# Patient Record
Sex: MALE | Race: WHITE | Employment: OTHER | URBAN - METROPOLITAN AREA
[De-identification: names, ages, dates, MRNs, and addresses within clinical notes are randomized per-mention and may not be internally consistent; named-entity substitution may affect disease eponyms.]

---

## 2017-01-16 ENCOUNTER — TELEPHONE (OUTPATIENT)
Dept: FAMILY MEDICINE CLINIC | Age: 72
End: 2017-01-16

## 2017-01-16 NOTE — TELEPHONE ENCOUNTER
Patient states that he would like to request a antibiotic called in for him, patient states that he is having the same symptoms as he did the last time an antibiotic was called in for him (chest cold, coughing up green phlegm). Patient states that he would also like this medication called in before 5pm today, advised patient of Dr. Yumiko song on Mondays. Patient states that he would just like this done as quickly as possible.      Best call back # for patient: [phone]

## 2017-01-17 RX ORDER — AMOXICILLIN AND CLAVULANATE POTASSIUM 875; 125 MG/1; MG/1
1 TABLET, FILM COATED ORAL 2 TIMES DAILY
Qty: 20 TAB | Refills: 0 | Status: SHIPPED | OUTPATIENT
Start: 2017-01-17 | End: 2017-01-27

## 2017-05-11 ENCOUNTER — OFFICE VISIT (OUTPATIENT)
Dept: FAMILY MEDICINE CLINIC | Age: 72
End: 2017-05-11

## 2017-05-11 VITALS
RESPIRATION RATE: 16 BRPM | DIASTOLIC BLOOD PRESSURE: 62 MMHG | SYSTOLIC BLOOD PRESSURE: 118 MMHG | TEMPERATURE: 97.4 F | BODY MASS INDEX: 26.93 KG/M2 | WEIGHT: 188.13 LBS | OXYGEN SATURATION: 98 % | HEART RATE: 73 BPM | HEIGHT: 70 IN

## 2017-05-11 DIAGNOSIS — D64.9 ANEMIA, UNSPECIFIED TYPE: ICD-10-CM

## 2017-05-11 DIAGNOSIS — E11.65 TYPE 2 DIABETES MELLITUS WITH HYPERGLYCEMIA, WITHOUT LONG-TERM CURRENT USE OF INSULIN (HCC): Primary | ICD-10-CM

## 2017-05-11 DIAGNOSIS — E78.5 HYPERLIPIDEMIA LDL GOAL <100: ICD-10-CM

## 2017-05-11 DIAGNOSIS — Z23 ENCOUNTER FOR IMMUNIZATION: ICD-10-CM

## 2017-05-11 DIAGNOSIS — E55.9 VITAMIN D DEFICIENCY: ICD-10-CM

## 2017-05-11 RX ORDER — LATANOPROST 50 UG/ML
SOLUTION/ DROPS OPHTHALMIC
Refills: 5 | COMMUNITY
Start: 2017-03-08 | End: 2017-08-03 | Stop reason: ALTCHOICE

## 2017-05-11 NOTE — PROGRESS NOTES
Patient Name: Josh Shah   MRN: [id number]    Francesco [place] is a 73 y.o. male who presents with the following: Transferring care from prior PCP Dr. Tono Lujan. He is seen for diabetes, hypertension, hyperlipidemia, history of prior MI, Atrial Fibrillation and history of TIA's. Since last visit he reports no chest pain, dyspnea or TIA's, no numbness, tingling or pain in extremities, no unusual visual symptoms, no hypoglycemia, no medication side effects noted. Home glucose monitoring: is performed regularly, fasting values range 100-200s. He reports medication compliance: compliant all of the time. Medication side effects: none. Diabetic diet compliance: compliant all of the time. Reports that he starting to get steroid injection into his eye last fall and noticed glucose levels are high afterwards. No longer will be getting steroid injections; previously did not take glyburide on a regular basis due to well controlled A1C. Lab Results   Component Value Date/Time    Hemoglobin A1c 7.8 11/09/2016 11:05 AM    Hemoglobin A1c (POC) 6.3 08/02/2016 12:53 PM     History of vitamin D deficiency, not currently on supplements. States that the weekly high-dose vitamin D caused insomnia for several weeks. Would prefer a daily dose instead. History of iron deficiency anemia. Previously has been admitted for anemia requiring blood transfusion. 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 Saw oncology who recommended either monitoring labs or doing a bone marrow biopsy. Patient has not yet done a bone marrow biopsy. Of note, he also has a history of abnormal free light chains which were thought to be attributable to his chronic kidney disease. Patient reports during the time he was diagnosed with anemia, he felt very fatigued and ill but currently these days he feels well. Has upcoming GI appt.     Lab Results   Component Value Date/Time    WBC 5.6 08/09/2016 02:55 PM HGB 9.3 08/09/2016 02:55 PM    HCT 30.1 08/09/2016 02:55 PM    PLATELET 747 27/00/7663 02:55 PM    MCV 82 08/09/2016 02:55 PM     Lab Results   Component Value Date/Time    Iron 47 07/06/2016 03:33 PM    TIBC 395 07/06/2016 03:33 PM    Iron % saturation 12 07/06/2016 03:33 PM    Ferritin 158 07/06/2016 03:33 PM         Review of Systems   Constitutional: Negative for fever, malaise/fatigue and weight loss. Respiratory: Negative for cough, hemoptysis, shortness of breath and wheezing. Cardiovascular: Negative for chest pain, palpitations, leg swelling and PND. Gastrointestinal: Negative for abdominal pain, constipation, diarrhea, nausea and vomiting. The patient's medications, allergies, past medical history, surgical history, family history and social history were reviewed and updated where appropriate. Prior to Admission medications    Medication Sig Start Date End Date Taking? Authorizing Provider   latanoprost (XALATAN) 0.005 % ophthalmic solution INSTILL ONE DROP INTO RIGHT EYE AT BEDTIME 3/8/17  Yes Historical Provider   glyBURIDE (DIABETA) 1.25 mg tablet Take 1 Tab by mouth Daily (before breakfast). 11/10/16  Yes Brittany Mathis DO   colchicine 0.6 mg tablet TAKE 1 TABLET BY MOUTH DAILY AS NEEDED 10/10/16  Yes Historical Provider   digoxin (LANOXIN) 0.125 mg tablet Take 1 Tab by mouth daily. 8/25/16  Yes Brittany Mathis DO   furosemide (LASIX) 40 mg tablet 40 mg daily. 5/10/16  Yes Historical Provider   hydrALAZINE (APRESOLINE) 25 mg tablet 25 mg three (3) times daily. 5/20/16  Yes Historical Provider   Febuxostat (ULORIC) 80 mg tab tablet Take 80 mg by mouth daily. Yes Historical Provider   omeprazole (PRILOSEC) 20 mg capsule Take 20 mg by mouth daily as needed. Yes Historical Provider   multivitamin (ONE A DAY) tablet Take 1 Tab by mouth daily.    Yes Historical Provider   Shelda Older TEST strip  1/29/16  Yes Historical Provider   glucose blood VI test strips (BLOOD GLUCOSE TEST) strip Per patient insurance and meter requirements. Testing 4 times per day. 2/26/16  Yes Yuliet Graft, DO   apixaban (ELIQUIS) 5 mg tablet Take 1 Tab by mouth two (2) times a day. 1/18/16  Yes Slick Forde NP   pravastatin (PRAVACHOL) 20 mg tablet Take 1 Tab by mouth nightly. 1/18/16  Yes Slick Forde NP   carvedilol (COREG) 3.125 mg tablet Take 1 Tab by mouth two (2) times daily (with meals). 1/18/16  Yes Slick Forde NP       Allergies   Allergen Reactions    Epinephrine Palpitations    Other Medication Other (comments)     Novacaine  Causes tachycardia           Past Medical History:   Diagnosis Date    Acute encephalopathy 1/14/2016    Anemia 5/11/2017    Atrial fibrillation (HCC)     Painter esophagus     CAD (coronary artery disease)     Diabetes (HCC)     Heart failure (HCC)     Cardiomyopathy, myocarditis    HTN, goal below 140/90     Retinopathy, diabetic, bilateral ([address] 3/11/2016    Stenosis of right carotid artery without cerebral infarction 1/18/2016    TIA (transient ischemic attack) 1/14/2016    Vitamin D deficiency 3/1/2016    Nephrology ordered and follow 2/22/16        Past Surgical History:   Procedure Laterality Date    COLONOSCOPY N/A 6/23/2016    COLONOSCOPY performed by Artis Hawkins MD at [facility] END[facility]COPY    HX UROLOGICAL  2013       Family History   Problem Relation Age of Onset    No Known Problems Mother     No Known Problems Father        Social History     Social History    Marital status:      Spouse name: N/A    Number of children: N/A    Years of education: N/A     Occupational History    Not on file.      Social History Main Topics    Smoking status: Former Smoker    Smokeless tobacco: Never Used    Alcohol use Yes      Comment: socially    Drug use: No    Sexual activity: Not Currently     Other Topics Concern    Not on file     Social History Narrative    ** Merged History Encounter **                OBJECTIVE    Visit Vitals  /62 (BP 1 Location: Right arm, BP Patient Position: Sitting)    Pulse 73    Temp 97.4 °F (36.3 °C) (Oral)    Resp 16    Ht 5' 10\" (1.778 m)    Wt 188 lb 2 oz (85.3 kg)    SpO2 98%    BMI 26.99 kg/m2       Physical Exam   Constitutional: He is well-developed, well-nourished, and in no distress. No distress. HENT:   Head: Normocephalic and atraumatic. Right Ear: External ear normal.   Left Ear: External ear normal.   Eyes: Conjunctivae and EOM are normal. Pupils are equal, round, and reactive to light. Cardiovascular: Normal rate and normal heart sounds. Exam reveals no gallop and no friction rub. No murmur heard. Irregularly irregular   Pulmonary/Chest: Effort normal and breath sounds normal. No respiratory distress. He has no wheezes. Skin: He is not diaphoretic. Psychiatric: Mood, memory, affect and judgment normal.   Nursing note and vitals reviewed. ASSESSMENT AND PLAN  Betty Harrell is a 73 y.o. male who presents today for:    1. Type 2 diabetes mellitus with hyperglycemia, without long-term current use of insulin (HCC)  Stable, continue current treatment pending review of labs. - HEMOGLOBIN A1C WITH EAG  - MICROALBUMIN, UR, RAND W/ MICROALBUMIN/CREA RATIO    2. Hyperlipidemia LDL goal <100  Will calculate ASCVD risk score pending labs. - METABOLIC PANEL, COMPREHENSIVE  - LIPID PANEL    3. Vitamin D deficiency  Stable, continue current treatment pending review of labs  - VITAMIN D, 25 HYDROXY    4. Anemia, unspecified type  Will reevaluate; if still abnormal, pt to schedule bone marrow biopsy and/or see oncology again. Pt to keep upcoming GI appt. - CBC WITH AUTOMATED DIFF    5. Encounter for immunization  - diph,Pertuss,AC,,Tet Vac-PF (BOOSTRIX) 2.5-8-5 Lf-mcg suspension; 0.5 mL by IntraMUSCular route once for 1 dose.  Please fax vaccination documentation to [phone] Attn: Dr. Pedro Centeno  Dispense: 0.5 mL; Refill: 0  - pneumococcal 13 rodney conj dip (PREVNAR 13, PF,) 0.5 mL syrg injection; 0.5 mL by IntraMUSCular route once for 1 dose. Please fax vaccination documentation to [phone] Attn: Dr. Svitlana Jennings  Dispense: 0.5 mL; Refill: 0  - varicella zoster vacine live (VARICELLA-ZOSTER VACINE LIVE) 19,400 unit/0.65 mL susr injection; 1 Vial by SubCUTAneous route once for 1 dose. Dispense: 0.65 mL; Refill: 0       Medications Discontinued During This Encounter   Medication Reason    Melatonin 300 mcg tab Not A Current Medication       Follow-up Disposition:  Return in about 3 months (around 8/11/2017) for DM follow up. Medication risks/benefits/costs/interactions/alternatives discussed with patient. Advised patient to call back or return to office if symptoms worsen/change/persist. If patient cannot reach us or should anything more severe/urgent arise he/she should proceed directly to the nearest emergency department. Discussed expected course/resolution/complications of diagnosis in detail with patient. Patient given a written after visit summary which includes his/her diagnoses, current medications and vitals. Patient expressed understanding with the diagnosis and plan.      Svitlana Jennings M.D.

## 2017-05-11 NOTE — PATIENT INSTRUCTIONS

## 2017-05-11 NOTE — MR AVS SNAPSHOT
Visit Information Date & Time Provider Department Dept. Phone Encounter #  
 5/11/2017 10:30 AM Mamta March MD [address] [place] [address] Avenue [phone] [id number] Follow-up Instructions Return in about 3 months (around 8/11/2017) for DM follow up. Upcoming Health Maintenance Date Due DTaP/Tdap/Td series (1 - Tdap) 10/26/1968 ZOSTER VACCINE AGE 60> 10/26/2007 Pneumococcal 65+ Low/Medium Risk (1 of 2 - PCV13) 10/26/2012 LIPID PANEL Q1 1/15/2017 FOOT EXAM Q1 2/26/2017 MICROALBUMIN Q1 4/29/2017 EYE EXAM RETINAL OR DILATED Q1 4/29/2017 HEMOGLOBIN A1C Q6M 5/9/2017 FOBT Q 1 YEAR AGE 50-75 5/25/2017 INFLUENZA AGE 9 TO ADULT 8/1/2017 MEDICARE YEARLY EXAM 11/10/2017 GLAUCOMA SCREENING Q2Y 4/29/2018 Allergies as of 5/11/2017  Review Complete On: 5/11/2017 By: Kaur Mohan LPN Severity Noted Reaction Type Reactions Epinephrine  07/06/2016    Palpitations Other Medication  03/03/2011    Other (comments) Novacaine Causes tachycardia Current Immunizations  Reviewed on 11/9/2016 Name Date Influenza High Dose Vaccine PF 2/26/2016 Not reviewed this visit You Were Diagnosed With   
  
 Codes Comments Vitamin D deficiency    -  Primary ICD-10-CM: E55.9 ICD-9-CM: 268.9 Type 2 diabetes mellitus with hyperglycemia, without long-term current use of insulin (HCC)     ICD-10-CM: E11.65 ICD-9-CM: 250.00, 790.29 Hyperlipidemia LDL goal <100     ICD-10-CM: E78.5 ICD-9-CM: 272.4 Anemia, unspecified type     ICD-10-CM: D64.9 ICD-9-CM: 515. 9 Vitals BP Pulse Temp Resp Height(growth percentile) Weight(growth percentile)  
 118/62 (BP 1 Location: Right arm, BP Patient Position: Sitting) 73 97.4 °F (36.3 °C) (Oral) 16 5' 10\" (1.778 m) 188 lb 2 oz (85.3 kg) SpO2 BMI Smoking Status 98% 26.99 kg/m2 Former Smoker Vitals History BMI and BSA Data Body Mass Index Body Surface Area  
 26.99 kg/m 2 2.05 m 2 Preferred Pharmacy Pharmacy Name Phone [id number] Maggie Morales, [address] [facility] [phone] Your Updated Medication List  
  
   
This list is accurate as of: 5/11/17 11:29 AM.  Always use your most recent med list.  
  
  
  
  
 apixaban 5 mg tablet Commonly known as:  Osvaldo Slough Take 1 Tab by mouth two (2) times a day. carvedilol 3.125 mg tablet Commonly known as:  Geannie Clos Take 1 Tab by mouth two (2) times daily (with meals). colchicine 0.6 mg tablet TAKE 1 TABLET BY MOUTH DAILY AS NEEDED  
  
 digoxin 0.125 mg tablet Commonly known as:  LANOXIN Take 1 Tab by mouth daily. febuxostat 80 mg Tab tablet Commonly known as:  Tura Moons Take 80 mg by mouth daily. furosemide 40 mg tablet Commonly known as:  LASIX 40 mg daily. glyBURIDE 1.25 mg tablet Commonly known as:  Basil Quarto Take 1 Tab by mouth Daily (before breakfast). hydrALAZINE 25 mg tablet Commonly known as:  APRESOLINE 25 mg three (3) times daily. latanoprost 0.005 % ophthalmic solution Commonly known as:  XALATAN  
INSTILL ONE DROP INTO RIGHT EYE AT BEDTIME  
  
 multivitamin tablet Commonly known as:  ONE A DAY Take 1 Tab by mouth daily. omeprazole 20 mg capsule Commonly known as:  PRILOSEC Take 20 mg by mouth daily as needed. * ONETOUCH ULTRA TEST strip Generic drug:  glucose blood VI test strips * glucose blood VI test strips strip Commonly known as:  blood glucose test  
Per patient insurance and meter requirements. Testing 4 times per day. pravastatin 20 mg tablet Commonly known as:  PRAVACHOL Take 1 Tab by mouth nightly. * Notice: This list has 2 medication(s) that are the same as other medications prescribed for you. Read the directions carefully, and ask your doctor or other care provider to review them with you. We Performed the Following CBC WITH AUTOMATED DIFF [63145 CPT(R)] HEMOGLOBIN A1C WITH EAG [61228 CPT(R)] LIPID PANEL [18853 CPT(R)] METABOLIC PANEL, COMPREHENSIVE [49729 CPT(R)] MICROALBUMIN, UR, RAND W/ MICROALBUMIN/CREA RATIO [id number] CPT(R)] VITAMIN D, 25 HYDROXY [id number] CPT(R)] Follow-up Instructions Return in about 3 months (around 8/11/2017) for DM follow up. Patient Instructions Learning About Meal Planning for Diabetes Why plan your meals? Meal planning can be a key part of managing diabetes. Planning meals and snacks with the right balance of carbohydrate, protein, and fat can help you keep your blood sugar at the target level you set with your doctor. You don't have to eat special foods. You can eat what your family eats, including sweets once in a while. But you do have to pay attention to how often you eat and how much you eat of certain foods. You may want to work with a dietitian or a certified diabetes educator. He or she can give you tips and meal ideas and can answer your questions about meal planning. This health professional can also help you reach a healthy weight if that is one of your goals. What plan is right for you? Your dietitian or diabetes educator may suggest that you start with the plate format or carbohydrate counting. The plate format The plate format is a simple way to help you manage how you eat. You plan meals by learning how much space each food should take on a plate. Using the plate format helps you spread carbohydrate throughout the day. It can make it easier to keep your blood sugar level within your target range. It also helps you see if you're eating healthy portion sizes. To use the plate format, you put non-starchy vegetables on half your plate. Add meat or meat substitutes on one-quarter of the plate.  Put a grain or starchy vegetable (such as brown rice or a potato) on the final quarter of the plate. You can add a small piece of fruit and some low-fat or fat-free milk or yogurt, depending on your carbohydrate goal for each meal. 
Here are some tips for using the plate format: · Make sure that you are not using an oversized plate. A 9-inch plate is best. Many restaurants use larger plates. · Get used to using the plate format at home. Then you can use it when you eat out. · Write down your questions about using the plate format. Talk to your doctor, a dietitian, or a diabetes educator about your concerns. Carbohydrate counting With carbohydrate counting, you plan meals based on the amount of carbohydrate in each food. Carbohydrate raises blood sugar higher and more quickly than any other nutrient. It is found in desserts, breads and cereals, and fruit. It's also found in starchy vegetables such as potatoes and corn, grains such as rice and pasta, and milk and yogurt. Spreading carbohydrate throughout the day helps keep your blood sugar levels within your target range. Your daily amount depends on several things, including your weight, how active you are, which diabetes medicines you take, and what your goals are for your blood sugar levels. A registered dietitian or diabetes educator can help you plan how much carbohydrate to include in each meal and snack. A guideline for your daily amount of carbohydrate is: · 45 to 60 grams at each meal. That's about the same as 3 to 4 carbohydrate servings. · 15 to 20 grams at each snack. That's about the same as 1 carbohydrate serving. The Nutrition Facts label on packaged foods tells you how much carbohydrate is in a serving of the food. First, look at the serving size on the food label. Is that the amount you eat in a serving? All of the nutrition information on a food label is based on that serving size. So if you eat more or less than that, you'll need to adjust the other numbers. Total carbohydrate is the next thing you need to look for on the label. If you count carbohydrate servings, one serving of carbohydrate is 15 grams. For foods that don't come with labels, such as fresh fruits and vegetables, you'll need a guide that lists carbohydrate in these foods. Ask your doctor, dietitian, or diabetes educator about books or other nutrition guides you can use. If you take insulin, you need to know how many grams of carbohydrate are in a meal. This lets you know how much rapid-acting insulin to take before you eat. If you use an insulin pump, you get a constant rate of insulin during the day. So the pump must be programmed at meals to give you extra insulin to cover the rise in blood sugar after meals. When you know how much carbohydrate you will eat, you can take the right amount of insulin. Or, if you always use the same amount of insulin, you need to make sure that you eat the same amount of carbohydrate at meals. If you need more help to understand carbohydrate counting and food labels, ask your doctor, dietitian, or diabetes educator. How do you get started with meal planning? Here are some tips to get started: 
· Plan your meals a week at a time. Don't forget to include snacks too. · Use cookbooks or online recipes to plan several main meals. Plan some quick meals for busy nights. You also can double some recipes that freeze well. Then you can save half for other busy nights when you don't have time to cook. · Make sure you have the ingredients you need for your recipes. If you're running low on basic items, put these items on your shopping list too. · List foods that you use to make breakfasts, lunches, and snacks. List plenty of fruits and vegetables. · Post this list on the refrigerator. Add to it as you think of more things you need. · Take the list to the store to do your weekly shopping. Follow-up care is a key part of your treatment and safety.  Be sure to make and go to all appointments, and call your doctor if you are having problems. It's also a good idea to know your test results and keep a list of the medicines you take. Where can you learn more? Go to http://tanika-claudia.info/. Kishan Derrick in the search box to learn more about \"Learning About Meal Planning for Diabetes. \" Current as of: October 26, 2016 Content Version: 11.2 © 6070-9657 [employer]. Care instructions adapted under license by [employer] (which disclaims liability or warranty for this information). If you have questions about a medical condition or this instruction, always ask your healthcare professional. [address] any warranty or liability for your use of this information. Introducing [facility]! Dear Kelechi July: Thank you for requesting a Ondeego account. Our records indicate that you already have an active Ondeego account. You can access your account anytime at [email or web address] Did you know that you can access your hospital and ER discharge instructions at any time in Ondeego? You can also review all of your test results from your hospital stay or ER visit. Additional Information If you have questions, please visit the Frequently Asked Questions section of the Ondeego website at [email or web address] Remember, Ondeego is NOT to be used for urgent needs. For medical emergencies, dial 911. Now available from your iPhone and Android! Please provide this summary of care documentation to your next provider. Your primary care clinician is listed as Amy Thacker. If you have any questions after today's visit, please call [phone].

## 2017-05-11 NOTE — PROGRESS NOTES
Patient presents in office today for routine office visit and to establish care with provider    Chief Complaint   Patient presents with    Diabetes     routine office visit    Cholesterol Problem     routine office visit   [address] [facility] transfer     1. Have you been to the ER, urgent care clinic since your last visit? Hospitalized since your last visit? No    2. Have you seen or consulted any other health care providers outside of the [address] [place] [address] since your last visit? Include any pap smears or colon screening. No     PHQ over the last two weeks 5/11/2017   Little interest or pleasure in doing things Not at all   Feeling down, depressed or hopeless Not at all   Total Score PHQ 2 0     Fall Risk Assessment, last 12 mths 5/11/2017   Able to walk? Yes   Fall in past 12 months?  No       Learning Assessment 5/11/2017   PRIMARY LEARNER Patient   PRIMARY LANGUAGE ENGLISH   LEARNER PREFERENCE PRIMARY OTHER (COMMENT)   ANSWERED BY patient   RELATIONSHIP SELF       Vitals:    05/11/17 1054   BP: 118/62   BP 1 Location: Right arm   BP Patient Position: Sitting   Pulse: 73   Resp: 16   Temp: 97.4 °F (36.3 °C)   TempSrc: Oral   SpO2: 98%   Weight: 188 lb 2 oz (85.3 kg)   Height: 5' 10\" (1.778 m)

## 2017-05-12 LAB
25(OH)D3+25(OH)D2 SERPL-MCNC: 26.9 NG/ML (ref 30–100)
ALBUMIN SERPL-MCNC: 4.6 G/DL (ref 3.6–4.8)
ALBUMIN/CREAT UR: 1339.5 MG/G CREAT (ref 0–30)
ALBUMIN/GLOB SERPL: 1.3 {RATIO} (ref 1.2–2.2)
ALP SERPL-CCNC: 64 IU/L (ref 39–117)
ALT SERPL-CCNC: 23 IU/L (ref 0–44)
AST SERPL-CCNC: 27 IU/L (ref 0–40)
BASOPHILS # BLD AUTO: 0 X10E3/UL (ref 0–0.2)
BASOPHILS NFR BLD AUTO: 0 %
BILIRUB SERPL-MCNC: 0.5 MG/DL (ref 0–1.2)
BUN SERPL-MCNC: 51 MG/DL (ref 8–27)
BUN/CREAT SERPL: 26 (ref 10–24)
CALCIUM SERPL-MCNC: 9.4 MG/DL (ref 8.6–10.2)
CHLORIDE SERPL-SCNC: 97 MMOL/L (ref 96–106)
CHOLEST SERPL-MCNC: 123 MG/DL (ref 100–199)
CO2 SERPL-SCNC: 25 MMOL/L (ref 18–29)
CREAT SERPL-MCNC: 1.96 MG/DL (ref 0.76–1.27)
CREAT UR-MCNC: 27.6 MG/DL
EOSINOPHIL # BLD AUTO: 0.1 X10E3/UL (ref 0–0.4)
EOSINOPHIL NFR BLD AUTO: 2 %
ERYTHROCYTE [DISTWIDTH] IN BLOOD BY AUTOMATED COUNT: 14.1 % (ref 12.3–15.4)
EST. AVERAGE GLUCOSE BLD GHB EST-MCNC: 209 MG/DL
GLOBULIN SER CALC-MCNC: 3.5 G/DL (ref 1.5–4.5)
GLUCOSE SERPL-MCNC: 314 MG/DL (ref 65–99)
HBA1C MFR BLD: 8.9 % (ref 4.8–5.6)
HCT VFR BLD AUTO: 38 % (ref 37.5–51)
HDLC SERPL-MCNC: 31 MG/DL
HGB BLD-MCNC: 12.1 G/DL (ref 12.6–17.7)
IMM GRANULOCYTES # BLD: 0 X10E3/UL (ref 0–0.1)
IMM GRANULOCYTES NFR BLD: 0 %
INTERPRETATION, 910389: NORMAL
INTERPRETATION: NORMAL
LDLC SERPL CALC-MCNC: 76 MG/DL (ref 0–99)
LYMPHOCYTES # BLD AUTO: 1 X10E3/UL (ref 0.7–3.1)
LYMPHOCYTES NFR BLD AUTO: 17 %
MCH RBC QN AUTO: 29.2 PG (ref 26.6–33)
MCHC RBC AUTO-ENTMCNC: 31.8 G/DL (ref 31.5–35.7)
MCV RBC AUTO: 92 FL (ref 79–97)
MICROALBUMIN UR-MCNC: 369.7 UG/ML
MONOCYTES # BLD AUTO: 0.4 X10E3/UL (ref 0.1–0.9)
MONOCYTES NFR BLD AUTO: 6 %
NEUTROPHILS # BLD AUTO: 4.3 X10E3/UL (ref 1.4–7)
NEUTROPHILS NFR BLD AUTO: 75 %
PDF IMAGE, 910387: NORMAL
PLATELET # BLD AUTO: 165 X10E3/UL (ref 150–379)
POTASSIUM SERPL-SCNC: 5 MMOL/L (ref 3.5–5.2)
PROT SERPL-MCNC: 8.1 G/DL (ref 6–8.5)
RBC # BLD AUTO: 4.15 X10E6/UL (ref 4.14–5.8)
SODIUM SERPL-SCNC: 138 MMOL/L (ref 134–144)
TRIGL SERPL-MCNC: 78 MG/DL (ref 0–149)
VLDLC SERPL CALC-MCNC: 16 MG/DL (ref 5–40)
WBC # BLD AUTO: 5.8 X10E3/UL (ref 3.4–10.8)

## 2017-05-16 NOTE — PROGRESS NOTES
Call to patient  verified. Informed patient of all results and recommendations. Patient will contact nephrologist regarding lisinopril 2.5 mg  Scheduled patient for  with Dr. Gardenia Garcia.

## 2017-05-25 ENCOUNTER — OFFICE VISIT (OUTPATIENT)
Dept: FAMILY MEDICINE CLINIC | Age: 72
End: 2017-05-25

## 2017-05-25 VITALS
BODY MASS INDEX: 26.6 KG/M2 | HEART RATE: 72 BPM | HEIGHT: 70 IN | DIASTOLIC BLOOD PRESSURE: 79 MMHG | SYSTOLIC BLOOD PRESSURE: 186 MMHG | WEIGHT: 185.8 LBS

## 2017-05-25 RX ORDER — INSULIN GLARGINE 100 [IU]/ML
14 INJECTION, SOLUTION SUBCUTANEOUS DAILY
Qty: 5 PEN | Refills: 3 | Status: SHIPPED | OUTPATIENT
Start: 2017-05-25 | End: 2018-05-27 | Stop reason: SDUPTHER

## 2017-05-25 RX ORDER — PEN NEEDLE, DIABETIC 31 GX3/16"
1 NEEDLE, DISPOSABLE MISCELLANEOUS DAILY
Qty: 100 PEN NEEDLE | Refills: 3 | Status: SHIPPED | OUTPATIENT
Start: 2017-05-25 | End: 2018-08-04 | Stop reason: SDUPTHER

## 2017-05-25 RX ORDER — BLOOD SUGAR DIAGNOSTIC
STRIP MISCELLANEOUS
Qty: 90 STRIP | Refills: 11 | Status: SHIPPED | OUTPATIENT
Start: 2017-05-25 | End: 2018-08-29

## 2017-05-25 NOTE — MR AVS SNAPSHOT
Visit Information Date & Time Provider Department Dept. Phone Encounter #  
 5/25/2017 11:00 AM Gladys Chan MD [address] [place] [address] [phone] [id number] Your Appointments 8/11/2017 10:00 AM  
ROUTINE CARE with Lewis Knight MD  
[facility]) Appt Note: 3 mo follow up  
 [address] [address]  
[phone]  
  
   
 [address] [address] Upcoming Health Maintenance Date Due DTaP/Tdap/Td series (1 - Tdap) 10/26/1968 ZOSTER VACCINE AGE 60> 10/26/2007 Pneumococcal 65+ Low/Medium Risk (1 of 2 - PCV13) 10/26/2012 FOOT EXAM Q1 2/26/2017 EYE EXAM RETINAL OR DILATED Q1 4/29/2017 FOBT Q 1 YEAR AGE 50-75 5/25/2017 INFLUENZA AGE 9 TO ADULT 8/1/2017 MEDICARE YEARLY EXAM 11/10/2017 HEMOGLOBIN A1C Q6M 11/11/2017 GLAUCOMA SCREENING Q2Y 4/29/2018 MICROALBUMIN Q1 5/11/2018 LIPID PANEL Q1 5/11/2018 Allergies as of 5/25/2017  Review Complete On: 5/25/2017 By: Gladys Chan MD  
  
 Severity Noted Reaction Type Reactions Epinephrine  07/06/2016    Palpitations Other Medication  03/03/2011    Other (comments) Novacaine Causes tachycardia Current Immunizations  Reviewed on 11/9/2016 Name Date Influenza High Dose Vaccine PF 2/26/2016 Not reviewed this visit You Were Diagnosed With   
  
 Codes Comments Uncontrolled type 2 diabetes mellitus with stage 4 chronic kidney disease, with long-term current use of insulin (HCC)    -  Primary ICD-10-CM: E11.22, E11.65, N18.4, Z79.4 ICD-9-CM: 250.52, 585.4, V58.67 Vitals BP Pulse Height(growth percentile) Weight(growth percentile) BMI Smoking Status 186/79 (BP 1 Location: Right arm, BP Patient Position: Sitting) 72 5' 10\" (1.778 m) 185 lb 12.8 oz (84.3 kg) 26.66 kg/m2 Former Smoker Vitals History BMI and BSA Data Body Mass Index Body Surface Area 26.66 kg/m 2 2.04 m 2 Preferred Pharmacy Pharmacy Name Phone [facility]/PHARMACY #[id number] [facility] [phone] Your Updated Medication List  
  
   
This list is accurate as of: 5/25/17 12:34 PM.  Always use your most recent med list.  
  
  
  
  
 apixaban 5 mg tablet Commonly known as:  Manuel Denice Take 1 Tab by mouth two (2) times a day. carvedilol 3.125 mg tablet Commonly known as:  Antionette Combe Take 1 Tab by mouth two (2) times daily (with meals). colchicine 0.6 mg tablet TAKE 1 TABLET BY MOUTH DAILY AS NEEDED  
  
 digoxin 0.125 mg tablet Commonly known as:  LANOXIN Take 1 Tab by mouth daily. febuxostat 80 mg Tab tablet Commonly known as:  Annye Hoots Take 80 mg by mouth daily. furosemide 40 mg tablet Commonly known as:  LASIX 40 mg daily. * glucose blood VI test strips strip Commonly known as:  blood glucose test  
Per patient insurance and meter requirements. Testing 4 times per day. * ONETOUCH ULTRA TEST strip Generic drug:  glucose blood VI test strips Test blood sugar 3 times daily  
  
 hydrALAZINE 25 mg tablet Commonly known as:  APRESOLINE 25 mg three (3) times daily. insulin glargine 100 unit/mL (3 mL) Inpn Commonly known as:  BASAGLAR KWIKPEN  
14 Units by SubCUTAneous route daily. Indications: type 2 diabetes mellitus Insulin Needles (Disposable) 32 gauge x 5/32\" Ndle Commonly known as:  COMFORT EZ PEN NEEDLES  
1 Each by Does Not Apply route daily. latanoprost 0.005 % ophthalmic solution Commonly known as:  XALATAN  
INSTILL ONE DROP INTO RIGHT EYE AT BEDTIME  
  
 multivitamin tablet Commonly known as:  ONE A DAY Take 1 Tab by mouth daily. omeprazole 20 mg capsule Commonly known as:  PRILOSEC Take 20 mg by mouth daily as needed. pravastatin 20 mg tablet Commonly known as:  PRAVACHOL Take 1 Tab by mouth nightly. * Notice: This list has 2 medication(s) that are the same as other medications prescribed for you. Read the directions carefully, and ask your doctor or other care provider to review them with you. Prescriptions Sent to Pharmacy Refills ONETOUCH ULTRA TEST strip 11 Sig: Test blood sugar 3 times daily Class: Normal  
 Pharmacy: [facility]/[address], [address] [place] [address] Ph #: [phone] insulin glargine (BASAGLAR KWIKPEN) 100 unit/mL (3 mL) inpn 3 Si Units by SubCUTAneous route daily. Indications: type 2 diabetes mellitus Class: Normal  
 Pharmacy: [facility]/[address], [address] [place] [address] Ph #: [phone] Route: SubCUTAneous Insulin Needles, Disposable, (COMFORT EZ PEN NEEDLES) 32 gauge x 5/32\" ndle 3 Si Each by Does Not Apply route daily. Class: Normal  
 Pharmacy: [address] Ph #: [phone] Route: Does Not Apply We Performed the Following Jaelyn 6 GLUCOSE FLOWSHEET [id number] CPT(R)]  DIABETES FOOT EXAM [7 Custom] Patient Instructions 1)  Please start checking your blood sugar first thing in the morning and then before bedtime. 2)  Stop taking your glyburide and start taking Basiglar 14 units under the skin every morning. Introducing [facility] & HEALTH SERVICES! Dear Jonel Mena: Thank you for requesting a [employer] account. Our records indicate that you already have an active [employer] account. You can access your account anytime at [email or web address] Did you know that you can access your hospital and ER discharge instructions at any time in [employer]ak[employer]? You can also review all of your test results from your hospital stay or ER visit. Additional Information If you have questions, please visit the Frequently Asked Questions section of the [employer] website at [email or web address] Remember, [employer] is NOT to be used for urgent needs. For medical emergencies, dial 911. Now available from your iPhone and Android! Please provide this summary of care documentation to your next provider. Your primary care clinician is listed as Amy Thacker. If you have any questions after today's visit, please call [phone].

## 2017-05-25 NOTE — PROGRESS NOTES
Chief Complaint   Patient presents with    New Patient    Diabetes     Type II diabetes. Last A1C - 8.9% - 5/11/17. Patient is testing BS 3 times daily    Diabetic Foot Exam     Patient is due for a foot exam     Other     Patient states that he had an eye exam - May 2017 - Dr. Christian Lamar     Next microalbumin is due 5/11/18       HPI  This is a 42-year-old white male with a history of type 2 diabetes mellitus diagnosed in 1989. He associates the diagnosis of diabetes with the initiation of carvedilol therapy for atrial fibrillation. He has been on carvedilol at varying doses for many years. His antihyperglycemic medication has been exclusively glyburide in varying doses. For quite some time his hemoglobin A1c's were well controlled on glyburide. More recently his hemoglobin A1c's have been increasing. His dose of carvedilol was decreased and the decrease in carvedilol was associated with an improvement in A1c. However it is important to point out that he also was hospitalized and received transfusions and so the reduction in hemoglobin A1c was likely artificial.  More recently, his A1c's have increased and his most recent value was 8.9% in early May. He has been trying to manage his diet with 1.25 mg of glyburide in the morning and dietary changes. He is on the road in his job as a . In general he checks her blood sugar in the morning. Recently those blood sugars have ranged between 180 and 280. He had a recent random blood sugar in his primary care physician's office of 312. His breakfast is usually eggs. Depending on the elevation in blood sugar in the morning he may or may not have a piece of bread or occasionally some leftover vegetables. He may or may not have lunch. If he does so it could be a sandwich and some flavored water. If his blood sugar is high that would be another day where he did not eat the bread but ate the meat and cheese.   He occasionally has a pack of peanut butter crackers in the afternoon and more flavored water. The evening meal is the largest meal of the day. Most often this is meat and chicken. Most of his vegetables are non-starchy vegetables including CABG, squash, peppers, onions and broccoli. He does not like [place] sprouts. He occasionally has corn. He may have a piece of dark chocolate in the evening. Usually does not snack at night although he occasionally has a low-carb yogurt. He states that he eats a lot of cheese. He has occasional episodes of hypoglycemia. The symptoms are described as tingling of the lips. He does not get palpitations or diaphoresis. ROS  He denies chest pain. He does have some diastolic heart failure and some shortness of breath. He denies constipation or diarrhea.   Visit Vitals    /79 (BP 1 Location: Right arm, BP Patient Position: Sitting)    Pulse 72    Ht 5' 10\" (1.778 m)    Wt 185 lb 12.8 oz (84.3 kg)    BMI 26.66 kg/m2       Physical Examination: General appearance - alert, well appearing, and in no distress  Mental status - alert, oriented to person, place, and time  Neck - supple, no significant adenopathy, thyroid exam: thyroid is normal in size without nodules or tenderness  Chest - clear to auscultation, no wheezes, rales or rhonchi, symmetric air entry  Heart - no murmurs noted, irregularly irregular rhythm with rate 86  Abdomen - soft, nontender, nondistended, no masses or organomegaly  Extremities - peripheral pulses normal, no pedal edema, no clubbing or cyanosis    Diabetic foot exam:     Left: Reflexes 4+     Vibratory sensation absent    Filament test reduced sensation with micro filament   Pulse DP: 2+ (normal)   Pulse PT: 2+ (normal)   Deformities: None  Right: Reflexes 4+   Vibratory sensation absent   Filament test reduced sensation with micro filament   Pulse DP: 2+ (normal)   Pulse PT: 2+ (normal)   Deformities: None      ASSESSMENT & PLAN  This gentleman has type 2 diabetes mellitus for nearly 30 years with elevated A1c's over the last 6 months. Given his baseline creatinine of nearly 2, clearly increasing the glyburide is inadvisable. Other oral medications are also not advised. Instead, we discussed the addition of basal insulin and have started him on 14 units of basaglar insulin once in the morning. We have chosen the morning because he thinks that is the best time for him to remember the medication. Alessio Barger reviewed the use of an insulin pen and he demonstrated good understanding. In addition, his wife is a nurse, he has 2 daughters who are nurses and his son is a physician so he has ample support to help him in the administration of the insulin. I have asked him to record blood sugars in the morning and at bedtime and return in 3 weeks. We will also communicate via my chart and adjustments in insulin will be made accordingly. He was also given my cell phone number. We spent more than 25 mintutes face to face, more than 50% was in counseling regarding diet, exercise and carbohydrate intake.

## 2017-05-25 NOTE — PATIENT INSTRUCTIONS
1)  Please start checking your blood sugar first thing in the morning and then before bedtime. 2)  Stop taking your glyburide and start taking Basiglar 14 units under the skin every morning.

## 2017-05-25 NOTE — PROGRESS NOTES
Patient seen today with Dr. Elder Oropeza and Vijaya Richmond RD. Patient's chart was reviewed prior to visit and discussed with team.      Patient is currently taking the following for diabetes: Glyburide 1.25 mg daily, patient states he \"rarely\" takes the evening dose, maybe 2 times a month    Patient reported medication adherence and denies any adverse effects from his medications. Patient currently checks blood sugars on a regular basis in the morning, but if it is higher he will check it multiple times daily for which he will decrease his carb intake during the day to help bring his blood sugar down. He did not bring in his blood sugars but states that it was in the 90's this morning with the highest blood sugar being in the high 200's. He hasn't reported any recent hypoglycemic events but states that he can tell when his sugars get into the 90's as his lips start to tingle. Demonstrated and verbally walked through how to inject basiglar using a demo pen which patient was able to successfully demonstrate back. Reviewed proper storage with patient and reviewed hypoglycemia with patient. Patient verbalized understanding of the above.     Sandip Gray, PharmD, Kaylie Elizabeth

## 2017-05-25 NOTE — PROGRESS NOTES
Reviewed pt's diet hx. He notes he has made some changes to tighten up his diet recently and now typical diet at home is generally low in carbohydrate. He does travel every few weeks for work for 3-5 days at a time and will eat more at restaurants with 30-50g carb at more meals than at home. Meals at home might be eggs with 0-1 slices toast; roast beef and cheese with or without bread for lunch sandwich; dinner of non-starchy veg and chicken. He is drinking only diet or sugar free drinks and flavored hendricks. Pt is eating well and aware of his carbohydrate intake and no diet changes appear needed at this time.

## 2017-06-29 ENCOUNTER — OFFICE VISIT (OUTPATIENT)
Dept: FAMILY MEDICINE CLINIC | Age: 72
End: 2017-06-29

## 2017-06-29 VITALS
HEART RATE: 67 BPM | BODY MASS INDEX: 26.26 KG/M2 | DIASTOLIC BLOOD PRESSURE: 95 MMHG | SYSTOLIC BLOOD PRESSURE: 220 MMHG | HEIGHT: 70 IN | WEIGHT: 183.4 LBS

## 2017-06-29 DIAGNOSIS — R03.0 ELEVATED BLOOD PRESSURE READING: Primary | ICD-10-CM

## 2017-06-29 NOTE — PROGRESS NOTES
At the same visit, I had patient patient show me using a basaglar demo pen how to put on and take off pen needle which patient was able to successfully complete. Patient states he will restart the basalgar tonight.     Estefania Lester, PharmD, Manish Baeza

## 2017-06-29 NOTE — PATIENT INSTRUCTIONS
1)  Restart the basaglar and let us know if you have any other problems with the basaglar pen    2) Send in your blood sugars via Hoodint 1 week

## 2017-06-29 NOTE — PROGRESS NOTES
This gentleman returns for follow-up of his type 2 diabetes mellitus with poor blood sugar control. He had been on 1.25 mg of glyburide daily but had with advancing renal insufficiency and inability to titrate this up further. Because of that, we elected to begin glargine insulin 14 units in the morning. The patient returns today having tried it briefly but only after discontinuing the glyburide altogether. He apparently had a lot of difficulty with the pen device and Ana spent a lot of time today going over his technique with him. At the same time, he was very frustrated with the fact that he had 2 m, both of which were giving him discrepant values and this added to his confusion and frustration. For the most part, he has not taken the insulin since we saw him last.  He went through a whole pen but we think he lost all of the insulin and through the pen away and so he has not been on the insulin for the most part. Examination  His blood pressure was 220/95. He claims to have missed his dose of antihypertensive this morning but has been taking it prior to that. Pulse 67  Lungs clear  Heart revealed a regular rate and rhythm with occasional skipped beats    Impression:  1. Type 2 diabetes mellitus with poor glucose control  2. Hypertension    Regarding his diabetes:  Alessio Barger did review his 2 different meters with him together this morning. There was some discrepancy between the 2 m. The test solution for the Livongo meter was accurate both at the low and high range suggesting that that meter is working well. Patient is going to test his One Touch meter with chest solution to determine whether it is accurate or not. Alessio Barger also went over with him the use of the insulin pen device and he demonstrated some understanding. Our hope is that he will begin taking 14 units of glargine insulin daily. Regarding his hypertension, I obtained a EKG today which was unchanged.   The patient notably was without any chest pain or shortness of breath. He did go back and take his blood pressure medication at home. We spent more than 25 mintutes face to face, more than 50% was in counseling regarding diet, exercise and carbohydrate intake.

## 2017-06-29 NOTE — PROGRESS NOTES
Patient seen today for follow-up. Patient's chart was reviewed prior to visit and discussed with team.      Patient is currently taking the following for diabetes: no medications. Patient states he did take the basaglar 14 units daily for a couple days but was concerned when he saw his blood sugars start to increase vs decrease. He expressed difficulties using the pen initially, he thought the pen needle snapped on versus screwed on so he couldn't get any insulin out when he tried to prime the pen. He then had problems removing the pen needle which he feels damaged the pen since it started leaking when he stored it. His wife and daughter showed him how to screw on the pen needle and patient states he didn't have any difficulty but then noted that his sugars were increasing so he stopped it. Patient also concerned about his glucometer. He received a livongo meter which he states reports higher numbers than his one touch meter (which he has always used). We asked him to bring back both glucometers with the control solution so we could retest his sugar using both meters and run the control solutions. Post visit:    Patient returned with both glucometers and control solution for the livongo meter. Using both meters simultaneously his blood sugars (11:50am):  Livongo: 243  One Touch: 175    Using controlled solutions, the livongo strip readings were within range for high and low solutions. After patient bought control solutions for the one touch, the high control was within range but the low control was 2 mg/dl below range. After discussion with Dr. Emery Franco, I told patient to use the livongo glucometer going forward and when he comes back in 1 week to bring it with him and we can retest and get a blood draw at the same time.

## 2017-06-29 NOTE — PROGRESS NOTES
Pt states he has not changed his food intake habits and continues to choose mostly protein and non-starchy vegetables to control his blood sugar and keep carbohydrates to a minimum. Nely Ledezma and Dr. Gardenia Garcia reviewed insulin use and plan to retry the previous goals and we will address blood sugars and diet later if needed once insulin regiment is administered correctly. He expressed comprehension, high motivation, and compliance is expected.

## 2017-07-06 ENCOUNTER — OFFICE VISIT (OUTPATIENT)
Dept: FAMILY MEDICINE CLINIC | Age: 72
End: 2017-07-06

## 2017-07-06 VITALS
DIASTOLIC BLOOD PRESSURE: 91 MMHG | HEART RATE: 64 BPM | BODY MASS INDEX: 26.11 KG/M2 | HEIGHT: 70 IN | SYSTOLIC BLOOD PRESSURE: 209 MMHG | WEIGHT: 182.4 LBS

## 2017-07-06 NOTE — PROGRESS NOTES
Pt has continued his low carbohydrate intake. His meals consist of mostly lean protein and non-starchy vegetables, main carbohydrate sources are from fruits. 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 He is doing much better with insulin regiment and plans to continue current diet. Provided positive reinforcement for progress. He expressed comprehension, high motivation, and compliance is expected.

## 2017-07-06 NOTE — MR AVS SNAPSHOT
Visit Information Date & Time Provider Department Dept. Phone Encounter #  
 7/6/2017 11:40 AM Keara Churchill  [facility] [phone] [id number] Your Appointments 8/11/2017 10:00 AM  
ROUTINE CARE with Roberto Luna MD  
[facility]) Appt Note: 3 mo follow up  
 [address] [address]  
[phone]  
  
   
 [address] [address] Upcoming Health Maintenance Date Due DTaP/Tdap/Td series (1 - Tdap) 10/26/1968 ZOSTER VACCINE AGE 60> 10/26/2007 Pneumococcal 65+ Low/Medium Risk (1 of 2 - PCV13) 10/26/2012 EYE EXAM RETINAL OR DILATED Q1 4/29/2017 FOBT Q 1 YEAR AGE 50-75 5/25/2017 INFLUENZA AGE 9 TO ADULT 8/1/2017 MEDICARE YEARLY EXAM 11/10/2017 HEMOGLOBIN A1C Q6M 11/11/2017 GLAUCOMA SCREENING Q2Y 4/29/2018 MICROALBUMIN Q1 5/11/2018 LIPID PANEL Q1 5/11/2018 FOOT EXAM Q1 5/25/2018 Allergies as of 7/6/2017  Review Complete On: 7/6/2017 By: Keara Churchill MD  
  
 Severity Noted Reaction Type Reactions Epinephrine  07/06/2016    Palpitations Other Medication  03/03/2011    Other (comments) Novacaine Causes tachycardia Current Immunizations  Reviewed on 11/9/2016 Name Date Influenza High Dose Vaccine PF 2/26/2016 Not reviewed this visit You Were Diagnosed With   
  
 Codes Comments Uncontrolled type 2 diabetes mellitus without complication, with long-term current use of insulin ([facility]    -  Primary ICD-10-CM: E11.65, Z79.4 ICD-9-CM: 250.02, V58.67 Vitals BP Pulse Height(growth percentile) Weight(growth percentile) BMI Smoking Status (!) 209/91 (BP 1 Location: Left arm, BP Patient Position: Sitting) 64 5' 10\" (1.778 m) 182 lb 6.4 oz (82.7 kg) 26.17 kg/m2 Former Smoker Vitals History BMI and BSA Data Body Mass Index Body Surface Area  
 26.17 kg/m 2 2.02 m 2 Preferred Pharmacy Pharmacy Name Phone [facility]/PHARMACY #[id number] [address], [address] [facility] [phone] Your Updated Medication List  
  
   
This list is accurate as of: 7/6/17 12:19 PM.  Always use your most recent med list.  
  
  
  
  
 apixaban 5 mg tablet Commonly known as:  Saeid Caraballo Take 1 Tab by mouth two (2) times a day. carvedilol 3.125 mg tablet Commonly known as:  Osorio Place Take 1 Tab by mouth two (2) times daily (with meals). colchicine 0.6 mg tablet TAKE 1 TABLET BY MOUTH DAILY AS NEEDED  
  
 digoxin 0.125 mg tablet Commonly known as:  LANOXIN Take 1 Tab by mouth daily. febuxostat 80 mg Tab tablet Commonly known as:  Adi Shun Take 80 mg by mouth daily. furosemide 40 mg tablet Commonly known as:  LASIX 40 mg daily. * glucose blood VI test strips strip Commonly known as:  blood glucose test  
Per patient insurance and meter requirements. Testing 4 times per day. * ONETOUCH ULTRA TEST strip Generic drug:  glucose blood VI test strips Test blood sugar 3 times daily  
  
 hydrALAZINE 25 mg tablet Commonly known as:  APRESOLINE 25 mg three (3) times daily. insulin glargine 100 unit/mL (3 mL) Inpn Commonly known as:  BASAGLAR KWIKPEN  
14 Units by SubCUTAneous route daily. Indications: type 2 diabetes mellitus Insulin Needles (Disposable) 32 gauge x 5/32\" Ndle Commonly known as:  COMFORT EZ PEN NEEDLES  
1 Each by Does Not Apply route daily. latanoprost 0.005 % ophthalmic solution Commonly known as:  XALATAN  
INSTILL ONE DROP INTO RIGHT EYE AT BEDTIME  
  
 multivitamin tablet Commonly known as:  ONE A DAY Take 1 Tab by mouth daily. omeprazole 20 mg capsule Commonly known as:  PRILOSEC Take 20 mg by mouth daily as needed. pravastatin 20 mg tablet Commonly known as:  PRAVACHOL Take 1 Tab by mouth nightly. * Notice: This list has 2 medication(s) that are the same as other medications prescribed for you. Read the directions carefully, and ask your doctor or other care provider to review them with you. Patient Instructions Don't make any changes to your Basaglar dose Keep up the good work! Introducing [facility] & HEALTH SERVICES! Dear Rukhsana Gathers: Thank you for requesting a [employer] account. Our records indicate that you already have an active [employer] account. You can access your account anytime at [email or web address] Did you know that you can access your hospital and ER discharge instructions at any time in [employer]? You can also review all of your test results from your hospital stay or ER visit. Additional Information If you have questions, please visit the Frequently Asked Questions section of the [employer] website at [email or web address] Remember, [employer] is NOT to be used for urgent needs. For medical emergencies, dial 911. Now available from your iPhone and Android! Please provide this summary of care documentation to your next provider. Your primary care clinician is listed as Amy Thacker. If you have any questions after today's visit, please call [phone].

## 2017-07-06 NOTE — PROGRESS NOTES
This gentleman returns for follow-up of his type 2 diabetes mellitus with elevated hemoglobin A1c is now on Lantus insulin 14 units at bedtime. He returns 1 week after starting the insulin consistently. Does turn out that he has had a flare of gout in his right hand and so his wife is been administering most of the doses of insulin which he takes at bedtime. Nevertheless, the blood sugars are really coming down. For the last 5 days his blood sugars have ranged between 100-140. He feels well. His diet is unchanged. He continues to eat a very low carb diet. He has had no episodes of hypoglycemia. Examination  Blood pressure 200/91  Pulse 64  Weight 182    Impression type 2 diabetes mellitus with improving blood sugar control on Lantus insulin 14 units at bedtime. Plan: We have continue the regimen. Please note I am very concerned about his persistent elevations in blood pressure for the last 2 visits. We will verify with a he has been taking his hydralazine are not. If he has been, adjustments in his medication will be necessary. We spent more than 25 mintutes face to face, more than 50% was in counseling regarding diet, exercise and carbohydrate intake.

## 2017-07-06 NOTE — PROGRESS NOTES
Verified that he had taken his blood pressure medications this morning. Patient states that he has noticed that his blood pressures had been running a little higher at home, but not as high as in the office. He reports readings of 160-180/60-80 and states that \"salt has been slipping back into his diet\". Asked him how often he has missed his hydralazine and he states that he is only taking it twice daily. Recommended that he try fitting his afternoon dose which patient states he can take it along with his uloric at 3 pm.  Also recommended that he bring his home readings to his upcoming PCP visit so his regimen can be reassessed. Patient verbalized understanding and he is going to try to take all three doses of hydralazine.     Bertrand Garcia, PharmD, Yash Jamison

## 2017-07-06 NOTE — PROGRESS NOTES
Patient seen today for follow-up after starting Basaglar 14 units daily. Patient's chart was reviewed prior to visit and discussed with team.      Patient is currently taking the following for diabetes: Basaglar 14 units at bedtime. He denies any problems with the basaglar pen or injecting, but states that he has only injected himself 2 days and that his wife has been giving the insulin. He also states that he is having a gout attack in his right hand so it has been hard for him to self inject. Patient reported medication adherence and denies any adverse effects from his basaglar. Patient currently checks blood sugars on a regular basis, today it was 126.     Yarelis Bar, PharmD, Miguel Ulloa

## 2017-08-03 ENCOUNTER — OFFICE VISIT (OUTPATIENT)
Dept: FAMILY MEDICINE CLINIC | Age: 72
End: 2017-08-03

## 2017-08-03 VITALS
BODY MASS INDEX: 26.14 KG/M2 | WEIGHT: 182.6 LBS | HEART RATE: 65 BPM | SYSTOLIC BLOOD PRESSURE: 212 MMHG | DIASTOLIC BLOOD PRESSURE: 76 MMHG | HEIGHT: 70 IN

## 2017-08-03 DIAGNOSIS — M10.9 ACUTE GOUT OF RIGHT KNEE, UNSPECIFIED CAUSE: ICD-10-CM

## 2017-08-03 NOTE — PROGRESS NOTES
Patient seen today for follow-up after Kristene Reason was started in July. Patient's chart was reviewed prior to visit and discussed with team.      Patient is currently taking the following for diabetes: Basaglar 14 units at bedtime    Patient reported medication adherence with his Basaglar and is better with the hydralazine now that he has moved it to mid afternoon with his uloric. He did miss a dose of the hydralazine yesterday but did take it this morning. He states he has an upcoming appointment with his cardiologist and has been checking his blood pressure at home. He denies any hypoglycemic episodes and has been checking his blood sugars on a regular basis.     Enrico Vieira, PharmD, Seamus Quevedo

## 2017-08-03 NOTE — PROGRESS NOTES
This gentleman returns for follow-up of his type 2 diabetes mellitus and history of gout. We recently started him on glargine insulin 14 units which he takes at bedtime. Prior to starting insulin consistently had lots of blood sugars in the 150-220 range. He now has consistent blood sugars in the  range. He checks his blood sugars mostly in the morning but occasionally throughout the rest of the day and they are all in excellent range. Breakfast is eggs vegetables. Lunch can be salad or leftovers or sandwich or yogurt. Supper is usually vegetables with it without some meat. Continues to have episode of gout. Currently his pain is not in his knee previously he had a attack in his wrist.  Because of the gout he has not been able to exercise or walk as much as he would like to. He also enjoyed tennis but is not being able to do that    Examination  Blood pressure 212/76  Pulse 65  Weight 182    Impression:  1. Type 2 diabetes mellitus with significantly improved glucose on glargine insulin 14 units at bedtime  2. Gout  3. Hypertension    Plan:  1. Regarding the diabetes, we continue the glargine insulin at 14 units at bedtime  2. Regarding the gout, he is on U [place] and we took the liberty of obtaining a serum uric acid and basic metabolic panel. I will call him with the results. 3.  Regarding his hypertension, he has an appointment with his cardiologist.  For the most part he is taking has his hydralazine on a regular basis but he did miss a dose yesterday evening. We spent more than 25 mintutes face to face, more than 50% was in counseling regarding diet, exercise and carbohydrate intake.

## 2017-08-03 NOTE — PROGRESS NOTES
Pt has continued previous diet habits of mostly non-starchy vegetables and some protein with a very little carbohydrate. He is not as active as he would like to be and is currently limited by a gout flair up. Reviewed foods that trigger gout and pt denies all except red meat but this is eaten minimally. He has been on business travel last week and food choices are not as consistent as at home, however blood sugars remain well controlled. Provided positive reinforcement for continuation of adherence. He expressed comprehension, high motivation, and compliance is expected.

## 2017-08-03 NOTE — MR AVS SNAPSHOT
Visit Information Date & Time Provider Department Dept. Phone Encounter #  
 8/3/2017 11:40 AM Jesse Ward MD [address] [place] [address] [phone] [id number] Your Appointments 8/11/2017 10:00 AM  
ROUTINE CARE with Sudhir Villareal MD  
[facility]) Appt Note: 3 mo follow up  
 [address] [address]  
[phone]  
  
   
 [address] [address] Upcoming Health Maintenance Date Due DTaP/Tdap/Td series (1 - Tdap) 10/26/1968 ZOSTER VACCINE AGE 60> 8/26/2007 Pneumococcal 65+ Low/Medium Risk (1 of 2 - PCV13) 10/26/2012 EYE EXAM RETINAL OR DILATED Q1 4/29/2017 FOBT Q 1 YEAR AGE 50-75 5/25/2017 INFLUENZA AGE 9 TO ADULT 8/1/2017 MEDICARE YEARLY EXAM 11/10/2017 HEMOGLOBIN A1C Q6M 11/11/2017 GLAUCOMA SCREENING Q2Y 4/29/2018 MICROALBUMIN Q1 5/11/2018 LIPID PANEL Q1 5/11/2018 FOOT EXAM Q1 5/25/2018 Allergies as of 8/3/2017  Review Complete On: 8/3/2017 By: Jesse Ward MD  
  
 Severity Noted Reaction Type Reactions Epinephrine  07/06/2016    Palpitations Other Medication  03/03/2011    Other (comments) Novacaine Causes tachycardia Current Immunizations  Reviewed on 11/9/2016 Name Date Influenza High Dose Vaccine PF 2/26/2016 Not reviewed this visit You Were Diagnosed With   
  
 Codes Comments Uncontrolled type 2 diabetes mellitus without complication, with long-term current use of insulin ([facility]    -  Primary ICD-10-CM: E11.65, Z79.4 ICD-9-CM: 250.02, V58.67 Acute gout of right knee, unspecified cause     ICD-10-CM: M10.9 ICD-9-CM: 274.01 Vitals BP Pulse Height(growth percentile) Weight(growth percentile) BMI Smoking Status (!) 212/76 (BP 1 Location: Left arm, BP Patient Position: Sitting) 65 5' 10\" (1.778 m) 182 lb 9.6 oz (82.8 kg) 26.2 kg/m2 Former Smoker Vitals History BMI and BSA Data Body Mass Index Body Surface Area  
 26.2 kg/m 2 2.02 m 2 Preferred Pharmacy Pharmacy Name Phone [facility] #[id number] Marnie Flores [address] [place] [address] [phone] Your Updated Medication List  
  
   
This list is accurate as of: 8/3/17 11:42 AM.  Always use your most recent med list.  
  
  
  
  
 apixaban 5 mg tablet Commonly known as:  Morna Escobar Take 1 Tab by mouth two (2) times a day. carvedilol 3.125 mg tablet Commonly known as:  Sourav Asim Take 1 Tab by mouth two (2) times daily (with meals). colchicine 0.6 mg tablet TAKE 1 TABLET BY MOUTH DAILY AS NEEDED  
  
 digoxin 0.125 mg tablet Commonly known as:  LANOXIN Take 1 Tab by mouth daily. febuxostat 80 mg Tab tablet Commonly known as:  Leanjani Awad Take 80 mg by mouth daily. furosemide 40 mg tablet Commonly known as:  LASIX 40 mg daily. * glucose blood VI test strips strip Commonly known as:  blood glucose test  
Per patient insurance and meter requirements. Testing 4 times per day. * ONETOUCH ULTRA TEST strip Generic drug:  glucose blood VI test strips Test blood sugar 3 times daily  
  
 hydrALAZINE 25 mg tablet Commonly known as:  APRESOLINE 25 mg three (3) times daily. insulin glargine 100 unit/mL (3 mL) Inpn Commonly known as:  BASAGLAR KWIKPEN  
14 Units by SubCUTAneous route daily. Indications: type 2 diabetes mellitus Insulin Needles (Disposable) 32 gauge x 5/32\" Ndle Commonly known as:  COMFORT EZ PEN NEEDLES  
1 Each by Does Not Apply route daily. multivitamin tablet Commonly known as:  ONE A DAY Take 1 Tab by mouth daily. omeprazole 20 mg capsule Commonly known as:  PRILOSEC Take 20 mg by mouth daily as needed. pravastatin 20 mg tablet Commonly known as:  PRAVACHOL Take 1 Tab by mouth nightly. * Notice:   This list has 2 medication(s) that are the same as other medications prescribed for you. Read the directions carefully, and ask your doctor or other care provider to review them with you. We Performed the Following AMB POC BASIC METABOLIC PANEL [39568 CPT(R)] URIC ACID [id number] CPT(R)] Patient Instructions 1)  Try checking an occasional blood sugar before you go to bed in addition to first thing in the morning 2)  Don't make any changes to your Basaglar dose at this time Introducing [facility] SERVICES! Dear Solo Jackson: Thank you for requesting a xTV account. Our records indicate that you already have an active xTV account. You can access your account anytime at [email or web address] Did you know that you can access your hospital and ER discharge instructions at any time in xTV? You can also review all of your test results from your hospital stay or ER visit. Additional Information If you have questions, please visit the Frequently Asked Questions section of the xTV website at [email or web address] Remember, xTV is NOT to be used for urgent needs. For medical emergencies, dial 911. Now available from your iPhone and Android! Please provide this summary of care documentation to your next provider. Your primary care clinician is listed as Amy Thacker. If you have any questions after today's visit, please call [phone].

## 2017-08-04 LAB — URATE SERPL-MCNC: 4.1 MG/DL (ref 3.7–8.6)

## 2017-08-11 ENCOUNTER — OFFICE VISIT (OUTPATIENT)
Dept: FAMILY MEDICINE CLINIC | Age: 72
End: 2017-08-11

## 2017-08-11 VITALS
WEIGHT: 177.2 LBS | RESPIRATION RATE: 18 BRPM | BODY MASS INDEX: 25.37 KG/M2 | HEART RATE: 61 BPM | DIASTOLIC BLOOD PRESSURE: 86 MMHG | OXYGEN SATURATION: 98 % | TEMPERATURE: 98.1 F | HEIGHT: 70 IN | SYSTOLIC BLOOD PRESSURE: 112 MMHG

## 2017-08-11 DIAGNOSIS — D64.9 ANEMIA, UNSPECIFIED TYPE: ICD-10-CM

## 2017-08-11 DIAGNOSIS — M1A.9XX0 CHRONIC GOUT, UNSPECIFIED CAUSE, UNSPECIFIED SITE: ICD-10-CM

## 2017-08-11 NOTE — MR AVS SNAPSHOT
Visit Information Date & Time Provider Department Dept. Phone Encounter #  
 8/11/2017 10:00 AM Dari Molina  [facility] [phone] [id number] Follow-up Instructions Return in about 3 months (around 11/11/2017) for DM follow up. Your Appointments 8/11/2017 10:00 AM  
ROUTINE CARE with Dari Molina MD  
[facility]) Appt Note: 3 mo follow up  
 [address] [facility] [address]  
[phone]  
  
   
 [address] [address]  
  
    
 8/31/2017 10:40 AM  
ROUTINE CARE with Elizabeth Underwood MD  
[address] [facility]) Appt Note: follow up  
 [address] [address]  
[phone]  
  
   
 [address] [address] Upcoming Health Maintenance Date Due DTaP/Tdap/Td series (1 - Tdap) 10/26/1968 ZOSTER VACCINE AGE 60> 8/26/2007 Pneumococcal 65+ Low/Medium Risk (1 of 2 - PCV13) 10/26/2012 EYE EXAM RETINAL OR DILATED Q1 4/29/2017 FOBT Q 1 YEAR AGE 50-75 5/25/2017 INFLUENZA AGE 9 TO ADULT 8/1/2017 MEDICARE YEARLY EXAM 11/10/2017 HEMOGLOBIN A1C Q6M 11/11/2017 GLAUCOMA SCREENING Q2Y 4/29/2018 MICROALBUMIN Q1 5/11/2018 LIPID PANEL Q1 5/11/2018 FOOT EXAM Q1 5/25/2018 Allergies as of 8/11/2017  Review Complete On: 8/11/2017 By: Ashley Newer Severity Noted Reaction Type Reactions Epinephrine  07/06/2016    Palpitations Other Medication  03/03/2011    Other (comments) Novacaine Causes tachycardia Current Immunizations  Reviewed on 11/9/2016 Name Date Influenza High Dose Vaccine PF 2/26/2016 Not reviewed this visit You Were Diagnosed With   
  
 Codes Comments Uncontrolled type 2 diabetes mellitus without complication, with long-term current use of insulin ([facility]    -  Primary ICD-10-CM: E11.65, Z79.4 ICD-9-CM: 250.02, V58.67   
 Anemia, unspecified type     ICD-10-CM: D64.9 ICD-9-CM: 464. 9 Chronic gout, unspecified cause, unspecified site     ICD-10-CM: M1A. 9XX0 
ICD-9-CM: 274.02 Vitals BP Pulse Temp Resp Height(growth percentile) Weight(growth percentile) 112/86 (BP 1 Location: Left arm, BP Patient Position: Sitting) 61 98.1 °F (36.7 °C) (Oral) 18 5' 10\" (1.778 m) 177 lb 3.2 oz (80.4 kg) SpO2 BMI Smoking Status 98% 25.43 kg/m2 Former Smoker Vitals History BMI and BSA Data Body Mass Index Body Surface Area  
 25.43 kg/m 2 1.99 m 2 Preferred Pharmacy Pharmacy Name Phone [facility] #[id number] Maria De Jesusariela Andrews, [address] [place] [address] [phone] Your Updated Medication List  
  
   
This list is accurate as of: 8/11/17  9:03 AM.  Always use your most recent med list.  
  
  
  
  
 apixaban 5 mg tablet Commonly known as:  Susa  Take 1 Tab by mouth two (2) times a day. carvedilol 3.125 mg tablet Commonly known as:  Jerl Specter Take 1 Tab by mouth two (2) times daily (with meals). colchicine 0.6 mg tablet TAKE 1 TABLET BY MOUTH DAILY AS NEEDED  
  
 digoxin 0.125 mg tablet Commonly known as:  LANOXIN Take 1 Tab by mouth daily. febuxostat 80 mg Tab tablet Commonly known as:  Ayleen Led Take 80 mg by mouth daily. furosemide 40 mg tablet Commonly known as:  LASIX 40 mg daily. * glucose blood VI test strips strip Commonly known as:  blood glucose test  
Per patient insurance and meter requirements. Testing 4 times per day. * ONETOUCH ULTRA TEST strip Generic drug:  glucose blood VI test strips Test blood sugar 3 times daily  
  
 hydrALAZINE 25 mg tablet Commonly known as:  APRESOLINE 25 mg three (3) times daily. insulin glargine 100 unit/mL (3 mL) Inpn Commonly known as:  BASAGLAR KWIKPEN  
14 Units by SubCUTAneous route daily. Indications: type 2 diabetes mellitus Insulin Needles (Disposable) 32 gauge x 5/32\" Ndle Commonly known as:  COMFORT EZ PEN NEEDLES  
1 Each by Does Not Apply route daily. multivitamin tablet Commonly known as:  ONE A DAY Take 1 Tab by mouth daily. omeprazole 20 mg capsule Commonly known as:  PRILOSEC Take 20 mg by mouth daily as needed. pravastatin 20 mg tablet Commonly known as:  PRAVACHOL Take 1 Tab by mouth nightly. * Notice: This list has 2 medication(s) that are the same as other medications prescribed for you. Read the directions carefully, and ask your doctor or other care provider to review them with you. We Performed the Following BASIC METABOLIC PANEL (7) [89522 CPT(R)] CBC WITH AUTOMATED DIFF [86752 CPT(R)] HEMOGLOBIN A1C WITH EAG [64351 CPT(R)] REFERRAL TO RHEUMATOLOGY [ZSW20 Custom] Comments:  
 Please evaluate patient for recurrent gout. Follow-up Instructions Return in about 3 months (around 11/11/2017) for DM follow up. Referral Information Referral ID Referred By Referred To  
  
 [id number] Sakshi Castellanos, [address] [place] [address], [address] [place] [address] Phone: [phone] Fax: [phone] Visits Status Start Date End Date 1 New Request 8/11/17 8/11/18 If your referral has a status of pending review or denied, additional information will be sent to support the outcome of this decision. Patient Instructions Learning About Diabetes Food Guidelines Your Care Instructions Meal planning is important to manage diabetes. It helps keep your blood sugar at a target level (which you set with your doctor). You don't have to eat special foods. You can eat what your family eats, including sweets once in a while. But you do have to pay attention to how often you eat and how much you eat of certain foods.  
You may want to work with a dietitian or a certified diabetes educator (CDE) to help you plan meals and snacks. A dietitian or CDE can also help you lose weight if that is one of your goals. What should you know about eating carbs? Managing the amount of carbohydrate (carbs) you eat is an important part of healthy meals when you have diabetes. Carbohydrate is found in many foods. · Learn which foods have carbs. And learn the amounts of carbs in different foods. ¨ Bread, cereal, pasta, and rice have about 15 grams of carbs in a serving. A serving is 1 slice of bread (1 ounce), ½ cup of cooked cereal, or 1/3 cup of cooked pasta or rice. ¨ Fruits have 15 grams of carbs in a serving. A serving is 1 small fresh fruit, such as an apple or orange; ½ of a banana; ½ cup of cooked or canned fruit; ½ cup of fruit juice; 1 cup of melon or raspberries; or 2 tablespoons of dried fruit. ¨ Milk and no-sugar-added yogurt have 15 grams of carbs in a serving. A serving is 1 cup of milk or 2/3 cup of no-sugar-added yogurt. ¨ Starchy vegetables have 15 grams of carbs in a serving. A serving is ½ cup of mashed potatoes or sweet potato; 1 cup winter squash; ½ of a small baked potato; ½ cup of cooked beans; or ½ cup cooked corn or green peas. · Learn how much carbs to eat each day and at each meal. A dietitian or CDE can teach you how to keep track of the amount of carbs you eat. This is called carbohydrate counting. · If you are not sure how to count carbohydrate grams, use the Plate Method to plan meals. It is a good, quick way to make sure that you have a balanced meal. It also helps you spread carbs throughout the day. ¨ Divide your plate by types of foods. Put non-starchy vegetables on half the plate, meat or other protein food on one-quarter of the plate, and a grain or starchy vegetable in the final quarter of the plate.  To this you can add a small piece of fruit and 1 cup of milk or yogurt, depending on how many carbs you are supposed to eat at a meal. 
 · Try to eat about the same amount of carbs at each meal. Do not \"save up\" your daily allowance of carbs to eat at one meal. 
· Proteins have very little or no carbs per serving. Examples of proteins are beef, chicken, turkey, fish, eggs, tofu, cheese, cottage cheese, and peanut butter. A serving size of meat is 3 ounces, which is about the size of a deck of cards. Examples of meat substitute serving sizes (equal to 1 ounce of meat) are 1/4 cup of cottage cheese, 1 egg, 1 tablespoon of peanut butter, and ½ cup of tofu. How can you eat out and still eat healthy? · Learn to estimate the serving sizes of foods that have carbohydrate. If you measure food at home, it will be easier to estimate the amount in a serving of restaurant food. · If the meal you order has too much carbohydrate (such as potatoes, corn, or baked beans), ask to have a low-carbohydrate food instead. Ask for a salad or green vegetables. · If you use insulin, check your blood sugar before and after eating out to help you plan how much to eat in the future. · If you eat more carbohydrate at a meal than you had planned, take a walk or do other exercise. This will help lower your blood sugar. What else should you know? · Limit saturated fat, such as the fat from meat and dairy products. This is a healthy choice because people who have diabetes are at higher risk of heart disease. So choose lean cuts of meat and nonfat or low-fat dairy products. Use olive or canola oil instead of butter or shortening when cooking. · Don't skip meals. Your blood sugar may drop too low if you skip meals and take insulin or certain medicines for diabetes. · Check with your doctor before you drink alcohol. Alcohol can cause your blood sugar to drop too low. Alcohol can also cause a bad reaction if you take certain diabetes medicines. Follow-up care is a key part of your treatment and safety.  Be sure to make and go to all appointments, and call your doctor if you are having problems. It's also a good idea to know your test results and keep a list of the medicines you take. Where can you learn more? Go to http://tanika-claudia.info/. Enter U286 in the search box to learn more about \"Learning About Diabetes Food Guidelines. \" Current as of: March 13, 2017 Content Version: 11.3 © 4703-7828 [employer]. Care instructions adapted under license by [employer] (which disclaims liability or warranty for this information). If you have questions about a medical condition or this instruction, always ask your healthcare professional. [address] any warranty or liability for your use of this information. Introducing [facility] & HEALTH SERVICES! Dear Fermin Antunez: Thank you for requesting a Mimoona account. Our records indicate that you already have an active Mimoona account. You can access your account anytime at [email or web address] Did you know that you can access your hospital and ER discharge instructions at any time in Mimoona? You can also review all of your test results from your hospital stay or ER visit. Additional Information If you have questions, please visit the Frequently Asked Questions section of the Mimoona website at [email or web address] Remember, Mimoona is NOT to be used for urgent needs. For medical emergencies, dial 911. Now available from your iPhone and Android! Please provide this summary of care documentation to your next provider. Your primary care clinician is listed as Amy Thacker. If you have any questions after today's visit, please call [phone].

## 2017-08-11 NOTE — PROGRESS NOTES
Chief Complaint   Patient presents with    Diabetes     3 months follow up     1. Have you been to the ER, urgent care clinic since your last visit? Hospitalized since your last visit? No    2. Have you seen or consulted any other health care providers outside of the [address] [place] [address] since your last visit? Include any pap smears or colon screening.  No

## 2017-08-11 NOTE — PATIENT INSTRUCTIONS

## 2017-08-11 NOTE — PROGRESS NOTES
Patient Name: Nakul Villalobos   MRN: [id number]    Fco Lawson is a 73 y.o. male who presents with the following:     He is seen for diabetes, hypertension and hyperlipidemia. Since last visit he reports no chest pain, dyspnea or TIA's, no numbness, tingling or pain in extremities, no unusual visual symptoms, no hypoglycemia, no medication side effects noted, no significant changes. Home glucose monitoring: is performed regularly. He reports medication compliance: compliant all of the time. Medication side effects: none. Diabetic diet compliance: compliant most of the time. Lab review: orders written for new lab studies as appropriate; see orders. Eye exam: overdue. Followed by Dr. Prasad Began team, due for A1c. Started on Kaola100. Lab Results   Component Value Date/Time    Hemoglobin A1c 8.9 05/11/2017 11:47 AM    Hemoglobin A1c (POC) 6.3 08/02/2016 12:53 PM     History of iron deficiency anemia. Previously has been admitted for anemia requiring blood transfusion. 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 Saw oncology who recommended either monitoring labs or doing a bone marrow biopsy. Patient has not yet done a bone marrow biopsy. Of note, he also has a history of abnormal free light chains which were thought to be attributable to his chronic kidney disease. Patient reports during the time he was diagnosed with anemia, he felt very fatigued and ill but currently these days he feels well. Has upcoming GI appt with repeat colonoscopy in a few months. Hx of chronic gout. Flare ups seems to affect multiple joints including ankles, knees, and left elbow. Has intermittently been taking Uloric acid but did not consistently take it recently. Had a flare up on 8/3 and uric acid was low. Hx of CKD, followed by nephrology.   Pt interested in seeing a rheumatology who would be able to perform joint aspiration during an acute flare up to get a definitive diagnosis. Review of Systems   Constitutional: Negative for fever, malaise/fatigue and weight loss. Respiratory: Negative for cough, hemoptysis, shortness of breath and wheezing. Cardiovascular: Negative for chest pain, palpitations, leg swelling and PND. Gastrointestinal: Negative for abdominal pain, constipation, diarrhea, nausea and vomiting. Musculoskeletal: Positive for joint pain. The patient's medications, allergies, past medical history, surgical history, family history and social history were reviewed and updated where appropriate. Prior to Admission medications    Medication Sig Start Date End Date Taking? Authorizing Provider   Tanvir Brady TEST strip Test blood sugar 3 times daily 5/25/17  Yes Tracy Badillo MD   insulin glargine [facility]) 100 unit/mL (3 mL) inpn 14 Units by SubCUTAneous route daily. Indications: type 2 diabetes mellitus 5/25/17  Yes Tracy Badillo MD   Insulin Needles, Disposable, (COMFORT EZ PEN NEEDLES) 32 gauge x 5/32\" ndle 1 Each by Does Not Apply route daily. 5/25/17  Yes Tracy Badillo MD   colchicine 0.6 mg tablet TAKE 1 TABLET BY MOUTH DAILY AS NEEDED 10/10/16  Yes Historical Provider   digoxin (LANOXIN) 0.125 mg tablet Take 1 Tab by mouth daily. 8/25/16  Yes Sahara Soto DO   furosemide (LASIX) 40 mg tablet 40 mg daily. 5/10/16  Yes Historical Provider   hydrALAZINE (APRESOLINE) 25 mg tablet 25 mg three (3) times daily. 5/20/16  Yes Historical Provider   Febuxostat (ULORIC) 80 mg tab tablet Take 80 mg by mouth daily. Yes Historical Provider   omeprazole (PRILOSEC) 20 mg capsule Take 20 mg by mouth daily as needed. Yes Historical Provider   multivitamin (ONE A DAY) tablet Take 1 Tab by mouth daily. Yes Historical Provider   glucose blood VI test strips (BLOOD GLUCOSE TEST) strip Per patient insurance and meter requirements. Testing 4 times per day.  2/26/16  Yes Sahara Soto DO   apixaban (ELIQUIS) 5 mg tablet Take 1 Tab by mouth two (2) times a day. 1/18/16  Yes Monica Zhong NP   pravastatin (PRAVACHOL) 20 mg tablet Take 1 Tab by mouth nightly. 1/18/16  Yes Monica Zhong NP   carvedilol (COREG) 3.125 mg tablet Take 1 Tab by mouth two (2) times daily (with meals). 1/18/16  Yes Monica Zhong NP       Allergies   Allergen Reactions    Epinephrine Palpitations    Other Medication Other (comments)     Novacaine  Causes tachycardia             OBJECTIVE    Visit Vitals    /86 (BP 1 Location: Left arm, BP Patient Position: Sitting)    Pulse 61    Temp 98.1 °F (36.7 °C) (Oral)    Resp 18    Ht 5' 10\" (1.778 m)    Wt 177 lb 3.2 oz (80.4 kg)    SpO2 98%    BMI 25.43 kg/m2       Physical Exam   Constitutional: He is oriented to person, place, and time and well-developed, well-nourished, and in no distress. No distress. HENT:   Head: Normocephalic and atraumatic. Right Ear: External ear normal.   Left Ear: External ear normal.   Eyes: Conjunctivae and EOM are normal. Pupils are equal, round, and reactive to light. Neurological: He is alert and oriented to person, place, and time. Gait normal.   Skin: He is not diaphoretic. Psychiatric: Mood, memory, affect and judgment normal.   Nursing note and vitals reviewed. ASSESSMENT AND PLAN  Kalyn Finley is a 73 y.o. male who presents today for:    1. Uncontrolled type 2 diabetes mellitus without complication, with long-term current use of insulin (HCC)  Stable, continue current treatment pending review of labs. - BASIC METABOLIC PANEL (7)  - HEMOGLOBIN A1C WITH EAG    2. Anemia, unspecified type  Stable, continue current treatment pending review of labs. - CBC WITH AUTOMATED DIFF    3. Chronic gout, unspecified cause, unspecified site  Discussed that uric acid levels can be falsely low during flare up. Recommend continue Uloric acid for prevention.  - REFERRAL TO RHEUMATOLOGY       There are no discontinued medications.     Follow-up Disposition:  Return in about 3 months (around 11/11/2017) for DM follow up. Time: 25 minutes was spent with this patient face to face discussing test results, follow up visits, and when repeat testing. I discussed diagnoses, risk factors and treatment for each based on current recommendations and literature. Greater than 50% of total visit time was spent in counseling and coordination of care. Medication risks/benefits/costs/interactions/alternatives discussed with patient. Advised patient to call back or return to office if symptoms worsen/change/persist. If patient cannot reach us or should anything more severe/urgent arise he/she should proceed directly to the nearest emergency department. Discussed expected course/resolution/complications of diagnosis in detail with patient. Patient given a written after visit summary which includes his/her diagnoses, current medications and vitals. Patient expressed understanding with the diagnosis and plan.      Rose Boothe M.D.

## 2017-08-12 LAB
BASOPHILS # BLD AUTO: 0 X10E3/UL (ref 0–0.2)
BASOPHILS NFR BLD AUTO: 1 %
BUN SERPL-MCNC: 76 MG/DL (ref 8–27)
BUN/CREAT SERPL: 37 (ref 10–24)
CHLORIDE SERPL-SCNC: 99 MMOL/L (ref 96–106)
CO2 SERPL-SCNC: 22 MMOL/L (ref 18–29)
CREAT SERPL-MCNC: 2.05 MG/DL (ref 0.76–1.27)
EOSINOPHIL # BLD AUTO: 0.2 X10E3/UL (ref 0–0.4)
EOSINOPHIL NFR BLD AUTO: 3 %
ERYTHROCYTE [DISTWIDTH] IN BLOOD BY AUTOMATED COUNT: 13.7 % (ref 12.3–15.4)
EST. AVERAGE GLUCOSE BLD GHB EST-MCNC: 177 MG/DL
GLUCOSE SERPL-MCNC: 121 MG/DL (ref 65–99)
HBA1C MFR BLD: 7.8 % (ref 4.8–5.6)
HCT VFR BLD AUTO: 35.7 % (ref 37.5–51)
HGB BLD-MCNC: 11.7 G/DL (ref 12.6–17.7)
IMM GRANULOCYTES # BLD: 0 X10E3/UL (ref 0–0.1)
IMM GRANULOCYTES NFR BLD: 0 %
INTERPRETATION: NORMAL
LYMPHOCYTES # BLD AUTO: 0.8 X10E3/UL (ref 0.7–3.1)
LYMPHOCYTES NFR BLD AUTO: 16 %
MCH RBC QN AUTO: 29.1 PG (ref 26.6–33)
MCHC RBC AUTO-ENTMCNC: 32.8 G/DL (ref 31.5–35.7)
MCV RBC AUTO: 89 FL (ref 79–97)
MONOCYTES # BLD AUTO: 0.4 X10E3/UL (ref 0.1–0.9)
MONOCYTES NFR BLD AUTO: 8 %
NEUTROPHILS # BLD AUTO: 3.7 X10E3/UL (ref 1.4–7)
NEUTROPHILS NFR BLD AUTO: 72 %
PLATELET # BLD AUTO: 185 X10E3/UL (ref 150–379)
POTASSIUM SERPL-SCNC: 5.2 MMOL/L (ref 3.5–5.2)
RBC # BLD AUTO: 4.02 X10E6/UL (ref 4.14–5.8)
SODIUM SERPL-SCNC: 142 MMOL/L (ref 134–144)
WBC # BLD AUTO: 5.1 X10E3/UL (ref 3.4–10.8)

## 2017-08-13 NOTE — PROGRESS NOTES
Released to [address] Your kidney marker (known as the creatinine level) is higher than usual, although that may be due to fasting at the time of the visit. Be sure to follow up with your kidney doctor regularly. Your average sugar/glucose levels for the past 3 months (determined by the hemoglobin A1C blood marker) is 7.8 which is improved from prior. Your blood counts are mildly low; please follow up with your gastroenterology doctor for the repeat colonoscopy.

## 2017-08-17 ENCOUNTER — HOSPITAL ENCOUNTER (OUTPATIENT)
Dept: VASCULAR SURGERY | Age: 72
Discharge: HOME | End: 2017-08-17
Attending: INTERNAL MEDICINE
Payer: COMMERCIAL

## 2017-08-17 DIAGNOSIS — R09.89 BRUIT: ICD-10-CM

## 2017-08-17 DIAGNOSIS — I42.9 FAMILIAL CARDIOMYOPATHY (HCC): ICD-10-CM

## 2017-08-17 DIAGNOSIS — I65.29 CAROTID ARTERY STENOSIS: ICD-10-CM

## 2017-08-17 PROCEDURE — 93880 EXTRACRANIAL BILAT STUDY: CPT

## 2017-08-24 ENCOUNTER — TELEPHONE (OUTPATIENT)
Dept: RHEUMATOLOGY | Age: 72
End: 2017-08-24

## 2017-08-31 ENCOUNTER — OFFICE VISIT (OUTPATIENT)
Dept: FAMILY MEDICINE CLINIC | Age: 72
End: 2017-08-31

## 2017-08-31 VITALS
DIASTOLIC BLOOD PRESSURE: 66 MMHG | BODY MASS INDEX: 25.71 KG/M2 | WEIGHT: 179.6 LBS | SYSTOLIC BLOOD PRESSURE: 159 MMHG | HEART RATE: 56 BPM | HEIGHT: 70 IN

## 2017-08-31 NOTE — PROGRESS NOTES
This gentleman returns for follow-up of his type 2 diabetes mellitus now on Basaglar insulin 14 units in the evening. He has had a dramatic improvement in his blood sugar control with the addition of insulin. His fasting blood sugars now range between 100-120. He checks his blood sugars occasionally pre-dinner and those can be as low as 68 and as high as 100. But there are very few of those. Most of the time he takes it in the morning. His diet is unchanged. He has not had any episodes of symptomatic hypoglycemia. His A1c has improved to 7.8% but actually his blood sugars look like his next A1c will be closer to 7%. Examination  Blood pressure 159/66  Pulse 56  Weight 179    Impression type 2 diabetes now well controlled on glargine insulin 14 units at bedtime. Plan: The patient seems very pleased and follow-up will now be with his primary care physician. He was told that if he needs her help in the future we are happy to do so. We spent more than 15 mintutes face to face, more than 50% was in counseling regarding diet, exercise and carbohydrate intake.

## 2017-08-31 NOTE — PROGRESS NOTES
Patient seen today for follow-up. Patient's chart was reviewed prior to visit and discussed with team.      Patient is currently taking the following for diabetes: Basaglar 14 units at bedtime    Patient reported medication adherence and denies any adverse effects from his medications. He did have one reading in the 60's prior to dinner but symptoms resolved once he ate. Patient currently checks blood sugars on a regular basis, this morning it was 99.     Jenelle Oliveira, PharmD, [place]

## 2017-08-31 NOTE — PROGRESS NOTES
Reviewed pt's diet hx. No changes to report. He is doing well with his lower carbohydrate diet and is very aware of sources of carbohydrate and amounts. Provided positive reinforcement for continued adherence and expressed comprehension, high motivation, and compliance is expected.

## 2017-12-29 NOTE — PROCEDURES
J[facility] 89  *** FINAL REPORT ***    Name: Hernán Knight  MRN: [id number]    Outpatient  : 26 Oct 1945  HIS Order #: [id number]  [address] [facility] Visit #: [id number]  Date: 17 Aug 2017    TYPE OF TEST: Cerebrovascular Duplex    REASON FOR TEST  Carotid bruit, Known carotid stenosis    Right Carotid:-             Proximal               Mid                 Distal  cm/s  Systolic  Diastolic  Systolic  Diastolic  Systolic  Diastolic  CCA:    781.3       9.1                            73.2      13.8  Bulb:  ECA:    497.5      10.3  ICA:    185.6      37.9      143.1      24.6       85.0      18.7  ICA/CCA:  2.5       2.7    ICA Stenosis: <50%    Right Vertebral:-  Finding: Not visualized  Sys:  [place]:    Right Subclavian:    Left Carotid:-            Proximal                Mid                 Distal  cm/s  Systolic  Diastolic  Systolic  Diastolic  Systolic  Diastolic  CCA:    855.2       9.1                           106.0      17.1  Bulb:  ECA:    334.2      11.8  ICA:    115.5      24.8       85.0      18.7       71.0      16.0  ICA/CCA:  1.1       1.5    ICA Stenosis: <50%    Left Vertebral:-  Finding: Antegrade  Sys:      102.7  [place]:       18.7    Left Subclavian:    INTERPRETATION/FINDINGS  PROCEDURE:  Evaluation of the extracranial cerebrovascular arteries  with ultrasound (B-mode imaging, pulsed Doppler, color Doppler). Includes the common carotid, internal carotid, external carotid, and  vertebral arteries. FINDINGS:  Right side: Intimal thickening observed in the CCA with scattered  calcific plaque noted throughout the CCA. Calcific plaque noted in the   bulb, ECA and ICA. Elevated velocity noted in the ECA. Unable to  visualize the vertebral artery, due to possible occlusion. Left side: Intimal thickening observed in the CCA with scattered  calcific plaque noted throughout the CCA. Calcific plaque also noted  in the bulb, ECA and ICA. Elevated velocity noted in the ECA.   Vertebral is patent with Pt is aware of results and us schedule for Tuesday 8:30 as Monday is New Years Day. antegrade flow. IMPRESSION: Findings are consistent with high end 0-49% stenosis of  the right internal carotid, and 0-49% stenosis of the left internal  carotid. Also, > 70 % stenosis observed of the right and left external   carotid artery. ADDITIONAL COMMENTS    NOTE: As per the patient, the velocities were inverted on the study  for the right ICA and  ECA, performed on  1/14/2016. I have personally reviewed the data relevant to the interpretation of  this  study. TECHNOLOGIST: Vonn Osgood, RDCS  Signed: 08/17/2017 03:35 PM    PHYSICIAN: Paola Cano.  James Oviedo MD  Signed: 08/18/2017 09:22 AM

## 2018-02-23 ENCOUNTER — OFFICE VISIT (OUTPATIENT)
Dept: FAMILY MEDICINE CLINIC | Age: 73
End: 2018-02-23

## 2018-02-23 VITALS
SYSTOLIC BLOOD PRESSURE: 124 MMHG | RESPIRATION RATE: 18 BRPM | HEART RATE: 65 BPM | OXYGEN SATURATION: 98 % | DIASTOLIC BLOOD PRESSURE: 80 MMHG | TEMPERATURE: 98.2 F | BODY MASS INDEX: 25.94 KG/M2 | WEIGHT: 181.2 LBS | HEIGHT: 70 IN

## 2018-02-23 DIAGNOSIS — Z13.6 SCREENING FOR AAA (ABDOMINAL AORTIC ANEURYSM): ICD-10-CM

## 2018-02-23 DIAGNOSIS — Z00.00 ROUTINE GENERAL MEDICAL EXAMINATION AT HEALTH CARE FACILITY: Primary | ICD-10-CM

## 2018-02-23 DIAGNOSIS — R53.81 PHYSICAL DECONDITIONING: ICD-10-CM

## 2018-02-23 DIAGNOSIS — D64.9 ANEMIA, UNSPECIFIED TYPE: ICD-10-CM

## 2018-02-23 DIAGNOSIS — E78.5 HYPERLIPIDEMIA, UNSPECIFIED HYPERLIPIDEMIA TYPE: ICD-10-CM

## 2018-02-23 DIAGNOSIS — M1A.9XX0 CHRONIC GOUT WITHOUT TOPHUS, UNSPECIFIED CAUSE, UNSPECIFIED SITE: ICD-10-CM

## 2018-02-23 DIAGNOSIS — E55.9 VITAMIN D DEFICIENCY: ICD-10-CM

## 2018-02-23 NOTE — PROGRESS NOTES
Patient Name: Brenda Mina   MRN: [id number]    Keith Rick is a 81 y.o. male who presents with the following:     He is seen for diabetes, hypertension, hyperlipidemia and CKD. Since last visit he reports no numbness, tingling or pain in extremities, no unusual visual symptoms, no hypoglycemia, no medication side effects noted, no significant changes. Home glucose monitoring: is performed regularly, fasting values range 125. He reports medication compliance: compliant all of the time. Medication side effects: none. Diabetic diet compliance: compliant all of the time. Lab review: orders written for new lab studies as appropriate; see orders. Eye exam: overdue. Lab Results   Component Value Date/Time    Hemoglobin A1c 7.8 (H) 08/11/2017 09:11 AM    Hemoglobin A1c (POC) 6.3 08/02/2016 12:53 PM     History of iron deficiency anemia. Previously has been admitted for anemia requiring blood transfusion.  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 Saw oncology who recommended either monitoring labs or doing a bone marrow biopsy. Has not seen them in a while. Jesus Lange has not yet done a bone marrow biopsy.  Of note, he also has a history of abnormal free light chains which were thought to be attributable to his chronic kidney disease. Patient reports during the time he was diagnosed with anemia, he felt very fatigued and ill but currently these days he feels well. Is on Eliquis for Afib. Hx of chronic gout. Flare ups seems to affect multiple joints including ankles, knees, and left elbow. Has intermittently been taking Uloric acid but did not consistently take it recently. Hx of CKD, followed by nephrology. Pt interested in seeing a rheumatology who would be able to perform joint aspiration during an acute flare up to get a definitive diagnosis. Hx of vitamin D deficiency, not on supplements.     Patient would like a referral to physical therapy to increase his strength. Denies any recent falls but occasionally feels that his core stability is not as strong as it once was. Denies any chest pain, syncope, vision changes, dizziness. Patient has not yet had a AAA ultrasound done. Has smoked more than 100 cigarettes in his lifetime. Review of Systems   Constitutional: Negative for fever, malaise/fatigue and weight loss. Respiratory: Negative for cough, hemoptysis, shortness of breath and wheezing. Cardiovascular: Negative for chest pain, palpitations, leg swelling and PND. Gastrointestinal: Negative for abdominal pain, constipation, diarrhea, nausea and vomiting. The patient's medications, allergies, past medical history, surgical history, family history and social history were reviewed and updated where appropriate. Prior to Admission medications    Medication Sig Start Date End Date Taking? Authorizing Provider   colchicine 0.6 mg tablet 1.2 mg ORALLY at the first sign of a flare followed by 0.6 mg one hr later; MAX 1.8 mg over 1 hour 1/27/18  Yes Nick Noriega MD   ONETOUCH ULTRA TEST strip Test blood sugar 3 times daily 5/25/17  Yes Myles Fitzgerald MD   insulin glargine (BASAGLAR KWIKPEN) 100 unit/mL (3 mL) inpn 14 Units by SubCUTAneous route daily. Indications: type 2 diabetes mellitus 5/25/17  Yes Myles Fitzgerald MD   Insulin Needles, Disposable, (COMFORT EZ PEN NEEDLES) 32 gauge x 5/32\" ndle 1 Each by Does Not Apply route daily. 5/25/17  Yes Myles Fitzgerald MD   digoxin (LANOXIN) 0.125 mg tablet Take 1 Tab by mouth daily. 8/25/16  Yes Carlo Gonzalez,    furosemide (LASIX) 40 mg tablet 40 mg daily. 5/10/16  Yes Historical Provider   hydrALAZINE (APRESOLINE) 25 mg tablet 25 mg three (3) times daily. 5/20/16  Yes Historical Provider   omeprazole (PRILOSEC) 20 mg capsule Take 20 mg by mouth daily as needed. Yes Historical Provider   multivitamin (ONE A DAY) tablet Take 1 Tab by mouth daily.    Yes Historical Provider   glucose blood VI test strips (BLOOD GLUCOSE TEST) strip Per patient insurance and meter requirements. Testing 4 times per day. 2/26/16  Yes Lannyfariba Silva    apixaban (ELIQUIS) 5 mg tablet Take 1 Tab by mouth two (2) times a day. 1/18/16  Yes Alfred Zee NP   pravastatin (PRAVACHOL) 20 mg tablet Take 1 Tab by mouth nightly. 1/18/16  Yes Alfred Zee NP   carvedilol (COREG) 3.125 mg tablet Take 1 Tab by mouth two (2) times daily (with meals). 1/18/16  Yes Alfred Zee NP   Febuxostat (ULORIC) 80 mg tab tablet Take 80 mg by mouth daily. Historical Provider       Allergies   Allergen Reactions    Epinephrine Palpitations    Other Medication Other (comments)     Novacaine  Causes tachycardia             OBJECTIVE    Visit Vitals    /80 (BP 1 Location: Left arm, BP Patient Position: Sitting)    Pulse 65    Temp 98.2 °F (36.8 °C) (Oral)    Resp 18    Ht 5' 10\" (1.778 m)    Wt 181 lb 3.2 oz (82.2 kg)    SpO2 98%    BMI 26 kg/m2       Physical Exam   Constitutional: He is oriented to person, place, and time and well-developed, well-nourished, and in no distress. No distress. Eyes: Conjunctivae and EOM are normal. Pupils are equal, round, and reactive to light. Cardiovascular: Exam reveals no gallop and no friction rub. No murmur heard. Irregularly irregular   Pulmonary/Chest: Effort normal and breath sounds normal. No respiratory distress. He has no wheezes. Neurological: He is alert and oriented to person, place, and time. Skin: Skin is warm and dry. No rash noted. He is not diaphoretic. Psychiatric: Mood, memory, affect and judgment normal.   Nursing note and vitals reviewed. ASSESSMENT AND PLAN  Bolivar Javier is a 81 y.o. male who presents today for:    1. Routine general medical examination at health care facility  Reviewed age appropriate screening tests as recommended by the [place] Preventive Services Database with patient today.   I have reviewed/discussed the above normal BMI with the patient. I have recommended the following interventions: dietary management education, guidance, and counseling, encourage exercise, monitor weight and prescribed dietary intake. 2. Uncontrolled type 2 diabetes mellitus without complication, with long-term current use of insulin (HCC)  Stable, continue current treatment pending review of labs. - HEMOGLOBIN A1C WITH EAG  - MICROALBUMIN, UR, RAND W/ MICROALB/CREAT RATIO    3. Hyperlipidemia, unspecified hyperlipidemia type  Will calculate ASCVD risk score pending labs. - METABOLIC PANEL, COMPREHENSIVE  - LIPID PANEL    4. Anemia, unspecified type  Stable, continue current treatment pending review of labs. If worsened, will refer back to oncology. - CBC WITH AUTOMATED DIFF  - IRON PROFILE  - FERRITIN    5. Vitamin D deficiency  Stable, continue current treatment pending review of labs. - VITAMIN D, 25 HYDROXY    6. Physical deconditioning  - REFERRAL TO PHYSICAL THERAPY    7. Chronic gout without tophus, unspecified cause, unspecified site  Stable, continue current treatment pending review of labs. - URIC ACID    8. Screening for AAA (abdominal aortic aneurysm)  - US EXAM SCREENING AAA; Future       There are no discontinued medications. Follow-up Disposition:  Return in about 3 months (around 5/23/2018) for DM follow up. Time: 40 minutes was spent with this patient face to face discussing test results, follow up visits, and when repeat testing. I discussed diagnoses, risk factors and treatment for each based on current recommendations and literature. Greater than 50% of total visit time was spent in counseling and coordination of care. Medication risks/benefits/costs/interactions/alternatives discussed with patient.   Advised patient to call back or return to office if symptoms worsen/change/persist. If patient cannot reach us or should anything more severe/urgent arise he/she should proceed directly to the nearest emergency department. Discussed expected course/resolution/complications of diagnosis in detail with patient. Patient given a written after visit summary which includes his/her diagnoses, current medications and vitals. Patient expressed understanding with the diagnosis and plan.      Anel Gann M.D.

## 2018-02-23 NOTE — PATIENT INSTRUCTIONS

## 2018-02-23 NOTE — PROGRESS NOTES
Chief Complaint   Patient presents with   [facility] Visit          1. Have you been to the ER, urgent care clinic since your last visit? Hospitalized since your last visit? No    2. Have you seen or consulted any other health care providers outside of the [address] [place] [address] since your last visit? Include any pap smears or colon screening. Yes, patient stated he saw the cardiologist last week.

## 2018-02-23 NOTE — MR AVS SNAPSHOT
Carl Jeans 
 
 
 [address] 
[phone] Patient: Noa Ortiz MRN: [id number] :1945 Visit Information Date & Time Provider Department Dept. Phone Encounter #  
 2018 10:30 AM Sakina Andino  [facility] [phone] [id number] Upcoming Health Maintenance Date Due DTaP/Tdap/Td series (1 - Tdap) 10/26/1968 ZOSTER VACCINE AGE 60> 2007 Pneumococcal 65+ Low/Medium Risk (1 of 2 - PCV13) 10/26/2012 COLONOSCOPY 2017 MEDICARE YEARLY EXAM 11/10/2017 HEMOGLOBIN A1C Q6M 2018 MICROALBUMIN Q1 2018 LIPID PANEL Q1 2018 FOOT EXAM Q1 2018 EYE EXAM RETINAL OR DILATED Q1 10/17/2018 GLAUCOMA SCREENING Q2Y 10/17/2019 Allergies as of 2018  Review Complete On: 2018 By: Pieter Del Severity Noted Reaction Type Reactions Epinephrine  2016    Palpitations Other Medication  2011    Other (comments) Novacaine Causes tachycardia Current Immunizations  Reviewed on 2016 Name Date Influenza High Dose Vaccine PF 2017, 2016 Not reviewed this visit You Were Diagnosed With   
  
 Codes Comments Routine general medical examination at health care facility    -  Primary ICD-10-CM: Z00.00 ICD-9-CM: V70.0 Uncontrolled type 2 diabetes mellitus without complication, with long-term current use of insulin (HCC)     ICD-10-CM: E11.65, Z79.4 ICD-9-CM: 250.02, V58.67 Anemia, unspecified type     ICD-10-CM: D64.9 ICD-9-CM: 039. 9 Vitamin D deficiency     ICD-10-CM: E55.9 ICD-9-CM: 268.9 Hyperlipidemia, unspecified hyperlipidemia type     ICD-10-CM: E78.5 ICD-9-CM: 272.4 Special screening for malignant neoplasm of prostate     ICD-10-CM: Z12.5 ICD-9-CM: V76.44 Physical deconditioning     ICD-10-CM: R53.81 ICD-9-CM: 799.3 Chronic gout without tophus, unspecified cause, unspecified site     ICD-10-CM: M1A. 9XX0 
ICD-9-CM: 274.02 Vitals BP Pulse Temp Resp Height(growth percentile) Weight(growth percentile) 124/80 (BP 1 Location: Left arm, BP Patient Position: Sitting) 65 98.2 °F (36.8 °C) (Oral) 18 5' 10\" (1.778 m) 181 lb 3.2 oz (82.2 kg) SpO2 BMI Smoking Status 98% 26 kg/m2 Former Smoker BMI and BSA Data Body Mass Index Body Surface Area  
 26 kg/m 2 2.01 m 2 Preferred Pharmacy Pharmacy Name Phone [id number], [address] [place] [address] [phone] Your Updated Medication List  
  
   
This list is accurate as of 2/23/18 11:22 AM.  Always use your most recent med list.  
  
  
  
  
 apixaban 5 mg tablet Commonly known as:  Stacy Grad Take 1 Tab by mouth two (2) times a day. carvedilol 3.125 mg tablet Commonly known as:  Luz Lua Take 1 Tab by mouth two (2) times daily (with meals). colchicine 0.6 mg tablet 1.2 mg ORALLY at the first sign of a flare followed by 0.6 mg one hr later; MAX 1.8 mg over 1 hour  
  
 digoxin 0.125 mg tablet Commonly known as:  LANOXIN Take 1 Tab by mouth daily. febuxostat 80 mg Tab tablet Commonly known as:  Oneita Cory Take 80 mg by mouth daily. furosemide 40 mg tablet Commonly known as:  LASIX 40 mg daily. * glucose blood VI test strips strip Commonly known as:  blood glucose test  
Per patient insurance and meter requirements. Testing 4 times per day. * ONETOUCH ULTRA TEST strip Generic drug:  glucose blood VI test strips Test blood sugar 3 times daily  
  
 hydrALAZINE 25 mg tablet Commonly known as:  APRESOLINE 25 mg three (3) times daily. insulin glargine 100 unit/mL (3 mL) Inpn Commonly known as:  BASAGLSAM MENDOSAPEN U-100 INSULIN  
14 Units by SubCUTAneous route daily. Indications: type 2 diabetes mellitus Insulin Needles (Disposable) 32 gauge x 5/32\" Ndle Commonly known as:  COMFORT EZ PEN NEEDLES  
1 Each by Does Not Apply route daily. multivitamin tablet Commonly known as:  ONE A DAY Take 1 Tab by mouth daily. omeprazole 20 mg capsule Commonly known as:  PRILOSEC Take 20 mg by mouth daily as needed. pravastatin 20 mg tablet Commonly known as:  PRAVACHOL Take 1 Tab by mouth nightly. * Notice: This list has 2 medication(s) that are the same as other medications prescribed for you. Read the directions carefully, and ask your doctor or other care provider to review them with you. We Performed the Following CBC WITH AUTOMATED DIFF [01219 CPT(R)] FERRITIN [93507 CPT(R)] HEMOGLOBIN A1C WITH EAG [67296 CPT(R)] IRON PROFILE [id number] CPT(R)] LIPID PANEL [21324 CPT(R)] METABOLIC PANEL, COMPREHENSIVE [61917 CPT(R)] MICROALBUMIN, UR, RAND W/ MICROALB/CREAT RATIO [id number] CPT(R)] REFERRAL TO PHYSICAL THERAPY [OWN28 Custom] Comments:  
 [facility] [address] Phone: [phone] URIC ACID C7448050 CPT(R)] VITAMIN D, 25 HYDROXY [id number] CPT(R)] Referral Information Referral ID Referred By Referred To  
  
 [id number] [address] Phone: [phone] Fax: [phone] Visits Status Start Date End Date 1 New Request 2/23/18 2/23/19 If your referral has a status of pending review or denied, additional information will be sent to support the outcome of this decision. Patient Instructions Learning About Diabetes Food Guidelines Your Care Instructions Meal planning is important to manage diabetes. It helps keep your blood sugar at a target level (which you set with your doctor).  You don't have to eat special foods. You can eat what your family eats, including sweets once in a while. But you do have to pay attention to how often you eat and how much you eat of certain foods. You may want to work with a dietitian or a certified diabetes educator (CDE) to help you plan meals and snacks. A dietitian or CDE can also help you lose weight if that is one of your goals. What should you know about eating carbs? Managing the amount of carbohydrate (carbs) you eat is an important part of healthy meals when you have diabetes. Carbohydrate is found in many foods. · Learn which foods have carbs. And learn the amounts of carbs in different foods. ¨ Bread, cereal, pasta, and rice have about 15 grams of carbs in a serving. A serving is 1 slice of bread (1 ounce), ½ cup of cooked cereal, or 1/3 cup of cooked pasta or rice. ¨ Fruits have 15 grams of carbs in a serving. A serving is 1 small fresh fruit, such as an apple or orange; ½ of a banana; ½ cup of cooked or canned fruit; ½ cup of fruit juice; 1 cup of melon or raspberries; or 2 tablespoons of dried fruit. ¨ Milk and no-sugar-added yogurt have 15 grams of carbs in a serving. A serving is 1 cup of milk or 2/3 cup of no-sugar-added yogurt. ¨ Starchy vegetables have 15 grams of carbs in a serving. A serving is ½ cup of mashed potatoes or sweet potato; 1 cup winter squash; ½ of a small baked potato; ½ cup of cooked beans; or ½ cup cooked corn or green peas. · Learn how much carbs to eat each day and at each meal. A dietitian or CDE can teach you how to keep track of the amount of carbs you eat. This is called carbohydrate counting. · If you are not sure how to count carbohydrate grams, use the Plate Method to plan meals. It is a good, quick way to make sure that you have a balanced meal. It also helps you spread carbs throughout the day. ¨ Divide your plate by types of foods.  Put non-starchy vegetables on half the plate, meat or other protein food on one-quarter of the plate, and a grain or starchy vegetable in the final quarter of the plate. To this you can add a small piece of fruit and 1 cup of milk or yogurt, depending on how many carbs you are supposed to eat at a meal. 
· Try to eat about the same amount of carbs at each meal. Do not \"save up\" your daily allowance of carbs to eat at one meal. 
· Proteins have very little or no carbs per serving. Examples of proteins are beef, chicken, turkey, fish, eggs, tofu, cheese, cottage cheese, and peanut butter. A serving size of meat is 3 ounces, which is about the size of a deck of cards. Examples of meat substitute serving sizes (equal to 1 ounce of meat) are 1/4 cup of cottage cheese, 1 egg, 1 tablespoon of peanut butter, and ½ cup of tofu. How can you eat out and still eat healthy? · Learn to estimate the serving sizes of foods that have carbohydrate. If you measure food at home, it will be easier to estimate the amount in a serving of restaurant food. · If the meal you order has too much carbohydrate (such as potatoes, corn, or baked beans), ask to have a low-carbohydrate food instead. Ask for a salad or green vegetables. · If you use insulin, check your blood sugar before and after eating out to help you plan how much to eat in the future. · If you eat more carbohydrate at a meal than you had planned, take a walk or do other exercise. This will help lower your blood sugar. What else should you know? · Limit saturated fat, such as the fat from meat and dairy products. This is a healthy choice because people who have diabetes are at higher risk of heart disease. So choose lean cuts of meat and nonfat or low-fat dairy products. Use olive or canola oil instead of butter or shortening when cooking. · Don't skip meals. Your blood sugar may drop too low if you skip meals and take insulin or certain medicines for diabetes. · Check with your doctor before you drink alcohol. Alcohol can cause your blood sugar to drop too low. Alcohol can also cause a bad reaction if you take certain diabetes medicines. Follow-up care is a key part of your treatment and safety. Be sure to make and go to all appointments, and call your doctor if you are having problems. It's also a good idea to know your test results and keep a list of the medicines you take. Where can you learn more? Go to http://tanika-claudia.info/. Enter A955 in the search box to learn more about \"Learning About Diabetes Food Guidelines. \" Current as of: March 13, 2017 Content Version: 11.4 © 1089-7211 [employer]. Care instructions adapted under license by [employer] (which disclaims liability or warranty for this information). If you have questions about a medical condition or this instruction, always ask your healthcare professional. [address] any warranty or liability for your use of this information. Introducing [facility]! Dear Debbie Monroy: Thank you for requesting a [employer] account. Our records indicate that you already have an active [employer] account. You can access your account anytime at [email or web address] Did you know that you can access your hospital and ER discharge instructions at any time in [employer]? You can also review all of your test results from your hospital stay or ER visit. Additional Information If you have questions, please visit the Frequently Asked Questions section of the [employer] website at [email or web address] Remember, [employer] is NOT to be used for urgent needs. For medical emergencies, dial 911. Now available from your iPhone and Android! Please provide this summary of care documentation to your next provider. Your primary care clinician is listed as Amy Thacker.  If you have any questions after today's visit, please call [phone].

## 2018-02-24 LAB
25(OH)D3+25(OH)D2 SERPL-MCNC: 35.4 NG/ML (ref 30–100)
ALBUMIN SERPL-MCNC: 4.6 G/DL (ref 3.5–4.8)
ALBUMIN/CREAT UR: 566.9 MG/G CREAT (ref 0–30)
ALBUMIN/GLOB SERPL: 1.4 {RATIO} (ref 1.2–2.2)
ALP SERPL-CCNC: 71 IU/L (ref 39–117)
ALT SERPL-CCNC: 23 IU/L (ref 0–44)
AST SERPL-CCNC: 24 IU/L (ref 0–40)
BASOPHILS # BLD AUTO: 0 X10E3/UL (ref 0–0.2)
BASOPHILS NFR BLD AUTO: 1 %
BILIRUB SERPL-MCNC: 0.5 MG/DL (ref 0–1.2)
BUN SERPL-MCNC: 44 MG/DL (ref 8–27)
BUN/CREAT SERPL: 19 (ref 10–24)
CALCIUM SERPL-MCNC: 9.6 MG/DL (ref 8.6–10.2)
CHLORIDE SERPL-SCNC: 98 MMOL/L (ref 96–106)
CHOLEST SERPL-MCNC: 135 MG/DL (ref 100–199)
CO2 SERPL-SCNC: 26 MMOL/L (ref 18–29)
CREAT SERPL-MCNC: 2.3 MG/DL (ref 0.76–1.27)
CREAT UR-MCNC: 67 MG/DL
EOSINOPHIL # BLD AUTO: 0.3 X10E3/UL (ref 0–0.4)
EOSINOPHIL NFR BLD AUTO: 5 %
ERYTHROCYTE [DISTWIDTH] IN BLOOD BY AUTOMATED COUNT: 14.7 % (ref 12.3–15.4)
EST. AVERAGE GLUCOSE BLD GHB EST-MCNC: 140 MG/DL
FERRITIN SERPL-MCNC: 58 NG/ML (ref 30–400)
GFR SERPLBLD CREATININE-BSD FMLA CKD-EPI: 28 ML/MIN/1.73
GFR SERPLBLD CREATININE-BSD FMLA CKD-EPI: 32 ML/MIN/1.73
GLOBULIN SER CALC-MCNC: 3.4 G/DL (ref 1.5–4.5)
GLUCOSE SERPL-MCNC: 174 MG/DL (ref 65–99)
HBA1C MFR BLD: 6.5 % (ref 4.8–5.6)
HCT VFR BLD AUTO: 33.6 % (ref 37.5–51)
HDLC SERPL-MCNC: 34 MG/DL
HGB BLD-MCNC: 10.9 G/DL (ref 13–17.7)
IMM GRANULOCYTES # BLD: 0 X10E3/UL (ref 0–0.1)
IMM GRANULOCYTES NFR BLD: 0 %
INTERPRETATION, 910389: NORMAL
INTERPRETATION: NORMAL
IRON SATN MFR SERPL: 16 % (ref 15–55)
IRON SERPL-MCNC: 60 UG/DL (ref 38–169)
LDLC SERPL CALC-MCNC: 79 MG/DL (ref 0–99)
LYMPHOCYTES # BLD AUTO: 0.8 X10E3/UL (ref 0.7–3.1)
LYMPHOCYTES NFR BLD AUTO: 11 %
MCH RBC QN AUTO: 28.9 PG (ref 26.6–33)
MCHC RBC AUTO-ENTMCNC: 32.4 G/DL (ref 31.5–35.7)
MCV RBC AUTO: 89 FL (ref 79–97)
MICROALBUMIN UR-MCNC: 379.8 UG/ML
MONOCYTES # BLD AUTO: 0.6 X10E3/UL (ref 0.1–0.9)
MONOCYTES NFR BLD AUTO: 9 %
NEUTROPHILS # BLD AUTO: 5.2 X10E3/UL (ref 1.4–7)
NEUTROPHILS NFR BLD AUTO: 74 %
PDF IMAGE, 910387: NORMAL
PLATELET # BLD AUTO: 166 X10E3/UL (ref 150–379)
POTASSIUM SERPL-SCNC: 4.9 MMOL/L (ref 3.5–5.2)
PROT SERPL-MCNC: 8 G/DL (ref 6–8.5)
RBC # BLD AUTO: 3.77 X10E6/UL (ref 4.14–5.8)
SODIUM SERPL-SCNC: 142 MMOL/L (ref 134–144)
TIBC SERPL-MCNC: 374 UG/DL (ref 250–450)
TRIGL SERPL-MCNC: 111 MG/DL (ref 0–149)
UIBC SERPL-MCNC: 314 UG/DL (ref 111–343)
URATE SERPL-MCNC: 12 MG/DL (ref 3.7–8.6)
VLDLC SERPL CALC-MCNC: 22 MG/DL (ref 5–40)
WBC # BLD AUTO: 7 X10E3/UL (ref 3.4–10.8)

## 2018-02-27 NOTE — PROGRESS NOTES
Please notify patient regarding their test results:    Kidney marker is slightly higher than prior. Uric acid is also very elevated. Would recommend resuming Uloric medication on a daily basis to reduce uric acid levels and therefore prevent future gout attacks. Patient to follow-up with his kidney doctor with these results. A1c is improving, continue current insulin regimen and keep up the good work. Continues to be anemic. Overdue for follow-up with oncology. However Dr. Ruben Ellington is no longer here, therefore patient may need to establish care with Dr. Haley Ellington. Vitamin d level is normal.  Cholesterol levels are normal, continue current statin dose.

## 2018-02-28 ENCOUNTER — HOSPITAL ENCOUNTER (OUTPATIENT)
Dept: PHYSICAL THERAPY | Age: 73
Discharge: HOME | End: 2018-02-28
Payer: COMMERCIAL

## 2018-02-28 PROCEDURE — 97161 PT EVAL LOW COMPLEX 20 MIN: CPT

## 2018-02-28 PROCEDURE — G8979 MOBILITY GOAL STATUS: HCPCS

## 2018-02-28 PROCEDURE — 97110 THERAPEUTIC EXERCISES: CPT

## 2018-02-28 PROCEDURE — G8978 MOBILITY CURRENT STATUS: HCPCS

## 2018-02-28 NOTE — PROGRESS NOTES
[facility]  [address]  [place], [address]  Phone: [phone]  Fax: [phone]    Plan of Care/Statement of Necessity for Physical Therapy Services  2-15    Patient name: Chantelle Shukla  : 1945  Provider#: [id number]  Referral source: Tai Mcguire MD      Medical/Treatment Diagnosis: Other malaise [R53.81]     Prior Hospitalization: see medical history     Comorbidities: anemia, atrial fib, DM II, gout, CKD no specific treatment currently, HTN, restless leg syndrome      Prior Level of Function: coco prol sitting, transfer sit to stand, ascend/descend stairs, exercise w/out pain/limitation   Medications: Verified on Patient Summary List  Start of Care: 2018      Onset Date: long hx    The Plan of Care and following information is based on the information from the initial evaluation.     Assessment/ key information: pt is a 81year old male, presents w/ signs and sx consistent w/ knee OA, gait/balance dysfunction     Evaluation Complexity History MEDIUM  Complexity : 1-2 comorbidities / personal factors will impact the outcome/ POC ; Examination LOW Complexity : 1-2 Standardized tests and measures addressing body structure, function, activity limitation and / or participation in recreation  ;Presentation LOW Complexity : Stable, uncomplicated  ;Clinical Decision Making MEDIUM Complexity : FOTO score of 26-74  Overall Complexity Rating: LOW     Problem List: pain affecting function, decrease ROM, decrease strength, impaired gait/ balance, decrease ADL/ functional abilitiies, decrease activity tolerance and decrease flexibility/ joint mobility   Treatment Plan may include any combination of the following: Therapeutic exercise, Therapeutic activities, Neuromuscular re-education, Physical agent/modality, Gait/balance training, Manual therapy and Patient education  Patient / Family readiness to learn indicated by: asking questions, trying to perform skills and interest  Persons(s) to be included in education: patient (P)  Barriers to Learning/Limitations: None  Patient Goal (s): loosen joints, improve balance  Patient Self Reported Health Status: good  Rehabilitation Potential: good    Short Term Goals: To be accomplished in 1-2 weeks:  1) Pt will be independent with HEP  2) Pt will demonstrate understanding/application of postural principles and recommendations   3) Pt will report >/= 25% decrease in symptoms   4) Pt will demo SLS >8 sec bilat for improved     Long Term Goals: To be accomplished in 6-8 weeks:  1) Pt will be able to sit greater than 60 minutes without increase pain   2) Pt will be able to stand greater than 60 minutes without increase pain  3) Pt will be able to ascend/descend stairs w/out incr pain    4) Pt will demonstrate independence with modified exercise/gym routine without aggravation of sx. 5) Pt will report >/= 50% improvement in balance/report of stability w/ ambulation     Frequency / Duration: Patient to be seen 1-2 times per week for 6-8 weeks. Patient/ Caregiver education and instruction: self care, activity modification, brace/ splint application and exercises    [x]  Plan of care has been reviewed with PTA    G-Codes (GP)  Mobility   Current  CK= 40-59%   Goal  CJ= 20-39%    The severity rating is based on clinical judgment and the FOTO Score . Certification Period: 2/28/2018-5/25/2018    Arielle Garg PT 2/28/2018 12:42 PM    ________________________________________________________________________    I certify that the above Therapy Services are being furnished while the patient is under my care. I agree with the treatment plan and certify that this therapy is necessary.     [id number] Signature:____________________  Date:____________Time: _________

## 2018-02-28 NOTE — PROGRESS NOTES
PT INITIAL EVALUATION NOTE - [facility] 2-15    Patient Name: Abiel Durham  Date:2018  : 1945  [x]  Patient  Verified  Payor: Don Gottron / Plan: [place] & B / Product Type: Medicare /    In time: 427 AM  Out time: 1030 AM  Total Treatment Time (min): 55  Total Timed Codes (min): 10  1:1 Treatment Time ( [address] only): 55   Visit #: 1     Treatment Area:  Other malaise [R53.81]    SUBJECTIVE  Pain Level (0-10 scale): 0  Any medication changes, allergies to medications, adverse drug reactions, diagnosis change, or new procedure performed?: [] No    [x] Yes (see summary sheet for update)  Subjective:      Pt presents w/ chief complaint of general decondition, bilat knee pain & balance deficits; pt reports long hx of gout that attributes various joint pain to, most often knees; long 30+ hx of bilat knee pain, no specific treatment or imaging over years, last episode of knee pain resolved ~ 2 weeks ago however has mild discomfort med knee today; pt reports other medical issues including anemia & DM that he feels effects general fitness level; describes 2 falls in last 5 years, last in 2017, 1 tripping over item in garage, 1 while descending attic stairs; after each fall experienced episodes of knee/LE pain but no specific treatment     Saw PCP for annual check up, mentioned pain & deconditioning, referred to rheumatologist but has not yet been able to schedule, referred to PT     Pt also w/ c/o bilat hand pain, hx of carpel tunnel syndromed ~ 5 years ago, saw MD, conservative treatment and sx have improved         Pain:   7/10 max 0/10 min 0/10 now     Aggravated by: prol sitting, ascending/descending stairs  Eased by: modification, walking   Location/description of symptoms:  L knee med joint line, peripatellar    Diagnostic Tests: [] Lab work [] X-rays    [] CT [] MRI     [] Other:  Results (per report of the patient):    PMH: Significant for anemia, atrial fib, DM II, gout, CKD no specific treatment currently, HTN, restless leg syndrome        Social/Recreation/Work: pt is self employed, primarily desk work, sitting & standing workstation, involves some going out to see clients, difficulty in/out of car (miata); pt walks for exercise, preferred routine, daily, typically outside, varied terrain, 30 min to hours, in last month min walking due to weather & knee pain; member of [employer], typically does TM amb & abdominal crunches, would like to go daily, in last month has only been 2 times     Reports interested in doing [employer] at gym, also reports stopped playing tennis 3-4 years ago, initially due to episode of L knee pain (gout?) and has not returned but is interested in doing some form of tennis     Prior level of function: able to coco prol sitting, transfer sit to stand, ascend/descend stairs, exercise w/out pain/limitation     Patient goal(s): \"loosen joints, improve balance\"     OBJECTIVE/EXAMINATION    Observation:   Pt is of tall, slender build  Mild edema observed L knee     Movement/gait:   decr trunk rotation, decr arm swing, toe out R > L       ROM:  [] Unable to assess               AAROM                                         Right Left   Hip Flexion wnl wnl    Extension hypo hypo    Abduction wnl wnl    Adduction wnl wnl    IR hypo hypo    ER hypo hypo   Knee Flexion 112 mild pain  114 pain     Extension 0 2 (hypo)    Ankle Plantarflexion nt nt    Dorsiflexion hypo hypo    Inversion nt nt    Eversion nt nt       Strength:  [] Unable to assess    Right Left   Hip Flexion 5 5    Extension 5 5    Abduction 4 4    Adduction nt nt    IR nt nt    ER 5 5   Knee Flexion 5 5    Extension 5 5   Ankle Plantarflexion 4 4    Dorsiflexion 5 5     SLR 5/5 bilat   QS = bilat     Neurosensory:   Sensation intact to light touch     Palpation:   Mild tender to palpation L med joint line  Crepitus L PF joint w/ mobilization & AROM L knee flex,ext   incr temp L knee     Joint Mobility:  Patellar mobility WNL, L PF crepitus     Functional Biomechanical Screen  SLS: R 4 seconds L 5 seconds (best of 3 trials bilat)  Bilateral Squat: bilat heel lift, quad dominant, L knee valgus     Special Tests:  Lachmans  [x] Neg    [] Pos Apley's Compression [x] Neg    [] Pos   Valgus@ 0 Degrees [x] Neg    [] Pos Reynold-Aashish [x] Neg    [] Pos  Valgus@ 30 Degrees [x] Neg    [] Pos Patellar Apprehension [x] Neg    [] Pos  Varus@ 0 Degrees [x] Neg    [] Pos Thessaly   [x] Neg    [] Pos  Varus@ 30 Degrees [x] Neg    [] Pos 90-90                           [] Neg    [x] Pos signif   Posterior Drawer [x] Neg    [] Pos   Anterior Drawer [x] Neg    [] Pos Ely's   [] Neg    [x] Pos signif   Other:                         Outcome Measure: Patient presents with an initial FOTO score 48/100    10 min Therapeutic Exercise:  [x] See flow sheet : patient education, instruction HEP    Rationale: increase ROM, increase strength, improve coordination, improve balance and increase proprioception to improve the patients ability to coco prol sitting, transfer sit to stand, ascend/descend stairs, exercise w/out pain/limitation           With   [x] TE   [] TA   [] neuro   [] other: Patient Education: [x] Review HEP    [] Progressed/Changed HEP based on:   [x] positioning   [x] body mechanics   [] transfers   [x] heat/ice application    [x] other:  karthik/path, POC and role of PT, activity modification, postural principles, sleeping position, workstation ergonomic         Pain Level (0-10 scale) post treatment: 0/10      ASSESSMENT:      [x]  See Plan of [facility], PT 2/28/2018  9:43 AM

## 2018-03-01 ENCOUNTER — TELEPHONE (OUTPATIENT)
Dept: FAMILY MEDICINE CLINIC | Age: 73
End: 2018-03-01

## 2018-03-02 ENCOUNTER — HOSPITAL ENCOUNTER (OUTPATIENT)
Dept: ULTRASOUND IMAGING | Age: 73
Discharge: HOME | End: 2018-03-02
Attending: FAMILY MEDICINE
Payer: COMMERCIAL

## 2018-03-02 DIAGNOSIS — Z13.6 SCREENING FOR AAA (ABDOMINAL AORTIC ANEURYSM): ICD-10-CM

## 2018-03-02 PROCEDURE — 76706 US ABDL AORTA SCREEN AAA: CPT

## 2018-03-02 NOTE — PROGRESS NOTES
Released to [address] Good news, your ultrasound of your aorta is normal without signs of an aneurysm.

## 2018-03-05 ENCOUNTER — HOSPITAL ENCOUNTER (OUTPATIENT)
Dept: PHYSICAL THERAPY | Age: 73
Discharge: HOME | End: 2018-03-05
Payer: COMMERCIAL

## 2018-03-05 PROCEDURE — 97110 THERAPEUTIC EXERCISES: CPT

## 2018-03-05 PROCEDURE — 97116 GAIT TRAINING THERAPY: CPT

## 2018-03-05 NOTE — PROGRESS NOTES
PT DAILY TREATMENT NOTE - [facility] 215    Patient Name: Noa Ortiz  Date:3/5/2018  : 1945  [x]  Patient  Verified  Payor: Diaz Watson / Plan: Alfredo García / Product Type: PPO /    In time: 1040 AM  Out time:1130 AM  Total Treatment Time (min): 50  Total Timed Codes (min): 50  1:1 Treatment Time ( [address] only): 40   Visit #: 2     Treatment Area:  Other malaise [R53.81]    SUBJECTIVE  Pain Level (0-10 scale): 0/10  Any medication changes, allergies to medications, adverse drug reactions, diagnosis change, or new procedure performed?: [x] No    [] Yes (see summary sheet for update)  Subjective functional status/changes:   [] No changes reported  Pt reports \"just stiff\"; admits poor compliance w/ ex over weekend; walked 30 min for ex this morning     OBJECTIVE    Modality rationale: decrease inflammation and decrease pain to improve the patients ability to coco prol sitting, transfer sit to stand, ascend/descend stairs, exercise w/out pain/limitation   Min Type Additional Details    [] Estim: []Att   []Unatt        []TENS instruct                  []IFC  []Premod   []NMES                     []Other:  []w/US   []w/ice   []w/heat  Position:  Location:    []  Traction: [] Cervical       []Lumbar                       [] Prone          []Supine                       []Intermittent   []Continuous Lbs:  [] before manual  [] after manual  []w/heat    []  Ultrasound: []Continuous   [] Pulsed at:                           []1MHz   []3MHz Location:  W/cm2:    [] Paraffin         Location:   []w/heat   nt [x]  Ice     []  Heat  []  Ice massage Position:  Location:    []  Laser  []  Other: Position:  Location:      []  Vasopneumatic Device Pressure:       [] lo [] med [] hi   Temperature:      [x] Skin assessment post-treatment:  [x]intact []redness- no adverse reaction    []redness - adverse reaction:     40 min Therapeutic Exercise:  [x] See flow sheet : review HEP, add standing HR, march, alt hip abd, mini squat, prone strap hip flexor stretch   Rationale: increase ROM, increase strength, improve coordination, improve balance and increase proprioception to improve the patients ability to coco prol sitting, transfer sit to stand, ascend/descend stairs, exercise w/out pain/limitation    10 min Gait Training:  SLS, tandem stance    Rationale: increase ROM, increase strength, improve coordination, improve balance and increase proprioception  to improve the patients ability to coco prol sitting, transfer sit to stand, ascend/descend stairs, exercise w/out pain/limitation    Other Objective/Functional Measures:   nt     Pain Level (0-10 scale) post treatment: 0/10    ASSESSMENT/Changes in Function:     Cueing for proper technique w/ ex; challenged w/ balance ex; hold warm up due to patient walking 30 min this morning    Patient will continue to benefit from skilled PT services to modify and progress therapeutic interventions, address functional mobility deficits, address ROM deficits, address strength deficits, analyze and address soft tissue restrictions, analyze and cue movement patterns, analyze and modify body mechanics/ergonomics and assess and modify postural abnormalities to attain remaining goals.      [x]  See Plan of Care  []  See progress note/recertification  []  See Discharge Summary         Progress towards goals / Updated goals:  nt    PLAN  []  Upgrade activities as tolerated     [x]  Continue plan of care  []  Update interventions per flow sheet       []  Discharge due to:_  []  Other:_      Kayla Mackenzie PT 3/5/2018  11:38 AM

## 2018-03-07 NOTE — PROGRESS NOTES
Call to patient.  verified. Informed patient of results and recommendations. Patient understands. Patient stated his Beauty Lance is too big for him to swallow whole and he would like to be prescribed a smaller pill. Advised him to use a pill cutter and cut in half or in 3 pieces because we do not know the size of pill when they are prescribed. Patient understands. He will make appt with nephrologist and Dr. Ronaldo Newby as well.

## 2018-03-08 ENCOUNTER — HOSPITAL ENCOUNTER (OUTPATIENT)
Dept: PHYSICAL THERAPY | Age: 73
Discharge: HOME | End: 2018-03-08
Payer: COMMERCIAL

## 2018-03-08 PROCEDURE — 97110 THERAPEUTIC EXERCISES: CPT

## 2018-03-08 PROCEDURE — 97116 GAIT TRAINING THERAPY: CPT

## 2018-03-08 NOTE — PROGRESS NOTES
PT DAILY TREATMENT NOTE - [facility] 2-15    Patient Name: Wagner Dover  Date:3/8/2018  : 1945  [x]  Patient  Verified  Payor: Heather Miles / Plan: Barb Fermin / Product Type: PPO /    In time: 1030 AM  Out time: 1145AM  Total Treatment Time (min): 75  Total Timed Codes (min): 75  1:1 Treatment Time ( [address] only): 40  Visit #: 3    Treatment Area:  Other malaise [R53.81]    SUBJECTIVE  Pain Level (0-10 scale): 0/10  Any medication changes, allergies to medications, adverse drug reactions, diagnosis change, or new procedure performed?: [x] No    [] Yes (see summary sheet for update)  Subjective functional status/changes:   [] No changes reported  Pt reports \"normal stiffness but worse from wearing shorts out in the cold\"; reports has been working out a little more which means \"looser overall\" but incr stiffness in the morning      OBJECTIVE    Modality rationale: decrease inflammation and decrease pain to improve the patients ability to coco prol sitting, transfer sit to stand, ascend/descend stairs, exercise w/out pain/limitation   Min Type Additional Details    [] Estim: []Att   []Unatt        []TENS instruct                  []IFC  []Premod   []NMES                     []Other:  []w/US   []w/ice   []w/heat  Position:  Location:    []  Traction: [] Cervical       []Lumbar                       [] Prone          []Supine                       []Intermittent   []Continuous Lbs:  [] before manual  [] after manual  []w/heat    []  Ultrasound: []Continuous   [] Pulsed at:                           []1MHz   []3MHz Location:  W/cm2:    [] Paraffin         Location:   []w/heat   nt [x]  Ice     []  Heat  []  Ice massage Position:  Location:    []  Laser  []  Other: Position:  Location:      []  Vasopneumatic Device Pressure:       [] lo [] med [] hi   Temperature:      [x] Skin assessment post-treatment:  [x]intact []redness- no adverse reaction    []redness - adverse reaction:     60 min Therapeutic Exercise:  [x] See flow sheet : add bridge w/ hold, TG LP, step up fw/lat      Rationale: increase ROM, increase strength, improve coordination, improve balance and increase proprioception to improve the patients ability to coco prol sitting, transfer sit to stand, ascend/descend stairs, exercise w/out pain/limitation    15 min Gait Training:  SLS, tandem stance, balance board, gait in speed ladder     Rationale: increase ROM, increase strength, improve coordination, improve balance and increase proprioception  to improve the patients ability to coco prol sitting, transfer sit to stand, ascend/descend stairs, exercise w/out pain/limitation    Other Objective/Functional Measures:   nt     Pain Level (0-10 scale) post treatment: 0/10    ASSESSMENT/Changes in Function:     Able to perform step ups w/out use of UE assist; challenged w/ sequencing w/ gait in speed ladder but no signif LOB; cont to c/o \"stiffness\", decr report of fatigue     Patient will continue to benefit from skilled PT services to modify and progress therapeutic interventions, address functional mobility deficits, address ROM deficits, address strength deficits, analyze and address soft tissue restrictions, analyze and cue movement patterns, analyze and modify body mechanics/ergonomics and assess and modify postural abnormalities to attain remaining goals.      [x]  See Plan of Care  []  See progress note/recertification  []  See Discharge Summary         Progress towards goals / Updated goals:  nt    PLAN  []  Upgrade activities as tolerated     [x]  Continue plan of care  []  Update interventions per flow sheet       []  Discharge due to:_  []  Other:_      Arminda Conrell, PT 3/8/2018  10:58 AM

## 2018-03-09 NOTE — PATIENT INSTRUCTIONS
1)  Try checking an occasional blood sugar before you go to bed in addition to first thing in the morning    2)  Don't make any changes to your Basaglar dose at this time Received return call from spouse, Wanting palliative care to follow in the community. Visit scheduled for 3/15.     Cathy Fragoso MSN, APNP, AGPCNP-BC, [facility]  Palliative Care  [phone] (Pager)  [phone] (Phone)

## 2018-03-12 ENCOUNTER — HOSPITAL ENCOUNTER (OUTPATIENT)
Dept: PHYSICAL THERAPY | Age: 73
End: 2018-03-12
Payer: COMMERCIAL

## 2018-03-15 ENCOUNTER — APPOINTMENT (OUTPATIENT)
Dept: PHYSICAL THERAPY | Age: 73
End: 2018-03-15
Payer: COMMERCIAL

## 2018-03-19 ENCOUNTER — APPOINTMENT (OUTPATIENT)
Dept: PHYSICAL THERAPY | Age: 73
End: 2018-03-19
Payer: COMMERCIAL

## 2018-03-22 ENCOUNTER — APPOINTMENT (OUTPATIENT)
Dept: PHYSICAL THERAPY | Age: 73
End: 2018-03-22
Payer: COMMERCIAL

## 2018-04-21 NOTE — TELEPHONE ENCOUNTER
Notes Recorded by Price Caldera LPN on  at 9:93 PM  Call to patient.  verified. Informed patient of results and recommendations. Patient understands. Patient stated his Amado Bevel is too big for him to swallow whole and he would like to be prescribed a smaller pill. Advised him to use a pill cutter and cut in half or in 3 pieces because we do not know the size of pill when they are prescribed. Patient understands. He will make appt with nephrologist and Dr. Prakash Penaloza as well.

## 2018-05-31 ENCOUNTER — OFFICE VISIT (OUTPATIENT)
Dept: FAMILY MEDICINE CLINIC | Age: 73
End: 2018-05-31

## 2018-05-31 VITALS
BODY MASS INDEX: 26.51 KG/M2 | TEMPERATURE: 98.4 F | WEIGHT: 185.2 LBS | HEART RATE: 76 BPM | HEIGHT: 70 IN | SYSTOLIC BLOOD PRESSURE: 138 MMHG | DIASTOLIC BLOOD PRESSURE: 80 MMHG | RESPIRATION RATE: 18 BRPM | OXYGEN SATURATION: 98 %

## 2018-05-31 DIAGNOSIS — D64.9 ANEMIA, UNSPECIFIED TYPE: ICD-10-CM

## 2018-05-31 DIAGNOSIS — E11.319 DIABETIC RETINOPATHY OF BOTH EYES ASSOCIATED WITH TYPE 2 DIABETES MELLITUS, MACULAR EDEMA PRESENCE UNSPECIFIED, UNSPECIFIED RETINOPATHY SEVERITY (HCC): ICD-10-CM

## 2018-05-31 DIAGNOSIS — R79.89 ABNORMAL THYROID BLOOD TEST: ICD-10-CM

## 2018-05-31 DIAGNOSIS — M1A.9XX0 CHRONIC GOUT, UNSPECIFIED CAUSE, UNSPECIFIED SITE: ICD-10-CM

## 2018-05-31 RX ORDER — INSULIN GLARGINE 100 [IU]/ML
INJECTION, SOLUTION SUBCUTANEOUS
Qty: 15 ADJUSTABLE DOSE PRE-FILLED PEN SYRINGE | Refills: 0 | Status: CANCELLED | OUTPATIENT
Start: 2018-05-31

## 2018-05-31 RX ORDER — COLCHICINE 0.6 MG/1
TABLET ORAL
Qty: 30 TAB | Refills: 0 | Status: SHIPPED | OUTPATIENT
Start: 2018-05-31 | End: 2018-08-10 | Stop reason: SDUPTHER

## 2018-05-31 NOTE — PROGRESS NOTES
Chief Complaint   Patient presents with    Diabetes     follow up     1. Have you been to the ER, urgent care clinic since your last visit? Hospitalized since your last visit? No    2. Have you seen or consulted any other health care providers outside of the [address] [place] [address] since your last visit? Include any pap smears or colon screening.  No

## 2018-05-31 NOTE — MR AVS SNAPSHOT
Wilber Childers 
 
 
 [address] [facility] [address] [place] [address] 
[phone] Patient: Yasmin Rodriguez MRN: [id number] :1945 Visit Information Date & Time Provider Department Dept. Phone Encounter #  
 2018  1:30 PM Estelle Hernandez  [facility] [phone] [id number] Follow-up Instructions Return in about 6 months (around 2018) for DM follow up. Your Appointments 2018 11:00 AM  
New Patient with Raffy Winkler MD  
[address] ([facility]) Appt Note: New Pt  
 [address] Ign[facility] [address]  
[phone]  
  
   
 [phone] Upcoming Health Maintenance Date Due DTaP/Tdap/Td series (1 - Tdap) 10/26/1968 ZOSTER VACCINE AGE 60> 2007 Pneumococcal 65+ Low/Medium Risk (1 of 2 - PCV13) 10/26/2012 FOOT EXAM Q1 2018 MEDICARE YEARLY EXAM 11/10/2018* Influenza Age 5 to Adult 2018 HEMOGLOBIN A1C Q6M 2018 EYE EXAM RETINAL OR DILATED Q1 10/17/2018 MICROALBUMIN Q1 2019 LIPID PANEL Q1 2019 GLAUCOMA SCREENING Q2Y 10/17/2019 *Topic was postponed. The date shown is not the original due date. Allergies as of 2018  Review Complete On: 2018 By: Honorio Monet Severity Noted Reaction Type Reactions Other Medication  2011    Other (comments) Novacaine Causes tachycardia Current Immunizations  Reviewed on 2016 Name Date Influenza High Dose Vaccine PF 2017, 2016 Not reviewed this visit You Were Diagnosed With   
  
 Codes Comments Uncontrolled type 2 diabetes mellitus with stage 4 chronic kidney disease, with long-term current use of insulin (HCC)    -  Primary ICD-10-CM: E11.22, E11.65, N18.4, Z79.4 ICD-9-CM: 250.52, 585.4, V58.67 Anemia, unspecified type     ICD-10-CM: D64.9 ICD-9-CM: 129. 9 Chronic gout, unspecified cause, unspecified site     ICD-10-CM: M1A. 9XX0 
ICD-9-CM: 274.02 Abnormal thyroid blood test     ICD-10-CM: R94.6 ICD-9-CM: 794.5 Vitals BP Pulse Temp Resp Height(growth percentile) Weight(growth percentile) 138/80 (BP 1 Location: Right arm, BP Patient Position: Sitting) 76 98.4 °F (36.9 °C) (Oral) 18 5' 10\" (1.778 m) 185 lb 3.2 oz (84 kg) SpO2 BMI Smoking Status 98% 26.57 kg/m2 Former Smoker Vitals History BMI and BSA Data Body Mass Index Body Surface Area  
 26.57 kg/m 2 2.04 m 2 Preferred Pharmacy Pharmacy Name Phone [id number] [address], [address] [phone] Your Updated Medication List  
  
   
This list is accurate as of 5/31/18  2:03 PM.  Always use your most recent med list.  
  
  
  
  
 apixaban 5 mg tablet Commonly known as:  Margarie Brody Take 1 Tab by mouth two (2) times a day. BASAGLAR KWIKPEN U-100 INSULIN 100 unit/mL (3 mL) Inpn Generic drug:  insulin glargine INJECT 14 UNITS BY SUBCUTANEOUS ROUTE DAILY. INDICATIONS: TYPE 2 DIABETES MELLITUS  
  
 carvedilol 3.125 mg tablet Commonly known as:  [place]  Take 1 Tab by mouth two (2) times daily (with meals). colchicine 0.6 mg tablet 1.2 mg ORALLY at the first sign of a flare followed by 0.6 mg one hr later; MAX 1.8 mg over 1 hour  
  
 digoxin 0.125 mg tablet Commonly known as:  LANOXIN Take 1 Tab by mouth daily. febuxostat 80 mg Tab tablet Commonly known as:  Abad Jim Take 80 mg by mouth daily. furosemide 40 mg tablet Commonly known as:  LASIX 40 mg daily. * glucose blood VI test strips strip Commonly known as:  blood glucose test  
Per patient insurance and meter requirements. Testing 4 times per day. * ONETOUCH ULTRA TEST strip Generic drug:  glucose blood VI test strips Test blood sugar 3 times daily hydrALAZINE 25 mg tablet Commonly known as:  APRESOLINE 25 mg three (3) times daily. Insulin Needles (Disposable) 32 gauge x \" Ndle Commonly known as:  COMFORT EZ PEN NEEDLES  
1 Each by Does Not Apply route daily. multivitamin tablet Commonly known as:  ONE A DAY Take 1 Tab by mouth daily. omeprazole 20 mg capsule Commonly known as:  PRILOSEC Take 20 mg by mouth daily as needed. pravastatin 20 mg tablet Commonly known as:  PRAVACHOL Take 1 Tab by mouth nightly. * Notice: This list has 2 medication(s) that are the same as other medications prescribed for you. Read the directions carefully, and ask your doctor or other care provider to review them with you. Prescriptions Sent to Pharmacy Refills  
 colchicine 0.6 mg tablet 0 Si.2 mg ORALLY at the first sign of a flare followed by 0.6 mg one hr later; MAX 1.8 mg over 1 hour Class: Normal  
 Pharmacy: [facility] [address] [place] [address], [address] [place] [address] #: [phone] We Performed the Following BASIC METABOLIC PANEL (7) [66909 CPT(R)] CBC WITH AUTOMATED DIFF [55501 CPT(R)] FERRITIN [89468 CPT(R)] HEMOGLOBIN A1C WITH EAG [93256 CPT(R)] IRON PROFILE [id number] CPT(R)] REFERRAL TO HEMATOLOGY ONCOLOGY [PHA79 Custom] Comments:  
 Patient will schedule referral himself/herself TSH AND FREE T4 [87899 CPT(R)] URIC ACID [id number] CPT(R)] Follow-up Instructions Return in about 6 months (around 2018) for DM follow up. Referral Information Referral ID Referred By Referred To  
  
 [id number] Rick RODNEY MD   
   [address] [place] [address] [address] [place] [address] Phone: [phone] Fax: [phone] Visits Status Start Date End Date 1 New Request 18  If your referral has a status of pending review or denied, additional information will be sent to support the outcome of this decision. Patient Instructions Anemia: Care Instructions Your Care Instructions Anemia is a low level of red blood cells, which carry oxygen throughout your body. Many things can cause anemia. Lack of iron is one of the most common causes. Your body needs iron to make hemoglobin, a substance in red blood cells that carries oxygen from the lungs to your body's cells. Without enough iron, the body produces fewer and smaller red blood cells. As a result, your body's cells do not get enough oxygen, and you feel tired and weak. And you may have trouble concentrating. Bleeding is the most common cause of a lack of iron. You may have heavy menstrual bleeding or bleeding caused by conditions such as ulcers, hemorrhoids, or cancer. Regular use of aspirin or other anti-inflammatory medicines (such as ibuprofen) also can cause bleeding in some people. A lack of iron in your diet also can cause anemia, especially at times when the body needs more iron, such as during pregnancy, infancy, and the teen years. Your doctor may have prescribed iron pills. It may take several months of treatment for your iron levels to return to normal. Your doctor also may suggest that you eat foods that are rich in iron, such as meat and beans. There are many other causes of anemia. It is not always due to a lack of iron. Finding the specific cause of your anemia will help your doctor find the right treatment for you. Follow-up care is a key part of your treatment and safety. Be sure to make and go to all appointments, and call your doctor if you are having problems. It's also a good idea to know your test results and keep a list of the medicines you take. How can you care for yourself at home? · Take your medicines exactly as prescribed. Call your doctor if you think you are having a problem with your medicine. · If your doctor recommends iron pills, take them as directed: ¨ Try to take the pills on an empty stomach about 1 hour before or 2 hours after meals. But you may need to take iron with food to avoid an upset stomach. ¨ Do not take antacids or drink milk or caffeine drinks (such as coffee, tea, or cola) at the same time or within 2 hours of the time that you take your iron. They can make it hard for your body to absorb the iron. ¨ Vitamin C (from food or supplements) helps your body absorb iron. Try taking iron pills with a glass of orange juice or some other food that is high in vitamin C, such as citrus fruits. ¨ Iron pills may cause stomach problems, such as heartburn, nausea, diarrhea, constipation, and cramps. Be sure to drink plenty of fluids, and include fruits, vegetables, and fiber in your diet each day. Iron pills often make your bowel movements dark or green. ¨ If you forget to take an iron pill, do not take a double dose of iron the next time you take a pill. ¨ Keep iron pills out of the reach of small children. An overdose of iron can be very dangerous. · Follow your doctor's advice about eating iron-rich foods. These include red meat, shellfish, poultry, eggs, beans, raisins, whole-grain bread, and leafy green vegetables. · Steam vegetables to help them keep their iron content. When should you call for help? Call 911 anytime you think you may need emergency care. For example, call if: 
? · You have symptoms of a heart attack. These may include: ¨ Chest pain or pressure, or a strange feeling in the chest. 
¨ Sweating. ¨ Shortness of breath. ¨ Nausea or vomiting. ¨ Pain, pressure, or a strange feeling in the back, neck, jaw, or upper belly or in one or both shoulders or arms. ¨ Lightheadedness or sudden weakness. ¨ A fast or irregular heartbeat. After you call 911, the  may tell you to chew 1 adult-strength or 2 to 4 low-dose aspirin. Wait for an ambulance.  Do not try to drive yourself. ? · You passed out (lost consciousness). ?Call your doctor now or seek immediate medical care if: 
? · You have new or increased shortness of breath. ? · You are dizzy or lightheaded, or you feel like you may faint. ? · Your fatigue and weakness continue or get worse. ? · You have any abnormal bleeding, such as: 
¨ Nosebleeds. ¨ Vaginal bleeding that is different (heavier, more frequent, at a different time of the month) than what you are used to. ¨ Bloody or black stools, or rectal bleeding. ¨ Bloody or pink urine. ? Watch closely for changes in your health, and be sure to contact your doctor if: 
? · You do not get better as expected. Where can you learn more? Go to http://tanika-claudia.info/. Enter R301 in the search box to learn more about \"Anemia: Care Instructions. \" Current as of: October 13, 2016 Content Version: 11.4 © 8668-9067 [employer]. Care instructions adapted under license by [employer] (which disclaims liability or warranty for this information). If you have questions about a medical condition or this instruction, always ask your healthcare professional. [address] [address] any warranty or liability for your use of this information. Introducing \[facility]\"" & HEALTH SERVICES! Dear Cortes Reinoso: Thank you for requesting a [employer] account. Our records indicate that you already have an active [employer] account. You can access your account anytime at [email or web address] Did you know that you can access your hospital and ER discharge instructions at any time in [employer] You can also review all of your test results from your hospital stay or ER visit. Additional Information If you have questions, please visit the Frequently Asked Questions section of the [employer] website at [email or web address] Remember, [employer] is NOT to be used for urgent needs.  For medical emergencies, dial 911. Now available from your iPhone and Android! Please provide this summary of care documentation to your next provider. Your primary care clinician is listed as Amy Thacker. If you have any questions after today's visit, please call [phone].

## 2018-05-31 NOTE — PROGRESS NOTES
Patient Name: Elizabeth Flores   MRN: [id number]    Ramone Reddy is a 81 y.o. male who presents with the following:     He is seen for diabetes. Since last visit he reports no chest pain, dyspnea or TIA's, no numbness, tingling or pain in extremities, no unusual visual symptoms, no hypoglycemia, no significant changes. Home glucose monitoring: is performed regularly, values are usually normal.  He reports medication compliance: compliant all of the time. Medication side effects: none. Diabetic diet compliance: compliant most of the time. Lab review: orders written for new lab studies as appropriate; see orders. Eye exam: UTD. Lab Results   Component Value Date/Time    Hemoglobin A1c 6.5 (H) 02/23/2018 11:31 AM    Hemoglobin A1c (POC) 6.3 08/02/2016 12:53 PM     Previously has been admitted for anemia requiring blood transfusion.  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 Saw oncology who recommended either monitoring labs or doing a bone marrow biopsy. Has not seen them in a while. Monroe Snyder has not yet done a bone marrow biopsy.  Of note, he also has a history of abnormal free light chains which were thought to be attributable to his chronic kidney disease. Has not set up appointment with new hematologist.  Sally Benton feels mildly fatigued but manageable. Requesting for his thyroid levels to be checked as he has had abnormal thyroid levels in the past and his daughters were recently diagnosed with Hashimoto's. Hx of chronic gout. Flare ups seems to affect multiple joints including ankles, knees, and left elbow. Has intermittently been taking Uloric acid but did not consistently take it recently. Hx of CKD, followed by nephrology. Pt interested in seeing a rheumatology who would be able to perform joint aspiration during an acute flare up to get a definitive diagnosis. Review of Systems   Constitutional: Positive for malaise/fatigue.  Negative for fever and weight loss. Respiratory: Negative for cough, hemoptysis, shortness of breath and wheezing. Cardiovascular: Negative for chest pain, palpitations, leg swelling and PND. Gastrointestinal: Negative for abdominal pain, constipation, diarrhea, nausea and vomiting. The patient's medications, allergies, past medical history, surgical history, family history and social history were reviewed and updated where appropriate. Prior to Admission medications    Medication Sig Start Date End Date Taking? Authorizing Provider   DIEGO ROY U-100 INSULIN 100 unit/mL (3 mL) inpn INJECT 14 UNITS BY SUBCUTANEOUS ROUTE DAILY. INDICATIONS: TYPE 2 DIABETES MELLITUS 5/28/18  Yes Nakul Lucas NP   colchicine 0.6 mg tablet 1.2 mg ORALLY at the first sign of a flare followed by 0.6 mg one hr later; MAX 1.8 mg over 1 hour 1/27/18  Yes Otis Crow MD   ONETOUCH ULTRA TEST strip Test blood sugar 3 times daily 5/25/17  Yes Jose Luis Taylor MD   Insulin Needles, Disposable, (COMFORT EZ PEN NEEDLES) 32 gauge x 5/32\" ndle 1 Each by Does Not Apply route daily. 5/25/17  Yes Jose Luis Taylor MD   digoxin (LANOXIN) 0.125 mg tablet Take 1 Tab by mouth daily. 8/25/16  Yes Gena Ayon DO   furosemide (LASIX) 40 mg tablet 40 mg daily. 5/10/16  Yes Historical Provider   hydrALAZINE (APRESOLINE) 25 mg tablet 25 mg three (3) times daily. 5/20/16  Yes Historical Provider   Febuxostat (ULORIC) 80 mg tab tablet Take 80 mg by mouth daily. Yes Historical Provider   omeprazole (PRILOSEC) 20 mg capsule Take 20 mg by mouth daily as needed. Yes Historical Provider   multivitamin (ONE A DAY) tablet Take 1 Tab by mouth daily. Yes Historical Provider   glucose blood VI test strips (BLOOD GLUCOSE TEST) strip Per patient insurance and meter requirements. Testing 4 times per day. 2/26/16  Yes Gena Ayon DO   apixaban (ELIQUIS) 5 mg tablet Take 1 Tab by mouth two (2) times a day.  1/18/16  Yes Martin Fox NP   pravastatin (PRAVACHOL) 20 mg tablet Take 1 Tab by mouth nightly. 1/18/16  Yes Dominguez Ramos NP   carvedilol (COREG) 3.125 mg tablet Take 1 Tab by mouth two (2) times daily (with meals). 1/18/16  Yes Dominguez Ramos NP       Allergies   Allergen Reactions    Other Medication Other (comments)     Novacaine  Causes tachycardia             OBJECTIVE    Visit Vitals    /80 (BP 1 Location: Right arm, BP Patient Position: Sitting)    Pulse 76    Temp 98.4 °F (36.9 °C) (Oral)    Resp 18    Ht 5' 10\" (1.778 m)    Wt 185 lb 3.2 oz (84 kg)    SpO2 98%    BMI 26.57 kg/m2       Physical Exam   Constitutional: He is oriented to person, place, and time and well-developed, well-nourished, and in no distress. No distress. Eyes: Conjunctivae and EOM are normal. Pupils are equal, round, and reactive to light. Musculoskeletal: Normal range of motion. Neurological: He is alert and oriented to person, place, and time. Gait normal.   Skin: Skin is warm and dry. He is not diaphoretic. Psychiatric: Mood, memory, affect and judgment normal.   Nursing note and vitals reviewed. ASSESSMENT AND PLAN  Ruth Ann Underwood is a 81 y.o. male who presents today for:    1. Uncontrolled type 2 diabetes mellitus with stage 4 chronic kidney disease, with long-term current use of insulin (HCC)  Stable, continue current treatment pending review of labs. - HEMOGLOBIN A1C WITH EAG    2. Anemia, unspecified type  Pt to set up appt with hematology. - CBC WITH AUTOMATED DIFF  - IRON PROFILE  - FERRITIN  - REFERRAL TO HEMATOLOGY ONCOLOGY    3. Chronic gout, unspecified cause, unspecified site  Has upcoming appt with rheumatology in July. - colchicine 0.6 mg tablet; 1.2 mg ORALLY at the first sign of a flare followed by 0.6 mg one hr later; MAX 1.8 mg over 1 hour  Dispense: 30 Tab; Refill: 0  - BASIC METABOLIC PANEL (7)  - URIC ACID    4. Abnormal thyroid blood test  Will r/o thyroid d/o.  - TSH AND FREE T4    5.  Diabetic retinopathy of both eyes associated with type 2 diabetes mellitus, macular edema presence unspecified, unspecified retinopathy severity ([address]  Stable, continue current treatment. Medications Discontinued During This Encounter   Medication Reason    colchicine 0.6 mg tablet Reorder       Follow-up Disposition:  Return in about 6 months (around 11/30/2018) for DM follow up. Medication risks/benefits/costs/interactions/alternatives discussed with patient. Advised patient to call back or return to office if symptoms worsen/change/persist. If patient cannot reach us or should anything more severe/urgent arise he/she should proceed directly to the nearest emergency department. Discussed expected course/resolution/complications of diagnosis in detail with patient. Patient given a written after visit summary which includes his/her diagnoses, current medications and vitals. Patient expressed understanding with the diagnosis and plan. Sterling Lawler M.D. Diagnoses and all orders for this visit:    1. Uncontrolled type 2 diabetes mellitus with stage 4 chronic kidney disease, with long-term current use of insulin (HCC)  -     HEMOGLOBIN A1C WITH EAG    2. Anemia, unspecified type  -     CBC WITH AUTOMATED DIFF  -     IRON PROFILE  -     FERRITIN  -     REFERRAL TO HEMATOLOGY ONCOLOGY    3. Chronic gout, unspecified cause, unspecified site  -     colchicine 0.6 mg tablet; 1.2 mg ORALLY at the first sign of a flare followed by 0.6 mg one hr later; MAX 1.8 mg over 1 hour  -     BASIC METABOLIC PANEL (7)  -     URIC ACID    4. Abnormal thyroid blood test  -     TSH AND FREE T4    5. Diabetic retinopathy of both eyes associated with type 2 diabetes mellitus, macular edema presence unspecified, unspecified retinopathy severity ([address]  Assessment & Plan:   This condition is managed by Specialist.  Key Antihyperglycemic Medications             BASAGLAR KWIKPEN U-100 INSULIN 100 unit/mL (3 mL) inpn  (Taking) INJECT 14 UNITS BY SUBCUTANEOUS ROUTE DAILY. INDICATIONS: TYPE 2 DIABETES MELLITUS        Other Key Diabetic Medications             pravastatin (PRAVACHOL) 20 mg tablet  (Taking) Take 1 Tab by mouth nightly.         Lab Results   Component Value Date/Time    Hemoglobin A1c 6.5 02/23/2018 11:31 AM    Glucose 174 02/23/2018 11:31 AM    Creatinine 2.30 02/23/2018 11:31 AM    Microalb/Creat ratio (ug/mg creat.) 566.9 02/23/2018 11:32 AM    Cholesterol, total 135 02/23/2018 11:31 AM    HDL Cholesterol 34 02/23/2018 11:31 AM    LDL, calculated 79 02/23/2018 11:31 AM    Triglyceride 111 02/23/2018 11:31 AM     Diabetic Foot and Eye Exam HM Status   Topic Date Due    Diabetic Foot Care  05/25/2018    Eye Exam  10/17/2018

## 2018-05-31 NOTE — ASSESSMENT & PLAN NOTE
This condition is managed by Specialist.  Key Antihyperglycemic Medications             BASAGLAR KWIKPEN U-100 INSULIN 100 unit/mL (3 mL) inpn  (Taking) INJECT 14 UNITS BY SUBCUTANEOUS ROUTE DAILY. INDICATIONS: TYPE 2 DIABETES MELLITUS        Other Key Diabetic Medications             pravastatin (PRAVACHOL) 20 mg tablet  (Taking) Take 1 Tab by mouth nightly.         Lab Results   Component Value Date/Time    Hemoglobin A1c 6.5 02/23/2018 11:31 AM    Glucose 174 02/23/2018 11:31 AM    Creatinine 2.30 02/23/2018 11:31 AM    Microalb/Creat ratio (ug/mg creat.) 566.9 02/23/2018 11:32 AM    Cholesterol, total 135 02/23/2018 11:31 AM    HDL Cholesterol 34 02/23/2018 11:31 AM    LDL, calculated 79 02/23/2018 11:31 AM    Triglyceride 111 02/23/2018 11:31 AM     Diabetic Foot and Eye Exam HM Status   Topic Date Due    Diabetic Foot Care  05/25/2018    Eye Exam  10/17/2018 .

## 2018-05-31 NOTE — PATIENT INSTRUCTIONS
Anemia: Care Instructions  Your Care Instructions    Anemia is a low level of red blood cells, which carry oxygen throughout your body. Many things can cause anemia. Lack of iron is one of the most common causes. Your body needs iron to make hemoglobin, a substance in red blood cells that carries oxygen from the lungs to your body's cells. Without enough iron, the body produces fewer and smaller red blood cells. As a result, your body's cells do not get enough oxygen, and you feel tired and weak. And you may have trouble concentrating. Bleeding is the most common cause of a lack of iron. You may have heavy menstrual bleeding or bleeding caused by conditions such as ulcers, hemorrhoids, or cancer. Regular use of aspirin or other anti-inflammatory medicines (such as ibuprofen) also can cause bleeding in some people. A lack of iron in your diet also can cause anemia, especially at times when the body needs more iron, such as during pregnancy, infancy, and the teen years. Your doctor may have prescribed iron pills. It may take several months of treatment for your iron levels to return to normal. Your doctor also may suggest that you eat foods that are rich in iron, such as meat and beans. There are many other causes of anemia. It is not always due to a lack of iron. Finding the specific cause of your anemia will help your doctor find the right treatment for you. Follow-up care is a key part of your treatment and safety. Be sure to make and go to all appointments, and call your doctor if you are having problems. It's also a good idea to know your test results and keep a list of the medicines you take. How can you care for yourself at home? · Take your medicines exactly as prescribed. Call your doctor if you think you are having a problem with your medicine. · If your doctor recommends iron pills, take them as directed:  ¨ Try to take the pills on an empty stomach about 1 hour before or 2 hours after meals. But you may need to take iron with food to avoid an upset stomach. ¨ Do not take antacids or drink milk or caffeine drinks (such as coffee, tea, or cola) at the same time or within 2 hours of the time that you take your iron. They can make it hard for your body to absorb the iron. ¨ Vitamin C (from food or supplements) helps your body absorb iron. Try taking iron pills with a glass of orange juice or some other food that is high in vitamin C, such as citrus fruits. ¨ Iron pills may cause stomach problems, such as heartburn, nausea, diarrhea, constipation, and cramps. Be sure to drink plenty of fluids, and include fruits, vegetables, and fiber in your diet each day. Iron pills often make your bowel movements dark or green. ¨ If you forget to take an iron pill, do not take a double dose of iron the next time you take a pill. ¨ Keep iron pills out of the reach of small children. An overdose of iron can be very dangerous. · Follow your doctor's advice about eating iron-rich foods. These include red meat, shellfish, poultry, eggs, beans, raisins, whole-grain bread, and leafy green vegetables. · Steam vegetables to help them keep their iron content. When should you call for help? Call 911 anytime you think you may need emergency care. For example, call if:  ? · You have symptoms of a heart attack. These may include:  ¨ Chest pain or pressure, or a strange feeling in the chest.  ¨ Sweating. ¨ Shortness of breath. ¨ Nausea or vomiting. ¨ Pain, pressure, or a strange feeling in the back, neck, jaw, or upper belly or in one or both shoulders or arms. ¨ Lightheadedness or sudden weakness. ¨ A fast or irregular heartbeat. After you call 911, the  may tell you to chew 1 adult-strength or 2 to 4 low-dose aspirin. Wait for an ambulance. Do not try to drive yourself. ? · You passed out (lost consciousness).    ?Call your doctor now or seek immediate medical care if:  ? · You have new or increased shortness of breath. ? · You are dizzy or lightheaded, or you feel like you may faint. ? · Your fatigue and weakness continue or get worse. ? · You have any abnormal bleeding, such as:  ¨ Nosebleeds. ¨ Vaginal bleeding that is different (heavier, more frequent, at a different time of the month) than what you are used to. ¨ Bloody or black stools, or rectal bleeding. ¨ Bloody or pink urine. ? Watch closely for changes in your health, and be sure to contact your doctor if:  ? · You do not get better as expected. Where can you learn more? Go to http://tanika-claudia.info/. Enter R301 in the search box to learn more about \"Anemia: Care Instructions. \"  Current as of: October 13, 2016  Content Version: 11.4  © 5839-9396 [employer]. Care instructions adapted under license by [employer] (which disclaims liability or warranty for this information). If you have questions about a medical condition or this instruction, always ask your healthcare professional. [place] [address] any warranty or liability for your use of this information.

## 2018-06-01 LAB
BASOPHILS # BLD AUTO: 0 X10E3/UL (ref 0–0.2)
BASOPHILS NFR BLD AUTO: 1 %
BUN SERPL-MCNC: 53 MG/DL (ref 8–27)
BUN/CREAT SERPL: 24 (ref 10–24)
CHLORIDE SERPL-SCNC: 99 MMOL/L (ref 96–106)
CO2 SERPL-SCNC: 23 MMOL/L (ref 18–29)
CREAT SERPL-MCNC: 2.25 MG/DL (ref 0.76–1.27)
EOSINOPHIL # BLD AUTO: 0.2 X10E3/UL (ref 0–0.4)
EOSINOPHIL NFR BLD AUTO: 3 %
ERYTHROCYTE [DISTWIDTH] IN BLOOD BY AUTOMATED COUNT: 14.7 % (ref 12.3–15.4)
EST. AVERAGE GLUCOSE BLD GHB EST-MCNC: 148 MG/DL
FERRITIN SERPL-MCNC: 47 NG/ML (ref 30–400)
GFR SERPLBLD CREATININE-BSD FMLA CKD-EPI: 28 ML/MIN/1.73
GFR SERPLBLD CREATININE-BSD FMLA CKD-EPI: 33 ML/MIN/1.73
GLUCOSE SERPL-MCNC: 88 MG/DL (ref 65–99)
HBA1C MFR BLD: 6.8 % (ref 4.8–5.6)
HCT VFR BLD AUTO: 33.3 % (ref 37.5–51)
HGB BLD-MCNC: 11.1 G/DL (ref 13–17.7)
IMM GRANULOCYTES # BLD: 0 X10E3/UL (ref 0–0.1)
IMM GRANULOCYTES NFR BLD: 0 %
INTERPRETATION: NORMAL
IRON SATN MFR SERPL: 13 % (ref 15–55)
IRON SERPL-MCNC: 51 UG/DL (ref 38–169)
LYMPHOCYTES # BLD AUTO: 0.6 X10E3/UL (ref 0.7–3.1)
LYMPHOCYTES NFR BLD AUTO: 11 %
MCH RBC QN AUTO: 29.1 PG (ref 26.6–33)
MCHC RBC AUTO-ENTMCNC: 33.3 G/DL (ref 31.5–35.7)
MCV RBC AUTO: 87 FL (ref 79–97)
MONOCYTES # BLD AUTO: 0.6 X10E3/UL (ref 0.1–0.9)
MONOCYTES NFR BLD AUTO: 10 %
NEUTROPHILS # BLD AUTO: 4.3 X10E3/UL (ref 1.4–7)
NEUTROPHILS NFR BLD AUTO: 75 %
PLATELET # BLD AUTO: 157 X10E3/UL (ref 150–379)
POTASSIUM SERPL-SCNC: 4.9 MMOL/L (ref 3.5–5.2)
RBC # BLD AUTO: 3.82 X10E6/UL (ref 4.14–5.8)
SODIUM SERPL-SCNC: 140 MMOL/L (ref 134–144)
T4 FREE SERPL-MCNC: 1.28 NG/DL (ref 0.82–1.77)
TIBC SERPL-MCNC: 392 UG/DL (ref 250–450)
TSH SERPL DL<=0.005 MIU/L-ACNC: 7.37 UIU/ML (ref 0.45–4.5)
UIBC SERPL-MCNC: 341 UG/DL (ref 111–343)
URATE SERPL-MCNC: 11.2 MG/DL (ref 3.7–8.6)
WBC # BLD AUTO: 5.6 X10E3/UL (ref 3.4–10.8)

## 2018-06-02 DIAGNOSIS — E03.8 SUBCLINICAL HYPOTHYROIDISM: Primary | ICD-10-CM

## 2018-06-02 RX ORDER — LEVOTHYROXINE SODIUM 25 UG/1
25 TABLET ORAL
Qty: 30 TAB | Refills: 2 | Status: SHIPPED | OUTPATIENT
Start: 2018-06-02 | End: 2018-07-13

## 2018-06-02 NOTE — PROGRESS NOTES
Dear Jose Dara Amaro,    I hope you are doing well. I wanted to follow up on your recent test results: Your average sugar/glucose levels for the past 3 months (determined by the hemoglobin A1C blood marker) is 6.8, which is currently at goal; please continue taking your diabetes medications at the same current dose. Your iron deficiency anemia is still present although remains stable; please schedule your appointment with Dr. Lizett Chicas (hematology). Your uric acid (gout levels) is still very high; please try to continue taking Uloric consistent to decrease these levels in order to prevent future gout attacks. Your kidney marker remains stable; please relay this to your kidney doctor. Your thyroid levels are abnormal. Given this finding and your symptoms of fatigue, I recommend that we start a thyroid supplement called levothyroxine 25 mcg daily. I have sent a prescription of this medication to your local pharmacy. Please come by to our lab for blood work only in about 6 weeks to recheck your thyroid levels; I will also obtain an additional blood test at that time to see if this is due to Hashimoto's diease. Please do not hesitate to contact our clinic with any further questions. Sincerely,    Shani MORRISON

## 2018-07-05 ENCOUNTER — OFFICE VISIT (OUTPATIENT)
Dept: FAMILY MEDICINE CLINIC | Age: 73
End: 2018-07-05

## 2018-07-05 VITALS
TEMPERATURE: 98.3 F | DIASTOLIC BLOOD PRESSURE: 66 MMHG | RESPIRATION RATE: 16 BRPM | BODY MASS INDEX: 26.83 KG/M2 | SYSTOLIC BLOOD PRESSURE: 142 MMHG | HEART RATE: 66 BPM | WEIGHT: 187.4 LBS | HEIGHT: 70 IN | OXYGEN SATURATION: 96 %

## 2018-07-05 DIAGNOSIS — J06.9 VIRAL URI WITH COUGH: Primary | ICD-10-CM

## 2018-07-05 RX ORDER — GUAIFENESIN AND DEXTROMETHORPHAN HYDROBROMIDE 1200; 60 MG/1; MG/1
1 TABLET, EXTENDED RELEASE ORAL
Qty: 30 TAB | Refills: 0
Start: 2018-07-05 | End: 2018-07-13

## 2018-07-05 NOTE — PROGRESS NOTES
Chief Complaint   Patient presents with    Cough     For about 3 days, Nt taking any medications for his symptoms yet.  Nasal Discharge     For 3 days,may have caught this from his wife per pt. 1. Have you been to the ER, urgent care clinic since your last visit? Hospitalized since your last visit? No    2. Have you seen or consulted any other health care providers outside of the [address] [place] [address] since your last visit? Include any pap smears or colon screening.  No

## 2018-07-05 NOTE — PROGRESS NOTES
HISTORY OF PRESENT ILLNESS  Vikash Alicia is a 81 y.o. male. HPI  C/o cough and sinus drainage x 3 days. Wife has URI. No fever/chills. Not taking any medications for current symptoms. Past medical history, social history, family history and medications were reviewed and updated. Blood pressure 142/66, pulse 66, temperature 98.3 °F (36.8 °C), temperature source Oral, resp. rate 16, height 5' 10\" (1.778 m), weight 187 lb 6.4 oz (85 kg), SpO2 96 %. Review of Systems   Constitutional: Negative for chills, fever and malaise/fatigue. HENT: Positive for congestion (mild) and sore throat. Negative for ear pain and sinus pain. Respiratory: Positive for cough. Negative for sputum production, shortness of breath and wheezing. Cardiovascular: Negative for chest pain. All other systems reviewed and are negative. Physical Exam   Constitutional: No distress. HENT:   Right Ear: Tympanic membrane and ear canal normal.   Left Ear: Tympanic membrane and ear canal normal.   Nose: Mucosal edema present. Right sinus exhibits no maxillary sinus tenderness and no frontal sinus tenderness. Left sinus exhibits no maxillary sinus tenderness and no frontal sinus tenderness. Mouth/Throat: Posterior oropharyngeal erythema present. No oropharyngeal exudate or posterior oropharyngeal edema. Neck: Neck supple. Cardiovascular:   Murmur heard. IRR   Pulmonary/Chest: He has no wheezes. He has no rales. Decreased inspiratory effort. Lymphadenopathy:     He has no cervical adenopathy. Skin: Skin is warm and dry. ASSESSMENT and PLAN  Diagnoses and all orders for this visit:    1. Viral URI with cough  -     dextromethorphan-guaiFENesin (MUCINEX DM) 60-1,200 mg Tb12; Take 1 Tab by mouth every twelve (12) hours as needed. Rest and fluids. Mucinex as above. Follow up prn if sx worsen or FTI over the next 2-3 days.

## 2018-07-05 NOTE — MR AVS SNAPSHOT
[facility] 
 
 
 [facility] [address] [place] [address] 
[phone] Patient: Gordon Denson MRN: [id number] :1945 Visit Information Date & Time Provider Department Dept. Phone Encounter #  
 2018  3:30 PM Ronaldo Slater, [address] [phone] [id number] Your Appointments 2018 11:00 AM  
New Patient with Penny Hendricks MD  
[address] ([facility]) Appt Note: New Pt  
 [address] Ignacio [facility] [address]  
[phone]  
  
   
 [address] [id number] Upcoming Health Maintenance Date Due DTaP/Tdap/Td series (1 - Tdap) 10/26/1968 ZOSTER VACCINE AGE 60> 2007 Pneumococcal 65+ Low/Medium Risk (1 of 2 - PCV13) 10/26/2012 FOOT EXAM Q1 2018 Influenza Age 5 to Adult 2018 EYE EXAM RETINAL OR DILATED Q1 10/17/2018 HEMOGLOBIN A1C Q6M 2018 MICROALBUMIN Q1 2019 LIPID PANEL Q1 2019 GLAUCOMA SCREENING Q2Y 10/17/2019 Allergies as of 2018  Review Complete On: 2018 By: Ronaldo Slater NP Severity Noted Reaction Type Reactions Other Medication  2011    Other (comments) Novacaine Causes tachycardia Current Immunizations  Reviewed on 2016 Name Date Influenza High Dose Vaccine PF 2017, 2016 Not reviewed this visit You Were Diagnosed With   
  
 Codes Comments Viral URI with cough    -  Primary ICD-10-CM: J06.9, B97.89 ICD-9-CM: 465.9 Vitals BP Pulse Temp Resp Height(growth percentile) Weight(growth percentile) 142/66 66 98.3 °F (36.8 °C) (Oral) 16 5' 10\" (1.778 m) 187 lb 6.4 oz (85 kg) SpO2 BMI Smoking Status 96% 26.89 kg/m2 Former Smoker Vitals History BMI and BSA Data Body Mass Index Body Surface Area  
 26.89 kg/m 2 2.05 m 2 Preferred Pharmacy Pharmacy Name Phone [id number] Michael [phone] Your Updated Medication List  
  
   
This list is accurate as of 7/5/18  4:17 PM.  Always use your most recent med list.  
  
  
  
  
 apixaban 5 mg tablet Commonly known as:  [place] Mac Take 1 Tab by mouth two (2) times a day. BASAGLAR KWIKPEN U-100 INSULIN 100 unit/mL (3 mL) Inpn Generic drug:  insulin glargine INJECT 14 UNITS BY SUBCUTANEOUS ROUTE DAILY. INDICATIONS: TYPE 2 DIABETES MELLITUS  
  
 carvedilol 3.125 mg tablet Commonly known as:  Mirlande Locket Take 1 Tab by mouth two (2) times daily (with meals). colchicine 0.6 mg tablet 1.2 mg ORALLY at the first sign of a flare followed by 0.6 mg one hr later; MAX 1.8 mg over 1 hour  
  
 dextromethorphan-guaiFENesin 60-1,200 mg Tb12 Commonly known as:  [address] DM Take 1 Tab by mouth every twelve (12) hours as needed. digoxin 0.125 mg tablet Commonly known as:  LANOXIN Take 1 Tab by mouth daily. febuxostat 80 mg Tab tablet Commonly known as:  Matt Monica Take 80 mg by mouth daily. furosemide 40 mg tablet Commonly known as:  LASIX 40 mg daily. * glucose blood VI test strips strip Commonly known as:  blood glucose test  
Per patient insurance and meter requirements. Testing 4 times per day. * ONETOUCH ULTRA TEST strip Generic drug:  glucose blood VI test strips Test blood sugar 3 times daily  
  
 hydrALAZINE 25 mg tablet Commonly known as:  APRESOLINE 25 mg three (3) times daily. Insulin Needles (Disposable) 32 gauge x 5/32\" Ndle Commonly known as:  COMFORT EZ PEN NEEDLES  
1 Each by Does Not Apply route daily. levothyroxine 25 mcg tablet Commonly known as:  SYNTHROID Take 1 Tab by mouth Daily (before breakfast). multivitamin tablet Commonly known as:  ONE A DAY Take 1 Tab by mouth daily. omeprazole 20 mg capsule Commonly known as:  PRILOSEC Take 20 mg by mouth daily as needed. pravastatin 20 mg tablet Commonly known as:  PRAVACHOL Take 1 Tab by mouth nightly. * Notice: This list has 2 medication(s) that are the same as other medications prescribed for you. Read the directions carefully, and ask your doctor or other care provider to review them with you. Introducing [facility]! Dear Kristie Torres: Thank you for requesting a Factual account. Our records indicate that you already have an active Factual account. You can access your account anytime at [email or web address] Did you know that you can access your hospital and ER discharge instructions at any time in Factual? You can also review all of your test results from your hospital stay or ER visit. Additional Information If you have questions, please visit the Frequently Asked Questions section of the Factual website at [email or web address] Remember, Factual is NOT to be used for urgent needs. For medical emergencies, dial 911. Now available from your iPhone and Android! Please provide this summary of care documentation to your next provider. Your primary care clinician is listed as Amy Thacker. If you have any questions after today's visit, please call [phone].

## 2018-07-13 ENCOUNTER — OFFICE VISIT (OUTPATIENT)
Dept: RHEUMATOLOGY | Age: 73
End: 2018-07-13

## 2018-07-13 VITALS
HEART RATE: 91 BPM | RESPIRATION RATE: 18 BRPM | DIASTOLIC BLOOD PRESSURE: 80 MMHG | BODY MASS INDEX: 26.77 KG/M2 | TEMPERATURE: 98.1 F | WEIGHT: 187 LBS | SYSTOLIC BLOOD PRESSURE: 161 MMHG | HEIGHT: 70 IN

## 2018-07-13 DIAGNOSIS — M10.361 ACUTE GOUT DUE TO RENAL IMPAIRMENT INVOLVING RIGHT KNEE: Primary | ICD-10-CM

## 2018-07-13 DIAGNOSIS — M25.462 PAIN AND SWELLING OF LEFT KNEE: ICD-10-CM

## 2018-07-13 DIAGNOSIS — M1A.39X1 CHRONIC TOPHACEOUS GOUT OF MULTIPLE SITES DUE TO RENAL IMPAIRMENT: ICD-10-CM

## 2018-07-13 DIAGNOSIS — I10 ESSENTIAL HYPERTENSION: ICD-10-CM

## 2018-07-13 DIAGNOSIS — M25.562 PAIN AND SWELLING OF LEFT KNEE: ICD-10-CM

## 2018-07-13 DIAGNOSIS — N18.4 CKD (CHRONIC KIDNEY DISEASE) STAGE 4, GFR 15-29 ML/MIN (HCC): ICD-10-CM

## 2018-07-13 RX ORDER — LIDOCAINE HYDROCHLORIDE 10 MG/ML
1 INJECTION, SOLUTION EPIDURAL; INFILTRATION; INTRACAUDAL; PERINEURAL ONCE
Qty: 1 ML | Refills: 0
Start: 2018-07-13 | End: 2018-07-13

## 2018-07-13 RX ORDER — TRIAMCINOLONE ACETONIDE 40 MG/ML
40 INJECTION, SUSPENSION INTRA-ARTICULAR; INTRAMUSCULAR ONCE
Qty: 1 ML | Refills: 0
Start: 2018-07-13 | End: 2018-07-13

## 2018-07-13 RX ORDER — LEFLUNOMIDE 20 MG/1
20 TABLET ORAL DAILY
Qty: 90 TAB | Refills: 0 | Status: SHIPPED | OUTPATIENT
Start: 2018-07-13 | End: 2018-08-29

## 2018-07-13 NOTE — PROGRESS NOTES
REASON FOR VISIT    This is the initial evaluation for Mr. Lisa Frost a 81 y.o.  male for gout. The patient is referred to the [facility] [address] [place] [address] at the request of Dr. Chang Ruiz. HISTORY OF PRESENT ILLNESS     I have reviewed and summarized old records from [facility]    In 10/02/2012, Bilateral Hand showed RIGHT: demineralization and small erosive changes.  Vascular calcification is noted. LEFT: demineralization and small erosive changes head 2nd middle phalanx, head first metacarpal bone. Vascular calcification is noted.      In 4/19/2016, labs showed uric acid 12.6 mg/dL. In 11/09/2016, labs showed uric acid 9.6 mg/dL. In 8/03/2017, labs showed uric acid 4.1 mg/dL. In 2/23/2018, labs showed uric acid 12.0 mg/dL    In 5/31/2018, labs showed WBC 5.6, lymphocytes 0.6, Hct 33.3%, platelets [phone], creatinine 2.23 mg/dL, eGFR 28, uric acid 11.2 mg/dL    Today, he complains of right knee acute pain and swelling that has been persistent. He has been taking colchicine twice daily without difficulty. He reports a history of gout involving his ankles, elbows, toes, knees. He has pain in his hands that he describes migratory. He does not have morning stiffness. His right 5th PIP cannot bend. He denies swelling. He cannot take Uloric now due to dysphagia    Therapy Includes:    NSAIDs: contra-indicated due to CKD  Colchicine prophylaxis: yes  Current urate-lowering therapy: none  Discontinued urate-lowering therapy because of inefficacy: allopurinol  Discontinued urate-lowering therapy because of side effects: none  Contra-Indicated urate-lowering therapy because of CKD: allopurinol, probenecid  Discontinued urate-lowering therapy because of dysphagia: Uloric    REVIEW OF SYSTEMS    A 15 point review of systems was performed and summarized below. The questionnaire was reviewed with the patient and scanned into the patient's medical record.     General: endorses fatigue, denies recent weight gain, recent weight loss, weakness, fever, night sweats  Musculoskeletal: endorses joint pain, joint swelling, morning stiffness, muscle weakness  Ears: endorses ringing in left ear, denies loss of hearing, deafness  Eyes: denies pain, redness, loss of vision, double vision, blurred vision, dryness, foreign body sensation  Mouth: denies sore tongue, oral ulcers, bleeding gums, loss of taste, dryness, increased dental caries  Nose: denies nosebleeds, loss of smell, nasal ulcers  Throat: denies frequent sore throats, hoarseness, difficulty in swallowing, pain in jaw while chewing  Neck: denies swollen glands, tender glands  Cardiopulmonary: endorses irregular heart beat, swollen legs or feet, denies pain in chest, sudden changes in heart beat, shortness of breath, difficulty breathing at night, cough, coughing of blood, wheezing  Gastrointestinal: endorses denies nausea, heartburn, stomach pain relieved by food, vomiting of blood/\"coffee grounds\", jaundice, increasing constipation, persistent diarrhea, blood in stools, black stools  Genitourinary: denies nocturia, difficult urination, pain or burning on urination, blood in urine, cloudy urine, pus in urine, genital discharge, frequent urination, rash/ulcers, sexual difficulties, prostate trouble  Hematologic: endorses anemia, denies bleeding tendency, blood clots  Skin: denies easy bruising, redness, rash, hives, sun sensitive, skin tightness, nodules/bumps, hair loss, color changes of hands or feet in the cold (Raynaud's)  Neurologic: denies headaches, dizziness, numbness or tingling in hands/feet, memory loss, muscle weakness, fainting or loss of consciousness  Psychiatric: denies depression, excessive worries  Sleep: endorses poor sleep (4 hours), daytime somnolence, denies snoring, apnea, difficulty falling asleep, difficulty staying asleep     PAST MEDICAL HISTORY    He has a past medical history of Acute encephalopathy (1/14/2016);  Anemia (5/11/2017); Atrial fibrillation ([address] Painter esophagus; CAD (coronary artery disease); Diabetes ([address] Heart failure ([address] HTN, goal below 140/90; Retinopathy, diabetic, bilateral ([address] (3/11/2016); Stenosis of right carotid artery without cerebral infarction (1/18/2016); TIA (transient ischemic attack) (1/14/2016); and Vitamin D deficiency (3/1/2016). FAMILY HISTORY    His family history includes Diabetes in his father; Heart Failure in his mother; No Known Problems in his sister; Other in his daughter, daughter, and daughter. SOCIAL HISTORY    He reports that he has quit smoking. He has never used smokeless tobacco. He reports that he drinks alcohol. He reports that he does not use illicit drugs. HEALTH MAINTENANCE    Immunizations  Immunization History   Administered Date(s) Administered    Influenza High Dose Vaccine PF 02/26/2016, 11/23/2017     MEDICATIONS    Current Outpatient Prescriptions   Medication Sig Dispense Refill    triamcinolone acetonide (KENALOG) 40 mg/mL injection 1 mL by Intra artICUlar route once for 1 dose. 1 mL 0    lidocaine, PF, (XYLOCAINE) 10 mg/mL (1 %) injection 1 mL by Intra artICUlar route once for 1 dose. 1 mL 0    leflunomide (ARAVA) 20 mg tablet Take 1 Tab by mouth daily for 90 days. Take half tab daily for 7 days and then one tab if tolerated 90 Tab 0    colchicine 0.6 mg tablet 1.2 mg ORALLY at the first sign of a flare followed by 0.6 mg one hr later; MAX 1.8 mg over 1 hour 30 Tab 0    BASAGLAR KWIKPEN U-100 INSULIN 100 unit/mL (3 mL) inpn INJECT 14 UNITS BY SUBCUTANEOUS ROUTE DAILY. INDICATIONS: TYPE 2 DIABETES MELLITUS 15 Adjustable Dose Pre-filled Pen Syringe 0    ONETOUCH ULTRA TEST strip Test blood sugar 3 times daily 90 Strip 11    Insulin Needles, Disposable, (COMFORT EZ PEN NEEDLES) 32 gauge x 5/32\" ndle 1 Each by Does Not Apply route daily. 100 Pen Needle 3    digoxin (LANOXIN) 0.125 mg tablet Take 1 Tab by mouth daily.  30 Tab 11    furosemide (LASIX) 40 mg tablet 40 mg daily. 5    hydrALAZINE (APRESOLINE) 25 mg tablet 25 mg three (3) times daily. 3    omeprazole (PRILOSEC) 20 mg capsule Take 20 mg by mouth daily as needed.  multivitamin (ONE A DAY) tablet Take 1 Tab by mouth daily.  glucose blood VI test strips (BLOOD GLUCOSE TEST) strip Per patient insurance and meter requirements. Testing 4 times per day. 100 Strip 11    apixaban (ELIQUIS) 5 mg tablet Take 1 Tab by mouth two (2) times a day. 60 Tab 2    pravastatin (PRAVACHOL) 20 mg tablet Take 1 Tab by mouth nightly. 30 Tab 2    carvedilol (COREG) 3.125 mg tablet Take 1 Tab by mouth two (2) times daily (with meals). 60 Tab 2    Febuxostat (ULORIC) 80 mg tab tablet Take 80 mg by mouth daily. ALLERGIES    Allergies   Allergen Reactions    Other Medication Other (comments)     Novacaine  Causes tachycardia         PHYSICAL EXAMINATION    Visit Vitals    /80    Pulse 91    Temp 98.1 °F (36.7 °C)    Resp 18    Ht 5' 10\" (1.778 m)    Wt 187 lb (84.8 kg)    BMI 26.83 kg/m2     Body mass index is 26.83 kg/(m^2). General: Patient is alert, oriented x 3, not in acute distress    HEENT:   Conjunctiva are not injected and appear moist, oral mucous membranes are moist, there are no ulcers present, there is no alopecia, neck is supple, there is no lymphadenopathy. Salivary glands are normal    Cardiovascular:  Heart is regular rate and rhythm, no murmurs. Chest:  Lungs are clear to auscultation bilaterally. Extremities:  Free of clubbing, cyanosis, extremities well perfused with edema around ankles    Neurological exam:  No focal sensory deficits, muscle strength is full in upper and lower extremities. Skin exam:  There are no rashes, no psoriasis, no active Raynaud's, no livedo reticularis, no periungual erythema.     Tophi: left olecranon, bilateral PIPIs    Musculoskeletal exam:  A comprehensive musculoskeletal exam was performed for all joints of each upper and lower extremity and assessed for swelling, tenderness and range of motion. Pertinent results are documented as below:     Bilateral ankle edema    MUSCULOSKELETAL ULTRASOUND EVALUATION    Ultrasound of the left hand. Indication: joint pain. (07/13/18)    Using a "[employer] Logiq e with 18 Mhz probe, limited views of the left hand were reviewed. This revealed hyperechoic rim along the 2nd MCP dorsally. The tendons were normal. Bony contours were regular without erosions seen. There were an an inhomogeneous collection in the joint space    Impression: tophaceous gout with double contour    Musculoskeletal Ultrasound Procedure Note. 1. Ultrasound of the right knee. Indication: joint pain. (07/13/18)    Using a "[employer] Logiq e with 12 Mhz probe, limited views of the right knee were reviewed. This revealed a large anechoic colleciton without doppler and the presence of synovial frons within the suprapatellar joint space and medial and lateral gutters joint space. The tendons were normal. Bony contours were regular without erosions seen. There were no soft tissue masses noted. Unable to perform suprapatellar view due to knee effusion to assess for double contour. Impression: large joint effusion    2. Ultrasound Guided Left knee Joint aspiration/Injection. Indication: Joint swelling/Pain    The patient was advised about the possible risks of the procedure including pain, bleeding, infection and lack of benefit. After obtaining verbal and written informed consent and time out performed, the area was prepped in a sterile fashion with chlorprep scrub. The skin was sprayed with Ethyl Chloride followed by insertion of 18 gauge needle into the subcutaneous tissue. The ultrasound probe was then placed on the sterile surface with sterile gel and under direct in-plane visualization the needle was inserted into joint space and 45 mL of serous fluid was obtained.  40 mg of Kenalog (triaminolone) mixed with 1% 1mL lidocaine was injected into the joint  The area was bandaged following the procedure and no acute complications were noted. The patient was advised to ice the area overnight and avoid strenuous activity for up to 72 hours. He will be contacting us should there be any fevers, increased pain or swelling suggestive of an infection. DATA REVIEW    Studies Reviewed:     Prior medical records were reviewed and are summarized as below:    Laboratory data: summarized in the HPI    Imaging: summarized in the HPI. 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 He has multiple joints involved with tophi. His ultrasound showed double contour sign. He has chronic kidney disease stage 4, so allopurinol and probenecid are contra-indicated. He cannot swallow Uloric and has had an inadequate response while trying to. He is on colchicine. We discussed lifestyle modification in the form of decreased protein and alcohol consumption. Special precaution to shellfish and beer, which are known precipitators. I aspirated 45 mL form his right knee and sent for analysis and injected it with Kenalog 40 mg IA with good tolerance. I discussed starting Satira Bible due to the inability for him to take other urate lowering therapies and he was amenable. [employer] Patient Assitance form submitted. Krystexxa infusion order was submitted to [facility]. I ordered labs including G6PD. I will continue colchicine daily and informed him to take twice daily if tolerated during a flare, given his chronic kidney disease. I also discussed initiation with the DMARD Arava (leflunomide) to help with his polyarthritis in his hands and to minimize immunogenenicity to Satira Bible.  I informed the patient the potential adverse effects, which may include: nausea, vomiting, dyspepsia, diarrhea, oral ulcers, infection, liver function abnormalities, blood count abnormalities, and rarely melanoma and non-melanoma skin cancer. The patient understood these possible adverse effects. I informed the patient of the need for routine CBC and CMP as a measure for long term use of immunosuppressants. I also instructed the patient to avoid ill contacts and the needs for annual influenza vaccines and the pneumonia vaccines. In childbearing patients, pregnancy is contra-indicated on leflunomide due to risk for birth defects. I informed the patient that leflunomide may take 4 to 12 weeks to be effective. I prescribed the patient Arava (leflunomide) 20 mg, and instructed to start with half a tablet (10 mg) daily for 7 days and then increase to a full tablet (20 mg) if he has no side effects, such as diarrhea or upset stomach. The patient will follow up in 4 weeks for laboratory monitoring. 2) Chronic Kidney Disease Stage 4. The patient's creatinine 2.23 mg/dL, eGFR 28. See #1. 3) Essential Hypertension. Her pressure was 161/80.  He is on Coreg 3.125 mg twice daily, hydralazine 25 mg three times daily, and Lasix 40 mg as needed    TODAY'S ORDERS    Orders Placed This Encounter    XR HAND LT MIN 3 V    XR HAND RT MIN 3 V    CBC WITH AUTOMATED DIFF    METABOLIC PANEL, COMPREHENSIVE    C REACTIVE PROTEIN, QT    SED RATE (ESR)    PROTEIN ELECTROPHORESIS W/ REFLX KEENAN    URIC ACID    GLUCOSE-6-PHOSPHATE DEHYDROGENASE    CELL COUNT, SYNOVIAL FLUID    CRYSTALS, SYNOVIAL FLUID    CHG US LMTD JOINT/OTH NONVASC XTR STRUX R-T W/IMG    TRIAMCINOLONE ACETONIDE INJ    20611 -DRAIN/INJECT LARGE JOINT/BURSA WITH US    triamcinolone acetonide (KENALOG) 40 mg/mL injection    lidocaine, PF, (XYLOCAINE) 10 mg/mL (1 %) injection    leflunomide (ARAVA) 20 mg tablet       Future Appointments  Date Time Provider Department Center   8/16/2018 2:20 PM MD Bryn Contreras MD, [address]    Adult Rheumatology   Musculoskeletal Ultrasound Certified  [facility] [address] [place], [address] [facility]   Phone [phone]  Fax [phone]

## 2018-07-13 NOTE — MR AVS SNAPSHOT
[address] 
[phone] Patient: Rafal De La Torre MRN: [id number] :1945 Visit Information Date & Time Provider Department Dept. Phone Encounter #  
 2018 11:00 AM Mark Scott, [address] [facility] [id number] Follow-up Instructions Return in about 4 weeks (around 8/10/2018). Upcoming Health Maintenance Date Due DTaP/Tdap/Td series (1 - Tdap) 10/26/1968 ZOSTER VACCINE AGE 60> 2007 Pneumococcal 65+ Low/Medium Risk (1 of 2 - PCV13) 10/26/2012 FOOT EXAM Q1 2018 Influenza Age 5 to Adult 2018 EYE EXAM RETINAL OR DILATED Q1 10/17/2018 HEMOGLOBIN A1C Q6M 2018 MICROALBUMIN Q1 2019 LIPID PANEL Q1 2019 GLAUCOMA SCREENING Q2Y 10/17/2019 Allergies as of 2018  Review Complete On: 2018 By: Mark Scott MD  
  
 Severity Noted Reaction Type Reactions Other Medication  2011    Other (comments) Novacaine Causes tachycardia Current Immunizations  Reviewed on 2016 Name Date Influenza High Dose Vaccine PF 2017, 2016 Not reviewed this visit You Were Diagnosed With   
  
 Codes Comments Acute gout due to renal impairment involving right knee    -  Primary ICD-10-CM: Halie Gonsales ICD-9-CM: 274.10 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     ICD-10-CM: M1A.30X1 ICD-9-CM: 274.03, 588.89 Vitals BP Pulse Temp Resp Height(growth percentile) Weight(growth percentile) 161/80 91 98.1 °F (36.7 °C) 18 5' 10\" (1.778 m) 187 lb (84.8 kg) BMI Smoking Status 26.83 kg/m2 Former Smoker BMI and BSA Data Body Mass Index Body Surface Area  
 26.83 kg/m 2 2.05 m 2 Preferred Pharmacy Pharmacy Name Phone  [id number] [phone] [phone] Your Updated Medication List  
  
   
This list is accurate as of 7/13/18 11:40 AM.  Always use your most recent med list.  
  
  
  
  
 apixaban 5 mg tablet Commonly known as:  Liliana Ortega Take 1 Tab by mouth two (2) times a day. BASAGLAR KWIKPEN U-100 INSULIN 100 unit/mL (3 mL) Inpn Generic drug:  insulin glargine INJECT 14 UNITS BY SUBCUTANEOUS ROUTE DAILY. INDICATIONS: TYPE 2 DIABETES MELLITUS  
  
 carvedilol 3.125 mg tablet Commonly known as:  Gillermina Begun Take 1 Tab by mouth two (2) times daily (with meals). colchicine 0.6 mg tablet 1.2 mg ORALLY at the first sign of a flare followed by 0.6 mg one hr later; MAX 1.8 mg over 1 hour  
  
 digoxin 0.125 mg tablet Commonly known as:  LANOXIN Take 1 Tab by mouth daily. febuxostat 80 mg Tab tablet Commonly known as:  Kati Delvin Take 80 mg by mouth daily. furosemide 40 mg tablet Commonly known as:  LASIX 40 mg daily. * glucose blood VI test strips strip Commonly known as:  blood glucose test  
Per patient insurance and meter requirements. Testing 4 times per day. * ONETOUCH ULTRA TEST strip Generic drug:  glucose blood VI test strips Test blood sugar 3 times daily  
  
 hydrALAZINE 25 mg tablet Commonly known as:  APRESOLINE 25 mg three (3) times daily. Insulin Needles (Disposable) 32 gauge x 5/32\" Ndle Commonly known as:  COMFORT EZ PEN NEEDLES  
1 Each by Does Not Apply route daily. leflunomide 20 mg tablet Commonly known as:  Farhana Cardinal Take 1 Tab by mouth daily for 90 days. Take half tab daily for 7 days and then one tab if tolerated  
  
 lidocaine (PF) 10 mg/mL (1 %) injection Commonly known as:  XYLOCAINE  
1 mL by Intra artICUlar route once for 1 dose. multivitamin tablet Commonly known as:  ONE A DAY Take 1 Tab by mouth daily. omeprazole 20 mg capsule Commonly known as:  PRILOSEC Take 20 mg by mouth daily as needed. pravastatin 20 mg tablet Commonly known as:  PRAVACHOL Take 1 Tab by mouth nightly. triamcinolone acetonide 40 mg/mL injection Commonly known as:  KENALOG  
1 mL by Intra artICUlar route once for 1 dose. * Notice: This list has 2 medication(s) that are the same as other medications prescribed for you. Read the directions carefully, and ask your doctor or other care provider to review them with you. Prescriptions Sent to Pharmacy Refills  
 leflunomide (ARAVA) 20 mg tablet 0 Sig: Take 1 Tab by mouth daily for 90 days. Take half tab daily for 7 days and then one tab if tolerated Class: Normal  
 Pharmacy: [facility] #: [phone] Route: Oral  
  
We Performed the Following C REACTIVE PROTEIN, QT [42492 CPT(R)] CBC WITH AUTOMATED DIFF [11488 CPT(R)] GLUCOSE-6-PHOSPHATE DEHYDROGENASE [id number] CPT(R)] METABOLIC PANEL, COMPREHENSIVE [70608 CPT(R)] ID ARTHROCENTESIS ASPIR&/INJ MAJOR JT/BURSA W/US [64316 CPT(R)] PROTEIN ELECTROPHORESIS W/ REFLX KEENAN [[id number] Custom] SED RATE (ESR) [id number] CPT(R)] TRIAMCINOLONE ACETONIDE INJ [ [facility]] URIC ACID G5039463 CPT(R)] Follow-up Instructions Return in about 4 weeks (around 8/10/2018). To-Do List   
 07/13/2018 Imaging:  XR HAND LT MIN 3 V   
  
 07/13/2018 Imaging:  XR HAND RT MIN 3 V Patient Instructions ARAVA (leflunomide) I prescribed you Arava (leflunomide) 20 mg tablets. Please start with half a tablet (10 mg) daily for 7 days and if you have no side effects, such as diarrhea or upset stomach, please increase to one full tablet daily (20 mg). Potential Adverse Affects 
 
- Nausea - Vomiting - Upset stomach 
- Diarrhea 
- Oral ulcers 
- Hair thinning - Infection - Liver function abnormalities - Blood count abnormalities Medication Monitoring You will need routine blood counts and kidney and liver testing as a measure of monitoring for long term use of immunosuppressants. Things You Should Do You should avoid ill contacts You should get annual influenza vaccines and the pneumonia vaccines. You should apply SPF 50 sunscreen when out in the sun. You should avoid alcohol as it may hasten hepatotoxicity. You should avoid pregnancy due to risk for birth defects. You should contact me if you are sick. You should hold leflunomide if you are sick and resume after your sickness resolves. If you have any problems or side effects with this medication, please contact me immediately to inform me. Leflunomide may take 4 to 12 weeks to be effective. Please continue to rest the joint for a few more days before resuming regular activities. It may be more painful for the first 1-2 days. Watch for fever, or increased swelling or persistent pain. Call or return to clinic as needed if such symptoms occur or there is failure to improve as anticipated.  
  
 
 
 
  
Introducing [facility]! Dear Nithya Host: Thank you for requesting a [employer] account. Our records indicate that you already have an active [employer] account. You can access your account anytime at [email or web address] Did you know that you can access your hospital and ER discharge instructions at any time in [employer]? You can also review all of your test results from your hospital stay or ER visit. Additional Information If you have questions, please visit the Frequently Asked Questions section of the [employer] website at [email or web address] Remember, [employer] is NOT to be used for urgent needs. For medical emergencies, dial 911. Now available from your iPhone and Android! Please provide this summary of care documentation to your next provider. Your primary care clinician is listed as Amy Thacker. If you have any questions after today's visit, please call [phone].

## 2018-07-13 NOTE — PATIENT INSTRUCTIONS
ARAVA (leflunomide)    I prescribed you Arava (leflunomide) 20 mg tablets. Please start with half a tablet (10 mg) daily for 7 days and if you have no side effects, such as diarrhea or upset stomach, please increase to one full tablet daily (20 mg). Potential Adverse Affects    - Nausea  - Vomiting  - Upset stomach  - Diarrhea  - Oral ulcers  - Hair thinning  - Infection  - Liver function abnormalities  - Blood count abnormalities    Medication Monitoring    You will need routine blood counts and kidney and liver testing as a measure of monitoring for long term use of immunosuppressants. Things You Should Do    You should avoid ill contacts     You should get annual influenza vaccines and the pneumonia vaccines. You should apply SPF 50 sunscreen when out in the sun. You should avoid alcohol as it may hasten hepatotoxicity. You should avoid pregnancy due to risk for birth defects. You should contact me if you are sick. You should hold leflunomide if you are sick and resume after your sickness resolves. If you have any problems or side effects with this medication, please contact me immediately to inform me. Leflunomide may take 4 to 12 weeks to be effective. Please continue to rest the joint for a few more days before resuming regular activities. It may be more painful for the first 1-2 days. Watch for fever, or increased swelling or persistent pain.  Call or return to clinic as needed if such symptoms occur or there is failure to improve as anticipated.

## 2018-07-14 LAB
APPEARANCE FLD: ABNORMAL
CIL COLUMNAR LINING CELLS FLD: 0 %
COLOR FLD: YELLOW
CRYSTALS FLD MICRO: ABNORMAL
EOSINOPHIL NFR FLD MANUAL: 0 %
LYMPHOCYTES NFR FLD MANUAL: 2 %
MACROPHAGES NFR FLD MANUAL: 8 %
NEUTROPHILS NFR FLD MANUAL: 90 %
NUC CELL # FLD: ABNORMAL CELLS/UL (ref 0–200)
RBC # FLD AUTO: 2000 /UL

## 2018-07-18 RX ORDER — DIPHENHYDRAMINE HCL 25 MG
25 CAPSULE ORAL ONCE
Status: ACTIVE | OUTPATIENT
Start: 2018-07-23 | End: 2018-07-23

## 2018-07-18 RX ORDER — ACETAMINOPHEN 325 MG/1
650 TABLET ORAL ONCE
Status: ACTIVE | OUTPATIENT
Start: 2018-07-23 | End: 2018-07-23

## 2018-07-18 RX ORDER — DIPHENHYDRAMINE HYDROCHLORIDE 50 MG/ML
25 INJECTION, SOLUTION INTRAMUSCULAR; INTRAVENOUS
Status: DISCONTINUED | OUTPATIENT
Start: 2018-07-23 | End: 2018-08-23 | Stop reason: HOSPADM

## 2018-07-18 RX ORDER — SODIUM CHLORIDE 9 MG/ML
25 INJECTION, SOLUTION INTRAVENOUS CONTINUOUS
Status: DISPENSED | OUTPATIENT
Start: 2018-07-23 | End: 2018-07-23

## 2018-07-18 RX ORDER — ACETAMINOPHEN 325 MG/1
650 TABLET ORAL
Status: ACTIVE | OUTPATIENT
Start: 2018-07-23 | End: 2018-07-23

## 2018-07-20 LAB
ALBUMIN SERPL ELPH-MCNC: 3.2 G/DL (ref 2.9–4.4)
ALBUMIN SERPL-MCNC: 3.7 G/DL (ref 3.5–4.8)
ALBUMIN/GLOB SERPL: 0.7 {RATIO} (ref 0.7–1.7)
ALBUMIN/GLOB SERPL: 0.9 {RATIO} (ref 1.2–2.2)
ALP SERPL-CCNC: 92 IU/L (ref 39–117)
ALPHA1 GLOB SERPL ELPH-MCNC: 0.4 G/DL (ref 0–0.4)
ALPHA2 GLOB SERPL ELPH-MCNC: 1.1 G/DL (ref 0.4–1)
ALT SERPL-CCNC: 24 IU/L (ref 0–44)
AST SERPL-CCNC: 26 IU/L (ref 0–40)
B-GLOBULIN SERPL ELPH-MCNC: 1.4 G/DL (ref 0.7–1.3)
BASOPHILS # BLD AUTO: 0 X10E3/UL (ref 0–0.2)
BASOPHILS NFR BLD AUTO: 0 %
BILIRUB SERPL-MCNC: 0.7 MG/DL (ref 0–1.2)
BUN SERPL-MCNC: 46 MG/DL (ref 8–27)
BUN/CREAT SERPL: 24 (ref 10–24)
CALCIUM SERPL-MCNC: 9.4 MG/DL (ref 8.6–10.2)
CHLORIDE SERPL-SCNC: 96 MMOL/L (ref 96–106)
CO2 SERPL-SCNC: 27 MMOL/L (ref 20–29)
CREAT SERPL-MCNC: 1.91 MG/DL (ref 0.76–1.27)
CRP SERPL-MCNC: 61.8 MG/L (ref 0–4.9)
EOSINOPHIL # BLD AUTO: 0.2 X10E3/UL (ref 0–0.4)
EOSINOPHIL NFR BLD AUTO: 1 %
ERYTHROCYTE [DISTWIDTH] IN BLOOD BY AUTOMATED COUNT: 13.9 % (ref 12.3–15.4)
ERYTHROCYTE [SEDIMENTATION RATE] IN BLOOD BY WESTERGREN METHOD: 28 MM/HR (ref 0–30)
G6PD BLD QN: 260 U/10E12 RBC (ref 146–376)
GAMMA GLOB SERPL ELPH-MCNC: 1.6 G/DL (ref 0.4–1.8)
GLOBULIN SER CALC-MCNC: 4 G/DL (ref 1.5–4.5)
GLOBULIN SER CALC-MCNC: 4.5 G/DL (ref 2.2–3.9)
GLUCOSE SERPL-MCNC: 136 MG/DL (ref 65–99)
HCT VFR BLD AUTO: 35 % (ref 37.5–51)
HGB BLD-MCNC: 11.2 G/DL (ref 13–17.7)
IMM GRANULOCYTES # BLD: 0 X10E3/UL (ref 0–0.1)
IMM GRANULOCYTES NFR BLD: 0 %
LYMPHOCYTES # BLD AUTO: 1.2 X10E3/UL (ref 0.7–3.1)
LYMPHOCYTES NFR BLD AUTO: 11 %
M PROTEIN SERPL ELPH-MCNC: ABNORMAL G/DL
MCH RBC QN AUTO: 28.6 PG (ref 26.6–33)
MCHC RBC AUTO-ENTMCNC: 32 G/DL (ref 31.5–35.7)
MCV RBC AUTO: 90 FL (ref 79–97)
MONOCYTES # BLD AUTO: 0.3 X10E3/UL (ref 0.1–0.9)
MONOCYTES NFR BLD AUTO: 3 %
NEUTROPHILS # BLD AUTO: 9.2 X10E3/UL (ref 1.4–7)
NEUTROPHILS NFR BLD AUTO: 85 %
PLATELET # BLD AUTO: 250 X10E3/UL (ref 150–379)
PLEASE NOTE, 011150: ABNORMAL
POTASSIUM SERPL-SCNC: 4.9 MMOL/L (ref 3.5–5.2)
PROT PATTERN SERPL ELPH-IMP: ABNORMAL
PROT SERPL-MCNC: 7.7 G/DL (ref 6–8.5)
RBC # BLD AUTO: 3.91 X10E6/UL (ref 4.14–5.8)
SODIUM SERPL-SCNC: 139 MMOL/L (ref 134–144)
URATE SERPL-MCNC: 10.8 MG/DL (ref 3.7–8.6)
WBC # BLD AUTO: 10.9 X10E3/UL (ref 3.4–10.8)

## 2018-07-23 ENCOUNTER — HOSPITAL ENCOUNTER (OUTPATIENT)
Dept: INFUSION THERAPY | Age: 73
Discharge: HOME | End: 2018-07-23

## 2018-08-03 ENCOUNTER — TELEPHONE (OUTPATIENT)
Dept: FAMILY MEDICINE CLINIC | Age: 73
End: 2018-08-03

## 2018-08-04 ENCOUNTER — OFFICE VISIT (OUTPATIENT)
Dept: FAMILY MEDICINE CLINIC | Age: 73
End: 2018-08-04

## 2018-08-04 VITALS
RESPIRATION RATE: 17 BRPM | BODY MASS INDEX: 24.91 KG/M2 | DIASTOLIC BLOOD PRESSURE: 70 MMHG | HEIGHT: 70 IN | SYSTOLIC BLOOD PRESSURE: 140 MMHG | OXYGEN SATURATION: 97 % | WEIGHT: 174 LBS | TEMPERATURE: 95.9 F | HEART RATE: 61 BPM

## 2018-08-04 DIAGNOSIS — R06.81 WITNESSED APNEIC SPELLS: ICD-10-CM

## 2018-08-04 DIAGNOSIS — E03.9 ACQUIRED HYPOTHYROIDISM: ICD-10-CM

## 2018-08-04 DIAGNOSIS — I10 ACCELERATED HYPERTENSION: Primary | ICD-10-CM

## 2018-08-04 DIAGNOSIS — I70.1 RENAL ARTERY STENOSIS (HCC): ICD-10-CM

## 2018-08-04 DIAGNOSIS — I48.0 PAROXYSMAL ATRIAL FIBRILLATION (HCC): ICD-10-CM

## 2018-08-04 DIAGNOSIS — D64.9 ANEMIA, UNSPECIFIED TYPE: ICD-10-CM

## 2018-08-04 DIAGNOSIS — N18.4 CKD (CHRONIC KIDNEY DISEASE) STAGE 4, GFR 15-29 ML/MIN (HCC): ICD-10-CM

## 2018-08-04 DIAGNOSIS — I65.21 STENOSIS OF RIGHT CAROTID ARTERY WITHOUT CEREBRAL INFARCTION: ICD-10-CM

## 2018-08-04 DIAGNOSIS — D12.2 ADENOMATOUS POLYP OF ASCENDING COLON: ICD-10-CM

## 2018-08-04 DIAGNOSIS — M1A.39X1 CHRONIC TOPHACEOUS GOUT OF MULTIPLE SITES DUE TO RENAL IMPAIRMENT: ICD-10-CM

## 2018-08-04 DIAGNOSIS — K22.70 BARRETT'S ESOPHAGUS WITHOUT DYSPLASIA: ICD-10-CM

## 2018-08-04 DIAGNOSIS — R13.10 DYSPHAGIA, UNSPECIFIED TYPE: ICD-10-CM

## 2018-08-04 RX ORDER — LEVOTHYROXINE SODIUM 25 UG/1
25 TABLET ORAL
Qty: 90 TAB | Refills: 1 | Status: SHIPPED | OUTPATIENT
Start: 2018-08-04 | End: 2018-08-16

## 2018-08-04 NOTE — MR AVS SNAPSHOT
[address] 
[phone] Patient: Ramón Salomon MRN: [id number] :1945 Visit Information Date & Time Provider Department Dept. Phone Encounter #  
 2018  2:00 PM Isaac Bright, [address] [facility] [phone] [id number] Your Appointments 2018  2:20 PM  
ESTABLISHED PATIENT with Tyra Lagos MD  
[facility] [address] [facility]) Appt Note: follow up; follow up  
 [place] [address]  
[address] [place] [phone] Upcoming Health Maintenance Date Due DTaP/Tdap/Td series (1 - Tdap) 10/26/1968 ZOSTER VACCINE AGE 60> 2007 Pneumococcal 65+ Low/Medium Risk (1 of 2 - PCV13) 10/26/2012 FOOT EXAM Q1 2018 Influenza Age 5 to Adult 2018 EYE EXAM RETINAL OR DILATED Q1 10/17/2018 HEMOGLOBIN A1C Q6M 2018 MICROALBUMIN Q1 2019 LIPID PANEL Q1 2019 GLAUCOMA SCREENING Q2Y 10/17/2019 Allergies as of 2018  Review Complete On: 2018 By: Yovany Yung LPN Severity Noted Reaction Type Reactions Other Medication  2011    Other (comments) Novacaine Causes tachycardia Current Immunizations  Reviewed on 2018 Name Date Influenza High Dose Vaccine PF 2017, 2016 Reviewed by Yovany Yung LPN on 8814 at  2:98 PM  
 Reviewed by Yovany Yung LPN on 7974 at  6:15 PM  
You Were Diagnosed With   
  
 Codes Comments Witnessed apneic spells    -  Primary ICD-10-CM: R06.81 
ICD-9-CM: 786.03 Accelerated hypertension     ICD-10-CM: I10 
ICD-9-CM: 401.0 Acquired hypothyroidism     ICD-10-CM: E03.9 ICD-9-CM: 244.9 CKD (chronic kidney disease) stage 4, GFR 15-29 ml/min ([facility])     ICD-10-CM: N18.4 ICD-9-CM: 202. 4 Anemia, unspecified type     ICD-10-CM: D64.9 ICD-9-CM: 285.9 Stenosis of right carotid artery without cerebral infarction     ICD-10-CM: I65.21 ICD-9-CM: 433.10 Paroxysmal atrial fibrillation (HCC)     ICD-10-CM: I48.0 ICD-9-CM: 427.31 Dysphagia, unspecified type     ICD-10-CM: R13.10 ICD-9-CM: 787.20 Vitals BP Pulse Temp Resp Height(growth percentile) Weight(growth percentile) 140/70 61 95.9 °F (35.5 °C) (Oral) 17 5' 10\" (1.778 m) 174 lb (78.9 kg) SpO2 BMI Smoking Status 97% 24.97 kg/m2 Former Smoker Vitals History BMI and BSA Data Body Mass Index Body Surface Area 24.97 kg/m 2 1.97 m 2 Preferred Pharmacy Pharmacy Name Phone [id number]- [address] [phone] Your Updated Medication List  
  
   
This list is accurate as of 8/4/18  3:04 PM.  Always use your most recent med list.  
  
  
  
  
 apixaban 5 mg tablet Commonly known as:  Princella Winkler Take 1 Tab by mouth two (2) times a day. BASAGLAR KWIKPEN U-100 INSULIN 100 unit/mL (3 mL) Inpn Generic drug:  insulin glargine INJECT 14 UNITS BY SUBCUTANEOUS ROUTE DAILY. INDICATIONS: TYPE 2 DIABETES MELLITUS  
  
 carvedilol 3.125 mg tablet Commonly known as:  Mar [place] Take 1 Tab by mouth two (2) times daily (with meals). colchicine 0.6 mg tablet 1.2 mg ORALLY at the first sign of a flare followed by 0.6 mg one hr later; MAX 1.8 mg over 1 hour  
  
 digoxin 0.125 mg tablet Commonly known as:  LANOXIN Take 1 Tab by mouth daily. furosemide 40 mg tablet Commonly known as:  LASIX 40 mg daily. * glucose blood VI test strips strip Commonly known as:  blood glucose test  
Per patient insurance and meter requirements. Testing 4 times per day. * ONETOUCH ULTRA TEST strip Generic drug:  glucose blood VI test strips Test blood sugar 3 times daily  
  
 hydrALAZINE 25 mg tablet Commonly known as:  APRESOLINE  
 25 mg three (3) times daily. Insulin Needles (Disposable) 32 gauge x 5/32\" Ndle Commonly known as:  COMFORT EZ PEN NEEDLES  
1 Each by Does Not Apply route daily. leflunomide 20 mg tablet Commonly known as:  Glorietta Hardin Take 1 Tab by mouth daily for 90 days. Take half tab daily for 7 days and then one tab if tolerated  
  
 levothyroxine 25 mcg tablet Commonly known as:  SYNTHROID Take 1 Tab by mouth Daily (before breakfast). multivitamin tablet Commonly known as:  ONE A DAY Take 1 Tab by mouth daily. omeprazole 20 mg capsule Commonly known as:  PRILOSEC Take 20 mg by mouth daily as needed. pravastatin 20 mg tablet Commonly known as:  PRAVACHOL Take 1 Tab by mouth nightly. * Notice: This list has 2 medication(s) that are the same as other medications prescribed for you. Read the directions carefully, and ask your doctor or other care provider to review them with you. Prescriptions Sent to Pharmacy Refills  
 levothyroxine (SYNTHROID) 25 mcg tablet 1 Sig: Take 1 Tab by mouth Daily (before breakfast). Class: Normal  
 Pharmacy: [facility] #: [phone] Route: Oral  
  
We Performed the Following CBC WITH AUTOMATED DIFF [82259 CPT(R)] DIGOXIN [45706 CPT(R)] METABOLIC PANEL, COMPREHENSIVE [36574 CPT(R)] REFERRAL TO NEPHROLOGY [TOW90 Custom] REFERRAL TO SLEEP STUDIES [REF99 Custom] RETICULOCYTE COUNT M3371317 CPT(R)] THYROID ANTIBODY PANEL [35723 CPT(R)] To-Do List   
 08/13/2018 10:00 AM  
  Appointment with [address] at [place] [address] ([phone])  
  
 08/27/2018 10:00 AM  
  Appointment with [address] at [place] [address] ([phone])  
  
 09/10/2018 10:00 AM  
  Appointment with [address] at [place] [address] ([phone])  
  
 09/24/2018 10:00 AM  
 Appointment with Shawn Car [address] at [facility] ([phone]) Referral Information Referral ID Referred By Referred To  
  
 [id number] MAT MARTINEZ, [address], [place]   
   [address] [place] Jason Rubi Phone: [phone] Fax: [phone] Visits Status Start Date End Date 1 New Request 8/4/18 8/4/19 If your referral has a status of pending review or denied, additional information will be sent to support the outcome of this decision. Referral ID Referred By Referred To  
 [id number] Tutu CORTE[facility] [place], [address] Visits Status Start Date End Date 1 New Request 8/4/18 8/4/19 If your referral has a status of pending review or denied, additional information will be sent to support the outcome of this decision. Patient Instructions High Blood Pressure: Care Instructions Your Care Instructions If your blood pressure is usually above 130/80, you have high blood pressure, or hypertension. That means the top number is 130 or higher or the bottom number is 80 or higher, or both. Despite what a lot of people think, high blood pressure usually doesn't cause headaches or make you feel dizzy or lightheaded. It usually has no symptoms. But it does increase your risk for heart attack, stroke, and kidney or eye damage. The higher your blood pressure, the more your risk increases. Your doctor will give you a goal for your blood pressure. Your goal will be based on your health and your age. Lifestyle changes, such as eating healthy and being active, are always important to help lower blood pressure. You might also take medicine to reach your blood pressure goal. 
Follow-up care is a key part of your treatment and safety.  Be sure to make and go to all appointments, and call your doctor if you are having problems. It's also a good idea to know your test results and keep a list of the medicines you take. How can you care for yourself at home? Medical treatment · If you stop taking your medicine, your blood pressure will go back up. You may take one or more types of medicine to lower your blood pressure. Be safe with medicines. Take your medicine exactly as prescribed. Call your doctor if you think you are having a problem with your medicine. · Talk to your doctor before you start taking aspirin every day. Aspirin can help certain people lower their risk of a heart attack or stroke. But taking aspirin isn't right for everyone, because it can cause serious bleeding. · See your doctor regularly. You may need to see the doctor more often at first or until your blood pressure comes down. · If you are taking blood pressure medicine, talk to your doctor before you take decongestants or anti-inflammatory medicine, such as ibuprofen. Some of these medicines can raise blood pressure. · Learn how to check your blood pressure at home. Lifestyle changes · Stay at a healthy weight. This is especially important if you put on weight around the waist. Losing even 10 pounds can help you lower your blood pressure. · If your doctor recommends it, get more exercise. Walking is a good choice. Bit by bit, increase the amount you walk every day. Try for at least 30 minutes on most days of the week. You also may want to swim, bike, or do other activities. · Avoid or limit alcohol. Talk to your doctor about whether you can drink any alcohol. · Try to limit how much sodium you eat to less than 2,300 milligrams (mg) a day. Your doctor may ask you to try to eat less than 1,500 mg a day. · Eat plenty of fruits (such as bananas and oranges), vegetables, legumes, whole grains, and low-fat dairy products. · Lower the amount of saturated fat in your diet.  Saturated fat is found in animal products such as milk, cheese, and meat. Limiting these foods may help you lose weight and also lower your risk for heart disease. · Do not smoke. Smoking increases your risk for heart attack and stroke. If you need help quitting, talk to your doctor about stop-smoking programs and medicines. These can increase your chances of quitting for good. When should you call for help? Call 911 anytime you think you may need emergency care. This may mean having symptoms that suggest that your blood pressure is causing a serious heart or blood vessel problem. Your blood pressure may be over 180/110. 
 For example, call 911 if: 
  · You have symptoms of a heart attack. These may include: ¨ Chest pain or pressure, or a strange feeling in the chest. 
¨ Sweating. ¨ Shortness of breath. ¨ Nausea or vomiting. ¨ Pain, pressure, or a strange feeling in the back, neck, jaw, or upper belly or in one or both shoulders or arms. ¨ Lightheadedness or sudden weakness. ¨ A fast or irregular heartbeat.  
  · You have symptoms of a stroke. These may include: 
¨ Sudden numbness, tingling, weakness, or loss of movement in your face, arm, or leg, especially on only one side of your body. ¨ Sudden vision changes. ¨ Sudden trouble speaking. ¨ Sudden confusion or trouble understanding simple statements. ¨ Sudden problems with walking or balance. ¨ A sudden, severe headache that is different from past headaches.  
  · You have severe back or belly pain.  
 Do not wait until your blood pressure comes down on its own. Get help right away. 
 Call your doctor now or seek immediate care if: 
  · Your blood pressure is much higher than normal (such as 180/110 or higher), but you don't have symptoms.  
  · You think high blood pressure is causing symptoms, such as: ¨ Severe headache. ¨ Blurry vision.  
 Watch closely for changes in your health, and be sure to contact your doctor if:   · Your blood pressure measures 140/90 or higher at least 2 times. That means the top number is 140 or higher or the bottom number is 90 or higher, or both.  
  · You think you may be having side effects from your blood pressure medicine.  
  · Your blood pressure is usually normal, but it goes above normal at least 2 times. Where can you learn more? Go to http://tanika-claudia.info/. Enter A868 in the search box to learn more about \"High Blood Pressure: Care Instructions. \" Current as of: December 6, 2017 Content Version: 11.7 © 2812-1304 [employer]. Care instructions adapted under license by [employer] (which disclaims liability or warranty for this information). If you have questions about a medical condition or this instruction, always ask your healthcare professional. [address] any warranty or liability for your use of this information. Introducing [facility] & HEALTH SERVICES! Dear Rafi Norris: Thank you for requesting a [employer] account. Our records indicate that you already have an active [employer] account. You can access your account anytime at [email or web address] Did you know that you can access your hospital and ER discharge instructions at any time in [employer]? You can also review all of your test results from your hospital stay or ER visit. Additional Information If you have questions, please visit the Frequently Asked Questions section of the [employer] website at [email or web address] Remember, [employer] is NOT to be used for urgent needs. For medical emergencies, dial 911. Now available from your iPhone and Android! Please provide this summary of care documentation to your next provider. Your primary care clinician is listed as Amy Thacker. If you have any questions after today's visit, please call [phone].

## 2018-08-04 NOTE — PATIENT INSTRUCTIONS

## 2018-08-04 NOTE — PROGRESS NOTES
Dhruv Lujan [facility] [place]. [place], [address]  [phone]             Date of visit: 8/4/2018   Subjective:      History obtained from:  the patient. Paula Ramon is a 81 y.o. male who presents today for bp was very high yesterday. Hadn't checked it in a while but checked it because he generally wasn't feeling well, was a bit foggy headed. Took an extra 1/2 hydralazine last night and in the early morning hours. Took regular dose this am and hasn't had mid-day dose yet. Feeling mostly better today. Complicated patient with numerous medication problems  Stage 4 CKD, has seen nephrology in hospital and outpatient, but not in the past 2 years. They found he had proteinuria and thought kidney disease likely due to HTN. He had ultrasound 2 years ago as well as urine tests, spep/upep, renal dopplers (showing mild less than 60% stenosis bilaterally)    His bp does tend to fluctuate over the years despite healthy lifestyle and regular use of his med    He also struggles with frequent gout. Was recently started on ARava but stopped it due to the high bp, which is a known side effect    Daughter with him notes she has heard him stop breathing and gasp for air when sleeping. He was noted to desaturate during sleep during last hospitalization. She and sister are concerned about JENNA. 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 He does have a cardiologist, Dr. Prince Stone, and has known paroxysmal a-fib but isn't sure when he feels it  History of TIA  On Eliquis but will stop it tomorrow in preparation for endoscopy    On dig for rate control and that works well, also coreg    He also has history of hypothyroidism and took synthroid for a while. It ran out, he never got a refill or the follow up testing.   Admits to often feeling cold  Generally prone to constipation but not so much since he has been taking colchicine regularly for his gout. Also known carotid stenosis but mild enough they didn't recommend procedure, checked yearly by Dr. Mary Stoner    Notes he is prone to pills getting stuck in his throat, makes him hoarse for a while afterward. Has to flush down with a lot of water. 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 07/13/2018    CKD (chronic kidney disease) stage 4, GFR 15-29 ml/min ([facility] 07/13/2018    Anemia 05/11/2017    Essential hypertension     Painter esophagus     Advance care planning 11/09/2016    Heart murmur, systolic 88/42/0289    [facility] 03/11/2016    Vitamin D deficiency 03/01/2016    Stenosis of right carotid artery without cerebral infarction 01/18/2016    TIA (transient ischemic attack) 01/14/2016     Current Outpatient Prescriptions   Medication Sig Dispense Refill    levothyroxine (SYNTHROID) 25 mcg tablet Take 1 Tab by mouth Daily (before breakfast). 90 Tab 1    leflunomide (ARAVA) 20 mg tablet Take 1 Tab by mouth daily for 90 days. Take half tab daily for 7 days and then one tab if tolerated 90 Tab 0    colchicine 0.6 mg tablet 1.2 mg ORALLY at the first sign of a flare followed by 0.6 mg one hr later; MAX 1.8 mg over 1 hour 30 Tab 0    BASAGLAR KWIKPEN U-100 INSULIN 100 unit/mL (3 mL) inpn INJECT 14 UNITS BY SUBCUTANEOUS ROUTE DAILY. INDICATIONS: TYPE 2 DIABETES MELLITUS 15 Adjustable Dose Pre-filled Pen Syringe 0    ONETOUCH ULTRA TEST strip Test blood sugar 3 times daily 90 Strip 11    Insulin Needles, Disposable, (COMFORT EZ PEN NEEDLES) 32 gauge x 5/32\" ndle 1 Each by Does Not Apply route daily.  100 Pen Needle 3    digoxin (LANOXIN) 0.125 mg tablet Take 1 Tab by mouth daily. 30 Tab 11    furosemide (LASIX) 40 mg tablet 40 mg daily. 5    hydrALAZINE (APRESOLINE) 25 mg tablet 25 mg three (3) times daily. 3    omeprazole (PRILOSEC) 20 mg capsule Take 20 mg by mouth daily as needed.  multivitamin (ONE A DAY) tablet Take 1 Tab by mouth daily.  glucose blood VI test strips (BLOOD GLUCOSE TEST) strip Per patient insurance and meter requirements. Testing 4 times per day. 100 Strip 11    apixaban (ELIQUIS) 5 mg tablet Take 1 Tab by mouth two (2) times a day. 60 Tab 2    pravastatin (PRAVACHOL) 20 mg tablet Take 1 Tab by mouth nightly. 30 Tab 2    carvedilol (COREG) 3.125 mg tablet Take 1 Tab by mouth two (2) times daily (with meals).  61 Tab 2     Facility-Administered Medications Ordered in Other Visits   Medication Dose Route Frequency Provider Last Rate Last Dose    diphenhydrAMINE (BENADRYL) injection 25 mg  25 mg IntraVENous Q4H PRN Shelia Diaz MD         Allergies   Allergen Reactions    Other Medication Other (comments)     Novacaine  Causes tachycardia       Past Medical History:   Diagnosis Date    Acute encephalopathy 1/14/2016    Anemia 5/11/2017    Atrial fibrillation (HCC)     Painter esophagus     CAD (coronary artery disease)     Diabetes (HCC)     Heart failure (HCC)     Cardiomyopathy, myocarditis    HTN, goal below 140/90     Retinopathy, diabetic, bilateral ([facility] 3/11/2016    Stenosis of right carotid artery without cerebral infarction 1/18/2016    TIA (transient ischemic attack) 1/14/2016    Vitamin D deficiency 3/1/2016    Nephrology ordered and follow 2/22/16      Past Surgical History:   Procedure Laterality Date    COLONOSCOPY N/A 6/23/2016    [facility] at [facility] ENDOSCOPY    HX ENDOSCOPY  06/27/2016    CAPSULE; normal small bowel    HX ENDOSCOPY  06/23/2016    upper endoscopy    HX UROLOGICAL  2013     Family History   Problem Relation Age of Onset    Heart Failure Mother     Diabetes Father     No Known Problems Sister     Other Daughter      Ana Rosa Coad Other Daughter      Ana Rosa Coad Other Daughter      Hashimoto     Social History   Substance Use Topics    Smoking status: Former Smoker    Smokeless tobacco: Never Used    Alcohol use Yes      Comment: 2 drinks/week, never a heavy drinker      Social History     Social History Narrative        Generally active, works out in the yard often        Review of Systems  Card: denies chest pain  Pulm: denies shortness of breath      Objective:     Vitals:    08/04/18 1358 08/04/18 1446   BP: 145/75 140/70   Pulse: 61    Resp: 17    Temp: 95.9 °F (35.5 °C)    TempSrc: Oral    SpO2: 97%    Weight: 174 lb (78.9 kg)    Height: 5' 10\" (1.778 m)      Body mass index is 24.97 kg/(m^2). General: stated age, well developed, well nourished and in NAD  Neck: supple, symmetrical, trachea midline, no adenopathy and thyroid: not enlarged, symmetric, no tenderness/mass/nodules  Lungs:  clear to auscultation w/o rales, rhonchi, wheezes w/normal effort and no use of accessory muscles of respiration   Heart: regular rate and rhythm, S1, S2 normal, no murmur, click, rub or gallop  Abdomen: soft, nontender  Ext:  No edema noted. Lymph: no cervical adenopathy appreciated  Skin:  Normal. and no rash or abnormalities   Psych: alert and oriented to person, place, time and situation and Speech: appropriate quality, quantity and organization of sentences     Assessment/Plan:       ICD-10-CM ICD-9-CM    1. Accelerated hypertension I10 401.0 REFERRAL TO SLEEP STUDIES      METABOLIC PANEL, COMPREHENSIVE      CBC WITH AUTOMATED DIFF   2. Witnessed apneic spells R06.81 786.03 REFERRAL TO SLEEP STUDIES   3. Acquired hypothyroidism E03.9 244.9 THYROID ANTIBODY PANEL   4. CKD (chronic kidney disease) stage 4, GFR 15-29 ml/min (HCC) N18.4 585.4 REFERRAL TO NEPHROLOGY   5. Anemia, unspecified type D64.9 285.9 RETICULOCYTE COUNT   6. Stenosis of right carotid artery without cerebral infarction I65.21 433.10    7. Paroxysmal atrial fibrillation (HCC) I48.0 427.31 DIGOXIN   8. Dysphagia, unspecified type R13.10 787.20    9. Renal artery stenosis (HCC) I70.1 440.1    10. 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 Adenomatous polyp of ascending colon D12.2 211.3    12. 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 M1A.30X1 274.03      588.89         Orders Placed This Encounter    THYROID ANTIBODY PANEL    METABOLIC PANEL, COMPREHENSIVE    CBC WITH AUTOMATED DIFF    DIGOXIN    RETICULOCYTE COUNT    REFERRAL TO SLEEP STUDIES    REFERRAL TO NEPHROLOGY    levothyroxine (SYNTHROID) 25 mcg tablet       Fluctuating BP certainly could be due to sleep apnea, will refer for sleep study  If can't get in quickly with sleep clinic let me know so we can try referral pulmonology instead  bp better today and I don't think will keep him from having his procedure on Monday  Advised to just not check it tomorrow, as he tends to get anxious about what it will be and then BP goes up. Can do his prep tomorrow as planned. I hopefully will have some labs back tomorrow and will send results. Advised going back on the synthroid  Will do antibody panel to confirm this is Hashimotos    The kidney disease is quite severe although stable  Will refer to nephrology for ongoing monitoring  discussed putting him back on losartan but not sure it would be the best idea as he has mild bilateral renal a stenosis    Discussed the diagnosis and plan and he expressed understanding.     F/u as planned previously with PCP or in 2-3 months, will need thyroid recheck    Freeman Daniel MD

## 2018-08-04 NOTE — PROGRESS NOTES
Chief Complaint   Patient presents with    Hypertension     for 2 days his BP this morning was 136 74 which is been the lowest. patient is having colonoscopy done 2018 just want to make sure BP is good     Identified pt with two pt identifiers (Name @ )    1. Have you been to the ER, urgent care clinic since your last visit? Hospitalized since your last visit? No    2. Have you seen or consulted any other health care providers outside of the [address] [place] [address] since your last visit? Include any pap smears or colon screening.  No     \"REVIEWED RECORD IN PREPARATION FOR VISIT AND HAVE OBTAINED NECESSARY DOCUMENTATION\"

## 2018-08-06 ENCOUNTER — APPOINTMENT (OUTPATIENT)
Dept: INFUSION THERAPY | Age: 73
End: 2018-08-06
Payer: COMMERCIAL

## 2018-08-06 ENCOUNTER — HOSPITAL ENCOUNTER (OUTPATIENT)
Age: 73
Setting detail: OUTPATIENT SURGERY
Discharge: HOME | End: 2018-08-06
Attending: INTERNAL MEDICINE | Admitting: INTERNAL MEDICINE
Payer: COMMERCIAL

## 2018-08-06 ENCOUNTER — ANESTHESIA EVENT (OUTPATIENT)
Dept: ENDOSCOPY | Age: 73
End: 2018-08-06
Payer: COMMERCIAL

## 2018-08-06 ENCOUNTER — ANESTHESIA (OUTPATIENT)
Dept: ENDOSCOPY | Age: 73
End: 2018-08-06
Payer: COMMERCIAL

## 2018-08-06 VITALS
HEIGHT: 70 IN | RESPIRATION RATE: 18 BRPM | WEIGHT: 170 LBS | OXYGEN SATURATION: 97 % | TEMPERATURE: 97.7 F | HEART RATE: 74 BPM | BODY MASS INDEX: 24.34 KG/M2 | SYSTOLIC BLOOD PRESSURE: 147 MMHG | DIASTOLIC BLOOD PRESSURE: 73 MMHG

## 2018-08-06 PROBLEM — E03.8 HYPOTHYROIDISM DUE TO HASHIMOTO'S THYROIDITIS: Status: ACTIVE | Noted: 2018-08-06

## 2018-08-06 PROBLEM — E06.3 HYPOTHYROIDISM DUE TO HASHIMOTO'S THYROIDITIS: Status: ACTIVE | Noted: 2018-08-06

## 2018-08-06 PROBLEM — D63.8 ANEMIA, CHRONIC DISEASE: Status: ACTIVE | Noted: 2017-05-11

## 2018-08-06 LAB
ALBUMIN SERPL-MCNC: 4.7 G/DL (ref 3.5–4.8)
ALBUMIN/GLOB SERPL: 1.3 {RATIO} (ref 1.2–2.2)
ALP SERPL-CCNC: 80 IU/L (ref 39–117)
ALT SERPL-CCNC: 32 IU/L (ref 0–44)
AST SERPL-CCNC: 33 IU/L (ref 0–40)
BASOPHILS # BLD AUTO: 0 X10E3/UL (ref 0–0.2)
BASOPHILS NFR BLD AUTO: 1 %
BILIRUB SERPL-MCNC: 0.5 MG/DL (ref 0–1.2)
BUN SERPL-MCNC: 51 MG/DL (ref 8–27)
BUN/CREAT SERPL: 28 (ref 10–24)
CALCIUM SERPL-MCNC: 9.7 MG/DL (ref 8.6–10.2)
CHLORIDE SERPL-SCNC: 99 MMOL/L (ref 96–106)
CO2 SERPL-SCNC: 25 MMOL/L (ref 20–29)
COLONOSCOPY, EXTERNAL: NORMAL
CREAT SERPL-MCNC: 1.79 MG/DL (ref 0.76–1.27)
DIGOXIN SERPL-MCNC: 1.4 NG/ML (ref 0.5–0.9)
EOSINOPHIL # BLD AUTO: 0.2 X10E3/UL (ref 0–0.4)
EOSINOPHIL NFR BLD AUTO: 6 %
ERYTHROCYTE [DISTWIDTH] IN BLOOD BY AUTOMATED COUNT: 14.6 % (ref 12.3–15.4)
GLOBULIN SER CALC-MCNC: 3.6 G/DL (ref 1.5–4.5)
GLUCOSE BLD STRIP.AUTO-MCNC: 95 MG/DL (ref 65–100)
GLUCOSE SERPL-MCNC: 110 MG/DL (ref 65–99)
HCT VFR BLD AUTO: 37.4 % (ref 37.5–51)
HGB BLD-MCNC: 12.1 G/DL (ref 13–17.7)
IMM GRANULOCYTES # BLD: 0 X10E3/UL (ref 0–0.1)
IMM GRANULOCYTES NFR BLD: 0 %
INTERPRETATION: NORMAL
LYMPHOCYTES # BLD AUTO: 0.6 X10E3/UL (ref 0.7–3.1)
LYMPHOCYTES NFR BLD AUTO: 15 %
MCH RBC QN AUTO: 28.9 PG (ref 26.6–33)
MCHC RBC AUTO-ENTMCNC: 32.4 G/DL (ref 31.5–35.7)
MCV RBC AUTO: 90 FL (ref 79–97)
MONOCYTES # BLD AUTO: 0.5 X10E3/UL (ref 0.1–0.9)
MONOCYTES NFR BLD AUTO: 11 %
NEUTROPHILS # BLD AUTO: 2.9 X10E3/UL (ref 1.4–7)
NEUTROPHILS NFR BLD AUTO: 67 %
PLATELET # BLD AUTO: 129 X10E3/UL (ref 150–379)
POTASSIUM SERPL-SCNC: 4.4 MMOL/L (ref 3.5–5.2)
PROT SERPL-MCNC: 8.3 G/DL (ref 6–8.5)
RBC # BLD AUTO: 4.18 X10E6/UL (ref 4.14–5.8)
RETICS/RBC NFR AUTO: 1 % (ref 0.6–2.6)
SERVICE CMNT-IMP: NORMAL
SODIUM SERPL-SCNC: 143 MMOL/L (ref 134–144)
THYROGLOB AB SERPL-ACNC: <1 IU/ML (ref 0–0.9)
THYROPEROXIDASE AB SERPL-ACNC: 141 IU/ML (ref 0–34)
WBC # BLD AUTO: 4.2 X10E3/UL (ref 3.4–10.8)

## 2018-08-06 PROCEDURE — 77030027957 HC TBNG IRR ENDOGTR BUSS -B: Performed by: INTERNAL MEDICINE

## 2018-08-06 PROCEDURE — 74011250636 HC RX REV CODE- 250/636

## 2018-08-06 PROCEDURE — 77030013992 HC SNR POLYP ENDOSC BSC -B: Performed by: INTERNAL MEDICINE

## 2018-08-06 PROCEDURE — 77030035028 HC CLP HEMSTAS ENDOSCP INSC COOK -C: Performed by: INTERNAL MEDICINE

## 2018-08-06 PROCEDURE — 76060000033 HC ANESTHESIA 1 TO 1.5 HR: Performed by: INTERNAL MEDICINE

## 2018-08-06 PROCEDURE — 76040000008: Performed by: INTERNAL MEDICINE

## 2018-08-06 PROCEDURE — 88305 TISSUE EXAM BY PATHOLOGIST: CPT | Performed by: INTERNAL MEDICINE

## 2018-08-06 PROCEDURE — 82962 GLUCOSE BLOOD TEST: CPT

## 2018-08-06 PROCEDURE — 77030009426 HC FCPS BIOP ENDOSC BSC -B: Performed by: INTERNAL MEDICINE

## 2018-08-06 RX ORDER — SODIUM CHLORIDE 9 MG/ML
100 INJECTION, SOLUTION INTRAVENOUS CONTINUOUS
Status: DISCONTINUED | OUTPATIENT
Start: 2018-08-06 | End: 2018-08-06 | Stop reason: HOSPADM

## 2018-08-06 RX ORDER — DEXTROMETHORPHAN/PSEUDOEPHED 2.5-7.5/.8
1.2 DROPS ORAL
Status: DISCONTINUED | OUTPATIENT
Start: 2018-08-06 | End: 2018-08-06 | Stop reason: HOSPADM

## 2018-08-06 RX ORDER — NALOXONE HYDROCHLORIDE 0.4 MG/ML
0.4 INJECTION, SOLUTION INTRAMUSCULAR; INTRAVENOUS; SUBCUTANEOUS
Status: DISCONTINUED | OUTPATIENT
Start: 2018-08-06 | End: 2018-08-06 | Stop reason: HOSPADM

## 2018-08-06 RX ORDER — ATROPINE SULFATE 0.1 MG/ML
0.5 INJECTION INTRAVENOUS
Status: DISCONTINUED | OUTPATIENT
Start: 2018-08-06 | End: 2018-08-06 | Stop reason: HOSPADM

## 2018-08-06 RX ORDER — HYDRALAZINE HYDROCHLORIDE 20 MG/ML
20 INJECTION INTRAMUSCULAR; INTRAVENOUS ONCE
Status: DISCONTINUED | OUTPATIENT
Start: 2018-08-06 | End: 2018-08-06 | Stop reason: HOSPADM

## 2018-08-06 RX ORDER — HYDRALAZINE HYDROCHLORIDE 20 MG/ML
INJECTION INTRAMUSCULAR; INTRAVENOUS AS NEEDED
Status: DISCONTINUED | OUTPATIENT
Start: 2018-08-06 | End: 2018-08-06 | Stop reason: HOSPADM

## 2018-08-06 RX ORDER — PROPOFOL 10 MG/ML
INJECTION, EMULSION INTRAVENOUS AS NEEDED
Status: DISCONTINUED | OUTPATIENT
Start: 2018-08-06 | End: 2018-08-06 | Stop reason: HOSPADM

## 2018-08-06 RX ORDER — FENTANYL CITRATE 50 UG/ML
200 INJECTION, SOLUTION INTRAMUSCULAR; INTRAVENOUS
Status: DISCONTINUED | OUTPATIENT
Start: 2018-08-06 | End: 2018-08-06 | Stop reason: HOSPADM

## 2018-08-06 RX ORDER — SODIUM CHLORIDE 9 MG/ML
INJECTION, SOLUTION INTRAVENOUS
Status: DISCONTINUED | OUTPATIENT
Start: 2018-08-06 | End: 2018-08-06 | Stop reason: HOSPADM

## 2018-08-06 RX ORDER — FLUMAZENIL 0.1 MG/ML
0.2 INJECTION INTRAVENOUS
Status: DISCONTINUED | OUTPATIENT
Start: 2018-08-06 | End: 2018-08-06 | Stop reason: HOSPADM

## 2018-08-06 RX ORDER — LABETALOL HYDROCHLORIDE 5 MG/ML
10 INJECTION, SOLUTION INTRAVENOUS ONCE
Status: DISCONTINUED | OUTPATIENT
Start: 2018-08-06 | End: 2018-08-06 | Stop reason: HOSPADM

## 2018-08-06 RX ORDER — LIDOCAINE HYDROCHLORIDE 20 MG/ML
INJECTION, SOLUTION EPIDURAL; INFILTRATION; INTRACAUDAL; PERINEURAL AS NEEDED
Status: DISCONTINUED | OUTPATIENT
Start: 2018-08-06 | End: 2018-08-06 | Stop reason: HOSPADM

## 2018-08-06 RX ORDER — SODIUM CHLORIDE 0.9 % (FLUSH) 0.9 %
5-10 SYRINGE (ML) INJECTION EVERY 8 HOURS
Status: DISCONTINUED | OUTPATIENT
Start: 2018-08-06 | End: 2018-08-06 | Stop reason: HOSPADM

## 2018-08-06 RX ORDER — SODIUM CHLORIDE 0.9 % (FLUSH) 0.9 %
5-10 SYRINGE (ML) INJECTION AS NEEDED
Status: DISCONTINUED | OUTPATIENT
Start: 2018-08-06 | End: 2018-08-06 | Stop reason: HOSPADM

## 2018-08-06 RX ORDER — MIDAZOLAM HYDROCHLORIDE 1 MG/ML
.25-1 INJECTION, SOLUTION INTRAMUSCULAR; INTRAVENOUS
Status: DISCONTINUED | OUTPATIENT
Start: 2018-08-06 | End: 2018-08-06 | Stop reason: HOSPADM

## 2018-08-06 RX ORDER — EPINEPHRINE 0.1 MG/ML
1 INJECTION INTRACARDIAC; INTRAVENOUS
Status: DISCONTINUED | OUTPATIENT
Start: 2018-08-06 | End: 2018-08-06 | Stop reason: HOSPADM

## 2018-08-06 RX ADMIN — HYDRALAZINE HYDROCHLORIDE 10 MG: 20 INJECTION INTRAMUSCULAR; INTRAVENOUS at 10:22

## 2018-08-06 RX ADMIN — LIDOCAINE HYDROCHLORIDE 100 MG: 20 INJECTION, SOLUTION EPIDURAL; INFILTRATION; INTRACAUDAL; PERINEURAL at 10:35

## 2018-08-06 RX ADMIN — SODIUM CHLORIDE: 9 INJECTION, SOLUTION INTRAVENOUS at 10:07

## 2018-08-06 RX ADMIN — HYDRALAZINE HYDROCHLORIDE 10 MG: 20 INJECTION INTRAMUSCULAR; INTRAVENOUS at 10:23

## 2018-08-06 RX ADMIN — PROPOFOL 40 MG: 10 INJECTION, EMULSION INTRAVENOUS at 10:35

## 2018-08-06 RX ADMIN — PROPOFOL 20 MG: 10 INJECTION, EMULSION INTRAVENOUS at 10:57

## 2018-08-06 RX ADMIN — PROPOFOL 40 MG: 10 INJECTION, EMULSION INTRAVENOUS at 10:50

## 2018-08-06 RX ADMIN — SODIUM CHLORIDE: 9 INJECTION, SOLUTION INTRAVENOUS at 10:55

## 2018-08-06 RX ADMIN — PROPOFOL 20 MG: 10 INJECTION, EMULSION INTRAVENOUS at 11:05

## 2018-08-06 RX ADMIN — PROPOFOL 40 MG: 10 INJECTION, EMULSION INTRAVENOUS at 10:45

## 2018-08-06 RX ADMIN — PROPOFOL 70 MG: 10 INJECTION, EMULSION INTRAVENOUS at 10:32

## 2018-08-06 RX ADMIN — PROPOFOL 40 MG: 10 INJECTION, EMULSION INTRAVENOUS at 10:39

## 2018-08-06 NOTE — DISCHARGE INSTRUCTIONS
[address]  [address], [address]    COLON and EGD DISCHARGE INSTRUCTIONS    Sia Deras  [id number]  1945    Discomfort:  Redness at IV site- apply warm compress to area; if redness or soreness persist- contact your physician  There may be a slight amount of blood passed from the rectum  Gaseous discomfort- walking, belching will help relieve any discomfort  You may not operate a vehicle for 12 hours  You may not engage in an occupation involving machinery or appliances for rest of today  You may not drink alcoholic beverages for at least 12 hours  Avoid making any critical decisions for at least 24 hour  DIET:  You may resume your regular diet - however -  remember your colon is empty and a heavy meal will produce gas. Avoid these foods:  vegetables, fried / greasy foods, carbonated drinks     ACTIVITY:  You may  resume your normal daily activities it is recommended that you spend the remainder of the day resting -  avoid any strenuous activity. CALL M.D. ANY SIGN OF:   Increasing pain, nausea, vomiting  Abdominal distension (swelling)  New increased bleeding (oral or rectal)  Fever (chills)  Pain in chest area  Bloody discharge from nose or mouth  Shortness of breath      Follow-up Instructions:   Call Dr. Law Glez for any questions or problems at [phone]   High fiber diet   Resume eliquis on 8/8/2018          ENDOSCOPY FINDINGS:   Your colonoscopy showed 6 polyps I removed and diverticulosis   Your endoscopy showed Painter's, I biopsied.   Telephone # [phone]      Signed By: Law Glez MD     8/6/2018  11:23 AM       DISCHARGE SUMMARY from Nurse    The following personal items collected during your admission are returned to you:   Dental Appliance: Dental Appliances: Uppers, With patient  Vision: Visual Aid: None  Hearing Aid:    Jewelry:    Clothing:    Other Valuables:    Valuables sent to safe:

## 2018-08-06 NOTE — PROGRESS NOTES
Received report from anesthesia staff on vital signs and status of patient.     Scopes precleaned at bedside by Jennifer Baer

## 2018-08-06 NOTE — ROUTINE PROCESS
Cate Newkirk  1945  [id number]    Situation:  Verbal report received from: Lynne Shah RN  Procedure: Procedure(s):  COLONOSCOPY  ESOPHAGOGASTRODUODENOSCOPY (EGD)  ESOPHAGOGASTRODUODENAL (EGD) BIOPSY  ENDOSCOPIC POLYPECTOMY  RESOLUTION CLIP    Background:    Preoperative diagnosis: HISTORY OF POLYPS, BARRETTS  Postoperative diagnosis: Painter's Esophagus  Hiatal Hernia  Colon Polyp    :  Dr. Mikayla Sunshine  Assistant(s): Endoscopy Technician-1: Radha Wtats IV  Endoscopy RN-1: Elliott Watkins RN    Specimens:   ID Type Source Tests Collected by Time Destination   1 : Painter's bx Preservative   Juanita Gilmore MD 8/6/2018 1040 Pathology   2 : Cecal polyp Preservative   Juaniat Gilmore MD 8/6/2018 1056 Pathology   3 : Transverse Polyps Preservative   Juanita Gilmore MD 8/6/2018 1058 Pathology   4 : Sigmoid polyps Preservative   Kings Johnson MD 8/6/2018 1103 Pathology     H. Pylori  no    Assessment:  Intra-procedure medications Anesthesia gave intra-procedure sedation and medications, see anesthesia flow sheet yes    Intravenous fluids: NS@ KVO     Vital signs stable     Abdominal assessment: round and soft     Recommendation:  Discharge patient per MD order.     Family or Friend   Permission to share finding with family or friend yes

## 2018-08-06 NOTE — ANESTHESIA POSTPROCEDURE EVALUATION
Post-Anesthesia Evaluation and Assessment    Patient: Rima Gonzales MRN: [id number]  SSN: [id number]    YOB: 1945  Age: 81 y.o. Sex: male       Cardiovascular Function/Vital Signs  Visit Vitals    /73    Pulse 74    Temp 36.5 °C (97.7 °F)    Resp 18    Ht 5' 10\" (1.778 m)    Wt 77.1 kg (170 lb)    SpO2 97%    BMI 24.39 kg/m2       Patient is status post MAC anesthesia for Procedure(s):  COLONOSCOPY  ESOPHAGOGASTRODUODENOSCOPY (EGD)  ESOPHAGOGASTRODUODENAL (EGD) BIOPSY  ENDOSCOPIC POLYPECTOMY  RESOLUTION CLIP. Nausea/Vomiting: None    Postoperative hydration reviewed and adequate. Pain:  Pain Scale 1: Numeric (0 - 10) (08/06/18 1139)  Pain Intensity 1: 0 (08/06/18 1139)   Managed    Neurological Status: At baseline    Mental Status and Level of Consciousness: Arousable    Pulmonary Status:   O2 Device: Room air (08/06/18 1139)   Adequate oxygenation and airway patent    Complications related to anesthesia: None    Post-anesthesia assessment completed.  No concerns    Signed By: Lita Reilly MD     August 6, 2018

## 2018-08-06 NOTE — PROCEDURES
[address]  [address] [facility], [address] [facility] (EGD) Procedure Note    Eladio Edwards  1945  [id number]      Procedure: Endoscopic Gastroduodenoscopy with biopsy    Indication:  Painter's/esophageal ulcer, GERD     Pre-operative Diagnosis: see indication above    Post-operative Diagnosis: see findings below    : Arnoldo Rivas MD    Referring Provider:  Agueda Lamb MD      Anesthesia/Sedation:  MAC anesthesia Propofol        Procedure Details     After infomed consent was obtained for the procedure, with all risks and benefits of procedure explained the patient was taken to the endoscopy suite and placed in the left lateral decubitus position. Following sequential administration of sedation as per above, the endoscope was inserted into the mouth and advanced under direct vision to third portion of the duodenum. A careful inspection was made as the gastroscope was withdrawn, including a retroflexed view of the proximal stomach; findings and interventions are described below. Findings:   Esophagus:hiatal hernia 3 cm in size   10 cm distal esophagus Painter's, random biopsies taken  Stomach: normal  Duodenum/jejunum: normal       Therapies:  none    Specimens: as above         EBL: None      Complications:   None; patient tolerated the procedure well. Impression:    -See post-procedure diagnoses.     Recommendations:  -Continue acid suppression.  -colonoscopy today    Signed By: Arnoldo Rivas MD     8/6/2018  10:42 AM

## 2018-08-06 NOTE — ANESTHESIA PREPROCEDURE EVALUATION
Anesthetic History               Review of Systems / Medical History  Patient summary reviewed, nursing notes reviewed and pertinent labs reviewed    Pulmonary                   Neuro/Psych       CVA: no residual symptoms  TIA     Cardiovascular    Hypertension: well controlled      CHF  Dysrhythmias : atrial fibrillation  CAD         GI/Hepatic/Renal  Within defined limits              Endo/Other    Diabetes: well controlled, type 2    Arthritis     Other Findings            Physical Exam    Airway  Mallampati: II  TM Distance: > 6 cm  Neck ROM: normal range of motion   Mouth opening: Normal     Cardiovascular  Regular rate and rhythm,  S1 and S2 normal,  no murmur, click, rub, or gallop             Dental    Dentition: Upper partial plate     Pulmonary  Breath sounds clear to auscultation               Abdominal  GI exam deferred       Other Findings            Anesthetic Plan    ASA: 3  Anesthesia type: MAC          Induction: Intravenous  Anesthetic plan and risks discussed with: Patient

## 2018-08-06 NOTE — PROCEDURES
[address], [address] [place] [address]      Colonoscopy Operative Report    Tayo Jefferson  [id number]  1945      Procedure Type:   Colonoscopy with polypectomy (snare cautery), polypectomy (hot biopsy)     Indications:    Personal history of colon polyps (screening only)   Date of last colonoscopy: 2016, Polyps  Yes    Pre-operative Diagnosis: see indication above    Post-operative Diagnosis:  See findings below    :  Aura Hernandez MD      Referring Provider: Winnie Kasper MD      Sedation:  MAC anesthesia Propofol      Procedure Details:  After informed consent was obtained with all risks and benefits of procedure explained and preoperative exam completed, the patient was taken to the endoscopy suite and placed in the left lateral decubitus position. Upon sequential sedation as per above, a digital rectal exam was performed demonstrating internal hemorrhoids. The Olympus videocolonoscope  was inserted in the rectum and carefully advanced to the cecum, which was identified by the ileocecal valve and appendiceal orifice. The cecum was identified by the ileocecal valve and appendiceal orifice. The quality of preparation was good. The colonoscope was slowly withdrawn with careful evaluation between folds. Retroflexion in the rectum was completed . Findings:   Rectum: normal  Sigmoid: 2 cm polyp removed by hot snaring, one hemoclip placed at site  1 cm polyp removed by hot biopsy  Descending Colon: normal  Transverse Colon: 3 polyps around 1 cm in size each, removed by hot biopsies  Ascending Colon: normal  Cecum: 9 mm polyp removed by hot biopsies  Diffuse diverticulosis of moderate degree was seen throughout colon      Specimen Removed:  as above    Complications: None. EBL:  None. Impression:    see findings    Recommendations: --Await pathology.       Recommendation for next colonscopy in 3 years  Resume eliquis on 8/8/2018    Signed By: Kings Johnson MD     8/6/2018  11:19 AM

## 2018-08-06 NOTE — IP AVS SNAPSHOT
[address] [id number] Patient: Rafal De La Torre MRN: [id number] :1945 About your hospitalization You were admitted on:  2018 You last received care in the:  [address] [place] [address] You were discharged on:  2018 Why you were hospitalized Your primary diagnosis was:  Not on File Follow-up Information None Your Scheduled Appointments 2018 10:00 AM EDT INFUSION 30 with [place] INFUSION NURSE 2  
Aundrea Toure ([address] [place] [address] Dr) [address] [place] [address] [address] [phone]  
[phone]   2:20 PM EDT  
ESTABLISHED PATIENT with Mark Scott MD  
[address]) [facility] [id number]  
[phone] 2018 10:00 AM EDT INFUSION 30 with [place] INFUSION NURSE 2  
Aundrea Toure ([address] [place] [address] ) [address] [place] [address] [address] [phone]  
[phone] Monday September 10, 2018 10:00 AM EDT INFUSION 30 with [place] INFUSION NURSE 2  
Aundrea Toure ([address] [place] [address] ) [address] [place] [address] [address] [phone]  
[phone] 2018 10:00 AM EDT INFUSION 30 with [place] INFUSION NURSE 2  
Aundrea Toure ([address] [place] [address] ) [address] [place] [address] [address] [phone]  
[phone] Discharge Orders None A check julieta indicates which time of day the medication should be taken. My Medications CONTINUE taking these medications Instructions Each Dose to Equal  
 Morning Noon Evening Bedtime BASAGLAR KWIKPEN U-100 INSULIN 100 unit/mL (3 mL) Inpn Generic drug:  insulin glargine Your last dose was: Your next dose is: INJECT 14 UNITS BY SUBCUTANEOUS ROUTE DAILY. INDICATIONS: TYPE 2 DIABETES MELLITUS  
     
   
   
   
  
 carvedilol 3.125 mg tablet Commonly known as:  Savanna Taylor Your last dose was: Your next dose is: Take 1 Tab by mouth two (2) times daily (with meals). 3.125 mg  
    
   
   
   
  
 colchicine 0.6 mg tablet Your last dose was: Your next dose is: 1.2 mg ORALLY at the first sign of a flare followed by 0.6 mg one hr later; MAX 1.8 mg over 1 hour  
     
   
   
   
  
 digoxin 0.125 mg tablet Commonly known as:  LANOXIN Your last dose was: Your next dose is: Take 1 Tab by mouth daily. 0.125 mg  
    
   
   
   
  
 furosemide 40 mg tablet Commonly known as:  LASIX Your last dose was: Your next dose is:    
   
   
 40 mg daily. 40 mg  
    
   
   
   
  
 * glucose blood VI test strips strip Commonly known as:  blood glucose test  
   
Your last dose was: Your next dose is:    
   
   
 Per patient insurance and meter requirements. Testing 4 times per day. * ONETOUCH ULTRA TEST strip Generic drug:  glucose blood VI test strips Your last dose was: Your next dose is:    
   
   
 Test blood sugar 3 times daily  
     
   
   
   
  
 hydrALAZINE 25 mg tablet Commonly known as:  APRESOLINE Your last dose was: Your next dose is:    
   
   
 25 mg three (3) times daily. 25 mg Insulin Needles (Disposable) 32 gauge x 5/32\" Ndle Commonly known as:  COMFORT EZ PEN NEEDLES Your last dose was: Your next dose is:    
   
   
 1 Each by Does Not Apply route daily. 1 Each  
    
   
   
   
  
 leflunomide 20 mg tablet Commonly known as:  Esther Poole Your last dose was: Your next dose is: Take 1 Tab by mouth daily for 90 days.  Take half tab daily for 7 days and then one tab if tolerated 20 mg  
    
   
   
   
  
 levothyroxine 25 mcg tablet Commonly known as:  SYNTHROID Your last dose was: Your next dose is: Take 1 Tab by mouth Daily (before breakfast). 25 mcg  
    
   
   
   
  
 multivitamin tablet Commonly known as:  ONE A DAY Your last dose was: Your next dose is: Take 1 Tab by mouth daily. 1 Tab  
    
   
   
   
  
 omeprazole 20 mg capsule Commonly known as:  PRILOSEC Your last dose was: Your next dose is: Take 20 mg by mouth daily as needed. 20 mg  
    
   
   
   
  
 pravastatin 20 mg tablet Commonly known as:  PRAVACHOL Your last dose was: Your next dose is: Take 1 Tab by mouth nightly. 20 mg  
    
   
   
   
  
 * Notice: This list has 2 medication(s) that are the same as other medications prescribed for you. Read the directions carefully, and ask your doctor or other care provider to review them with you. STOP taking these medications   
 apixaban 5 mg tablet Commonly known as:  Noreen Minus Discharge Instructions [address] 
[address] COLON and EGD DISCHARGE INSTRUCTIONS Tayo Jefferson 
[id number] 
1945 Discomfort: 
Redness at IV site- apply warm compress to area; if redness or soreness persist- contact your physician There may be a slight amount of blood passed from the rectum Gaseous discomfort- walking, belching will help relieve any discomfort You may not operate a vehicle for 12 hours You may not engage in an occupation involving machinery or appliances for rest of today You may not drink alcoholic beverages for at least 12 hours Avoid making any critical decisions for at least 24 hour DIET: 
You may resume your regular diet  however -  remember your colon is empty and a heavy meal will produce gas. Avoid these foods:  vegetables, fried / greasy foods, carbonated drinks ACTIVITY: 
You may  resume your normal daily activities it is recommended that you spend the remainder of the day resting -  avoid any strenuous activity. CALL M.D. ANY SIGN OF: Increasing pain, nausea, vomiting Abdominal distension (swelling) New increased bleeding (oral or rectal) Fever (chills) Pain in chest area Bloody discharge from nose or mouth Shortness of breath Follow-up Instructions: 
 Call Dr. Elijah Rodrigues for any questions or problems at [phone] High fiber diet Resume eliquis on 8/8/2018 ENDOSCOPY FINDINGS: 
 Your colonoscopy showed 6 polyps I removed and diverticulosis Your endoscopy showed Painter's, I biopsied. Telephone # [phone] Signed By: Elijah Rodrigues MD   
 8/6/2018  11:23 AM 
  
 
DISCHARGE SUMMARY from Nurse The following personal items collected during your admission are returned to you:  
Dental Appliance: Dental Appliances: Uppers, With patient Vision: Visual Aid: None Hearing Aid:   
Jewelry:   
Clothing:   
Other Valuables:   
Valuables sent to safe:   
 
 
 
ACO Transitions of Care [facility][1] offers a voluntary care coordination program to provide high quality service and care to [facility] fee-for-service beneficiaries. Karissa Salinas was designed to help you enhance your health and well-being through the following services: ? Transitions of Care  support for individuals who are transitioning from one care setting to another (example: Hospital to home). ? Chronic and Complex Care Coordination  support for individuals and caregivers of those with serious or chronic illnesses or with more than one chronic (ongoing) condition and those who take a number of different medications. If you meet specific medical criteria, a [address] [place] [address] Rd may call you directly to coordinate your care with your primary care physician and your other care providers. For questions about the [facility] programs, please, contact your physicians office. For general questions or additional information about Accountable Care Organizations: 
Please visit www.medicare.gov/acos. html or call 1-800-MEDICARE (7-347.807.7002) TTY users should call 5-226.608.3608. Introducing \[facility] [place] [facility]\"" & HEALTH SERVICES! Dear Osorio Lynne: Thank you for requesting a [employer] account. Our records indicate that you already have an active [employer] account. You can access your account anytime at [email or web address] Did you know that you can access your hospital and ER discharge instructions at any time in [employer]? You can also review all of your test results from your hospital stay or ER visit. Additional Information If you have questions, please visit the Frequently Asked Questions section of the [employer] website at [email or web address] Remember, [employer] is NOT to be used for urgent needs. For medical emergencies, dial 911. Now available from your iPhone and Android! Introducing Alan Warren As a [employer] patient, I wanted to make you aware of our electronic visit tool called Alan Laswonjeffreymegan. [employer] 24/7 allows you to connect within minutes with a medical provider 24 hours a day, seven days a week via a mobile device or tablet or logging into a secure website from your computer. You can access Alan Warren from anywhere in the [place].  
 
A virtual visit might be right for you when you have a simple condition and feel like you just dont want to get out of bed, or cant get away from work for an appointment, when your regular [employer] provider is not available (evenings, weekends or holidays), or when youre out of town and need minor care. Electronic visits cost only $49 and if the [employer] 24/7 provider determines a prescription is needed to treat your condition, one can be electronically transmitted to a nearby pharmacy*. Please take a moment to enroll today if you have not already done so. The enrollment process is free and takes just a few minutes. To enroll, please download the [employer] 24/7 murtaza to your tablet or phone, or visit [email or web address] to enroll on your computer. And, as an [address] [place] [address] patient with a [employer] account, the results of your visits will be scanned into your electronic medical record and your primary care provider will be able to view the scanned results. We urge you to continue to see your regular [employer] provider for your ongoing medical care. And while your primary care provider may not be the one available when you seek a [employer] virtual visit, the peace of mind you get from getting a real diagnosis real time can be priceless. For more information on [employer], view our Frequently Asked Questions (FAQs) at [email or web address]. Sincerely, 
 
Dariel Mahmood MD 
Chief Medical Officer [address] *:  certain medications cannot be prescribed via [employer] Providers Seen During Your Hospitalization Provider Specialty Primary office phone Klaus Lange MD Gastroenterology [phone] Your Primary Care Physician (PCP) Primary Care Physician Office Phone Office Fax Alena Thompson [phone] You are allergic to the following Allergen Reactions Other Medication Other (comments) Novacaine Causes tachycardia Recent Documentation Height Weight BMI Smoking Status 1.778 m 77.1 kg 24.39 kg/m2 Never Smoker Emergency Contacts Name Discharge Info Relation Home Work Mobile [address] CAREGIVER [3] Spouse [3] [phone] Patient Belongings The following personal items are in your possession at time of discharge: 
  Dental Appliances: Uppers, With patient  Visual Aid: None Please provide this summary of care documentation to your next provider. Signatures-by signing, you are acknowledging that this After Visit Summary has been reviewed with you and you have received a copy. Patient Signature:  ____________________________________________________________ Date:  ____________________________________________________________  
  
[facility] Provider Signature:  ____________________________________________________________ Date:  ____________________________________________________________

## 2018-08-09 RX ORDER — ACETAMINOPHEN 325 MG/1
650 TABLET ORAL
Status: ACTIVE | OUTPATIENT
Start: 2018-08-13 | End: 2018-08-13

## 2018-08-09 RX ORDER — ACETAMINOPHEN 325 MG/1
650 TABLET ORAL ONCE
Status: COMPLETED | OUTPATIENT
Start: 2018-08-13 | End: 2018-08-13

## 2018-08-09 RX ORDER — DIPHENHYDRAMINE HCL 25 MG
25 CAPSULE ORAL ONCE
Status: COMPLETED | OUTPATIENT
Start: 2018-08-13 | End: 2018-08-13

## 2018-08-09 RX ORDER — DIPHENHYDRAMINE HYDROCHLORIDE 50 MG/ML
25 INJECTION, SOLUTION INTRAMUSCULAR; INTRAVENOUS
Status: ACTIVE | OUTPATIENT
Start: 2018-08-13 | End: 2018-08-14

## 2018-08-09 RX ORDER — SODIUM CHLORIDE 9 MG/ML
25 INJECTION, SOLUTION INTRAVENOUS CONTINUOUS
Status: DISPENSED | OUTPATIENT
Start: 2018-08-13 | End: 2018-08-13

## 2018-08-10 DIAGNOSIS — M1A.9XX0 CHRONIC GOUT, UNSPECIFIED CAUSE, UNSPECIFIED SITE: ICD-10-CM

## 2018-08-13 ENCOUNTER — HOSPITAL ENCOUNTER (OUTPATIENT)
Dept: INFUSION THERAPY | Age: 73
Discharge: HOME | End: 2018-08-13
Payer: COMMERCIAL

## 2018-08-13 VITALS
OXYGEN SATURATION: 98 % | SYSTOLIC BLOOD PRESSURE: 182 MMHG | DIASTOLIC BLOOD PRESSURE: 88 MMHG | RESPIRATION RATE: 18 BRPM | HEART RATE: 71 BPM | TEMPERATURE: 98 F

## 2018-08-13 LAB
ALBUMIN SERPL-MCNC: 3.6 G/DL (ref 3.5–5)
ALBUMIN/GLOB SERPL: 0.9 {RATIO} (ref 1.1–2.2)
ALP SERPL-CCNC: 82 U/L (ref 45–117)
ALT SERPL-CCNC: 37 U/L (ref 12–78)
ANION GAP SERPL CALC-SCNC: 10 MMOL/L (ref 5–15)
AST SERPL-CCNC: 32 U/L (ref 15–37)
BASOPHILS # BLD: 0 K/UL (ref 0–0.1)
BASOPHILS NFR BLD: 1 % (ref 0–1)
BILIRUB SERPL-MCNC: 0.5 MG/DL (ref 0.2–1)
BUN SERPL-MCNC: 50 MG/DL (ref 6–20)
BUN/CREAT SERPL: 25 (ref 12–20)
CALCIUM SERPL-MCNC: 8.6 MG/DL (ref 8.5–10.1)
CHLORIDE SERPL-SCNC: 103 MMOL/L (ref 97–108)
CO2 SERPL-SCNC: 26 MMOL/L (ref 21–32)
CREAT SERPL-MCNC: 2.03 MG/DL (ref 0.7–1.3)
DIFFERENTIAL METHOD BLD: ABNORMAL
EOSINOPHIL # BLD: 0.2 K/UL (ref 0–0.4)
EOSINOPHIL NFR BLD: 5 % (ref 0–7)
ERYTHROCYTE [DISTWIDTH] IN BLOOD BY AUTOMATED COUNT: 13.8 % (ref 11.5–14.5)
ERYTHROCYTE [SEDIMENTATION RATE] IN BLOOD: 33 MM/HR (ref 0–20)
GLOBULIN SER CALC-MCNC: 4 G/DL (ref 2–4)
GLUCOSE SERPL-MCNC: 143 MG/DL (ref 65–100)
HCT VFR BLD AUTO: 34.2 % (ref 36.6–50.3)
HGB BLD-MCNC: 10.6 G/DL (ref 12.1–17)
IMM GRANULOCYTES # BLD: 0 K/UL (ref 0–0.04)
IMM GRANULOCYTES NFR BLD AUTO: 1 % (ref 0–0.5)
LYMPHOCYTES # BLD: 0.5 K/UL (ref 0.8–3.5)
LYMPHOCYTES NFR BLD: 12 % (ref 12–49)
MCH RBC QN AUTO: 28.3 PG (ref 26–34)
MCHC RBC AUTO-ENTMCNC: 31 G/DL (ref 30–36.5)
MCV RBC AUTO: 91.4 FL (ref 80–99)
MONOCYTES # BLD: 0.4 K/UL (ref 0–1)
MONOCYTES NFR BLD: 10 % (ref 5–13)
NEUTS SEG # BLD: 3 K/UL (ref 1.8–8)
NEUTS SEG NFR BLD: 71 % (ref 32–75)
NRBC # BLD: 0 K/UL (ref 0–0.01)
NRBC BLD-RTO: 0 PER 100 WBC
PLATELET # BLD AUTO: 120 K/UL (ref 150–400)
PMV BLD AUTO: 10.6 FL (ref 8.9–12.9)
POTASSIUM SERPL-SCNC: 4.2 MMOL/L (ref 3.5–5.1)
PROT SERPL-MCNC: 7.6 G/DL (ref 6.4–8.2)
RBC # BLD AUTO: 3.74 M/UL (ref 4.1–5.7)
RBC MORPH BLD: ABNORMAL
RBC MORPH BLD: ABNORMAL
SODIUM SERPL-SCNC: 139 MMOL/L (ref 136–145)
URATE SERPL-MCNC: 10.2 MG/DL (ref 3.5–7.2)
WBC # BLD AUTO: 4.1 K/UL (ref 4.1–11.1)

## 2018-08-13 PROCEDURE — 96413 CHEMO IV INFUSION 1 HR: CPT

## 2018-08-13 PROCEDURE — 96375 TX/PRO/DX INJ NEW DRUG ADDON: CPT

## 2018-08-13 PROCEDURE — 80053 COMPREHEN METABOLIC PANEL: CPT | Performed by: INTERNAL MEDICINE

## 2018-08-13 PROCEDURE — 74011250636 HC RX REV CODE- 250/636: Performed by: INTERNAL MEDICINE

## 2018-08-13 PROCEDURE — 85025 COMPLETE CBC W/AUTO DIFF WBC: CPT | Performed by: INTERNAL MEDICINE

## 2018-08-13 PROCEDURE — 84550 ASSAY OF BLOOD/URIC ACID: CPT | Performed by: INTERNAL MEDICINE

## 2018-08-13 PROCEDURE — 85652 RBC SED RATE AUTOMATED: CPT | Performed by: INTERNAL MEDICINE

## 2018-08-13 PROCEDURE — 36415 COLL VENOUS BLD VENIPUNCTURE: CPT | Performed by: INTERNAL MEDICINE

## 2018-08-13 PROCEDURE — 74011250637 HC RX REV CODE- 250/637: Performed by: INTERNAL MEDICINE

## 2018-08-13 PROCEDURE — 96415 CHEMO IV INFUSION ADDL HR: CPT

## 2018-08-13 PROCEDURE — 74011000250 HC RX REV CODE- 250: Performed by: INTERNAL MEDICINE

## 2018-08-13 RX ORDER — COLCHICINE 0.6 MG/1
0.6 TABLET ORAL 2 TIMES DAILY
Qty: 90 TAB | Refills: 0 | Status: SHIPPED | OUTPATIENT
Start: 2018-08-13 | End: 2018-08-29

## 2018-08-13 RX ADMIN — SODIUM CHLORIDE 20 MG: 9 INJECTION INTRAMUSCULAR; INTRAVENOUS; SUBCUTANEOUS at 10:38

## 2018-08-13 RX ADMIN — DIPHENHYDRAMINE HYDROCHLORIDE 25 MG: 25 CAPSULE ORAL at 10:37

## 2018-08-13 RX ADMIN — PEGLOTICASE 8 MG: 8 INJECTION, SOLUTION INTRAVENOUS at 11:13

## 2018-08-13 RX ADMIN — METHYLPREDNISOLONE SODIUM SUCCINATE 125 MG: 125 INJECTION, POWDER, FOR SOLUTION INTRAMUSCULAR; INTRAVENOUS at 10:42

## 2018-08-13 RX ADMIN — ACETAMINOPHEN 650 MG: 325 TABLET ORAL at 10:37

## 2018-08-13 RX ADMIN — SODIUM CHLORIDE 25 ML/HR: 900 INJECTION, SOLUTION INTRAVENOUS at 10:37

## 2018-08-13 NOTE — PROGRESS NOTES
[facility] VISIT NOTE    1010. Pt arrived at [facility] ambulatory and in no distress for Krystexxa C1. Assessment completed, pt without complaint. 24g PIV placed in Left Arm. Positive blood return noted and labs drawn. Medications received:  NS KVO  Tylenol PO  Benadryl PO  Pepcid IV  Solumedrol IV  Krystexxa IV    1241. Notified Dr. Shane Martinez of patient's elevated BP. Orders to continue [facility] and patient to follow up with PCP/ER if elevated BP persists. Notified patient. Patient verbalized understanding and stated he was scheduled to see his cardiologist this week and would discuss with cardiologist.    Patient stayed for 1 hour observation. Tolerated treatment well, no adverse reaction noted. PIV removed at discharge, no s/s of infiltration noted. Patient Vitals for the past 12 hrs:   Temp Pulse Resp BP SpO2   08/13/18 1441 98 °F (36.7 °C) 71 - 182/88 98 %   08/13/18 1246 97.7 °F (36.5 °C) 65 18 180/75 -   08/13/18 1217 - - - 200/71 -   08/13/18 1216 97.2 °F (36.2 °C) 75 16 (!) 213/98 -   08/13/18 1146 97.9 °F (36.6 °C) - 16 - -   08/13/18 1013 98 °F (36.7 °C) 76 18 187/79 -     Recent Results (from the past 12 hour(s))   CBC WITH AUTOMATED DIFF    Collection Time: 08/13/18 10:27 AM   Result Value Ref Range    WBC 4.1 4.1 - 11.1 K/uL    RBC 3.74 (L) 4.10 - 5.70 M/uL    HGB 10.6 (L) 12.1 - 17.0 g/dL    HCT 34.2 (L) 36.6 - 50.3 %    MCV 91.4 80.0 - 99.0 FL    MCH 28.3 26.0 - 34.0 PG    MCHC 31.0 30.0 - 36.5 g/dL    RDW 13.8 11.5 - 14.5 %    PLATELET 460 (L) 384 - 400 K/uL    MPV 10.6 8.9 - 12.9 FL    NRBC 0.0 0  WBC    ABSOLUTE NRBC 0.00 0.00 - 0.01 K/uL    NEUTROPHILS 71 32 - 75 %    LYMPHOCYTES 12 12 - 49 %    MONOCYTES 10 5 - 13 %    EOSINOPHILS 5 0 - 7 %    BASOPHILS 1 0 - 1 %    IMMATURE GRANULOCYTES 1 (H) 0.0 - 0.5 %    ABS. NEUTROPHILS 3.0 1.8 - 8.0 K/UL    ABS. LYMPHOCYTES 0.5 (L) 0.8 - 3.5 K/UL    ABS. MONOCYTES 0.4 0.0 - 1.0 K/UL    ABS. EOSINOPHILS 0.2 0.0 - 0.4 K/UL    ABS.  BASOPHILS 0.0 0.0 - 0.1 K/UL    ABS. IMM. GRANS. 0.0 0.00 - 0.04 K/UL    DF SMEAR SCANNED      RBC COMMENTS ANISOCYTOSIS  1+        RBC COMMENTS OVALOCYTES  PRESENT       METABOLIC PANEL, COMPREHENSIVE    Collection Time: 08/13/18 10:27 AM   Result Value Ref Range    Sodium 139 136 - 145 mmol/L    Potassium 4.2 3.5 - 5.1 mmol/L    Chloride 103 97 - 108 mmol/L    CO2 26 21 - 32 mmol/L    Anion gap 10 5 - 15 mmol/L    Glucose 143 (H) 65 - 100 mg/dL    BUN 50 (H) 6 - 20 MG/DL    Creatinine 2.03 (H) 0.70 - 1.30 MG/DL    BUN/Creatinine ratio 25 (H) 12 - 20      GFR est AA 40 (L) >60 ml/min/1.73m2    GFR est non-AA 33 (L) >60 ml/min/1.73m2    Calcium 8.6 8.5 - 10.1 MG/DL    Bilirubin, total 0.5 0.2 - 1.0 MG/DL    ALT (SGPT) 37 12 - 78 U/L    AST (SGOT) 32 15 - 37 U/L    Alk. phosphatase 82 45 - 117 U/L    Protein, total 7.6 6.4 - 8.2 g/dL    Albumin 3.6 3.5 - 5.0 g/dL    Globulin 4.0 2.0 - 4.0 g/dL    A-G Ratio 0.9 (L) 1.1 - 2.2     SED RATE (ESR)    Collection Time: 08/13/18 10:27 AM   Result Value Ref Range    Sed rate, automated 33 (H) 0 - 20 mm/hr   URIC ACID    Collection Time: 08/13/18 10:27 AM   Result Value Ref Range    Uric acid 10.2 (H) 3.5 - 7.2 MG/DL       1445. D/C'd from [facility] ambulatory and in no distress.  Next appointment is 8/27/18 at 10:00 am.

## 2018-08-16 ENCOUNTER — OFFICE VISIT (OUTPATIENT)
Dept: RHEUMATOLOGY | Age: 73
End: 2018-08-16

## 2018-08-16 VITALS
RESPIRATION RATE: 16 BRPM | OXYGEN SATURATION: 94 % | HEIGHT: 70 IN | WEIGHT: 172.2 LBS | SYSTOLIC BLOOD PRESSURE: 137 MMHG | BODY MASS INDEX: 24.65 KG/M2 | HEART RATE: 81 BPM | DIASTOLIC BLOOD PRESSURE: 64 MMHG | TEMPERATURE: 98.1 F

## 2018-08-16 DIAGNOSIS — N18.4 CKD (CHRONIC KIDNEY DISEASE) STAGE 4, GFR 15-29 ML/MIN (HCC): ICD-10-CM

## 2018-08-16 DIAGNOSIS — M1A.39X1 CHRONIC TOPHACEOUS GOUT OF MULTIPLE SITES DUE TO RENAL IMPAIRMENT: Primary | ICD-10-CM

## 2018-08-16 DIAGNOSIS — I10 ESSENTIAL HYPERTENSION: ICD-10-CM

## 2018-08-16 DIAGNOSIS — Z79.60 LONG-TERM USE OF IMMUNOSUPPRESSANT MEDICATION: ICD-10-CM

## 2018-08-16 RX ORDER — SODIUM, POTASSIUM,MAG SULFATES 17.5-3.13G
SOLUTION, RECONSTITUTED, ORAL ORAL
Refills: 0 | COMMUNITY
Start: 2018-07-06 | End: 2018-08-16

## 2018-08-16 RX ORDER — APIXABAN 5 MG/1
TABLET, FILM COATED ORAL
Refills: 3 | COMMUNITY
Start: 2018-06-30 | End: 2018-08-30

## 2018-08-16 NOTE — PATIENT INSTRUCTIONS
Take Imodium for diarrhea and let me know if you do not have relief. If you have relief, increase your leflunomide to 20 mg daily. If you do not tolerate it due to diarrhea while taking Imodium, then let me know on MyChart. Imodium generic is just as good.

## 2018-08-16 NOTE — PROGRESS NOTES
REASON FOR VISIT    This is a follow-up visit for Mr. 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 Gouty arthritis phenotype includes:  Tophi: yes  Erosions: yes  Tenosynovitis: no  Double Contour: yes     Therapy Includes:    NSAIDs: contra-indicated due to CKD  Colchicine prophylaxis: yes  Current urate-lowering therapy: Krystexxa 8 mg every 2 weeks (8/13/2018 to present)  Discontinued urate-lowering therapy because of inefficacy: allopurinol  Discontinued urate-lowering therapy because of side effects: none  Contra-Indicated urate-lowering therapy because of CKD: allopurinol, probenecid  Discontinued urate-lowering therapy because of dysphagia: Uloric    Immunomodulatory Therapy History includes:  Current DMARD therapy include: Leflunomide 10 mg  Prior DMARD therapy include: Leflunomide 20 mg  Discontinued DMARDs because of inefficacy: None  Discontinued DMARDs because of side effects: Leflunomide 20 mg (diarrhea)    Active problems include:    Patient Active Problem List   Diagnosis Code    TIA (transient ischemic attack) G45.9    Stenosis of right carotid artery without cerebral infarction I65.21    Vitamin D deficiency E55.9    Retinopathy, diabetic, bilateral ([facility] E11.319    Heart murmur, systolic M93.6    Advance care planning Z71.89    Essential hypertension I10    Anemia, chronic disease D63.8    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 Stephanie Mendiola returns for a follow-up visit.     On his last visit, I started him leflunomide 20 mg daily to minimize immunogenicity when Krystexxa infusions were started. I also aspirated and injection his right knee with good tolerance relief. The aspiration showed MSU crystals. He received his first dose on 8/13/2018, which was associated with blood pressure elevation hyperglcemia, which resolved, and diarrhea. He had diarrhea with lelfunomide on 10 mg and 20 mg so lowered his dose to 10 mg but did not inform me. He did not try Imodium. He was told that his colchicine was increased because he exhausted his benefits, which he is not sure about, and feels like this happened because he started [place]. He has noticed more mobility, loosening in his joints, and improved function after his infusions. Most recent uric acid from 8/13/2018 was 10.2 mg/dL (previously 11.2, 12.0, 4.1, 9.6, 12.6 mg/dL). The patient has not had any interval hospital admissions, infections, or surgeries. He had a interval EGD and colonoscopy with benign polyps found. REVIEW OF SYSTEMS    A comprehensive review of systems was performed and pertinent results are documented in the HPI, review of systems is otherwise non-contributory. PAST MEDICAL HISTORY    He has a past medical history of Acute encephalopathy (1/14/2016); Anemia (5/11/2017); Atrial fibrillation ([address] Painter esophagus; CAD (coronary artery disease); Diabetes ([address] Heart failure ([address] HTN, goal below 140/90; Retinopathy, diabetic, bilateral ([address] (3/11/2016); Stenosis of right carotid artery without cerebral infarction (1/18/2016); TIA (transient ischemic attack) (1/14/2016); and Vitamin D deficiency (3/1/2016). FAMILY HISTORY    His family history includes Diabetes in his father; Heart Failure in his mother; No Known Problems in his sister; Other in his daughter, daughter, and daughter. SOCIAL HISTORY    He reports that he has never smoked. He has never used smokeless tobacco. He reports that he drinks alcohol.  He reports that he does not use illicit drugs. IMMUNIZATIONS  Immunization History   Administered Date(s) Administered    Influenza High Dose Vaccine PF 02/26/2016, 11/23/2017       MEDICATIONS    Current Outpatient Prescriptions   Medication Sig Dispense Refill    ELIQUIS 5 mg tablet TAKE 1 TABLET BY MOUTH TWICE A DAY  3    Insulin Needles, Disposable, (LEONARD PEN NEEDLE) 32 gauge x 5/32\" ndle Test fasting glucose daily; E11.8 100 Pen Needle 5    leflunomide (ARAVA) 20 mg tablet Take 1 Tab by mouth daily for 90 days. Take half tab daily for 7 days and then one tab if tolerated 90 Tab 0    BASAGLAR KWIKPEN U-100 INSULIN 100 unit/mL (3 mL) inpn INJECT 14 UNITS BY SUBCUTANEOUS ROUTE DAILY. INDICATIONS: TYPE 2 DIABETES MELLITUS 15 Adjustable Dose Pre-filled Pen Syringe 0    ONETOUCH ULTRA TEST strip Test blood sugar 3 times daily 90 Strip 11    digoxin (LANOXIN) 0.125 mg tablet Take 1 Tab by mouth daily. 30 Tab 11    furosemide (LASIX) 40 mg tablet 40 mg daily. 5    hydrALAZINE (APRESOLINE) 25 mg tablet 25 mg three (3) times daily. 3    omeprazole (PRILOSEC) 20 mg capsule Take 20 mg by mouth daily as needed.  multivitamin (ONE A DAY) tablet Take 1 Tab by mouth daily.  glucose blood VI test strips (BLOOD GLUCOSE TEST) strip Per patient insurance and meter requirements. Testing 4 times per day. 100 Strip 11    pravastatin (PRAVACHOL) 20 mg tablet Take 1 Tab by mouth nightly. 30 Tab 2    carvedilol (COREG) 3.125 mg tablet Take 1 Tab by mouth two (2) times daily (with meals). 60 Tab 2    colchicine 0.6 mg tablet Take 1 Tab by mouth two (2) times a day.  90 Tab 0     Facility-Administered Medications Ordered in Other Visits   Medication Dose Route Frequency Provider Last Rate Last Dose    diphenhydrAMINE (BENADRYL) injection 25 mg  25 mg IntraVENous Q4H PRN Fariba Bernabe MD            ALLERGIES    Allergies   Allergen Reactions    Other Medication Other (comments)     Novacaine  Causes tachycardia PHYSICAL EXAMINATION    Visit Vitals    /64 (BP 1 Location: Left arm, BP Patient Position: Sitting)    Pulse 81    Temp 98.1 °F (36.7 °C) (Oral)    Resp 16    Ht 5' 10\" (1.778 m)    Wt 172 lb 3.2 oz (78.1 kg)    SpO2 94%    BMI 24.71 kg/m2     Body mass index is 24.71 kg/(m^2). General: Patient is alert, oriented x 3, not in acute distress    HEENT:   Sclerae are not injected and appear moist.  There is no alopecia. Neck is supple    Cardiovascular:  Heart is regular rate and rhythm, no murmurs. Chest:  Lungs are clear to auscultation bilaterally. No rhonchi, wheezes, or crackles. Extremities:  Free of clubbing, cyanosis, edema    Neurological exam:  Muscle strength is full in upper and lower extremities     Skin exam:  There are no rashes, no alopecia, no discoid lesions, no active Raynaud's, no livedo reticularis, no periungual erythema. Tophi: left olecranon, bilateral PIPIs    Musculoskeletal exam:  A comprehensive musculoskeletal exam was NOT performed for all joints of each upper and lower extremity and assessed for swelling, tenderness and range of motion.  Positive results are documented as below:    Previous Exam     Bilateral ankle edema    Joint Count 7/13/2018   Patient pain (0-100) 70   Left 1st MCP - Tender 1   Left 1st MCP - Swollen 1   Left 2nd MCP - Swollen 1   Left thumb IP - Tender 1   Left thumb IP - Swollen 1   Left 2nd PIP - Tender 1   Left 2nd PIP - Swollen 1   Left 3rd PIP - Tender 1   Left 3rd PIP - Swollen 1   Left 5th PIP - Tender 1   Left 5th PIP - Swollen 1   Right wrist- Tender 1   Right wrist- Swollen 1   Right 2nd MCP - Swollen 1   Right 5th MCP - Swollen 1   Right 2nd PIP - Swollen 1   Right 3rd PIP - Swollen 1   Right 4th PIP - Swollen 1   Right 5th PIP - Swollen 1   Right knee - Tender 1   Right knee - Swollen 1   Tender Joint Count (Total) 7   Swollen Joint Count (Total) 14   Patient Assessment (0-10) 2.5     DATA REVIEW    Laboratory     Recent laboratory results were reviewed, summarized, and discussed with the patient. Imaging    Musculoskeletal Ultrasound    Ultrasound of the left hand. Indication: joint pain. (07/13/18)  Using a [employer] Logiq e with 18 Mhz probe, limited views of the left hand were reviewed. This revealed hyperechoic rim along the 2nd MCP dorsally. The tendons were normal. Bony contours were regular without erosions seen. There were an an inhomogeneous collection in the joint space  Impression: tophaceous gout with double contour    Ultrasound of the right knee. Indication: joint pain. (07/13/18)  Using a [employer] Logiq e with 12 Mhz probe, limited views of the right knee were reviewed. This revealed a large anechoic colleciton without doppler and the presence of synovial frons within the suprapatellar joint space and medial and lateral gutters joint space. The tendons were normal. Bony contours were regular without erosions seen. There were no soft tissue masses noted. Unable to perform suprapatellar view due to knee effusion to assess for double contour. Impression: large joint effusion    Radiographs    Bilateral Hand 7/13/2018: RIGHT: Osteopenia is unchanged. No acute fracture or dislocation. Vascular calcifications are slightly increased. Radiocarpal and DRUJ osteoarthritis is increased. Triscaphe and first [facility] joint mild osteoarthritis is increased. No carpal erosion. There are erosions of the proximal fourth metacarpal at the fourth [facility] joint. Questionable new erosion of the second metacarpal head. No other MCP joint erosion. LEFT: No fracture or dislocation on plain film. Bones are osteopenic. Vascular calcifications are present. No chondrocalcinosis. Radiocarpal and intercarpal joints are within normal limits and unchanged. First MCP joint erosions are increased in size and number. Second MCP joint erosions are new. Remaining MCP joints are within normal limits. Multiple IP joint erosions are increased and more conspicuous.  No significant narrowing of the DIP joints. No periosteal reaction. Subtle erosion of the fourth DIP joint is unchanged. Third DIP joint erosions are more conspicuous. No chondrocalcinosis or periosteal reaction. Bilateral Hand 10/02/2012: RIGHT: demineralization and small erosive changes.  Vascular calcification is noted. LEFT: demineralization and small erosive changes head 2nd middle phalanx, head first metacarpal bone. Vascular calcification is noted    CT Imaging    CT Chest without contrast 6/21/2016: There is no definite evidence of interstitial lung disease. In the right upper lobe, there are vague areas of groundglass opacity which are nonspecific. There is a 5 mm nodule in the right upper lobe. There is minor atelectasis at the lung bases. There is a borderline enlarged pretracheal lymph node similar to 2012. The main pulmonary artery segment is slightly prominent measuring 3.6 cm perhaps and pulmonary artery hypertension. There is extensive atherosclerotic changes in the coronary arteries. There is also calcification in the aortic valve. In the upper abdomen, there is a small amount of ascites. There is mild prominence of the IVC and hepatic veins.      MR Imaging    MRI Brain without contrast 1/14/2016: There is no evidence for acute infarction. Several nonspecific tiny foci of altered signal are noted in the centrum semiovale bilaterally, somewhat more numerous on the left, nonspecific though compatible with chronic small vessel ischemic disease of white matter. There is no intra-axial extra-axial mass. The vascular flow voids at the base of the brain appear normal in conspicuity. Sella, optic chiasm, posterior fossa, orbits and paranasal sinuses are normal. A 7 mm focus of altered signal in the left occipital bone corresponds with an ill-defined lytic lesion shown on CT. The significance of this finding is doubtful.     MRA Brain without contrast 1/14/2016: The codominant vertebral artery confluence and basilar artery  demonstrate normal flow as do the posterior cerebral arteries bilaterally. Normal appearing flow is shown in the internal carotid artery siphons bilaterally as well as the anterior and middle cerebral arteries bilaterally. DXA     None    PROCEDURE    Ultrasound-Guided Right Knee Aspiration and Kenalog 40 mg IA. (07/13/18)    ASSESSMENT AND PLAN    This is a follow-up visit for Mr. Kristen Blake. 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 He has multiple joints involved with tophi. His ultrasound showed double contour sign. He has chronic kidney disease stage 4, so allopurinol and probenecid are contra-indicated. He cannot swallow Uloric and has had an inadequate response while trying to. He has failed conservative low purine diet. He is now on Krystexxa infusions. He received his first dose on 8/13/2018, with good tolerance. He feels that his joints are less stiff and he has more range of movement with decreased resistance in his joints after that one dose. I also have him on leflunomide. He did not tolerate taking more than 10 mg daily due to diarrhea. He did not contact me to inform me that he is having diarrhea. I asked him to take Imodium which she has not tried doing before. I asked him to take Imodium as needed and if his diarrhea resolves to increase his leflunomide to 20 mg daily. If he develops diarrhea on 20 mg leflunomide, then he should try taking Imodium. If the Imodium does not control his diarrhea then he should stop the clindamycin informally. He is no longer on colchicine due to cost of medication increasing to 500 hours for an unknown reason. I asked him to contact his insurance company to find out why it is no longer covered. I will continue Krystexxa infusions. 2) Long Term Use of Immunosuppressants.  The patient remains on immunomodulatory medications (lefluomide) and requires frequent toxicity monitoring by blood work. Respective labs were ordered (CBC and CMP). 3) Chronic Kidney Disease Stage 4. The patient's creatinine 2.03 mg/dL and eGFR 33 (previously 2.23 mg/dL, eGFR 28). 4) Essential Hypertension. Her pressure was 137/64 (previously 161/80). He is on Coreg 3.125 mg twice daily, hydralazine 25 mg three times daily, and Lasix 40 mg as needed    The patient voiced understanding of the aforementioned assessment and plan. Summary of plan was provided in the After Visit Summary patient instructions.      TODAY'S ORDERS    None    Future Appointments  Date Time Provider Xiomara Velez   8/29/2018 10:00 AM [place] INFUSION NURSE 2 [facility]   9/10/2018 10:00 AM SS INF5 CH3 <4H [facility]   9/24/2018 10:00 AM SS INF3 CH3 <4H [facility]   11/15/2018 3:00 PM Terra Quarles MD [address] [place], [address]    Adult Rheumatology   Rheumatology Ultrasound Certified  Raffaele Ball  A Part of [address]   Phone [phone]  Fax [phone]

## 2018-08-16 NOTE — MR AVS SNAPSHOT
[address] [facility] [address] 
[phone] Patient: Daryn Vivas MRN: [id number] :1945 Visit Information Date & Time Provider Department Dept. Phone Encounter #  
 2018  2:20 PM Raffaele Ryder [id number] Follow-up Instructions Return in about 3 months (around 2018). Upcoming Health Maintenance Date Due DTaP/Tdap/Td series (1 - Tdap) 10/26/1968 ZOSTER VACCINE AGE 60> 2007 Pneumococcal 65+ Low/Medium Risk (1 of 2 - PCV13) 10/26/2012 FOOT EXAM Q1 2018 Influenza Age 5 to Adult 2018 EYE EXAM RETINAL OR DILATED Q1 10/17/2018 HEMOGLOBIN A1C Q6M 2018 MICROALBUMIN Q1 2019 LIPID PANEL Q1 2019 GLAUCOMA SCREENING Q2Y 10/17/2019 Allergies as of 2018  Review Complete On: 2018 By: Heather Caldera LPN Severity Noted Reaction Type Reactions Other Medication  2011    Other (comments) Novacaine Causes tachycardia Current Immunizations  Reviewed on 2018 Name Date Influenza High Dose Vaccine PF 2017, 2016 Not reviewed this visit Vitals BP Pulse Temp Resp Height(growth percentile) Weight(growth percentile)  
 137/64 (BP 1 Location: Left arm, BP Patient Position: Sitting) 81 98.1 °F (36.7 °C) (Oral) 16 5' 10\" (1.778 m) 172 lb 3.2 oz (78.1 kg) SpO2 BMI Smoking Status 94% 24.71 kg/m2 Never Smoker Vitals History BMI and BSA Data Body Mass Index Body Surface Area 24.71 kg/m 2 1.96 m 2 Preferred Pharmacy Pharmacy Name Phone [id number] [facility], [address] [place] [address] [phone] Your Updated Medication List  
  
   
This list is accurate as of 18  3:02 PM.  Always use your most recent med list.  
  
  
  
  
 BASAGLAR KWIKPEN U-100 INSULIN 100 unit/mL (3 mL) Inpn Generic drug:  insulin glargine INJECT 14 UNITS BY SUBCUTANEOUS ROUTE DAILY. INDICATIONS: TYPE 2 DIABETES MELLITUS  
  
 carvedilol 3.125 mg tablet Commonly known as:  Len Pintos Take 1 Tab by mouth two (2) times daily (with meals). colchicine 0.6 mg tablet Take 1 Tab by mouth two (2) times a day. digoxin 0.125 mg tablet Commonly known as:  LANOXIN Take 1 Tab by mouth daily. ELIQUIS 5 mg tablet Generic drug:  apixaban TAKE 1 TABLET BY MOUTH TWICE A DAY  
  
 furosemide 40 mg tablet Commonly known as:  LASIX 40 mg daily. * glucose blood VI test strips strip Commonly known as:  blood glucose test  
Per patient insurance and meter requirements. Testing 4 times per day. * ONETOUCH ULTRA TEST strip Generic drug:  glucose blood VI test strips Test blood sugar 3 times daily  
  
 hydrALAZINE 25 mg tablet Commonly known as:  APRESOLINE 25 mg three (3) times daily. Insulin Needles (Disposable) 32 gauge x 5/32\" Ndle Commonly known as:  Elida Pen Needle Test fasting glucose daily; E11.8  
  
 leflunomide 20 mg tablet Commonly known as:  Candiss Filter Take 1 Tab by mouth daily for 90 days. Take half tab daily for 7 days and then one tab if tolerated  
  
 multivitamin tablet Commonly known as:  ONE A DAY Take 1 Tab by mouth daily. omeprazole 20 mg capsule Commonly known as:  PRILOSEC Take 20 mg by mouth daily as needed. pravastatin 20 mg tablet Commonly known as:  PRAVACHOL Take 1 Tab by mouth nightly. * Notice: This list has 2 medication(s) that are the same as other medications prescribed for you. Read the directions carefully, and ask your doctor or other care provider to review them with you. Follow-up Instructions Return in about 3 months (around 11/16/2018). To-Do List   
 08/29/2018 10:00 AM  
 Appointment with Shawn London 2 at [place] [address] ([phone])  
  
 09/10/2018 10:00 AM  
  Appointment with SS INF5 CH3 <4H at [place] [address] ([phone])  
  
 09/24/2018 10:00 AM  
  Appointment with SS INF3 CH3 <4H at [place] [address] ([phone]) Patient Instructions Take Imodium for diarrhea and let me know if you do not have relief. If you have relief, increase your leflunomide to 20 mg daily. If you do not tolerate it due to diarrhea while taking Imodium, then let me know on MyChart. Imodium generic is just as good. Introducing [facility] SERVICES! Dear Rosalba Rock: Thank you for requesting a [employer] account. Our records indicate that you already have an active [employer] account. You can access your account anytime at [email or web address] Device Did you know that you can access your hospital and ER discharge instructions at any time in [employer]? You can also review all of your test results from your hospital stay or ER visit. Additional Information If you have questions, please visit the Frequently Asked Questions section of the [employer] website at [email or web address] Remember, [employer] is NOT to be used for urgent needs. For medical emergencies, dial 911. Now available from your iPhone and Android! Please provide this summary of care documentation to your next provider. Your primary care clinician is listed as Amy Thacker. If you have any questions after today's visit, please call [phone].

## 2018-08-20 ENCOUNTER — APPOINTMENT (OUTPATIENT)
Dept: INFUSION THERAPY | Age: 73
End: 2018-08-20
Payer: COMMERCIAL

## 2018-08-21 RX ORDER — DIPHENHYDRAMINE HCL 25 MG
25 CAPSULE ORAL ONCE
Status: COMPLETED | OUTPATIENT
Start: 2018-08-29 | End: 2018-08-29

## 2018-08-21 RX ORDER — ACETAMINOPHEN 325 MG/1
650 TABLET ORAL ONCE
Status: COMPLETED | OUTPATIENT
Start: 2018-08-29 | End: 2018-08-29

## 2018-08-28 ENCOUNTER — TELEPHONE (OUTPATIENT)
Dept: FAMILY MEDICINE CLINIC | Age: 73
End: 2018-08-28

## 2018-08-28 NOTE — TELEPHONE ENCOUNTER
Left a message to patient informing that we have received his [place] Powerhart message and provider has replied to it and also has tried to call him twice today. Will try to contact patient again tomorrow.

## 2018-08-29 ENCOUNTER — HOSPITAL ENCOUNTER (INPATIENT)
Age: 73
LOS: 1 days | Discharge: HOME | DRG: 920 | End: 2018-08-30
Attending: EMERGENCY MEDICINE | Admitting: INTERNAL MEDICINE
Payer: COMMERCIAL

## 2018-08-29 ENCOUNTER — HOSPITAL ENCOUNTER (OUTPATIENT)
Dept: INFUSION THERAPY | Age: 73
Discharge: HOME | End: 2018-08-29
Payer: COMMERCIAL

## 2018-08-29 ENCOUNTER — TELEPHONE (OUTPATIENT)
Dept: RHEUMATOLOGY | Age: 73
End: 2018-08-29

## 2018-08-29 VITALS
SYSTOLIC BLOOD PRESSURE: 173 MMHG | TEMPERATURE: 98.7 F | OXYGEN SATURATION: 99 % | HEART RATE: 82 BPM | DIASTOLIC BLOOD PRESSURE: 79 MMHG | RESPIRATION RATE: 18 BRPM

## 2018-08-29 DIAGNOSIS — K92.2 GASTROINTESTINAL HEMORRHAGE, UNSPECIFIED GASTROINTESTINAL HEMORRHAGE TYPE: Primary | ICD-10-CM

## 2018-08-29 PROBLEM — D62 ACUTE BLOOD LOSS ANEMIA: Status: ACTIVE | Noted: 2018-08-29

## 2018-08-29 LAB
ALBUMIN SERPL-MCNC: 3.1 G/DL (ref 3.5–5)
ALBUMIN/GLOB SERPL: 0.9 {RATIO} (ref 1.1–2.2)
ALP SERPL-CCNC: 61 U/L (ref 45–117)
ALT SERPL-CCNC: 26 U/L (ref 12–78)
ANION GAP SERPL CALC-SCNC: 8 MMOL/L (ref 5–15)
AST SERPL-CCNC: 20 U/L (ref 15–37)
BASOPHILS # BLD: 0 K/UL (ref 0–0.1)
BASOPHILS NFR BLD: 1 % (ref 0–1)
BILIRUB SERPL-MCNC: 0.4 MG/DL (ref 0.2–1)
BUN SERPL-MCNC: 48 MG/DL (ref 6–20)
BUN/CREAT SERPL: 26 (ref 12–20)
CALCIUM SERPL-MCNC: 8.2 MG/DL (ref 8.5–10.1)
CHLORIDE SERPL-SCNC: 102 MMOL/L (ref 97–108)
CO2 SERPL-SCNC: 27 MMOL/L (ref 21–32)
COMMENT, HOLDF: NORMAL
CREAT SERPL-MCNC: 1.84 MG/DL (ref 0.7–1.3)
DIFFERENTIAL METHOD BLD: ABNORMAL
EOSINOPHIL # BLD: 0.1 K/UL (ref 0–0.4)
EOSINOPHIL NFR BLD: 2 % (ref 0–7)
ERYTHROCYTE [DISTWIDTH] IN BLOOD BY AUTOMATED COUNT: 13.8 % (ref 11.5–14.5)
ERYTHROCYTE [SEDIMENTATION RATE] IN BLOOD: 65 MM/HR (ref 0–20)
GLOBULIN SER CALC-MCNC: 3.5 G/DL (ref 2–4)
GLUCOSE BLD STRIP.AUTO-MCNC: 200 MG/DL (ref 65–100)
GLUCOSE BLD STRIP.AUTO-MCNC: 211 MG/DL (ref 65–100)
GLUCOSE SERPL-MCNC: 140 MG/DL (ref 65–100)
HCT VFR BLD AUTO: 19.8 % (ref 36.6–50.3)
HCT VFR BLD AUTO: 21 % (ref 36.6–50.3)
HEMOCCULT STL QL: POSITIVE
HGB BLD-MCNC: 6.1 G/DL (ref 12.1–17)
HGB BLD-MCNC: 6.2 G/DL (ref 12.1–17)
HGB BLD-MCNC: 6.8 G/DL (ref 12.1–17)
IMM GRANULOCYTES # BLD: 0 K/UL (ref 0–0.04)
IMM GRANULOCYTES NFR BLD AUTO: 0 % (ref 0–0.5)
INR PPP: 1.1 (ref 0.9–1.1)
LYMPHOCYTES # BLD: 0.5 K/UL (ref 0.8–3.5)
LYMPHOCYTES NFR BLD: 10 % (ref 12–49)
MCH RBC QN AUTO: 28.3 PG (ref 26–34)
MCHC RBC AUTO-ENTMCNC: 29.5 G/DL (ref 30–36.5)
MCV RBC AUTO: 95.9 FL (ref 80–99)
MONOCYTES # BLD: 0.4 K/UL (ref 0–1)
MONOCYTES NFR BLD: 8 % (ref 5–13)
NEUTS SEG # BLD: 3.8 K/UL (ref 1.8–8)
NEUTS SEG NFR BLD: 79 % (ref 32–75)
NRBC # BLD: 0 K/UL (ref 0–0.01)
NRBC BLD-RTO: 0 PER 100 WBC
PLATELET # BLD AUTO: 130 K/UL (ref 150–400)
PMV BLD AUTO: 10 FL (ref 8.9–12.9)
POTASSIUM SERPL-SCNC: 3.9 MMOL/L (ref 3.5–5.1)
PROT SERPL-MCNC: 6.6 G/DL (ref 6.4–8.2)
PROTHROMBIN TIME: 11.4 SEC (ref 9–11.1)
RBC # BLD AUTO: 2.19 M/UL (ref 4.1–5.7)
RBC MORPH BLD: ABNORMAL
SAMPLES BEING HELD,HOLD: NORMAL
SERVICE CMNT-IMP: ABNORMAL
SERVICE CMNT-IMP: ABNORMAL
SODIUM SERPL-SCNC: 137 MMOL/L (ref 136–145)
URATE SERPL-MCNC: 1.5 MG/DL (ref 3.5–7.2)
WBC # BLD AUTO: 4.8 K/UL (ref 4.1–11.1)

## 2018-08-29 PROCEDURE — 96413 CHEMO IV INFUSION 1 HR: CPT

## 2018-08-29 PROCEDURE — 96366 THER/PROPH/DIAG IV INF ADDON: CPT

## 2018-08-29 PROCEDURE — 74011250637 HC RX REV CODE- 250/637: Performed by: INTERNAL MEDICINE

## 2018-08-29 PROCEDURE — 74011000250 HC RX REV CODE- 250: Performed by: INTERNAL MEDICINE

## 2018-08-29 PROCEDURE — 77030013169 SET IV BLD ICUM -A

## 2018-08-29 PROCEDURE — 84550 ASSAY OF BLOOD/URIC ACID: CPT | Performed by: INTERNAL MEDICINE

## 2018-08-29 PROCEDURE — 82962 GLUCOSE BLOOD TEST: CPT

## 2018-08-29 PROCEDURE — 85652 RBC SED RATE AUTOMATED: CPT | Performed by: INTERNAL MEDICINE

## 2018-08-29 PROCEDURE — 36415 COLL VENOUS BLD VENIPUNCTURE: CPT | Performed by: INTERNAL MEDICINE

## 2018-08-29 PROCEDURE — 80053 COMPREHEN METABOLIC PANEL: CPT | Performed by: INTERNAL MEDICINE

## 2018-08-29 PROCEDURE — P9016 RBC LEUKOCYTES REDUCED: HCPCS | Performed by: EMERGENCY MEDICINE

## 2018-08-29 PROCEDURE — 85025 COMPLETE CBC W/AUTO DIFF WBC: CPT | Performed by: INTERNAL MEDICINE

## 2018-08-29 PROCEDURE — 65270000029 HC RM PRIVATE

## 2018-08-29 PROCEDURE — 36430 TRANSFUSION BLD/BLD COMPNT: CPT

## 2018-08-29 PROCEDURE — 82272 OCCULT BLD FECES 1-3 TESTS: CPT | Performed by: EMERGENCY MEDICINE

## 2018-08-29 PROCEDURE — 85018 HEMOGLOBIN: CPT | Performed by: EMERGENCY MEDICINE

## 2018-08-29 PROCEDURE — 86900 BLOOD TYPING SEROLOGIC ABO: CPT | Performed by: EMERGENCY MEDICINE

## 2018-08-29 PROCEDURE — 96365 THER/PROPH/DIAG IV INF INIT: CPT

## 2018-08-29 PROCEDURE — 96375 TX/PRO/DX INJ NEW DRUG ADDON: CPT

## 2018-08-29 PROCEDURE — 74011636637 HC RX REV CODE- 636/637: Performed by: INTERNAL MEDICINE

## 2018-08-29 PROCEDURE — 83036 HEMOGLOBIN GLYCOSYLATED A1C: CPT | Performed by: EMERGENCY MEDICINE

## 2018-08-29 PROCEDURE — 99284 EMERGENCY DEPT VISIT MOD MDM: CPT

## 2018-08-29 PROCEDURE — 85610 PROTHROMBIN TIME: CPT | Performed by: EMERGENCY MEDICINE

## 2018-08-29 PROCEDURE — 86920 COMPATIBILITY TEST SPIN: CPT | Performed by: EMERGENCY MEDICINE

## 2018-08-29 PROCEDURE — 30233N1 TRANSFUSION OF NONAUTOLOGOUS RED BLOOD CELLS INTO PERIPHERAL VEIN, PERCUTANEOUS APPROACH: ICD-10-PCS | Performed by: EMERGENCY MEDICINE

## 2018-08-29 PROCEDURE — P9040 RBC LEUKOREDUCED IRRADIATED: HCPCS | Performed by: EMERGENCY MEDICINE

## 2018-08-29 PROCEDURE — 36415 COLL VENOUS BLD VENIPUNCTURE: CPT | Performed by: EMERGENCY MEDICINE

## 2018-08-29 PROCEDURE — 74011250636 HC RX REV CODE- 250/636: Performed by: INTERNAL MEDICINE

## 2018-08-29 PROCEDURE — 96415 CHEMO IV INFUSION ADDL HR: CPT

## 2018-08-29 RX ORDER — SODIUM CHLORIDE 9 MG/ML
250 INJECTION, SOLUTION INTRAVENOUS AS NEEDED
Status: DISCONTINUED | OUTPATIENT
Start: 2018-08-29 | End: 2018-08-30 | Stop reason: HOSPADM

## 2018-08-29 RX ORDER — SODIUM CHLORIDE 0.9 % (FLUSH) 0.9 %
5-10 SYRINGE (ML) INJECTION AS NEEDED
Status: DISCONTINUED | OUTPATIENT
Start: 2018-08-29 | End: 2018-08-30 | Stop reason: HOSPADM

## 2018-08-29 RX ORDER — INSULIN GLARGINE 100 [IU]/ML
10 INJECTION, SOLUTION SUBCUTANEOUS DAILY
Status: DISCONTINUED | OUTPATIENT
Start: 2018-08-30 | End: 2018-08-30 | Stop reason: HOSPADM

## 2018-08-29 RX ORDER — FUROSEMIDE 40 MG/1
40 TABLET ORAL DAILY
Status: DISCONTINUED | OUTPATIENT
Start: 2018-08-29 | End: 2018-08-30 | Stop reason: HOSPADM

## 2018-08-29 RX ORDER — INSULIN LISPRO 100 [IU]/ML
INJECTION, SOLUTION INTRAVENOUS; SUBCUTANEOUS
Status: DISCONTINUED | OUTPATIENT
Start: 2018-08-29 | End: 2018-08-30 | Stop reason: HOSPADM

## 2018-08-29 RX ORDER — LEFLUNOMIDE 20 MG/1
20 TABLET ORAL DAILY
COMMUNITY
End: 2018-11-15 | Stop reason: SDUPTHER

## 2018-08-29 RX ORDER — COLCHICINE 0.6 MG/1
0.6 TABLET ORAL
COMMUNITY
End: 2018-12-14 | Stop reason: SDUPTHER

## 2018-08-29 RX ORDER — HYDRALAZINE HYDROCHLORIDE 25 MG/1
25 TABLET, FILM COATED ORAL 3 TIMES DAILY
Status: DISCONTINUED | OUTPATIENT
Start: 2018-08-29 | End: 2018-08-30 | Stop reason: HOSPADM

## 2018-08-29 RX ORDER — ONDANSETRON 2 MG/ML
4 INJECTION INTRAMUSCULAR; INTRAVENOUS
Status: DISCONTINUED | OUTPATIENT
Start: 2018-08-29 | End: 2018-08-30 | Stop reason: HOSPADM

## 2018-08-29 RX ORDER — DIGOXIN 125 MCG
0.12 TABLET ORAL EVERY OTHER DAY
Status: DISCONTINUED | OUTPATIENT
Start: 2018-08-29 | End: 2018-08-29

## 2018-08-29 RX ORDER — DIGOXIN 125 MCG
0.12 TABLET ORAL
COMMUNITY
End: 2019-02-22

## 2018-08-29 RX ORDER — DIGOXIN 125 MCG
0.12 TABLET ORAL DAILY
Status: DISCONTINUED | OUTPATIENT
Start: 2018-08-30 | End: 2018-08-29

## 2018-08-29 RX ORDER — SODIUM CHLORIDE 0.9 % (FLUSH) 0.9 %
5-10 SYRINGE (ML) INJECTION EVERY 8 HOURS
Status: DISCONTINUED | OUTPATIENT
Start: 2018-08-29 | End: 2018-08-30 | Stop reason: HOSPADM

## 2018-08-29 RX ORDER — DIGOXIN 125 MCG
0.12 TABLET ORAL EVERY OTHER DAY
Status: DISCONTINUED | OUTPATIENT
Start: 2018-08-30 | End: 2018-08-30 | Stop reason: HOSPADM

## 2018-08-29 RX ORDER — INSULIN GLARGINE 100 [IU]/ML
14 INJECTION, SOLUTION SUBCUTANEOUS
COMMUNITY
End: 2018-09-25 | Stop reason: SDUPTHER

## 2018-08-29 RX ORDER — ACETAMINOPHEN 325 MG/1
650 TABLET ORAL
Status: DISCONTINUED | OUTPATIENT
Start: 2018-08-29 | End: 2018-08-30 | Stop reason: HOSPADM

## 2018-08-29 RX ORDER — MAGNESIUM SULFATE 100 %
4 CRYSTALS MISCELLANEOUS AS NEEDED
Status: DISCONTINUED | OUTPATIENT
Start: 2018-08-29 | End: 2018-08-30 | Stop reason: HOSPADM

## 2018-08-29 RX ORDER — PRAVASTATIN SODIUM 20 MG/1
20 TABLET ORAL
Status: DISCONTINUED | OUTPATIENT
Start: 2018-08-29 | End: 2018-08-30 | Stop reason: HOSPADM

## 2018-08-29 RX ORDER — CARVEDILOL 3.12 MG/1
3.12 TABLET ORAL 2 TIMES DAILY WITH MEALS
Status: DISCONTINUED | OUTPATIENT
Start: 2018-08-29 | End: 2018-08-30 | Stop reason: HOSPADM

## 2018-08-29 RX ORDER — LEVOTHYROXINE SODIUM 25 UG/1
25 TABLET ORAL
COMMUNITY
End: 2018-08-29

## 2018-08-29 RX ORDER — LOPERAMIDE HYDROCHLORIDE 2 MG/1
2 CAPSULE ORAL
Status: ON HOLD | COMMUNITY
End: 2019-02-05

## 2018-08-29 RX ORDER — DEXTROSE 50 % IN WATER (D50W) INTRAVENOUS SYRINGE
25-50 AS NEEDED
Status: DISCONTINUED | OUTPATIENT
Start: 2018-08-29 | End: 2018-08-30 | Stop reason: HOSPADM

## 2018-08-29 RX ORDER — COLCHICINE 0.6 MG/1
0.6 TABLET ORAL 2 TIMES DAILY
Status: DISCONTINUED | OUTPATIENT
Start: 2018-08-29 | End: 2018-08-29

## 2018-08-29 RX ADMIN — HYDRALAZINE HYDROCHLORIDE 25 MG: 25 TABLET, FILM COATED ORAL at 21:36

## 2018-08-29 RX ADMIN — DIPHENHYDRAMINE HYDROCHLORIDE 25 MG: 25 CAPSULE ORAL at 10:27

## 2018-08-29 RX ADMIN — INSULIN LISPRO 3 UNITS: 100 INJECTION, SOLUTION INTRAVENOUS; SUBCUTANEOUS at 16:54

## 2018-08-29 RX ADMIN — PEGLOTICASE 8 MG: 8 INJECTION, SOLUTION INTRAVENOUS at 10:54

## 2018-08-29 RX ADMIN — HYDRALAZINE HYDROCHLORIDE 25 MG: 25 TABLET, FILM COATED ORAL at 16:44

## 2018-08-29 RX ADMIN — FUROSEMIDE 40 MG: 40 TABLET ORAL at 16:44

## 2018-08-29 RX ADMIN — INSULIN LISPRO 3 UNITS: 100 INJECTION, SOLUTION INTRAVENOUS; SUBCUTANEOUS at 22:52

## 2018-08-29 RX ADMIN — ACETAMINOPHEN 650 MG: 325 TABLET ORAL at 10:27

## 2018-08-29 RX ADMIN — PRAVASTATIN SODIUM 20 MG: 20 TABLET ORAL at 21:36

## 2018-08-29 RX ADMIN — METHYLPREDNISOLONE SODIUM SUCCINATE 125 MG: 125 INJECTION, POWDER, FOR SOLUTION INTRAMUSCULAR; INTRAVENOUS at 10:27

## 2018-08-29 RX ADMIN — CARVEDILOL 3.12 MG: 6.25 TABLET, FILM COATED ORAL at 16:44

## 2018-08-29 RX ADMIN — FAMOTIDINE 20 MG: 10 INJECTION, SOLUTION INTRAVENOUS at 10:28

## 2018-08-29 RX ADMIN — Medication 10 ML: at 21:39

## 2018-08-29 RX ADMIN — Medication 10 ML: at 16:45

## 2018-08-29 NOTE — ED NOTES
TRANSFER - OUT REPORT: 
 
Verbal report given to DANIELLE Childers(name) on Gordon Denson  being transferred to [facility](unit) for routine progression of care Report consisted of patients Situation, Background, Assessment and  
Recommendations(SBAR). Information from the following report(s) SBAR, ED Summary, [facility] and Recent Results was reviewed with the receiving nurse. Lines:  
Peripheral IV 08/29/18 Left Forearm (Active) Site Assessment Clean, dry, & intact 8/29/2018 10:48 AM  
Phlebitis Assessment 0 8/29/2018 10:48 AM  
Infiltration Assessment 0 8/29/2018 10:48 AM  
Dressing Status New 8/29/2018 10:48 AM  
Dressing Type Transparent 8/29/2018 10:48 AM  
   
Peripheral IV 08/29/18 Right Forearm (Active) Site Assessment Clean, dry, & intact 8/29/2018  1:58 PM  
Phlebitis Assessment 0 8/29/2018  1:58 PM  
Infiltration Assessment 0 8/29/2018  1:58 PM  
Dressing Status Clean, dry, & intact 8/29/2018  1:58 PM  
Dressing Type Transparent;Tape 8/29/2018  1:58 PM  
Hub Color/Line Status Pink;Flushed;Patent 8/29/2018  1:58 PM  
Action Taken Blood drawn 8/29/2018  1:58 PM  
Alcohol Cap Used Yes 8/29/2018  1:58 PM  
  
 
Opportunity for questions and clarification was provided. Patient transported with: 
 Registered Nurse

## 2018-08-29 NOTE — PROGRESS NOTES
[facility]: MED RECONCILIATION Comments/Recommendations:  
 
Eliquis and Leflunomide 
- stopped taking 8/25/18 (sat), per pt, when started to experience some bleeding 
- reviewed signs and symptoms of bleeding, including ICH-like headache, vision, hearing changes. Stated to call MD or come to emergency dpt when any of this occurs 
- also reviewed OTC meds that can additively \"thin the blood\" like vit e, fish oil Medications added: · Artificial tears · Imodium Medications adjusted: · Omeprazole- previously daily prn · Digoxin- previously 0.125mg daily · Colchicin- previously 0.6mg BID Information obtained from: Patient (good historian, alert, oriented), RxQuery, previous medical records Allergies: Novocain [procaine] Prior to Admission Medications:  
 
Medication Documentation Review Audit Reviewed by Jim Duffy (Pharmacist) on 08/29/18 at 5150 Medication Sig Documenting Provider Last Dose Status Taking?  
 
 carvedilol (COREG) 3.125 mg tablet Take 1 Tab by mouth two (2) times daily (with meals). Ayesha Rahman NP 8/28/2018 PM Active Yes  
 colchicine 0.6 mg tablet Take 0.6 mg by mouth daily as needed. Historical Provider  Active Yes  
 dextran 70-hypromellose (ARTIFICIAL TEARS,[id number],) ophthalmic solution Administer 1 Drop to both eyes as needed. Historical Provider  Active Yes  
 digoxin (LANOXIN) 0.125 mg tablet Take 0.125 mg by mouth every other day. Historical Provider 8/28/2018 AM Active Yes ELIQUIS 5 mg tablet TAKE 1 TABLET BY MOUTH TWICE A DAY Historical Provider 8/25/2018 AM Active Yes Med Note (Jessica Ellington. Wed Aug 29, 2018  4:29 PM): Last took Sat AM (8/25/18) furosemide (LASIX) 40 mg tablet Take 40 mg by mouth daily. Historical Provider 8/28/2018 AM Active Yes  Med Note Laverne January Fri Feb 23, 2018 10:47 AM):   
  
 hydrALAZINE (APRESOLINE) 25 mg tablet Take 25 mg by mouth three (3) times daily. Historical Provider 8/29/2018 AM Active Yes Med Note (Natasha Higgins   Fri Feb 23, 2018 10:47 AM):   
  
 insulin glargine (LANTUS,BASAGLAR) 100 unit/mL (3 mL) inpn 14 Units by SubCUTAneous route nightly. Historical Provider 8/28/2018 PM Active Yes  
 leflunomide (ARAVA) 20 mg tablet Take 20 mg by mouth daily. Historical Provider 8/25/2018 AM Active Yes Med Note (Trista Tinoco. Wed Aug 29, 2018  4:31 PM): Stopped on Sat (8/25/18) in cause involved w/ current issue 
  
 loperamide (IMODIUM) 2 mg capsule Take 2 mg by mouth four (4) times daily as needed for Diarrhea. Historical Provider  Active Yes Med Note (Trista Tinoco. Wed Aug 29, 2018  4:34 PM): May develop diarrhea w/ leflunomide 
  
 multivitamin (ONE A DAY) tablet Take 1 Tab by mouth daily. Historical Provider 8/28/2018 AM Active Yes  
 omeprazole (PRILOSEC) 20 mg capsule Take 20 mg by mouth daily. Historical Provider 8/28/2018 AM Active Yes  
 pravastatin (PRAVACHOL) 20 mg tablet Take 1 Tab by mouth nightly. Juan Tinoco NP 8/28/2018 PM Active Yes Thank you, 
Maru Parker, Pharm. D.

## 2018-08-29 NOTE — PROGRESS NOTES
8/29/2018 
3:07 PM 
Case management note Met with patient to discuss discharge planning. Confirmed demographics. Patient lives in 2 story home with 6 steps to enter and 20 within. Patient drives, works and performs ADL's independently. He has no equipment and never used home health. RX @ [facility]. Patient follows Dr. Porter Beltran for medical management. NN notified Reason for Admission: RRAT Score:     37 Resources/supports as identified by patient/family:   Family support Top Challenges facing patient (as identified by patient/family and CM): Finances/Medication cost?      No issues with cost 
           
Transportation? Patient drives Support system or lack thereof? Family support Living arrangements? Live with spouse Self-care/ADLs/Cognition? Can perform self care/ good health cognition Current Advanced Directive/Advance Care Plan:  Not discussed Plan for utilizing home health:    Not on this admission Likelihood of readmission: high/red Transition of Care Plan:         Home with out patient follow up Care Management Interventions PCP Verified by CM: Yes Mode of Transport at Discharge: Self Transition of Care Consult (CM Consult): Discharge Planning Current Support Network: Lives with Spouse Plan discussed with Pt/Family/Caregiver: Yes Discharge Location Discharge Placement: Unable to determine at this time Idalia Quinteros, [address]

## 2018-08-29 NOTE — TELEPHONE ENCOUNTER
Returned call to [facility] and was unable to reach Stewart Rosa. Dr. Lonnie Motta stated that he already text her and answered her question regarding the labs.

## 2018-08-29 NOTE — PROGRESS NOTES
Problem: Chemotherapy Treatment Goal: *Chemotherapy regimen followed Outcome: Progressing Towards Goal 
Pt receiving Mari Silverio

## 2018-08-29 NOTE — ED PROVIDER NOTES
HPI Comments: 81 y.o. male with extensive past medical history, please see list, significant for A-fib, CAD, CHF, cardiomyopathy, myocarditis, HTN, DM, TIA, anemia, who presents ambulatory to the ED with cc of abnormal labs and rectal bleeding. Pt reports he had 2 recent colonoscopies, during which 8 and 6 polyps were removed respectively. Per pt, the last colonoscopy was 2 weeks ago and involved the placement of hemostatic clips to prevent GI bleeding after the removal of \"some large\" polyps. Subsequently, he reports he woke 3 days ago \"in a little pool of blood. \" Today, pt states he was referred to the ED during infusion appointment at the [facility] for low Hgb of 6.2. There are no other acute medical concerns at this time. Social Hx: Never Tobacco use, yes EtOH use (2 drinks/week), denies Illicit Drug use PCP: Francisco Knight MD 
GI: Dr. Robledo Olegario Note written by Macho Corona, as dictated by Julianna Mackenzie MD 2:21 PM 
 
 
The history is provided by the patient. No  was used. Past Medical History:  
Diagnosis Date  Acute encephalopathy 1/14/2016  Anemia 5/11/2017  Atrial fibrillation ([address]  Painter esophagus  CAD (coronary artery disease)  Diabetes ([address]  Heart failure ([address] Cardiomyopathy, myocarditis  HTN, goal below 140/90  Retinopathy, diabetic, bilateral ([address] 3/11/2016  Stenosis of right carotid artery without cerebral infarction 1/18/2016  TIA (transient ischemic attack) 1/14/2016  Vitamin D deficiency 3/1/2016 Nephrology ordered and follow 2/22/16 Past Surgical History:  
Procedure Laterality Date  COLONOSCOPY N/A 6/23/2016 COLONOSCOPY performed by Dioni Aldana MD at [address] [place] [address] Sw ENDOSCOPY  COLONOSCOPY N/A 8/6/2018 COLONOSCOPY performed by Dioni Aldana MD at [address] [place] [address] HX ENDOSCOPY  06/27/2016 CAPSULE; normal small bowel  HX ENDOSCOPY  06/23/2016  
 upper endoscopy  HX UROLOGICAL  2013 Family History:  
Problem Relation Age of Onset  Heart Failure Mother  Diabetes Father  No Known Problems Sister  Other Daughter Roberto Bumpers  Other Daughter Roberto Bumpers  Other Daughter Roberto Bumpers Social History Social History  Marital status:  Spouse name: N/A  
 Number of children: N/A  
 Years of education: N/A Occupational History  Not on file. Social History Main Topics  Smoking status: Never Smoker  Smokeless tobacco: Never Used  Alcohol use Yes Comment: 2 drinks/week, never a heavy drinker  Drug use: No  
 Sexual activity: Not Currently Other Topics Concern  Not on file Social History Narrative  Generally active, works out in the yard often ALLERGIES: Novocain [procaine] Review of Systems Constitutional: Negative for chills and fever. HENT: Negative for ear pain and sore throat. Eyes: Negative for photophobia and pain. Respiratory: Negative for chest tightness and shortness of breath. Cardiovascular: Negative for chest pain and leg swelling. Gastrointestinal: Positive for anal bleeding. Negative for abdominal pain, nausea and vomiting. Genitourinary: Negative for dysuria and flank pain. Musculoskeletal: Negative for back pain and neck pain. Skin: Negative for rash and wound. Neurological: Negative for dizziness, light-headedness and headaches. Vitals:  
 08/29/18 1343 BP: 138/60 Pulse: 89 Resp: 16 Temp: 98 °F (36.7 °C) SpO2: 100% Weight: 77.1 kg (170 lb) Height: 5' 10\" (1.778 m) Physical Exam  
Constitutional: He is oriented to person, place, and time. He appears well-developed and well-nourished. No distress. HENT:  
Head: Normocephalic and atraumatic. Eyes: Conjunctivae and EOM are normal.  
Neck: Normal range of motion. Neck supple.   
Cardiovascular: Normal rate, regular rhythm, normal heart sounds and intact distal pulses. No murmur heard. Pulmonary/Chest: Effort normal and breath sounds normal. No stridor. No respiratory distress. He has no wheezes. Abdominal: Soft. Bowel sounds are normal. There is no tenderness. Genitourinary: Rectal exam shows guaiac positive stool. Musculoskeletal: Normal range of motion. He exhibits no edema or tenderness. Neurological: He is alert and oriented to person, place, and time. No cranial nerve deficit. Skin: Skin is warm and dry. He is not diaphoretic. There is pallor. Psychiatric: He has a normal mood and affect. Nursing note and vitals reviewed. MDM Number of Diagnoses or Management Options Gastrointestinal hemorrhage, unspecified gastrointestinal hemorrhage type:  
Diagnosis management comments: Patient with anemia and likely gi bleed - admit for transfusions and further care Amount and/or Complexity of Data Reviewed Clinical lab tests: reviewed and ordered ED Course Procedures CONSULT NOTE: 
3:15 PM Heath Seip, MD spoke with Dr. Karolyn Walker, Consult for Hospitalist.  Discussed available diagnostic tests and clinical findings. Dr. Karolyn Walker will admit. VITALS:  
Patient Vitals for the past 8 hrs: 
 Temp Pulse Resp BP SpO2  
08/29/18 1820 98.5 °F (36.9 °C) 96 16 168/74 100 % 08/29/18 1720 98.4 °F (36.9 °C) 89 16 161/80 -  
08/29/18 1650 98.4 °F (36.9 °C) - 16 (!) 160/96 99 % 08/29/18 1635 97.8 °F (36.6 °C) (!) 103 16 164/89 98 % 08/29/18 1615 98.2 °F (36.8 °C) 87 16 180/86 100 % 08/29/18 1343 98 °F (36.7 °C) 89 16 138/60 100 % Recent Results (from the past 24 hour(s)) CBC WITH AUTOMATED DIFF Collection Time: 08/29/18 10:08 AM  
Result Value Ref Range WBC 4.8 4.1 - 11.1 K/uL  
 RBC 2.19 (L) 4.10 - 5.70 M/uL HGB 6.2 (L) 12.1 - 17.0 g/dL HCT 21.0 (L) 36.6 - 50.3 % MCV 95.9 80.0 - 99.0 FL  
 MCH 28.3 26.0 - 34.0 PG  
 MCHC 29.5 (L) 30.0 - 36.5 g/dL RDW 13.8 11.5 - 14.5 % PLATELET 158 (L) 990 - 400 K/uL MPV 10.0 8.9 - 12.9 FL  
 NRBC 0.0 0  WBC ABSOLUTE NRBC 0.00 0.00 - 0.01 K/uL NEUTROPHILS 79 (H) 32 - 75 % LYMPHOCYTES 10 (L) 12 - 49 % MONOCYTES 8 5 - 13 % EOSINOPHILS 2 0 - 7 % BASOPHILS 1 0 - 1 % IMMATURE GRANULOCYTES 0 0.0 - 0.5 % ABS. NEUTROPHILS 3.8 1.8 - 8.0 K/UL  
 ABS. LYMPHOCYTES 0.5 (L) 0.8 - 3.5 K/UL  
 ABS. MONOCYTES 0.4 0.0 - 1.0 K/UL  
 ABS. EOSINOPHILS 0.1 0.0 - 0.4 K/UL  
 ABS. BASOPHILS 0.0 0.0 - 0.1 K/UL  
 ABS. IMM. GRANS. 0.0 0.00 - 0.04 K/UL  
 DF SMEAR SCANNED    
 RBC COMMENTS HYPOCHROMIA 1+ METABOLIC PANEL, COMPREHENSIVE Collection Time: 08/29/18 10:08 AM  
Result Value Ref Range Sodium 137 136 - 145 mmol/L Potassium 3.9 3.5 - 5.1 mmol/L Chloride 102 97 - 108 mmol/L  
 CO2 27 21 - 32 mmol/L Anion gap 8 5 - 15 mmol/L Glucose 140 (H) 65 - 100 mg/dL BUN 48 (H) 6 - 20 MG/DL Creatinine 1.84 (H) 0.70 - 1.30 MG/DL  
 BUN/Creatinine ratio 26 (H) 12 - 20 GFR est AA 44 (L) >60 ml/min/1.73m2 GFR est non-AA 37 (L) >60 ml/min/1.73m2 Calcium 8.2 (L) 8.5 - 10.1 MG/DL Bilirubin, total 0.4 0.2 - 1.0 MG/DL  
 ALT (SGPT) 26 12 - 78 U/L  
 AST (SGOT) 20 15 - 37 U/L Alk. phosphatase 61 45 - 117 U/L Protein, total 6.6 6.4 - 8.2 g/dL Albumin 3.1 (L) 3.5 - 5.0 g/dL Globulin 3.5 2.0 - 4.0 g/dL A-G Ratio 0.9 (L) 1.1 - 2.2 SED RATE (ESR) Collection Time: 08/29/18 10:08 AM  
Result Value Ref Range Sed rate, automated 65 (H) 0 - 20 mm/hr URIC ACID Collection Time: 08/29/18 10:08 AM  
Result Value Ref Range Uric acid 1.5 (L) 3.5 - 7.2 MG/DL PROTHROMBIN TIME + INR Collection Time: 08/29/18  1:57 PM  
Result Value Ref Range INR 1.1 0.9 - 1.1 Prothrombin time 11.4 (H) 9.0 - 11.1 sec SAMPLES BEING HELD Collection Time: 08/29/18  1:57 PM  
Result Value Ref Range SAMPLES BEING HELD 1SST, 1PST COMMENT Add-on orders for these samples will be processed based on acceptable specimen integrity and analyte stability, which may vary by analyte. HGB & HCT Collection Time: 08/29/18  1:57 PM  
Result Value Ref Range HGB 6.1 (L) 12.1 - 17.0 g/dL HCT 19.8 (L) 36.6 - 50.3 % TYPE + CROSSMATCH Collection Time: 08/29/18  1:57 PM  
Result Value Ref Range Crossmatch Expiration 09/01/2018 ABO/Rh(D) A NEGATIVE Antibody screen NEG Unit number [id number] Blood component type RC LRIR Unit division 00 Status of unit ISSUED Crossmatch result Compatible OCCULT BLOOD, STOOL Collection Time: 08/29/18  4:22 PM  
Result Value Ref Range Occult blood, stool POSITIVE (A) NEG    
GLUCOSE, POC Collection Time: 08/29/18  4:49 PM  
Result Value Ref Range Glucose (POC) 200 (H) 65 - 100 mg/dL  Performed by Irina Aid

## 2018-08-29 NOTE — PROGRESS NOTES
[facility] VISIT NOTE 
 
7420 Pt arrived at [place] ambulatory and in no distress for Krystexxa. Assessment completed, pt c/oleft knee pain when getting up. PIV LFA 24 G Positive blood return noted and labs drawn. Medications received: 
Pepcid IVP Solumedrol IVP Tylenol PO Bendaryl PO 
Krystexxa Tolerated treatment well, no adverse reaction noted. Pt's HGB 6.2 notified Dr Devonte Moeller and advised patient to go to ED for blood transfusion today. Pt left [facility] with IV still intact. Patient Vitals for the past 12 hrs: 
 Temp Pulse Resp BP SpO2  
08/29/18 1317 98.7 °F (37.1 °C) - 18 173/79 99 % 08/29/18 0959 98.3 °F (36.8 °C) 82 18 167/75 98 % Recent Results (from the past 12 hour(s)) CBC WITH AUTOMATED DIFF Collection Time: 08/29/18 10:08 AM  
Result Value Ref Range WBC 4.8 4.1 - 11.1 K/uL  
 RBC 2.19 (L) 4.10 - 5.70 M/uL HGB 6.2 (L) 12.1 - 17.0 g/dL HCT 21.0 (L) 36.6 - 50.3 % MCV 95.9 80.0 - 99.0 FL  
 MCH 28.3 26.0 - 34.0 PG  
 MCHC 29.5 (L) 30.0 - 36.5 g/dL  
 RDW 13.8 11.5 - 14.5 % PLATELET 049 (L) 315 - 400 K/uL MPV 10.0 8.9 - 12.9 FL  
 NRBC 0.0 0  WBC ABSOLUTE NRBC 0.00 0.00 - 0.01 K/uL NEUTROPHILS 79 (H) 32 - 75 % LYMPHOCYTES 10 (L) 12 - 49 % MONOCYTES 8 5 - 13 % EOSINOPHILS 2 0 - 7 % BASOPHILS 1 0 - 1 % IMMATURE GRANULOCYTES 0 0.0 - 0.5 % ABS. NEUTROPHILS 3.8 1.8 - 8.0 K/UL  
 ABS. LYMPHOCYTES 0.5 (L) 0.8 - 3.5 K/UL  
 ABS. MONOCYTES 0.4 0.0 - 1.0 K/UL  
 ABS. EOSINOPHILS 0.1 0.0 - 0.4 K/UL  
 ABS. BASOPHILS 0.0 0.0 - 0.1 K/UL  
 ABS. IMM. GRANS. 0.0 0.00 - 0.04 K/UL  
 DF SMEAR SCANNED    
 RBC COMMENTS HYPOCHROMIA 1+ METABOLIC PANEL, COMPREHENSIVE Collection Time: 08/29/18 10:08 AM  
Result Value Ref Range Sodium 137 136 - 145 mmol/L Potassium 3.9 3.5 - 5.1 mmol/L Chloride 102 97 - 108 mmol/L  
 CO2 27 21 - 32 mmol/L Anion gap 8 5 - 15 mmol/L Glucose 140 (H) 65 - 100 mg/dL  BUN 48 (H) 6 - 20 MG/DL  
 Creatinine 1.84 (H) 0.70 - 1.30 MG/DL  
 BUN/Creatinine ratio 26 (H) 12 - 20 GFR est AA 44 (L) >60 ml/min/1.73m2 GFR est non-AA 37 (L) >60 ml/min/1.73m2 Calcium 8.2 (L) 8.5 - 10.1 MG/DL Bilirubin, total 0.4 0.2 - 1.0 MG/DL  
 ALT (SGPT) 26 12 - 78 U/L  
 AST (SGOT) 20 15 - 37 U/L Alk. phosphatase 61 45 - 117 U/L Protein, total 6.6 6.4 - 8.2 g/dL Albumin 3.1 (L) 3.5 - 5.0 g/dL Globulin 3.5 2.0 - 4.0 g/dL A-G Ratio 0.9 (L) 1.1 - 2.2 SED RATE (ESR) Collection Time: 08/29/18 10:08 AM  
Result Value Ref Range Sed rate, automated 65 (H) 0 - 20 mm/hr URIC ACID Collection Time: 08/29/18 10:08 AM  
Result Value Ref Range Uric acid 1.5 (L) 3.5 - 7.2 MG/DL  
 
1315 D/C'd from [place] ambulatory and in no distress accompanied by self. Next appointment is 9/10/18 at 1000.

## 2018-08-29 NOTE — ROUTINE PROCESS
Bedside shift change report given to [facility]  (oncoming nurse) by Garrett Robison (offgoing nurse). Report included the following information SBAR and MAR.

## 2018-08-29 NOTE — ED NOTES
1:41 PM 
I have evaluated the patient as the Provider in Triage. I have reviewed His vital signs and the triage nurse assessment. I have talked with the patient and any available family and advised that I am the provider in triage and have ordered the appropriate study to initiate their work up based on the clinical presentation during my assessment. I have advised that the patient will be accommodated in the Main ED as soon as possible. I have also requested to contact the triage nurse or myself immediately if the patient experiences any changes in their condition during this brief waiting period. Edgardo Acevedo, DO Has low hgb today - was told by infusion center. Had a GI bleed over weekend. Reviewed labs today. At 10 am, his hgb was 6. BUN, Cr up. No abd pain right now.

## 2018-08-29 NOTE — H&P
Admission History and Physical 
 
 
NAME:  Enrico Stephen :   1945 MRN:  [id number] PCP:  Moni Figueroa MD  
 
Date/Time:  2018 Assessment/Plan:   
  
GI bleed (2018): Recent EGD on  with Barretts esophagus. Colonoscopy done same day with polypectomy x 6. Has had bleeding since. Suspect bleeding from polypectomy site exacerbated by apixaban use. May have stopped now. -- GI consult 
 -- hold apixaban 
 
Acute blood loss anemia (2018): HGB  was 12.1 and on  was 10.6. HGB now 6.2 with symptoms. -- agree with transfusion 1 unit for now -- serial HGB and transfuse for HGB < 7 or persistent symptoms -- lasix with transfusion Paroxysmal atrial fibrillation ([facility] (2018): By exam, c/w sinus with pac. -- continue coreg, digoxin -- hold apixaban DM type 2 with renal complications: 
 -- lantus while here and back to [facility] 
 -- add SSI Essential hypertension (): 
 -- continue coreg and hydralazine CKD (chronic kidney disease) stage 4, GFR 15-29 ml/min ([facility]) (2018):  Creatinine stable from prior. 
 -- monitor creatinine Painter esophagus (): 
 -- continue PPI Hypothyroidism due to Hashimoto's thyroiditis (2018): Not on meds. Gout:  Has [facility] infusion. -- continue colchicine Subjective: CHIEF COMPLAINT:  bleeding HISTORY OF PRESENT ILLNESS:    
Mr. Saadia Lacy is a 81 y.o.  male who is admitted with GI bleed. Mr. Saadia Lacy presented to the Emergency Department today complaining of gi bleeding. Had egd and colonoscopy earlier this month. Since then has noted dark colored stools. Then this past  woke up in a \"pool of blood\" and had another larger bloody BM the next day. Patient stopped taking apixaban at that time and has not noted any recurrence of bleeding. Was in [facility] and had labs done showing anemia.   Reports decreased energy and lightheaded. Mild sob. No chest pain or abd pains. Past Medical History:  
Diagnosis Date  Acute encephalopathy 1/14/2016  Anemia 5/11/2017  Atrial fibrillation ([address]  Painter esophagus  CAD (coronary artery disease)  Diabetes ([address] [address]  Heart failure ([facility] [address] [facility] Cardiomyopathy, myocarditis  HTN, goal below 140/90  Retinopathy, diabetic, bilateral ([facility] [address] [address] 3/11/2016  Stenosis of right carotid artery without cerebral infarction 1/18/2016  TIA (transient ischemic attack) 1/14/2016  Vitamin D deficiency 3/1/2016 Nephrology ordered and follow 2/22/16 Past Surgical History:  
Procedure Laterality Date  COLONOSCOPY N/A 6/23/2016 COLONOSCOPY performed by Valentine John MD at [address] [place] [address] ENDOSCOPY  COLONOSCOPY N/A 8/6/2018 COLONOSCOPY performed by Valentine John MD at [address] HX ENDOSCOPY  06/27/2016 CAPSULE; normal small bowel  HX ENDOSCOPY  06/23/2016  
 upper endoscopy  HX UROLOGICAL  2013 Social History Substance Use Topics  Smoking status: Never Smoker  Smokeless tobacco: Never Used  Alcohol use Yes Comment: 2 drinks/week, never a heavy drinker Family History Problem Relation Age of Onset  Heart Failure Mother  Diabetes Father  No Known Problems Sister  Other Daughter Andrew Small  Other Daughter Andrew Small  Other Daughter Andrew Small Allergies Allergen Reactions  Novocain [Procaine] Other (comments)  
  tachycardia Prior to Admission medications Medication Sig Start Date End Date Taking? Authorizing Provider ELIQUIS 5 mg tablet TAKE 1 TABLET BY MOUTH TWICE A DAY 6/30/18   Historical Provider  
colchicine 0.6 mg tablet Take 1 Tab by mouth two (2) times a day.  8/13/18   Fariba Bernabe MD  
Insulin Needles, Disposable, (LEONARD PEN NEEDLE) 32 gauge x 5/32\" ndle Test fasting glucose daily; E11.8 8/6/18   Chris Franz MD  
 leflunomide (ARAVA) 20 mg tablet Take 1 Tab by mouth daily for 90 days. Take half tab daily for 7 days and then one tab if tolerated 7/13/18 10/11/18  Rosemarie Villarreal MD  
[facility] U-100 INSULIN 100 unit/mL (3 mL) inpn INJECT 14 UNITS BY SUBCUTANEOUS ROUTE DAILY. INDICATIONS: TYPE 2 DIABETES MELLITUS 5/28/18   Baldomero Lucas NP  
[facility] ULTRA TEST strip Test blood sugar 3 times daily 5/25/17   Steve Castelan MD  
digoxin (LANOXIN) 0.125 mg tablet Take 1 Tab by mouth daily. 8/25/16   Leva Waldo, DO  
furosemide (LASIX) 40 mg tablet 40 mg daily. 5/10/16   Historical Provider  
hydrALAZINE (APRESOLINE) 25 mg tablet 25 mg three (3) times daily. 5/20/16   Historical Provider  
omeprazole (PRILOSEC) 20 mg capsule Take 20 mg by mouth daily as needed. Historical Provider  
multivitamin (ONE A DAY) tablet Take 1 Tab by mouth daily. Historical Provider  
glucose blood VI test strips (BLOOD GLUCOSE TEST) strip Per patient insurance and meter requirements. Testing 4 times per day. 2/26/16   Leva Waldo, DO  
pravastatin (PRAVACHOL) 20 mg tablet Take 1 Tab by mouth nightly. 1/18/16   Gerardigonzalez Diaz NP  
carvedilol (COREG) 3.125 mg tablet Take 1 Tab by mouth two (2) times daily (with meals). 1/18/16   Gerardine Bodily, NP Review of Systems: 
(bold if positive, if negative) Gen:  fatigueEyes:  ENT:  CVS:  Pulm:  dyspneaGI:  melena :   
MS:  Skin:  Endo:   
Hem:  Renal:   
Neuro:    
 
 
  
Objective: VITALS:   
Vital signs reviewed; most recent are: 
 
Visit Vitals  /60 (BP 1 Location: Right arm, BP Patient Position: At rest)  Pulse 89  Temp 98 °F (36.7 °C)  Resp 16  
 Ht 5' 10\" (1.778 m)  Wt 77.1 kg (170 lb)  SpO2 100%  BMI 24.39 kg/m2 SpO2 Readings from Last 6 Encounters:  
08/29/18 100% 08/29/18 99% 08/16/18 94% 08/13/18 98% 08/06/18 97% 08/04/18 97% No intake or output data in the 24 hours ending 08/29/18 8356 Exam: Physical Exam: 
 
Gen:  Well-developed, well-nourished, in no acute distress HEENT:  Pale conjunctivae, PERRL, hearing intact to voice, moist mucous membranes Neck:  Supple Resp:  No accessory muscle use, clear breath sounds without wheezes rales or rhonchi 
Card:  No murmurs, normal S1, S2 without thrills, trace peripheral edema Abd:  Soft, non-tender, non-distended, normoactive bowel sounds are present Musc:  No cyanosis Skin:  No rashes or ulcers, skin turgor is good Neuro:  Cranial nerves 3-12 are grossly intact,  strength is 5/5 bilaterally, dorsi / plantarflexion strength is 5/5 bilaterally, follows commands appropriately Psych:  Alert with good insight. Oriented to person, place, and time Labs: 
 
Recent Labs  
   08/29/18 
 1357  08/29/18 
 1008 WBC   --   4.8 HGB  6.1*  6.2* HCT  19.8*  21.0*  
PLT   --   130* Recent Labs  
   08/29/18 
 1008 NA  137  
K  3.9 CL  102 CO2  27 GLU  140* BUN  48* CREA  1.84* CA  8.2* ALB  3.1* TBILI  0.4 SGOT  20 ALT  26 Lab Results Component Value Date/Time Glucose (POC) 95 08/06/2018 10:03 AM  
 Glucose (POC) 136 (H) 06/28/2016 04:14 PM  
 
No results for input(s): PH, PCO2, PO2, HCO3, FIO2 in the last 72 hours. Recent Labs  
   08/29/18 
 1357 INR  1.1 Medical records reviewed in preparation for this admission: Old medical records. Surrogate decision maker:  spouse Total time spent in care of this patient: 60 Minutes Care Plan discussed with: Patient Discussed:  Care Plan Prophylaxis:  SCD's 
 
Probable Disposition:  Home w/Family 
        
___________________________________________________ Attending Physician: Rupinder Whyte MD

## 2018-08-29 NOTE — TELEPHONE ENCOUNTER
Lyubov Reyes from [facility] infusion called to speak to nurse to report patients labs. Call back number is [phone].

## 2018-08-29 NOTE — ED TRIAGE NOTES
\"I have low Hemoglobin, I had an intestinal bleed over the weekend. \" Pt noted blood in stool and contacted [facility] to notify him. He has a history of Anemia but not due to blood loss, rectal bleeding is new. Bright red blood, patient unsure if he's still bleeding.

## 2018-08-29 NOTE — IP AVS SNAPSHOT
[address] 
[phone] Patient: Rafal De La Torre MRN: [id number] :1945 About your hospitalization You were admitted on:  2018 You last received care in the:  [facility] 5M1 MED SURG 1 You were discharged on:  2018 Why you were hospitalized Your primary diagnosis was:  Gi Bleed Your diagnoses also included:  Essential Hypertension, Painter Esophagus, Ckd (Chronic Kidney Disease) Stage 4, Gfr 15-29 Ml/Min (Hcc), Paroxysmal Atrial Fibrillation (Hcc), Hypothyroidism Due To Hashimoto's Thyroiditis, Acute Blood Loss Anemia Follow-up Information Follow up With Details Comments Contact Info Tiffanie Capellan MD Schedule an appointment as soon as possible for a visit in 1 week Repeat blood count at that visit prior to restarting Eliquis. [address] 
[phone] Your Scheduled Appointments Monday September 10, 2018 10:00 AM EDT Infusion with SS INF5 CH3 <4H  
[address] ([address]) 11 [facility] [phone]  
[phone] 2018 10:00 AM EDT Infusion with SS INF3 CH3 <4H  
[address] ([address]) 11 [facility] [address] [phone] Discharge Orders None A check julieta indicates which time of day the medication should be taken. My Medications CONTINUE taking these medications Instructions Each Dose to Equal  
 Morning Noon Evening Bedtime ARTIFICIAL TEARS([id number]) ophthalmic solution Generic drug:  dextran 70-hypromellose Your last dose was: Your next dose is:    
   
   
 Administer 1 Drop to both eyes as needed. 1 Drop  
    
   
   
   
  
 carvedilol 3.125 mg tablet Commonly known as:  Adriane [place]  
   
 Your last dose was: Your next dose is: Take 1 Tab by mouth two (2) times daily (with meals). 3.125 mg  
    
   
   
   
  
 colchicine 0.6 mg tablet Your last dose was: Your next dose is: Take 0.6 mg by mouth daily as needed. 0.6 mg  
    
   
   
   
  
 digoxin 0.125 mg tablet Commonly known as:  LANOXIN Your last dose was: Your next dose is: Take 0.125 mg by mouth every other day. 0.125 mg  
    
   
   
   
  
 furosemide 40 mg tablet Commonly known as:  LASIX Your last dose was: Your next dose is: Take 40 mg by mouth daily. 40 mg  
    
   
   
   
  
 hydrALAZINE 25 mg tablet Commonly known as:  APRESOLINE Your last dose was: Your next dose is: Take 25 mg by mouth three (3) times daily. 25 mg  
    
   
   
   
  
 insulin glargine 100 unit/mL (3 mL) Inpn Commonly known as:  Ed Share Your last dose was: Your next dose is:    
   
   
 14 Units by SubCUTAneous route nightly. 14 Units  
    
   
   
   
  
 leflunomide 20 mg tablet Commonly known as:  Lopez Larson Your last dose was: Your next dose is: Take 20 mg by mouth daily. 20 mg  
    
   
   
   
  
 loperamide 2 mg capsule Commonly known as:  IMODIUM Your last dose was: Your next dose is: Take 2 mg by mouth four (4) times daily as needed for Diarrhea.  
 2 mg  
    
   
   
   
  
 multivitamin tablet Commonly known as:  ONE A DAY Your last dose was: Your next dose is: Take 1 Tab by mouth daily. 1 Tab  
    
   
   
   
  
 omeprazole 20 mg capsule Commonly known as:  PRILOSEC Your last dose was: Your next dose is: Take 20 mg by mouth daily. 20 mg  
    
   
   
   
  
 pravastatin 20 mg tablet Commonly known as:  PRAVACHOL Your last dose was: Your next dose is: Take 1 Tab by mouth nightly. 20 mg  
    
   
   
   
  
  
STOP taking these medications ELIQUIS 5 mg tablet Generic drug:  apixaban Discharge Instructions HOSPITALIST DISCHARGE INSTRUCTIONS 
NAME: Elan Flores :  1945 MRN:  [id number] Date/Time:  2018 12:30 PM 
 
ADMIT DATE: 2018 DISCHARGE DATE: 2018 DISCHARGE DIAGNOSIS: 
Blood loss anemia MEDICATIONS: 
 
· It is important that you take the medication exactly as they are prescribed. · Keep your medication in the bottles provided by the pharmacist and keep a list of the medication names, dosages, and times to be taken in your wallet. · Do not take other medications without consulting your doctor. What to do at Home: 1. Check your blood pressure at home twice a day. Call your doctor for blood pressure greater than 150/90 or lower than 110/55 or if you have dizziness or lightheadedness. 2.  Check you blood sugar twice a day. Call your doctor for blood sugar persistently greater than 200 or lower than 80. 
3.  Do not take Eliquis for one week. Have your blood count rechecked with PCP next week prior to restarting it. Recommended diet:  Cardiac Diet and Diabetic Diet Recommended activity: Activity as tolerated If you experience any of the following symptoms then please call your primary care physician or return to the emergency room if you cannot get hold of your doctor: 
Fever, chills, nausea, vomiting, diarrhea, change in mentation, falling, bleeding, shortness of breath, dizziness, lightheadedness. Follow Up: Follow-up Information Follow up With Details Comments Contact Info Chris Franz MD Schedule an appointment as soon as possible for a visit in 1 week Repeat blood count at that visit prior to restarting Eliquis. [address] 
[phone] Information obtained by : 
 I understand that if any problems occur once I am at home I am to contact my physician. I understand and acknowledge receipt of the instructions indicated above. Physician's or R.N.'s Signature                                                                  Date/Time Patient or Representative Signature                                                          Date/Time 
 
 
[facility] Transitions of Care [facility] offers a voluntary care coordination program to provide high quality service and care to [facility] fee-for-service beneficiaries. [employer] was designed to help you enhance your health and well-being through the following services: ? Transitions of Care  support for individuals who are transitioning from one care setting to another (example: Hospital to home). ? Chronic and Complex Care Coordination  support for individuals and caregivers of those with serious or chronic illnesses or with more than one chronic (ongoing) condition and those who take a number of different medications. If you meet specific medical criteria, a Critical access hospital [address] may call you directly to coordinate your care with your primary care physician and your other care providers. For questions about the [facility] programs, please, contact your physicians office. For general questions or additional information about Accountable Care Organizations: 
Please visit www.medicare.gov/acos. html or call 1-800-MEDICARE (1-204.566.9817) TTY users should call 9-761.290.9509. [employer] Announcement We are excited to announce that we are making your provider's discharge notes available to you in [employer]. You will see these notes when they are completed and signed by the physician that discharged you from your recent hospital stay. If you have any questions or concerns about any information you see in [employer], please call the Health Information Department where you were seen or reach out to your Primary Care Provider for more information about your plan of care. Introducing [facility] HEALTH SERVICES! Dear Alex Bond: Thank you for requesting a [employer] account. Our records indicate that you already have an active [employer] account. You can access your account anytime at [email or web address] Did you know that you can access your hospital and ER discharge instructions at any time in [employer]? You can also review all of your test results from your hospital stay or ER visit. Additional Information If you have questions, please visit the Frequently Asked Questions section of the [employer] website at [email or web address] Remember, [employer] is NOT to be used for urgent needs. For medical emergencies, dial 911. Now available from your iPhone and Android! Introducing Alan Warren As a [facility] patient, I wanted to make you aware of our electronic visit tool called Alan Warren. [facility] 24/7 allows you to connect within minutes with a medical provider 24 hours a day, seven days a week via a mobile device or tablet or logging into a secure website from your computer. You can access Alan Warren from anywhere in the [place].  
 
A virtual visit might be right for you when you have a simple condition and feel like you just dont want to get out of bed, or cant get away from work for an appointment, when your regular [facility] provider is not available (evenings, weekends or holidays), or when youre out of town and need minor care. Electronic visits cost only $49 and if the [employer] 24/7 provider determines a prescription is needed to treat your condition, one can be electronically transmitted to a nearby pharmacy*. Please take a moment to enroll today if you have not already done so. The enrollment process is free and takes just a few minutes. To enroll, please download the [employer] 24/7 murtaza to your tablet or phone, or visit [email or web address] to enroll on your computer. And, as an [address] [place] [address] patient with a [employer] account, the results of your visits will be scanned into your electronic medical record and your primary care provider will be able to view the scanned results. We urge you to continue to see your regular [employer] provider for your ongoing medical care. And while your primary care provider may not be the one available when you seek a [employer] virtual visit, the peace of mind you get from getting a real diagnosis real time can be priceless. For more information on [employer], view our Frequently Asked Questions (FAQs) at [email or web address]. Sincerely, 
 
Valentino Milks, MD 
Chief Medical Officer [employer] *:  certain medications cannot be prescribed via [email or web address] Providers Seen During Your Hospitalization Provider Specialty Primary office phone Jewel Vuong DO Emergency Medicine [phone] Jaime Salas MD Emergency Medicine [phone] Clarisa Lemus MD Internal Medicine [phone] Your Primary Care Physician (PCP) Primary Care Physician Office Phone Office Fax Loistine Spatz [phone] You are allergic to the following No active allergies Recent Documentation Height Weight BMI Smoking Status 1.778 m 77.1 kg 24.39 kg/m2 Never Smoker Emergency Contacts Name Discharge Info Relation Home Work Mobile [address] Benedicto CAREGIVER [3] Spouse [3] [phone] Patient Belongings The following personal items are in your possession at time of discharge: 
  Dental Appliances: None  Visual Aid: Glasses, With patient Please provide this summary of care documentation to your next provider. Signatures-by signing, you are acknowledging that this After Visit Summary has been reviewed with you and you have received a copy. Patient Signature:  ____________________________________________________________ Date:  ____________________________________________________________  
  
[employer] Provider Signature:  ____________________________________________________________ Date:  ____________________________________________________________

## 2018-08-29 NOTE — TELEPHONE ENCOUNTER
Contacted patient one more time regarding a [employer]. Left a message to call back office.

## 2018-08-30 ENCOUNTER — ANESTHESIA (OUTPATIENT)
Dept: ENDOSCOPY | Age: 73
DRG: 920 | End: 2018-08-30
Payer: COMMERCIAL

## 2018-08-30 ENCOUNTER — ANESTHESIA EVENT (OUTPATIENT)
Dept: ENDOSCOPY | Age: 73
DRG: 920 | End: 2018-08-30
Payer: COMMERCIAL

## 2018-08-30 VITALS
SYSTOLIC BLOOD PRESSURE: 183 MMHG | WEIGHT: 170 LBS | DIASTOLIC BLOOD PRESSURE: 86 MMHG | OXYGEN SATURATION: 95 % | HEIGHT: 70 IN | RESPIRATION RATE: 17 BRPM | BODY MASS INDEX: 24.34 KG/M2 | HEART RATE: 82 BPM | TEMPERATURE: 97.4 F

## 2018-08-30 LAB
ANION GAP SERPL CALC-SCNC: 8 MMOL/L (ref 5–15)
BUN SERPL-MCNC: 40 MG/DL (ref 6–20)
BUN/CREAT SERPL: 23 (ref 12–20)
CALCIUM SERPL-MCNC: 8.6 MG/DL (ref 8.5–10.1)
CHLORIDE SERPL-SCNC: 103 MMOL/L (ref 97–108)
CO2 SERPL-SCNC: 26 MMOL/L (ref 21–32)
COMMENT, HOLDF: NORMAL
CREAT SERPL-MCNC: 1.71 MG/DL (ref 0.7–1.3)
ERYTHROCYTE [DISTWIDTH] IN BLOOD BY AUTOMATED COUNT: 14.2 % (ref 11.5–14.5)
EST. AVERAGE GLUCOSE BLD GHB EST-MCNC: 134 MG/DL
GLUCOSE BLD STRIP.AUTO-MCNC: 124 MG/DL (ref 65–100)
GLUCOSE BLD STRIP.AUTO-MCNC: 135 MG/DL (ref 65–100)
GLUCOSE SERPL-MCNC: 117 MG/DL (ref 65–100)
HBA1C MFR BLD: 6.3 % (ref 4.2–6.3)
HCT VFR BLD AUTO: 22.8 % (ref 36.6–50.3)
HGB BLD-MCNC: 7.2 G/DL (ref 12.1–17)
HGB BLD-MCNC: 7.5 G/DL (ref 12.1–17)
MAGNESIUM SERPL-MCNC: 2.1 MG/DL (ref 1.6–2.4)
MCH RBC QN AUTO: 28.9 PG (ref 26–34)
MCHC RBC AUTO-ENTMCNC: 31.6 G/DL (ref 30–36.5)
MCV RBC AUTO: 91.6 FL (ref 80–99)
NRBC # BLD: 0 K/UL (ref 0–0.01)
NRBC BLD-RTO: 0 PER 100 WBC
PLATELET # BLD AUTO: 105 K/UL (ref 150–400)
PMV BLD AUTO: 9.6 FL (ref 8.9–12.9)
POTASSIUM SERPL-SCNC: 3.8 MMOL/L (ref 3.5–5.1)
RBC # BLD AUTO: 2.49 M/UL (ref 4.1–5.7)
SAMPLES BEING HELD,HOLD: NORMAL
SERVICE CMNT-IMP: ABNORMAL
SERVICE CMNT-IMP: ABNORMAL
SODIUM SERPL-SCNC: 137 MMOL/L (ref 136–145)
TSH SERPL DL<=0.05 MIU/L-ACNC: 1.93 UIU/ML (ref 0.36–3.74)
WBC # BLD AUTO: 4.1 K/UL (ref 4.1–11.1)

## 2018-08-30 PROCEDURE — 85027 COMPLETE CBC AUTOMATED: CPT | Performed by: INTERNAL MEDICINE

## 2018-08-30 PROCEDURE — 76060000031 HC ANESTHESIA FIRST 0.5 HR: Performed by: INTERNAL MEDICINE

## 2018-08-30 PROCEDURE — 85018 HEMOGLOBIN: CPT | Performed by: INTERNAL MEDICINE

## 2018-08-30 PROCEDURE — 36415 COLL VENOUS BLD VENIPUNCTURE: CPT | Performed by: INTERNAL MEDICINE

## 2018-08-30 PROCEDURE — 76040000019: Performed by: INTERNAL MEDICINE

## 2018-08-30 PROCEDURE — 36430 TRANSFUSION BLD/BLD COMPNT: CPT

## 2018-08-30 PROCEDURE — 80048 BASIC METABOLIC PNL TOTAL CA: CPT | Performed by: INTERNAL MEDICINE

## 2018-08-30 PROCEDURE — P9016 RBC LEUKOCYTES REDUCED: HCPCS | Performed by: EMERGENCY MEDICINE

## 2018-08-30 PROCEDURE — 84443 ASSAY THYROID STIM HORMONE: CPT | Performed by: INTERNAL MEDICINE

## 2018-08-30 PROCEDURE — 82962 GLUCOSE BLOOD TEST: CPT

## 2018-08-30 PROCEDURE — 83735 ASSAY OF MAGNESIUM: CPT | Performed by: INTERNAL MEDICINE

## 2018-08-30 PROCEDURE — 74011250637 HC RX REV CODE- 250/637: Performed by: INTERNAL MEDICINE

## 2018-08-30 PROCEDURE — 74011000250 HC RX REV CODE- 250

## 2018-08-30 PROCEDURE — 74011250636 HC RX REV CODE- 250/636

## 2018-08-30 PROCEDURE — 0DJD8ZZ INSPECTION OF LOWER INTESTINAL TRACT, VIA NATURAL OR ARTIFICIAL OPENING ENDOSCOPIC: ICD-10-PCS | Performed by: INTERNAL MEDICINE

## 2018-08-30 RX ORDER — FLUMAZENIL 0.1 MG/ML
0.2 INJECTION INTRAVENOUS
Status: DISCONTINUED | OUTPATIENT
Start: 2018-08-30 | End: 2018-08-30 | Stop reason: HOSPADM

## 2018-08-30 RX ORDER — EPINEPHRINE 0.1 MG/ML
1 INJECTION INTRACARDIAC; INTRAVENOUS
Status: DISCONTINUED | OUTPATIENT
Start: 2018-08-30 | End: 2018-08-30 | Stop reason: HOSPADM

## 2018-08-30 RX ORDER — SODIUM CHLORIDE 9 MG/ML
50 INJECTION, SOLUTION INTRAVENOUS CONTINUOUS
Status: DISCONTINUED | OUTPATIENT
Start: 2018-08-30 | End: 2018-08-30 | Stop reason: HOSPADM

## 2018-08-30 RX ORDER — ATROPINE SULFATE 0.1 MG/ML
0.4 INJECTION INTRAVENOUS
Status: DISCONTINUED | OUTPATIENT
Start: 2018-08-30 | End: 2018-08-30 | Stop reason: HOSPADM

## 2018-08-30 RX ORDER — PROPOFOL 10 MG/ML
INJECTION, EMULSION INTRAVENOUS AS NEEDED
Status: DISCONTINUED | OUTPATIENT
Start: 2018-08-30 | End: 2018-08-30 | Stop reason: HOSPADM

## 2018-08-30 RX ORDER — MIDAZOLAM HYDROCHLORIDE 1 MG/ML
.25-5 INJECTION, SOLUTION INTRAMUSCULAR; INTRAVENOUS
Status: DISCONTINUED | OUTPATIENT
Start: 2018-08-30 | End: 2018-08-30 | Stop reason: HOSPADM

## 2018-08-30 RX ORDER — FAMOTIDINE 10 MG/ML
INJECTION INTRAVENOUS AS NEEDED
Status: DISCONTINUED | OUTPATIENT
Start: 2018-08-30 | End: 2018-08-30 | Stop reason: HOSPADM

## 2018-08-30 RX ORDER — FAMOTIDINE 10 MG/ML
INJECTION INTRAVENOUS
Status: DISCONTINUED
Start: 2018-08-30 | End: 2018-08-30 | Stop reason: HOSPADM

## 2018-08-30 RX ORDER — DEXTROMETHORPHAN/PSEUDOEPHED 2.5-7.5/.8
1.2 DROPS ORAL
Status: DISCONTINUED | OUTPATIENT
Start: 2018-08-30 | End: 2018-08-30 | Stop reason: HOSPADM

## 2018-08-30 RX ORDER — NALOXONE HYDROCHLORIDE 0.4 MG/ML
0.4 INJECTION, SOLUTION INTRAMUSCULAR; INTRAVENOUS; SUBCUTANEOUS
Status: DISCONTINUED | OUTPATIENT
Start: 2018-08-30 | End: 2018-08-30 | Stop reason: HOSPADM

## 2018-08-30 RX ADMIN — HYDRALAZINE HYDROCHLORIDE 25 MG: 25 TABLET, FILM COATED ORAL at 08:33

## 2018-08-30 RX ADMIN — PROPOFOL 40 MG: 10 INJECTION, EMULSION INTRAVENOUS at 11:13

## 2018-08-30 RX ADMIN — CARVEDILOL 3.12 MG: 6.25 TABLET, FILM COATED ORAL at 08:33

## 2018-08-30 RX ADMIN — PROPOFOL 30 MG: 10 INJECTION, EMULSION INTRAVENOUS at 11:15

## 2018-08-30 RX ADMIN — Medication 10 ML: at 06:00

## 2018-08-30 RX ADMIN — PROPOFOL 10 MG: 10 INJECTION, EMULSION INTRAVENOUS at 11:20

## 2018-08-30 RX ADMIN — DIGOXIN 0.12 MG: 125 TABLET ORAL at 08:33

## 2018-08-30 RX ADMIN — FUROSEMIDE 40 MG: 40 TABLET ORAL at 08:33

## 2018-08-30 RX ADMIN — PROPOFOL 10 MG: 10 INJECTION, EMULSION INTRAVENOUS at 11:22

## 2018-08-30 RX ADMIN — PROPOFOL 10 MG: 10 INJECTION, EMULSION INTRAVENOUS at 11:17

## 2018-08-30 RX ADMIN — PROPOFOL 10 MG: 10 INJECTION, EMULSION INTRAVENOUS at 11:21

## 2018-08-30 RX ADMIN — PROPOFOL 10 MG: 10 INJECTION, EMULSION INTRAVENOUS at 11:18

## 2018-08-30 RX ADMIN — FAMOTIDINE 20 MG: 10 INJECTION INTRAVENOUS at 11:14

## 2018-08-30 NOTE — ANESTHESIA PREPROCEDURE EVALUATION
Anesthetic History Review of Systems / Medical History Patient summary reviewed, nursing notes reviewed and pertinent labs reviewed Pulmonary Neuro/Psych CVA: no residual symptoms TIA Cardiovascular Hypertension: well controlled CHF Dysrhythmias : atrial fibrillation CAD 
 
 
  
GI/Hepatic/Renal 
Within defined limits Endo/Other Diabetes: well controlled, type 2 Arthritis and anemia Other Findings Physical Exam 
 
Airway Mallampati: II 
TM Distance: > 6 cm Neck ROM: normal range of motion Mouth opening: Normal 
 
 Cardiovascular Regular rate and rhythm,  S1 and S2 normal,  no murmur, click, rub, or gallop Dental 
 
Dentition: Upper partial plate Pulmonary Breath sounds clear to auscultation Abdominal 
GI exam deferred Other Findings Anesthetic Plan ASA: 3 Anesthesia type: MAC Induction: Intravenous Anesthetic plan and risks discussed with: Patient Post polypectomy bleed in need of colonoscopy. Vital signs stable.

## 2018-08-30 NOTE — PROCEDURES
Singh Capps M.D.  [phone]            2018          Colonoscopy Operative Report  Sunday Du  :  1945  [facility] Medical Record Number:  [id number]      Indications:  post polypecotmy bleeding     :  Hetal Torres MD    Referring Provider: Nilsa Cooks, MD    Sedation:  MAC anesthesia Propofol    Pre-Procedural Exam:      Airway: clear,  No airway problems anticipated  Heart: RRR, without gallops or rubs  Lungs: clear bilaterally without wheezes, crackles, or rhonchi  Abdomen: soft, nontender, nondistended, bowel sounds present  Mental Status: awake, alert and oriented to person, place and time     Procedure Details:  After informed consent was obtained with all risks and benefits of procedure explained and preoperative exam completed, the patient was taken to the endoscopy suite and placed in the left lateral decubitus position. Upon sequential sedation as per above, a digital rectal exam was performed. The Olympus videocolonoscope  was inserted in the rectum and carefully advanced to the hepatic flexure. The quality of preparation was poor. The colonoscope was slowly withdrawn with careful inspection and evaluation between folds. Retroflexion in the rectum was performed. Findings: There was old heme and solid stool noted throughout the colon. The scope could only be safely advanced to the hepatic flexure. Due to the poor prep the prior sigmoid polypectomy site could not be identified. Interventions:  none    Specimen Removed:  none    Complications: None. EBL:  None. Impression:  Old heme and solid stool noted throughout the colon. There was no evidence of active GI blood loss.                The prior polypectomy sites could not be identified due to the poor prep    Recommendations:  - Monitor H&H closely and continue to hold Eliquis for at least 4-5 days  - Okay to advance diet as tolerated  - Follow up with  Elizabeth in 2-4 weeks for follow up  - If remains stable may be able to go home    Discharge Disposition:  Home in the company of a  when able to ambulate.     Bertrand Kang MD  8/30/2018  11:28 AM

## 2018-08-30 NOTE — DISCHARGE SUMMARY
Physician Discharge Summary     Patient ID:  Jerri Chamberlain  [id number]  81 y.o.  1945    Admit date: 8/29/2018    Discharge date and time: 8/30/2018    Admission Diagnoses: GI bleed  GI bleed    Discharge Diagnoses:    Principal Problem:    GI bleed (8/29/2018)    Active Problems:    Essential hypertension ()      Painter esophagus ()      CKD (chronic kidney disease) stage 4, GFR 15-29 ml/min (HCC) (7/13/2018)      Paroxysmal atrial fibrillation ([facility] (8/4/2018)      Hypothyroidism due to Hashimoto's thyroiditis (8/6/2018)      Acute blood loss anemia (8/29/2018)           Hospital Course:   GI bleed (8/29/2018): Recent EGD on 8/6 with Barretts esophagus. Colonoscopy done same day with polypectomy x 6. Has had bleeding since. Suspect bleeding from polypectomy site exacerbated by apixaban use. Colonoscopy done with old heme and solid stool, but no active bleeding noted. Will hold apixaban x 1 week.     Acute blood loss anemia (8/29/2018): HGB 8/4 was 12.1 and on 8/13 was 10.6. HGB to 7.2 overnight. Patient reports resolution of symptoms.     Paroxysmal atrial fibrillation ([facility] (8/4/2018):  Continued coreg and digoxin. Repeat HGB next week with PCP prior to restarting apixaban.     DM type 2 with renal complications:  Insulin continued.     Essential hypertension ():  Continued home regimen of coreg and hydralazine.     CKD (chronic kidney disease) stage 4, GFR 15-29 ml/min ([facility]) (7/13/2018):  Creatinine stable from prior. PCP:  Bianca Rousseau MD     Consults: GI    Condition of patient at discharge: stable    Discharge Exam:  Vitals:    08/30/18 1149 08/30/18 1154 08/30/18 1159 08/30/18 1212   BP: 147/84 160/85 150/78 183/86   Pulse: 73 81 80 82   Resp: 18 16 16 17   Temp:    97.4 °F (36.3 °C)   SpO2: 99% 98% 98% 95%   Weight:       Height:         Physical Exam:    Gen: Well-developed, well-nourished, in no acute distress  HEENT:  Pink conjunctivae, hearing intact to voice, moist mucous membranes  Resp: No accessory muscle use, clear breath sounds without wheezes rales or rhonchi  Card: No murmurs, normal S1, S2 without thrills or peripheral edema  Abd:  Soft, non-tender, non-distended, normoactive bowel sounds are present  Neuro: Face symmetric, tongue midline, speech fluent,  strength is 5/5 bilaterally  Psych:  Good insight, alert    Disposition: home    Discharge Medications:   Current Discharge Medication List      CONTINUE these medications which have NOT CHANGED    Details   insulin glargine (LANTUS,BASAGLAR) 100 unit/mL (3 mL) inpn 14 Units by SubCUTAneous route nightly. leflunomide (ARAVA) 20 mg tablet Take 20 mg by mouth daily. colchicine 0.6 mg tablet Take 0.6 mg by mouth daily as needed. digoxin (LANOXIN) 0.125 mg tablet Take 0.125 mg by mouth every other day. dextran 70-hypromellose (ARTIFICIAL TEARS,[id number],) ophthalmic solution Administer 1 Drop to both eyes as needed. loperamide (IMODIUM) 2 mg capsule Take 2 mg by mouth four (4) times daily as needed for Diarrhea. furosemide (LASIX) 40 mg tablet Take 40 mg by mouth daily. Refills: 5      hydrALAZINE (APRESOLINE) 25 mg tablet Take 25 mg by mouth three (3) times daily. Refills: 3      omeprazole (PRILOSEC) 20 mg capsule Take 20 mg by mouth daily. multivitamin (ONE A DAY) tablet Take 1 Tab by mouth daily. pravastatin (PRAVACHOL) 20 mg tablet Take 1 Tab by mouth nightly. Qty: 30 Tab, Refills: 2      carvedilol (COREG) 3.125 mg tablet Take 1 Tab by mouth two (2) times daily (with meals). Qty: 60 Tab, Refills: 2         STOP taking these medications       ELIQUIS 5 mg tablet Comments:   Reason for Stopping:               Activity: Activity as tolerated  Diet: Cardiac Diet and Diabetic Diet    Follow-up with Agueda Lamb MD in 1 week. Approximate time spent in patient care on day of discharge: 40 min.     Signed:  Sanchez Levi MD  8/30/2018  12:33 PM

## 2018-08-30 NOTE — ROUTINE PROCESS
Bedside and Verbal shift change report given to DANIELLE Mcginnis (oncoming nurse) by Chioma Woodson (offgoing nurse). Report included the following information SBAR, Kardex, Intake/Output and MAR.

## 2018-08-30 NOTE — DISCHARGE INSTRUCTIONS
HOSPITALIST DISCHARGE INSTRUCTIONS  NAME: Tayo Jefferson   :  1945   MRN:  [id number]     Date/Time:  2018 12:30 PM    ADMIT DATE: 2018     DISCHARGE DATE: 2018     DISCHARGE DIAGNOSIS:  Blood loss anemia    MEDICATIONS:    · It is important that you take the medication exactly as they are prescribed. · Keep your medication in the bottles provided by the pharmacist and keep a list of the medication names, dosages, and times to be taken in your wallet. · Do not take other medications without consulting your doctor. What to do at Home:  1. Check your blood pressure at home twice a day. Call your doctor for blood pressure greater than 150/90 or lower than 110/55 or if you have dizziness or lightheadedness. 2.  Check you blood sugar twice a day. Call your doctor for blood sugar persistently greater than 200 or lower than 80.  3.  Do not take Eliquis for one week. Have your blood count rechecked with PCP next week prior to restarting it. Recommended diet:  Cardiac Diet and Diabetic Diet    Recommended activity: Activity as tolerated    If you experience any of the following symptoms then please call your primary care physician or return to the emergency room if you cannot get hold of your doctor:  Fever, chills, nausea, vomiting, diarrhea, change in mentation, falling, bleeding, shortness of breath, dizziness, lightheadedness. Follow Up: Follow-up Information     Follow up With Details Comments TOSHIA Brower MD Schedule an appointment as soon as possible for a visit in 1 week Repeat blood count at that visit prior to restarting Eliquis. [address] [place] [address] Drive  [phone]            Information obtained by :  I understand that if any problems occur once I am at home I am to contact my physician. I understand and acknowledge receipt of the instructions indicated above. Physician's or R.N.'s Signature                                                                  Date/Time                                                                                                                                              Patient or Representative Signature                                                          Date/Time

## 2018-08-30 NOTE — PROGRESS NOTES
Enrico Stephen 
1945 
[id number] Situation: 
Verbal report received from: Carmita Obrien Procedure: Procedure(s): 
COLONOSCOPY Background: 
 
Preoperative diagnosis: GI bleed Postoperative diagnosis: * No post-op diagnosis entered * :  Dr. Ila Bobo Assistant(s): Endoscopy Technician-1: Celso Kay Endoscopy RN-1: Sylvia Bautista RN Specimens: * No specimens in log * H. Pylori  no Assessment: 
Intra-procedure medications Anesthesia gave intra-procedure sedation and medications, see anesthesia flow sheet yes Intravenous fluids: NS@ Dahlia Bolus Vital signs stable   yes Abdominal assessment: round and soft   yes Recommendation: 
Discharge patient per MD order  inpatient. Return to floor  yes Family or Friend   None present Permission to share finding with family or friend yes and n/a

## 2018-08-30 NOTE — CONSULTS
[address] [place] [address]. [facility] NP  [phone]                    GASTROENTEROLOGY CONSULTATION NOTE              NAME:  Paula Ramon   :   1945   MRN:   [id number]       Referring Physician:    Dr. Rowena Oconnell Date:   2018 11:08 AM    Chief Complaint:    Wendi Rahul Stools     History of Present Illness:    Patient is a 81 y.o. who we were asked to see in consultation for the above complaint.  morning the patient was incontinent of a maroon colored stool while sleeping. After this episode the the amount of maroon colored stools has been gradually decreasing. Jayshree Riser He takes Eliquis for A fib but had stopped taking it since he noted maroon stools. His symptoms have been associated with a crampy abdominal pain. He explains that he has been receiving an IV infusion of a new medication for gout and expresses concern that this may be contributing to his symptoms. He had an EGD earlier this month which showed barrets esophagus and a hiatal hernia. Colonoscopy was also done at that time and he had 6 polyps removed.      PMH:  Past Medical History:   Diagnosis Date    Acute encephalopathy 2016    Anemia 2017    Atrial fibrillation (HCC)     Painter esophagus     CAD (coronary artery disease)     Diabetes (HCC)     Heart failure (HCC)     Cardiomyopathy, myocarditis    HTN, goal below 140/90     Retinopathy, diabetic, bilateral ([facility] [address] 3/11/2016    Stenosis of right carotid artery without cerebral infarction 2016    TIA (transient ischemic attack) 2016    Vitamin D deficiency 3/1/2016    Nephrology ordered and follow 16        PSH:  Past Surgical History:   Procedure Laterality Date    COLONOSCOPY N/A 2016    COLONOSCOPY performed by Elizabeth Sanches MD at [address] 2018    COLONOSCOPY performed by Elizabeth Sanches MD at [address] HX ENDOSCOPY  2016    CAPSULE; normal small bowel    HX ENDOSCOPY  2016    upper endoscopy    HX UROLOGICAL  2013       Allergies:  No Known Allergies    Home Medications:  Prior to Admission Medications   Prescriptions Last Dose Informant Patient Reported? Taking? ELIQUIS 5 mg tablet 2018 at AM  Yes Yes   Sig: TAKE 1 TABLET BY MOUTH TWICE A DAY   carvedilol (COREG) 3.125 mg tablet 2018 at PM  No Yes   Sig: Take 1 Tab by mouth two (2) times daily (with meals). colchicine 0.6 mg tablet   Yes Yes   Sig: Take 0.6 mg by mouth daily as needed. dextran 70-hypromellose (ARTIFICIAL TEARS,[id number],) ophthalmic solution   Yes Yes   Sig: Administer 1 Drop to both eyes as needed. digoxin (LANOXIN) 0.125 mg tablet 2018 at AM  Yes Yes   Sig: Take 0.125 mg by mouth every other day. furosemide (LASIX) 40 mg tablet 2018 at AM  Yes Yes   Sig: Take 40 mg by mouth daily. hydrALAZINE (APRESOLINE) 25 mg tablet 2018 at AM  Yes Yes   Sig: Take 25 mg by mouth three (3) times daily. insulin glargine (LANTUS,BASAGLAR) 100 unit/mL (3 mL) inpn 2018 at PM  Yes Yes   Si Units by SubCUTAneous route nightly. leflunomide (ARAVA) 20 mg tablet 2018 at AM  Yes Yes   Sig: Take 20 mg by mouth daily. loperamide (IMODIUM) 2 mg capsule   Yes Yes   Sig: Take 2 mg by mouth four (4) times daily as needed for Diarrhea.   multivitamin (ONE A DAY) tablet 2018 at AM  Yes Yes   Sig: Take 1 Tab by mouth daily. omeprazole (PRILOSEC) 20 mg capsule 2018 at AM  Yes Yes   Sig: Take 20 mg by mouth daily. pravastatin (PRAVACHOL) 20 mg tablet 2018 at PM  No Yes   Sig: Take 1 Tab by mouth nightly.       Facility-Administered Medications: None       Hospital Medications:  Current Facility-Administered Medications   Medication Dose Route Frequency    0.9% sodium chloride infusion  50 mL/hr IntraVENous CONTINUOUS    midazolam (VERSED) injection 0.25-5 mg  0.25-5 mg IntraVENous Multiple    naloxone (NARCAN) injection 0.4 mg  0.4 mg IntraVENous Multiple    flumazenil (ROMAZICON) 0.1 mg/mL injection 0.2 mg  0.2 mg IntraVENous Multiple    simethicone (MYLICON) 92NE/8.7SM oral drops 80 mg  1.2 mL Oral Multiple    atropine injection 0.4 mg  0.4 mg IntraVENous ONCE PRN    EPINEPHrine (ADRENALIN) 0.1 mg/mL syringe 1 mg  1 mg Endoscopically ONCE PRN    0.9% sodium chloride infusion 250 mL  250 mL IntraVENous PRN    sodium chloride (NS) flush 5-10 mL  5-10 mL IntraVENous Q8H    sodium chloride (NS) flush 5-10 mL  5-10 mL IntraVENous PRN    acetaminophen (TYLENOL) tablet 650 mg  650 mg Oral Q4H PRN    ondansetron (ZOFRAN) injection 4 mg  4 mg IntraVENous Q4H PRN    insulin lispro (HUMALOG) injection   SubCUTAneous AC&HS    glucose chewable tablet 16 g  4 Tab Oral PRN    dextrose (D50W) injection syrg 12.5-25 g  25-50 mL IntraVENous PRN    glucagon (GLUCAGEN) injection 1 mg  1 mg IntraMUSCular PRN    furosemide (LASIX) tablet 40 mg  40 mg Oral DAILY    hydrALAZINE (APRESOLINE) tablet 25 mg  25 mg Oral TID    carvedilol (COREG) tablet 3.125 mg  3.125 mg Oral BID WITH MEALS    pravastatin (PRAVACHOL) tablet 20 mg  20 mg Oral QHS    insulin glargine (LANTUS) injection 10 Units  10 Units SubCUTAneous DAILY    digoxin (LANOXIN) tablet 0.125 mg  0.125 mg Oral EVERY OTHER DAY    0.9% sodium chloride infusion 250 mL  250 mL IntraVENous PRN       Social History:  Social History   Substance Use Topics    Smoking status: Never Smoker    Smokeless tobacco: Never Used    Alcohol use Yes      Comment: 2 drinks/week, never a heavy drinker       Family History:  Family History   Problem Relation Age of Onset    Heart Failure Mother     Diabetes Father     No Known Problems Sister     Other Daughter      Bindu Tello Other Daughter      Bindu Tello Other Daughter      Joceline Thomas       Review of Systems:  Constitutional: negative fever, negative chills, negative weight loss  Eyes:   negative visual changes  ENT:   negative sore throat, tongue or lip swelling  Respiratory:  negative cough, negative dyspnea  Cards:  negative for chest pain, palpitations, lower extremity edema  GI:   See HPI  :  negative for frequency, dysuria  Integument:  negative for rash and pruritus  Heme:  negative for easy bruising and gum/nose bleeding  Musculoskel: negative for myalgias,  back pain and muscle weakness  Neuro: negative for headaches, dizziness, vertigo  Psych:  negative for feelings of anxiety, depression     Objective:   Patient Vitals for the past 8 hrs:   BP Temp Pulse Resp SpO2   08/30/18 0812 188/82 97.7 °F (36.5 °C) 81 17 96 %   08/30/18 0438 158/77 98.3 °F (36.8 °C) 85 17 95 %   08/30/18 0310 125/74 98.7 °F (37.1 °C) 84 17 94 %        08/28 1901 - 08/30 0700  In: 657.5   Out: -     EXAM:     NEURO-alert, oriented x3, affect appropriate   HEENT-Head: Normocephalic, no lesions, without obvious abnormality. LUNGS-clear to auscultation bilaterally    COR-regular rate and rhythym     ABD- soft, non-tender.  Bowel sounds normal. No masses,  no organomegaly     EXT-no edema    Skin - No rash     Data Review     Recent Labs      08/30/18   0602  08/29/18   2134  08/29/18   1357  08/29/18   1008   WBC  4.1   --    --   4.8   HGB  7.2*  6.8*  6.1*  6.2*   HCT  22.8*   --   19.8*  21.0*   PLT  105*   --    --   130*     Recent Labs      08/30/18   0602  08/29/18   1008   NA  137  137   K  3.8  3.9   CL  103  102   CO2  26  27   BUN  40*  48*   CREA  1.71*  1.84*   GLU  117*  140*   CA  8.6  8.2*     Recent Labs      08/29/18   1008   SGOT  20   AP  61   TP  6.6   ALB  3.1*   GLOB  3.5     Recent Labs      08/29/18   1357   INR  1.1   PTP  11.4*       Patient Active Problem List   Diagnosis Code    TIA (transient ischemic attack) G45.9    Stenosis of right carotid artery without cerebral infarction I65.21    Vitamin D deficiency E55.9    Retinopathy, diabetic, bilateral (HCC) E11.319    Heart murmur, systolic K07.6    Advance care planning Z71.89    Essential hypertension I10    Anemia, chronic disease D63.8    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 - NPO   - Plan is for unprepped colonoscopy today. - Trend Hemoglobin and transfuse to goal of 7. Thanks for allowing me to participate in the care of this patient.   Signed By: Efraín Reis NP     8/30/2018  11:08 AM

## 2018-08-30 NOTE — PROGRESS NOTES
8/30/2018   CARE MANAGEMENT NOTE:  CM notified Nurse Navigator of pt's discharge home today. No needs identified. Serge

## 2018-08-30 NOTE — ANESTHESIA POSTPROCEDURE EVALUATION
Post-Anesthesia Evaluation and Assessment Patient: Ramón Salomon MRN: [id number]  SSN: [id number] YOB: 1945  Age: 81 y.o. Sex: male Cardiovascular Function/Vital Signs Visit Vitals  /86 (BP 1 Location: Left arm, BP Patient Position: Sitting)  Pulse 82  Temp 36.3 °C (97.4 °F)  Resp 17  Ht 5' 10\" (1.778 m)  Wt 77.1 kg (170 lb)  SpO2 95%  BMI 24.39 kg/m2 Patient is status post MAC anesthesia for Procedure(s): 
COLONOSCOPY. Nausea/Vomiting: None Postoperative hydration reviewed and adequate. Pain: 
Pain Scale 1: Numeric (0 - 10) (08/30/18 1159) Pain Intensity 1: 0 (08/30/18 1159) Managed Neurological Status: At baseline Mental Status and Level of Consciousness: Arousable Pulmonary Status:  
O2 Device: Room air (08/30/18 1159) Adequate oxygenation and airway patent Complications related to anesthesia: None Post-anesthesia assessment completed. No concerns Signed By: Ricardo Che MD   
 August 30, 2018

## 2018-08-30 NOTE — ROUTINE PROCESS
Assumed care of patient. Patient resting comfortably in bed, call bell in reach, with no complaints at this time. Will continue to monitor and reassess.

## 2018-08-30 NOTE — PROGRESS NOTES
[facility] follow-up appointment on Tuesday September 4,2018 @ 2:00 p.m. with Lexy Frye. Added to AVS. Meri Chacon CM Specialist

## 2018-08-31 ENCOUNTER — PATIENT OUTREACH (OUTPATIENT)
Dept: OTHER | Age: 73
End: 2018-08-31

## 2018-08-31 LAB
ABO + RH BLD: NORMAL
BLD PROD TYP BPU: NORMAL
BLD PROD TYP BPU: NORMAL
BLOOD GROUP ANTIBODIES SERPL: NORMAL
BPU ID: NORMAL
BPU ID: NORMAL
CROSSMATCH RESULT,%XM: NORMAL
CROSSMATCH RESULT,%XM: NORMAL
SPECIMEN EXP DATE BLD: NORMAL
STATUS OF UNIT,%ST: NORMAL
STATUS OF UNIT,%ST: NORMAL
UNIT DIVISION, %UDIV: 0
UNIT DIVISION, %UDIV: 0

## 2018-08-31 NOTE — PROGRESS NOTES
Transition Of Care Note Patient discharged from [facility] admitted on  and discharged on  for GI bleed post polypectomy. Medical History:    
Past Medical History:  
Diagnosis Date  Acute encephalopathy 2016  Anemia 2017  Atrial fibrillation ([facility] [address]  Painter esophagus  CAD (coronary artery disease)  Diabetes ([facility] [address]  Heart failure ([facility] [address] Cardiomyopathy, myocarditis  HTN, goal below 140/90  Retinopathy, diabetic, bilateral ([facility] [address] 3/11/2016  Stenosis of right carotid artery without cerebral infarction 2016  TIA (transient ischemic attack) 2016  Vitamin D deficiency 3/1/2016 Nephrology ordered and follow 16 Care Manager contacted the patient by telephone to perform post hospital discharge assessment. Verified  and zip code with patient as identifiers. Provided introduction to self, and explanation of the Nurse Care Manager role. Red Flags: 1. Check your blood pressure at home twice a day. Call your doctor for blood pressure greater than 150/90 or lower than 110/55 or if you have dizziness or lightheadedness. 2.  Check you blood sugar twice a day. Call your doctor for blood sugar persistently greater than 200 or lower than 80. 
3.  Do not take Eliquis for one week. Have your blood count rechecked with PCP next week prior to restarting it. Call 911 anytime you think you may need emergency care. For example, call if: 
  You have sudden, severe belly pain. You vomit blood or what looks like coffee grounds. You passed out (lost consciousness). Your stools are maroon or very bloody. Call your doctor now or seek immediate medical care if: 
  You are dizzy or lightheaded, or you feel like you may faint. Your stools are black and look like tar, or they have streaks of blood. You have belly pain. You vomit or have nausea. You have trouble swallowing, or it hurts when you swallow. Watch closely for changes in your health, and be sure to contact your doctor if: You do not get better as expected. Condition Focused Assessment:  
Patient reports the following:  
Is patient on dialysis? no 
Dialysis access site: n/a Activity level- moving several times a day, or as recommended? yes Abnormal activity level reported: n/a Any edema? no 
Location of any swelling: n/a Nutrition- prescribed diet? yes Diet prescribed or recommended: cardiac and diabetic Any of the following? Shortness of breath or difficulty breathing no Dizziness/lightheadedness no Fever no Fatigue no Anxiety no Confusion no Unexplained change in weight loss no New or worsening pain? no If yes, pain rated 0-10: n/a Location/pain characteristics: n/a New or worsening numbness or tingling? no If yes, location of numbness and tingling: n/a Difficulty sleeping? no 
 
Patient reported important numbers to know: 
Temperature 97.4 Blood pressure 183/86 8/30 pt has not rechecked by time of call, but will contact provider if BP outside of specified parameters Weight  170 Creatinine 1.71 H  
GFR 40 L Last H&H  7.2/22.8 BUN 40 H  
BUN/ Creatnine Ratio 23 H Last labs performed: 8/30 Medication:  
New Medications at Discharge: none noted Changed Medications at Discharge: none noted Discontinued Medications at Discharge: eliquis until H&H rechecked on 9/4 Current Outpatient Prescriptions Medication Sig  
 insulin glargine (LANTUS,BASAGLAR) 100 unit/mL (3 mL) inpn 14 Units by SubCUTAneous route nightly.  digoxin (LANOXIN) 0.125 mg tablet Take 0.125 mg by mouth every other day.  dextran 70-hypromellose (ARTIFICIAL TEARS,[id number],) ophthalmic solution Administer 1 Drop to both eyes as needed.  furosemide (LASIX) 40 mg tablet Take 40 mg by mouth daily.  hydrALAZINE (APRESOLINE) 25 mg tablet Take 25 mg by mouth three (3) times daily.  omeprazole (PRILOSEC) 20 mg capsule Take 20 mg by mouth daily.  multivitamin (ONE A DAY) tablet Take 1 Tab by mouth daily.  pravastatin (PRAVACHOL) 20 mg tablet Take 1 Tab by mouth nightly.  carvedilol (COREG) 3.125 mg tablet Take 1 Tab by mouth two (2) times daily (with meals).  leflunomide (ARAVA) 20 mg tablet Take 20 mg by mouth daily.  colchicine 0.6 mg tablet Take 0.6 mg by mouth daily as needed.  loperamide (IMODIUM) 2 mg capsule Take 2 mg by mouth four (4) times daily as needed for Diarrhea. No current facility-administered medications for this visit. There are no discontinued medications. Performed medication reconciliation with patient, and patient verbalizes understanding of administration of home medications. There were no barriers to obtaining medications identified at this time. Inpatient RRAT score: 37 Was this a readmission? no  
Patient stated reason for the readmission: n/a Barriers/Support system: 
patient and spouse Barriers/Challenges to Care: []  Decline in memory    []  Language barrier    
[]  Emotional                  []  Limited mobility 
[]  Lack of motivation     [] Vision, hearing or cognitive impairment []  Knowledge [] Financial Barriers []  Lack of support  []  Pain []  Other [x]  None CM Identified  Problems 
 (contributing problems for risk for readmission) 1. Risk for poor tissue perfusion related to increased risk of bleeding- pt states bleeding resolved, feeling very energetic after PRBC transfusion on 8/30- stopped eliquis will restart after follow-up labs Discharge Instructions : 
Reviewed discharge instructions with patient. Patient verbalizes understanding of discharge instructions and follow-up care. Advance Care Planning:  
Patient was offered the opportunity to discuss advance care planning:  no    
Does patient have an Advance Directive:  no If no, did you provide information on Caring Connections?   no  
 
 PCP/Specialist follow up: Patient scheduled to follow up with Rhoda Gar MD on 9/4. Future Appointments Date Time Provider Xiomara Velez 9/4/2018 2:00 PM Rhoda Gar MD [facility] SCHED  
9/10/2018 10:00 AM SS INF5 CH3 <4H [facility]  
9/24/2018 10:00 AM SS INF3 CH3 <4H [facility]  
11/15/2018 3:00 PM Sun Hathaway MD [address] [place] [address] Reviewed red flags with patient, and patient verbalizes understanding. Patient given an opportunity to ask questions. No other clinical/social/functional needs noted. The patient agrees to contact the PCP office for questions related to their healthcare. The patient expressed thanks, offered no additional questions and ended the call.

## 2018-08-31 NOTE — PROGRESS NOTES
Patient on [facility] discharge report dated 8/31, patient admitted from 8-29 to 8/30 at [address] for GI bleed, RRAT of 37, follow up with PCP scheduled on 9/4 at 2pm.  Left message on voicemail. Will attempt to contact again. Need to complete post-discharge assessment.

## 2018-08-31 NOTE — PATIENT INSTRUCTIONS
Gastrointestinal Bleeding: Care Instructions Your Care Instructions The digestive or gastrointestinal tract goes from the mouth to the anus. It is often called the GI tract. Bleeding can happen anywhere in the GI tract. It may be caused by an ulcer, an infection, or cancer. It may also be caused by medicines such as aspirin or ibuprofen. Light bleeding may not cause any symptoms at first. But if you continue to bleed for a while, you may feel very weak or tired. Sudden, heavy bleeding means you need to see a doctor right away. This kind of bleeding can be very dangerous. But it can usually be cured or controlled. The doctor may do some tests to find the cause of your bleeding. Follow-up care is a key part of your treatment and safety. Be sure to make and go to all appointments, and call your doctor if you are having problems. It's also a good idea to know your test results and keep a list of the medicines you take. How can you care for yourself at home? · Be safe with medicines. Take your medicines exactly as prescribed. Call your doctor if you think you are having a problem with your medicine. You will get more details on the specific medicines your doctor prescribes. · Do not take aspirin or other anti-inflammatory medicines, such as naproxen (Aleve) or ibuprofen (Advil, Motrin), without talking to your doctor first. Ask your doctor if it is okay to use acetaminophen (Tylenol). · Do not drink alcohol. · The bleeding may make you lose iron. So it's important to eat foods that have a lot of iron. These include red meat, shellfish, poultry, and eggs. They also include beans, raisins, whole-grain breads, and leafy green vegetables. If you want help planning meals, you can make an appointment with a dietitian. When should you call for help? Call 911 anytime you think you may need emergency care. For example, call if: 
  · You have sudden, severe belly pain.   · You vomit blood or what looks like coffee grounds.  
  · You passed out (lost consciousness).  
  · Your stools are maroon or very bloody.  
 Call your doctor now or seek immediate medical care if: 
  · You are dizzy or lightheaded, or you feel like you may faint.  
  · Your stools are black and look like tar, or they have streaks of blood.  
  · You have belly pain.  
  · You vomit or have nausea.  
  · You have trouble swallowing, or it hurts when you swallow.  
 Watch closely for changes in your health, and be sure to contact your doctor if: 
  · You do not get better as expected. Where can you learn more? Go to http://tanika-claudia.info/. Enter D444 in the search box to learn more about \"Gastrointestinal Bleeding: Care Instructions. \" Current as of: November 20, 2017 Content Version: 11.7 © 9655-5098 [employer]. Care instructions adapted under license by [employer] (which disclaims liability or warranty for this information). If you have questions about a medical condition or this instruction, always ask your healthcare professional. [address] any warranty or liability for your use of this information.

## 2018-09-04 ENCOUNTER — HOSPITAL ENCOUNTER (EMERGENCY)
Age: 73
Discharge: HOME | End: 2018-09-04
Attending: EMERGENCY MEDICINE
Payer: COMMERCIAL

## 2018-09-04 ENCOUNTER — OFFICE VISIT (OUTPATIENT)
Dept: FAMILY MEDICINE CLINIC | Age: 73
End: 2018-09-04

## 2018-09-04 VITALS
WEIGHT: 173 LBS | TEMPERATURE: 99.5 F | BODY MASS INDEX: 24.77 KG/M2 | RESPIRATION RATE: 18 BRPM | HEIGHT: 70 IN | HEART RATE: 93 BPM | DIASTOLIC BLOOD PRESSURE: 60 MMHG | SYSTOLIC BLOOD PRESSURE: 122 MMHG | OXYGEN SATURATION: 98 %

## 2018-09-04 VITALS
DIASTOLIC BLOOD PRESSURE: 66 MMHG | SYSTOLIC BLOOD PRESSURE: 154 MMHG | HEIGHT: 70 IN | WEIGHT: 174.2 LBS | BODY MASS INDEX: 24.94 KG/M2 | TEMPERATURE: 98.4 F | RESPIRATION RATE: 16 BRPM | OXYGEN SATURATION: 99 % | HEART RATE: 88 BPM

## 2018-09-04 DIAGNOSIS — D62 ACUTE BLOOD LOSS ANEMIA: Primary | ICD-10-CM

## 2018-09-04 DIAGNOSIS — K92.2 GASTROINTESTINAL HEMORRHAGE, UNSPECIFIED GASTROINTESTINAL HEMORRHAGE TYPE: ICD-10-CM

## 2018-09-04 DIAGNOSIS — Z09 HOSPITAL DISCHARGE FOLLOW-UP: Primary | ICD-10-CM

## 2018-09-04 DIAGNOSIS — M10.9 ACUTE GOUT OF RIGHT HAND, UNSPECIFIED CAUSE: ICD-10-CM

## 2018-09-04 DIAGNOSIS — D62 ACUTE BLOOD LOSS ANEMIA: ICD-10-CM

## 2018-09-04 LAB
ALBUMIN SERPL-MCNC: 3.1 G/DL (ref 3.5–5)
ALBUMIN/GLOB SERPL: 0.8 {RATIO} (ref 1.1–2.2)
ALP SERPL-CCNC: 72 U/L (ref 45–117)
ALT SERPL-CCNC: 25 U/L (ref 12–78)
ANION GAP SERPL CALC-SCNC: 8 MMOL/L (ref 5–15)
AST SERPL-CCNC: 16 U/L (ref 15–37)
BASOPHILS # BLD: 0 K/UL (ref 0–0.1)
BASOPHILS NFR BLD: 0 % (ref 0–1)
BILIRUB SERPL-MCNC: 0.6 MG/DL (ref 0.2–1)
BUN SERPL-MCNC: 54 MG/DL (ref 6–20)
BUN/CREAT SERPL: 26 (ref 12–20)
CALCIUM SERPL-MCNC: 8.2 MG/DL (ref 8.5–10.1)
CHLORIDE SERPL-SCNC: 101 MMOL/L (ref 97–108)
CO2 SERPL-SCNC: 28 MMOL/L (ref 21–32)
COMMENT, HOLDF: NORMAL
CREAT SERPL-MCNC: 2.04 MG/DL (ref 0.7–1.3)
DIFFERENTIAL METHOD BLD: ABNORMAL
EOSINOPHIL # BLD: 0.1 K/UL (ref 0–0.4)
EOSINOPHIL NFR BLD: 1 % (ref 0–7)
ERYTHROCYTE [DISTWIDTH] IN BLOOD BY AUTOMATED COUNT: 13.4 % (ref 11.5–14.5)
GLOBULIN SER CALC-MCNC: 3.9 G/DL (ref 2–4)
GLUCOSE SERPL-MCNC: 148 MG/DL (ref 65–100)
HCT VFR BLD AUTO: 24.5 % (ref 36.6–50.3)
HGB BLD-MCNC: 6.2 G/DL
HGB BLD-MCNC: 7.4 G/DL (ref 12.1–17)
IMM GRANULOCYTES # BLD: 0 K/UL
IMM GRANULOCYTES NFR BLD AUTO: 0 %
LYMPHOCYTES # BLD: 0.6 K/UL (ref 0.8–3.5)
LYMPHOCYTES NFR BLD: 9 % (ref 12–49)
MCH RBC QN AUTO: 28.7 PG (ref 26–34)
MCHC RBC AUTO-ENTMCNC: 30.2 G/DL (ref 30–36.5)
MCV RBC AUTO: 95 FL (ref 80–99)
MONOCYTES # BLD: 0.9 K/UL (ref 0–1)
MONOCYTES NFR BLD: 13 % (ref 5–13)
NEUTS SEG # BLD: 5.1 K/UL (ref 1.8–8)
NEUTS SEG NFR BLD: 77 % (ref 32–75)
NRBC # BLD: 0 K/UL (ref 0–0.01)
NRBC BLD-RTO: 0 PER 100 WBC
PLATELET # BLD AUTO: 120 K/UL (ref 150–400)
PMV BLD AUTO: 9.8 FL (ref 8.9–12.9)
POTASSIUM SERPL-SCNC: 3.9 MMOL/L (ref 3.5–5.1)
PROT SERPL-MCNC: 7 G/DL (ref 6.4–8.2)
RBC # BLD AUTO: 2.58 M/UL (ref 4.1–5.7)
RBC MORPH BLD: ABNORMAL
SAMPLES BEING HELD,HOLD: NORMAL
SODIUM SERPL-SCNC: 137 MMOL/L (ref 136–145)
WBC # BLD AUTO: 6.7 K/UL (ref 4.1–11.1)

## 2018-09-04 PROCEDURE — 80053 COMPREHEN METABOLIC PANEL: CPT | Performed by: STUDENT IN AN ORGANIZED HEALTH CARE EDUCATION/TRAINING PROGRAM

## 2018-09-04 PROCEDURE — 85025 COMPLETE CBC W/AUTO DIFF WBC: CPT | Performed by: STUDENT IN AN ORGANIZED HEALTH CARE EDUCATION/TRAINING PROGRAM

## 2018-09-04 PROCEDURE — 93005 ELECTROCARDIOGRAM TRACING: CPT

## 2018-09-04 PROCEDURE — 36415 COLL VENOUS BLD VENIPUNCTURE: CPT | Performed by: STUDENT IN AN ORGANIZED HEALTH CARE EDUCATION/TRAINING PROGRAM

## 2018-09-04 PROCEDURE — 99284 EMERGENCY DEPT VISIT MOD MDM: CPT

## 2018-09-04 RX ORDER — LEVOTHYROXINE SODIUM 25 UG/1
TABLET ORAL
Refills: 2 | COMMUNITY
Start: 2018-06-02 | End: 2018-11-15

## 2018-09-04 NOTE — PROGRESS NOTES
Chief Complaint   Patient presents with   [facility] Follow Up     GI bleed    Gout     left foot and right arm     1. Have you been to the ER, urgent care clinic since your last visit? Hospitalized since your last visit? No    2. Have you seen or consulted any other health care providers outside of the [facility] since your last visit? Include any pap smears or colon screening.  No

## 2018-09-04 NOTE — PATIENT INSTRUCTIONS
Learning About Blood Transfusions  What is a blood transfusion? Blood transfusion is a medical treatment to replace the blood or parts of blood that your body has lost. The blood goes through a tube from a bag to an intravenous (IV) catheter and into your vein. You may need a blood transfusion after losing blood from an injury, a major surgery, an illness that causes bleeding, or an illness that destroys blood cells. Transfusions are also used to give you the parts of blood-such as platelets, plasma, or substances that cause clotting-that your body needs to fight an illness or stop bleeding. How is a blood transfusion done? Before you receive a blood transfusion, your blood is tested to find out what your blood type is. Blood or blood parts that are a match with your blood type are ordered by your doctor. Blood is typed as A, B, AB, or O. It is also typed as Rh-positive or Rh-negative. Your blood is also screened to look for antibodies that might react with the blood that is given to you. The blood you are getting is checked and rechecked to make sure that it's the right type for you. A sample of your blood is mixed with a sample of the blood you will receive to check for problems. Before actually giving you the transfusion, a doctor and nurses will look at the label on the package of blood and compare it to your hospital ID bracelet and medical records. The transfusion begins only when all agree that this is the correct blood and that you are the correct person to receive it. To receive the transfusion, you will have an intravenous (IV) catheter inserted into a vein. A tube connects the catheter to the bag containing the blood, which is placed higher than your body. The blood then flows slowly into your vein. A doctor or nurse will check you several times during the transfusion to watch for a reaction or other problems. What are the possible risks?   Blood transfusions have many benefits and are often life-saving. But they also have a few risks. Possible risks include:  · Your body's reaction to receiving new blood. This may include:  ¨ Fever. ¨ Allergic reactions. ¨ Breathing problems. · An infection from the blood. This risk is small because of the strict rules placed on handling and storing blood. Getting a viral infection, such as HIV or hepatitis B or C, through blood transfusions has become very rare. The U.S. Food and Drug Administration (FDA) enforces strict guidelines on the collection, testing, storage, and use of blood. · Getting the wrong blood type by accident. Severe reactions, which can be life-threatening, are very rare. What can you expect after a blood transfusion? Here are some things you can do at home to help prevent infection at the transfusion site:  · Wash the area daily with warm, soapy water, and pat it dry. Don't use hydrogen peroxide or alcohol, which can slow healing. You may cover the area with a gauze bandage if it weeps or rubs against clothing. Change the bandage every day. · Keep the area clean and dry. When should you call for help? Call 911 anytime you think you may need emergency care. For example, call if:  · You have severe trouble breathing. Call your doctor now or seek immediate medical care if:  · You have a fever. · You feel weaker or more tired than usual.  · You have a yellow tint to your skin or the whites of your eyes. Watch closely for changes in your health, and be sure to contact your doctor if you have any problems. Follow-up care is a key part of your treatment and safety. Be sure to make and go to all appointments, and call your doctor if you are having problems. It's also a good idea to know your test results and keep a list of the medicines you take. Where can you learn more? Go to http://tanika-claudia.info/. Enter V509 in the search box to learn more about \"Learning About Blood Transfusions. \"  Current as of: October 9, 2017  Content Version: 11.7  © 9678-6763 [employer]. Care instructions adapted under license by [employer] (which disclaims liability or warranty for this information). If you have questions about a medical condition or this instruction, always ask your healthcare professional. [address] any warranty or liability for your use of this information.

## 2018-09-04 NOTE — MR AVS SNAPSHOT
Nancy Morgan 
 
 
 [address] 
[phone] Patient: Noreen Hughes MRN: [id number] :1945 Visit Information Date & Time Provider Department Dept. Phone Encounter #  
 2018  2:00 PM Karen Abdalla MD [address] [place] [phone] [id number] Follow-up Instructions Return if symptoms worsen or fail to improve. Your Appointments 11/15/2018  3:00 PM  
ESTABLISHED PATIENT with Chantell Ritchie MD  
[address]) Appt Note: 3 month fu  
 [place] [phone]  
[address] [place] [id number] Upcoming Health Maintenance Date Due DTaP/Tdap/Td series (1 - Tdap) 10/26/1968 ZOSTER VACCINE AGE 60> 2007 Pneumococcal 65+ Low/Medium Risk (1 of 2 - PCV13) 10/26/2012 FOOT EXAM Q1 2018 Influenza Age 5 to Adult 2018 MEDICARE YEARLY EXAM 11/10/2018* EYE EXAM RETINAL OR DILATED Q1 10/17/2018 MICROALBUMIN Q1 2019 LIPID PANEL Q1 2019 HEMOGLOBIN A1C Q6M 2019 GLAUCOMA SCREENING Q2Y 10/17/2019 *Topic was postponed. The date shown is not the original due date. Allergies as of 2018  Review Complete On: 2018 By: Kyle Morris No Known Allergies Current Immunizations  Reviewed on 2018 Name Date Influenza High Dose Vaccine PF 2017, 2016 Not reviewed this visit You Were Diagnosed With   
  
 Codes Comments Gastrointestinal hemorrhage, unspecified gastrointestinal hemorrhage type    -  Primary ICD-10-CM: K92.2 ICD-9-CM: 578.9 Vitals BP Pulse Temp Resp Height(growth percentile) Weight(growth percentile) 122/60 (BP 1 Location: Left arm, BP Patient Position: Sitting) 93 99.5 °F (37.5 °C) (Oral) 18 5' 10\" (1.778 m) 173 lb (78.5 kg) SpO2 BMI Smoking Status 98% 24.82 kg/m2 Never Smoker Vitals History BMI and BSA Data Body Mass Index Body Surface Area  
 24.82 kg/m 2 1.97 m 2 Preferred Pharmacy Pharmacy Name Phone [id number] [phone] Your Updated Medication List  
  
   
This list is accurate as of 9/4/18  2:33 PM.  Always use your most recent med list.  
  
  
  
  
 ARTIFICIAL TEARS([id number]) ophthalmic solution Generic drug:  dextran 70-hypromellose Administer 1 Drop to both eyes as needed. carvedilol 3.125 mg tablet Commonly known as:  Wileen Carney Take 1 Tab by mouth two (2) times daily (with meals). colchicine 0.6 mg tablet Take 0.6 mg by mouth daily as needed. digoxin 0.125 mg tablet Commonly known as:  LANOXIN Take 0.125 mg by mouth every other day. furosemide 40 mg tablet Commonly known as:  LASIX Take 40 mg by mouth daily. hydrALAZINE 25 mg tablet Commonly known as:  APRESOLINE Take 25 mg by mouth three (3) times daily. insulin glargine 100 unit/mL (3 mL) Inpn Commonly known as:  LANTUS,BASAGLAR  
14 Units by SubCUTAneous route nightly. leflunomide 20 mg tablet Commonly known as:  Ardella Odell Take 20 mg by mouth daily. levothyroxine 25 mcg tablet Commonly known as:  SYNTHROID  
TAKE 1 TABLET BY MOUTH EVERY DAY BEFORE BREAKFAST  
  
 loperamide 2 mg capsule Commonly known as:  IMODIUM Take 2 mg by mouth four (4) times daily as needed for Diarrhea.  
  
 multivitamin tablet Commonly known as:  ONE A DAY Take 1 Tab by mouth daily. omeprazole 20 mg capsule Commonly known as:  PRILOSEC Take 20 mg by mouth daily. pravastatin 20 mg tablet Commonly known as:  PRAVACHOL Take 1 Tab by mouth nightly. We Performed the Following AMB POC HEMOGLOBIN (HGB) [70362 CPT(R)] Follow-up Instructions Return if symptoms worsen or fail to improve. To-Do List   
 09/10/2018 10:00 AM  
  Appointment with FREDA INF5 CH3 <4H at [place] [address] ([phone])  
  
 09/24/2018 10:00 AM  
  Appointment with SS INF3 CH3 <4H at [place] [address] ([phone]) Patient Instructions Learning About Blood Transfusions What is a blood transfusion? Blood transfusion is a medical treatment to replace the blood or parts of blood that your body has lost. The blood goes through a tube from a bag to an intravenous (IV) catheter and into your vein. You may need a blood transfusion after losing blood from an injury, a major surgery, an illness that causes bleeding, or an illness that destroys blood cells. Transfusions are also used to give you the parts of blood-such as platelets, plasma, or substances that cause clotting-that your body needs to fight an illness or stop bleeding. How is a blood transfusion done? Before you receive a blood transfusion, your blood is tested to find out what your blood type is. Blood or blood parts that are a match with your blood type are ordered by your doctor. Blood is typed as A, B, AB, or O. It is also typed as Rh-positive or Rh-negative. Your blood is also screened to look for antibodies that might react with the blood that is given to you. The blood you are getting is checked and rechecked to make sure that it's the right type for you. A sample of your blood is mixed with a sample of the blood you will receive to check for problems. Before actually giving you the transfusion, a doctor and nurses will look at the label on the package of blood and compare it to your hospital ID bracelet and medical records. The transfusion begins only when all agree that this is the correct blood and that you are the correct person to receive it.  
To receive the transfusion, you will have an intravenous (IV) catheter inserted into a vein. A tube connects the catheter to the bag containing the blood, which is placed higher than your body. The blood then flows slowly into your vein. A doctor or nurse will check you several times during the transfusion to watch for a reaction or other problems. What are the possible risks? Blood transfusions have many benefits and are often life-saving. But they also have a few risks. Possible risks include: 
· Your body's reaction to receiving new blood. This may include: ¨ Fever. ¨ Allergic reactions. ¨ Breathing problems. · An infection from the blood. This risk is small because of the strict rules placed on handling and storing blood. Getting a viral infection, such as HIV or hepatitis B or C, through blood transfusions has become very rare. The U.S. Food and Drug Administration (FDA) enforces strict guidelines on the collection, testing, storage, and use of blood. · Getting the wrong blood type by accident. Severe reactions, which can be life-threatening, are very rare. What can you expect after a blood transfusion? Here are some things you can do at home to help prevent infection at the transfusion site: 
· Wash the area daily with warm, soapy water, and pat it dry. Don't use hydrogen peroxide or alcohol, which can slow healing. You may cover the area with a gauze bandage if it weeps or rubs against clothing. Change the bandage every day. · Keep the area clean and dry. When should you call for help? Call 911 anytime you think you may need emergency care. For example, call if: 
· You have severe trouble breathing. Call your doctor now or seek immediate medical care if: 
· You have a fever. · You feel weaker or more tired than usual. 
· You have a yellow tint to your skin or the whites of your eyes. Watch closely for changes in your health, and be sure to contact your doctor if you have any problems. Follow-up care is a key part of your treatment and safety.  Be sure to make and go to all appointments, and call your doctor if you are having problems. It's also a good idea to know your test results and keep a list of the medicines you take. Where can you learn more? Go to http://tanika-claudia.info/. Enter V588 in the search box to learn more about \"Learning About Blood Transfusions. \" Current as of: October 9, 2017 Content Version: 11.7 © 7802-3253 [employer]. Care instructions adapted under license by [employer] (which disclaims liability or warranty for this information). If you have questions about a medical condition or this instruction, always ask your healthcare professional. [address] any warranty or liability for your use of this information. Introducing [facility]! Dear Dominick Garcia: Thank you for requesting a [employer]YA[employer] account. Our records indicate that you already have an active UYA100 account. You can access your account anytime at [email or web address]. [email or web address]/[employer] Did you know that you can access your hospital and ER discharge instructions at any time in UYA100? You can also review all of your test results from your hospital stay or ER visit. Additional Information If you have questions, please visit the Frequently Asked Questions section of the UYA100 website at [email or web address]/. Remember, UYA100 is NOT to be used for urgent needs. For medical emergencies, dial 911. Now available from your iPhone and Android! Please provide this summary of care documentation to your next provider. Your primary care clinician is listed as Amy Thacker. If you have any questions after today's visit, please call [phone].

## 2018-09-04 NOTE — PROGRESS NOTES
Patient Name: Tayo Jefferson   MRN: [id number]    Fariba Smith is a 81 y.o. male who presents with the following:     Transition Care Management:    Admission: 8/29/18  Discharge: 8/30/18  Location: [address] [place] [address]  Diagnosis: Acute anemia secondary to GI bleed. Nurse Navigator note: reviewed    We reviewed the records. He presented with lower GI bleed secondary to recent colonoscopy with polypectomy ×6 and Eliquis use. He underwent another colonoscopy during hospital which showed old heme and solid stool with poor preparation but no active bleeding was noted. He received 2 units of packed red blood cells. He continues to hold his apixaban. He is taking his medications as directed & without side effects. These symptoms show no change. He states that 2 days ago, he cut his lower leg on a rock while mowing the lawn and it profusely bled; he estimates about 1/4 pint of blood loss at the time. States he has felt overall fatigue but currently has a gout flareup in his right hand. Denies any active bleeding in his stool. Lab Results   Component Value Date/Time    Hemoglobin (POC) 6.2 09/04/2018 02:17 PM    HGB 7.5 (L) 08/30/2018 12:48 PM       Review of Systems   Constitutional: Positive for malaise/fatigue. Negative for fever and weight loss. Respiratory: Negative for cough, hemoptysis, shortness of breath and wheezing. Cardiovascular: Negative for chest pain, palpitations, leg swelling and PND. Gastrointestinal: Negative for abdominal pain, constipation, diarrhea, nausea and vomiting. Endo/Heme/Allergies: Bruises/bleeds easily. The patient's medications, allergies, past medical history, surgical history, family history and social history were reviewed and updated where appropriate. Prior to Admission medications    Medication Sig Start Date End Date Taking?  Authorizing Provider   insulin glargine (LANTUS,BASAGLAR) 100 unit/mL (3 mL) inpn 14 Units by SubCUTAneous route nightly. Yes Historical Provider   leflunomide (ARAVA) 20 mg tablet Take 20 mg by mouth daily. Yes Historical Provider   colchicine 0.6 mg tablet Take 0.6 mg by mouth daily as needed. Yes Historical Provider   digoxin (LANOXIN) 0.125 mg tablet Take 0.125 mg by mouth every other day. Yes Historical Provider   dextran 70-hypromellose (ARTIFICIAL TEARS,[id number],) ophthalmic solution Administer 1 Drop to both eyes as needed. Yes Historical Provider   loperamide (IMODIUM) 2 mg capsule Take 2 mg by mouth four (4) times daily as needed for Diarrhea. Yes Historical Provider   furosemide (LASIX) 40 mg tablet Take 40 mg by mouth daily. 5/10/16  Yes Historical Provider   hydrALAZINE (APRESOLINE) 25 mg tablet Take 25 mg by mouth three (3) times daily. 5/20/16  Yes Historical Provider   omeprazole (PRILOSEC) 20 mg capsule Take 20 mg by mouth daily. Yes Historical Provider   multivitamin (ONE A DAY) tablet Take 1 Tab by mouth daily. Yes Historical Provider   pravastatin (PRAVACHOL) 20 mg tablet Take 1 Tab by mouth nightly. 1/18/16  Yes Aguila Curry NP   carvedilol (COREG) 3.125 mg tablet Take 1 Tab by mouth two (2) times daily (with meals). 1/18/16  Yes Aguila Curry NP   levothyroxine (SYNTHROID) 25 mcg tablet TAKE 1 TABLET BY MOUTH EVERY DAY BEFORE BREAKFAST 6/2/18   Historical Provider       No Known Allergies        OBJECTIVE    Visit Vitals    /60 (BP 1 Location: Left arm, BP Patient Position: Sitting)    Pulse 93    Temp 99.5 °F (37.5 °C) (Oral)    Resp 18    Ht 5' 10\" (1.778 m)    Wt 173 lb (78.5 kg)    SpO2 98%    BMI 24.82 kg/m2       Physical Exam   Constitutional: He is oriented to person, place, and time and well-developed, well-nourished, and in no distress. No distress. Eyes: Conjunctivae and EOM are normal. Pupils are equal, round, and reactive to light. Cardiovascular: Normal rate, regular rhythm and normal heart sounds.   Exam reveals no gallop and no friction rub. No murmur heard. Pulmonary/Chest: Effort normal and breath sounds normal. No respiratory distress. He has no wheezes. Musculoskeletal: He exhibits edema (Swelling of the right hand). Neurological: He is alert and oriented to person, place, and time. Skin: Skin is warm and dry. No rash noted. He is not diaphoretic. Prior superficial wound noted on left anterior shin with old blood scabbed over. No tenderness to touch. Mild erythema. Psychiatric: Mood, memory, affect and judgment normal.   Nursing note and vitals reviewed. ASSESSMENT AND PLAN  Patricia Ellsworth is a 81 y.o. male who presents today for:    1. Hospital discharge follow-up  Unfortunately, his hemoglobin is back down to 6.2, which was what sent him to the hospital the first time. Suspect that he lost additional blood over the weekend with his recent wound as well as possibly some ongoing lower GI bleeding. Advised patient to return to the ER for likely blood transfusion and close monitoring of H/H. Continue to hold apixiban. Pt was offered ambulance transport to [facility] ER. After reviewing risks and benefits of immediate ambulance transport, pt declined while understanding risks of refusing EMS transport and pt will plan to arrange self-transport. Informed refusal form signed and scanned into EHR. 2. Acute blood loss anemia  Not resolved, as above. - AMB POC HEMOGLOBIN (HGB)    3. Acute gout of right hand, unspecified cause  May be managed inpatient if admitted, otherwise f/u with rheumatology. There are no discontinued medications. Follow-up Disposition:  Return if symptoms worsen or fail to improve. Medication risks/benefits/costs/interactions/alternatives discussed with patient.   Advised patient to call back or return to office if symptoms worsen/change/persist. If patient cannot reach us or should anything more severe/urgent arise he/she should proceed directly to the nearest emergency department. Discussed expected course/resolution/complications of diagnosis in detail with patient. Patient given a written after visit summary which includes his/her diagnoses, current medications and vitals. Patient expressed understanding with the diagnosis and plan.      Derek Mack M.D.

## 2018-09-05 ENCOUNTER — PATIENT OUTREACH (OUTPATIENT)
Dept: OTHER | Age: 73
End: 2018-09-05

## 2018-09-05 LAB
ATRIAL RATE: 416 BPM
CALCULATED R AXIS, ECG10: 29 DEGREES
CALCULATED T AXIS, ECG11: 130 DEGREES
DIAGNOSIS, 93000: NORMAL
Q-T INTERVAL, ECG07: 356 MS
QRS DURATION, ECG06: 92 MS
QTC CALCULATION (BEZET), ECG08: 426 MS
VENTRICULAR RATE, ECG03: 86 BPM

## 2018-09-05 NOTE — DISCHARGE INSTRUCTIONS
Iron Deficiency Anemia: Care Instructions  Your Care Instructions    Anemia means that you do not have enough red blood cells. Red blood cells carry oxygen around your body. When you have anemia, it can make you pale, weak, and tired. Many things can cause anemia. The most common cause is loss of blood. This can happen if you have heavy menstrual periods. It can also happen if you have bleeding in your stomach or bowel. You can also get anemia if you don't have enough iron in your diet or if it's hard for your body to absorb iron. In some cases, pregnancy causes anemia. That's because a pregnant woman needs more iron. Your doctor may do more tests to find the cause of your anemia. If a disease or other health problem is causing it, your doctor will treat that problem. It's important to follow up with your doctor to make sure that your iron level returns to normal.  Follow-up care is a key part of your treatment and safety. Be sure to make and go to all appointments, and call your doctor if you are having problems. It's also a good idea to know your test results and keep a list of the medicines you take. How can you care for yourself at home? · If your doctor recommended iron pills, take them as directed. ¨ Try to take the pills on an empty stomach. You can do this about 1 hour before or 2 hours after meals. But you may need to take iron with food to avoid an upset stomach. ¨ Do not take antacids or drink milk or anything with caffeine within 2 hours of when you take your iron. They can keep your body from absorbing the iron well. ¨ Vitamin C helps your body absorb iron. You may want to take iron pills with a glass of orange juice or some other food high in vitamin C.  ¨ Iron pills may cause stomach problems. These include heartburn, nausea, diarrhea, constipation, and cramps. It can help to drink plenty of fluids and include fruits, vegetables, and fiber in your diet.   ¨ It's normal for iron pills to make your stool a greenish or grayish black. But internal bleeding can also cause dark stool. So it's important to tell your doctor about any color changes. ¨ Call your doctor if you think you are having a problem with your iron pills. Even after you start to feel better, it will take several months for your body to build up its supply of iron. ¨ If you miss a pill, don't take a double dose. ¨ Keep iron pills out of the reach of small children. Too much iron can be very dangerous. · Eat foods with a lot of iron. These include red meat, shellfish, poultry, and eggs. They also include beans, raisins, whole-grain bread, and leafy green vegetables. · Steam your vegetables. This is the best way to prepare them if you want to get as much iron as possible. · Be safe with medicines. Do not take nonsteroidal anti-inflammatory pain relievers unless your doctor tells you to. These include aspirin, naproxen (Aleve), and ibuprofen (Advil, Motrin). · Liquid iron can stain your teeth. But you can mix it with water or juice and drink it with a straw. Then it won't get on your teeth. When should you call for help? Call 911 anytime you think you may need emergency care. For example, call if:    · You passed out (lost consciousness).    Call your doctor now or seek immediate medical care if:    · You are short of breath.     · You are dizzy or light-headed, or you feel like you may faint.     · You have new or worse bleeding.    Watch closely for changes in your health, and be sure to contact your doctor if:    · You feel weaker or more tired than usual.     · You do not get better as expected. Where can you learn more? Go to http://tanika-claudia.info/. Enter H967 in the search box to learn more about \"Iron Deficiency Anemia: Care Instructions. \"  Current as of: October 9, 2017  Content Version: 11.7  © 3672-2818 [employer].  Care instructions adapted under license by [employer] (which disclaims liability or warranty for this information). If you have questions about a medical condition or this instruction, always ask your healthcare professional. [address] any warranty or liability for your use of this information.

## 2018-09-05 NOTE — ED TRIAGE NOTES
Patient arrives from home for abnormal labs due to GI bleed. Patient was recently admitted due to GI bleed at [facility]. Received 2 units of blood was discharged and followed up with his PCP today. Reports 6.2 hemoglobin today and was told to come to the ER. Patient alert and oriented x4. Denies dizziness, chest pain or shortness of breath. Reports generalized weakness but denies any dark stools. Ambulatory to triage.

## 2018-09-05 NOTE — ED NOTES
Discharge instructions given to patient by  nurse. Pt has been given counseling on follow-up and verbalizes understanding. IV D/C. Pt ambulated off of unit in no signs of distress.

## 2018-09-05 NOTE — PROGRESS NOTES
Follow up phone call to patient, two pt identifiers verified. Discussed patient's goals: Goals  Attends follow-up appointments as directed. 9/4 follow-up with Dr. Rogelio Herrera for repeat labs and discussion of restarting Eliquis Pt to contact cardiologist- Dr. Sandrine Murillo office to inform them of hospitalization and discontinued Eliquis  Prevent complications post hospitalization. Risk for poor tissue perfusion related to increased risk of bleeding- pt states bleeding resolved, feeling very energetic after PRBC transfusion on 8/30- stopped eliquis will restart after follow-up labs  Understands red flags post discharge. Call 911 anytime you think you may need emergency care. For example, call if: 
  You have sudden, severe belly pain. You vomit blood or what looks like coffee grounds. You passed out (lost consciousness). Your stools are maroon or very bloody. Call your doctor now or seek immediate medical care if: 
  You are dizzy or lightheaded, or you feel like you may faint. Your stools are black and look like tar, or they have streaks of blood. You have belly pain. You vomit or have nausea. You have trouble swallowing, or it hurts when you swallow. Watch closely for changes in your health, and be sure to contact your doctor if: You do not get better as expected. Pt reports that he had follow up with Dr. Venkatesh Bonilla and Dr. Rogelio Herrera, Hgb was rechecked at PCP office and was found to be 6.2- pt was referred to ED, where labs were rechecked and Hgb was 7.4, no transfusion needed. Pt states he is relieved, but has not started Apixiban at this time, will be contacting provider in regards to when he is to restart. Denied red flag symptoms of anemia-though he is a little fatigued, denies SOB, increasing fatigue, or dizziness or lightheadedness. This CM encouraged pt to contact provider and to inform this CM if unable to get response. Readiness to Change: []  Pre-contemplative    []  Contemplative 
[]  Preparation               [x]  Action                  []  Maintenance Barriers/Challenges to Care: []  Decline in memory    []  Language barrier    
[]  Emotional                  []  Limited mobility 
[]  Lack of motivation     [] Vision, hearing or cognitive impairment []  Knowledge [] Financial Barriers []  Lack of support  []  Pain [x]  Other-fagtigue  []  None Key pt activities to achieve better health:  
[]  Weight loss 
[]  Improved diabetic control 
[]  Decreased cholesterol levels 
[]  Decreased blood pressure [x]  Stable H&H post GI bleed 
[] Upcoming appointments: Future Appointments Date Time Provider Xiomara Velez 9/10/2018 10:00 AM [facility]  
9/24/2018 10:00 AM SS INF4 CH4 <4H [facility]  
11/15/2018 3:00 PM Amie Polk MD [address] [facility] Plan for next call: follow up in one week

## 2018-09-05 NOTE — ED PROVIDER NOTES
HPI Comments: 81 y.o. male with past medical history significant for AFIB, CAD, cardiomyopathy, DM with retinopathy, HTN, h/o TIA, carotid stenosis, Painter's esophagus, anemia, who presents ambulatory to the ED accompanied by wife, for evaluation of abnormal lab results. Patient reports that he had a colonoscopy performed 8/6/18 with polypectomy x 6 by Dr. Oswald Cespedes. Had rectal bleeding following the procedure, so was admitted to the hospital 8/29-8/30/18 for a GI bleed and acute blood loss anemia. It was suspected that the bleeding was from the polypectomy site exacerbated by Eliquis use. Patient states that he had a repeat colonoscopy performed on 8/30 by Dr. Sabi Philippe that showed old heme and solid stool throughout the colon without active bleeding noted. Patient also had a blood transfusion while admitted and was instructed to hold his Eliquis x 1 week. Hgb prior to discharge on 8/30 was 7.5. Patient states that he followed up with his PCP earlier today for repeat Hgb to see if he could re-start his Eliquis, but the Hgb was measured to be 6.2. Due to the lower Hgb, his PCP recommended that he continue holding the Eliquis and to come to the ED for further evaluation. Patient denies any lightheadedness or dizziness, but states that he feels generally weak. Also denies any melena, hematochezia, or rectal bleeding. Notes that he has joint pain d/t gout, but denies any chest pain, abdominal pain, headache, or back pain. Patient specifically denies fevers, chills, congestion, cough, shortness of breath, nausea, vomiting, diarrhea, constipation, neck pain, difficulty urinating, dysuria, or skin rash. There are no other acute medical concerns at this time. Social hx: Negative for Tobacco use; Positive for EtOH use (2 drinks per week on average); Negative for Illicit Drug Abuse PCP: Alberta Ramirez MD 
 
Note written by Meche Tabares, as dictated by Allyssa Pacheco MD 10:50 PM  
 
 The history is provided by the spouse, the patient and medical records. No  was used. Past Medical History:  
Diagnosis Date  Acute encephalopathy 1/14/2016  Anemia 5/11/2017  Atrial fibrillation ([facility]  Painter esophagus  CAD (coronary artery disease)  Diabetes ([facility]  Heart failure ([facility] [address] Cardiomyopathy, myocarditis  HTN, goal below 140/90  Retinopathy, diabetic, bilateral ([facility] [address] 3/11/2016  Stenosis of right carotid artery without cerebral infarction 1/18/2016  TIA (transient ischemic attack) 1/14/2016  Vitamin D deficiency 3/1/2016 Nephrology ordered and follow 2/22/16 Past Surgical History:  
Procedure Laterality Date  COLONOSCOPY N/A 6/23/2016 COLONOSCOPY performed by Elizabeth Sanches MD at [facility] ENDOSCOPY  COLONOSCOPY N/A 8/6/2018 COLONOSCOPY performed by Elizabeth Sanches MD at [facility] ENDOSCOPY  COLONOSCOPY N/A 8/30/2018 COLONOSCOPY performed by Michelle May MD at [address] [place] [address] HX ENDOSCOPY  06/27/2016 CAPSULE; normal small bowel  HX ENDOSCOPY  06/23/2016  
 upper endoscopy  HX UROLOGICAL  2013 Family History:  
Problem Relation Age of Onset  Heart Failure Mother  Diabetes Father  No Known Problems Sister  Other Daughter Charlett Heads  Other Daughter Charlett Heads  Other Daughter Charlett Heads Social History Social History  Marital status:  Spouse name: N/A  
 Number of children: N/A  
 Years of education: N/A Occupational History  Not on file. Social History Main Topics  Smoking status: Never Smoker  Smokeless tobacco: Never Used  Alcohol use Yes Comment: 2 drinks/week, never a heavy drinker  Drug use: No  
 Sexual activity: Not Currently Other Topics Concern  Not on file Social History Narrative  Generally active, works out in the yard often ALLERGIES: Review of patient's allergies indicates no known allergies. Review of Systems Constitutional: Negative for chills and fever. HENT: Negative for congestion. Respiratory: Negative for cough and shortness of breath. Cardiovascular: Negative for chest pain. Gastrointestinal: Negative for abdominal pain, anal bleeding, blood in stool, constipation, diarrhea, nausea and vomiting. Genitourinary: Negative for difficulty urinating and dysuria. Musculoskeletal: Positive for arthralgias. Negative for back pain and neck pain. Skin: Negative for rash. Neurological: Positive for weakness (generalized). Negative for dizziness, light-headedness and headaches. All other systems reviewed and are negative. Vitals:  
 09/04/18 2214 09/04/18 2230 09/04/18 2300 09/04/18 2341 BP:  158/50 152/62 154/66 Pulse:  88 89 88 Resp:  16 17 16 Temp:   98.4 °F (36.9 °C) 98.4 °F (36.9 °C) SpO2: 99% 98% 99% 99% Weight:      
Height:      
      
 
Physical Exam  
Nursing note and vitals reviewed. CONSTITUTIONAL: Well-appearing; well-nourished; in no apparent distress HEAD: Normocephalic; atraumatic EYES: PERRL; EOM intact; conjunctiva and sclera are clear bilaterally. ENT: No rhinorrhea; normal pharynx with no tonsillar hypertrophy; mucous membranes pink/moist, no erythema, no exudate. NECK: Supple; non-tender; no cervical lymphadenopathy CARD: Normal S1, S2; no murmurs, rubs, or gallops. Regular rate and rhythm. RESP: Normal respiratory effort; breath sounds clear and equal bilaterally; no wheezes, rhonchi, or rales. ABD: Normal bowel sounds; non-distended; non-tender; no palpable organomegaly, no masses, no bruits. Back Exam: Normal inspection; no vertebral point tenderness, no CVA tenderness. Normal range of motion. EXT: Normal ROM in all four extremities; non-tender to palpation; no swelling or deformity; distal pulses are normal, no edema. SKIN: Warm; dry; no rash. NEURO:Alert and oriented x 3, coherent, ISABEL-XII grossly intact, sensory and motor are non-focal. 
 
 
 
MDM Number of Diagnoses or Management Options Acute blood loss anemia:  
Gastrointestinal hemorrhage, unspecified gastrointestinal hemorrhage type:  
Diagnosis management comments: Assessment: 77-year-old, male, who presents with decreased hemoglobin since last admission, during which time he was eventually transfused secondarily to acute loss anemia, resulting from removal of chronic polyps, he was undergoing a colonoscopy and was on Eliquis. The patient had a repeat hemoglobin and PCP office  And was 6.2. He denies any bloody stools, and appears hemodynamically stable at this time. Plan: lab/ IV fluid/ antiemetics and analgesia/ serial exam/ Monitor and Reevaluate. Amount and/or Complexity of Data Reviewed Clinical lab tests: ordered and reviewed Tests in the radiology section of CPT®: ordered and reviewed Tests in the medicine section of CPT®: reviewed and ordered Risk of Complications, Morbidity, and/or Mortality Presenting problems: moderate Diagnostic procedures: moderate Management options: moderate Critical Care Total time providing critical care: (Total critical care time spent exclusive of procedures: 45 minutes also it should) ED Course Procedures Progress Note:  
Pt has been reexamined by Santosh Turner MD. Pt is feeling much better. Symptoms have improved. All available results have been reviewed with pt and any available family. Pt understands sx, dx, and tx in ED. Care plan has been outlined and questions have been answered. Pt is ready to go home. Will send home on  Chronic anemia, secondary to GI bleed requiring transfusion. Outpatient referral with PCP as needed. Written by Santosh Turner MD,10:51 PM 
 
. [facility]

## 2018-09-06 ENCOUNTER — TELEPHONE (OUTPATIENT)
Dept: FAMILY MEDICINE CLINIC | Age: 73
End: 2018-09-06

## 2018-09-06 NOTE — TELEPHONE ENCOUNTER
----- Message from Kimber Tinsley sent at 9/6/2018  1:41 PM EDT -----  Regarding: Dr. Sherrie Fisher [facility] cardiology) Dr. Jude Bruce office) requesting Pt's recent lab results sent over to that office (Fax) [phone]. Best contact [phone].

## 2018-09-10 ENCOUNTER — HOSPITAL ENCOUNTER (OUTPATIENT)
Dept: INFUSION THERAPY | Age: 73
Discharge: HOME | End: 2018-09-10
Payer: COMMERCIAL

## 2018-09-10 VITALS
RESPIRATION RATE: 16 BRPM | WEIGHT: 177.1 LBS | TEMPERATURE: 97.9 F | DIASTOLIC BLOOD PRESSURE: 110 MMHG | BODY MASS INDEX: 25.41 KG/M2 | HEART RATE: 70 BPM | SYSTOLIC BLOOD PRESSURE: 200 MMHG | OXYGEN SATURATION: 98 %

## 2018-09-10 LAB
ALBUMIN SERPL-MCNC: 2.9 G/DL (ref 3.5–5)
ALBUMIN/GLOB SERPL: 0.8 {RATIO} (ref 1.1–2.2)
ALP SERPL-CCNC: 82 U/L (ref 45–117)
ALT SERPL-CCNC: 28 U/L (ref 12–78)
ANION GAP SERPL CALC-SCNC: 9 MMOL/L (ref 5–15)
AST SERPL-CCNC: 23 U/L (ref 15–37)
BASOPHILS # BLD: 0 K/UL (ref 0–0.1)
BASOPHILS NFR BLD: 1 % (ref 0–1)
BILIRUB SERPL-MCNC: 0.4 MG/DL (ref 0.2–1)
BUN SERPL-MCNC: 42 MG/DL (ref 6–20)
BUN/CREAT SERPL: 27 (ref 12–20)
CALCIUM SERPL-MCNC: 8.6 MG/DL (ref 8.5–10.1)
CHLORIDE SERPL-SCNC: 107 MMOL/L (ref 97–108)
CO2 SERPL-SCNC: 27 MMOL/L (ref 21–32)
CREAT SERPL-MCNC: 1.58 MG/DL (ref 0.7–1.3)
DIFFERENTIAL METHOD BLD: ABNORMAL
EOSINOPHIL # BLD: 0.1 K/UL (ref 0–0.4)
EOSINOPHIL NFR BLD: 5 % (ref 0–7)
ERYTHROCYTE [DISTWIDTH] IN BLOOD BY AUTOMATED COUNT: 13.7 % (ref 11.5–14.5)
ERYTHROCYTE [SEDIMENTATION RATE] IN BLOOD: 70 MM/HR (ref 0–20)
GLOBULIN SER CALC-MCNC: 3.8 G/DL (ref 2–4)
GLUCOSE SERPL-MCNC: 93 MG/DL (ref 65–100)
HCT VFR BLD AUTO: 23.6 % (ref 36.6–50.3)
HGB BLD-MCNC: 7.1 G/DL (ref 12.1–17)
IMM GRANULOCYTES # BLD: 0 K/UL (ref 0–0.04)
IMM GRANULOCYTES NFR BLD AUTO: 0 % (ref 0–0.5)
LYMPHOCYTES # BLD: 0.4 K/UL (ref 0.8–3.5)
LYMPHOCYTES NFR BLD: 13 % (ref 12–49)
MCH RBC QN AUTO: 27.8 PG (ref 26–34)
MCHC RBC AUTO-ENTMCNC: 30.1 G/DL (ref 30–36.5)
MCV RBC AUTO: 92.5 FL (ref 80–99)
MONOCYTES # BLD: 0.3 K/UL (ref 0–1)
MONOCYTES NFR BLD: 12 % (ref 5–13)
NEUTS SEG # BLD: 2 K/UL (ref 1.8–8)
NEUTS SEG NFR BLD: 69 % (ref 32–75)
NRBC # BLD: 0 K/UL (ref 0–0.01)
NRBC BLD-RTO: 0 PER 100 WBC
PLATELET # BLD AUTO: 139 K/UL (ref 150–400)
PLATELET COMMENTS,PCOM: ABNORMAL
PMV BLD AUTO: 9.8 FL (ref 8.9–12.9)
POTASSIUM SERPL-SCNC: 3.9 MMOL/L (ref 3.5–5.1)
PROT SERPL-MCNC: 6.7 G/DL (ref 6.4–8.2)
RBC # BLD AUTO: 2.55 M/UL (ref 4.1–5.7)
RBC MORPH BLD: ABNORMAL
SODIUM SERPL-SCNC: 143 MMOL/L (ref 136–145)
URATE SERPL-MCNC: <0.2 MG/DL (ref 3.5–7.2)
WBC # BLD AUTO: 2.8 K/UL (ref 4.1–11.1)

## 2018-09-10 PROCEDURE — 96366 THER/PROPH/DIAG IV INF ADDON: CPT

## 2018-09-10 PROCEDURE — 84550 ASSAY OF BLOOD/URIC ACID: CPT | Performed by: INTERNAL MEDICINE

## 2018-09-10 PROCEDURE — 74011250636 HC RX REV CODE- 250/636: Performed by: INTERNAL MEDICINE

## 2018-09-10 PROCEDURE — 74011000250 HC RX REV CODE- 250: Performed by: INTERNAL MEDICINE

## 2018-09-10 PROCEDURE — 80053 COMPREHEN METABOLIC PANEL: CPT | Performed by: INTERNAL MEDICINE

## 2018-09-10 PROCEDURE — 85652 RBC SED RATE AUTOMATED: CPT | Performed by: INTERNAL MEDICINE

## 2018-09-10 PROCEDURE — 85025 COMPLETE CBC W/AUTO DIFF WBC: CPT | Performed by: INTERNAL MEDICINE

## 2018-09-10 PROCEDURE — 36415 COLL VENOUS BLD VENIPUNCTURE: CPT | Performed by: INTERNAL MEDICINE

## 2018-09-10 PROCEDURE — 96365 THER/PROPH/DIAG IV INF INIT: CPT

## 2018-09-10 PROCEDURE — 96375 TX/PRO/DX INJ NEW DRUG ADDON: CPT

## 2018-09-10 PROCEDURE — 74011250637 HC RX REV CODE- 250/637: Performed by: INTERNAL MEDICINE

## 2018-09-10 RX ORDER — SODIUM CHLORIDE 0.9 % (FLUSH) 0.9 %
10 SYRINGE (ML) INJECTION AS NEEDED
Status: ACTIVE | OUTPATIENT
Start: 2018-09-10 | End: 2018-09-11

## 2018-09-10 RX ORDER — DIPHENHYDRAMINE HCL 25 MG
25 CAPSULE ORAL ONCE
Status: COMPLETED | OUTPATIENT
Start: 2018-09-10 | End: 2018-09-10

## 2018-09-10 RX ORDER — ACETAMINOPHEN 325 MG/1
650 TABLET ORAL ONCE
Status: COMPLETED | OUTPATIENT
Start: 2018-09-10 | End: 2018-09-10

## 2018-09-10 RX ADMIN — Medication 10 ML: at 10:41

## 2018-09-10 RX ADMIN — PEGLOTICASE 8 MG: 8 INJECTION, SOLUTION INTRAVENOUS at 11:30

## 2018-09-10 RX ADMIN — ACETAMINOPHEN 650 MG: 325 TABLET ORAL at 10:49

## 2018-09-10 RX ADMIN — DIPHENHYDRAMINE HYDROCHLORIDE 25 MG: 25 CAPSULE ORAL at 10:49

## 2018-09-10 RX ADMIN — METHYLPREDNISOLONE SODIUM SUCCINATE 125 MG: 125 INJECTION, POWDER, FOR SOLUTION INTRAMUSCULAR; INTRAVENOUS at 10:49

## 2018-09-10 RX ADMIN — FAMOTIDINE 20 MG: 10 INJECTION, SOLUTION INTRAVENOUS at 10:49

## 2018-09-10 NOTE — PROGRESS NOTES
[facility] VISIT NOTE 
 
1020 Pt arrived at [facility] ambulatory and in no distress for Krystexxa infusion. Assessment completed, pt c/o right wrist gout pain and fatigue. Patient was in the ED on 8/29/18 and 9/4/18 for GI bleed. Dr. Jeovany Ramirez gave a verbal ok to treat order and said we do not need to wait for lab results before treating. Blood pressure 111/53, pulse 93, temperature 98.1 °F (36.7 °C), resp. rate 16, weight 80.3 kg (177 lb 1.6 oz). 24 G PIV placed in left arm, flushes easily with positive blood return. Labs drawn. Recent Results (from the past 12 hour(s)) CBC WITH AUTOMATED DIFF Collection Time: 09/10/18 10:40 AM  
Result Value Ref Range WBC 2.8 (L) 4.1 - 11.1 K/uL  
 RBC 2.55 (L) 4.10 - 5.70 M/uL HGB 7.1 (L) 12.1 - 17.0 g/dL HCT 23.6 (L) 36.6 - 50.3 % MCV 92.5 80.0 - 99.0 FL  
 MCH 27.8 26.0 - 34.0 PG  
 MCHC 30.1 30.0 - 36.5 g/dL  
 RDW 13.7 11.5 - 14.5 % PLATELET 343 (L) 230 - 400 K/uL MPV 9.8 8.9 - 12.9 FL  
 NRBC 0.0 0  WBC ABSOLUTE NRBC 0.00 0.00 - 0.01 K/uL NEUTROPHILS 69 32 - 75 % LYMPHOCYTES 13 12 - 49 % MONOCYTES 12 5 - 13 % EOSINOPHILS 5 0 - 7 % BASOPHILS 1 0 - 1 % IMMATURE GRANULOCYTES 0 0.0 - 0.5 % ABS. NEUTROPHILS 2.0 1.8 - 8.0 K/UL  
 ABS. LYMPHOCYTES 0.4 (L) 0.8 - 3.5 K/UL  
 ABS. MONOCYTES 0.3 0.0 - 1.0 K/UL  
 ABS. EOSINOPHILS 0.1 0.0 - 0.4 K/UL  
 ABS. BASOPHILS 0.0 0.0 - 0.1 K/UL  
 ABS. IMM. GRANS. 0.0 0.00 - 0.04 K/UL  
 DF SMEAR SCANNED    
 PLATELET COMMENTS Large Platelets RBC COMMENTS NORMOCYTIC, NORMOCHROMIC METABOLIC PANEL, COMPREHENSIVE Collection Time: 09/10/18 10:40 AM  
Result Value Ref Range Sodium 143 136 - 145 mmol/L Potassium 3.9 3.5 - 5.1 mmol/L Chloride 107 97 - 108 mmol/L  
 CO2 27 21 - 32 mmol/L Anion gap 9 5 - 15 mmol/L Glucose 93 65 - 100 mg/dL BUN 42 (H) 6 - 20 MG/DL  Creatinine 1.58 (H) 0.70 - 1.30 MG/DL  
 BUN/Creatinine ratio 27 (H) 12 - 20    
 GFR est AA 53 (L) >60 ml/min/1.73m2 GFR est non-AA 44 (L) >60 ml/min/1.73m2 Calcium 8.6 8.5 - 10.1 MG/DL Bilirubin, total 0.4 0.2 - 1.0 MG/DL  
 ALT (SGPT) 28 12 - 78 U/L  
 AST (SGOT) 23 15 - 37 U/L Alk. phosphatase 82 45 - 117 U/L Protein, total 6.7 6.4 - 8.2 g/dL Albumin 2.9 (L) 3.5 - 5.0 g/dL Globulin 3.8 2.0 - 4.0 g/dL A-G Ratio 0.8 (L) 1.1 - 2.2 URIC ACID Collection Time: 09/10/18 10:40 AM  
Result Value Ref Range Uric acid <0.2 (L) 3.5 - 7.2 MG/DL  
SED RATE (ESR) Collection Time: 09/10/18 10:40 AM  
Result Value Ref Range Sed rate, automated 70 (H) 0 - 20 mm/hr Medications received: 
Tylenol PO Benadryl PO Solumedrol IVP Pepcid IVP Mendez Halter Patient Vitals for the past 12 hrs: 
 Temp Pulse Resp BP SpO2  
09/10/18 1315 - - 16 (!) 243/112 -  
09/10/18 1310 - 72 16 (!) 212/92 98 % 09/10/18 1235 97.9 °F (36.6 °C) 98 16 170/77 98 % 09/10/18 1206 98.9 °F (37.2 °C) 99 16 141/88 -  
09/10/18 1029 98.1 °F (36.7 °C) 93 16 111/53 -  
 
BP was elevated in the last few minutes of the infusion. Patient was asymptomatic with no complaints. He said he took his BP meds this morning. Dr Andrews Melgoza was notified and ok with the patient's plan to monitor BP at home and call his PCP, cardiologist or nephrologist if it continues to stay elevated. Copy of the BP readings and labs from today given to the patient. PIV flushed and removed. (31) 400-548 D/C'd from [facility] ambulatory and in no distress.  Next appointment is 9/24/18 at 10am.

## 2018-09-14 ENCOUNTER — PATIENT OUTREACH (OUTPATIENT)
Dept: OTHER | Age: 73
End: 2018-09-14

## 2018-09-18 RX ORDER — ACETAMINOPHEN 325 MG/1
650 TABLET ORAL ONCE
Status: COMPLETED | OUTPATIENT
Start: 2018-09-24 | End: 2018-09-24

## 2018-09-18 RX ORDER — DIPHENHYDRAMINE HCL 25 MG
25 CAPSULE ORAL ONCE
Status: COMPLETED | OUTPATIENT
Start: 2018-09-24 | End: 2018-09-24

## 2018-09-24 ENCOUNTER — HOSPITAL ENCOUNTER (OUTPATIENT)
Dept: NON INVASIVE DIAGNOSTICS | Age: 73
Discharge: HOME | End: 2018-09-24
Attending: INTERNAL MEDICINE
Payer: COMMERCIAL

## 2018-09-24 ENCOUNTER — HOSPITAL ENCOUNTER (OUTPATIENT)
Dept: VASCULAR SURGERY | Age: 73
Discharge: HOME | End: 2018-09-24
Attending: INTERNAL MEDICINE
Payer: COMMERCIAL

## 2018-09-24 ENCOUNTER — HOSPITAL ENCOUNTER (OUTPATIENT)
Dept: INFUSION THERAPY | Age: 73
Discharge: HOME | End: 2018-09-24
Payer: COMMERCIAL

## 2018-09-24 VITALS
HEART RATE: 92 BPM | TEMPERATURE: 96.7 F | OXYGEN SATURATION: 99 % | RESPIRATION RATE: 18 BRPM | DIASTOLIC BLOOD PRESSURE: 96 MMHG | SYSTOLIC BLOOD PRESSURE: 182 MMHG

## 2018-09-24 DIAGNOSIS — I65.29 CAROTID STENOSIS: ICD-10-CM

## 2018-09-24 DIAGNOSIS — I11.9 HYPERTENSIVE HEART DISEASE: ICD-10-CM

## 2018-09-24 DIAGNOSIS — I48.91 ATRIAL FIBRILLATION (HCC): ICD-10-CM

## 2018-09-24 DIAGNOSIS — I42.9 CARDIOMYOPATHY (HCC): ICD-10-CM

## 2018-09-24 LAB
ALBUMIN SERPL-MCNC: 3.6 G/DL (ref 3.5–5)
ALBUMIN/GLOB SERPL: 1 {RATIO} (ref 1.1–2.2)
ALP SERPL-CCNC: 86 U/L (ref 45–117)
ALT SERPL-CCNC: 42 U/L (ref 12–78)
ANION GAP SERPL CALC-SCNC: 9 MMOL/L (ref 5–15)
AST SERPL-CCNC: 29 U/L (ref 15–37)
BASOPHILS # BLD: 0 K/UL (ref 0–0.1)
BASOPHILS NFR BLD: 1 % (ref 0–1)
BILIRUB SERPL-MCNC: 0.8 MG/DL (ref 0.2–1)
BUN SERPL-MCNC: 49 MG/DL (ref 6–20)
BUN/CREAT SERPL: 25 (ref 12–20)
CALCIUM SERPL-MCNC: 9 MG/DL (ref 8.5–10.1)
CHLORIDE SERPL-SCNC: 103 MMOL/L (ref 97–108)
CO2 SERPL-SCNC: 28 MMOL/L (ref 21–32)
CREAT SERPL-MCNC: 1.95 MG/DL (ref 0.7–1.3)
DIFFERENTIAL METHOD BLD: ABNORMAL
EOSINOPHIL # BLD: 0.1 K/UL (ref 0–0.4)
EOSINOPHIL NFR BLD: 3 % (ref 0–7)
ERYTHROCYTE [DISTWIDTH] IN BLOOD BY AUTOMATED COUNT: 15.8 % (ref 11.5–14.5)
ERYTHROCYTE [SEDIMENTATION RATE] IN BLOOD: 52 MM/HR (ref 0–20)
GLOBULIN SER CALC-MCNC: 3.7 G/DL (ref 2–4)
GLUCOSE SERPL-MCNC: 94 MG/DL (ref 65–100)
HCT VFR BLD AUTO: 26.3 % (ref 36.6–50.3)
HGB BLD-MCNC: 7.7 G/DL (ref 12.1–17)
IMM GRANULOCYTES # BLD: 0 K/UL (ref 0–0.04)
IMM GRANULOCYTES NFR BLD AUTO: 0 % (ref 0–0.5)
LYMPHOCYTES # BLD: 0.4 K/UL (ref 0.8–3.5)
LYMPHOCYTES NFR BLD: 9 % (ref 12–49)
MCH RBC QN AUTO: 26.4 PG (ref 26–34)
MCHC RBC AUTO-ENTMCNC: 29.3 G/DL (ref 30–36.5)
MCV RBC AUTO: 90.1 FL (ref 80–99)
MONOCYTES # BLD: 0.4 K/UL (ref 0–1)
MONOCYTES NFR BLD: 9 % (ref 5–13)
NEUTS SEG # BLD: 3.5 K/UL (ref 1.8–8)
NEUTS SEG NFR BLD: 78 % (ref 32–75)
NRBC # BLD: 0 K/UL (ref 0–0.01)
NRBC BLD-RTO: 0 PER 100 WBC
PLATELET # BLD AUTO: 113 K/UL (ref 150–400)
PMV BLD AUTO: 10.1 FL (ref 8.9–12.9)
POTASSIUM SERPL-SCNC: 4.1 MMOL/L (ref 3.5–5.1)
PROT SERPL-MCNC: 7.3 G/DL (ref 6.4–8.2)
RBC # BLD AUTO: 2.92 M/UL (ref 4.1–5.7)
RBC MORPH BLD: ABNORMAL
SODIUM SERPL-SCNC: 140 MMOL/L (ref 136–145)
URATE SERPL-MCNC: 0.2 MG/DL (ref 3.5–7.2)
WBC # BLD AUTO: 4.4 K/UL (ref 4.1–11.1)

## 2018-09-24 PROCEDURE — 74011250636 HC RX REV CODE- 250/636: Performed by: INTERNAL MEDICINE

## 2018-09-24 PROCEDURE — 93306 TTE W/DOPPLER COMPLETE: CPT

## 2018-09-24 PROCEDURE — 93880 EXTRACRANIAL BILAT STUDY: CPT

## 2018-09-24 PROCEDURE — 96417 CHEMO IV INFUS EACH ADDL SEQ: CPT

## 2018-09-24 PROCEDURE — 36415 COLL VENOUS BLD VENIPUNCTURE: CPT | Performed by: INTERNAL MEDICINE

## 2018-09-24 PROCEDURE — 74011250637 HC RX REV CODE- 250/637: Performed by: INTERNAL MEDICINE

## 2018-09-24 PROCEDURE — 84550 ASSAY OF BLOOD/URIC ACID: CPT | Performed by: INTERNAL MEDICINE

## 2018-09-24 PROCEDURE — 96366 THER/PROPH/DIAG IV INF ADDON: CPT

## 2018-09-24 PROCEDURE — 85652 RBC SED RATE AUTOMATED: CPT | Performed by: INTERNAL MEDICINE

## 2018-09-24 PROCEDURE — 85025 COMPLETE CBC W/AUTO DIFF WBC: CPT | Performed by: INTERNAL MEDICINE

## 2018-09-24 PROCEDURE — 80053 COMPREHEN METABOLIC PANEL: CPT | Performed by: INTERNAL MEDICINE

## 2018-09-24 PROCEDURE — 74011000250 HC RX REV CODE- 250: Performed by: INTERNAL MEDICINE

## 2018-09-24 PROCEDURE — 96365 THER/PROPH/DIAG IV INF INIT: CPT

## 2018-09-24 PROCEDURE — 96413 CHEMO IV INFUSION 1 HR: CPT

## 2018-09-24 PROCEDURE — 96375 TX/PRO/DX INJ NEW DRUG ADDON: CPT

## 2018-09-24 RX ADMIN — ACETAMINOPHEN 650 MG: 325 TABLET ORAL at 10:43

## 2018-09-24 RX ADMIN — FAMOTIDINE 20 MG: 10 INJECTION, SOLUTION INTRAVENOUS at 10:46

## 2018-09-24 RX ADMIN — PEGLOTICASE 8 MG: 8 INJECTION, SOLUTION INTRAVENOUS at 11:08

## 2018-09-24 RX ADMIN — DIPHENHYDRAMINE HYDROCHLORIDE 25 MG: 25 CAPSULE ORAL at 10:43

## 2018-09-24 RX ADMIN — METHYLPREDNISOLONE SODIUM SUCCINATE 125 MG: 125 INJECTION, POWDER, FOR SOLUTION INTRAMUSCULAR; INTRAVENOUS at 10:48

## 2018-09-24 NOTE — PROGRESS NOTES
[facility] VISIT NOTE Pt arrived at [facility] ambulatory and in no distress for Krystexxa. Assessment completed, pt had no complaints . Patient Vitals for the past 12 hrs: 
 Temp Pulse Resp BP SpO2  
09/24/18 1435 96.7 °F (35.9 °C) 92 18 (!) 182/96 -  
09/24/18 1310 96.5 °F (35.8 °C) 77 16 (!) 204/95 -  
09/24/18 1240 97.6 °F (36.4 °C) 83 18 182/77 -  
09/24/18 1210 97.9 °F (36.6 °C) 83 18 (!) 170/96 -  
09/24/18 1010 97.5 °F (36.4 °C) 88 16 139/59 99 % PIV accessed in right arm without difficulty and labs drawn and sent. Recent Results (from the past 12 hour(s)) CBC WITH AUTOMATED DIFF Collection Time: 09/24/18 10:22 AM  
Result Value Ref Range WBC 4.4 4.1 - 11.1 K/uL  
 RBC 2.92 (L) 4.10 - 5.70 M/uL HGB 7.7 (L) 12.1 - 17.0 g/dL HCT 26.3 (L) 36.6 - 50.3 % MCV 90.1 80.0 - 99.0 FL  
 MCH 26.4 26.0 - 34.0 PG  
 MCHC 29.3 (L) 30.0 - 36.5 g/dL  
 RDW 15.8 (H) 11.5 - 14.5 % PLATELET 693 (L) 603 - 400 K/uL MPV 10.1 8.9 - 12.9 FL  
 NRBC 0.0 0  WBC ABSOLUTE NRBC 0.00 0.00 - 0.01 K/uL NEUTROPHILS 78 (H) 32 - 75 % LYMPHOCYTES 9 (L) 12 - 49 % MONOCYTES 9 5 - 13 % EOSINOPHILS 3 0 - 7 % BASOPHILS 1 0 - 1 % IMMATURE GRANULOCYTES 0 0.0 - 0.5 % ABS. NEUTROPHILS 3.5 1.8 - 8.0 K/UL  
 ABS. LYMPHOCYTES 0.4 (L) 0.8 - 3.5 K/UL  
 ABS. MONOCYTES 0.4 0.0 - 1.0 K/UL  
 ABS. EOSINOPHILS 0.1 0.0 - 0.4 K/UL  
 ABS. BASOPHILS 0.0 0.0 - 0.1 K/UL  
 ABS. IMM. GRANS. 0.0 0.00 - 0.04 K/UL  
 DF SMEAR SCANNED    
 RBC COMMENTS NORMOCYTIC, NORMOCHROMIC METABOLIC PANEL, COMPREHENSIVE Collection Time: 09/24/18 10:22 AM  
Result Value Ref Range Sodium 140 136 - 145 mmol/L Potassium 4.1 3.5 - 5.1 mmol/L Chloride 103 97 - 108 mmol/L  
 CO2 28 21 - 32 mmol/L Anion gap 9 5 - 15 mmol/L Glucose 94 65 - 100 mg/dL BUN 49 (H) 6 - 20 MG/DL Creatinine 1.95 (H) 0.70 - 1.30 MG/DL  
 BUN/Creatinine ratio 25 (H) 12 - 20 GFR est AA 41 (L) >60 ml/min/1.73m2 GFR est non-AA 34 (L) >60 ml/min/1.73m2 Calcium 9.0 8.5 - 10.1 MG/DL Bilirubin, total 0.8 0.2 - 1.0 MG/DL  
 ALT (SGPT) 42 12 - 78 U/L  
 AST (SGOT) 29 15 - 37 U/L Alk. phosphatase 86 45 - 117 U/L Protein, total 7.3 6.4 - 8.2 g/dL Albumin 3.6 3.5 - 5.0 g/dL Globulin 3.7 2.0 - 4.0 g/dL A-G Ratio 1.0 (L) 1.1 - 2.2 SED RATE (ESR) Collection Time: 09/24/18 10:22 AM  
Result Value Ref Range Sed rate, automated 52 (H) 0 - 20 mm/hr URIC ACID Collection Time: 09/24/18 10:22 AM  
Result Value Ref Range Uric acid 0.2 (L) 3.5 - 7.2 MG/DL Medications received: 
Acetaminophen PO Diphenhydramine PO Famotidine IV Methylprednisolone IV Krystexxa IV 
 
Pt's BP routinely is elevated during this infusion as noted in past treatments. MD advises patient to monitor at home and call if there is a problem. Tolerated treatment well, no adverse reaction noted. PIV de-accessed without difficulty, 2x2 and coban wrapped. D/C'd from [facility] ambulatory and in no distress. Next appointment is 10/8/18 at 10:00.

## 2018-09-24 NOTE — PROCEDURES
[facility]  *** FINAL REPORT ***    Name: Mary Ortega  MRN: [id number]    Outpatient  : 26 Oct 1945  HIS Order #: [id number]  [address] [facility] Visit #: [id number]  Date: 24 Sep 2018    TYPE OF TEST: Cerebrovascular Duplex    REASON FOR TEST  Known carotid stenosis    Right Carotid:-             Proximal               Mid                 Distal  cm/s  Systolic  Diastolic  Systolic  Diastolic  Systolic  Diastolic  CCA:    428.3      12.4                            72.9      19.7  Bulb:  ECA:    448.4       0.0  ICA:    139.2      54.4      173.3      50.2      106.0      28.4  ICA/CCA:  1.9       2.8    ICA Stenosis: 50-69%    Right Vertebral:-  Finding: Antegrade  Sys:       57.8  [place]:       17.1    Right Subclavian:    Left Carotid:-            Proximal                Mid                 Distal  cm/s  Systolic  Diastolic  Systolic  Diastolic  Systolic  Diastolic  CCA:     47.8      16.2                            88.6      19.3  Bulb:  ECA:    447.8       0.0  ICA:    154.7      59.5      104.3      31.8      119.5      30.8  ICA/CCA:  1.7       3.1    ICA Stenosis: 50-69%    Left Vertebral:-  Finding: Antegrade  Sys:       99.7  Madison:       20.9    Left Subclavian:    INTERPRETATION/FINDINGS  PROCEDURE:  Evaluation of the extracranial cerebrovascular arteries  with ultrasound (B-mode imaging, pulsed Doppler, color Doppler). Includes the common carotid, internal carotid, external carotid, and  vertebral arteries. FINDINGS:  Right side: Intimal thickening observed in the CCA with scattered  calcific plaque noted throughout the CCA. Calcific plaque noted in the    bulb, ECA and ICA. Elevated velocity noted in the ECA. Left side: Intimal thickening observed in the CCA with scattered  calcific plaque noted throughout the CCA in addition to homogeneous  plaque in the mid CCA. Calcific plaque also noted in the bulb, ECA and   ICA. Elevated velocity noted in the ECA.     IMPRESSION: Findings are consistent with high end 50-69% stenosis of  the right internal carotid, and 50-69% stenosis of the left internal  carotid. Also, > 70 % stenosis observed of the right and left external    carotid artery. Asymmetric vertebrals noted with antegrade flow. ADDITIONAL COMMENTS    I have personally reviewed the data relevant to the interpretation of  this  study. TECHNOLOGIST: Cherelle Red RDCS  Signed: 09/24/2018 04:15 PM    PHYSICIAN: Ricke Babinski.  Renzo Valle MD  Signed: 09/25/2018 07:25 AM

## 2018-09-24 NOTE — PROGRESS NOTES
Problem: Patient Education:  Go to Education Activity Goal: Patient/Family Education Outcome: Progressing Towards Goal 
Pt here for Yarelis Martin

## 2018-10-02 RX ORDER — DIPHENHYDRAMINE HCL 25 MG
25 CAPSULE ORAL ONCE
Status: COMPLETED | OUTPATIENT
Start: 2018-10-08 | End: 2018-10-08

## 2018-10-02 RX ORDER — ACETAMINOPHEN 325 MG/1
650 TABLET ORAL ONCE
Status: COMPLETED | OUTPATIENT
Start: 2018-10-08 | End: 2018-10-08

## 2018-10-08 ENCOUNTER — HOSPITAL ENCOUNTER (OUTPATIENT)
Dept: INFUSION THERAPY | Age: 73
Discharge: HOME | End: 2018-10-08
Payer: COMMERCIAL

## 2018-10-08 VITALS
SYSTOLIC BLOOD PRESSURE: 216 MMHG | DIASTOLIC BLOOD PRESSURE: 93 MMHG | RESPIRATION RATE: 16 BRPM | HEART RATE: 77 BPM | TEMPERATURE: 97.6 F

## 2018-10-08 LAB
ALBUMIN SERPL-MCNC: 3.5 G/DL (ref 3.5–5)
ALBUMIN/GLOB SERPL: 1 {RATIO} (ref 1.1–2.2)
ALP SERPL-CCNC: 72 U/L (ref 45–117)
ALT SERPL-CCNC: 35 U/L (ref 12–78)
ANION GAP SERPL CALC-SCNC: 11 MMOL/L (ref 5–15)
AST SERPL-CCNC: 30 U/L (ref 15–37)
BASOPHILS # BLD: 0 K/UL (ref 0–0.1)
BASOPHILS NFR BLD: 1 % (ref 0–1)
BILIRUB SERPL-MCNC: 0.6 MG/DL (ref 0.2–1)
BUN SERPL-MCNC: 51 MG/DL (ref 6–20)
BUN/CREAT SERPL: 31 (ref 12–20)
CALCIUM SERPL-MCNC: 8.8 MG/DL (ref 8.5–10.1)
CHLORIDE SERPL-SCNC: 106 MMOL/L (ref 97–108)
CO2 SERPL-SCNC: 25 MMOL/L (ref 21–32)
CREAT SERPL-MCNC: 1.65 MG/DL (ref 0.7–1.3)
DIFFERENTIAL METHOD BLD: ABNORMAL
EOSINOPHIL # BLD: 0.2 K/UL (ref 0–0.4)
EOSINOPHIL NFR BLD: 5 % (ref 0–7)
ERYTHROCYTE [DISTWIDTH] IN BLOOD BY AUTOMATED COUNT: 17.2 % (ref 11.5–14.5)
ERYTHROCYTE [SEDIMENTATION RATE] IN BLOOD: 34 MM/HR (ref 0–20)
GLOBULIN SER CALC-MCNC: 3.4 G/DL (ref 2–4)
GLUCOSE SERPL-MCNC: 115 MG/DL (ref 65–100)
HCT VFR BLD AUTO: 28.5 % (ref 36.6–50.3)
HGB BLD-MCNC: 8.1 G/DL (ref 12.1–17)
IMM GRANULOCYTES # BLD: 0 K/UL (ref 0–0.04)
IMM GRANULOCYTES NFR BLD AUTO: 0 % (ref 0–0.5)
LYMPHOCYTES # BLD: 0.4 K/UL (ref 0.8–3.5)
LYMPHOCYTES NFR BLD: 10 % (ref 12–49)
MCH RBC QN AUTO: 26.2 PG (ref 26–34)
MCHC RBC AUTO-ENTMCNC: 28.4 G/DL (ref 30–36.5)
MCV RBC AUTO: 92.2 FL (ref 80–99)
MONOCYTES # BLD: 0.5 K/UL (ref 0–1)
MONOCYTES NFR BLD: 12 % (ref 5–13)
NEUTS SEG # BLD: 2.8 K/UL (ref 1.8–8)
NEUTS SEG NFR BLD: 72 % (ref 32–75)
NRBC # BLD: 0 K/UL (ref 0–0.01)
NRBC BLD-RTO: 0 PER 100 WBC
PLATELET # BLD AUTO: 96 K/UL (ref 150–400)
PMV BLD AUTO: 10.9 FL (ref 8.9–12.9)
POTASSIUM SERPL-SCNC: 4.5 MMOL/L (ref 3.5–5.1)
PROT SERPL-MCNC: 6.9 G/DL (ref 6.4–8.2)
RBC # BLD AUTO: 3.09 M/UL (ref 4.1–5.7)
RBC MORPH BLD: ABNORMAL
SODIUM SERPL-SCNC: 142 MMOL/L (ref 136–145)
URATE SERPL-MCNC: <0.2 MG/DL (ref 3.5–7.2)
WBC # BLD AUTO: 3.9 K/UL (ref 4.1–11.1)

## 2018-10-08 PROCEDURE — 74011250637 HC RX REV CODE- 250/637: Performed by: INTERNAL MEDICINE

## 2018-10-08 PROCEDURE — 96365 THER/PROPH/DIAG IV INF INIT: CPT

## 2018-10-08 PROCEDURE — 96413 CHEMO IV INFUSION 1 HR: CPT

## 2018-10-08 PROCEDURE — 36415 COLL VENOUS BLD VENIPUNCTURE: CPT | Performed by: INTERNAL MEDICINE

## 2018-10-08 PROCEDURE — 80053 COMPREHEN METABOLIC PANEL: CPT | Performed by: INTERNAL MEDICINE

## 2018-10-08 PROCEDURE — 85652 RBC SED RATE AUTOMATED: CPT | Performed by: INTERNAL MEDICINE

## 2018-10-08 PROCEDURE — 85025 COMPLETE CBC W/AUTO DIFF WBC: CPT | Performed by: INTERNAL MEDICINE

## 2018-10-08 PROCEDURE — 74011000250 HC RX REV CODE- 250: Performed by: INTERNAL MEDICINE

## 2018-10-08 PROCEDURE — 96415 CHEMO IV INFUSION ADDL HR: CPT

## 2018-10-08 PROCEDURE — 84550 ASSAY OF BLOOD/URIC ACID: CPT | Performed by: INTERNAL MEDICINE

## 2018-10-08 PROCEDURE — 74011250636 HC RX REV CODE- 250/636: Performed by: INTERNAL MEDICINE

## 2018-10-08 PROCEDURE — 96375 TX/PRO/DX INJ NEW DRUG ADDON: CPT

## 2018-10-08 PROCEDURE — 96366 THER/PROPH/DIAG IV INF ADDON: CPT

## 2018-10-08 RX ADMIN — METHYLPREDNISOLONE SODIUM SUCCINATE 125 MG: 125 INJECTION, POWDER, FOR SOLUTION INTRAMUSCULAR; INTRAVENOUS at 10:18

## 2018-10-08 RX ADMIN — PEGLOTICASE 8 MG: 8 INJECTION, SOLUTION INTRAVENOUS at 11:08

## 2018-10-08 RX ADMIN — ACETAMINOPHEN 650 MG: 325 TABLET ORAL at 10:12

## 2018-10-08 RX ADMIN — FAMOTIDINE 20 MG: 10 INJECTION, SOLUTION INTRAVENOUS at 10:18

## 2018-10-08 RX ADMIN — DIPHENHYDRAMINE HYDROCHLORIDE 25 MG: 25 CAPSULE ORAL at 10:12

## 2018-10-08 NOTE — PROGRESS NOTES
[facility] VISIT NOTE 
 
[id number]. Pt arrived at [facility] ambulatory and in no distress for Krystexxa. Assessment completed, pt states no complaints. 24g PIV placed in Right Arm. Positive blood return noted and labs drawn. Medications received: 
[address] Tylenol PO Benadryl PO Solumedrol IV Pepcid IV [place] Ty Patient Vitals for the past 12 hrs: 
 Temp Pulse Resp BP  
10/08/18 1306 97.6 °F (36.4 °C) 77 16 (!) 216/93  
10/08/18 1237 97.8 °F (36.6 °C) 89 14 (!) 217/98  
10/08/18 1207 98.8 °F (37.1 °C) - 16 -  
10/08/18 1137 98 °F (36.7 °C) 73 16 (!) 199/93  
10/08/18 0954 98 °F (36.7 °C) 78 16 183/87 Recent Results (from the past 12 hour(s)) CBC WITH AUTOMATED DIFF Collection Time: 10/08/18 10:06 AM  
Result Value Ref Range WBC 3.9 (L) 4.1 - 11.1 K/uL  
 RBC 3.09 (L) 4.10 - 5.70 M/uL HGB 8.1 (L) 12.1 - 17.0 g/dL HCT 28.5 (L) 36.6 - 50.3 % MCV 92.2 80.0 - 99.0 FL  
 MCH 26.2 26.0 - 34.0 PG  
 MCHC 28.4 (L) 30.0 - 36.5 g/dL  
 RDW 17.2 (H) 11.5 - 14.5 % PLATELET 96 (L) 647 - 400 K/uL MPV 10.9 8.9 - 12.9 FL  
 NRBC 0.0 0  WBC ABSOLUTE NRBC 0.00 0.00 - 0.01 K/uL NEUTROPHILS 72 32 - 75 % LYMPHOCYTES 10 (L) 12 - 49 % MONOCYTES 12 5 - 13 % EOSINOPHILS 5 0 - 7 % BASOPHILS 1 0 - 1 % IMMATURE GRANULOCYTES 0 0.0 - 0.5 % ABS. NEUTROPHILS 2.8 1.8 - 8.0 K/UL  
 ABS. LYMPHOCYTES 0.4 (L) 0.8 - 3.5 K/UL  
 ABS. MONOCYTES 0.5 0.0 - 1.0 K/UL  
 ABS. EOSINOPHILS 0.2 0.0 - 0.4 K/UL  
 ABS. BASOPHILS 0.0 0.0 - 0.1 K/UL  
 ABS. IMM. GRANS. 0.0 0.00 - 0.04 K/UL  
 DF SMEAR SCANNED    
 RBC COMMENTS OVALOCYTES PRESENT 
    
 RBC COMMENTS SCHISTOCYTES PRESENT 
    
 RBC COMMENTS HYPOCHROMIA 1+ 
    
 RBC COMMENTS ANISOCYTOSIS 
1+ METABOLIC PANEL, COMPREHENSIVE Collection Time: 10/08/18 10:06 AM  
Result Value Ref Range Sodium 142 136 - 145 mmol/L Potassium 4.5 3.5 - 5.1 mmol/L  Chloride 106 97 - 108 mmol/L  
 CO2 25 21 - 32 mmol/L  
 Anion gap 11 5 - 15 mmol/L Glucose 115 (H) 65 - 100 mg/dL BUN 51 (H) 6 - 20 MG/DL Creatinine 1.65 (H) 0.70 - 1.30 MG/DL  
 BUN/Creatinine ratio 31 (H) 12 - 20 GFR est AA 50 (L) >60 ml/min/1.73m2 GFR est non-AA 41 (L) >60 ml/min/1.73m2 Calcium 8.8 8.5 - 10.1 MG/DL Bilirubin, total 0.6 0.2 - 1.0 MG/DL  
 ALT (SGPT) 35 12 - 78 U/L  
 AST (SGOT) 30 15 - 37 U/L Alk. phosphatase 72 45 - 117 U/L Protein, total 6.9 6.4 - 8.2 g/dL Albumin 3.5 3.5 - 5.0 g/dL Globulin 3.4 2.0 - 4.0 g/dL A-G Ratio 1.0 (L) 1.1 - 2.2 SED RATE (ESR) Collection Time: 10/08/18 10:06 AM  
Result Value Ref Range Sed rate, automated 34 (H) 0 - 20 mm/hr URIC ACID Collection Time: 10/08/18 10:06 AM  
Result Value Ref Range Uric acid <0.2 (L) 3.5 - 7.2 MG/DL Tolerated treatment well, no adverse reaction noted. PIV removed at discharge, no s/s of infiltration noted. 1310. D/C'd from [facility] ambulatory and in no distress.  Next appointment is 10/22/18 at 11:00 am.

## 2018-10-11 ENCOUNTER — PATIENT OUTREACH (OUTPATIENT)
Dept: OTHER | Age: 73
End: 2018-10-11

## 2018-10-11 NOTE — LETTER
10/11/2018 3:30 PM 
 
Mr. Amaya Meridian 
[address] [facility] [address] Dear  Glendy Angela, 
 
My name is Adelia Heath , Employee Care Manager for [employer], and I have been trying to reach you. The Employee Care Management program is a free-of-charge, confidential service provided to our employees and their family members covered by the [employer]. Part of my job is to follow up with members who have recently been in the hospital or emergency room, to help them coordinate their care and answer questions they may have about their visit. As healthcare providers, we know that patients do better when they have close follow up with a primary care provider (PCP), especially after a hospital or emergency department visit. If you do not have a PCP, I can help you find one that is convenient to you and covered by your insurance. I can also help you understand any after visit instructions, such as what symptoms to watch out for, or any new or changed medications. Remember that you can access your After Visit Summary by logging into your [employer] account. If you do not have a [employer] account, I can help you request access. Our program is designed to provide you with the opportunity to have a [employer] care manager partner with you for your healthcare needs. Please contact me at the below number if I can provide you with assistance for any of the above services. Sincerely, Adelia Heath RN, BSN  Employee Care Manager [address] [place] [address] [phone] Fax [email or web address]. [email or web address]

## 2018-10-18 RX ORDER — ACETAMINOPHEN 325 MG/1
650 TABLET ORAL ONCE
Status: COMPLETED | OUTPATIENT
Start: 2018-10-22 | End: 2018-10-22

## 2018-10-18 RX ORDER — DIPHENHYDRAMINE HCL 25 MG
25 CAPSULE ORAL ONCE
Status: COMPLETED | OUTPATIENT
Start: 2018-10-22 | End: 2018-10-22

## 2018-10-22 ENCOUNTER — HOSPITAL ENCOUNTER (OUTPATIENT)
Dept: INFUSION THERAPY | Age: 73
Discharge: HOME | End: 2018-10-22
Payer: COMMERCIAL

## 2018-10-22 VITALS
HEART RATE: 83 BPM | BODY MASS INDEX: 26.36 KG/M2 | DIASTOLIC BLOOD PRESSURE: 72 MMHG | WEIGHT: 183.7 LBS | TEMPERATURE: 97.9 F | RESPIRATION RATE: 18 BRPM | SYSTOLIC BLOOD PRESSURE: 175 MMHG

## 2018-10-22 LAB
ALBUMIN SERPL-MCNC: 3.8 G/DL (ref 3.5–5)
ALBUMIN/GLOB SERPL: 1 {RATIO} (ref 1.1–2.2)
ALP SERPL-CCNC: 89 U/L (ref 45–117)
ALT SERPL-CCNC: 41 U/L (ref 12–78)
ANION GAP SERPL CALC-SCNC: 9 MMOL/L (ref 5–15)
AST SERPL-CCNC: 31 U/L (ref 15–37)
BASOPHILS # BLD: 0 K/UL (ref 0–0.1)
BASOPHILS NFR BLD: 1 % (ref 0–1)
BILIRUB SERPL-MCNC: 0.6 MG/DL (ref 0.2–1)
BUN SERPL-MCNC: 66 MG/DL (ref 6–20)
BUN/CREAT SERPL: 31 (ref 12–20)
CALCIUM SERPL-MCNC: 8.9 MG/DL (ref 8.5–10.1)
CHLORIDE SERPL-SCNC: 101 MMOL/L (ref 97–108)
CO2 SERPL-SCNC: 30 MMOL/L (ref 21–32)
CREAT SERPL-MCNC: 2.11 MG/DL (ref 0.7–1.3)
DIFFERENTIAL METHOD BLD: ABNORMAL
EOSINOPHIL # BLD: 0.2 K/UL (ref 0–0.4)
EOSINOPHIL NFR BLD: 5 % (ref 0–7)
ERYTHROCYTE [DISTWIDTH] IN BLOOD BY AUTOMATED COUNT: 17.8 % (ref 11.5–14.5)
ERYTHROCYTE [SEDIMENTATION RATE] IN BLOOD: 34 MM/HR (ref 0–20)
GLOBULIN SER CALC-MCNC: 3.8 G/DL (ref 2–4)
GLUCOSE SERPL-MCNC: 149 MG/DL (ref 65–100)
HCT VFR BLD AUTO: 31.6 % (ref 36.6–50.3)
HGB BLD-MCNC: 9 G/DL (ref 12.1–17)
IMM GRANULOCYTES # BLD: 0 K/UL (ref 0–0.04)
IMM GRANULOCYTES NFR BLD AUTO: 0 % (ref 0–0.5)
LYMPHOCYTES # BLD: 0.4 K/UL (ref 0.8–3.5)
LYMPHOCYTES NFR BLD: 9 % (ref 12–49)
MCH RBC QN AUTO: 26.3 PG (ref 26–34)
MCHC RBC AUTO-ENTMCNC: 28.5 G/DL (ref 30–36.5)
MCV RBC AUTO: 92.4 FL (ref 80–99)
MONOCYTES # BLD: 0.4 K/UL (ref 0–1)
MONOCYTES NFR BLD: 10 % (ref 5–13)
NEUTS SEG # BLD: 2.9 K/UL (ref 1.8–8)
NEUTS SEG NFR BLD: 75 % (ref 32–75)
NRBC # BLD: 0 K/UL (ref 0–0.01)
NRBC BLD-RTO: 0 PER 100 WBC
PLATELET # BLD AUTO: 104 K/UL (ref 150–400)
PMV BLD AUTO: 11 FL (ref 8.9–12.9)
POTASSIUM SERPL-SCNC: 4.1 MMOL/L (ref 3.5–5.1)
PROT SERPL-MCNC: 7.6 G/DL (ref 6.4–8.2)
RBC # BLD AUTO: 3.42 M/UL (ref 4.1–5.7)
RBC MORPH BLD: ABNORMAL
SODIUM SERPL-SCNC: 140 MMOL/L (ref 136–145)
URATE SERPL-MCNC: <0.2 MG/DL (ref 3.5–7.2)
WBC # BLD AUTO: 3.9 K/UL (ref 4.1–11.1)

## 2018-10-22 PROCEDURE — 74011250637 HC RX REV CODE- 250/637: Performed by: INTERNAL MEDICINE

## 2018-10-22 PROCEDURE — 36415 COLL VENOUS BLD VENIPUNCTURE: CPT | Performed by: INTERNAL MEDICINE

## 2018-10-22 PROCEDURE — 96365 THER/PROPH/DIAG IV INF INIT: CPT

## 2018-10-22 PROCEDURE — 74011250636 HC RX REV CODE- 250/636: Performed by: INTERNAL MEDICINE

## 2018-10-22 PROCEDURE — 80053 COMPREHEN METABOLIC PANEL: CPT | Performed by: INTERNAL MEDICINE

## 2018-10-22 PROCEDURE — 85025 COMPLETE CBC W/AUTO DIFF WBC: CPT | Performed by: INTERNAL MEDICINE

## 2018-10-22 PROCEDURE — 96375 TX/PRO/DX INJ NEW DRUG ADDON: CPT

## 2018-10-22 PROCEDURE — 74011000250 HC RX REV CODE- 250: Performed by: INTERNAL MEDICINE

## 2018-10-22 PROCEDURE — 84550 ASSAY OF BLOOD/URIC ACID: CPT | Performed by: INTERNAL MEDICINE

## 2018-10-22 PROCEDURE — 85652 RBC SED RATE AUTOMATED: CPT | Performed by: INTERNAL MEDICINE

## 2018-10-22 PROCEDURE — 96366 THER/PROPH/DIAG IV INF ADDON: CPT

## 2018-10-22 RX ORDER — SODIUM CHLORIDE 0.9 % (FLUSH) 0.9 %
10 SYRINGE (ML) INJECTION AS NEEDED
Status: ACTIVE | OUTPATIENT
Start: 2018-10-22 | End: 2018-10-23

## 2018-10-22 RX ADMIN — METHYLPREDNISOLONE SODIUM SUCCINATE 125 MG: 125 INJECTION, POWDER, FOR SOLUTION INTRAMUSCULAR; INTRAVENOUS at 11:43

## 2018-10-22 RX ADMIN — PEGLOTICASE 8 MG: 8 INJECTION, SOLUTION INTRAVENOUS at 12:20

## 2018-10-22 RX ADMIN — DIPHENHYDRAMINE HYDROCHLORIDE 25 MG: 25 CAPSULE ORAL at 11:33

## 2018-10-22 RX ADMIN — FAMOTIDINE 20 MG: 10 INJECTION, SOLUTION INTRAVENOUS at 11:40

## 2018-10-22 RX ADMIN — ACETAMINOPHEN 650 MG: 325 TABLET ORAL at 11:33

## 2018-10-22 NOTE — PROGRESS NOTES
[facility] VISIT NOTE 
 
1025 Pt arrived at [facility] ambulatory and in no distress for Krystexxa. Assessment completed, pt states no complaints. 24g PIV placed in Right Arm. Positive blood return noted and labs drawn. Medications received: 
[address] Tylenol PO Benadryl PO Solumedrol IV Pepcid IV Donata Cho Patient Vitals for the past 12 hrs: 
 Temp Pulse Resp BP  
10/22/18 1250 98.1 °F (36.7 °C) 80 18 162/80  
10/22/18 1104    166/88  
10/22/18 1026 98.1 °F (36.7 °C) 86 16 180/72 Recent Results (from the past 12 hour(s)) CBC WITH AUTOMATED DIFF Collection Time: 10/22/18 10:30 AM  
Result Value Ref Range WBC 3.9 (L) 4.1 - 11.1 K/uL  
 RBC 3.42 (L) 4.10 - 5.70 M/uL HGB 9.0 (L) 12.1 - 17.0 g/dL HCT 31.6 (L) 36.6 - 50.3 % MCV 92.4 80.0 - 99.0 FL  
 MCH 26.3 26.0 - 34.0 PG  
 MCHC 28.5 (L) 30.0 - 36.5 g/dL  
 RDW 17.8 (H) 11.5 - 14.5 % PLATELET 644 (L) 104 - 400 K/uL MPV 11.0 8.9 - 12.9 FL  
 NRBC 0.0 0  WBC ABSOLUTE NRBC 0.00 0.00 - 0.01 K/uL NEUTROPHILS 75 32 - 75 % LYMPHOCYTES 9 (L) 12 - 49 % MONOCYTES 10 5 - 13 % EOSINOPHILS 5 0 - 7 % BASOPHILS 1 0 - 1 % IMMATURE GRANULOCYTES 0 0.0 - 0.5 % ABS. NEUTROPHILS 2.9 1.8 - 8.0 K/UL  
 ABS. LYMPHOCYTES 0.4 (L) 0.8 - 3.5 K/UL  
 ABS. MONOCYTES 0.4 0.0 - 1.0 K/UL  
 ABS. EOSINOPHILS 0.2 0.0 - 0.4 K/UL  
 ABS. BASOPHILS 0.0 0.0 - 0.1 K/UL  
 ABS. IMM. GRANS. 0.0 0.00 - 0.04 K/UL  
 DF SMEAR SCANNED    
 RBC COMMENTS HYPOCHROMIA PRESENT 
    
METABOLIC PANEL, COMPREHENSIVE Collection Time: 10/22/18 10:30 AM  
Result Value Ref Range Sodium 140 136 - 145 mmol/L Potassium 4.1 3.5 - 5.1 mmol/L Chloride 101 97 - 108 mmol/L  
 CO2 30 21 - 32 mmol/L Anion gap 9 5 - 15 mmol/L Glucose 149 (H) 65 - 100 mg/dL BUN 66 (H) 6 - 20 MG/DL Creatinine 2.11 (H) 0.70 - 1.30 MG/DL  
 BUN/Creatinine ratio 31 (H) 12 - 20 GFR est AA 38 (L) >60 ml/min/1.73m2 GFR est non-AA 31 (L) >60 ml/min/1.73m2 Calcium 8.9 8.5 - 10.1 MG/DL Bilirubin, total 0.6 0.2 - 1.0 MG/DL  
 ALT (SGPT) 41 12 - 78 U/L  
 AST (SGOT) 31 15 - 37 U/L Alk. phosphatase 89 45 - 117 U/L Protein, total 7.6 6.4 - 8.2 g/dL Albumin 3.8 3.5 - 5.0 g/dL Globulin 3.8 2.0 - 4.0 g/dL A-G Ratio 1.0 (L) 1.1 - 2.2 SED RATE (ESR) Collection Time: 10/22/18 10:30 AM  
Result Value Ref Range Sed rate, automated 34 (H) 0 - 20 mm/hr URIC ACID Collection Time: 10/22/18 10:30 AM  
Result Value Ref Range Uric acid <0.2 (L) 3.5 - 7.2 MG/DL Tolerated treatment well, no adverse reaction noted. PIV removed at discharge, no s/s of infiltration noted.  D[facility]C'd [facility] ambulatory and in no distress.  Next appointment is 11/5/18 at 11:00 am.

## 2018-10-31 RX ORDER — ACETAMINOPHEN 325 MG/1
650 TABLET ORAL ONCE
Status: COMPLETED | OUTPATIENT
Start: 2018-11-05 | End: 2018-11-05

## 2018-10-31 RX ORDER — DIPHENHYDRAMINE HCL 25 MG
25 CAPSULE ORAL ONCE
Status: COMPLETED | OUTPATIENT
Start: 2018-11-05 | End: 2018-11-05

## 2018-11-05 ENCOUNTER — HOSPITAL ENCOUNTER (OUTPATIENT)
Dept: INFUSION THERAPY | Age: 73
Discharge: HOME | End: 2018-11-05
Payer: COMMERCIAL

## 2018-11-05 VITALS
TEMPERATURE: 97.6 F | SYSTOLIC BLOOD PRESSURE: 180 MMHG | RESPIRATION RATE: 16 BRPM | DIASTOLIC BLOOD PRESSURE: 74 MMHG | HEART RATE: 56 BPM

## 2018-11-05 LAB
ALBUMIN SERPL-MCNC: 3.6 G/DL (ref 3.5–5)
ALBUMIN/GLOB SERPL: 1 {RATIO} (ref 1.1–2.2)
ALP SERPL-CCNC: 93 U/L (ref 45–117)
ALT SERPL-CCNC: 40 U/L (ref 12–78)
ANION GAP SERPL CALC-SCNC: 10 MMOL/L (ref 5–15)
AST SERPL-CCNC: 34 U/L (ref 15–37)
BASOPHILS # BLD: 0.1 K/UL (ref 0–0.1)
BASOPHILS NFR BLD: 2 % (ref 0–1)
BILIRUB SERPL-MCNC: 0.6 MG/DL (ref 0.2–1)
BUN SERPL-MCNC: 50 MG/DL (ref 6–20)
BUN/CREAT SERPL: 25 (ref 12–20)
CALCIUM SERPL-MCNC: 8.7 MG/DL (ref 8.5–10.1)
CHLORIDE SERPL-SCNC: 104 MMOL/L (ref 97–108)
CO2 SERPL-SCNC: 29 MMOL/L (ref 21–32)
CREAT SERPL-MCNC: 2 MG/DL (ref 0.7–1.3)
DIFFERENTIAL METHOD BLD: ABNORMAL
EOSINOPHIL # BLD: 0.1 K/UL (ref 0–0.4)
EOSINOPHIL NFR BLD: 3 % (ref 0–7)
ERYTHROCYTE [DISTWIDTH] IN BLOOD BY AUTOMATED COUNT: 17.4 % (ref 11.5–14.5)
ERYTHROCYTE [SEDIMENTATION RATE] IN BLOOD: 25 MM/HR (ref 0–20)
GLOBULIN SER CALC-MCNC: 3.7 G/DL (ref 2–4)
GLUCOSE SERPL-MCNC: 140 MG/DL (ref 65–100)
HCT VFR BLD AUTO: 29.1 % (ref 36.6–50.3)
HGB BLD-MCNC: 8.5 G/DL (ref 12.1–17)
IMM GRANULOCYTES # BLD: 0 K/UL (ref 0–0.04)
IMM GRANULOCYTES NFR BLD AUTO: 0 % (ref 0–0.5)
LYMPHOCYTES # BLD: 0.3 K/UL (ref 0.8–3.5)
LYMPHOCYTES NFR BLD: 9 % (ref 12–49)
MCH RBC QN AUTO: 26.2 PG (ref 26–34)
MCHC RBC AUTO-ENTMCNC: 29.2 G/DL (ref 30–36.5)
MCV RBC AUTO: 89.5 FL (ref 80–99)
MONOCYTES # BLD: 0.4 K/UL (ref 0–1)
MONOCYTES NFR BLD: 13 % (ref 5–13)
NEUTS SEG # BLD: 2.5 K/UL (ref 1.8–8)
NEUTS SEG NFR BLD: 73 % (ref 32–75)
NRBC # BLD: 0 K/UL (ref 0–0.01)
NRBC BLD-RTO: 0 PER 100 WBC
PLATELET # BLD AUTO: 114 K/UL (ref 150–400)
PMV BLD AUTO: 11.4 FL (ref 8.9–12.9)
POTASSIUM SERPL-SCNC: 4.2 MMOL/L (ref 3.5–5.1)
PROT SERPL-MCNC: 7.3 G/DL (ref 6.4–8.2)
RBC # BLD AUTO: 3.25 M/UL (ref 4.1–5.7)
RBC MORPH BLD: ABNORMAL
SODIUM SERPL-SCNC: 143 MMOL/L (ref 136–145)
URATE SERPL-MCNC: <0.2 MG/DL (ref 3.5–7.2)
WBC # BLD AUTO: 3.4 K/UL (ref 4.1–11.1)

## 2018-11-05 PROCEDURE — 80053 COMPREHEN METABOLIC PANEL: CPT | Performed by: INTERNAL MEDICINE

## 2018-11-05 PROCEDURE — 85025 COMPLETE CBC W/AUTO DIFF WBC: CPT | Performed by: INTERNAL MEDICINE

## 2018-11-05 PROCEDURE — 74011250636 HC RX REV CODE- 250/636: Performed by: INTERNAL MEDICINE

## 2018-11-05 PROCEDURE — 96365 THER/PROPH/DIAG IV INF INIT: CPT

## 2018-11-05 PROCEDURE — 74011000250 HC RX REV CODE- 250: Performed by: INTERNAL MEDICINE

## 2018-11-05 PROCEDURE — 36415 COLL VENOUS BLD VENIPUNCTURE: CPT | Performed by: INTERNAL MEDICINE

## 2018-11-05 PROCEDURE — 96375 TX/PRO/DX INJ NEW DRUG ADDON: CPT

## 2018-11-05 PROCEDURE — 84550 ASSAY OF BLOOD/URIC ACID: CPT | Performed by: INTERNAL MEDICINE

## 2018-11-05 PROCEDURE — 74011250637 HC RX REV CODE- 250/637: Performed by: INTERNAL MEDICINE

## 2018-11-05 PROCEDURE — 96366 THER/PROPH/DIAG IV INF ADDON: CPT

## 2018-11-05 PROCEDURE — 85652 RBC SED RATE AUTOMATED: CPT | Performed by: INTERNAL MEDICINE

## 2018-11-05 RX ADMIN — METHYLPREDNISOLONE SODIUM SUCCINATE 125 MG: 125 INJECTION, POWDER, FOR SOLUTION INTRAMUSCULAR; INTRAVENOUS at 12:32

## 2018-11-05 RX ADMIN — DIPHENHYDRAMINE HYDROCHLORIDE 25 MG: 25 CAPSULE ORAL at 12:26

## 2018-11-05 RX ADMIN — PEGLOTICASE 8 MG: 8 INJECTION, SOLUTION INTRAVENOUS at 13:00

## 2018-11-05 RX ADMIN — ACETAMINOPHEN 650 MG: 325 TABLET ORAL at 12:27

## 2018-11-05 RX ADMIN — FAMOTIDINE 20 MG: 10 INJECTION, SOLUTION INTRAVENOUS at 12:31

## 2018-11-05 NOTE — PROGRESS NOTES
[facility] VISIT NOTE Pt arrived at [facility] ambulatory and in no distress for Krystexxa. Assessment completed, pt had no complaints . Patient Vitals for the past 12 hrs: 
 Temp Pulse Resp BP  
11/05/18 1430 97.6 °F (36.4 °C) (!) 56  180/74  
11/05/18 1400 97.4 °F (36.3 °C) (!) 52 16 178/80  
11/05/18 1330 97.6 °F (36.4 °C) 60 16 174/88  
11/05/18 1140 97.2 °F (36.2 °C) 81 18 180/80 PIV accessed in right forearm without difficulty and labs drawn and sent. Recent Results (from the past 12 hour(s)) CBC WITH AUTOMATED DIFF Collection Time: 11/05/18 11:50 AM  
Result Value Ref Range WBC 3.4 (L) 4.1 - 11.1 K/uL  
 RBC 3.25 (L) 4.10 - 5.70 M/uL HGB 8.5 (L) 12.1 - 17.0 g/dL HCT 29.1 (L) 36.6 - 50.3 % MCV 89.5 80.0 - 99.0 FL  
 MCH 26.2 26.0 - 34.0 PG  
 MCHC 29.2 (L) 30.0 - 36.5 g/dL  
 RDW 17.4 (H) 11.5 - 14.5 % PLATELET 419 (L) 003 - 400 K/uL MPV 11.4 8.9 - 12.9 FL  
 NRBC 0.0 0  WBC ABSOLUTE NRBC 0.00 0.00 - 0.01 K/uL NEUTROPHILS 73 32 - 75 % LYMPHOCYTES 9 (L) 12 - 49 % MONOCYTES 13 5 - 13 % EOSINOPHILS 3 0 - 7 % BASOPHILS 2 (H) 0 - 1 % IMMATURE GRANULOCYTES 0 0.0 - 0.5 % ABS. NEUTROPHILS 2.5 1.8 - 8.0 K/UL  
 ABS. LYMPHOCYTES 0.3 (L) 0.8 - 3.5 K/UL  
 ABS. MONOCYTES 0.4 0.0 - 1.0 K/UL  
 ABS. EOSINOPHILS 0.1 0.0 - 0.4 K/UL  
 ABS. BASOPHILS 0.1 0.0 - 0.1 K/UL  
 ABS. IMM. GRANS. 0.0 0.00 - 0.04 K/UL  
 DF SMEAR SCANNED    
 RBC COMMENTS ANISOCYTOSIS 1+ 
    
 RBC COMMENTS OVALOCYTES PRESENT 
    
 RBC COMMENTS TEARDROP CELLS 
PRESENT 
    
METABOLIC PANEL, COMPREHENSIVE Collection Time: 11/05/18 11:50 AM  
Result Value Ref Range Sodium 143 136 - 145 mmol/L Potassium 4.2 3.5 - 5.1 mmol/L Chloride 104 97 - 108 mmol/L  
 CO2 29 21 - 32 mmol/L Anion gap 10 5 - 15 mmol/L Glucose 140 (H) 65 - 100 mg/dL BUN 50 (H) 6 - 20 MG/DL  Creatinine 2.00 (H) 0.70 - 1.30 MG/DL  
 BUN/Creatinine ratio 25 (H) 12 - 20    
 GFR est AA 40 (L) >60 ml/min/1.73m2 GFR est non-AA 33 (L) >60 ml/min/1.73m2 Calcium 8.7 8.5 - 10.1 MG/DL Bilirubin, total 0.6 0.2 - 1.0 MG/DL  
 ALT (SGPT) 40 12 - 78 U/L  
 AST (SGOT) 34 15 - 37 U/L Alk. phosphatase 93 45 - 117 U/L Protein, total 7.3 6.4 - 8.2 g/dL Albumin 3.6 3.5 - 5.0 g/dL Globulin 3.7 2.0 - 4.0 g/dL A-G Ratio 1.0 (L) 1.1 - 2.2 SED RATE (ESR) Collection Time: 11/05/18 11:50 AM  
Result Value Ref Range Sed rate, automated 25 (H) 0 - 20 mm/hr URIC ACID Collection Time: 11/05/18 11:50 AM  
Result Value Ref Range Uric acid <0.2 (L) 3.5 - 7.2 MG/DL Medications received: 
Acetaminophen PO Diphenhydramine PO Famotidine IV Methyl prednisolone IV Ben Harm Tolerated treatment well, no adverse reaction noted. PIV flushed and deaccessed. 2x2 and band-aid applied. D/C'd from [facility] ambulatory and in no distress. Next appointment is 11/19/18 at 11:00.

## 2018-11-13 RX ORDER — DIPHENHYDRAMINE HCL 25 MG
25 CAPSULE ORAL ONCE
Status: COMPLETED | OUTPATIENT
Start: 2018-11-19 | End: 2018-11-19

## 2018-11-13 RX ORDER — ACETAMINOPHEN 325 MG/1
650 TABLET ORAL ONCE
Status: COMPLETED | OUTPATIENT
Start: 2018-11-19 | End: 2018-11-19

## 2018-11-15 ENCOUNTER — OFFICE VISIT (OUTPATIENT)
Dept: RHEUMATOLOGY | Age: 73
End: 2018-11-15

## 2018-11-15 VITALS
HEIGHT: 70 IN | DIASTOLIC BLOOD PRESSURE: 87 MMHG | WEIGHT: 182 LBS | TEMPERATURE: 97.6 F | HEART RATE: 98 BPM | BODY MASS INDEX: 26.05 KG/M2 | RESPIRATION RATE: 18 BRPM | SYSTOLIC BLOOD PRESSURE: 175 MMHG

## 2018-11-15 DIAGNOSIS — Z79.60 LONG-TERM USE OF IMMUNOSUPPRESSANT MEDICATION: ICD-10-CM

## 2018-11-15 DIAGNOSIS — M1A.39X1 CHRONIC TOPHACEOUS GOUT OF MULTIPLE SITES DUE TO RENAL IMPAIRMENT: Primary | ICD-10-CM

## 2018-11-15 DIAGNOSIS — I10 ESSENTIAL HYPERTENSION: ICD-10-CM

## 2018-11-15 DIAGNOSIS — N18.30 CKD (CHRONIC KIDNEY DISEASE) STAGE 3, GFR 30-59 ML/MIN (HCC): ICD-10-CM

## 2018-11-15 RX ORDER — LEFLUNOMIDE 20 MG/1
20 TABLET ORAL DAILY
Qty: 90 TAB | Refills: 0 | Status: SHIPPED | OUTPATIENT
Start: 2018-11-15 | End: 2019-01-14

## 2018-11-15 NOTE — PROGRESS NOTES
REASON FOR VISIT This is a follow-up visit for Mr. 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 Gouty arthritis phenotype includes: 
Tophi: yes Erosions: yes Tenosynovitis: no 
Double Contour: yes Therapy Includes: 
 
NSAIDs: contra-indicated due to CKD Colchicine prophylaxis: yes Current urate-lowering therapy: Krystexxa 8 mg every 2 weeks (8/13/2018 to present) Discontinued urate-lowering therapy because of inefficacy: allopurinol Discontinued urate-lowering therapy because of side effects: none Contra-Indicated urate-lowering therapy because of CKD: allopurinol, probenecid Discontinued urate-lowering therapy because of dysphagia: Uloric Immunomodulatory Therapy History includes: 
Current DMARD therapy include: Leflunomide 20 mg daily (8/2018 to present) Prior DMARD therapy include: none Discontinued DMARDs because of inefficacy: None Discontinued DMARDs because of side effects: none Active problems include: 
 
Patient Active Problem List  
Diagnosis Code  TIA (transient ischemic attack) G45.9  Stenosis of right carotid artery without cerebral infarction I65.21  
 Vitamin D deficiency E55.9  Retinopathy, diabetic, bilateral (HCC) E11.319  
 Heart murmur, systolic S08.4  Advance care planning Z71.89  
 Essential hypertension I10  
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 Tc Nash returns for a follow-up visit. On his last visit, I continued colchicine 0.6 mg, leflunomide 20 mg daily to minimize immunogenicity when Krystexxa infusions. His most recent infusion was 11/05/2018. Today, he feels well. He has had a small flare in his right hand around 5-6 weeks ago. He treated with colchicine with resolution. He has not been taking colchicine daily and has been taking it as needed. He feels more dexterity and mobility now overall. He has noticed some issues with balance now that he is more limber. He denies fever, weight loss, blurred vision, vision loss, oral ulcers, ankle swelling, dry cough, dyspnea, nausea, vomiting, dysphagia, abdominal pain, black or bloody stool, fall since last visit, rash, easy bruising and increased thirst. 
 
Most recent uric acid from 11/05/2018 was 0.2 mg/dL (previously 0.2, 0.2, 0.2, 0.2, 1.5, 10.2, 11.2, 12.0, 4.1, 9.6, 12.6 mg/dL). The patient has not had any interval hospital admissions, infections, or surgeries. He had a interval EGD and colonoscopy with benign polyps found. REVIEW OF SYSTEMS A comprehensive review of systems was performed and pertinent results are documented in the HPI, review of systems is otherwise non-contributory. PAST MEDICAL HISTORY He has a past medical history of Acute encephalopathy, Anemia, Atrial fibrillation ([address] Painter esophagus, CAD (coronary artery disease), Diabetes ([address] Heart failure ([address] HTN, goal below 140/90, Retinopathy, diabetic, bilateral ([address] Stenosis of right carotid artery without cerebral infarction, TIA (transient ischemic attack), and Vitamin D deficiency. FAMILY HISTORY His family history includes Diabetes in his father; Heart Failure in his mother; No Known Problems in his sister; Other in his daughter, daughter, and daughter. SOCIAL HISTORY He reports that  has never smoked.  he has never used smokeless tobacco. He reports that he drinks alcohol. He reports that he does not use drugs. IMMUNIZATIONS Immunization History Administered Date(s) Administered  Influenza High Dose Vaccine PF 02/26/2016, 11/23/2017 MEDICATIONS Current Outpatient Medications Medication Sig Dispense Refill  leflunomide (ARAVA) 20 mg tablet Take 1 Tab by mouth daily. 90 Tab 0  pegloticase (KRYSTEXXA) 8 mg/mL soln injection by IntraVENous route Once every 2 weeks.  insulin glargine (LANTUS,BASAGLAR) 100 unit/mL (3 mL) inpn 14 Units by SubCUTAneous route nightly for 90 days. 4 Pen 1  
 colchicine 0.6 mg tablet Take 0.6 mg by mouth daily as needed.  digoxin (LANOXIN) 0.125 mg tablet Take 0.125 mg by mouth every other day.  dextran 70-hypromellose (ARTIFICIAL TEARS,[id number],) ophthalmic solution Administer 1 Drop to both eyes as needed.  loperamide (IMODIUM) 2 mg capsule Take 2 mg by mouth four (4) times daily as needed for Diarrhea.  furosemide (LASIX) 40 mg tablet Take 40 mg by mouth daily. 5  
 hydrALAZINE (APRESOLINE) 25 mg tablet Take 25 mg by mouth three (3) times daily. 3  
 omeprazole (PRILOSEC) 20 mg capsule Take 20 mg by mouth daily.  multivitamin (ONE A DAY) tablet Take 1 Tab by mouth daily.  pravastatin (PRAVACHOL) 20 mg tablet Take 1 Tab by mouth nightly. 30 Tab 2  carvedilol (COREG) 3.125 mg tablet Take 1 Tab by mouth two (2) times daily (with meals). 60 Tab 2 Facility-Administered Medications Ordered in Other Visits Medication Dose Route Frequency Provider Last Rate Last Dose  [START ON 11/19/2018] methylPREDNISolone (PF) (SOLU-MEDROL) injection 125 mg  125 mg IntraVENous ONCE Greer Nageotte, MD      
 [START ON 11/19/2018] famotidine (PF) (PEPCID) 20 mg in sodium chloride 0.9% 10 mL injection  20 mg IntraVENous ONCE Greer Nageotte, MD      
 [START ON 11/19/2018] diphenhydrAMINE (BENADRYL) capsule 25 mg  25 mg Oral ONCE Janna Frederick MD      
  [START ON 11/19/2018] acetaminophen (TYLENOL) tablet 650 mg  650 mg Oral ONCE Omer Frederick MD      
 [START ON 11/19/2018] pegloticase (KRYSTEXXA) 8 mg in 0.9% sodium chloride 250 mL infusion  8 mg IntraVENous ONCE Omer Frederick MD      
  
 
ALLERGIES No Known Allergies PHYSICAL EXAMINATION Visit Vitals /87 Pulse 98 Temp 97.6 °F (36.4 °C) Resp 18 Ht 5' 10\" (1.778 m) Wt 182 lb (82.6 kg) BMI 26.11 kg/m² Body mass index is 26.11 kg/m². General: Patient is alert, oriented x 3, not in acute distress HEENT:  
Sclerae are not injected and appear moist. 
There is no alopecia. Neck is supple Cardiovascular: 
Heart is regular rate and rhythm, no murmurs. Chest: 
Lungs are clear to auscultation bilaterally. No rhonchi, wheezes, or crackles. Extremities: 
Free of clubbing, cyanosis, edema Neurological exam: Muscle strength is full in upper and lower extremities Skin exam: 
There are no rashes, no alopecia, no discoid lesions, no active Raynaud's, no livedo reticularis, no periungual erythema. Tophi: left olecranon, bilateral PIPs (which improved) Musculoskeletal exam: A comprehensive musculoskeletal exam was performed for all joints of each upper and lower extremity and assessed for swelling, tenderness and range of motion. Positive results are documented as below: 
 
Bilateral ankle edema (IMPROVED) Joint Count 11/15/2018 7/13/2018 Patient pain (0-100) - 70 Left 1st MCP - Tender - 1 Left 1st MCP - Swollen - 1 Left 2nd MCP - Swollen - 1 Left thumb IP - Tender - 1 Left thumb IP - Swollen - 1 Left 2nd PIP - Tender 1 1 Left 2nd PIP - Swollen 0 1 Left 3rd PIP - Tender 1 1 Left 3rd PIP - Swollen 1 1 Left 5th PIP - Tender - 1 Left 5th PIP - Swollen - 1 Right wrist- Tender - 1 Right wrist- Swollen - 1 Right 2nd MCP - Swollen - 1 Right 5th MCP - Swollen - 1 Right 2nd PIP - Swollen - 1 Right 3rd PIP - Swollen - 1 Right 4th PIP - Swollen - 1 Right 5th PIP - Swollen - 1 Right knee - Tender - 1 Right knee - Swollen - 1 Tender Joint Count (Total) 2 7 Swollen Joint Count (Total) 1 14 Patient Assessment (0-10) - 2.5 DATA REVIEW Laboratory Recent laboratory results were reviewed, summarized, and discussed with the patient. Imaging Musculoskeletal Ultrasound Ultrasound of the left hand. Indication: joint pain. (07/13/18) Using a [employer] Logiq e with 18 Mhz probe, limited views of the left hand were reviewed. This revealed hyperechoic rim along the 2nd MCP dorsally. The tendons were normal. Bony contours were regular without erosions seen. There were an an inhomogeneous collection in the joint space Impression: tophaceous gout with double contour Ultrasound of the right knee. Indication: joint pain. (07/13/18) Using a [employer] Logiq e with 12 Mhz probe, limited views of the right knee were reviewed. This revealed a large anechoic colleciton without doppler and the presence of synovial frons within the suprapatellar joint space and medial and lateral gutters joint space. The tendons were normal. Bony contours were regular without erosions seen. There were no soft tissue masses noted. Unable to perform suprapatellar view due to knee effusion to assess for double contour. Impression: large joint effusion Radiographs Bilateral Hand 7/13/2018: RIGHT: Osteopenia is unchanged. No acute fracture or dislocation. Vascular calcifications are slightly increased. Radiocarpal and DRUJ osteoarthritis is increased. Triscaphe and first Aia 16 joint mild osteoarthritis is increased. No carpal erosion. There are erosions of the proximal fourth metacarpal at the fourth Aia 16 joint. Questionable new erosion of the second metacarpal head. No other MCP joint erosion. LEFT: No fracture or dislocation on plain film. Bones are osteopenic. Vascular calcifications are present. No chondrocalcinosis. Radiocarpal and intercarpal joints are within normal limits and unchanged. First MCP joint erosions are increased in size and number. Second MCP joint erosions are new. Remaining MCP joints are within normal limits. Multiple IP joint erosions are increased and more conspicuous. No significant narrowing of the DIP joints. No periosteal reaction. Subtle erosion of the fourth DIP joint is unchanged. Third DIP joint erosions are more conspicuous. No chondrocalcinosis or periosteal reaction. Bilateral Hand 10/02/2012: RIGHT: demineralization and small erosive changes.  Vascular calcification is noted. LEFT: demineralization and small erosive changes head 2nd middle phalanx, head first metacarpal bone. Vascular calcification is noted CT Imaging CT Chest without contrast 6/21/2016: There is no definite evidence of interstitial lung disease. In the right upper lobe, there are vague areas of groundglass opacity which are nonspecific. There is a 5 mm nodule in the right upper lobe. There is minor atelectasis at the lung bases. There is a borderline enlarged pretracheal lymph node similar to 2012. The main pulmonary artery segment is slightly prominent measuring 3.6 cm perhaps and pulmonary artery hypertension. There is extensive atherosclerotic changes in the coronary arteries. There is also calcification in the aortic valve. In the upper abdomen, there is a small amount of ascites. There is mild prominence of the IVC and hepatic veins. 
   
MR Imaging MRI Brain without contrast 1/14/2016: There is no evidence for acute infarction. Several nonspecific tiny foci of altered signal are noted in the centrum semiovale bilaterally, somewhat more numerous on the left, nonspecific though compatible with chronic small vessel ischemic disease of white matter. There is no intra-axial extra-axial mass. The vascular flow voids at the base of the brain appear normal in conspicuity.  Sella, optic chiasm, posterior fossa, orbits and paranasal sinuses are normal. A 7 mm focus of altered signal in the left occipital bone corresponds with an ill-defined lytic lesion shown on CT. The significance of this finding is doubtful. MRA Brain without contrast 1/14/2016: The codominant vertebral artery confluence and basilar artery  demonstrate normal flow as do the posterior cerebral arteries bilaterally. Normal appearing flow is shown in the internal carotid artery siphons bilaterally as well as the anterior and middle cerebral arteries bilaterally. DXA None PROCEDURE Ultrasound-Guided Right Knee Aspiration and Kenalog 40 mg IA. (07/13/18) ASSESSMENT AND PLAN This is a follow-up visit for Mr. Stacey Mcclendon. 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 He is maintained on leflunomide 20 mg daily for immunogenicity prophylaxis while on Krystexxa infusions (since 8/13/2018). He is also taking colchicine as needed. He has had good tolerance and response to [place]. Most recent uric acid from 11/05/2018 was 0.2 mg/dL (previously 0.2, 0.2, 0.2, 0.2, 1.5, 10.2, 11.2, 12.0, 4.1, 9.6, 12.6 mg/dL). His tophi in his hands and left olecranon have improved. I will continue treatment 2) Long Term Use of Immunosuppressants. The patient remains on immunomodulatory medications (lefluomide) and requires frequent toxicity monitoring by blood work. 3) Chronic Kidney Disease Stage 3. The patient's creatinine 2.00 mg/dL and eGFR 33 (previously 2.03, 2.23 mg/dL, eGFR 33, 28). 4) Essential Hypertension. Her pressure was 175/87 (previously 137/64, 161/80). He is on Coreg 3.125 mg twice daily, hydralazine 25 mg three times daily, and Lasix 40 mg as needed The patient voiced understanding of the aforementioned assessment and plan. Summary of plan was provided in the After Visit Summary patient instructions.   
 
TODAY'S ORDERS 
 
 Orders Placed This Encounter  leflunomide (ARAVA) 20 mg tablet  pegloticase (KRYSTEXXA) 8 mg/mL soln injection Future Appointments Date Time Provider Xiomara Dalei 11/19/2018 11:00 AM SS INF4 CH4 <4H [facility] Burns  
11/27/2018 11:20 AM Pierre White MD [facility] [place] SCHED  
12/5/2018  9:00 AM Jayant Mckay MD [facility] 7103 Follow-up Disposition: 
Return in about 6 months (around 5/15/2019). Beto Aggarwal MD, [facility] Adult Rheumatology Rheumatology Ultrasound Certified [facility] A Part of [address], [address] [place] [address] Road Phone [phone] Fax [phone]

## 2018-11-19 ENCOUNTER — HOSPITAL ENCOUNTER (OUTPATIENT)
Dept: INFUSION THERAPY | Age: 73
Discharge: HOME | End: 2018-11-19
Payer: COMMERCIAL

## 2018-11-19 VITALS
TEMPERATURE: 97.1 F | OXYGEN SATURATION: 97 % | SYSTOLIC BLOOD PRESSURE: 172 MMHG | DIASTOLIC BLOOD PRESSURE: 88 MMHG | HEART RATE: 63 BPM | BODY MASS INDEX: 26.59 KG/M2 | WEIGHT: 185.3 LBS

## 2018-11-19 LAB
BASOPHILS # BLD: 0 K/UL (ref 0–0.1)
BASOPHILS NFR BLD: 1 % (ref 0–1)
COMMENT, HOLDF: NORMAL
DIFFERENTIAL METHOD BLD: ABNORMAL
EOSINOPHIL # BLD: 0.1 K/UL (ref 0–0.4)
EOSINOPHIL NFR BLD: 2 % (ref 0–7)
ERYTHROCYTE [DISTWIDTH] IN BLOOD BY AUTOMATED COUNT: 17 % (ref 11.5–14.5)
ERYTHROCYTE [SEDIMENTATION RATE] IN BLOOD: 35 MM/HR (ref 0–20)
HCT VFR BLD AUTO: 31.7 % (ref 36.6–50.3)
HGB BLD-MCNC: 9.4 G/DL (ref 12.1–17)
IMM GRANULOCYTES # BLD: 0 K/UL (ref 0–0.04)
IMM GRANULOCYTES NFR BLD AUTO: 0 % (ref 0–0.5)
LYMPHOCYTES # BLD: 0.3 K/UL (ref 0.8–3.5)
LYMPHOCYTES NFR BLD: 9 % (ref 12–49)
MCH RBC QN AUTO: 26.3 PG (ref 26–34)
MCHC RBC AUTO-ENTMCNC: 29.7 G/DL (ref 30–36.5)
MCV RBC AUTO: 88.5 FL (ref 80–99)
MONOCYTES # BLD: 0.4 K/UL (ref 0–1)
MONOCYTES NFR BLD: 10 % (ref 5–13)
NEUTS SEG # BLD: 3 K/UL (ref 1.8–8)
NEUTS SEG NFR BLD: 78 % (ref 32–75)
NRBC # BLD: 0 K/UL (ref 0–0.01)
NRBC BLD-RTO: 0 PER 100 WBC
PLATELET # BLD AUTO: 94 K/UL (ref 150–400)
PMV BLD AUTO: 11.3 FL (ref 8.9–12.9)
RBC # BLD AUTO: 3.58 M/UL (ref 4.1–5.7)
RBC MORPH BLD: ABNORMAL
RBC MORPH BLD: ABNORMAL
SAMPLES BEING HELD,HOLD: NORMAL
URATE SERPL-MCNC: <0.2 MG/DL (ref 3.5–7.2)
WBC # BLD AUTO: 3.8 K/UL (ref 4.1–11.1)

## 2018-11-19 PROCEDURE — 85025 COMPLETE CBC W/AUTO DIFF WBC: CPT

## 2018-11-19 PROCEDURE — 74011000250 HC RX REV CODE- 250: Performed by: INTERNAL MEDICINE

## 2018-11-19 PROCEDURE — 36415 COLL VENOUS BLD VENIPUNCTURE: CPT

## 2018-11-19 PROCEDURE — 96366 THER/PROPH/DIAG IV INF ADDON: CPT

## 2018-11-19 PROCEDURE — 96365 THER/PROPH/DIAG IV INF INIT: CPT

## 2018-11-19 PROCEDURE — 74011250637 HC RX REV CODE- 250/637: Performed by: INTERNAL MEDICINE

## 2018-11-19 PROCEDURE — 74011250636 HC RX REV CODE- 250/636: Performed by: INTERNAL MEDICINE

## 2018-11-19 PROCEDURE — 85652 RBC SED RATE AUTOMATED: CPT

## 2018-11-19 PROCEDURE — 84550 ASSAY OF BLOOD/URIC ACID: CPT

## 2018-11-19 PROCEDURE — 96375 TX/PRO/DX INJ NEW DRUG ADDON: CPT

## 2018-11-19 RX ADMIN — DIPHENHYDRAMINE HYDROCHLORIDE 25 MG: 25 CAPSULE ORAL at 11:41

## 2018-11-19 RX ADMIN — METHYLPREDNISOLONE SODIUM SUCCINATE 125 MG: 125 INJECTION, POWDER, FOR SOLUTION INTRAMUSCULAR; INTRAVENOUS at 11:47

## 2018-11-19 RX ADMIN — FAMOTIDINE 20 MG: 10 INJECTION, SOLUTION INTRAVENOUS at 11:47

## 2018-11-19 RX ADMIN — ACETAMINOPHEN 650 MG: 325 TABLET ORAL at 11:41

## 2018-11-19 RX ADMIN — PEGLOTICASE 8 MG: 8 INJECTION, SOLUTION INTRAVENOUS at 12:24

## 2018-11-19 NOTE — PROGRESS NOTES
Outpatient Infusion Center Progress Note 1100 Pt admit to [facility] for [address] ambulatory in stable condition. Assessment completed. No new concerns voiced. Visit Vitals /88 Pulse 61 Temp 97.6 °F (36.4 °C) Wt 84.1 kg (185 lb 4.8 oz) SpO2 97% BMI 26.59 kg/m² Medications: 
Tylenol po Benadryl po 
pepcid ivp Methylprednisolone ivp Krystexxa iv 
 
1445 Pt tolerated treatment well. D/c home ambulatory in no distress. Pt aware of next appointment scheduled for 12/3/18 @ 1100 Recent Results (from the past 12 hour(s)) CBC WITH AUTOMATED DIFF Collection Time: 11/19/18 11:18 AM  
Result Value Ref Range WBC 3.8 (L) 4.1 - 11.1 K/uL  
 RBC 3.58 (L) 4.10 - 5.70 M/uL HGB 9.4 (L) 12.1 - 17.0 g/dL HCT 31.7 (L) 36.6 - 50.3 % MCV 88.5 80.0 - 99.0 FL  
 MCH 26.3 26.0 - 34.0 PG  
 MCHC 29.7 (L) 30.0 - 36.5 g/dL  
 RDW 17.0 (H) 11.5 - 14.5 % PLATELET 94 (L) 914 - 400 K/uL MPV 11.3 8.9 - 12.9 FL  
 NRBC 0.0 0  WBC ABSOLUTE NRBC 0.00 0.00 - 0.01 K/uL NEUTROPHILS 78 (H) 32 - 75 % LYMPHOCYTES 9 (L) 12 - 49 % MONOCYTES 10 5 - 13 % EOSINOPHILS 2 0 - 7 % BASOPHILS 1 0 - 1 % IMMATURE GRANULOCYTES 0 0.0 - 0.5 % ABS. NEUTROPHILS 3.0 1.8 - 8.0 K/UL  
 ABS. LYMPHOCYTES 0.3 (L) 0.8 - 3.5 K/UL  
 ABS. MONOCYTES 0.4 0.0 - 1.0 K/UL  
 ABS. EOSINOPHILS 0.1 0.0 - 0.4 K/UL  
 ABS. BASOPHILS 0.0 0.0 - 0.1 K/UL  
 ABS. IMM. GRANS. 0.0 0.00 - 0.04 K/UL  
 DF SMEAR SCANNED    
 RBC COMMENTS ANISOCYTOSIS 1+ 
    
 RBC COMMENTS HYPOCHROMIA PRESENT 
    
URIC ACID Collection Time: 11/19/18 11:18 AM  
Result Value Ref Range Uric acid <0.2 (L) 3.5 - 7.2 MG/DL  
SAMPLES BEING HELD Collection Time: 11/19/18 11:18 AM  
Result Value Ref Range SAMPLES BEING HELD SST   
 COMMENT Add-on orders for these samples will be processed based on acceptable specimen integrity and analyte stability, which may vary by analyte.

## 2018-11-27 ENCOUNTER — OFFICE VISIT (OUTPATIENT)
Dept: SLEEP MEDICINE | Age: 73
End: 2018-11-27

## 2018-11-27 VITALS
HEIGHT: 70 IN | SYSTOLIC BLOOD PRESSURE: 160 MMHG | DIASTOLIC BLOOD PRESSURE: 90 MMHG | OXYGEN SATURATION: 95 % | BODY MASS INDEX: 25.27 KG/M2 | HEART RATE: 89 BPM | WEIGHT: 176.5 LBS

## 2018-11-27 DIAGNOSIS — G47.33 OSA (OBSTRUCTIVE SLEEP APNEA): Primary | ICD-10-CM

## 2018-11-27 DIAGNOSIS — I10 ESSENTIAL HYPERTENSION: ICD-10-CM

## 2018-11-27 DIAGNOSIS — I48.91 ATRIAL FIBRILLATION, UNSPECIFIED TYPE (HCC): ICD-10-CM

## 2018-11-27 RX ORDER — APIXABAN 5 MG/1
TABLET, FILM COATED ORAL
Refills: 3 | COMMUNITY
Start: 2018-11-09 | End: 2019-02-22

## 2018-11-27 NOTE — PATIENT INSTRUCTIONS
[address] [facility] [place]. [place], [address]  Tel.  [phone]  Fax. 100 [facility] 60  [place], [address] [facility]  Tel.  [phone]  Fax. [phone] [address]  Tel.  [phone]  Fax. [phone]     Sleep Apnea: After Your Visit  Your Care Instructions  Sleep apnea occurs when you frequently stop breathing for 10 seconds or longer during sleep. It can be mild to severe, based on the number of times per hour that you stop breathing or have slowed breathing. Blocked or narrowed airways in your nose, mouth, or throat can cause sleep apnea. Your airway can become blocked when your throat muscles and tongue relax during sleep. Sleep apnea is common, occurring in 1 out of 20 individuals. Individuals having any of the following characteristics should be evaluated and treated right away due to high risk and detrimental consequences from untreated sleep apnea:  1. Obesity  2. Congestive Heart failure  3. Atrial Fibrillation  4. Uncontrolled Hypertension  5. Type II Diabetes  6. Night-time Arrhythmias  7. Stroke  8. Pulmonary Hypertension  9. High-risk Driving Populations (pilots, truck drivers, etc.)  10. Patients Considering Weight-loss Surgery    How do you know you have sleep apnea? You probably have sleep apnea if you answer 'yes' to 3 or more of the following questions:  S - Have you been told that you Snore? T - Are you often Tired during the day? O - Has anyone Observed you stop breathing while sleeping? P- Do you have (or are being treated for) high blood Pressure? B - Are you obese (Body Mass Index > 35)? A - Is your Age 48years old or older? N - Is your Neck size greater than 16 inches? G - Are you male Gender? A sleep physician can prescribe a breathing device that prevents tissues in the throat from blocking your airway.  Or your doctor may recommend using a dental device (oral breathing device) to help keep your airway open. In some cases, surgery may be needed to remove enlarged tissues in the throat. Follow-up care is a key part of your treatment and safety. Be sure to make and go to all appointments, and call your doctor if you are having problems. It's also a good idea to know your test results and keep a list of the medicines you take. How can you care for yourself at home? · Lose weight, if needed. It may reduce the number of times you stop breathing or have slowed breathing. · Go to bed at the same time every night. · Sleep on your side. It may stop mild apnea. If you tend to roll onto your back, sew a pocket in the back of your pajama top. Put a tennis ball into the pocket, and stitch the pocket shut. This will help keep you from sleeping on your back. · Avoid alcohol and medicines such as sleeping pills and sedatives before bed. · Do not smoke. Smoking can make sleep apnea worse. If you need help quitting, talk to your doctor about stop-smoking programs and medicines. These can increase your chances of quitting for good. · Prop up the head of your bed 4 to 6 inches by putting bricks under the legs of the bed. · Treat breathing problems, such as a stuffy nose, caused by a cold or allergies. · Use a continuous positive airway pressure (CPAP) breathing machine if lifestyle changes do not help your apnea and your doctor recommends it. The machine keeps your airway from closing when you sleep. · If CPAP does not help you, ask your doctor whether you should try other breathing machines. A bilevel positive airway pressure machine has two types of air pressureâone for breathing in and one for breathing out. Another device raises or lowers air pressure as needed while you breathe. · If your nose feels dry or bleeds when using one of these machines, talk with your doctor about increasing moisture in the air. A humidifier may help.   · If your nose is runny or stuffy from using a breathing machine, talk with your doctor about using decongestants or a corticosteroid nasal spray. When should you call for help? Watch closely for changes in your health, and be sure to contact your doctor if:  · You still have sleep apnea even though you have made lifestyle changes. · You are thinking of trying a device such as CPAP. · You are having problems using a CPAP or similar machine. Where can you learn more? Go to [employer]. Enter T431 in the search box to learn more about \"Sleep Apnea: After Your Visit. \"   © 8611-7595 Healthwise, Incorporated. Care instructions adapted under license by [facility] [employer] (which disclaims liability or warranty for this information). This care instruction is for use with your licensed healthcare professional. If you have questions about a medical condition or this instruction, always ask your healthcare professional. Ila Glover any warranty or liability for your use of this information. PROPER SLEEP HYGIENE    What to avoid  · Do not have drinks with caffeine, such as coffee or black tea, for 8 hours before bed. · Do not smoke or use other types of tobacco near bedtime. Nicotine is a stimulant and can keep you awake. · Avoid drinking alcohol late in the evening, because it can cause you to wake in the middle of the night. · Do not eat a big meal close to bedtime. If you are hungry, eat a light snack. · Do not drink a lot of water close to bedtime, because the need to urinate may wake you up during the night. · Do not read or watch TV in bed. Use the bed only for sleeping and sexual activity. What to try  · Go to bed at the same time every night, and wake up at the same time every morning. Do not take naps during the day. · Keep your bedroom quiet, dark, and cool. · Get regular exercise, but not within 3 to 4 hours of your bedtime. .  · Sleep on a comfortable pillow and mattress.   · If watching the clock makes you anxious, turn it facing away from you so you cannot see the time. · If you worry when you lie down, start a worry book. Well before bedtime, write down your worries, and then set the book and your concerns aside. · Try meditation or other relaxation techniques before you go to bed. · If you cannot fall asleep, get up and go to another room until you feel sleepy. Do something relaxing. Repeat your bedtime routine before you go to bed again. · Make your house quiet and calm about an hour before bedtime. Turn down the lights, turn off the TV, log off the computer, and turn down the volume on music. This can help you relax after a busy day. Drowsy Driving  The [address] [place] [address] Road Traffic Safety Administration cites drowsiness as a causing factor in more than 971,344 police reported crashes annually, resulting in 76,000 injuries and 1,500 deaths. Other surveys suggest 55% of people polled have driven while drowsy in the past year, 23% had fallen asleep but not crashed, 3% crashed, and 2% had and accident due to drowsy driving. Who is at risk? Young Drivers: One study of drowsy driving accidents states that 55% of the drivers were under 25 years. Of those, 75% were male. Shift Workers and Travelers: People who work overnight or travel across time zones frequently are at higher risk of experiencing Circadian Rhythm Disorders. They are trying to work and function when their body is programed to sleep. Sleep Deprived: Lack of sleep has a serious impact on your ability to pay attention or focus on a task. Consistently getting less than the average of 8 hours your body needs creates partial or cumulative sleep deprivation. Untreated Sleep Disorders: Sleep Apnea, Narcolepsy, R.L.S., and other sleep disorders (untreated) prevent a person from getting enough restful sleep. This leads to excessive daytime sleepiness and increases the risk for drowsy driving accidents by up to 7 times.   Medications / Alcohol: Even over the counter medications can cause drowsiness. Medications that impair a drivers attention should have a warning label. Alcohol naturally makes you sleepy and on its own can cause accidents. Combined with excessive drowsiness its effects are amplified. Signs of Drowsy Driving:   * You don't remember driving the last few miles   * You may drift out of your abelardo   * You are unable to focus and your thoughts wander   * You may yawn more often than normal   * You have difficulty keeping your eyes open / nodding off   * Missing traffic signs, speeding, or tailgating  Prevention-   Good sleep hygiene, lifestyle and behavioral choices have the most impact on drowsy driving. There is no substitute for sleep and the average person requires 8 hours nightly. If you find yourself driving drowsy, stop and sleep. Consider the sleep hygiene tips provided during your visit as well. Medication Refill Policy: Refills for all medications require 1 week advance notice. Please have your pharmacy fax a refill request. We are unable to fax, or call in \"controled substance\" medications and you will need to pick these prescriptions up from our office. [employer] Activation    Thank you for requesting access to [employer]. Please follow the instructions below to securely access and download your online medical record. [employer] allows you to send messages to your doctor, view your test results, renew your prescriptions, schedule appointments, and more. How Do I Sign Up? 1. In your internet browser, go to [email or web address]. 2. Click on the First Time User? Click Here link in the Sign In box. You will see the New Member Sign Up page. 3. Enter your [employer] Access Code exactly as it appears below. You will not need to use this code after youve completed the sign-up process. If you do not sign up before the expiration date, you must request a new code. [employer] Access Code:  Activation code not generated  Current [employer] Status: Active (This is the date your [employer] access code will )    4. Enter the last four digits of your Social Security Number (xxxx) and Date of Birth (mm/dd/yyyy) as indicated and click Submit. You will be taken to the next sign-up page. 5. Create a Apartment Listt ID. This will be your [employer] login ID and cannot be changed, so think of one that is secure and easy to remember. 6. Create a [employer] password. You can change your password at any time. 7. Enter your Password Reset Question and Answer. This can be used at a later time if you forget your password. 8. Enter your e-mail address. You will receive e-mail notification when new information is available in [address] 9. Click Sign Up. You can now view and download portions of your medical record. 10. Click the Download Summary menu link to download a portable copy of your medical information. Additional Information    If you have questions, please call [phone]. Remember, [employer] is NOT to be used for urgent needs. For medical emergencies, dial 911.

## 2018-11-27 NOTE — PROGRESS NOTES
[address] [facility] [address] [place]. [place], [address]  Tel.  [phone]  Fax. 100 [facility] 60  [place], [address] [facility]  Tel.  [phone]  Fax. [phone] [address] [facility] [address]  Tel.  [phone]  Fax. [phone]         Subjective:      Nancy Saravia is an 72 y.o. male referred for evaluation for a sleep disorder. He complains of low oxygen levels during sleep noted during at least 4 four hospitalization in the past 3 years associated poor quality sleep sleeping in 3 hour intervals during the night and feeling sleepy during the day. Symptoms began several years ago, unchanged since that time. He usually can fall asleep in 5 minutes. Family or house members no note snoring. He denies completely or partially paralyzed while falling asleep or waking up. Nancy Saravia does wake up frequently at night. He is not bothered by waking up too early and left unable to get back to sleep. He actually sleeps about 3 - 8 hours at night and wakes up about 3 times during the night. He does not work shifts:  . Clerts! indicates he does not get too little sleep at night. His bedtime is variable. He awakens at 7:30 am. He does take naps. He takes 7 naps a week lasting 2, Hour(s). He has the following observed behaviors: Twitching of legs or feet;  . Other remarks: Vivid dreams    [place] Sleepiness Score: 8 which reflect mild daytime drowsiness. Ejection fraction was estimated to be 60 %. No Known Allergies      Current Outpatient Medications:     ELIQUIS 5 mg tablet, TAKE 1 TABLET BY MOUTH TWICE A DAY, Disp: , Rfl: 3    leflunomide (ARAVA) 20 mg tablet, Take 1 Tab by mouth daily. , Disp: 90 Tab, Rfl: 0    pegloticase (KRYSTEXXA) 8 mg/mL soln injection, by IntraVENous route Once every 2 weeks. , Disp: , Rfl:     insulin glargine (LANTUS,BASAGLAR) 100 unit/mL (3 mL) inpn, 14 Units by SubCUTAneous route nightly for 90 days. , Disp: 4 Pen, Rfl: 1   colchicine 0.6 mg tablet, Take 0.6 mg by mouth daily as needed. , Disp: , Rfl:     digoxin (LANOXIN) 0.125 mg tablet, Take 0.125 mg by mouth every other day., Disp: , Rfl:     dextran 70-hypromellose (ARTIFICIAL TEARS,[id number],) ophthalmic solution, Administer 1 Drop to both eyes as needed. , Disp: , Rfl:     loperamide (IMODIUM) 2 mg capsule, Take 2 mg by mouth four (4) times daily as needed for Diarrhea., Disp: , Rfl:     furosemide (LASIX) 40 mg tablet, Take 40 mg by mouth daily. , Disp: , Rfl: 5    hydrALAZINE (APRESOLINE) 25 mg tablet, Take 25 mg by mouth three (3) times daily. , Disp: , Rfl: 3    omeprazole (PRILOSEC) 20 mg capsule, Take 20 mg by mouth daily. , Disp: , Rfl:     multivitamin (ONE A DAY) tablet, Take 1 Tab by mouth daily. , Disp: , Rfl:     pravastatin (PRAVACHOL) 20 mg tablet, Take 1 Tab by mouth nightly., Disp: 30 Tab, Rfl: 2    carvedilol (COREG) 3.125 mg tablet, Take 1 Tab by mouth two (2) times daily (with meals). , Disp: 60 Tab, Rfl: 2     He  has a past medical history of Acute encephalopathy, Anemia, Atrial fibrillation ([address] Painter esophagus, CAD (coronary artery disease), Diabetes ([address] Heart failure ([address] HTN, goal below 140/90, Retinopathy, diabetic, bilateral ([address] Stenosis of right carotid artery without cerebral infarction, TIA (transient ischemic attack), and Vitamin D deficiency. He  has a past surgical history that includes hx urological (2013); hx endoscopy (06/27/2016); hx endoscopy (06/23/2016); COLONOSCOPY (N/A, 8/30/2018); COLONOSCOPY (N/A, 8/6/2018); ESOPHAGOGASTRODUODENOSCOPY (EGD) (N/A, 8/6/2018); ESOPHAGOGASTRODUODENAL (EGD) BIOPSY (N/A, 8/6/2018); ENDOSCOPIC POLYPECTOMY (N/A, 8/6/2018); RESOLUTION CLIP (N/A, 8/6/2018); CAPSULE (N/A, 6/26/2016); COLONOSCOPY (N/A, 6/23/2016); ESOPHAGOGASTRODUODENOSCOPY (EGD) (N/A, 6/23/2016); ESOPHAGOGASTRODUODENAL (EGD) BIOPSY (N/A, 6/23/2016); and ENDOSCOPIC POLYPECTOMY (N/A, 6/23/2016).     He family history includes Diabetes in his father; Heart Failure in his mother; No Known Problems in his sister; Other in his daughter, daughter, and daughter. He  reports that  has never smoked. he has never used smokeless tobacco. He reports that he drinks alcohol. He reports that he does not use drugs. Review of Systems:  Constitutional:  No significant weight loss or weight gain  Eyes:  No blurred vision  CVS:  No significant chest pain  Pulm:  No significant shortness of breath  GI:  No significant nausea or vomiting  :  No significant nocturia  Musculoskeletal:  No significant joint pain at night  Skin:  No significant rashes  Neuro:  No significant dizziness   Psych:  No active mood issues    Sleep Review of Systems: notable for no difficulty falling asleep; frequent awakenings at night;  regular dreaming noted; no nightmares ; no early morning headaches; memory problems; no concentration issues; no history of any automobile or occupational accidents due to daytime drowsiness. Objective:     Visit Vitals  /90   Pulse 89   Ht 5' 10\" (1.778 m)   Wt 176 lb 8 oz (80.1 kg)   SpO2 95%   BMI 25.33 kg/m²         General:   Not in acute distress   Eyes:  Anicteric sclerae, no obvious strabismus   Nose:  No obvious nasal septum deviation    Oropharynx:   Class 4 oropharyngeal outlet, thick tongue base, uvula could not be seen due to low-lying soft palate, narrow tonsilo-pharyngeal pilars   Tonsils:   tonsils are not seen due to low-lying soft palate   Neck:   Neck circ. in \"inches\": 14.5; midline trachea   Chest/Lungs:  Equal lung expansion, clear on auscultation    CVS:  Normal rate, regular rhythm; no JVD   Skin:  Warm to touch; no obvious rashes   Neuro:  No focal deficits ; no obvious tremor    Psych:  Normal affect,  normal countenance;          Assessment:       ICD-10-CM ICD-9-CM    1. JENNA (obstructive sleep apnea) G47.33 327.23 POLYSOMNOGRAPHY 1 NIGHT   2.  Atrial fibrillation, unspecified type ([facility] I48.91 427.31 3. Essential hypertension I10 401.9    4. BMI 25.0-25.9,adult Z68.25 V85.21          Plan:     * The patient currently has a Moderate Risk for having sleep apnea. STOP-BANG score 4.  * Sleep testing was ordered for initial evaluation. * He was provided information on sleep apnea including coresponding risk factors and the importance of proper treatment. * Treatment options if indicated were reviewed today. Patient agrees to a trial of PAP therapy if indicated. * Counseling was provided regarding proper sleep hygiene (including effect of light on sleep), paradoxical intention, stimulus control, sleep environment safety and safe driving. * Patient agrees to telephone [phone]  follow-up by myself or lead sleep technologist shortly after sleep study to review results and plan final management.     (patient has given permission for a message to be left regarding test results and further management if patient cannot be cannot be reached directly). Thank you for allowing us to participate in your patient's medical care. We'll keep you updated on these investigations. Cristy Figueroa MD, [facility]  Electronically signed.  11/27/18

## 2018-11-28 RX ORDER — ACETAMINOPHEN 325 MG/1
650 TABLET ORAL ONCE
Status: COMPLETED | OUTPATIENT
Start: 2018-12-03 | End: 2018-12-03

## 2018-11-28 RX ORDER — DIPHENHYDRAMINE HCL 25 MG
25 CAPSULE ORAL ONCE
Status: COMPLETED | OUTPATIENT
Start: 2018-12-03 | End: 2018-12-03

## 2018-12-03 ENCOUNTER — HOSPITAL ENCOUNTER (OUTPATIENT)
Dept: INFUSION THERAPY | Age: 73
Discharge: HOME | End: 2018-12-03
Payer: COMMERCIAL

## 2018-12-03 VITALS
HEART RATE: 72 BPM | TEMPERATURE: 97.7 F | DIASTOLIC BLOOD PRESSURE: 78 MMHG | SYSTOLIC BLOOD PRESSURE: 140 MMHG | RESPIRATION RATE: 16 BRPM

## 2018-12-03 LAB
ALBUMIN SERPL-MCNC: 3.7 G/DL (ref 3.5–5)
ALBUMIN/GLOB SERPL: 1.1 {RATIO} (ref 1.1–2.2)
ALP SERPL-CCNC: 78 U/L (ref 45–117)
ALT SERPL-CCNC: 39 U/L (ref 12–78)
ANION GAP SERPL CALC-SCNC: 11 MMOL/L (ref 5–15)
AST SERPL-CCNC: 35 U/L (ref 15–37)
BASOPHILS # BLD: 0 K/UL (ref 0–0.1)
BASOPHILS NFR BLD: 1 % (ref 0–1)
BILIRUB SERPL-MCNC: 0.6 MG/DL (ref 0.2–1)
BUN SERPL-MCNC: 52 MG/DL (ref 6–20)
BUN/CREAT SERPL: 27 (ref 12–20)
CALCIUM SERPL-MCNC: 9.1 MG/DL (ref 8.5–10.1)
CHLORIDE SERPL-SCNC: 105 MMOL/L (ref 97–108)
CO2 SERPL-SCNC: 26 MMOL/L (ref 21–32)
CREAT SERPL-MCNC: 1.96 MG/DL (ref 0.7–1.3)
DIFFERENTIAL METHOD BLD: ABNORMAL
EOSINOPHIL # BLD: 0.1 K/UL (ref 0–0.4)
EOSINOPHIL NFR BLD: 3 % (ref 0–7)
ERYTHROCYTE [DISTWIDTH] IN BLOOD BY AUTOMATED COUNT: 15.9 % (ref 11.5–14.5)
ERYTHROCYTE [SEDIMENTATION RATE] IN BLOOD: 29 MM/HR (ref 0–20)
GLOBULIN SER CALC-MCNC: 3.5 G/DL (ref 2–4)
GLUCOSE SERPL-MCNC: 118 MG/DL (ref 65–100)
HCT VFR BLD AUTO: 32.8 % (ref 36.6–50.3)
HGB BLD-MCNC: 9.7 G/DL (ref 12.1–17)
IMM GRANULOCYTES # BLD: 0 K/UL (ref 0–0.04)
IMM GRANULOCYTES NFR BLD AUTO: 0 % (ref 0–0.5)
LYMPHOCYTES # BLD: 0.4 K/UL (ref 0.8–3.5)
LYMPHOCYTES NFR BLD: 8 % (ref 12–49)
MCH RBC QN AUTO: 26.1 PG (ref 26–34)
MCHC RBC AUTO-ENTMCNC: 29.6 G/DL (ref 30–36.5)
MCV RBC AUTO: 88.2 FL (ref 80–99)
MONOCYTES # BLD: 0.5 K/UL (ref 0–1)
MONOCYTES NFR BLD: 11 % (ref 5–13)
NEUTS SEG # BLD: 3.5 K/UL (ref 1.8–8)
NEUTS SEG NFR BLD: 77 % (ref 32–75)
NRBC # BLD: 0 K/UL (ref 0–0.01)
NRBC BLD-RTO: 0 PER 100 WBC
PLATELET # BLD AUTO: 92 K/UL (ref 150–400)
PMV BLD AUTO: 11.1 FL (ref 8.9–12.9)
POTASSIUM SERPL-SCNC: 4 MMOL/L (ref 3.5–5.1)
PROT SERPL-MCNC: 7.2 G/DL (ref 6.4–8.2)
RBC # BLD AUTO: 3.72 M/UL (ref 4.1–5.7)
RBC MORPH BLD: ABNORMAL
SODIUM SERPL-SCNC: 142 MMOL/L (ref 136–145)
URATE SERPL-MCNC: <0.2 MG/DL (ref 3.5–7.2)
WBC # BLD AUTO: 4.5 K/UL (ref 4.1–11.1)

## 2018-12-03 PROCEDURE — 74011250636 HC RX REV CODE- 250/636: Performed by: INTERNAL MEDICINE

## 2018-12-03 PROCEDURE — 74011000250 HC RX REV CODE- 250: Performed by: INTERNAL MEDICINE

## 2018-12-03 PROCEDURE — 96365 THER/PROPH/DIAG IV INF INIT: CPT

## 2018-12-03 PROCEDURE — 96366 THER/PROPH/DIAG IV INF ADDON: CPT

## 2018-12-03 PROCEDURE — 36415 COLL VENOUS BLD VENIPUNCTURE: CPT

## 2018-12-03 PROCEDURE — 80053 COMPREHEN METABOLIC PANEL: CPT

## 2018-12-03 PROCEDURE — 96375 TX/PRO/DX INJ NEW DRUG ADDON: CPT

## 2018-12-03 PROCEDURE — 85652 RBC SED RATE AUTOMATED: CPT

## 2018-12-03 PROCEDURE — 84550 ASSAY OF BLOOD/URIC ACID: CPT

## 2018-12-03 PROCEDURE — 85025 COMPLETE CBC W/AUTO DIFF WBC: CPT

## 2018-12-03 PROCEDURE — 74011250637 HC RX REV CODE- 250/637: Performed by: INTERNAL MEDICINE

## 2018-12-03 RX ADMIN — PEGLOTICASE 8 MG: 8 INJECTION, SOLUTION INTRAVENOUS at 12:40

## 2018-12-03 RX ADMIN — DIPHENHYDRAMINE HYDROCHLORIDE 25 MG: 25 CAPSULE ORAL at 12:00

## 2018-12-03 RX ADMIN — FAMOTIDINE 20 MG: 10 INJECTION, SOLUTION INTRAVENOUS at 12:00

## 2018-12-03 RX ADMIN — ACETAMINOPHEN 650 MG: 325 TABLET ORAL at 11:59

## 2018-12-03 RX ADMIN — METHYLPREDNISOLONE SODIUM SUCCINATE 125 MG: 125 INJECTION, POWDER, FOR SOLUTION INTRAMUSCULAR; INTRAVENOUS at 12:00

## 2018-12-03 NOTE — PROGRESS NOTES
[facility] VISIT NOTE Pt arrived at [facility] ambulatory and in no distress for Krystexxa. Assessment completed, pt had no complaints . Patient Vitals for the past 12 hrs: 
 Temp Pulse Resp BP  
12/03/18 1440 97.7 °F (36.5 °C) 72 16 140/78  
12/03/18 1410 97.7 °F (36.5 °C) 84 16 168/82  
12/03/18 1340 97.6 °F (36.4 °C) 83 16 160/88  
12/03/18 1310 97.6 °F (36.4 °C) 73 16 189/87  
12/03/18 1107 97.9 °F (36.6 °C) 88  137/58 PIV accessed in right wrist without difficulty. Labs drawn and sent. Recent Results (from the past 12 hour(s)) CBC WITH AUTOMATED DIFF Collection Time: 12/03/18 11:28 AM  
Result Value Ref Range WBC 4.5 4.1 - 11.1 K/uL  
 RBC 3.72 (L) 4.10 - 5.70 M/uL HGB 9.7 (L) 12.1 - 17.0 g/dL HCT 32.8 (L) 36.6 - 50.3 % MCV 88.2 80.0 - 99.0 FL  
 MCH 26.1 26.0 - 34.0 PG  
 MCHC 29.6 (L) 30.0 - 36.5 g/dL  
 RDW 15.9 (H) 11.5 - 14.5 % PLATELET 92 (L) 575 - 400 K/uL MPV 11.1 8.9 - 12.9 FL  
 NRBC 0.0 0  WBC ABSOLUTE NRBC 0.00 0.00 - 0.01 K/uL NEUTROPHILS 77 (H) 32 - 75 % LYMPHOCYTES 8 (L) 12 - 49 % MONOCYTES 11 5 - 13 % EOSINOPHILS 3 0 - 7 % BASOPHILS 1 0 - 1 % IMMATURE GRANULOCYTES 0 0.0 - 0.5 % ABS. NEUTROPHILS 3.5 1.8 - 8.0 K/UL  
 ABS. LYMPHOCYTES 0.4 (L) 0.8 - 3.5 K/UL  
 ABS. MONOCYTES 0.5 0.0 - 1.0 K/UL  
 ABS. EOSINOPHILS 0.1 0.0 - 0.4 K/UL  
 ABS. BASOPHILS 0.0 0.0 - 0.1 K/UL  
 ABS. IMM. GRANS. 0.0 0.00 - 0.04 K/UL  
 DF SMEAR SCANNED    
 RBC COMMENTS ANISOCYTOSIS 1+ 
    
 RBC COMMENTS OVALOCYTES PRESENT 
    
 RBC COMMENTS TEARDROP CELLS 
PRESENT 
    
METABOLIC PANEL, COMPREHENSIVE Collection Time: 12/03/18 11:28 AM  
Result Value Ref Range Sodium 142 136 - 145 mmol/L Potassium 4.0 3.5 - 5.1 mmol/L Chloride 105 97 - 108 mmol/L  
 CO2 26 21 - 32 mmol/L Anion gap 11 5 - 15 mmol/L Glucose 118 (H) 65 - 100 mg/dL BUN 52 (H) 6 - 20 MG/DL  Creatinine 1.96 (H) 0.70 - 1.30 MG/DL  
 BUN/Creatinine ratio 27 (H) 12 - 20    
 GFR est AA 41 (L) >60 ml/min/1.73m2 GFR est non-AA 34 (L) >60 ml/min/1.73m2 Calcium 9.1 8.5 - 10.1 MG/DL Bilirubin, total 0.6 0.2 - 1.0 MG/DL  
 ALT (SGPT) 39 12 - 78 U/L  
 AST (SGOT) 35 15 - 37 U/L Alk. phosphatase 78 45 - 117 U/L Protein, total 7.2 6.4 - 8.2 g/dL Albumin 3.7 3.5 - 5.0 g/dL Globulin 3.5 2.0 - 4.0 g/dL A-G Ratio 1.1 1.1 - 2.2 SED RATE (ESR) Collection Time: 12/03/18 11:28 AM  
Result Value Ref Range Sed rate, automated 29 (H) 0 - 20 mm/hr URIC ACID Collection Time: 12/03/18 11:28 AM  
Result Value Ref Range Uric acid <0.2 (L) 3.5 - 7.2 MG/DL Medications received: 
Tylenol PO Venedryl PO Famotidine IV Methylprednisolone IV Elsworth Nissen Tolerated treatment well, no adverse reaction noted. PIV de-accessed and 2x2 and band-aid applied. D/C'd from [facility] ambulatory and in no distress. Next appointment is 12/17/18 at 11:00.

## 2018-12-03 NOTE — PROGRESS NOTES
Problem: Knowledge Deficit Goal: *Verbalizes understanding of procedures and medications Outcome: Progressing Towards Goal 
Pt here for Krystexxa.

## 2018-12-05 ENCOUNTER — OFFICE VISIT (OUTPATIENT)
Dept: ONCOLOGY | Age: 73
End: 2018-12-05

## 2018-12-05 VITALS
SYSTOLIC BLOOD PRESSURE: 167 MMHG | RESPIRATION RATE: 16 BRPM | TEMPERATURE: 97.6 F | HEIGHT: 70 IN | OXYGEN SATURATION: 94 % | BODY MASS INDEX: 25.48 KG/M2 | DIASTOLIC BLOOD PRESSURE: 89 MMHG | WEIGHT: 178 LBS | HEART RATE: 86 BPM

## 2018-12-05 DIAGNOSIS — K62.5 GASTROINTESTINAL HEMORRHAGE ASSOCIATED WITH ANORECTAL SOURCE: ICD-10-CM

## 2018-12-05 DIAGNOSIS — D64.9 ANEMIA, UNSPECIFIED TYPE: Primary | ICD-10-CM

## 2018-12-05 DIAGNOSIS — R68.89 OTHER GENERAL SYMPTOMS AND SIGNS: ICD-10-CM

## 2018-12-05 DIAGNOSIS — M1A.39X1 CHRONIC TOPHACEOUS GOUT OF MULTIPLE SITES DUE TO RENAL IMPAIRMENT: ICD-10-CM

## 2018-12-05 DIAGNOSIS — D64.9 ANEMIA, UNSPECIFIED TYPE: ICD-10-CM

## 2018-12-05 DIAGNOSIS — N18.4 CKD (CHRONIC KIDNEY DISEASE) STAGE 4, GFR 15-29 ML/MIN (HCC): ICD-10-CM

## 2018-12-05 NOTE — PROGRESS NOTES
[facility] [place] [address] [address], [address]  W: [phone]  F: [phone]    Reason for Visit:   Donell Stringer is a 72 y.o. male who is seen in consultation at the request of Dr. Ezequiel Schlatter for evaluation of anemia    History of Present Illness:   Patient is a 72 y.o. male with multiple comorbidities which include atrial fibrillation, history of Painter's esophagus, diabetes, cardiomyopathy, hypertension, TIA who is seen for evaluation of anemia. EMR reviewed and suggests that he developed anemia in June 2016 with hemoglobin had dropped to about 6.3 g/dL at which time he was microcytic due to GIB and was evaluated by Dr. Tristen Pyle. Anemia gradually improved and nearly corrected by August 2018 when hemoglobin had improved to 12.1 g/dL but he has subsequently continued to be anemic with hemoglobin ranging from 8-9.7 g/dL and an MCV which has been in the normal range. Of note he has also developed new thrombocytopenia since August 2018 which is fluctuated between 90 K to 129K. Has had no changes in his WBC count for the most part. Does have history of iron deficiency and has known chronic kidney disease. Otherwise prior workup has revealed a normal B12 level and no clear evidence of a gammopathy. He sees Dr. Emily King for chronic refractory tophaceous gout and is currently on leflunomide and Krystexxa. He states that 2 months ago he had a colonoscopy and had post procedure. He had polypectomy at the time. These were multiple adenomas. His bleeding stopped after a few days when he stopped his Eliquis though he needed a transfusion. He doesn't think he has any other bleeding. He has been taking po iron intermittently. He is back on the Eliquis at 5 mg BID. He has no fevers, chills, sweats. His energy is diggs. No new pain. No recent infections. Has no changes in BMs, not dark. No nausea/ no vomiting. No weight changes. No falls. No CP, SOB.  No bleed in urine. He rarely uses ETOH    No FH of Blood disorders or cancers     Past Medical History:   Diagnosis Date    Acute encephalopathy 1/14/2016    Anemia 5/11/2017    Atrial fibrillation (HCC)     Painter esophagus     CAD (coronary artery disease)     Diabetes (HCC)     Heart failure (HCC)     Cardiomyopathy, myocarditis    HTN, goal below 140/90     Retinopathy, diabetic, bilateral ([address] 3/11/2016    Stenosis of right carotid artery without cerebral infarction 1/18/2016    TIA (transient ischemic attack) 1/14/2016    Vitamin D deficiency 3/1/2016    Nephrology ordered and follow 2/22/16       Past Surgical History:   Procedure Laterality Date    COLONOSCOPY N/A 6/23/2016    COLONOSCOPY performed by Frederick Thomas MD at [facility]. N/A 8/6/2018    COLONOSCOPY performed by Frederick Thomas MD at [address] COLONOSCOPY N/A 8/30/2018    COLONOSCOPY performed by Ramonita Purdy MD at [facility] HX ENDOSCOPY  06/27/2016    CAPSULE; normal small bowel    HX ENDOSCOPY  06/23/2016    upper endoscopy    HX UROLOGICAL  2013      Social History     Tobacco Use    Smoking status: Never Smoker    Smokeless tobacco: Never Used   Substance Use Topics    Alcohol use: Yes     Comment: 2 drinks/week, never a heavy drinker      Family History   Problem Relation Age of Onset    Heart Failure Mother     Diabetes Father     No Known Problems Sister     Other Daughter         Elva Rolon Other Daughter         Elva Rolon Other Daughter         Hashimoto     Current Outpatient Medications   Medication Sig    ELIQUIS 5 mg tablet TAKE 1 TABLET BY MOUTH TWICE A DAY    leflunomide (ARAVA) 20 mg tablet Take 1 Tab by mouth daily.  pegloticase (KRYSTEXXA) 8 mg/mL soln injection by IntraVENous route Once every 2 weeks.  insulin glargine (LANTUS,BASAGLAR) 100 unit/mL (3 mL) inpn 14 Units by SubCUTAneous route nightly for 90 days.     colchicine 0.6 mg tablet Take 0.6 mg by mouth daily as needed.  digoxin (LANOXIN) 0.125 mg tablet Take 0.125 mg by mouth every other day.  dextran 70-hypromellose (ARTIFICIAL TEARS,[id number],) ophthalmic solution Administer 1 Drop to both eyes as needed.  loperamide (IMODIUM) 2 mg capsule Take 2 mg by mouth four (4) times daily as needed for Diarrhea.  furosemide (LASIX) 40 mg tablet Take 40 mg by mouth daily.  hydrALAZINE (APRESOLINE) 25 mg tablet Take 25 mg by mouth three (3) times daily.  omeprazole (PRILOSEC) 20 mg capsule Take 20 mg by mouth daily.  multivitamin (ONE A DAY) tablet Take 1 Tab by mouth daily.  pravastatin (PRAVACHOL) 20 mg tablet Take 1 Tab by mouth nightly.  carvedilol (COREG) 3.125 mg tablet Take 1 Tab by mouth two (2) times daily (with meals). No current facility-administered medications for this visit. No Known Allergies     Review of Systems: A complete review of systems was obtained, negative except as described above. Physical Exam:     Visit Vitals  /89 (BP 1 Location: Left arm, BP Patient Position: Sitting)   Pulse 86   Temp 97.6 °F (36.4 °C) (Oral)   Resp 16   Ht 5' 10\" (1.778 m)   Wt 178 lb (80.7 kg)   SpO2 94%   BMI 25.54 kg/m²     ECOG PS:1  General: No distress  Eyes: PERRLA, anicteric sclerae  HENT: Atraumatic, OP clear  Neck: Supple  Lymphatic: No cervical, supraclavicular, or inguinal adenopathy  Respiratory: CTAB, normal respiratory effort  CV: Normal rate, regular rhythm, no murmurs, no peripheral edema  GI: Soft, nontender, nondistended, no masses, no hepatomegaly, no splenomegaly  MS: Normal gait and station. Digits without clubbing or cyanosis. Skin: No rashes, ecchymoses, or petechiae. Normal temperature, turgor, and texture.   Psych: Alert, oriented, appropriate affect, normal judgment/insight    Results:     Lab Results   Component Value Date/Time    WBC 4.5 12/03/2018 11:28 AM    HGB 9.7 (L) 12/03/2018 11:28 AM    HCT 32.8 (L) 12/03/2018 11:28 AM    PLATELET 92 (L) 12/03/2018 11:28 AM    MCV 88.2 12/03/2018 11:28 AM    ABS. NEUTROPHILS 3.5 12/03/2018 11:28 AM    Hemoglobin (POC) 6.2 09/04/2018 02:17 PM     Lab Results   Component Value Date/Time    Sodium 142 12/03/2018 11:28 AM    Potassium 4.0 12/03/2018 11:28 AM    Chloride 105 12/03/2018 11:28 AM    CO2 26 12/03/2018 11:28 AM    Glucose 118 (H) 12/03/2018 11:28 AM    BUN 52 (H) 12/03/2018 11:28 AM    Creatinine 1.96 (H) 12/03/2018 11:28 AM    GFR est AA 41 (L) 12/03/2018 11:28 AM    GFR est non-AA 34 (L) 12/03/2018 11:28 AM    Calcium 9.1 12/03/2018 11:28 AM    Glucose (POC) 124 (H) 08/30/2018 12:15 PM    Creatinine (POC) 1.4 (H) 07/27/2012 08:37 AM     Lab Results   Component Value Date/Time    Bilirubin, total 0.6 12/03/2018 11:28 AM    ALT (SGPT) 39 12/03/2018 11:28 AM    AST (SGOT) 35 12/03/2018 11:28 AM    Alk. phosphatase 78 12/03/2018 11:28 AM    Protein, total 7.2 12/03/2018 11:28 AM    Albumin 3.7 12/03/2018 11:28 AM    Globulin 3.5 12/03/2018 11:28 AM     Lab Results   Component Value Date/Time    Reticulocyte count 1.0 08/04/2018 03:03 PM    Iron % saturation 13 (L) 05/31/2018 02:10 PM    TIBC 392 05/31/2018 02:10 PM    Ferritin 47 05/31/2018 02:10 PM    Vitamin B12 318 01/15/2016 05:03 PM    Sed rate (ESR) 28 07/13/2018 12:08 PM    Sed rate, automated 29 (H) 12/03/2018 11:28 AM    C-Reactive protein <0.29 06/21/2016 03:45 PM    C-Reactive Protein, Qt 61.8 (H) 07/13/2018 12:08 PM    TSH 1.93 08/30/2018 06:02 AM    M-Dru Not Observed 07/13/2018 12:08 PM    M-Dru Not Observed 07/06/2016 03:33 PM    CLEMENCIA, Direct NEGATIVE  06/21/2016 03:45 PM    CLEMENCIA Direct NEGATIVE  01/17/2016 04:58 AM     Lab Results   Component Value Date/Time    INR 1.1 08/29/2018 01:57 PM    aPTT 30.9 06/21/2016 01:37 PM     Records reviewed and summarized above. Pathology report(s) reviewed above. Radiology report(s) reviewed above. Assessment:   1) Normocytic anemia    Reviewed the EMR and all available labs.     He has had prior incidences of GI bleed and a resulting anemia which corrected with transfusions and iron replacement. Did have an episode of GI bleed in August 2018 after a polypectomy with worsening anemia. This has shown some improvement on oral iron though he remains anemic. Anemia is likely multifactorial and secondary to underlying CKD, inflammation, GI bleed and possibly medications that include leflunomide. It is noted that he has developed new mild thrombocytopenia and hence an underlying bone marrow disorder cannot be ruled out. He is hesitant to pursue a bone marrow biopsy yet. I think we can obtain additional workup to understand his anemia better and correct any nutritional deficiencies. If he still has persistent cytopenias we will discuss a bone marrow biopsy      2) Thrombocytopenia    This is new since August 2018 and coincides with him starting [employer]. Could certainly be drug-induced. We will do further workup to rule out other reversible causes    3) atrial fibrillation for his current creatinine level    He should likely be on 2.5 mg twice daily of Eliquis. He will discuss this with his cardiologist    4) health maintenance    Up-to-date with age appropriate cancer screening    5) Abnormal light chains    Ratio was slightly elevated over 2 but below 3. At that time his GFR was abnormal.  There was no clear evidence of a monoclonal process. We will repeat this workup    Plan:     · Gammopathy evaluation, hepatitis C, ferritin, iron profile, lead, haptoglobin, B12 level, ultrasound of abdomen    RTC 4 weeks    I appreciate the opportunity to participate in Mr. Kandis Poet Fahrbach's care.     Signed By: Fina Reyna MD

## 2018-12-05 NOTE — PROGRESS NOTES
Taty Whitehead is a 72 y.o. male here today as a new patient, anemia. Patient reports cough/congestion, runny nose, clear nasal drainage. No fevers noted. Over the last few days. 1. Have you been to the ER, urgent care clinic since your last visit? Hospitalized since your last visit? Transfusion in August/Spetember, had colonoscopy with bleeding needing transfusion    2. Have you seen or consulted any other health care providers outside of the [address] [place] [address] since your last visit? Include any pap smears or colon screening.  Cardiologist is with [facility], last seen 12/4

## 2018-12-07 ENCOUNTER — HOSPITAL ENCOUNTER (OUTPATIENT)
Dept: ULTRASOUND IMAGING | Age: 73
Discharge: HOME | End: 2018-12-07
Attending: INTERNAL MEDICINE
Payer: COMMERCIAL

## 2018-12-07 DIAGNOSIS — D64.9 ANEMIA, UNSPECIFIED TYPE: ICD-10-CM

## 2018-12-07 PROCEDURE — 76700 US EXAM ABDOM COMPLETE: CPT

## 2018-12-12 RX ORDER — ACETAMINOPHEN 325 MG/1
650 TABLET ORAL ONCE
Status: COMPLETED | OUTPATIENT
Start: 2018-12-17 | End: 2018-12-17

## 2018-12-12 RX ORDER — DIPHENHYDRAMINE HCL 25 MG
25 CAPSULE ORAL ONCE
Status: COMPLETED | OUTPATIENT
Start: 2018-12-17 | End: 2018-12-17

## 2018-12-14 RX ORDER — COLCHICINE 0.6 MG/1
0.6 TABLET ORAL
Qty: 30 TAB | Refills: 2 | Status: ON HOLD | OUTPATIENT
Start: 2018-12-14 | End: 2019-02-05

## 2018-12-14 RX ORDER — COLCHICINE 0.6 MG/1
0.6 TABLET ORAL
Qty: 30 TAB | Refills: 2 | OUTPATIENT
Start: 2018-12-14

## 2018-12-17 ENCOUNTER — HOSPITAL ENCOUNTER (OUTPATIENT)
Dept: INFUSION THERAPY | Age: 73
Discharge: HOME | End: 2018-12-17
Payer: COMMERCIAL

## 2018-12-17 VITALS
TEMPERATURE: 96.9 F | DIASTOLIC BLOOD PRESSURE: 94 MMHG | HEART RATE: 63 BPM | SYSTOLIC BLOOD PRESSURE: 186 MMHG | RESPIRATION RATE: 16 BRPM

## 2018-12-17 LAB
ALBUMIN SERPL-MCNC: 3.6 G/DL (ref 3.5–5)
ALBUMIN/GLOB SERPL: 1 {RATIO} (ref 1.1–2.2)
ALP SERPL-CCNC: 74 U/L (ref 45–117)
ALT SERPL-CCNC: 36 U/L (ref 12–78)
ANION GAP SERPL CALC-SCNC: 9 MMOL/L (ref 5–15)
AST SERPL-CCNC: 26 U/L (ref 15–37)
BASOPHILS # BLD: 0 K/UL (ref 0–0.1)
BASOPHILS NFR BLD: 1 % (ref 0–1)
BILIRUB SERPL-MCNC: 0.7 MG/DL (ref 0.2–1)
BUN SERPL-MCNC: 54 MG/DL (ref 6–20)
BUN/CREAT SERPL: 29 (ref 12–20)
CALCIUM SERPL-MCNC: 8.7 MG/DL (ref 8.5–10.1)
CHLORIDE SERPL-SCNC: 103 MMOL/L (ref 97–108)
CO2 SERPL-SCNC: 29 MMOL/L (ref 21–32)
CREAT SERPL-MCNC: 1.84 MG/DL (ref 0.7–1.3)
DIFFERENTIAL METHOD BLD: ABNORMAL
EOSINOPHIL # BLD: 0.1 K/UL (ref 0–0.4)
EOSINOPHIL NFR BLD: 3 % (ref 0–7)
ERYTHROCYTE [DISTWIDTH] IN BLOOD BY AUTOMATED COUNT: 15.9 % (ref 11.5–14.5)
ERYTHROCYTE [SEDIMENTATION RATE] IN BLOOD: 35 MM/HR (ref 0–20)
GLOBULIN SER CALC-MCNC: 3.5 G/DL (ref 2–4)
GLUCOSE SERPL-MCNC: 140 MG/DL (ref 65–100)
HCT VFR BLD AUTO: 30.2 % (ref 36.6–50.3)
HGB BLD-MCNC: 9.1 G/DL (ref 12.1–17)
IMM GRANULOCYTES # BLD: 0 K/UL (ref 0–0.04)
IMM GRANULOCYTES NFR BLD AUTO: 0 % (ref 0–0.5)
LYMPHOCYTES # BLD: 0.4 K/UL (ref 0.8–3.5)
LYMPHOCYTES NFR BLD: 8 % (ref 12–49)
MCH RBC QN AUTO: 27.2 PG (ref 26–34)
MCHC RBC AUTO-ENTMCNC: 30.1 G/DL (ref 30–36.5)
MCV RBC AUTO: 90.4 FL (ref 80–99)
MONOCYTES # BLD: 0.5 K/UL (ref 0–1)
MONOCYTES NFR BLD: 12 % (ref 5–13)
NEUTS SEG # BLD: 3.5 K/UL (ref 1.8–8)
NEUTS SEG NFR BLD: 76 % (ref 32–75)
NRBC # BLD: 0 K/UL (ref 0–0.01)
NRBC BLD-RTO: 0 PER 100 WBC
PLATELET # BLD AUTO: 84 K/UL (ref 150–400)
PMV BLD AUTO: 10.6 FL (ref 8.9–12.9)
POTASSIUM SERPL-SCNC: 4 MMOL/L (ref 3.5–5.1)
PROT SERPL-MCNC: 7.1 G/DL (ref 6.4–8.2)
RBC # BLD AUTO: 3.34 M/UL (ref 4.1–5.7)
RBC MORPH BLD: ABNORMAL
RBC MORPH BLD: ABNORMAL
SODIUM SERPL-SCNC: 141 MMOL/L (ref 136–145)
URATE SERPL-MCNC: <0.2 MG/DL (ref 3.5–7.2)
WBC # BLD AUTO: 4.5 K/UL (ref 4.1–11.1)

## 2018-12-17 PROCEDURE — 80053 COMPREHEN METABOLIC PANEL: CPT

## 2018-12-17 PROCEDURE — 74011250637 HC RX REV CODE- 250/637: Performed by: INTERNAL MEDICINE

## 2018-12-17 PROCEDURE — 74011250636 HC RX REV CODE- 250/636: Performed by: INTERNAL MEDICINE

## 2018-12-17 PROCEDURE — 96365 THER/PROPH/DIAG IV INF INIT: CPT

## 2018-12-17 PROCEDURE — 85025 COMPLETE CBC W/AUTO DIFF WBC: CPT

## 2018-12-17 PROCEDURE — 36415 COLL VENOUS BLD VENIPUNCTURE: CPT

## 2018-12-17 PROCEDURE — 84550 ASSAY OF BLOOD/URIC ACID: CPT

## 2018-12-17 PROCEDURE — 96366 THER/PROPH/DIAG IV INF ADDON: CPT

## 2018-12-17 PROCEDURE — 96375 TX/PRO/DX INJ NEW DRUG ADDON: CPT

## 2018-12-17 PROCEDURE — 85652 RBC SED RATE AUTOMATED: CPT

## 2018-12-17 RX ADMIN — PEGLOTICASE 8 MG: 8 INJECTION, SOLUTION INTRAVENOUS at 12:28

## 2018-12-17 RX ADMIN — ACETAMINOPHEN 650 MG: 325 TABLET ORAL at 11:50

## 2018-12-17 RX ADMIN — DIPHENHYDRAMINE HYDROCHLORIDE 25 MG: 25 CAPSULE ORAL at 11:50

## 2018-12-17 RX ADMIN — METHYLPREDNISOLONE SODIUM SUCCINATE 125 MG: 125 INJECTION, POWDER, FOR SOLUTION INTRAMUSCULAR; INTRAVENOUS at 11:56

## 2018-12-17 RX ADMIN — FAMOTIDINE 20 MG: 10 INJECTION, SOLUTION INTRAVENOUS at 11:50

## 2018-12-17 NOTE — PROGRESS NOTES
[facility] Short Visit Progress Note    1100 Patient admitted to [facility] for [facility] ambulatory in stable condition. Assessment completed. No new concerns voiced. Patient Vitals for the past 12 hrs:   Temp Pulse Resp BP   12/17/18 1345 96.9 °F (36.1 °C) 74 16 161/75   12/17/18 1255 96.9 °F (36.1 °C) 80 16 160/61   12/17/18 1122 98 °F (36.7 °C) 84 16 144/71     Recent Results (from the past 12 hour(s))   CBC WITH AUTOMATED DIFF    Collection Time: 12/17/18 11:31 AM   Result Value Ref Range    WBC 4.5 4.1 - 11.1 K/uL    RBC 3.34 (L) 4.10 - 5.70 M/uL    HGB 9.1 (L) 12.1 - 17.0 g/dL    HCT 30.2 (L) 36.6 - 50.3 %    MCV 90.4 80.0 - 99.0 FL    MCH 27.2 26.0 - 34.0 PG    MCHC 30.1 30.0 - 36.5 g/dL    RDW 15.9 (H) 11.5 - 14.5 %    PLATELET 84 (L) 220 - 400 K/uL    MPV 10.6 8.9 - 12.9 FL    NRBC 0.0 0  WBC    ABSOLUTE NRBC 0.00 0.00 - 0.01 K/uL    NEUTROPHILS 76 (H) 32 - 75 %    LYMPHOCYTES 8 (L) 12 - 49 %    MONOCYTES 12 5 - 13 %    EOSINOPHILS 3 0 - 7 %    BASOPHILS 1 0 - 1 %    IMMATURE GRANULOCYTES 0 0.0 - 0.5 %    ABS. NEUTROPHILS 3.5 1.8 - 8.0 K/UL    ABS. LYMPHOCYTES 0.4 (L) 0.8 - 3.5 K/UL    ABS. MONOCYTES 0.5 0.0 - 1.0 K/UL    ABS. EOSINOPHILS 0.1 0.0 - 0.4 K/UL    ABS. BASOPHILS 0.0 0.0 - 0.1 K/UL    ABS. IMM.  GRANS. 0.0 0.00 - 0.04 K/UL    DF SMEAR SCANNED      RBC COMMENTS ANISOCYTOSIS  1+        RBC COMMENTS OVALOCYTES  PRESENT       METABOLIC PANEL, COMPREHENSIVE    Collection Time: 12/17/18 11:31 AM   Result Value Ref Range    Sodium 141 136 - 145 mmol/L    Potassium 4.0 3.5 - 5.1 mmol/L    Chloride 103 97 - 108 mmol/L    CO2 29 21 - 32 mmol/L    Anion gap 9 5 - 15 mmol/L    Glucose 140 (H) 65 - 100 mg/dL    BUN 54 (H) 6 - 20 MG/DL    Creatinine 1.84 (H) 0.70 - 1.30 MG/DL    BUN/Creatinine ratio 29 (H) 12 - 20      GFR est AA 44 (L) >60 ml/min/1.73m2    GFR est non-AA 36 (L) >60 ml/min/1.73m2    Calcium 8.7 8.5 - 10.1 MG/DL    Bilirubin, total 0.7 0.2 - 1.0 MG/DL    ALT (SGPT) 36 12 - 78 U/L    AST (SGOT) 26 15 - 37 U/L    Alk. phosphatase 74 45 - 117 U/L    Protein, total 7.1 6.4 - 8.2 g/dL    Albumin 3.6 3.5 - 5.0 g/dL    Globulin 3.5 2.0 - 4.0 g/dL    A-G Ratio 1.0 (L) 1.1 - 2.2     SED RATE (ESR)    Collection Time: 12/17/18 11:31 AM   Result Value Ref Range    Sed rate, automated 35 (H) 0 - 20 mm/hr   URIC ACID    Collection Time: 12/17/18 11:31 AM   Result Value Ref Range    Uric acid <0.2 (L) 3.5 - 7.2 MG/DL       R AC with positive blood return. Medications:  Tylenol PO  Benadryl PO  Solumedrol IVP  Pepcid IVP  Krystexxa IV    1435 Patient tolerated treatment well. Patient discharged from [facility] ambulatory in no distress. Patient aware of next appointment scheduled for 12/31/18 at 1100.     Marcelina Cabral RN

## 2018-12-21 ENCOUNTER — HOSPITAL ENCOUNTER (OUTPATIENT)
Dept: LAB | Age: 73
Discharge: HOME | End: 2018-12-21
Payer: COMMERCIAL

## 2018-12-21 PROCEDURE — 83010 ASSAY OF HAPTOGLOBIN QUANT: CPT

## 2018-12-21 PROCEDURE — 82607 VITAMIN B-12: CPT

## 2018-12-21 PROCEDURE — 86803 HEPATITIS C AB TEST: CPT

## 2018-12-21 PROCEDURE — 83615 LACTATE (LD) (LDH) ENZYME: CPT

## 2018-12-21 PROCEDURE — 36415 COLL VENOUS BLD VENIPUNCTURE: CPT

## 2018-12-21 PROCEDURE — 83550 IRON BINDING TEST: CPT

## 2018-12-21 PROCEDURE — 84165 PROTEIN E-PHORESIS SERUM: CPT

## 2018-12-21 PROCEDURE — 82728 ASSAY OF FERRITIN: CPT

## 2018-12-26 RX ORDER — DIPHENHYDRAMINE HCL 25 MG
25 CAPSULE ORAL ONCE
Status: COMPLETED | OUTPATIENT
Start: 2018-12-31 | End: 2018-12-31

## 2018-12-26 RX ORDER — ACETAMINOPHEN 325 MG/1
650 TABLET ORAL ONCE
Status: COMPLETED | OUTPATIENT
Start: 2018-12-31 | End: 2018-12-31

## 2018-12-27 LAB
ALBUMIN SERPL ELPH-MCNC: 3.8 G/DL (ref 2.9–4.4)
ALBUMIN/GLOB SERPL: 1.3 {RATIO} (ref 0.7–1.7)
ALPHA1 GLOB SERPL ELPH-MCNC: 0.2 G/DL (ref 0–0.4)
ALPHA2 GLOB SERPL ELPH-MCNC: 0.8 G/DL (ref 0.4–1)
B-GLOBULIN SERPL ELPH-MCNC: 1.2 G/DL (ref 0.7–1.3)
FERRITIN SERPL-MCNC: 58 NG/ML (ref 30–400)
GAMMA GLOB SERPL ELPH-MCNC: 0.9 G/DL (ref 0.4–1.8)
GLOBULIN SER-MCNC: 3.1 G/DL (ref 2.2–3.9)
HAPTOGLOB SERPL-MCNC: 120 MG/DL (ref 34–200)
HCV AB S/CO SERPL IA: <0.1 S/CO RATIO (ref 0–0.9)
IGA SERPL-MCNC: 285 MG/DL (ref 61–437)
IGG SERPL-MCNC: 969 MG/DL (ref 700–1600)
IGM SERPL-MCNC: 39 MG/DL (ref 15–143)
INTERPRETATION SERPL IEP-IMP: ABNORMAL
IRON SATN MFR SERPL: 13 % (ref 15–55)
IRON SERPL-MCNC: 53 UG/DL (ref 38–169)
KAPPA LC FREE SER-MCNC: 42.2 MG/L (ref 3.3–19.4)
KAPPA LC FREE/LAMBDA FREE SER: 2.1 {RATIO} (ref 0.26–1.65)
LAMBDA LC FREE SERPL-MCNC: 20.1 MG/L (ref 5.7–26.3)
LDH SERPL-CCNC: 220 IU/L (ref 121–224)
M PROTEIN SERPL ELPH-MCNC: ABNORMAL G/DL
PLEASE NOTE:, 149534: ABNORMAL
PROT SERPL-MCNC: 6.9 G/DL (ref 6–8.5)
TIBC SERPL-MCNC: 395 UG/DL (ref 250–450)
UIBC SERPL-MCNC: 342 UG/DL (ref 111–343)
VIT B12 SERPL-MCNC: 624 PG/ML (ref 232–1245)

## 2018-12-31 ENCOUNTER — HOSPITAL ENCOUNTER (OUTPATIENT)
Dept: INFUSION THERAPY | Age: 73
Discharge: HOME | End: 2018-12-31
Payer: COMMERCIAL

## 2018-12-31 VITALS
HEART RATE: 75 BPM | DIASTOLIC BLOOD PRESSURE: 83 MMHG | SYSTOLIC BLOOD PRESSURE: 177 MMHG | TEMPERATURE: 97.8 F | RESPIRATION RATE: 16 BRPM

## 2018-12-31 LAB
ALBUMIN SERPL-MCNC: 3.5 G/DL (ref 3.5–5)
ALBUMIN/GLOB SERPL: 0.9 {RATIO} (ref 1.1–2.2)
ALP SERPL-CCNC: 78 U/L (ref 45–117)
ALT SERPL-CCNC: 34 U/L (ref 12–78)
ANION GAP SERPL CALC-SCNC: 9 MMOL/L (ref 5–15)
AST SERPL-CCNC: 23 U/L (ref 15–37)
BASOPHILS # BLD: 0 K/UL (ref 0–0.1)
BASOPHILS NFR BLD: 1 % (ref 0–1)
BILIRUB SERPL-MCNC: 0.6 MG/DL (ref 0.2–1)
BUN SERPL-MCNC: 68 MG/DL (ref 6–20)
BUN/CREAT SERPL: 34 (ref 12–20)
CALCIUM SERPL-MCNC: 8.9 MG/DL (ref 8.5–10.1)
CHLORIDE SERPL-SCNC: 105 MMOL/L (ref 97–108)
CO2 SERPL-SCNC: 26 MMOL/L (ref 21–32)
CREAT SERPL-MCNC: 1.99 MG/DL (ref 0.7–1.3)
DIFFERENTIAL METHOD BLD: ABNORMAL
EOSINOPHIL # BLD: 0.2 K/UL (ref 0–0.4)
EOSINOPHIL NFR BLD: 4 % (ref 0–7)
ERYTHROCYTE [DISTWIDTH] IN BLOOD BY AUTOMATED COUNT: 16 % (ref 11.5–14.5)
ERYTHROCYTE [SEDIMENTATION RATE] IN BLOOD: 48 MM/HR (ref 0–20)
GLOBULIN SER CALC-MCNC: 3.7 G/DL (ref 2–4)
GLUCOSE SERPL-MCNC: 142 MG/DL (ref 65–100)
HCT VFR BLD AUTO: 30.5 % (ref 36.6–50.3)
HGB BLD-MCNC: 9.2 G/DL (ref 12.1–17)
IMM GRANULOCYTES # BLD: 0 K/UL (ref 0–0.04)
IMM GRANULOCYTES NFR BLD AUTO: 0 % (ref 0–0.5)
LYMPHOCYTES # BLD: 0.3 K/UL (ref 0.8–3.5)
LYMPHOCYTES NFR BLD: 8 % (ref 12–49)
MCH RBC QN AUTO: 28 PG (ref 26–34)
MCHC RBC AUTO-ENTMCNC: 30.2 G/DL (ref 30–36.5)
MCV RBC AUTO: 93 FL (ref 80–99)
MONOCYTES # BLD: 0.5 K/UL (ref 0–1)
MONOCYTES NFR BLD: 11 % (ref 5–13)
NEUTS SEG # BLD: 3.2 K/UL (ref 1.8–8)
NEUTS SEG NFR BLD: 76 % (ref 32–75)
NRBC # BLD: 0 K/UL (ref 0–0.01)
NRBC BLD-RTO: 0 PER 100 WBC
PLATELET # BLD AUTO: 88 K/UL (ref 150–400)
PMV BLD AUTO: 11.1 FL (ref 8.9–12.9)
POTASSIUM SERPL-SCNC: 4.3 MMOL/L (ref 3.5–5.1)
PROT SERPL-MCNC: 7.2 G/DL (ref 6.4–8.2)
RBC # BLD AUTO: 3.28 M/UL (ref 4.1–5.7)
RBC MORPH BLD: ABNORMAL
SODIUM SERPL-SCNC: 140 MMOL/L (ref 136–145)
URATE SERPL-MCNC: 0.5 MG/DL (ref 3.5–7.2)
WBC # BLD AUTO: 4.2 K/UL (ref 4.1–11.1)

## 2018-12-31 PROCEDURE — 84550 ASSAY OF BLOOD/URIC ACID: CPT

## 2018-12-31 PROCEDURE — 85025 COMPLETE CBC W/AUTO DIFF WBC: CPT

## 2018-12-31 PROCEDURE — 36415 COLL VENOUS BLD VENIPUNCTURE: CPT

## 2018-12-31 PROCEDURE — 74011250636 HC RX REV CODE- 250/636: Performed by: INTERNAL MEDICINE

## 2018-12-31 PROCEDURE — 96366 THER/PROPH/DIAG IV INF ADDON: CPT

## 2018-12-31 PROCEDURE — 80053 COMPREHEN METABOLIC PANEL: CPT

## 2018-12-31 PROCEDURE — 96375 TX/PRO/DX INJ NEW DRUG ADDON: CPT

## 2018-12-31 PROCEDURE — 74011250637 HC RX REV CODE- 250/637: Performed by: INTERNAL MEDICINE

## 2018-12-31 PROCEDURE — 85652 RBC SED RATE AUTOMATED: CPT

## 2018-12-31 PROCEDURE — 96365 THER/PROPH/DIAG IV INF INIT: CPT

## 2018-12-31 RX ADMIN — PEGLOTICASE 8 MG: 8 INJECTION, SOLUTION INTRAVENOUS at 12:00

## 2018-12-31 RX ADMIN — METHYLPREDNISOLONE SODIUM SUCCINATE 125 MG: 125 INJECTION, POWDER, FOR SOLUTION INTRAMUSCULAR; INTRAVENOUS at 11:25

## 2018-12-31 RX ADMIN — DIPHENHYDRAMINE HYDROCHLORIDE 25 MG: 25 CAPSULE ORAL at 11:19

## 2018-12-31 RX ADMIN — FAMOTIDINE 20 MG: 10 INJECTION, SOLUTION INTRAVENOUS at 11:19

## 2018-12-31 RX ADMIN — ACETAMINOPHEN 650 MG: 325 TABLET ORAL at 11:19

## 2018-12-31 NOTE — PROGRESS NOTES
[facility] VISIT NOTE 
 
1100. Pt arrived at [facility] ambulatory and in no distress for Krystexxa. Assessment completed, pt states no new complaints or concerns. 24g PIV placed on Left Wrist. Positive blood return noted and labs drawn. Medications received: 
Charliene Coupe Tylenol PO Benadryl PO Pepcid IV Solumerol IV Stacie Stephen Tolerated treatment well, no adverse reaction noted. PIV removed at discharge, no s/s of infiltration noted. Patient Vitals for the past 12 hrs: 
 Temp Pulse Resp BP  
12/31/18 1359 97.8 °F (36.6 °C) 75 16 177/83  
12/31/18 1330 97.8 °F (36.6 °C) 75 16 173/74  
12/31/18 1300 97.8 °F (36.6 °C) 83 16 142/70  
12/31/18 1230 97.8 °F (36.6 °C) 74 16 156/69  
12/31/18 1102 97.1 °F (36.2 °C) 79 16 161/79 Recent Results (from the past 12 hour(s)) CBC WITH AUTOMATED DIFF Collection Time: 12/31/18 11:11 AM  
Result Value Ref Range WBC 4.2 4.1 - 11.1 K/uL  
 RBC 3.28 (L) 4.10 - 5.70 M/uL HGB 9.2 (L) 12.1 - 17.0 g/dL HCT 30.5 (L) 36.6 - 50.3 % MCV 93.0 80.0 - 99.0 FL  
 MCH 28.0 26.0 - 34.0 PG  
 MCHC 30.2 30.0 - 36.5 g/dL  
 RDW 16.0 (H) 11.5 - 14.5 % PLATELET 88 (L) 827 - 400 K/uL MPV 11.1 8.9 - 12.9 FL  
 NRBC 0.0 0  WBC ABSOLUTE NRBC 0.00 0.00 - 0.01 K/uL NEUTROPHILS 76 (H) 32 - 75 % LYMPHOCYTES 8 (L) 12 - 49 % MONOCYTES 11 5 - 13 % EOSINOPHILS 4 0 - 7 % BASOPHILS 1 0 - 1 % IMMATURE GRANULOCYTES 0 0.0 - 0.5 % ABS. NEUTROPHILS 3.2 1.8 - 8.0 K/UL  
 ABS. LYMPHOCYTES 0.3 (L) 0.8 - 3.5 K/UL  
 ABS. MONOCYTES 0.5 0.0 - 1.0 K/UL  
 ABS. EOSINOPHILS 0.2 0.0 - 0.4 K/UL  
 ABS. BASOPHILS 0.0 0.0 - 0.1 K/UL  
 ABS. IMM. GRANS. 0.0 0.00 - 0.04 K/UL  
 DF SMEAR SCANNED    
 RBC COMMENTS OVALOCYTES PRESENT 
    
 RBC COMMENTS ANISOCYTOSIS 1+ 
    
 RBC COMMENTS POLYCHROMASIA PRESENT 
    
METABOLIC PANEL, COMPREHENSIVE Collection Time: 12/31/18 11:11 AM  
Result Value Ref Range  Sodium 140 136 - 145 mmol/L  
 Potassium 4.3 3.5 - 5.1 mmol/L Chloride 105 97 - 108 mmol/L  
 CO2 26 21 - 32 mmol/L Anion gap 9 5 - 15 mmol/L Glucose 142 (H) 65 - 100 mg/dL BUN 68 (H) 6 - 20 MG/DL Creatinine 1.99 (H) 0.70 - 1.30 MG/DL  
 BUN/Creatinine ratio 34 (H) 12 - 20 GFR est AA 40 (L) >60 ml/min/1.73m2 GFR est non-AA 33 (L) >60 ml/min/1.73m2 Calcium 8.9 8.5 - 10.1 MG/DL Bilirubin, total 0.6 0.2 - 1.0 MG/DL  
 ALT (SGPT) 34 12 - 78 U/L  
 AST (SGOT) 23 15 - 37 U/L Alk. phosphatase 78 45 - 117 U/L Protein, total 7.2 6.4 - 8.2 g/dL Albumin 3.5 3.5 - 5.0 g/dL Globulin 3.7 2.0 - 4.0 g/dL A-G Ratio 0.9 (L) 1.1 - 2.2 SED RATE (ESR) Collection Time: 12/31/18 11:11 AM  
Result Value Ref Range Sed rate, automated 48 (H) 0 - 20 mm/hr URIC ACID Collection Time: 12/31/18 11:11 AM  
Result Value Ref Range Uric acid 0.5 (L) 3.5 - 7.2 MG/DL  
 
1400. D/C'd from [facility] ambulatory and in no distress.  Next appointment is 1/14/19 at 11:00 am.

## 2019-01-08 RX ORDER — ACETAMINOPHEN 325 MG/1
650 TABLET ORAL ONCE
Status: COMPLETED | OUTPATIENT
Start: 2019-01-14 | End: 2019-01-14

## 2019-01-08 RX ORDER — DIPHENHYDRAMINE HCL 25 MG
25 CAPSULE ORAL ONCE
Status: COMPLETED | OUTPATIENT
Start: 2019-01-14 | End: 2019-01-14

## 2019-01-10 ENCOUNTER — OFFICE VISIT (OUTPATIENT)
Dept: ONCOLOGY | Age: 74
End: 2019-01-10

## 2019-01-10 VITALS
TEMPERATURE: 98.2 F | WEIGHT: 185.6 LBS | HEIGHT: 70 IN | DIASTOLIC BLOOD PRESSURE: 79 MMHG | OXYGEN SATURATION: 95 % | HEART RATE: 97 BPM | BODY MASS INDEX: 26.57 KG/M2 | RESPIRATION RATE: 20 BRPM | SYSTOLIC BLOOD PRESSURE: 170 MMHG

## 2019-01-10 DIAGNOSIS — D64.9 ANEMIA, UNSPECIFIED TYPE: Primary | ICD-10-CM

## 2019-01-10 DIAGNOSIS — D69.6 THROMBOCYTOPENIA (HCC): ICD-10-CM

## 2019-01-10 DIAGNOSIS — N18.4 CKD (CHRONIC KIDNEY DISEASE) STAGE 4, GFR 15-29 ML/MIN (HCC): ICD-10-CM

## 2019-01-10 DIAGNOSIS — I48.0 PAROXYSMAL ATRIAL FIBRILLATION (HCC): ICD-10-CM

## 2019-01-10 NOTE — PROGRESS NOTES
[facility] [place] [address] [address], [address]  W: [phone]  F: [phone]    Reason for Visit:   Ruth Ann Underwood is a 72 y.o. male who is seen for follow up for anemia    History of Present Illness:   Patient is a 72 y.o. male with multiple comorbidities which include atrial fibrillation, history of Painter's esophagus, diabetes, cardiomyopathy, hypertension, TIA who is seen for follow up of anemia. Developed anemia in June 2016 with hemoglobin had dropped to about 6.3 g/dL at which time he was microcytic due to GIB and was evaluated by Dr. Hua Hurtado. Anemia gradually improved and nearly corrected by August 2018 when hemoglobin had improved to 12.1 g/dL but he has subsequently continued to be anemic with hemoglobin ranging from 8-9.7 g/dL and an MCV which has been in the normal range. Of note he has also developed new thrombocytopenia since August 2018 which is fluctuated between 90 K to 129K. Has had no changes in his WBC count for the most part. Does have history of iron deficiency and has known chronic kidney disease. Otherwise prior workup has revealed a normal B12 level and no clear evidence of a gammopathy. He sees Dr. Lamin Good for chronic refractory tophaceous gout and is currently on leflunomide and Krystexxa. He now comes to discuss results of additional testing. He states that 2 months ago he had a colonoscopy and had post procedure blood loss. He does not have any bleeding since then though. He had polypectomy at the time. These were multiple adenomas. He has been taking po iron intermittently. He is on the Eliquis at 5 mg BID. He has no other complaints today in the form of fevers, chills, sweats, additional bleeding, changes in energy, loss of weight etc.  Changes in medications for his chronic refractory tophaceous gout. Gae Jyoti       He rarely uses ETOH    No FH of Blood disorders or cancers     Past Medical History:   Diagnosis Date    Acute encephalopathy 1/14/2016    Anemia 5/11/2017    Atrial fibrillation (HCC)     Painter esophagus     CAD (coronary artery disease)     Diabetes (HCC)     Heart failure (HCC)     Cardiomyopathy, myocarditis    HTN, goal below 140/90     Retinopathy, diabetic, bilateral ([address] 3/11/2016    Stenosis of right carotid artery without cerebral infarction 1/18/2016    TIA (transient ischemic attack) 1/14/2016    Vitamin D deficiency 3/1/2016    Nephrology ordered and follow 2/22/16       Past Surgical History:   Procedure Laterality Date    COLONOSCOPY N/A 6/23/2016    COLONOSCOPY performed by Jordan Greene MD at [facility]. N/A 8/6/2018    COLONOSCOPY performed by Jordan Greene MD at [address] COLONOSCOPY N/A 8/30/2018    COLONOSCOPY performed by Darien Kramer MD at [address] [place] [address] HX ENDOSCOPY  06/27/2016    CAPSULE; normal small bowel    HX ENDOSCOPY  06/23/2016    upper endoscopy    HX UROLOGICAL  2013      Social History     Tobacco Use    Smoking status: Never Smoker    Smokeless tobacco: Never Used   Substance Use Topics    Alcohol use: Yes     Comment: 2 drinks/week, never a heavy drinker      Family History   Problem Relation Age of Onset    Heart Failure Mother     Diabetes Father     No Known Problems Sister     Other Daughter         Clement Anis Other Daughter         Clement Anis Other Daughter         Hashimoto     Current Outpatient Medications   Medication Sig    ELIQUIS 5 mg tablet TAKE 1 TABLET BY MOUTH TWICE A DAY    leflunomide (ARAVA) 20 mg tablet Take 1 Tab by mouth daily.  pegloticase (KRYSTEXXA) 8 mg/mL soln injection by IntraVENous route Once every 2 weeks.  digoxin (LANOXIN) 0.125 mg tablet Take 0.125 mg by mouth every other day.  dextran 70-hypromellose (ARTIFICIAL TEARS,[id number],) ophthalmic solution Administer 1 Drop to both eyes as needed.     loperamide (IMODIUM) 2 mg capsule Take 2 mg by mouth four (4) times daily as needed for Diarrhea.  furosemide (LASIX) 40 mg tablet Take 40 mg by mouth daily.  hydrALAZINE (APRESOLINE) 25 mg tablet Take 25 mg by mouth three (3) times daily.  omeprazole (PRILOSEC) 20 mg capsule Take 20 mg by mouth daily.  multivitamin (ONE A DAY) tablet Take 1 Tab by mouth daily.  pravastatin (PRAVACHOL) 20 mg tablet Take 1 Tab by mouth nightly.  carvedilol (COREG) 3.125 mg tablet Take 1 Tab by mouth two (2) times daily (with meals).  colchicine 0.6 mg tablet Take 1 Tab by mouth daily as needed. No current facility-administered medications for this visit. Facility-Administered Medications Ordered in Other Visits   Medication Dose Route Frequency    [START ON 1/14/2019] methylPREDNISolone (PF) (Solu-MEDROL) injection 125 mg  125 mg IntraVENous ONCE    [START ON 1/14/2019] famotidine (PF) (PEPCID) 20 mg in sodium chloride 0.9% 10 mL injection  20 mg IntraVENous ONCE    [START ON 1/14/2019] diphenhydrAMINE (BENADRYL) capsule 25 mg  25 mg Oral ONCE    [START ON 1/14/2019] acetaminophen (TYLENOL) tablet 650 mg  650 mg Oral ONCE    [START ON 1/14/2019] pegloticase (KRYSTEXXA) 8 mg in 0.9% sodium chloride 250 mL infusion  8 mg IntraVENous ONCE      No Known Allergies     Review of Systems: A complete review of systems was obtained, negative except as described above.     Physical Exam:     Visit Vitals  /79 (BP 1 Location: Left arm, BP Patient Position: Sitting)   Pulse 97   Temp 98.2 °F (36.8 °C) (Oral)   Resp 20   Ht 5' 10\" (1.778 m)   Wt 185 lb 9.6 oz (84.2 kg)   SpO2 95%   BMI 26.63 kg/m²     ECOG PS:1  General: No distress  Eyes: PERRLA, anicteric sclerae  HENT: Atraumatic, OP clear  Neck: Supple  Lymphatic: No cervical, supraclavicular, or inguinal adenopathy  Respiratory: CTAB, normal respiratory effort  CV: Normal rate, regular rhythm, no murmurs, no peripheral edema  GI: Soft, nontender, nondistended, no masses, no hepatomegaly, no splenomegaly  MS: Normal gait and station. Digits without clubbing or cyanosis. Skin: No rashes, ecchymoses, or petechiae. Normal temperature, turgor, and texture. Psych: Alert, oriented, appropriate affect, normal judgment/insight    Results:     Lab Results   Component Value Date/Time    WBC 4.2 12/31/2018 11:11 AM    HGB 9.2 (L) 12/31/2018 11:11 AM    HCT 30.5 (L) 12/31/2018 11:11 AM    PLATELET 88 (L) 35/74/0914 11:11 AM    MCV 93.0 12/31/2018 11:11 AM    ABS. NEUTROPHILS 3.2 12/31/2018 11:11 AM    Hemoglobin (POC) 6.2 09/04/2018 02:17 PM     Lab Results   Component Value Date/Time    Sodium 140 12/31/2018 11:11 AM    Potassium 4.3 12/31/2018 11:11 AM    Chloride 105 12/31/2018 11:11 AM    CO2 26 12/31/2018 11:11 AM    Glucose 142 (H) 12/31/2018 11:11 AM    BUN 68 (H) 12/31/2018 11:11 AM    Creatinine 1.99 (H) 12/31/2018 11:11 AM    GFR est AA 40 (L) 12/31/2018 11:11 AM    GFR est non-AA 33 (L) 12/31/2018 11:11 AM    Calcium 8.9 12/31/2018 11:11 AM    Glucose (POC) 124 (H) 08/30/2018 12:15 PM    Creatinine (POC) 1.4 (H) 07/27/2012 08:37 AM     Lab Results   Component Value Date/Time    Bilirubin, total 0.6 12/31/2018 11:11 AM    ALT (SGPT) 34 12/31/2018 11:11 AM    AST (SGOT) 23 12/31/2018 11:11 AM    Alk.  phosphatase 78 12/31/2018 11:11 AM    Protein, total 7.2 12/31/2018 11:11 AM    Albumin 3.5 12/31/2018 11:11 AM    Globulin 3.7 12/31/2018 11:11 AM     Lab Results   Component Value Date/Time    Reticulocyte count 1.0 08/04/2018 03:03 PM    Iron % saturation 13 (L) 12/21/2018 03:21 PM    TIBC 395 12/21/2018 03:21 PM    Ferritin 58 12/21/2018 03:21 PM    Vitamin B12 624 12/21/2018 03:21 PM    Haptoglobin 120 12/21/2018 03:21 PM     12/21/2018 03:21 PM    Sed rate (ESR) 28 07/13/2018 12:08 PM    Sed rate, automated 48 (H) 12/31/2018 11:11 AM    C-Reactive protein <0.29 06/21/2016 03:45 PM    C-Reactive Protein, Qt 61.8 (H) 07/13/2018 12:08 PM    TSH 1.93 08/30/2018 06:02 AM    M-Dru Not Observed 12/21/2018 03:21 PM    M-Dru Not Observed 07/13/2018 12:08 PM    CLEMENCIA, Direct NEGATIVE  06/21/2016 03:45 PM    CLEMENCIA Direct NEGATIVE  01/17/2016 04:58 AM    Hep C Virus Ab <0.1 12/21/2018 03:21 PM     Lab Results   Component Value Date/Time    INR 1.1 08/29/2018 01:57 PM    aPTT 30.9 06/21/2016 01:37 PM     Records reviewed and summarized above. Pathology report(s) reviewed above. Radiology report(s) reviewed ultrasound 12/7/18  IMPRESSION  IMPRESSION:      Isolated gallbladder wall thickening. Please correlate with clinical parameters  differentiate between acute and chronic cholecystitis. Splenic volume 322 mL. Normal appearance of the liver      Assessment:   1) Normocytic anemia    Reviewed the EMR and all available labs. Workup was reviewed and is consistent with anemia of chronic disease, anemia of CKD with a competent of functional iron deficiency. Though he was likely to stay anemic to some degree I think some intravenous iron might help given that his ferritin is 58. To hold off on intravenous iron for now while he is receiving treatment for his tophaceous gout. 2) Thrombocytopenia    This is new since August 2018 and coincides with him starting Denita Kohli. Could certainly be drug-induced. No other clear etiologies emerged from the above-mentioned workup. His ultrasound was reviewed and does not show any splenomegaly. We discussed that we cannot rule out AN underlying bone marrow disorder. However his thrombocytopenia is temporally related to when he started his treatment. Hence he agrees to follow this carefully for now and understands that a bone marrow biopsy may be indicated if counts worsen. 3) atrial fibrillation for his current creatinine level    He should likely be on 2.5 mg twice daily of Eliquis. He will discuss this with his cardiologist    4) health maintenance    Up-to-date with age appropriate cancer screening    5) Abnormal light chains    Ratio was slightly elevated over 2 but below 3.    There was no clear evidence of a monoclonal process. This is likely due to his abnormal GFR. Plan:     RTC 3 months with cbc and iron profile    I appreciate the opportunity to participate in Mr. Gerldine Atta Fahrbach's care.     Signed By: Eunice Galeano MD

## 2019-01-10 NOTE — PROGRESS NOTES
Bg Jay is a 72 y.o. male here today for follow up, anemia. 1. Have you been to the ER, urgent care clinic since your last visit? Hospitalized since your last visit? No     2. Have you seen or consulted any other health care providers outside of the [address] [place] [address] since your last visit? Include any pap smears or colon screening.

## 2019-01-14 ENCOUNTER — HOSPITAL ENCOUNTER (OUTPATIENT)
Age: 74
Setting detail: OBSERVATION
Discharge: HOME | End: 2019-01-15
Attending: EMERGENCY MEDICINE | Admitting: INTERNAL MEDICINE
Payer: COMMERCIAL

## 2019-01-14 ENCOUNTER — APPOINTMENT (OUTPATIENT)
Dept: GENERAL RADIOLOGY | Age: 74
End: 2019-01-14
Attending: EMERGENCY MEDICINE
Payer: COMMERCIAL

## 2019-01-14 ENCOUNTER — HOSPITAL ENCOUNTER (OUTPATIENT)
Dept: INFUSION THERAPY | Age: 74
Discharge: HOME | End: 2019-01-14
Payer: COMMERCIAL

## 2019-01-14 VITALS
OXYGEN SATURATION: 97 % | RESPIRATION RATE: 16 BRPM | HEART RATE: 88 BPM | BODY MASS INDEX: 27.05 KG/M2 | DIASTOLIC BLOOD PRESSURE: 81 MMHG | WEIGHT: 188.5 LBS | TEMPERATURE: 97.8 F | SYSTOLIC BLOOD PRESSURE: 161 MMHG

## 2019-01-14 DIAGNOSIS — R55 SYNCOPE AND COLLAPSE: Primary | ICD-10-CM

## 2019-01-14 PROBLEM — D69.6 THROMBOCYTOPENIA (HCC): Status: ACTIVE | Noted: 2019-01-14

## 2019-01-14 PROBLEM — R94.31 PROLONGED Q-T INTERVAL ON ECG: Status: ACTIVE | Noted: 2019-01-14

## 2019-01-14 LAB
ALBUMIN SERPL-MCNC: 3.6 G/DL (ref 3.5–5)
ALBUMIN/GLOB SERPL: 0.9 {RATIO} (ref 1.1–2.2)
ALP SERPL-CCNC: 80 U/L (ref 45–117)
ALT SERPL-CCNC: 35 U/L (ref 12–78)
ANION GAP SERPL CALC-SCNC: 11 MMOL/L (ref 5–15)
AST SERPL-CCNC: 29 U/L (ref 15–37)
ATRIAL RATE: 100 BPM
BASOPHILS # BLD: 0 K/UL (ref 0–0.1)
BASOPHILS NFR BLD: 1 % (ref 0–1)
BILIRUB SERPL-MCNC: 0.7 MG/DL (ref 0.2–1)
BUN SERPL-MCNC: 57 MG/DL (ref 6–20)
BUN/CREAT SERPL: 29 (ref 12–20)
CALCIUM SERPL-MCNC: 9.1 MG/DL (ref 8.5–10.1)
CALCULATED R AXIS, ECG10: 20 DEGREES
CALCULATED T AXIS, ECG11: 167 DEGREES
CHLORIDE SERPL-SCNC: 102 MMOL/L (ref 97–108)
CO2 SERPL-SCNC: 28 MMOL/L (ref 21–32)
COMMENT, HOLDF: NORMAL
COMMENT, HOLDF: NORMAL
CREAT SERPL-MCNC: 1.97 MG/DL (ref 0.7–1.3)
DIAGNOSIS, 93000: NORMAL
DIFFERENTIAL METHOD BLD: ABNORMAL
EOSINOPHIL # BLD: 0.2 K/UL (ref 0–0.4)
EOSINOPHIL NFR BLD: 4 % (ref 0–7)
ERYTHROCYTE [DISTWIDTH] IN BLOOD BY AUTOMATED COUNT: 15.8 % (ref 11.5–14.5)
ERYTHROCYTE [SEDIMENTATION RATE] IN BLOOD: 56 MM/HR (ref 0–20)
GLOBULIN SER CALC-MCNC: 3.9 G/DL (ref 2–4)
GLUCOSE BLD STRIP.AUTO-MCNC: 256 MG/DL (ref 65–100)
GLUCOSE SERPL-MCNC: 115 MG/DL (ref 65–100)
HCT VFR BLD AUTO: 28.4 % (ref 36.6–50.3)
HGB BLD-MCNC: 8.9 G/DL (ref 12.1–17)
IMM GRANULOCYTES # BLD AUTO: 0 K/UL (ref 0–0.04)
IMM GRANULOCYTES NFR BLD AUTO: 0 % (ref 0–0.5)
LYMPHOCYTES # BLD: 0.3 K/UL (ref 0.8–3.5)
LYMPHOCYTES NFR BLD: 7 % (ref 12–49)
MAGNESIUM SERPL-MCNC: 2.1 MG/DL (ref 1.6–2.4)
MCH RBC QN AUTO: 28.9 PG (ref 26–34)
MCHC RBC AUTO-ENTMCNC: 31.3 G/DL (ref 30–36.5)
MCV RBC AUTO: 92.2 FL (ref 80–99)
MONOCYTES # BLD: 0.5 K/UL (ref 0–1)
MONOCYTES NFR BLD: 11 % (ref 5–13)
NEUTS SEG # BLD: 3.5 K/UL (ref 1.8–8)
NEUTS SEG NFR BLD: 77 % (ref 32–75)
NRBC # BLD: 0 K/UL (ref 0–0.01)
NRBC BLD-RTO: 0 PER 100 WBC
PHOSPHATE SERPL-MCNC: 4.9 MG/DL (ref 2.6–4.7)
PLATELET # BLD AUTO: 132 K/UL (ref 150–400)
PMV BLD AUTO: 12.4 FL (ref 8.9–12.9)
POTASSIUM SERPL-SCNC: 4.3 MMOL/L (ref 3.5–5.1)
PROT SERPL-MCNC: 7.5 G/DL (ref 6.4–8.2)
Q-T INTERVAL, ECG07: 410 MS
QRS DURATION, ECG06: 92 MS
QTC CALCULATION (BEZET), ECG08: 496 MS
RBC # BLD AUTO: 3.08 M/UL (ref 4.1–5.7)
RBC MORPH BLD: ABNORMAL
SAMPLES BEING HELD,HOLD: NORMAL
SAMPLES BEING HELD,HOLD: NORMAL
SERVICE CMNT-IMP: ABNORMAL
SODIUM SERPL-SCNC: 141 MMOL/L (ref 136–145)
TROPONIN I SERPL-MCNC: 0.05 NG/ML
TROPONIN I SERPL-MCNC: 0.06 NG/ML
URATE SERPL-MCNC: 2 MG/DL (ref 3.5–7.2)
VENTRICULAR RATE, ECG03: 88 BPM
WBC # BLD AUTO: 4.5 K/UL (ref 4.1–11.1)

## 2019-01-14 PROCEDURE — 74011250637 HC RX REV CODE- 250/637: Performed by: INTERNAL MEDICINE

## 2019-01-14 PROCEDURE — 74011250637 HC RX REV CODE- 250/637: Performed by: FAMILY MEDICINE

## 2019-01-14 PROCEDURE — 96375 TX/PRO/DX INJ NEW DRUG ADDON: CPT

## 2019-01-14 PROCEDURE — 84550 ASSAY OF BLOOD/URIC ACID: CPT

## 2019-01-14 PROCEDURE — 93005 ELECTROCARDIOGRAM TRACING: CPT

## 2019-01-14 PROCEDURE — 96361 HYDRATE IV INFUSION ADD-ON: CPT

## 2019-01-14 PROCEDURE — 74011000250 HC RX REV CODE- 250: Performed by: INTERNAL MEDICINE

## 2019-01-14 PROCEDURE — 80053 COMPREHEN METABOLIC PANEL: CPT

## 2019-01-14 PROCEDURE — 74011250636 HC RX REV CODE- 250/636: Performed by: INTERNAL MEDICINE

## 2019-01-14 PROCEDURE — 65390000012 HC CONDITION CODE 44 OBSERVATION

## 2019-01-14 PROCEDURE — 74011250636 HC RX REV CODE- 250/636: Performed by: EMERGENCY MEDICINE

## 2019-01-14 PROCEDURE — 71045 X-RAY EXAM CHEST 1 VIEW: CPT

## 2019-01-14 PROCEDURE — 82962 GLUCOSE BLOOD TEST: CPT

## 2019-01-14 PROCEDURE — 84100 ASSAY OF PHOSPHORUS: CPT

## 2019-01-14 PROCEDURE — 96365 THER/PROPH/DIAG IV INF INIT: CPT

## 2019-01-14 PROCEDURE — 99285 EMERGENCY DEPT VISIT HI MDM: CPT

## 2019-01-14 PROCEDURE — 85025 COMPLETE CBC W/AUTO DIFF WBC: CPT

## 2019-01-14 PROCEDURE — 96374 THER/PROPH/DIAG INJ IV PUSH: CPT

## 2019-01-14 PROCEDURE — 74011636637 HC RX REV CODE- 636/637: Performed by: INTERNAL MEDICINE

## 2019-01-14 PROCEDURE — 99212 OFFICE O/P EST SF 10 MIN: CPT

## 2019-01-14 PROCEDURE — 36415 COLL VENOUS BLD VENIPUNCTURE: CPT

## 2019-01-14 PROCEDURE — 65660000000 HC RM CCU STEPDOWN

## 2019-01-14 PROCEDURE — 85652 RBC SED RATE AUTOMATED: CPT

## 2019-01-14 PROCEDURE — 83735 ASSAY OF MAGNESIUM: CPT

## 2019-01-14 PROCEDURE — 84484 ASSAY OF TROPONIN QUANT: CPT

## 2019-01-14 PROCEDURE — 74011250636 HC RX REV CODE- 250/636: Performed by: FAMILY MEDICINE

## 2019-01-14 RX ORDER — LOPERAMIDE HYDROCHLORIDE 2 MG/1
2 CAPSULE ORAL
Status: DISCONTINUED | OUTPATIENT
Start: 2019-01-14 | End: 2019-01-15 | Stop reason: HOSPADM

## 2019-01-14 RX ORDER — THERA TABS 400 MCG
1 TAB ORAL DAILY
Status: DISCONTINUED | OUTPATIENT
Start: 2019-01-15 | End: 2019-01-15 | Stop reason: HOSPADM

## 2019-01-14 RX ORDER — INSULIN GLARGINE 100 [IU]/ML
14 INJECTION, SOLUTION SUBCUTANEOUS
COMMUNITY
End: 2019-01-21 | Stop reason: SDUPTHER

## 2019-01-14 RX ORDER — LABETALOL HYDROCHLORIDE 5 MG/ML
10 INJECTION, SOLUTION INTRAVENOUS
Status: DISCONTINUED | OUTPATIENT
Start: 2019-01-14 | End: 2019-01-15 | Stop reason: HOSPADM

## 2019-01-14 RX ORDER — MAGNESIUM SULFATE HEPTAHYDRATE 40 MG/ML
2 INJECTION, SOLUTION INTRAVENOUS ONCE
Status: COMPLETED | OUTPATIENT
Start: 2019-01-14 | End: 2019-01-14

## 2019-01-14 RX ORDER — SODIUM CHLORIDE 0.9 % (FLUSH) 0.9 %
5-10 SYRINGE (ML) INJECTION AS NEEDED
Status: ACTIVE | OUTPATIENT
Start: 2019-01-14 | End: 2019-01-15

## 2019-01-14 RX ORDER — PRAVASTATIN SODIUM 20 MG/1
20 TABLET ORAL
Status: DISCONTINUED | OUTPATIENT
Start: 2019-01-14 | End: 2019-01-15 | Stop reason: HOSPADM

## 2019-01-14 RX ORDER — INSULIN GLARGINE 100 [IU]/ML
17 INJECTION, SOLUTION SUBCUTANEOUS ONCE
Status: COMPLETED | OUTPATIENT
Start: 2019-01-14 | End: 2019-01-14

## 2019-01-14 RX ORDER — SODIUM CHLORIDE 9 MG/ML
25 INJECTION, SOLUTION INTRAVENOUS AS NEEDED
Status: DISPENSED | OUTPATIENT
Start: 2019-01-14 | End: 2019-01-15

## 2019-01-14 RX ORDER — FUROSEMIDE 40 MG/1
40 TABLET ORAL DAILY
Status: DISCONTINUED | OUTPATIENT
Start: 2019-01-15 | End: 2019-01-15 | Stop reason: HOSPADM

## 2019-01-14 RX ORDER — LEFLUNOMIDE 20 MG/1
20 TABLET ORAL EVERY EVENING
COMMUNITY
End: 2019-02-22

## 2019-01-14 RX ORDER — DIGOXIN 125 MCG
0.12 TABLET ORAL EVERY OTHER DAY
Status: DISCONTINUED | OUTPATIENT
Start: 2019-01-14 | End: 2019-01-15 | Stop reason: HOSPADM

## 2019-01-14 RX ORDER — SODIUM CHLORIDE 9 MG/ML
75 INJECTION, SOLUTION INTRAVENOUS CONTINUOUS
Status: DISCONTINUED | OUTPATIENT
Start: 2019-01-14 | End: 2019-01-15

## 2019-01-14 RX ORDER — CARVEDILOL 3.12 MG/1
3.12 TABLET ORAL 2 TIMES DAILY WITH MEALS
Status: DISCONTINUED | OUTPATIENT
Start: 2019-01-14 | End: 2019-01-15 | Stop reason: HOSPADM

## 2019-01-14 RX ORDER — FUROSEMIDE 10 MG/ML
40 INJECTION INTRAMUSCULAR; INTRAVENOUS ONCE
Status: COMPLETED | OUTPATIENT
Start: 2019-01-14 | End: 2019-01-14

## 2019-01-14 RX ORDER — SODIUM CHLORIDE 0.9 % (FLUSH) 0.9 %
5-40 SYRINGE (ML) INJECTION AS NEEDED
Status: DISCONTINUED | OUTPATIENT
Start: 2019-01-14 | End: 2019-01-15 | Stop reason: HOSPADM

## 2019-01-14 RX ORDER — PANTOPRAZOLE SODIUM 40 MG/1
40 TABLET, DELAYED RELEASE ORAL
Status: DISCONTINUED | OUTPATIENT
Start: 2019-01-14 | End: 2019-01-15 | Stop reason: HOSPADM

## 2019-01-14 RX ORDER — HYDRALAZINE HYDROCHLORIDE 25 MG/1
25 TABLET, FILM COATED ORAL 3 TIMES DAILY
Status: DISCONTINUED | OUTPATIENT
Start: 2019-01-14 | End: 2019-01-15 | Stop reason: HOSPADM

## 2019-01-14 RX ORDER — DIPHENHYDRAMINE HCL 25 MG
25 CAPSULE ORAL
Status: ON HOLD | COMMUNITY
End: 2019-02-05

## 2019-01-14 RX ORDER — HYDRALAZINE HYDROCHLORIDE 20 MG/ML
10 INJECTION INTRAMUSCULAR; INTRAVENOUS
Status: DISCONTINUED | OUTPATIENT
Start: 2019-01-14 | End: 2019-01-15 | Stop reason: HOSPADM

## 2019-01-14 RX ORDER — OMEPRAZOLE 20 MG/1
20 CAPSULE, DELAYED RELEASE ORAL
Status: DISCONTINUED | OUTPATIENT
Start: 2019-01-14 | End: 2019-01-14 | Stop reason: CLARIF

## 2019-01-14 RX ORDER — FAMOTIDINE 10 MG/ML
20 INJECTION INTRAVENOUS
COMMUNITY
End: 2019-01-15

## 2019-01-14 RX ORDER — INSULIN GLARGINE 100 [IU]/ML
14 INJECTION, SOLUTION SUBCUTANEOUS
Status: DISCONTINUED | OUTPATIENT
Start: 2019-01-14 | End: 2019-01-15 | Stop reason: HOSPADM

## 2019-01-14 RX ORDER — LEFLUNOMIDE 10 MG/1
20 TABLET ORAL EVERY EVENING
Status: DISCONTINUED | OUTPATIENT
Start: 2019-01-14 | End: 2019-01-15 | Stop reason: HOSPADM

## 2019-01-14 RX ORDER — DIGOXIN 125 MCG
0.12 TABLET ORAL EVERY OTHER DAY
Status: DISCONTINUED | OUTPATIENT
Start: 2019-01-14 | End: 2019-01-14

## 2019-01-14 RX ORDER — SODIUM CHLORIDE 0.9 % (FLUSH) 0.9 %
5-40 SYRINGE (ML) INJECTION EVERY 8 HOURS
Status: DISCONTINUED | OUTPATIENT
Start: 2019-01-14 | End: 2019-01-15 | Stop reason: HOSPADM

## 2019-01-14 RX ADMIN — CARVEDILOL 3.12 MG: 6.25 TABLET, FILM COATED ORAL at 17:27

## 2019-01-14 RX ADMIN — FUROSEMIDE 40 MG: 10 INJECTION, SOLUTION INTRAMUSCULAR; INTRAVENOUS at 17:26

## 2019-01-14 RX ADMIN — SODIUM CHLORIDE 75 ML/HR: 900 INJECTION, SOLUTION INTRAVENOUS at 18:06

## 2019-01-14 RX ADMIN — Medication 10 ML: at 21:39

## 2019-01-14 RX ADMIN — METHYLPREDNISOLONE SODIUM SUCCINATE 125 MG: 125 INJECTION, POWDER, FOR SOLUTION INTRAMUSCULAR; INTRAVENOUS at 12:18

## 2019-01-14 RX ADMIN — DIGOXIN 0.12 MG: 125 TABLET ORAL at 17:27

## 2019-01-14 RX ADMIN — PRAVASTATIN SODIUM 20 MG: 20 TABLET ORAL at 21:39

## 2019-01-14 RX ADMIN — ACETAMINOPHEN 650 MG: 325 TABLET ORAL at 12:13

## 2019-01-14 RX ADMIN — INSULIN GLARGINE 17 UNITS: 100 INJECTION, SOLUTION SUBCUTANEOUS at 22:15

## 2019-01-14 RX ADMIN — SODIUM CHLORIDE 1000 ML: 900 INJECTION, SOLUTION INTRAVENOUS at 15:01

## 2019-01-14 RX ADMIN — PANTOPRAZOLE SODIUM 40 MG: 40 TABLET, DELAYED RELEASE ORAL at 21:38

## 2019-01-14 RX ADMIN — SODIUM CHLORIDE 25 ML/HR: 900 INJECTION, SOLUTION INTRAVENOUS at 12:16

## 2019-01-14 RX ADMIN — LABETALOL HYDROCHLORIDE 10 MG: 5 INJECTION INTRAVENOUS at 15:58

## 2019-01-14 RX ADMIN — APIXABAN 5 MG: 5 TABLET, FILM COATED ORAL at 21:38

## 2019-01-14 RX ADMIN — FAMOTIDINE 20 MG: 10 INJECTION, SOLUTION INTRAVENOUS at 12:18

## 2019-01-14 RX ADMIN — HYDRALAZINE HYDROCHLORIDE 25 MG: 25 TABLET, FILM COATED ORAL at 21:39

## 2019-01-14 RX ADMIN — HYDRALAZINE HYDROCHLORIDE 10 MG: 20 INJECTION INTRAMUSCULAR; INTRAVENOUS at 15:53

## 2019-01-14 RX ADMIN — MAGNESIUM SULFATE HEPTAHYDRATE 2 G: 40 INJECTION, SOLUTION INTRAVENOUS at 18:06

## 2019-01-14 RX ADMIN — DIPHENHYDRAMINE HYDROCHLORIDE 25 MG: 25 CAPSULE ORAL at 12:13

## 2019-01-14 NOTE — PROGRESS NOTES
Problem: Patient Education:  Go to Education Activity Goal: Patient/Family Education Outcome: Not Progressing Towards Goal 
Pt sent to ER

## 2019-01-14 NOTE — ED TRIAGE NOTES
Pt was at the cancer center for infusions for gout when he had a syncopal episode that lasted 3-6 mins. Urinated on himself while unresponsive. Pt states they gave him cortisone and an antiacid right before he went unresponsive. Hx of afib.

## 2019-01-14 NOTE — H&P
[facility] [address] [facility], [facility] 
[phone] Admission History and Physical 
 
 
NAME:  Inocente Zazueta :   1945 MRN:  [id number] PCP:  Gabby Terrazas MD  
 
Date/Time:  2019 Subjective: CHIEF COMPLAINT: syncope HISTORY OF PRESENT ILLNESS:    
Mr. Cami Ravi is a 72 y.o.  male who is admitted with syncope. Mr. Cami Ravi with PMH of gout, HTN, anemia, afib, TIA presented to ER after he passed out. Pt was at [facility] for his gout treatment and right after he received pepcid and cortisone IV, he felt dizzy and passed out. Never had similar symptoms in the past. rosalva SOB or chest pain. He had urine incontinent. Past Medical History:  
Diagnosis Date  Acute encephalopathy 2016  Anemia 2017  Atrial fibrillation ([address]  Painter esophagus  CAD (coronary artery disease)  Diabetes ([address]  Heart failure ([address] Cardiomyopathy, myocarditis  HTN, goal below 140/90  Retinopathy, diabetic, bilateral ([address] 3/11/2016  Stenosis of right carotid artery without cerebral infarction 2016  TIA (transient ischemic attack) 2016  Vitamin D deficiency 3/1/2016 Nephrology ordered and follow 16 Past Surgical History:  
Procedure Laterality Date  COLONOSCOPY N/A 2016 COLONOSCOPY performed by Tenisha Zimmer MD at [facility] ENDOSCOPY  COLONOSCOPY N/A 2018 COLONOSCOPY performed by Tenisha Zimmer MD at [facility] ENDOSCOPY  COLONOSCOPY N/A 2018 COLONOSCOPY performed by Naya Almeida MD at [address] [facility] HX ENDOSCOPY  2016 CAPSULE; normal small bowel  HX ENDOSCOPY  2016  
 upper endoscopy  HX UROLOGICAL  2013 Social History Tobacco Use  Smoking status: Never Smoker  Smokeless tobacco: Never Used Substance Use Topics  Alcohol use: Yes Comment: 2 drinks/week, never a heavy drinker Family History Problem Relation Age of Onset  Heart Failure Mother  Diabetes Father  No Known Problems Sister  Other Daughter Gena Din  Other Daughter Gena Din  Other Daughter Gena Din No Known Allergies Prior to Admission medications Medication Sig Start Date End Date Taking? Authorizing Provider  
colchicine 0.6 mg tablet Take 1 Tab by mouth daily as needed. 12/14/18   Cassy Santiago MD  
ELIQUIS 5 mg tablet TAKE 1 TABLET BY MOUTH TWICE A DAY 11/9/18   Provider, Historical  
leflunomide (ARAVA) 20 mg tablet Take 1 Tab by mouth daily. 11/15/18   Cassy Santiago MD  
pegloticase (KRYSTEXXA) 8 mg/mL soln injection by IntraVENous route Once every 2 weeks. Provider, Historical  
digoxin (LANOXIN) 0.125 mg tablet Take 0.125 mg by mouth every other day. Provider, Historical  
dextran 70-hypromellose (ARTIFICIAL TEARS,[id number],) ophthalmic solution Administer 1 Drop to both eyes as needed. Provider, Historical  
loperamide (IMODIUM) 2 mg capsule Take 2 mg by mouth four (4) times daily as needed for Diarrhea. Provider, Historical  
furosemide (LASIX) 40 mg tablet Take 40 mg by mouth daily. 5/10/16   Provider, Historical  
hydrALAZINE (APRESOLINE) 25 mg tablet Take 25 mg by mouth three (3) times daily. 5/20/16   Provider, Historical  
omeprazole (PRILOSEC) 20 mg capsule Take 20 mg by mouth daily. Provider, Historical  
multivitamin (ONE A DAY) tablet Take 1 Tab by mouth daily. Provider, Historical  
pravastatin (PRAVACHOL) 20 mg tablet Take 1 Tab by mouth nightly. 1/18/16   Merari Cuevas NP  
carvedilol (COREG) 3.125 mg tablet Take 1 Tab by mouth two (2) times daily (with meals). 1/18/16   Merari Cuevas NP Review of Systems: 
(bold if positive, if negative) Gen:  Eyes:  ENT:  CVS:  , syncopePulm:  GI:   
:   
MS:  Skin:  Psych:  Endo:   
Hem:  Renal:   
Neuro:    
 
 
  
Objective: VITALS:   
 Vital signs reviewed; most recent are: 
 
Visit Vitals BP (!) 201/93 Pulse 95 Temp 97.5 °F (36.4 °C) Resp 14 Ht 5' 10\" (1.778 m) Wt 83.9 kg (185 lb) SpO2 95% BMI 26.54 kg/m² SpO2 Readings from Last 6 Encounters:  
01/14/19 95% 01/14/19 97% 01/10/19 95% 12/05/18 94% 11/27/18 95% 11/19/18 97% No intake or output data in the 24 hours ending 01/14/19 1514 Exam:  
 
Physical Exam: 
 
Gen:  Well-developed, well-nourished, in no acute distress HEENT:  Pink conjunctivae, PERRL, hearing intact to voice, moist mucous membranes Neck:  Supple, without masses, thyroid non-tender Resp:  No accessory muscle use, clear breath sounds without wheezes rales or rhonchi 
Card:  No murmurs, normal S1, S2 without thrills, bruits or peripheral edema Abd:  Soft, non-tender, non-distended, normoactive bowel sounds are present, no palpable organomegaly and no detectable hernias Lymph:  No cervical or inguinal adenopathy Musc:  No cyanosis or clubbing Skin:  No rashes or ulcers, skin turgor is good Neuro:  Cranial nerves are grossly intact, no focal motor weakness, follows commands appropriately Psych:  Good insight, oriented to person, place and time, alert Labs: 
 
Recent Labs  
  01/14/19 
1158 WBC 4.5 HGB 8.9* HCT 28.4*  
* Recent Labs  
  01/14/19 
1158   
K 4.3  CO2 28 * BUN 57* CREA 1.97* CA 9.1 ALB 3.6 TBILI 0.7 SGOT 29 ALT 35 Lab Results Component Value Date/Time Glucose (POC) 124 (H) 08/30/2018 12:15 PM  
 Glucose (POC) 135 (H) 08/30/2018 07:13 AM  
 
No results for input(s): PH, PCO2, PO2, HCO3, FIO2 in the last 72 hours. No results for input(s): INR in the last 72 hours. No lab exists for component: INREXT Telemetry reviewed:   AFIB Assessment/Plan: 1. Syncope (1/14/2019). Possible vaso vegal. Admit to telemetry and monitor. Had recent echo, which is unremarkable. Check orthostatic BP. Cardiology consulted. 2.  Paroxysmal atrial fibrillation ([facility] (8/4/2018). Continue home digoxin, coreg and eliquis 3. Essential hypertension. On coreg and hydralazine. Add PRN hydralazine. 4.  Painter esophagus. Continue PPI 5. 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 Follows with rheumatology. On pegloticase q 2 weeks at infusion center. Continue colchicine and arava. 6.  CKD (chronic kidney disease) stage 4, GFR 15-29 ml/min ([facility]) (7/13/2018). Cr seems at baseline 7. Anemia (12/5/2018). This is likely secondary to chronic disease. Continue to monitor. 8.  Thrombocytopenia ([facility] (1/14/2019). This is chronic. Likely secondary to arava. Previous medical records reviewed Risk of deterioration: high Total time spent with patient: 70 Minutes Care Plan discussed with: Patient, Family, Nursing Staff, Consultant/Specialist and >50% of time spent in counseling and coordination of care Discussed:  Care Plan Prophylaxis:  eliquis Probable Disposition:  Home w/Family 
        
___________________________________________________ Attending Physician: Fadi Sandy MD

## 2019-01-14 NOTE — ED PROVIDER NOTES
72 y.o. male with extensive past medical history, please see list, significant for CAD, HTN, DM, TIA, afib, acute encephalopathy, anemia, Painter's esophagus, and heart failure who presents to the ED via EMS with chief complaint of syncope. Pt reports he was sitting in a chair today while receiving an IV infusion when he began feeling lightheaded and had a syncopal episode with LOC for about 3-6 minutes. Pt states he had bladder incontinence associated with the syncopal episode. Pt states he has also recently had some weight gain and diarrhea secondary to a medication he is taking. Pt states he felt fine prior to the episode. Pt denies hx of similar sx. Pt states he has been getting these infusions for his gout every two weeks and has had 11 prior infusions without any issues. Pt states he is feeling better now. Pt states he has hx of afib. Pt denies nausea, appetite changes, fever, cough, congestion, or vomiting. There are no other acute medical complaints voiced at this time. Social Hx: Never smoker. Uses EtOH. PCP: Avril Gutierrez MD 
Cardiology: Dr. Viv Hughes Note written by Macho Weldon, as dictated by Maxine Jama MD 1:16 PM  
 
 
The history is provided by the patient. Past Medical History:  
Diagnosis Date  Acute encephalopathy 1/14/2016  Anemia 5/11/2017  Atrial fibrillation ([address]  Painter esophagus  CAD (coronary artery disease)  Diabetes ([address]  Heart failure ([address] Cardiomyopathy, myocarditis  HTN, goal below 140/90  Retinopathy, diabetic, bilateral ([address] 3/11/2016  Stenosis of right carotid artery without cerebral infarction 1/18/2016  TIA (transient ischemic attack) 1/14/2016  Vitamin D deficiency 3/1/2016 Nephrology ordered and follow 2/22/16 Past Surgical History:  
Procedure Laterality Date  COLONOSCOPY N/A 6/23/2016 COLONOSCOPY performed by Ellis Hsu MD at [facility] ENDOSCOPY  COLONOSCOPY N/A 8/6/2018 COLONOSCOPY performed by Christy Bruno MD at [facility] ENDOSCOPY  COLONOSCOPY N/A 8/30/2018 COLONOSCOPY performed by Amina Muñiz MD at [address] [place] [address] HX ENDOSCOPY  06/27/2016 CAPSULE; normal small bowel  HX ENDOSCOPY  06/23/2016  
 upper endoscopy  HX UROLOGICAL  2013 Family History:  
Problem Relation Age of Onset  Heart Failure Mother  Diabetes Father  No Known Problems Sister  Other Daughter Danny Gene  Other Daughter Danny Gene  Other Daughter Danny Gene Social History Socioeconomic History  Marital status:  Spouse name: Not on file  Number of children: Not on file  Years of education: Not on file  Highest education level: Not on file Social Needs  Financial resource strain: Not on file  Food insecurity - worry: Not on file  Food insecurity - inability: Not on file  Transportation needs - medical: Not on file  Transportation needs - non-medical: Not on file Occupational History  Not on file Tobacco Use  Smoking status: Never Smoker  Smokeless tobacco: Never Used Substance and Sexual Activity  Alcohol use: Yes Comment: 2 drinks/week, never a heavy drinker  Drug use: No  
 Sexual activity: Not Currently Other Topics Concern  Not on file Social History Narrative  Generally active, works out in the yard often ALLERGIES: Patient has no known allergies. Review of Systems Constitutional: Positive for unexpected weight change (gain). Negative for appetite change and fever. HENT: Negative for congestion. Respiratory: Negative for cough. Gastrointestinal: Positive for diarrhea. Negative for nausea and vomiting. Genitourinary:  
     +bladder incontinence Neurological: Positive for syncope and light-headedness. All other systems reviewed and are negative. Vitals:  
 01/14/19 1304 BP: 177/81 Pulse: 86 Resp: 16 Temp: 97.5 °F (36.4 °C) SpO2: 97% Weight: 83.9 kg (185 lb) Height: 5' 10\" (1.778 m) Physical Exam  
Constitutional: He is oriented to person, place, and time. He appears well-developed and well-nourished. No distress. HENT:  
Head: Normocephalic and atraumatic. Eyes: Conjunctivae are normal. No scleral icterus. Neck: Neck supple. No tracheal deviation present. Cardiovascular: Normal rate, regular rhythm, normal heart sounds and intact distal pulses. Exam reveals no gallop and no friction rub. No murmur heard. Pulmonary/Chest: Effort normal and breath sounds normal. He has no wheezes. He has no rales. Abdominal: Soft. He exhibits no distension. There is no tenderness. There is no rebound and no guarding. Musculoskeletal: He exhibits no edema. Neurological: He is alert and oriented to person, place, and time. Skin: Skin is warm and dry. No rash noted. Psychiatric: He has a normal mood and affect. Nursing note and vitals reviewed. Note written by Macho Riggs, as dictated by Oralia Royal MD 1:17 PM 
 
MDM Procedures ED EKG interpretation: 
Rhythm: atrial fib; and irregular. Rate (approx.): 88; incomplete RBBB; ST/T wave: non-specific changes. Note written by Macho Riggs, as dictated by Oralia Royal MD 1:26 PM 
 
CONSULT NOTE: 
2:42 PM Oralia Royal MD spoke with Dr. Geri Weeks, Consult for Hospitalist.  Discussed available diagnostic tests and clinical findings. Dr. Geri Weeks will admit pt. A/P: prolonged episode of syncope prior to arrival; has underlying A-fib; unclear etiology of syncope; not orthostatic; appears well in ED; EKG, labs ok; feel admission for monitoring indicated.   Patricio Greenberg MD

## 2019-01-14 NOTE — PROGRESS NOTES
Outpatient Infusion Center Short Visit Progress Note 1130 Patient admitted to [facility] for Pegloticase  ambulatory in stable condition. Assessment completed. No new concerns voiced. Visit Vitals /81 Pulse 88 Temp 97.8 °F (36.6 °C) Resp 16 Wt 85.5 kg (188 lb 8 oz) SpO2 97% BMI 27.05 kg/m² Recent Results (from the past 12 hour(s)) CBC WITH AUTOMATED DIFF Collection Time: 01/14/19 11:58 AM  
Result Value Ref Range WBC 4.5 4.1 - 11.1 K/uL  
 RBC 3.08 (L) 4.10 - 5.70 M/uL HGB 8.9 (L) 12.1 - 17.0 g/dL HCT 28.4 (L) 36.6 - 50.3 % MCV 92.2 80.0 - 99.0 FL  
 MCH 28.9 26.0 - 34.0 PG  
 MCHC 31.3 30.0 - 36.5 g/dL  
 RDW 15.8 (H) 11.5 - 14.5 % PLATELET 311 (L) 087 - 400 K/uL MPV 12.4 8.9 - 12.9 FL  
 NRBC 0.0 0  WBC ABSOLUTE NRBC 0.00 0.00 - 0.01 K/uL NEUTROPHILS 77 (H) 32 - 75 % LYMPHOCYTES 7 (L) 12 - 49 % MONOCYTES 11 5 - 13 % EOSINOPHILS 4 0 - 7 % BASOPHILS 1 0 - 1 % IMMATURE GRANULOCYTES 0 0.0 - 0.5 % ABS. NEUTROPHILS 3.5 1.8 - 8.0 K/UL  
 ABS. LYMPHOCYTES 0.3 (L) 0.8 - 3.5 K/UL  
 ABS. MONOCYTES 0.5 0.0 - 1.0 K/UL  
 ABS. EOSINOPHILS 0.2 0.0 - 0.4 K/UL  
 ABS. BASOPHILS 0.0 0.0 - 0.1 K/UL  
 ABS. IMM. GRANS. 0.0 0.00 - 0.04 K/UL  
 DF SMEAR SCANNED    
 RBC COMMENTS NORMOCYTIC, NORMOCHROMIC METABOLIC PANEL, COMPREHENSIVE Collection Time: 01/14/19 11:58 AM  
Result Value Ref Range Sodium 141 136 - 145 mmol/L Potassium 4.3 3.5 - 5.1 mmol/L Chloride 102 97 - 108 mmol/L  
 CO2 28 21 - 32 mmol/L Anion gap 11 5 - 15 mmol/L Glucose 115 (H) 65 - 100 mg/dL BUN 57 (H) 6 - 20 MG/DL Creatinine 1.97 (H) 0.70 - 1.30 MG/DL  
 BUN/Creatinine ratio 29 (H) 12 - 20 GFR est AA 41 (L) >60 ml/min/1.73m2 GFR est non-AA 34 (L) >60 ml/min/1.73m2 Calcium 9.1 8.5 - 10.1 MG/DL Bilirubin, total 0.7 0.2 - 1.0 MG/DL  
 ALT (SGPT) 35 12 - 78 U/L  
 AST (SGOT) 29 15 - 37 U/L Alk.  phosphatase 80 45 - 117 U/L  
 Protein, total 7.5 6.4 - 8.2 g/dL Albumin 3.6 3.5 - 5.0 g/dL Globulin 3.9 2.0 - 4.0 g/dL A-G Ratio 0.9 (L) 1.1 - 2.2 SED RATE (ESR) Collection Time: 01/14/19 11:58 AM  
Result Value Ref Range Sed rate, automated 56 (H) 0 - 20 mm/hr URIC ACID Collection Time: 01/14/19 11:58 AM  
Result Value Ref Range Uric acid 2.0 (L) 3.5 - 7.2 MG/DL  
SAMPLES BEING HELD Collection Time: 01/14/19 11:58 AM  
Result Value Ref Range SAMPLES BEING HELD sst   
 COMMENT Add-on orders for these samples will be processed based on acceptable specimen integrity and analyte stability, which may vary by analyte. TROPONIN I Collection Time: 01/14/19  2:19 PM  
Result Value Ref Range Troponin-I, Qt. 0.05 (H) <0.05 ng/mL SAMPLES BEING HELD Collection Time: 01/14/19  2:19 PM  
Result Value Ref Range SAMPLES BEING HELD 1LAV,1SST,1RED,1BL   
 COMMENT Add-on orders for these samples will be processed based on acceptable specimen integrity and analyte stability, which may vary by analyte. PIV with positive blood return. Medications: 
Tylenol po Benadryl po 
Pepcid ivp Methylprednisolone ivp Pt received pre meds for planned Pegloticase IV At approximately 1225, pt c/o dizziness. RN (Madalyn Denise) assisted patient to feet elevated, recumbant position in the chairand fluids increased to 200 ml/hr. Pt then lost consciousness. 911 was called. RN [facility]) unable to get initial BP, thready pulse. Sternal rub applied. Additional RNs brought AED. Pt regained consciousness as AED was being applied and become A O X 4 but was unaware of incident. Pt was sweaty and incontinent of urine. EMS arrived and patient was transported to [facility] ED Patient Vitals for the past 12 hrs: 
 Temp Pulse Resp BP SpO2  
01/14/19 1240  88  161/81   
01/14/19 1235  88  161/81   
01/14/19 1136 97.8 °F (36.6 °C) 80 16 188/88 97 % 01/14/19 1131  99  104/67  MD office notified @ [phone]  Patient [facility] from [facility] ambulatory via EMS. MD will need to be consulted prior to next tx.

## 2019-01-14 NOTE — PROGRESS NOTES
TRANSFER - IN REPORT: 
 
Verbal report received from [address] [place] [address] (name) on Elizabeth Flores  being received from ED (unit) for routine progression of care Report consisted of patients Situation, Background, Assessment and  
Recommendations(SBAR). Information from the following report(s) SBAR, Kardex, ED Summary and Cardiac Rhythm A fib was reviewed with the receiving nurse. Opportunity for questions and clarification was provided. Assessment completed upon patients arrival to unit and care assumed. Per RN, they will try to draw 1800 troponin as it is already 1750  
 
1920 skin assessment completed with Leslee Salguero RN Bedside and Verbal shift change report given to Leslee Salguero (oncoming nurse) by Khushbu Nielsen (offgoing nurse). Report included the following information SBAR, Kardex, ED Summary and Cardiac Rhythm A fib.

## 2019-01-14 NOTE — PROGRESS NOTES
[facility]: MED RECONCILIATION Comments/Recommendations:  
· Patient able to confirm name, , allergies, and preferred pharmacy · Patient reports compliance to medications · Reports going to infusion center to receive dose of Krystexxa 8mg IV every 2 weeks. Is premedicated with IV Solumedrol (unknown dose), Benadryl 25mg PO, and Pepcid 20mg IV. Reports feeling lightheaded and passing out after doses of each. Did not receive dose of Krystexxa - was transferred to ER. · Patient also reported taking an additional 1/2 tab (12.5mg) of Hydralazine this am in addition to his usual 25mg am dose. · Wife and daughter were present and assisted with med history Medications added:  
 
· Pepcid 20mg IV Q14 days - Premed for [place] · Benadryl 25mg PO Q14 days - Premed for [place] · Solumedrol (unknown dose) Q14 days - Premed for [place] · Insulin glargine 14 units SQ QHS Medications removed: 
 
· None Medications adjusted: · Digoxin 0.125mg PO - takes daily x 2 days, then skips a day and repeats this schedule. Change from 0.125mg PO Q48hrs as previously reported. Information obtained from: Patient, patient's wife and daughter, and Rx Query Allergies: Patient has no known allergies. Prior to Admission Medications:  
Prior to Admission Medications Prescriptions Last Dose Informant Patient Reported? Taking? ELIQUIS 5 mg tablet 2019 at pm  Yes Yes Sig: TAKE 1 TABLET BY MOUTH TWICE A DAY  
OTHER 2019 at am  Yes Yes Sig: Solumedrol (unknown dose) IV every 14 days; Premed for Krystexxa  
carvedilol (COREG) 3.125 mg tablet 2019 at pm  No Yes Sig: Take 1 Tab by mouth two (2) times daily (with meals). colchicine 0.6 mg tablet 2019 at am  No Yes Sig: Take 1 Tab by mouth daily as needed. dextran 70-hypromellose (ARTIFICIAL TEARS,[id number],) ophthalmic solution 2019 at Unknown time  Yes Yes Sig: Administer 1 Drop to both eyes as needed. digoxin (LANOXIN) 0.125 mg tablet 2019  Yes Yes Sig: Take 0.125 mg by mouth. Pt takes daily x 2 days, then skips a day and repeats this schedule. diphenhydrAMINE (BENADRYL) 25 mg capsule 2019 at am  Yes Yes Sig: Take 25 mg by mouth every fourteen (14) days. Premed for Krystexxa  
famotidine, PF, (PEPCID) 20 mg/2 mL soln injection 2019 at am  Yes Yes Si mg by IntraVENous route every fourteen (14) days. Premed for Krystexxa  
furosemide (LASIX) 40 mg tablet 2019 at am  Yes Yes Sig: Take 40 mg by mouth daily. hydrALAZINE (APRESOLINE) 25 mg tablet 2019 at am  Yes Yes Sig: Take 25 mg by mouth three (3) times daily. insulin glargine (LANTUS,BASAGLAR) 100 unit/mL (3 mL) inpn 2019 at pm  Yes Yes Si Units by SubCUTAneous route nightly. leflunomide (ARAVA) 20 mg tablet 2019 at pm  Yes Yes Sig: Take 20 mg by mouth every evening. loperamide (IMODIUM) 2 mg capsule   Yes Yes Sig: Take 2 mg by mouth four (4) times daily as needed for Diarrhea.  
multivitamin (ONE A DAY) tablet 2019 at am  Yes Yes Sig: Take 1 Tab by mouth daily. omeprazole (PRILOSEC) 20 mg capsule 2019 at pm  Yes Yes Sig: Take 20 mg by mouth nightly. pegloticase (KRYSTEXXA) 8 mg/mL soln injection 2018  Yes Yes Si mg by IntraVENous route Once every 2 weeks. pravastatin (PRAVACHOL) 20 mg tablet 2019 at pm  No Yes Sig: Take 1 Tab by mouth nightly. Facility-Administered Medications: None Tiarra Denson, Pharm. D. [address] [place] [address] Dr [facility]

## 2019-01-15 VITALS
HEART RATE: 94 BPM | DIASTOLIC BLOOD PRESSURE: 90 MMHG | RESPIRATION RATE: 19 BRPM | TEMPERATURE: 97.9 F | WEIGHT: 185.19 LBS | OXYGEN SATURATION: 95 % | SYSTOLIC BLOOD PRESSURE: 152 MMHG | HEIGHT: 70 IN | BODY MASS INDEX: 26.51 KG/M2

## 2019-01-15 LAB
ANION GAP SERPL CALC-SCNC: 13 MMOL/L (ref 5–15)
BUN SERPL-MCNC: 55 MG/DL (ref 6–20)
BUN/CREAT SERPL: 29 (ref 12–20)
CALCIUM SERPL-MCNC: 8.6 MG/DL (ref 8.5–10.1)
CHLORIDE SERPL-SCNC: 103 MMOL/L (ref 97–108)
CO2 SERPL-SCNC: 25 MMOL/L (ref 21–32)
CREAT SERPL-MCNC: 1.89 MG/DL (ref 0.7–1.3)
ERYTHROCYTE [DISTWIDTH] IN BLOOD BY AUTOMATED COUNT: 15.3 % (ref 11.5–14.5)
GLUCOSE BLD STRIP.AUTO-MCNC: 211 MG/DL (ref 65–100)
GLUCOSE BLD STRIP.AUTO-MCNC: 264 MG/DL (ref 65–100)
GLUCOSE SERPL-MCNC: 255 MG/DL (ref 65–100)
HCT VFR BLD AUTO: 28.6 % (ref 36.6–50.3)
HGB BLD-MCNC: 8.5 G/DL (ref 12.1–17)
MAGNESIUM SERPL-MCNC: 2.4 MG/DL (ref 1.6–2.4)
MCH RBC QN AUTO: 27.7 PG (ref 26–34)
MCHC RBC AUTO-ENTMCNC: 29.7 G/DL (ref 30–36.5)
MCV RBC AUTO: 93.2 FL (ref 80–99)
NRBC # BLD: 0 K/UL (ref 0–0.01)
NRBC BLD-RTO: 0 PER 100 WBC
PHOSPHATE SERPL-MCNC: 5.2 MG/DL (ref 2.6–4.7)
PLATELET # BLD AUTO: 82 K/UL (ref 150–400)
PMV BLD AUTO: 10.7 FL (ref 8.9–12.9)
POTASSIUM SERPL-SCNC: 4.3 MMOL/L (ref 3.5–5.1)
RBC # BLD AUTO: 3.07 M/UL (ref 4.1–5.7)
SERVICE CMNT-IMP: ABNORMAL
SERVICE CMNT-IMP: ABNORMAL
SODIUM SERPL-SCNC: 141 MMOL/L (ref 136–145)
TROPONIN I SERPL-MCNC: 0.05 NG/ML
WBC # BLD AUTO: 3.5 K/UL (ref 4.1–11.1)

## 2019-01-15 PROCEDURE — 99218 HC RM OBSERVATION: CPT

## 2019-01-15 PROCEDURE — 82962 GLUCOSE BLOOD TEST: CPT

## 2019-01-15 PROCEDURE — 65390000012 HC CONDITION CODE 44 OBSERVATION

## 2019-01-15 PROCEDURE — 74011250637 HC RX REV CODE- 250/637: Performed by: INTERNAL MEDICINE

## 2019-01-15 PROCEDURE — 85027 COMPLETE CBC AUTOMATED: CPT

## 2019-01-15 PROCEDURE — 96376 TX/PRO/DX INJ SAME DRUG ADON: CPT

## 2019-01-15 PROCEDURE — 83735 ASSAY OF MAGNESIUM: CPT

## 2019-01-15 PROCEDURE — 36415 COLL VENOUS BLD VENIPUNCTURE: CPT

## 2019-01-15 PROCEDURE — 84484 ASSAY OF TROPONIN QUANT: CPT

## 2019-01-15 PROCEDURE — 74011250636 HC RX REV CODE- 250/636: Performed by: INTERNAL MEDICINE

## 2019-01-15 PROCEDURE — 80048 BASIC METABOLIC PNL TOTAL CA: CPT

## 2019-01-15 PROCEDURE — 93306 TTE W/DOPPLER COMPLETE: CPT

## 2019-01-15 PROCEDURE — 96361 HYDRATE IV INFUSION ADD-ON: CPT

## 2019-01-15 PROCEDURE — 74011250637 HC RX REV CODE- 250/637: Performed by: FAMILY MEDICINE

## 2019-01-15 PROCEDURE — 74011250636 HC RX REV CODE- 250/636: Performed by: NURSE PRACTITIONER

## 2019-01-15 PROCEDURE — 84100 ASSAY OF PHOSPHORUS: CPT

## 2019-01-15 RX ORDER — INSULIN LISPRO 100 [IU]/ML
INJECTION, SOLUTION INTRAVENOUS; SUBCUTANEOUS
Status: DISCONTINUED | OUTPATIENT
Start: 2019-01-15 | End: 2019-01-15 | Stop reason: HOSPADM

## 2019-01-15 RX ORDER — FUROSEMIDE 10 MG/ML
20 INJECTION INTRAMUSCULAR; INTRAVENOUS ONCE
Status: COMPLETED | OUTPATIENT
Start: 2019-01-15 | End: 2019-01-15

## 2019-01-15 RX ORDER — MAGNESIUM SULFATE 100 %
4 CRYSTALS MISCELLANEOUS AS NEEDED
Status: DISCONTINUED | OUTPATIENT
Start: 2019-01-15 | End: 2019-01-15 | Stop reason: HOSPADM

## 2019-01-15 RX ORDER — DEXTROSE 50 % IN WATER (D50W) INTRAVENOUS SYRINGE
12.5-25 AS NEEDED
Status: DISCONTINUED | OUTPATIENT
Start: 2019-01-15 | End: 2019-01-15 | Stop reason: HOSPADM

## 2019-01-15 RX ADMIN — FUROSEMIDE 20 MG: 10 INJECTION, SOLUTION INTRAMUSCULAR; INTRAVENOUS at 10:29

## 2019-01-15 RX ADMIN — CARVEDILOL 3.12 MG: 6.25 TABLET, FILM COATED ORAL at 08:14

## 2019-01-15 RX ADMIN — FUROSEMIDE 40 MG: 40 TABLET ORAL at 08:14

## 2019-01-15 RX ADMIN — HYDRALAZINE HYDROCHLORIDE 10 MG: 20 INJECTION INTRAMUSCULAR; INTRAVENOUS at 05:49

## 2019-01-15 RX ADMIN — THERA TABS 1 TABLET: TAB at 08:13

## 2019-01-15 RX ADMIN — APIXABAN 5 MG: 5 TABLET, FILM COATED ORAL at 08:13

## 2019-01-15 RX ADMIN — HYDRALAZINE HYDROCHLORIDE 25 MG: 25 TABLET, FILM COATED ORAL at 08:14

## 2019-01-15 NOTE — DISCHARGE INSTRUCTIONS
Patient Education     ACUTE DIAGNOSES:  Syncope  Syncope  Syncope    CHRONIC MEDICAL DIAGNOSES:  Problem List as of 1/15/2019 Date Reviewed: 1/14/2019          Codes Class Noted - Resolved    Thrombocytopenia ([facility] [address] ICD-10-CM: D69.6  ICD-9-CM: 287.5  1/14/2019 - Present        * (Principal) Syncope ICD-10-CM: R55  ICD-9-CM: 780.2  1/14/2019 - Present        Prolonged Q-T interval on ECG ICD-10-CM: R94.31  ICD-9-CM: 794.31  1/14/2019 - Present        Anemia ICD-10-CM: D64.9  ICD-9-CM: 285.9  12/5/2018 - Present        GI bleed ICD-10-CM: K92.2  ICD-9-CM: 578.9  8/29/2018 - Present        Acute blood loss anemia ICD-10-CM: D62  ICD-9-CM: 285.1  8/29/2018 - Present        Long-term use of immunosuppressant medication ICD-10-CM: Z79.899  ICD-9-CM: V58.69  8/16/2018 - Present        Hypothyroidism due to Hashimoto's thyroiditis ICD-10-CM: E03.8, E06.3  ICD-9-CM: 244.8, 245.2  8/6/2018 - Present        Adenomatous polyp of ascending colon ICD-10-CM: D12.2  ICD-9-CM: 211.3  8/4/2018 - Present        Paroxysmal atrial fibrillation (HCC) ICD-10-CM: I48.0  ICD-9-CM: 427.31  8/4/2018 - Present        Renal artery stenosis (HCC) ICD-10-CM: I70.1  ICD-9-CM: 440.1  8/4/2018 - Present        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 ICD-10-CM: M1A.30X1  ICD-9-CM: 274.03, 588.89  7/13/2018 - Present        CKD (chronic kidney disease) stage 4, GFR 15-29 ml/min (HCC) ICD-10-CM: N18.4  ICD-9-CM: 585.4  7/13/2018 - Present        Essential hypertension ICD-10-CM: I10  ICD-9-CM: 401.9  Unknown - Present        Anemia, chronic disease ICD-10-CM: D63.8  ICD-9-CM: 285.29  5/11/2017 - Present        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 AMD form reviewed and copy provided.  NN/facilitator to discuss with patient               Heart murmur, systolic [facility]-CF: Q36.7  ICD-9-CM: 785.2  5/6/2016 - Present    Overview Signed 5/6/2016  1:58 PM by Jonatan Roach     1/6 on 4/2016             Retinopathy, diabetic, bilateral ([facility] ICD-10-CM: E11.319  ICD-9-CM: 250.50, 362.01  3/11/2016 - Present        Vitamin D deficiency ICD-10-CM: E55.9  ICD-9-CM: 268.9  3/1/2016 - Present    Overview Signed 3/1/2016  4:55 PM by Jonatan Roach     Nephrology ordered and follow 2/22/16             Stenosis of right carotid artery without cerebral infarction ICD-10-CM: I65.21  ICD-9-CM: 433.10  1/18/2016 - Present        TIA (transient ischemic attack) ICD-10-CM: G45.9  ICD-9-CM: 435.9  1/14/2016 - Present        RESOLVED: Pulmonary hypertension ([facility] ICD-10-CM: I27.20  ICD-9-CM: 416.8  6/21/2016 - 6/28/2016        RESOLVED: Gout (Chronic) ICD-10-CM: M10.9  ICD-9-CM: 274.9  6/21/2016 - 6/28/2016        RESOLVED: CKD (chronic kidney disease) stage 3, GFR 30-59 ml/min (HCC) (Chronic) ICD-10-CM: N18.3  ICD-9-CM: 585.3  6/21/2016 - 6/28/2016        RESOLVED: HTN (hypertension) (Chronic) ICD-10-CM: I10  ICD-9-CM: 401.9  6/21/2016 - 6/28/2016        RESOLVED: Acute encephalopathy ICD-10-CM: G93.40  ICD-9-CM: 348.30  1/14/2016 - 8/11/2017        RESOLVED: A-fib ([facility] ICD-10-CM: I48.91  ICD-9-CM: 427.31  12/11/2011 - 6/28/2016        RESOLVED: Type 2 diabetes mellitus with diabetic retinopathy ([facility] ICD-10-CM: E11.319  ICD-9-CM: 250.50, 362.01  12/11/2011 - 6/28/2016              DISCHARGE MEDICATIONS:          · It is important that you take the medication exactly as they are prescribed. · Keep your medication in the bottles provided by the pharmacist and keep a list of the medication names, dosages, and times to be taken in your wallet. · Do not take other medications without consulting your doctor.        DIET:  Cardiac Diet and Diabetic Diet    ACTIVITY: Activity as tolerated    ADDITIONAL INFORMATION: If you experience any of the following symptoms then please call your primary care physician or return to the emergency room if you cannot get hold of your doctor: Fever, chills, nausea, vomiting, diarrhea, change in mentation, falling, bleeding, shortness of breath. FOLLOW UP CARE:  Dr. Nurys Kelly MD  you are to call and set up an appointment to see them in 2 weeks. Follow-up with cardiology       Information obtained by :  I understand that if any problems occur once I am at home I am to contact my physician. I understand and acknowledge receipt of the instructions indicated above. Physician's or R.N.'s Signature                                                                  Date/Time                                                                                                                                              Patient or Representative Signature                                                          Date/Time         Avoiding Triggers With Heart Failure: Care Instructions  Your Care Instructions    Triggers are anything that make your heart failure flare up. A flare-up is also called \"sudden heart failure\" or \"acute heart failure. \" When you have a flare-up, fluid builds up in your lungs, and you have problems breathing. You might need to go to the hospital. By watching for changes in your condition and avoiding triggers, you can prevent heart failure flare-ups. Follow-up care is a key part of your treatment and safety. Be sure to make and go to all appointments, and call your doctor if you are having problems. It's also a good idea to know your test results and keep a list of the medicines you take. How can you care for yourself at home? Watch for changes in your weight and condition  · Weigh yourself without clothing at the same time each day. Record your weight.  Call your doctor if you have sudden weight gain, such as more than 2 to 3 pounds in a day or 5 pounds in a week. (Your doctor may suggest a different range of weight gain.) A sudden weight gain may mean that your heart failure is getting worse. · Keep a daily record of your symptoms. Write down any changes in how you feel, such as new shortness of breath, cough, or problems eating. Also record if your ankles are more swollen than usual and if you feel more tired than usual. Note anything that you ate or did that could have triggered these changes. Limit sodium  Sodium causes your body to hold on to extra water. This may cause your heart failure symptoms to get worse. People get most of their sodium from processed foods. Fast food and restaurant meals also tend to be very high in sodium. · Your doctor may suggest that you limit sodium to 2,000 milligrams (mg) a day or less. That is less than 1 teaspoon of salt a day, including all the salt you eat in cooking or in packaged foods. · Read food labels on cans and food packages. They tell you how much sodium you get in one serving. Check the serving size. If you eat more than one serving, you are getting more sodium. · Be aware that sodium can come in forms other than salt, including monosodium glutamate (MSG), sodium citrate, and sodium bicarbonate (baking soda). MSG is often added to Asian food. You can sometimes ask for food without MSG or salt. · Slowly reducing salt will help you adjust to the taste. Take the salt shaker off the table. · Flavor your food with garlic, lemon juice, onion, vinegar, herbs, and spices instead of salt. Do not use soy sauce, steak sauce, onion salt, garlic salt, mustard, or ketchup on your food, unless it is labeled \"low-sodium\" or \"low-salt. \"  · Make your own salad dressings, sauces, and ketchup without adding salt. · Use fresh or frozen ingredients, instead of canned ones, whenever you can. Choose low-sodium canned goods.   · Eat less processed food and food from restaurants, including fast food. Exercise as directed  Moderate, regular exercise is very good for your heart. It improves your blood flow and helps control your weight. But too much exercise can stress your heart and cause a heart failure flare-up. · Check with your doctor before you start an exercise program.  · Walking is an easy way to get exercise. Start out slowly. Gradually increase the length and pace of your walk. Swimming, riding a bike, and using a treadmill are also good forms of exercise. · When you exercise, watch for signs that your heart is working too hard. You are pushing yourself too hard if you cannot talk while you are exercising. If you become short of breath or dizzy or have chest pain, stop, sit down, and rest.  · Do not exercise when you do not feel well. Take medicines correctly  · Take your medicines exactly as prescribed. Call your doctor if you think you are having a problem with your medicine. · Make a list of all the medicines you take. Include those prescribed to you by other doctors and any over-the-counter medicines, vitamins, or supplements you take. Take this list with you when you go to any doctor. · Take your medicines at the same time every day. It may help you to post a list of all the medicines you take every day and what time of day you take them. · Make taking your medicine as simple as you can. Plan times to take your medicines when you are doing other things, such as eating a meal or getting ready for bed. This will make it easier to remember to take your medicines. · Get organized. Use helpful tools, such as daily or weekly pill containers. When should you call for help?   Call 911 if you have symptoms of sudden heart failure such as:    · You have severe trouble breathing.     · You cough up pink, foamy mucus.     · You have a new irregular or rapid heartbeat.    Call your doctor now or seek immediate medical care if:    · You have new or increased shortness of breath.     · You are dizzy or lightheaded, or you feel like you may faint.     · You have sudden weight gain, such as more than 2 to 3 pounds in a day or 5 pounds in a week. (Your doctor may suggest a different range of weight gain.)     · You have increased swelling in your legs, ankles, or feet.     · You are suddenly so tired or weak that you cannot do your usual activities.    Watch closely for changes in your health, and be sure to contact your doctor if you develop new symptoms. Where can you learn more? Go to http://tanika-claudia.info/. Enter V514 in the search box to learn more about \"Avoiding Triggers With Heart Failure: Care Instructions. \"  Current as of: December 6, 2017  Content Version: 11.8  © 7737-0754 [employer]. Care instructions adapted under license by [employer] (which disclaims liability or warranty for this information). If you have questions about a medical condition or this instruction, always ask your healthcare professional. [place] [address] any warranty or liability for your use of this information.

## 2019-01-15 NOTE — PROGRESS NOTES
Shift Summary 1930: Bedside and Verbal shift change report given to Jacki Lopez, DANIELLE (oncoming nurse) by Wilberto Green RN (offgoing nurse). Report included the following information SBAR, Kardex, Procedure Summary, Intake/Output, MAR, Accordion, Recent Results and Cardiac Rhythm Afib.  
 
2100: Patient pleasant, finished dinner, denies SOB, chest pain, dizziness. Was talking to wife and daughter, both RNs and other daughter who is an MD. Patient well informed of all his medication and his diet. He requests to be put on a diabetic diet for the AM.  
 
2200: Blood glucose elevated post steroid tx earlier, patient says that when his blood glucose is elevated, he usually takes 17 instead of 14 units of lantus. Called Dr. Marcos Zimmer, got one time order for 17units tonight. 0020: trop and labs sent down. 0345: Patient has been sleeping soundly, BP stable, HR regular. 0545: treated  BP with prn hydralazine 0730: Bedside and Verbal shift change report given to Wilberto Green RN (oncoming nurse) by Jacki Lopez RN (offgoing nurse). Report included the following information SBAR, Kardex, Procedure Summary, Intake/Output, MAR, Accordion, Recent Results and Cardiac Rhythm Afib. Care Plan Summary Problem: Falls - Risk of 
Goal: *Absence of Falls Document Ruby Siddiqui Fall Risk and appropriate interventions in the flowsheet. Outcome: Progressing Towards Goal 
Fall Risk Interventions: 
 
Medication Interventions: Teach patient to arise slowly

## 2019-01-15 NOTE — NURSE NAVIGATOR
Chart reviewed by Heart Failure Nurse Navigator. Heart Failure database completed. EF:  55 to 60% ACEi/ARB/ARNi: Not currently indicated. BB: Carvedilol 3.125 mg BID. Aldosterone Antagonist: Not currently indicated. CRT Not currently indicated. NYHA Functional Class **. Heart Failure Teach Back in Patient Education. Heart Failure Avoiding Triggers on Discharge Instructions. Cardiologist: Dr. Norma Gray Met with patient at bedside. Introduced self and role of HF NN. Educated using teach back. Patient was well versed in HF self care. In addition, he has multiple nurses and one doctor in his immediate family. Patient was given and was appreciative of [facility] book. Patient was agreeable to f/u appointment with Dr. Kaykay Galloway for Friday and states he will be able to attend.

## 2019-01-15 NOTE — PROGRESS NOTES
1- Reason for Admission:   Syncope RRAT Score:   39 Resources/supports as identified by patient/family:  Supportive wife who is an RN Top Challenges facing patient (as identified by patient/family and CM): Finances/Medication cost?  No     
           
Transportation? No 
           
Support system or lack thereof? No 
                  
Living arrangements? No    
        
Self-care/ADLs/Cognition? No  
       
Current Advanced Directive/Advance Care Plan:  Full Code Plan for utilizing home health:  No   
                   
Likelihood of readmission: Moderate Transition of Care Plan:   Home I met with the pt to determine potential discharge needs. He lives with his wife, Ad Lopez ([phone]), who  Is an RN in a 2 story house. He is independent with his ADL's, works part-time in his own advertising business, uses no assistive devices and drives (drove self here). His PCP is Dr. Cali Cox who has no nurse navigator. He has prescription drug coverage and gets his medications at [facility] on [address] He is not anticipating any discharge needs. Care Management Interventions PCP Verified by CM: Yes(Dr. Carmela Coker nurse navigator) Discharge Durable Medical Equipment: No 
Physical Therapy Consult: No 
Occupational Therapy Consult: No 
Speech Therapy Consult: No 
Current Support Network: Lives with Spouse, Own Home Confirm Follow Up Transport: Self Plan discussed with Pt/Family/Caregiver: Yes Discharge Location Discharge Placement: (Home) TOO Simpson, CM

## 2019-01-15 NOTE — DISCHARGE SUMMARY
Hospitalist Discharge Summary     Patient ID:    Bg Jay  [id number]  72 y.o.  1945    Admit date: 1/14/2019    Discharge date and time: 1/15/2019    Admission Diagnoses: Syncope  Syncope  Syncope    Chronic Diagnoses:    Problem List as of 1/15/2019 Date Reviewed: 1/14/2019          Codes Class Noted - Resolved    Thrombocytopenia ([address] ICD-10-CM: D69.6  ICD-9-CM: 287.5  1/14/2019 - Present        * (Principal) Syncope ICD-10-CM: R55  ICD-9-CM: 780.2  1/14/2019 - Present        Prolonged Q-T interval on ECG ICD-10-CM: R94.31  ICD-9-CM: 794.31  1/14/2019 - Present        Anemia ICD-10-CM: D64.9  ICD-9-CM: 285.9  12/5/2018 - Present        GI bleed ICD-10-CM: K92.2  ICD-9-CM: 578.9  8/29/2018 - Present        Acute blood loss anemia ICD-10-CM: D62  ICD-9-CM: 285.1  8/29/2018 - Present        Long-term use of immunosuppressant medication ICD-10-CM: Z79.899  ICD-9-CM: V58.69  8/16/2018 - Present        Hypothyroidism due to Hashimoto's thyroiditis ICD-10-CM: E03.8, E06.3  ICD-9-CM: 244.8, 245.2  8/6/2018 - Present        Adenomatous polyp of ascending colon ICD-10-CM: D12.2  ICD-9-CM: 211.3  8/4/2018 - Present        Paroxysmal atrial fibrillation (HCC) ICD-10-CM: I48.0  ICD-9-CM: 427.31  8/4/2018 - Present        Renal artery stenosis (HCC) ICD-10-CM: I70.1  ICD-9-CM: 440.1  8/4/2018 - Present        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 ICD-10-CM: M1A.30X1  ICD-9-CM: 274.03, 588.89  7/13/2018 - Present        CKD (chronic kidney disease) stage 4, GFR 15-29 ml/min (HCC) ICD-10-CM: N18.4  ICD-9-CM: 585.4  7/13/2018 - Present        Essential hypertension ICD-10-CM: I10  ICD-9-CM: 401.9  Unknown - Present        Anemia, chronic disease ICD-10-CM: D63.8  ICD-9-CM: 285.29  5/11/2017 - Present        Painter esophagus ICD-10-CM: K22.70  ICD-9-CM: 530.85  Unknown - Present        Advance care planning ICD-10-CM: Z71.89  ICD-9-CM: V65.49  11/9/2016 - Present    Overview Signed 11/9/2016 10:37 AM by Sage Tinoco     Initial ACP discussion. AMD form reviewed and copy provided. NN/facilitator to discuss with patient               Heart murmur, systolic [facility]-AW: S10.1  ICD-9-CM: 785.2  5/6/2016 - Present    Overview Signed 5/6/2016  1:58 PM by Ange Petty     1/6 on 4/2016             Retinopathy, diabetic, bilateral ([facility] ICD-10-CM: E11.319  ICD-9-CM: 250.50, 362.01  3/11/2016 - Present        Vitamin D deficiency ICD-10-CM: E55.9  ICD-9-CM: 268.9  3/1/2016 - Present    Overview Signed 3/1/2016  4:55 PM by Ange Petty     Nephrology ordered and follow 2/22/16             Stenosis of right carotid artery without cerebral infarction ICD-10-CM: I65.21  ICD-9-CM: 433.10  1/18/2016 - Present        TIA (transient ischemic attack) ICD-10-CM: G45.9  ICD-9-CM: 435.9  1/14/2016 - Present        RESOLVED: Pulmonary hypertension ([facility] ICD-10-CM: I27.20  ICD-9-CM: 416.8  6/21/2016 - 6/28/2016        RESOLVED: Gout (Chronic) ICD-10-CM: M10.9  ICD-9-CM: 274.9  6/21/2016 - 6/28/2016        RESOLVED: CKD (chronic kidney disease) stage 3, GFR 30-59 ml/min (HCC) (Chronic) ICD-10-CM: N18.3  ICD-9-CM: 585.3  6/21/2016 - 6/28/2016        RESOLVED: HTN (hypertension) (Chronic) ICD-10-CM: I10  ICD-9-CM: 401.9  6/21/2016 - 6/28/2016        RESOLVED: Acute encephalopathy ICD-10-CM: G93.40  ICD-9-CM: 348.30  1/14/2016 - 8/11/2017        RESOLVED: A-fib ([facility] ICD-10-CM: I48.91  ICD-9-CM: 427.31  12/11/2011 - 6/28/2016        RESOLVED: Type 2 diabetes mellitus with diabetic retinopathy ([facility] ICD-10-CM: E11.319  ICD-9-CM: 250.50, 362.01  12/11/2011 - 6/28/2016              Discharge Medications:   Current Discharge Medication List      CONTINUE these medications which have NOT CHANGED    Details   leflunomide (ARAVA) 20 mg tablet Take 20 mg by mouth every evening.       OTHER Solumedrol (unknown dose) IV every 14 days; Premed for Krystexxa      diphenhydrAMINE (BENADRYL) 25 mg capsule Take 25 mg by mouth every fourteen (14) days. Premed for Krystexxa      insulin glargine (LANTUS,BASAGLAR) 100 unit/mL (3 mL) inpn 14 Units by SubCUTAneous route nightly. colchicine 0.6 mg tablet Take 1 Tab by mouth daily as needed. Qty: 30 Tab, Refills: 2      ELIQUIS 5 mg tablet TAKE 1 TABLET BY MOUTH TWICE A DAY  Refills: 3      pegloticase (KRYSTEXXA) 8 mg/mL soln injection 8 mg by IntraVENous route Once every 2 weeks. digoxin (LANOXIN) 0.125 mg tablet Take 0.125 mg by mouth. Pt takes daily x 2 days, then skips a day and repeats this schedule. dextran 70-hypromellose (ARTIFICIAL TEARS,[id number],) ophthalmic solution Administer 1 Drop to both eyes as needed. loperamide (IMODIUM) 2 mg capsule Take 2 mg by mouth four (4) times daily as needed for Diarrhea. furosemide (LASIX) 40 mg tablet Take 40 mg by mouth daily. Refills: 5      hydrALAZINE (APRESOLINE) 25 mg tablet Take 25 mg by mouth three (3) times daily. Refills: 3      omeprazole (PRILOSEC) 20 mg capsule Take 20 mg by mouth nightly. multivitamin (ONE A DAY) tablet Take 1 Tab by mouth daily. pravastatin (PRAVACHOL) 20 mg tablet Take 1 Tab by mouth nightly. Qty: 30 Tab, Refills: 2      carvedilol (COREG) 3.125 mg tablet Take 1 Tab by mouth two (2) times daily (with meals). Qty: 60 Tab, Refills: 2         STOP taking these medications       famotidine, PF, (PEPCID) 20 mg/2 mL soln injection Comments:   Reason for Stopping: Follow up Care:    1. Digna Hua MD in 1-2 weeks  2. Cardiology     Diet:  Cardiac Diet and Diabetic Diet    Disposition:  Home. Advanced Directive:    Discharge Exam:  See today's note.     CONSULTATIONS: Cardiology    Significant Diagnostic Studies:   Recent Labs     01/15/19  0021 01/14/19  1158   WBC 3.5* 4.5   HGB 8.5* 8.9*   HCT 28.6* 28.4*   PLT 82* 132*     Recent Labs     01/15/19  0021 01/14/19  1419 01/14/19  1158     --  141   K 4.3  --  4.3     --  102   CO2 25  -- 28   BUN 55*  --  57*   CREA 1.89*  --  1.97*   *  --  115*   CA 8.6  --  9.1   MG 2.4 2.1  --    PHOS 5.2* 4.9*  --    URICA  --   --  2.0*     Recent Labs     01/14/19  1158   SGOT 29   ALT 35   AP 80   TBILI 0.7   TP 7.5   ALB 3.6   GLOB 3.9     No results for input(s): INR, PTP, APTT in the last 72 hours. No lab exists for component: INREXT   No results for input(s): FE, TIBC, PSAT, FERR in the last 72 hours. No results for input(s): PH, PCO2, PO2 in the last 72 hours. No results for input(s): CPK, CKMB in the last 72 hours. No lab exists for component: TROPONINI  Lab Results   Component Value Date/Time    Glucose (POC) 264 (H) 01/15/2019 07:48 AM    Glucose (POC) 256 (H) 01/14/2019 09:36 PM    Glucose (POC) 124 (H) 08/30/2018 12:15 PM    Glucose (POC) 135 (H) 08/30/2018 07:13 AM    Glucose (POC) 211 (H) 08/29/2018 09:48 PM             HOSPITAL COURSE & TREATMENT RENDERED:   1. Syncope (1/14/2019). Possible vasovegal. Had recent echo, which is unremarkable. No orthostatic hypotension. Cardiology consulted appreciated. Recommended 30 days event monitor, but pt wants to do this with his cardiology       2.  Paroxysmal atrial fibrillation ([facility] (8/4/2018). Continue home digoxin, coreg and eliquis      3.   Essential hypertension. On coreg and hydralazine. Add PRN hydralazine.      4.  Painter esophagus. Continue PPI     5.  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 Follows with rheumatology. On pegloticase q 2 weeks at infusion center. Continue colchicine and arava.      6.  CKD (chronic kidney disease) stage 4, GFR 15-29 ml/min ([facility]) (7/13/2018). Cr seems at baseline      7.  Anemia (12/5/2018). This is likely secondary to chronic disease. Continue to monitor.      8.  Thrombocytopenia ([facility] (1/14/2019). This is chronic.  Likely secondary to arava.        Discharged in improved condition       Signed:  Genia Martin MD  1/15/2019  11:14 AM

## 2019-01-15 NOTE — PROGRESS NOTES
Samuel Faith [facility] 
[address] [facility], [address] [place] [address] 
[phone] Medical Progress Note NAME: Yasmin Rodriguez :  1945 MRM:  [id number] Date/Time: 1/15/2019  7:03 AM 
 
  
Assessment and Plan: 1. Syncope (2019). Possible vasovegal. Had recent echo, which is unremarkable. Check orthostatic BP. Cardiology consulted.  
  
2.  Paroxysmal atrial fibrillation ([facility] (2018). Continue home digoxin, coreg and eliquis  
  
3.   Essential hypertension. On coreg and hydralazine. Add PRN hydralazine.  
  
4.  Painter esophagus. Continue PPI 
  
5. 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 Follows with rheumatology. On pegloticase q 2 weeks at infusion center. Continue colchicine and arava.  
  
6.  CKD (chronic kidney disease) stage 4, GFR 15-29 ml/min ([facility]) (2018). Cr seems at baseline  
  
7. Anemia (2018). This is likely secondary to chronic disease. Continue to monitor.  
  
8. Thrombocytopenia ([facility] (2019). This is chronic. Likely secondary to arava.  
  
 
 
  
  
Subjective: Chief Complaint:  Follow up of pt who was admitted with syncope. Feels better ROS: 
(bold if positive, if negative) Tolerating PT  Tolerating Diet Objective:  
 
Last 24hrs VS reviewed since prior progress note. Most recent are: 
 
Visit Vitals /62 (BP 1 Location: Left arm, BP Patient Position: At rest) Pulse 82 Temp 97.8 °F (36.6 °C) Resp 14 Ht 5' 10\" (1.778 m) Wt 84 kg (185 lb 3 oz) SpO2 94% BMI 26.57 kg/m² SpO2 Readings from Last 6 Encounters:  
01/15/19 94% 19 97% 01/10/19 95% 18 94% 18 95% 18 97% Intake/Output Summary (Last 24 hours) at 1/15/2019 0703 Last data filed at 1/15/2019 3118 Gross per 24 hour Intake 2836.25 ml Output 750 ml Net 2086.25 ml Physical Exam: Gen:  Well-developed, well-nourished, in no acute distress HEENT:  Pink conjunctivae, PERRL, hearing intact to voice, moist mucous membranes Neck:  Supple, without masses, thyroid non-tender Resp:  No accessory muscle use, clear breath sounds without wheezes rales or rhonchi 
Card:  No murmurs, normal S1, S2 without thrills, bruits or peripheral edema Abd:  Soft, non-tender, non-distended, normoactive bowel sounds are present, no palpable organomegaly and no detectable hernias Lymph:  No cervical or inguinal adenopathy Musc:  No cyanosis or clubbing Skin:  No rashes or ulcers, skin turgor is good Neuro:  Cranial nerves are grossly intact, no focal motor weakness, follows commands appropriately Psych:  Good insight, oriented to person, place and time, alert 
__________________________________________________________________ Medications Reviewed: (see below) Medications:  
 
Current Facility-Administered Medications Medication Dose Route Frequency  hydrALAZINE (APRESOLINE) 20 mg/mL injection 10 mg  10 mg IntraVENous Q6H PRN  
 sodium chloride (NS) flush 5-40 mL  5-40 mL IntraVENous Q8H  
 sodium chloride (NS) flush 5-40 mL  5-40 mL IntraVENous PRN  
 0.9% sodium chloride infusion  75 mL/hr IntraVENous CONTINUOUS  
 labetalol (NORMODYNE;TRANDATE) injection 10 mg  10 mg IntraVENous Q2H PRN  
 carvedilol (COREG) tablet 3.125 mg  3.125 mg Oral BID WITH MEALS  furosemide (LASIX) tablet 40 mg  40 mg Oral DAILY  hydrALAZINE (APRESOLINE) tablet 25 mg  25 mg Oral TID  digoxin (LANOXIN) tablet 0.125 mg  0.125 mg Oral EVERY OTHER DAY  apixaban (ELIQUIS) tablet 5 mg  5 mg Oral BID  leflunomide (ARAVA) tablet 20 mg  20 mg Oral QPM  
 loperamide (IMODIUM) capsule 2 mg  2 mg Oral QID PRN  therapeutic multivitamin (THERAGRAN) tablet 1 Tab  1 Tab Oral DAILY  pravastatin (PRAVACHOL) tablet 20 mg  20 mg Oral QHS  insulin glargine (LANTUS) injection 14 Units  14 Units SubCUTAneous QHS  artificial tears (dextran 70-hypromellose) (NATURAL BALANCE) 0.1-0.3 % ophthalmic solution 1 Drop  1 Drop Both Eyes PRN  pantoprazole (PROTONIX) tablet 40 mg  40 mg Oral QHS Facility-Administered Medications Ordered in Other Encounters Medication Dose Route Frequency  sodium chloride (NS) flush 5-10 mL  5-10 mL IntraVENous PRN  
 0.9% sodium chloride infusion  25 mL/hr IntraVENous PRN Lab Data Reviewed: (see below) Lab Review:  
 
Recent Labs  
  01/15/19 
0021 01/14/19 
1158 WBC 3.5* 4.5 HGB 8.5* 8.9* HCT 28.6* 28.4* PLT 82* 132* Recent Labs  
  01/15/19 
0021 01/14/19 [id number] 01/14/19 
1158   --  141  
K 4.3  --  4.3   --  102 CO2 25  --  28  
*  --  115* BUN 55*  --  57* CREA 1.89*  --  1.97* CA 8.6  --  9.1 MG 2.4 2.1  --   
PHOS 5.2* 4.9*  --   
ALB  --   --  3.6 TBILI  --   --  0.7 SGOT  --   --  29 ALT  --   --  35 Lab Results Component Value Date/Time Glucose (POC) 256 (H) 01/14/2019 09:36 PM  
 Glucose (POC) 124 (H) 08/30/2018 12:15 PM  
 Glucose (POC) 135 (H) 08/30/2018 07:13 AM  
 Glucose (POC) 211 (H) 08/29/2018 09:48 PM  
 Glucose (POC) 200 (H) 08/29/2018 04:49 PM  
 
No results for input(s): PH, PCO2, PO2, HCO3, FIO2 in the last 72 hours. No results for input(s): INR in the last 72 hours. No lab exists for component: INREXT All Micro Results None I have reviewed notes of prior 24hr. Other pertinent lab: Total time spent with patient: 28 Care Plan discussed with: Patient, Family, Nursing Staff and >50% of time spent in counseling and coordination of care Discussed:  D/C Planning Prophylaxis:  eliquis Disposition:  Home w/Family 
        
___________________________________________________ Attending Physician: Xavier Lopez MD

## 2019-01-15 NOTE — PROGRESS NOTES
1- CASE MANAGEMENT NOTE: 
I received notification from UR that the pt's status has been changed from In-Pt to Observation. This was explained to the pt., he signed the Medicare and [facility], was given copies of these and the Code 44 letter and originals were placed on his chart.  TOO Simpson, CM

## 2019-01-15 NOTE — PROGRESS NOTES
Cardiology Progress Note [address] [facility]. [address] [place], [address] [facility] Phone [phone]; Fax [phone] 
 
 
 
1/15/2019 10:26 AM  
 
Admit Date:           2019 Admit Diagnosis:  Syncope Syncope :          1945 MRN:          [id number] ASSESSMENT/RECOMMENDATION:  
Syncope -Given history of myocarditis and cardiomyopathy (previously severely reduced restoration of function with medical therapy) patient is at risk for ventricular tachycardia related to scarring and reentrant rhythm. 
-Continue to monitor telemetry 
-Recommend 30-day event monitor to rule out dysrhythmia as etiology of syncope, patient wishes to have monitor obtained through Dr. Elizabeth Deaner office. 
  
Acute on chronic decompensated heart failure 
-Syncope with symptoms consistent with decompensated heart failure.  
-Echocardiogram shows pEF 
-s/p IV diuretics - transitioned to PO- okay to go home from a cardiac standpoint. Creatinine slightly better  
-continue home cardiovascular regimen 
  
Hypertension: elevated this morning. Monitor post meds 
  
Permanent atrial fibrillation rate controlled - cont dig/BB 
-Continue DOAC Anemia: H/H stable Okay to d.c from a cardiac standpoint. Will try to arrange follow up with Dr Trey Ray No intake/output data recorded. Last 3 Recorded Weights in this Encounter 19 1304 01/15/19 0531 Weight: 185 lb (83.9 kg) 185 lb 3 oz (84 kg) 1901 - 01/15 0700 In: 2836.3 [P.O.:900; I.V.:1936.3] Out: 750 [Urine:750] SUBJECTIVE Opal Madison denies palpitations, irregular heart beat, SOB, chest pain or LE edema. Weight still up, + abdominal bloating No lightheadedness or dizziness Current Facility-Administered Medications Medication Dose Route Frequency  insulin lispro (HUMALOG) injection   SubCUTAneous AC&HS  
 glucose chewable tablet 16 g  4 Tab Oral PRN  
 dextrose (D50W) injection syrg 12.5-25 g  12.5-25 g IntraVENous PRN  
 glucagon (GLUCAGEN) injection 1 mg  1 mg IntraMUSCular PRN  
 furosemide (LASIX) injection 20 mg  20 mg IntraVENous ONCE  hydrALAZINE (APRESOLINE) 20 mg/mL injection 10 mg  10 mg IntraVENous Q6H PRN  
 sodium chloride (NS) flush 5-40 mL  5-40 mL IntraVENous Q8H  
 sodium chloride (NS) flush 5-40 mL  5-40 mL IntraVENous PRN  
 labetalol (NORMODYNE;TRANDATE) injection 10 mg  10 mg IntraVENous Q2H PRN  
 carvedilol (COREG) tablet 3.125 mg  3.125 mg Oral BID WITH MEALS  furosemide (LASIX) tablet 40 mg  40 mg Oral DAILY  hydrALAZINE (APRESOLINE) tablet 25 mg  25 mg Oral TID  digoxin (LANOXIN) tablet 0.125 mg  0.125 mg Oral EVERY OTHER DAY  apixaban (ELIQUIS) tablet 5 mg  5 mg Oral BID  leflunomide (ARAVA) tablet 20 mg  20 mg Oral QPM  
 loperamide (IMODIUM) capsule 2 mg  2 mg Oral QID PRN  therapeutic multivitamin (THERAGRAN) tablet 1 Tab  1 Tab Oral DAILY  pravastatin (PRAVACHOL) tablet 20 mg  20 mg Oral QHS  insulin glargine (LANTUS) injection 14 Units  14 Units SubCUTAneous QHS  artificial tears (dextran 70-hypromellose) (NATURAL BALANCE) 0.1-0.3 % ophthalmic solution 1 Drop  1 Drop Both Eyes PRN  pantoprazole (PROTONIX) tablet 40 mg  40 mg Oral QHS OBJECTIVE Intake/Output Summary (Last 24 hours) at 1/15/2019 1026 Last data filed at 1/15/2019 9119 Gross per 24 hour Intake 2836.25 ml Output 750 ml Net 2086.25 ml Review of Systems - History obtained from the patient AS PER  HPI Telemetry A fib v rate 80's PHYSICAL EXAM  
  
 
Visit Vitals /71 (BP 1 Location: Left arm, BP Patient Position: At rest) Pulse 85 Temp 98 °F (36.7 °C) Resp 14 Ht 5' 10\" (1.778 m) Wt 185 lb 3 oz (84 kg) SpO2 95% BMI 26.57 kg/m² Gen: Well-developed, well-nourished, in no acute distress  alert and oriented x 3 HEENT:  Pink conjunctivae, Hearing grossly normal.No scleral icterus or conjunctival, moist mucous membranes Neck: Supple, No JVD Resp: No accessory muscle use, crackles angel bases-diminished BS Card: irregular Rate,Rythm, 2/6 murmurs at LSB, no rubs or gallop. No thrills. GI:          soft, non-tender MSK: No cyanosis or clubbing, good capillary refill Skin: No rashes or ulcers, abrasions to ANGEL LE 
Neuro:  Cranial nerves are grossly intact, moving all four extremities, no focal deficit, follows commands appropriately Psych:  Good insight, oriented to person, place and time, alert, Nml Affect LE: No edema DATA REVIEW Laboratory and Imaging have been reviewed by me and are notable for Recent Labs  
  01/15/19 
0021 01/14/19 
1817 01/14/19 [address] TROIQ 0.05* 0.06* 0.05* Recent Labs  
  01/15/19 
0021 01/14/19 [address] 01/14/19 
1158   --  141  
K 4.3  --  4.3 CO2 25  --  28  
BUN 55*  --  57* CREA 1.89*  --  1.97* *  --  115* PHOS 5.2* 4.9*  --   
MG 2.4 2.1  --   
WBC 3.5*  --  4.5 HGB 8.5*  --  8.9* HCT 28.6*  --  28.4* PLT 82*  --  132* Jac Arellano NP

## 2019-01-15 NOTE — PHYSICIAN ADVISORY
Letter of Status Determination:   
Recommend Changing patient status from INPATIENT to OBSERVATION Pt Name:  Asha Carrizales MR#  [id number] [facility]#   [id number] Room and Hospital  317/01  @ [facility] Hospitalization date  1/14/2019 12:53 PM  
Current Attending Physician  John Romero MD  
Principal diagnosis  Syncope Clinicals Mr. Cayla Diamond is a 72 y.o.  male who is admitted with syncope. Mr. Cayla Diamond with PMH of gout, HTN, anemia, afib, TIA presented to ER after he passed out. Pt was at [facility] for his gout treatment and right after he received pepcid and cortisone IV, he felt dizzy and passed out. Never had similar symptoms in the past. rosalva SOB or chest pain. He had urine incontinent. Pt with h/o myocarditis and severe CM, high risk for V Tach, acute on chronic decompensated HF, uncontrolled HTN [place] MCG criteria STATUS DETERMINATION  On the basis of review of available clinical data, documentation in the chart  It is our recommendation that the patient's status is changed from INPATIENT to OBSERVATION The final decision of the patient's hospitalization status depends on the attending physician's judgment Additional comments Insurance  Payor: [place] MEDICARE / Plan: [place] MEDICARE PART A & B / Product Type: Medicare /   
 
  
 
 
Dimitry Alvarado MD 
Cell: [phone] Physician Advisor Cell Insurance Information [facility] PART A & B Phone:   
 Subscriber: Yared Vasquez Subscriber#: [id number] Group#:  Precert#:   
   
 [employer] [address] [place] [address] PLAN Phone:   
 Subscriber: Jenny Rodriguez Subscriber#: [id number]  Group#: [id number] Precert#:

## 2019-01-15 NOTE — PROGRESS NOTES
Bedside and Verbal shift change report given to Briana (oncoming nurse) by Parag Snyder (offgoing nurse). Report included the following information SBAR, Kardex and Cardiac Rhythm NSR, PVCs. 0800 eccho being completed. 1120 orders seen to discharge patient.

## 2019-01-15 NOTE — PROGRESS NOTES
Discharge instructions were reviewed with patient. All questions were answered. Patient received a copy of his discharge papers and was discharged home. Security gave him a ride to his car that was parked at [place]. His IV and heart monitor were removed.

## 2019-01-16 ENCOUNTER — PATIENT OUTREACH (OUTPATIENT)
Dept: FAMILY MEDICINE CLINIC | Age: 74
End: 2019-01-16

## 2019-01-16 ENCOUNTER — PATIENT OUTREACH (OUTPATIENT)
Dept: OTHER | Age: 74
End: 2019-01-16

## 2019-01-16 NOTE — PROGRESS NOTES
Patient on report as eligible for Case Management. Recent observation for syncope at [facility] with discharge on 1/15. Left discreet message on voicemail with this CM contact information. Will attempt to contact again to offer [phone] Management services.

## 2019-01-16 NOTE — PROGRESS NOTES
Hospital Discharge Follow-Up Date/Time:  2019 1:46 PM 
 
Patient was admitted to [facility] on  and discharged on 1/15 for syncope. The physician discharge summary was available at the time of outreach. Patient was contacted within 1 business days of discharge. Top Challenges reviewed with the provider  -1/15 Syncope- passed out at infusion center after receiving gout tx. /93. Currently 160/70. Hgb 8.9 BUN 57,Creat 1.97 Cardio appt . pcp -MICAH and MWV if able to do. Method of communication with provider :face to face, chart routing Inpatient RRAT score: 39 Was this a readmission? no  
Patient stated reason for the readmission: na 
 
Nurse Navigator (NN) contacted the patient by telephone to perform post hospital discharge assessment. Verified name and  with patient as identifiers. Provided introduction to self, and explanation of the Nurse Navigator role. Reviewed discharge instructions and red flags with patient who verbalized understanding. Patient given an opportunity to ask questions and does not have any further questions or concerns at this time. The patient agrees to contact the PCP office for questions related to their healthcare. NN provided contact information for future reference. Disease Specific:   na 
 
Summary of patient's top problems: 1. Syncope-passed out at infusion center after getting gout tx. Possibly vasovegal. Recent ECHO ,unremarkable. 2. Paroxysmal Afib-continue digoxin,coreg and eliquis 3. HTN- coreg and hydralazine, add prn hydralazine. Home Health orders at discharge: none Home Health company: na 
Date of initial visit: na 
 
Durable Medical Equipment ordered/company: none Durable Medical Equipment received: na 
 
Barriers to care? Knowledge deficit-educate on management of syncope Advance Care Planning:  
Does patient have an Advance Directive:  not on file Medication(s):  
 New Medications at Discharge: none Changed Medications at Discharge: none Discontinued Medications at Discharge: pepcid Medication reconciliation was performed with patient, who verbalizes understanding of administration of home medications. There were barriers to obtaining medications identified at this time. Referral to Pharm D needed: no  
 
Current Outpatient Medications Medication Sig  
 leflunomide (ARAVA) 20 mg tablet Take 20 mg by mouth every evening.  OTHER Solumedrol (unknown dose) IV every 14 days; Premed for [place]  diphenhydrAMINE (BENADRYL) 25 mg capsule Take 25 mg by mouth every fourteen (14) days. Premed for [place]  insulin glargine (LANTUS,BASAGLAR) 100 unit/mL (3 mL) inpn 14 Units by SubCUTAneous route nightly.  colchicine 0.6 mg tablet Take 1 Tab by mouth daily as needed.  ELIQUIS 5 mg tablet TAKE 1 TABLET BY MOUTH TWICE A DAY  pegloticase (KRYSTEXXA) 8 mg/mL soln injection 8 mg by IntraVENous route Once every 2 weeks.  dextran 70-hypromellose (ARTIFICIAL TEARS,[id number],) ophthalmic solution Administer 1 Drop to both eyes as needed.  loperamide (IMODIUM) 2 mg capsule Take 2 mg by mouth four (4) times daily as needed for Diarrhea.  furosemide (LASIX) 40 mg tablet Take 40 mg by mouth daily.  hydrALAZINE (APRESOLINE) 25 mg tablet Take 25 mg by mouth three (3) times daily.  omeprazole (PRILOSEC) 20 mg capsule Take 20 mg by mouth nightly.  multivitamin (ONE A DAY) tablet Take 1 Tab by mouth daily.  pravastatin (PRAVACHOL) 20 mg tablet Take 1 Tab by mouth nightly.  carvedilol (COREG) 3.125 mg tablet Take 1 Tab by mouth two (2) times daily (with meals).  digoxin (LANOXIN) 0.125 mg tablet Take 0.125 mg by mouth. Pt takes daily x 2 days, then skips a day and repeats this schedule. No current facility-administered medications for this visit. [facility] follow up appointment(s):  
Future Appointments Date Time Provider Xiomara Velez 1/17/2019  4:20 PM Adriana Frederick MD [address] [facility]  
1/22/2019  1:30 PM Nilsa Hall MD [facility] SCHED  
1/28/2019 11:00 AM [facility]  
2/6/2019  8:30 PM BEDROOM 7 [address] [place] [address] SLEEP LAB [place]  
2/11/2019 11:00 AM [facility]  
2/25/2019 11:00 AM SS [facility] [place] [facility] follow up appointment(s): * 1/18 9:45am   Dispatch [facility]:  information provided as a resource Goals  education on management of syncope · Monitor bp-report low or high values to provider · Promote adequate fluid intake] · Change positions slowly · Reevaluate meds-may need to space bp meds out. · Note any dizziness, weakness · F/u cardio as directed. mbt 
  
  Prevent complications post hospitalization. 1/16/19 [facility] 1/14-1/15 syncope. · Reviewed meds with patient · Reviewed discharge instructions · Reminded to keep appt 1/18 with cardio and discuss syncope and MICAH · pcp MICAH 1/21  1:30pm 
· Reviewed red flags-dizzy,sob, chest pain, weakness, 
· Report any red flags/concerns to pcp/nn/cardio. · Nn contact info provided. · Dispatch health info provided. · NN to call back in 5-7 days for update. mbt

## 2019-01-17 ENCOUNTER — OFFICE VISIT (OUTPATIENT)
Dept: RHEUMATOLOGY | Age: 74
End: 2019-01-17

## 2019-01-17 ENCOUNTER — DOCUMENTATION ONLY (OUTPATIENT)
Dept: OTHER | Age: 74
End: 2019-01-17

## 2019-01-17 VITALS
WEIGHT: 180 LBS | DIASTOLIC BLOOD PRESSURE: 94 MMHG | HEIGHT: 70 IN | TEMPERATURE: 97.6 F | RESPIRATION RATE: 18 BRPM | HEART RATE: 86 BPM | SYSTOLIC BLOOD PRESSURE: 182 MMHG | BODY MASS INDEX: 25.77 KG/M2

## 2019-01-17 DIAGNOSIS — D61.818 PANCYTOPENIA (HCC): ICD-10-CM

## 2019-01-17 DIAGNOSIS — M1A.39X1 CHRONIC TOPHACEOUS GOUT OF MULTIPLE SITES DUE TO RENAL IMPAIRMENT: Primary | ICD-10-CM

## 2019-01-17 DIAGNOSIS — N18.4 CKD (CHRONIC KIDNEY DISEASE) STAGE 4, GFR 15-29 ML/MIN (HCC): ICD-10-CM

## 2019-01-17 DIAGNOSIS — Z79.60 LONG-TERM USE OF IMMUNOSUPPRESSANT MEDICATION: ICD-10-CM

## 2019-01-17 DIAGNOSIS — R55 SYNCOPE AND COLLAPSE: ICD-10-CM

## 2019-01-17 NOTE — PROGRESS NOTES
Noted patient inactive [facility], and does not qualify for Employee Case Management.

## 2019-01-17 NOTE — PROGRESS NOTES
REASON FOR VISIT This is a follow-up visit for Mr. 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 Gouty arthritis phenotype includes: 
Tophi: yes Erosions: yes Tenosynovitis: no 
Double Contour: yes Therapy Includes: 
 
NSAIDs: contra-indicated due to CKD Colchicine prophylaxis: yes Current urate-lowering therapy: Krystexxa 8 mg every 2 weeks (8/13/2018 to present) Discontinued urate-lowering therapy because of inefficacy: allopurinol Discontinued urate-lowering therapy because of side effects: none Contra-Indicated urate-lowering therapy because of CKD: allopurinol, probenecid Discontinued urate-lowering therapy because of dysphagia: Uloric Immunomodulatory Therapy History includes: 
Current DMARD therapy include: Leflunomide 20 mg daily (8/2018 to present) Prior DMARD therapy include: none Discontinued DMARDs because of inefficacy: None Discontinued DMARDs because of side effects: none Active problems include: 
 
Patient Active Problem List  
Diagnosis Code  TIA (transient ischemic attack) G45.9  Stenosis of right carotid artery without cerebral infarction I65.21  
 Vitamin D deficiency E55.9  Retinopathy, diabetic, bilateral (HCC) E11.319  
 Heart murmur, systolic Q63.5  Advance care planning Z71.89  
 Essential hypertension I10  
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  
 
 
HISTORY OF PRESENT ILLNESS 
 
Mr. Dara Amaro returns for a follow-up visit. On his last visit, I continued colchicine 0.6 mg, leflunomide 20 mg daily to minimize immunogenicity when Krystexxa infusions. His most recent infusion was 12/31/2018. On 1/14/2018, he received his pre-medication and had syncopal episode. His blood pressure and heart rate were normal. He regained consciousness but had become diaphoretic and had soiled himself and then was sent to the ED. He had not eaten. He associates it due to Pepcid and felt abdominal distention as soon he received. He feels that it was given soon as Benadryl. He also recalls that he had upset stomach prior to it. Today, he feels well. He has not had a flare of gout. He has mild stiffness in his hands and is more limber. He takes colchicine as depending on his ESR elevation. He denies fever, weight loss, blurred vision, vision loss, oral ulcers, ankle swelling, dry cough, dyspnea, nausea, vomiting, dysphagia, abdominal pain, black or bloody stool, fall since last visit, rash, easy bruising and increased thirst. 
 
Most recent uric acid from 11/05/2018 was 0.2 mg/dL (previously 0.2, 0.2, 0.2, 0.2, 1.5, 10.2, 11.2, 12.0, 4.1, 9.6, 12.6 mg/dL). The patient has not had any interval hospital admissions, infections, or surgeries. He had a interval EGD and colonoscopy with benign polyps found. REVIEW OF SYSTEMS A comprehensive review of systems was performed and pertinent results are documented in the HPI, review of systems is otherwise non-contributory. PAST MEDICAL HISTORY He has a past medical history of Acute encephalopathy (1/14/2016), Anemia (5/11/2017), Atrial fibrillation ([address] Painter esophagus, CAD (coronary artery disease), Diabetes ([address] Heart failure ([address] HTN, goal below 140/90, Retinopathy, diabetic, bilateral ([address] (3/11/2016), Stenosis of right carotid artery without cerebral infarction (1/18/2016), TIA (transient ischemic attack) (1/14/2016), and Vitamin D deficiency (3/1/2016). FAMILY HISTORY His family history includes Diabetes in his father; Heart Failure in his mother; No Known Problems in his sister; Other in his daughter, daughter, and daughter. SOCIAL HISTORY He reports that  has never smoked. he has never used smokeless tobacco. He reports that he drinks alcohol. He reports that he does not use drugs. IMMUNIZATIONS Immunization History Administered Date(s) Administered  Influenza High Dose Vaccine PF 02/26/2016, 11/23/2017  Influenza Vaccine 11/05/2018 MEDICATIONS Current Outpatient Medications Medication Sig Dispense Refill  leflunomide (ARAVA) 20 mg tablet Take 20 mg by mouth every evening.  insulin glargine (LANTUS,BASAGLAR) 100 unit/mL (3 mL) inpn 14 Units by SubCUTAneous route nightly.  colchicine 0.6 mg tablet Take 1 Tab by mouth daily as needed. 30 Tab 2  
 ELIQUIS 5 mg tablet TAKE 1 TABLET BY MOUTH TWICE A DAY  3  
 pegloticase (KRYSTEXXA) 8 mg/mL soln injection 8 mg by IntraVENous route Once every 2 weeks.  digoxin (LANOXIN) 0.125 mg tablet Take 0.125 mg by mouth. Pt takes daily x 2 days, then skips a day and repeats this schedule.  dextran 70-hypromellose (ARTIFICIAL TEARS,[id number],) ophthalmic solution Administer 1 Drop to both eyes as needed.  loperamide (IMODIUM) 2 mg capsule Take 2 mg by mouth four (4) times daily as needed for Diarrhea.  furosemide (LASIX) 40 mg tablet Take 40 mg by mouth daily. 5  
 hydrALAZINE (APRESOLINE) 25 mg tablet Take 25 mg by mouth three (3) times daily. 3  
 omeprazole (PRILOSEC) 20 mg capsule Take 20 mg by mouth nightly.  multivitamin (ONE A DAY) tablet Take 1 Tab by mouth daily.  pravastatin (PRAVACHOL) 20 mg tablet Take 1 Tab by mouth nightly.  30 Tab 2  
  carvedilol (COREG) 3.125 mg tablet Take 1 Tab by mouth two (2) times daily (with meals). 60 Tab 2  
 diphenhydrAMINE (BENADRYL) 25 mg capsule Take 25 mg by mouth every fourteen (14) days. Premed for [place] ALLERGIES No Known Allergies PHYSICAL EXAMINATION Visit Vitals BP (!) 182/94 Pulse 86 Temp 97.6 °F (36.4 °C) Resp 18 Ht 5' 10\" (1.778 m) Wt 180 lb (81.6 kg) BMI 25.83 kg/m² Body mass index is 25.83 kg/m². General: Patient is alert, oriented x 3, not in acute distress HEENT:  
Sclerae are not injected and appear moist. 
There is no alopecia. Neck is supple Cardiovascular: 
Heart is regular rate and rhythm, no murmurs. Chest: 
Lungs are clear to auscultation bilaterally. No rhonchi, wheezes, or crackles. Extremities: 
Free of clubbing, cyanosis, edema Neurological exam: Muscle strength is full in upper and lower extremities Skin exam: 
There are no rashes, no alopecia, no discoid lesions, no active Raynaud's, no livedo reticularis, no periungual erythema. Tophi: left olecranon, bilateral PIPs (which improved) Musculoskeletal exam: A comprehensive musculoskeletal exam was performed for all joints of each upper and lower extremity and assessed for swelling, tenderness and range of motion. Positive results are documented as below: 
 
Bilateral ankle edema (IMPROVED) Joint Count 11/15/2018 7/13/2018 Patient pain (0-100) - 70 Left 1st MCP - Tender - 1 Left 1st MCP - Swollen - 1 Left 2nd MCP - Swollen - 1 Left thumb IP - Tender - 1 Left thumb IP - Swollen - 1 Left 2nd PIP - Tender 1 1 Left 2nd PIP - Swollen 0 1 Left 3rd PIP - Tender 1 1 Left 3rd PIP - Swollen 1 1 Left 5th PIP - Tender - 1 Left 5th PIP - Swollen - 1 Right wrist- Tender - 1 Right wrist- Swollen - 1 Right 2nd MCP - Swollen - 1 Right 5th MCP - Swollen - 1 Right 2nd PIP - Swollen - 1 Right 3rd PIP - Swollen - 1 Right 4th PIP - Swollen - 1 Right 5th PIP - Swollen - 1 Right knee - Tender - 1 Right knee - Swollen - 1 Tender Joint Count (Total) 2 7 Swollen Joint Count (Total) 1 14 Patient Assessment (0-10) - 2.5 DATA REVIEW Laboratory Recent laboratory results were reviewed, summarized, and discussed with the patient. Imaging Musculoskeletal Ultrasound Ultrasound of the left hand. Indication: joint pain. (07/13/18) Using a MiniVax Logiq e with 18 Mhz probe, limited views of the left hand were reviewed. This revealed hyperechoic rim along the 2nd MCP dorsally. The tendons were normal. Bony contours were regular without erosions seen. There were an an inhomogeneous collection in the joint space Impression: tophaceous gout with double contour Ultrasound of the right knee. Indication: joint pain. (07/13/18) Using a MiniVax Logiq e with 12 Mhz probe, limited views of the right knee were reviewed. This revealed a large anechoic colleciton without doppler and the presence of synovial frons within the suprapatellar joint space and medial and lateral gutters joint space. The tendons were normal. Bony contours were regular without erosions seen. There were no soft tissue masses noted. Unable to perform suprapatellar view due to knee effusion to assess for double contour. Impression: large joint effusion Radiographs Bilateral Hand 7/13/2018: RIGHT: Osteopenia is unchanged. No acute fracture or dislocation. Vascular calcifications are slightly increased. Radiocarpal and DRUJ osteoarthritis is increased. Triscaphe and first [facility] joint mild osteoarthritis is increased. No carpal erosion. There are erosions of the proximal fourth metacarpal at the fourth [facility] joint. Questionable new erosion of the second metacarpal head. No other MCP joint erosion. LEFT: No fracture or dislocation on plain film. Bones are osteopenic. Vascular calcifications are present. No chondrocalcinosis. Radiocarpal and intercarpal joints are within normal limits and unchanged. First MCP joint erosions are increased in size and number. Second MCP joint erosions are new. Remaining MCP joints are within normal limits. Multiple IP joint erosions are increased and more conspicuous. No significant narrowing of the DIP joints. No periosteal reaction. Subtle erosion of the fourth DIP joint is unchanged. Third DIP joint erosions are more conspicuous. No chondrocalcinosis or periosteal reaction. Bilateral Hand 10/02/2012: RIGHT: demineralization and small erosive changes.  Vascular calcification is noted. LEFT: demineralization and small erosive changes head 2nd middle phalanx, head first metacarpal bone. Vascular calcification is noted CT Imaging CT Chest without contrast 6/21/2016: There is no definite evidence of interstitial lung disease. In the right upper lobe, there are vague areas of groundglass opacity which are nonspecific. There is a 5 mm nodule in the right upper lobe. There is minor atelectasis at the lung bases. There is a borderline enlarged pretracheal lymph node similar to 2012. The main pulmonary artery segment is slightly prominent measuring 3.6 cm perhaps and pulmonary artery hypertension. There is extensive atherosclerotic changes in the coronary arteries. There is also calcification in the aortic valve. In the upper abdomen, there is a small amount of ascites. There is mild prominence of the IVC and hepatic veins. 
   
MR Imaging MRI Brain without contrast 1/14/2016: There is no evidence for acute infarction. Several nonspecific tiny foci of altered signal are noted in the centrum semiovale bilaterally, somewhat more numerous on the left, nonspecific though compatible with chronic small vessel ischemic disease of white matter. There is no intra-axial extra-axial mass. The vascular flow voids at the base of the brain appear normal in conspicuity.  Sella, optic chiasm, posterior fossa, orbits and paranasal sinuses are normal. A 7 mm focus of altered signal in the left occipital bone corresponds with an ill-defined lytic lesion shown on CT. The significance of this finding is doubtful. MRA Brain without contrast 1/14/2016: The codominant vertebral artery confluence and basilar artery  demonstrate normal flow as do the posterior cerebral arteries bilaterally. Normal appearing flow is shown in the internal carotid artery siphons bilaterally as well as the anterior and middle cerebral arteries bilaterally. DXA None PROCEDURE Ultrasound-Guided Right Knee Aspiration and Kenalog 40 mg IA. (07/13/18) ASSESSMENT AND PLAN This is a follow-up visit for Mr. Deisy Monteiro. 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 He is maintained on leflunomide 20 mg daily for immunogenicity prophylaxis while on Krystexxa infusions (since 8/13/2018). He is also taking colchicine as needed but had been taking it according to ESR elevation, which I explained is likely due to his anemia. He has had good tolerance and response to [place]. Most recent uric acid from 1/14/2018 was 2.0 mg/dL (previously 0.2, 0.2, 0.2, 0.2, 0.2, 1.5, 10.2, 11.2, 12.0, 4.1, 9.6, 12.6 mg/dL). His tophi in his hands and left olecranon have improved. On his last infusion day, he had GI upset and then had syncope after his pre-medications were given, so this is not a direct effect from [place] and is not an infusion reaction. I will change his Pepcid from IV to PO to see if that minimizes this issue although, he had stomach upset prior to that infusion on that day. I asked him to only take colchicine as needed for flares. 2) Long Term Use of Immunosuppressants. The patient remains on immunomodulatory medications (lefluomide) and requires frequent toxicity monitoring by blood work. 3) Chronic Kidney Disease Stage 3. The patient's creatinine 1.89 mg/dL and eGFR 35 (previously 2.00, 2.03, 2.23 mg/dL, eGFR 33, 33, 28). 4) Pancytopenia. His Hct was 28.6%. He also has WBC 3.5 and platelets 57,985. This may be due to his chronic kidney disease and superimposed by leflunomide. Dr. Italo Walker is following. 5) Syncope. See #1. The patient voiced understanding of the aforementioned assessment and plan. Summary of plan was provided in the After Visit Summary patient instructions. TODAY'S ORDERS No orders of the defined types were placed in this encounter. Future Appointments Date Time Provider Xiomara Velez 1/22/2019  1:30 PM Nilsa Hall MD [facility]  
1/28/2019 11:00 [facility]  
2/6/2019  8:30 PM BEDROOM 7 [address] [place] [address] SLEEP LAB [place]  
2/11/2019 11:00 AM SS INF4 CH4 [facility]1H [facility]  
2/25/2019 11:00 AM SS INF4 CH4 <1H [facility]  
7/17/2019  1:40 PM Adriana Frederick MD [address] [place] [address] Zaire Hill MD, [facility] Adult Rheumatology Rheumatology Ultrasound Certified [facility] Part of [address] Phone [phone] Fax [phone]

## 2019-01-21 ENCOUNTER — HOSPITAL ENCOUNTER (OUTPATIENT)
Dept: INFUSION THERAPY | Age: 74
Discharge: HOME | End: 2019-01-21
Payer: COMMERCIAL

## 2019-01-21 VITALS
DIASTOLIC BLOOD PRESSURE: 98 MMHG | HEART RATE: 78 BPM | RESPIRATION RATE: 18 BRPM | SYSTOLIC BLOOD PRESSURE: 184 MMHG | TEMPERATURE: 97.8 F

## 2019-01-21 LAB
ALBUMIN SERPL-MCNC: 3.9 G/DL (ref 3.5–5)
ALBUMIN/GLOB SERPL: 0.9 {RATIO} (ref 1.1–2.2)
ALP SERPL-CCNC: 87 U/L (ref 45–117)
ALT SERPL-CCNC: 30 U/L (ref 12–78)
ANION GAP SERPL CALC-SCNC: 11 MMOL/L (ref 5–15)
AST SERPL-CCNC: 28 U/L (ref 15–37)
BASOPHILS # BLD: 0.1 K/UL (ref 0–0.1)
BASOPHILS NFR BLD: 2 % (ref 0–1)
BILIRUB SERPL-MCNC: 0.7 MG/DL (ref 0.2–1)
BUN SERPL-MCNC: 44 MG/DL (ref 6–20)
BUN/CREAT SERPL: 25 (ref 12–20)
CALCIUM SERPL-MCNC: 9.5 MG/DL (ref 8.5–10.1)
CHLORIDE SERPL-SCNC: 102 MMOL/L (ref 97–108)
CO2 SERPL-SCNC: 29 MMOL/L (ref 21–32)
CREAT SERPL-MCNC: 1.77 MG/DL (ref 0.7–1.3)
DIFFERENTIAL METHOD BLD: ABNORMAL
EOSINOPHIL # BLD: 0.3 K/UL (ref 0–0.4)
EOSINOPHIL NFR BLD: 7 % (ref 0–7)
ERYTHROCYTE [DISTWIDTH] IN BLOOD BY AUTOMATED COUNT: 14.6 % (ref 11.5–14.5)
ERYTHROCYTE [SEDIMENTATION RATE] IN BLOOD: 41 MM/HR (ref 0–20)
GLOBULIN SER CALC-MCNC: 4.2 G/DL (ref 2–4)
GLUCOSE SERPL-MCNC: 132 MG/DL (ref 65–100)
HCT VFR BLD AUTO: 34.7 % (ref 36.6–50.3)
HGB BLD-MCNC: 10.6 G/DL (ref 12.1–17)
IMM GRANULOCYTES # BLD AUTO: 0 K/UL (ref 0–0.04)
IMM GRANULOCYTES NFR BLD AUTO: 0 % (ref 0–0.5)
LYMPHOCYTES # BLD: 0.4 K/UL (ref 0.8–3.5)
LYMPHOCYTES NFR BLD: 9 % (ref 12–49)
MCH RBC QN AUTO: 28.5 PG (ref 26–34)
MCHC RBC AUTO-ENTMCNC: 30.5 G/DL (ref 30–36.5)
MCV RBC AUTO: 93.3 FL (ref 80–99)
MONOCYTES # BLD: 0.5 K/UL (ref 0–1)
MONOCYTES NFR BLD: 11 % (ref 5–13)
NEUTS SEG # BLD: 2.8 K/UL (ref 1.8–8)
NEUTS SEG NFR BLD: 71 % (ref 32–75)
NRBC # BLD: 0 K/UL (ref 0–0.01)
NRBC BLD-RTO: 0 PER 100 WBC
PLATELET # BLD AUTO: 117 K/UL (ref 150–400)
PMV BLD AUTO: 10.9 FL (ref 8.9–12.9)
POTASSIUM SERPL-SCNC: 4.3 MMOL/L (ref 3.5–5.1)
PROT SERPL-MCNC: 8.1 G/DL (ref 6.4–8.2)
RBC # BLD AUTO: 3.72 M/UL (ref 4.1–5.7)
RBC MORPH BLD: ABNORMAL
RBC MORPH BLD: ABNORMAL
SODIUM SERPL-SCNC: 142 MMOL/L (ref 136–145)
URATE SERPL-MCNC: 7.2 MG/DL (ref 3.5–7.2)
WBC # BLD AUTO: 4.1 K/UL (ref 4.1–11.1)

## 2019-01-21 PROCEDURE — 96366 THER/PROPH/DIAG IV INF ADDON: CPT

## 2019-01-21 PROCEDURE — 80053 COMPREHEN METABOLIC PANEL: CPT

## 2019-01-21 PROCEDURE — 85025 COMPLETE CBC W/AUTO DIFF WBC: CPT

## 2019-01-21 PROCEDURE — 84550 ASSAY OF BLOOD/URIC ACID: CPT

## 2019-01-21 PROCEDURE — 96375 TX/PRO/DX INJ NEW DRUG ADDON: CPT

## 2019-01-21 PROCEDURE — 74011250637 HC RX REV CODE- 250/637: Performed by: INTERNAL MEDICINE

## 2019-01-21 PROCEDURE — 96365 THER/PROPH/DIAG IV INF INIT: CPT

## 2019-01-21 PROCEDURE — 74011250636 HC RX REV CODE- 250/636: Performed by: INTERNAL MEDICINE

## 2019-01-21 PROCEDURE — 85652 RBC SED RATE AUTOMATED: CPT

## 2019-01-21 PROCEDURE — 36415 COLL VENOUS BLD VENIPUNCTURE: CPT

## 2019-01-21 RX ORDER — ACETAMINOPHEN 325 MG/1
650 TABLET ORAL ONCE
Status: COMPLETED | OUTPATIENT
Start: 2019-01-21 | End: 2019-01-21

## 2019-01-21 RX ORDER — FAMOTIDINE 20 MG/1
20 TABLET, FILM COATED ORAL ONCE
Status: COMPLETED | OUTPATIENT
Start: 2019-01-21 | End: 2019-01-21

## 2019-01-21 RX ORDER — DIPHENHYDRAMINE HCL 25 MG
25 CAPSULE ORAL ONCE
Status: COMPLETED | OUTPATIENT
Start: 2019-01-21 | End: 2019-01-21

## 2019-01-21 RX ORDER — FUROSEMIDE 10 MG/ML
20 INJECTION INTRAMUSCULAR; INTRAVENOUS ONCE
Status: DISCONTINUED | OUTPATIENT
Start: 2019-01-21 | End: 2019-01-21

## 2019-01-21 RX ADMIN — ACETAMINOPHEN 650 MG: 325 TABLET ORAL at 11:25

## 2019-01-21 RX ADMIN — METHYLPREDNISOLONE SODIUM SUCCINATE 125 MG: 125 INJECTION, POWDER, FOR SOLUTION INTRAMUSCULAR; INTRAVENOUS at 11:25

## 2019-01-21 RX ADMIN — DIPHENHYDRAMINE HYDROCHLORIDE 25 MG: 25 CAPSULE ORAL at 11:25

## 2019-01-21 RX ADMIN — FAMOTIDINE 20 MG: 20 TABLET, FILM COATED ORAL at 11:26

## 2019-01-21 RX ADMIN — PEGLOTICASE 8 MG: 8 INJECTION, SOLUTION INTRAVENOUS at 12:05

## 2019-01-21 NOTE — PROGRESS NOTES
Outpatient Infusion Center Progress Note 1100 Pt admit to [facility] for [address] ambulatory in stable condition. Assessment completed. No new concerns voiced. PIV started in right arm with positive blood. Labs drawn and sent. Premeds given. Visit Vitals BP (!) 184/98 Pulse 78 Temp 97.8 °F (36.6 °C) Resp 18 Medications: 
NS @ [facility] Benadryl po 
Pepcid po Tylenol po Solu-medrol ivp [address] Pt's BP elevated towards completion of infusion and after. Pt stated he felt fine. No dizziness, lightheadedness or blurry vision. Pt stated he monitors his BP at home as well. Dr. Shane Martinez notified of pt's BP. Instructed pt he needs to follow up with his nephrologist and to go to the ED if he started to experience chest pain, vision changes, or light headedness. Pt verbalized understanding. 1415 Pt tolerated treatment well. PIV flushed and removed prior to discharge. D/c home ambulatory in no distress. Pt aware of next appointment scheduled for 2/4/19. Recent Results (from the past 12 hour(s)) METABOLIC PANEL, COMPREHENSIVE Collection Time: 01/21/19 11:06 AM  
Result Value Ref Range Sodium 142 136 - 145 mmol/L Potassium 4.3 3.5 - 5.1 mmol/L Chloride 102 97 - 108 mmol/L  
 CO2 29 21 - 32 mmol/L Anion gap 11 5 - 15 mmol/L Glucose 132 (H) 65 - 100 mg/dL BUN 44 (H) 6 - 20 MG/DL Creatinine 1.77 (H) 0.70 - 1.30 MG/DL  
 BUN/Creatinine ratio 25 (H) 12 - 20 GFR est AA 46 (L) >60 ml/min/1.73m2 GFR est non-AA 38 (L) >60 ml/min/1.73m2 Calcium 9.5 8.5 - 10.1 MG/DL Bilirubin, total 0.7 0.2 - 1.0 MG/DL  
 ALT (SGPT) 30 12 - 78 U/L  
 AST (SGOT) 28 15 - 37 U/L Alk. phosphatase 87 45 - 117 U/L Protein, total 8.1 6.4 - 8.2 g/dL Albumin 3.9 3.5 - 5.0 g/dL Globulin 4.2 (H) 2.0 - 4.0 g/dL A-G Ratio 0.9 (L) 1.1 - 2.2 SED RATE (ESR) Collection Time: 01/21/19 11:06 AM  
Result Value Ref Range Sed rate, automated 41 (H) 0 - 20 mm/hr URIC ACID  
 Collection Time: 01/21/19 11:06 AM  
Result Value Ref Range Uric acid 7.2 3.5 - 7.2 MG/DL  
CBC WITH AUTOMATED DIFF Collection Time: 01/21/19 11:06 AM  
Result Value Ref Range WBC 4.1 4.1 - 11.1 K/uL  
 RBC 3.72 (L) 4.10 - 5.70 M/uL  
 HGB 10.6 (L) 12.1 - 17.0 g/dL HCT 34.7 (L) 36.6 - 50.3 % MCV 93.3 80.0 - 99.0 FL  
 MCH 28.5 26.0 - 34.0 PG  
 MCHC 30.5 30.0 - 36.5 g/dL  
 RDW 14.6 (H) 11.5 - 14.5 % PLATELET 448 (L) 808 - 400 K/uL MPV 10.9 8.9 - 12.9 FL  
 NRBC 0.0 0  WBC ABSOLUTE NRBC 0.00 0.00 - 0.01 K/uL NEUTROPHILS 71 32 - 75 % LYMPHOCYTES 9 (L) 12 - 49 % MONOCYTES 11 5 - 13 % EOSINOPHILS 7 0 - 7 % BASOPHILS 2 (H) 0 - 1 % IMMATURE GRANULOCYTES 0 0.0 - 0.5 % ABS. NEUTROPHILS 2.8 1.8 - 8.0 K/UL  
 ABS. LYMPHOCYTES 0.4 (L) 0.8 - 3.5 K/UL  
 ABS. MONOCYTES 0.5 0.0 - 1.0 K/UL  
 ABS. EOSINOPHILS 0.3 0.0 - 0.4 K/UL  
 ABS. BASOPHILS 0.1 0.0 - 0.1 K/UL  
 ABS. IMM. GRANS. 0.0 0.00 - 0.04 K/UL  
 DF SMEAR SCANNED    
 RBC COMMENTS ANISOCYTOSIS 1+ 
    
 RBC COMMENTS OVALOCYTES 1+

## 2019-01-22 RX ORDER — SODIUM CHLORIDE 0.9 % (FLUSH) 0.9 %
10 SYRINGE (ML) INJECTION AS NEEDED
Status: CANCELLED
Start: 2019-02-05

## 2019-01-22 RX ORDER — SODIUM CHLORIDE 9 MG/ML
10 INJECTION INTRAMUSCULAR; INTRAVENOUS; SUBCUTANEOUS AS NEEDED
Status: CANCELLED | OUTPATIENT
Start: 2019-02-05

## 2019-01-22 RX ORDER — SODIUM CHLORIDE 0.9 % (FLUSH) 0.9 %
10 SYRINGE (ML) INJECTION AS NEEDED
Status: CANCELLED
Start: 2019-01-29

## 2019-01-22 RX ORDER — HEPARIN 100 UNIT/ML
300-500 SYRINGE INTRAVENOUS AS NEEDED
Status: CANCELLED
Start: 2019-01-29

## 2019-01-22 RX ORDER — ACETAMINOPHEN 325 MG/1
650 TABLET ORAL AS NEEDED
Status: CANCELLED
Start: 2019-02-05

## 2019-01-22 RX ORDER — ONDANSETRON 2 MG/ML
8 INJECTION INTRAMUSCULAR; INTRAVENOUS AS NEEDED
Status: CANCELLED | OUTPATIENT
Start: 2019-01-29

## 2019-01-22 RX ORDER — DIPHENHYDRAMINE HYDROCHLORIDE 50 MG/ML
50 INJECTION, SOLUTION INTRAMUSCULAR; INTRAVENOUS AS NEEDED
Status: CANCELLED
Start: 2019-02-05

## 2019-01-22 RX ORDER — EPINEPHRINE 1 MG/ML
0.3 INJECTION, SOLUTION, CONCENTRATE INTRAVENOUS AS NEEDED
Status: CANCELLED | OUTPATIENT
Start: 2019-02-05

## 2019-01-22 RX ORDER — ALBUTEROL SULFATE 0.83 MG/ML
2.5 SOLUTION RESPIRATORY (INHALATION) AS NEEDED
Status: CANCELLED
Start: 2019-01-29

## 2019-01-22 RX ORDER — HYDROCORTISONE SODIUM SUCCINATE 100 MG/2ML
100 INJECTION, POWDER, FOR SOLUTION INTRAMUSCULAR; INTRAVENOUS AS NEEDED
Status: CANCELLED | OUTPATIENT
Start: 2019-01-29

## 2019-01-22 RX ORDER — HYDROCORTISONE SODIUM SUCCINATE 100 MG/2ML
100 INJECTION, POWDER, FOR SOLUTION INTRAMUSCULAR; INTRAVENOUS AS NEEDED
Status: CANCELLED | OUTPATIENT
Start: 2019-02-05

## 2019-01-22 RX ORDER — SODIUM CHLORIDE 9 MG/ML
10 INJECTION INTRAMUSCULAR; INTRAVENOUS; SUBCUTANEOUS AS NEEDED
Status: CANCELLED | OUTPATIENT
Start: 2019-01-29

## 2019-01-22 RX ORDER — ALBUTEROL SULFATE 0.83 MG/ML
2.5 SOLUTION RESPIRATORY (INHALATION) AS NEEDED
Status: CANCELLED
Start: 2019-02-05

## 2019-01-22 RX ORDER — ONDANSETRON 2 MG/ML
8 INJECTION INTRAMUSCULAR; INTRAVENOUS AS NEEDED
Status: CANCELLED | OUTPATIENT
Start: 2019-02-05

## 2019-01-22 RX ORDER — HEPARIN 100 UNIT/ML
300-500 SYRINGE INTRAVENOUS AS NEEDED
Status: CANCELLED
Start: 2019-02-05

## 2019-01-22 RX ORDER — ACETAMINOPHEN 325 MG/1
650 TABLET ORAL AS NEEDED
Status: CANCELLED
Start: 2019-01-29

## 2019-01-22 RX ORDER — DIPHENHYDRAMINE HYDROCHLORIDE 50 MG/ML
50 INJECTION, SOLUTION INTRAMUSCULAR; INTRAVENOUS AS NEEDED
Status: CANCELLED
Start: 2019-01-29

## 2019-01-22 RX ORDER — EPINEPHRINE 1 MG/ML
0.3 INJECTION, SOLUTION, CONCENTRATE INTRAVENOUS AS NEEDED
Status: CANCELLED | OUTPATIENT
Start: 2019-01-29

## 2019-01-24 ENCOUNTER — TELEPHONE (OUTPATIENT)
Dept: FAMILY MEDICINE CLINIC | Age: 74
End: 2019-01-24

## 2019-01-24 NOTE — TELEPHONE ENCOUNTER
Erik Marr is calling on behalf of Pt from [facility]  in regards to having medication submitted over to pharmacy. Medication they are requesting is Basaglar Insulin.       Best call back number  [phone]

## 2019-01-24 NOTE — TELEPHONE ENCOUNTER
Outgoing call to [facility] pharmacy. Spoke with Lesly Leger. Patients  verified. Lesly Leger asking if they can fill Basaglar insulin instead of Lantus. Advised pharmacy that the script that was sent over on 2019 contains the name Lashon Finch as well as Lantus. Pharmacist voiced understanding and will fill patients medication.

## 2019-01-25 ENCOUNTER — PATIENT OUTREACH (OUTPATIENT)
Dept: FAMILY MEDICINE CLINIC | Age: 74
End: 2019-01-25

## 2019-01-25 NOTE — PROGRESS NOTES
Call to patient today for f/u on [facility] 1/14-1/15 admission. LM requesting he call me back on my direct number. Will continue to try and reach.

## 2019-01-28 ENCOUNTER — APPOINTMENT (OUTPATIENT)
Dept: INFUSION THERAPY | Age: 74
End: 2019-01-28
Payer: COMMERCIAL

## 2019-01-30 RX ORDER — ACETAMINOPHEN 325 MG/1
650 TABLET ORAL ONCE
Status: COMPLETED | OUTPATIENT
Start: 2019-02-04 | End: 2019-02-04

## 2019-01-30 RX ORDER — FAMOTIDINE 20 MG/1
20 TABLET, FILM COATED ORAL ONCE
Status: COMPLETED | OUTPATIENT
Start: 2019-02-04 | End: 2019-02-04

## 2019-01-30 RX ORDER — DIPHENHYDRAMINE HCL 25 MG
25 CAPSULE ORAL ONCE
Status: COMPLETED | OUTPATIENT
Start: 2019-02-04 | End: 2019-02-04

## 2019-01-31 ENCOUNTER — TELEPHONE (OUTPATIENT)
Dept: ONCOLOGY | Age: 74
End: 2019-01-31

## 2019-01-31 NOTE — TELEPHONE ENCOUNTER
Call to patient. He has been out of town. HIPAA verified. He prefers [facility] for injectofer. He is unable to get injectofer on the same day as pegloticase as it is prescribed by another provider. He will be expecting [facility] scheduling to give him a call. Will advise Selene. /[facility]

## 2019-02-01 ENCOUNTER — PATIENT OUTREACH (OUTPATIENT)
Dept: FAMILY MEDICINE CLINIC | Age: 74
End: 2019-02-01

## 2019-02-01 NOTE — PROGRESS NOTES
Called patient, reports he is doing \"fine. \" Saw his cardiologist, Mg Perez, unsure what date(was sched for 1/18). Cardiologist thought he was doing well, no new recommendations. Denies any further syncope. Changed appt with  from 1/22 to 2/8 for his annual physical. 
Explained to him that it is best and in his best interests to see pcp within a few days of discharge to ensure that all is going well. He declined earlier appt with pcp, says he is doing fine and will see her on 2/8. No further concerns voiced. Urged to call NN if any questions/concerns. Goals  education on management of syncope · Monitor bp-report low or high values to provider · Promote adequate fluid intake] · Change positions slowly · Reevaluate meds-may need to space bp meds out. · Note any dizziness, weakness · F/u cardio as directed. mbt 
 
2/1/19 · No further episodes of syncope. · Following above recs. · bp stable per patient. mbt 
  
  Prevent complications post hospitalization. 1/16/19 [facility] 1/14-1/15 syncope. · Reviewed meds with patient · Reviewed discharge instructions · Reminded to keep appt 1/18 with cardio and discuss syncope and MICAH · pcp MICAH 1/21  1:30pm 
· Reviewed red flags-dizzy,sob, chest pain, weakness, 
· Report any red flags/concerns to pcp/nn/cardio. · Nn contact info provided. · Dispatch health info provided. · NN to call back in 5-7 days for update. mbt 
 
2/1/19 · Saw cardio and no new recs. · Cancelled appt for MICAH with pcp, rescheduled for 2/8 for a cpx-didn't want earlier visit, \"I am fine. \" · Denies any red flags · Reminded to call if any questions/concerns. mbt

## 2019-02-01 NOTE — Clinical Note
Cancelled his MICAH with you for 1/22-Rescheduled for cpx on 2/8-Does not want to be seen earlier- \"I am fine. \"

## 2019-02-04 ENCOUNTER — HOSPITAL ENCOUNTER (INPATIENT)
Age: 74
LOS: 18 days | Discharge: NURSING, REHAB OR LONG-TERM CARE | DRG: 242 | End: 2019-02-22
Attending: EMERGENCY MEDICINE | Admitting: FAMILY MEDICINE
Payer: MEDICARE

## 2019-02-04 ENCOUNTER — APPOINTMENT (OUTPATIENT)
Dept: CT IMAGING | Age: 74
End: 2019-02-04
Attending: EMERGENCY MEDICINE
Payer: MEDICARE

## 2019-02-04 ENCOUNTER — APPOINTMENT (OUTPATIENT)
Dept: NON INVASIVE DIAGNOSTICS | Age: 74
DRG: 242 | End: 2019-02-04
Attending: INTERNAL MEDICINE
Payer: MEDICARE

## 2019-02-04 ENCOUNTER — HOSPITAL ENCOUNTER (OUTPATIENT)
Dept: INFUSION THERAPY | Age: 74
Discharge: HOME | End: 2019-02-04
Payer: MEDICARE

## 2019-02-04 ENCOUNTER — HOSPITAL ENCOUNTER (EMERGENCY)
Age: 74
Discharge: ANOTHER HOSPITAL | End: 2019-02-04
Attending: EMERGENCY MEDICINE
Payer: MEDICARE

## 2019-02-04 ENCOUNTER — APPOINTMENT (OUTPATIENT)
Dept: GENERAL RADIOLOGY | Age: 74
End: 2019-02-04
Attending: EMERGENCY MEDICINE
Payer: MEDICARE

## 2019-02-04 ENCOUNTER — APPOINTMENT (OUTPATIENT)
Dept: GENERAL RADIOLOGY | Age: 74
DRG: 242 | End: 2019-02-04
Attending: INTERNAL MEDICINE
Payer: MEDICARE

## 2019-02-04 VITALS
TEMPERATURE: 96.4 F | HEART RATE: 91 BPM | RESPIRATION RATE: 18 BRPM | SYSTOLIC BLOOD PRESSURE: 118 MMHG | DIASTOLIC BLOOD PRESSURE: 60 MMHG

## 2019-02-04 VITALS
DIASTOLIC BLOOD PRESSURE: 88 MMHG | OXYGEN SATURATION: 100 % | SYSTOLIC BLOOD PRESSURE: 158 MMHG | HEART RATE: 91 BPM | RESPIRATION RATE: 18 BRPM | TEMPERATURE: 97.2 F | BODY MASS INDEX: 25.83 KG/M2 | WEIGHT: 180 LBS

## 2019-02-04 DIAGNOSIS — R41.82 ALTERED MENTAL STATUS, UNSPECIFIED ALTERED MENTAL STATUS TYPE: ICD-10-CM

## 2019-02-04 DIAGNOSIS — I46.9 PEA (PULSELESS ELECTRICAL ACTIVITY) (HCC): ICD-10-CM

## 2019-02-04 DIAGNOSIS — I21.3 ST ELEVATION (STEMI) MYOCARDIAL INFARCTION (HCC): ICD-10-CM

## 2019-02-04 DIAGNOSIS — I46.9 CARDIAC ARREST (HCC): Primary | ICD-10-CM

## 2019-02-04 DIAGNOSIS — R41.89 UNRESPONSIVE: Primary | ICD-10-CM

## 2019-02-04 DIAGNOSIS — I46.9 CARDIAC ARREST (HCC): ICD-10-CM

## 2019-02-04 LAB
ALBUMIN SERPL-MCNC: 3.4 G/DL (ref 3.5–5)
ALBUMIN SERPL-MCNC: 3.6 G/DL (ref 3.5–5)
ALBUMIN/GLOB SERPL: 0.9 {RATIO} (ref 1.1–2.2)
ALBUMIN/GLOB SERPL: 0.9 {RATIO} (ref 1.1–2.2)
ALP SERPL-CCNC: 86 U/L (ref 45–117)
ALP SERPL-CCNC: 90 U/L (ref 45–117)
ALT SERPL-CCNC: 29 U/L (ref 12–78)
ALT SERPL-CCNC: 44 U/L (ref 12–78)
AMPHET UR QL SCN: NEGATIVE
ANION GAP BLD CALC-SCNC: 19 MMOL/L (ref 10–20)
ANION GAP SERPL CALC-SCNC: 10 MMOL/L (ref 5–15)
ANION GAP SERPL CALC-SCNC: 13 MMOL/L (ref 5–15)
ANION GAP SERPL CALC-SCNC: 9 MMOL/L (ref 5–15)
APPEARANCE UR: ABNORMAL
APPEARANCE UR: ABNORMAL
APTT PPP: 28.7 SEC (ref 22.1–32)
APTT PPP: 30.5 SEC (ref 22.1–32)
ARTERIAL PATENCY WRIST A: YES
ARTERIAL PATENCY WRIST A: YES
AST SERPL-CCNC: 27 U/L (ref 15–37)
AST SERPL-CCNC: 45 U/L (ref 15–37)
BACTERIA URNS QL MICRO: NEGATIVE /HPF
BACTERIA URNS QL MICRO: NEGATIVE /HPF
BARBITURATES UR QL SCN: NEGATIVE
BASE DEFICIT BLD-SCNC: 2 MMOL/L
BASE DEFICIT BLDA-SCNC: 4.1 MMOL/L
BASOPHILS # BLD: 0 K/UL (ref 0–0.1)
BASOPHILS # BLD: 0 K/UL (ref 0–0.1)
BASOPHILS NFR BLD: 1 % (ref 0–1)
BASOPHILS NFR BLD: 1 % (ref 0–1)
BDY SITE: ABNORMAL
BDY SITE: ABNORMAL
BENZODIAZ UR QL: POSITIVE
BILIRUB SERPL-MCNC: 0.9 MG/DL (ref 0.2–1)
BILIRUB SERPL-MCNC: 0.9 MG/DL (ref 0.2–1)
BILIRUB UR QL: NEGATIVE
BILIRUB UR QL: NEGATIVE
BUN BLD-MCNC: 40 MG/DL (ref 9–20)
BUN SERPL-MCNC: 45 MG/DL (ref 6–20)
BUN SERPL-MCNC: 45 MG/DL (ref 6–20)
BUN SERPL-MCNC: 46 MG/DL (ref 6–20)
BUN/CREAT SERPL: 20 (ref 12–20)
BUN/CREAT SERPL: 23 (ref 12–20)
BUN/CREAT SERPL: 24 (ref 12–20)
CA-I BLD-MCNC: 0.95 MMOL/L (ref 1.12–1.32)
CA-I BLD-SCNC: 0.98 MMOL/L (ref 1.12–1.32)
CALCIUM SERPL-MCNC: 8.1 MG/DL (ref 8.5–10.1)
CALCIUM SERPL-MCNC: 8.9 MG/DL (ref 8.5–10.1)
CALCIUM SERPL-MCNC: 9.2 MG/DL (ref 8.5–10.1)
CANNABINOIDS UR QL SCN: NEGATIVE
CHLORIDE BLD-SCNC: 101 MMOL/L (ref 98–107)
CHLORIDE SERPL-SCNC: 102 MMOL/L (ref 97–108)
CHLORIDE SERPL-SCNC: 103 MMOL/L (ref 97–108)
CHLORIDE SERPL-SCNC: 105 MMOL/L (ref 97–108)
CO2 BLD-SCNC: 25 MMOL/L (ref 21–32)
CO2 SERPL-SCNC: 24 MMOL/L (ref 21–32)
CO2 SERPL-SCNC: 27 MMOL/L (ref 21–32)
CO2 SERPL-SCNC: 29 MMOL/L (ref 21–32)
COCAINE UR QL SCN: NEGATIVE
COLOR UR: ABNORMAL
COLOR UR: ABNORMAL
COMMENT, HOLDF: NORMAL
CREAT BLD-MCNC: 2.1 MG/DL (ref 0.6–1.3)
CREAT SERPL-MCNC: 1.86 MG/DL (ref 0.7–1.3)
CREAT SERPL-MCNC: 1.97 MG/DL (ref 0.7–1.3)
CREAT SERPL-MCNC: 2.26 MG/DL (ref 0.7–1.3)
D DIMER PPP FEU-MCNC: 3.99 MG/L FEU (ref 0–0.65)
DIFFERENTIAL METHOD BLD: ABNORMAL
DIFFERENTIAL METHOD BLD: ABNORMAL
DRUG SCRN COMMENT,DRGCM: ABNORMAL
EOSINOPHIL # BLD: 0.1 K/UL (ref 0–0.4)
EOSINOPHIL # BLD: 0.1 K/UL (ref 0–0.4)
EOSINOPHIL NFR BLD: 2 % (ref 0–7)
EOSINOPHIL NFR BLD: 3 % (ref 0–7)
EPAP/CPAP/PEEP, PAPEEP: 8
EPITH CASTS URNS QL MICRO: ABNORMAL /LPF
EPITH CASTS URNS QL MICRO: ABNORMAL /LPF
ERYTHROCYTE [DISTWIDTH] IN BLOOD BY AUTOMATED COUNT: 14.1 % (ref 11.5–14.5)
ERYTHROCYTE [DISTWIDTH] IN BLOOD BY AUTOMATED COUNT: 14.2 % (ref 11.5–14.5)
ERYTHROCYTE [DISTWIDTH] IN BLOOD BY AUTOMATED COUNT: 14.2 % (ref 11.5–14.5)
ERYTHROCYTE [SEDIMENTATION RATE] IN BLOOD: 47 MM/HR (ref 0–20)
FIO2 ON VENT: 100 %
GAS FLOW.O2 O2 DELIVERY SYS: ABNORMAL L/MIN
GAS FLOW.O2 SETTING OXYMISER: 12 BPM
GAS FLOW.O2 SETTING OXYMISER: 20 L/MIN
GLOBULIN SER CALC-MCNC: 3.8 G/DL (ref 2–4)
GLOBULIN SER CALC-MCNC: 3.8 G/DL (ref 2–4)
GLUCOSE BLD STRIP.AUTO-MCNC: 174 MG/DL (ref 65–100)
GLUCOSE BLD-MCNC: 151 MG/DL (ref 65–100)
GLUCOSE SERPL-MCNC: 145 MG/DL (ref 65–100)
GLUCOSE SERPL-MCNC: 148 MG/DL (ref 65–100)
GLUCOSE SERPL-MCNC: 82 MG/DL (ref 65–100)
GLUCOSE UR STRIP.AUTO-MCNC: NEGATIVE MG/DL
GLUCOSE UR STRIP.AUTO-MCNC: NEGATIVE MG/DL
GRAN CASTS URNS QL MICRO: ABNORMAL /LPF
GRAN CASTS URNS QL MICRO: ABNORMAL /LPF
HCO3 BLD-SCNC: 24.3 MMOL/L (ref 22–26)
HCO3 BLDA-SCNC: 23 MMOL/L (ref 22–26)
HCT VFR BLD AUTO: 30.4 % (ref 36.6–50.3)
HCT VFR BLD AUTO: 31.4 % (ref 36.6–50.3)
HCT VFR BLD AUTO: 32.8 % (ref 36.6–50.3)
HCT VFR BLD CALC: 32 % (ref 36.6–50.3)
HGB BLD-MCNC: 9.1 G/DL (ref 12.1–17)
HGB BLD-MCNC: 9.6 G/DL (ref 12.1–17)
HGB BLD-MCNC: 9.8 G/DL (ref 12.1–17)
HGB UR QL STRIP: ABNORMAL
HGB UR QL STRIP: NEGATIVE
IMM GRANULOCYTES # BLD AUTO: 0 K/UL (ref 0–0.04)
IMM GRANULOCYTES # BLD AUTO: 0.1 K/UL (ref 0–0.04)
IMM GRANULOCYTES NFR BLD AUTO: 0 % (ref 0–0.5)
IMM GRANULOCYTES NFR BLD AUTO: 2 % (ref 0–0.5)
INR PPP: 1.4 (ref 0.9–1.1)
INR PPP: 1.4 (ref 0.9–1.1)
KETONES UR QL STRIP.AUTO: ABNORMAL MG/DL
KETONES UR QL STRIP.AUTO: NEGATIVE MG/DL
LACTATE BLD-SCNC: 3.94 MMOL/L (ref 0.4–2)
LACTATE SERPL-SCNC: 1.2 MMOL/L (ref 0.4–2)
LEUKOCYTE ESTERASE UR QL STRIP.AUTO: NEGATIVE
LEUKOCYTE ESTERASE UR QL STRIP.AUTO: NEGATIVE
LYMPHOCYTES # BLD: 0.5 K/UL (ref 0.8–3.5)
LYMPHOCYTES # BLD: 0.8 K/UL (ref 0.8–3.5)
LYMPHOCYTES NFR BLD: 10 % (ref 12–49)
LYMPHOCYTES NFR BLD: 15 % (ref 12–49)
MAGNESIUM SERPL-MCNC: 1.9 MG/DL (ref 1.6–2.4)
MAGNESIUM SERPL-MCNC: 1.9 MG/DL (ref 1.6–2.4)
MCH RBC QN AUTO: 28.2 PG (ref 26–34)
MCH RBC QN AUTO: 28.3 PG (ref 26–34)
MCH RBC QN AUTO: 28.4 PG (ref 26–34)
MCHC RBC AUTO-ENTMCNC: 29.9 G/DL (ref 30–36.5)
MCHC RBC AUTO-ENTMCNC: 29.9 G/DL (ref 30–36.5)
MCHC RBC AUTO-ENTMCNC: 30.6 G/DL (ref 30–36.5)
MCV RBC AUTO: 92.4 FL (ref 80–99)
MCV RBC AUTO: 94.7 FL (ref 80–99)
MCV RBC AUTO: 95.1 FL (ref 80–99)
METHADONE UR QL: NEGATIVE
MONOCYTES # BLD: 0.4 K/UL (ref 0–1)
MONOCYTES # BLD: 0.5 K/UL (ref 0–1)
MONOCYTES NFR BLD: 10 % (ref 5–13)
MONOCYTES NFR BLD: 7 % (ref 5–13)
NEUTS SEG # BLD: 3.4 K/UL (ref 1.8–8)
NEUTS SEG # BLD: 4.2 K/UL (ref 1.8–8)
NEUTS SEG NFR BLD: 74 % (ref 32–75)
NEUTS SEG NFR BLD: 76 % (ref 32–75)
NITRITE UR QL STRIP.AUTO: NEGATIVE
NITRITE UR QL STRIP.AUTO: NEGATIVE
NRBC # BLD: 0 K/UL (ref 0–0.01)
NRBC BLD-RTO: 0 PER 100 WBC
O2/TOTAL GAS SETTING VFR VENT: 40 %
OPIATES UR QL: NEGATIVE
PCO2 BLD: 49.1 MMHG (ref 35–45)
PCO2 BLDA: 48 MMHG (ref 35–45)
PCP UR QL: NEGATIVE
PEEP RESPIRATORY: 5 CMH2O
PH BLD: 7.3 [PH] (ref 7.35–7.45)
PH BLDA: 7.29 [PH] (ref 7.35–7.45)
PH UR STRIP: 5 [PH] (ref 5–8)
PH UR STRIP: 5.5 [PH] (ref 5–8)
PHOSPHATE SERPL-MCNC: 3.9 MG/DL (ref 2.6–4.7)
PLATELET # BLD AUTO: 75 K/UL (ref 150–400)
PLATELET # BLD AUTO: 84 K/UL (ref 150–400)
PLATELET # BLD AUTO: 96 K/UL (ref 150–400)
PMV BLD AUTO: 10.9 FL (ref 8.9–12.9)
PMV BLD AUTO: 11.3 FL (ref 8.9–12.9)
PMV BLD AUTO: 11.6 FL (ref 8.9–12.9)
PO2 BLD: 111 MMHG (ref 80–100)
PO2 BLDA: 429 MMHG (ref 80–100)
POTASSIUM BLD-SCNC: 3.1 MMOL/L (ref 3.5–5.1)
POTASSIUM SERPL-SCNC: 3.2 MMOL/L (ref 3.5–5.1)
POTASSIUM SERPL-SCNC: 3.6 MMOL/L (ref 3.5–5.1)
POTASSIUM SERPL-SCNC: 3.8 MMOL/L (ref 3.5–5.1)
PROT SERPL-MCNC: 7.2 G/DL (ref 6.4–8.2)
PROT SERPL-MCNC: 7.4 G/DL (ref 6.4–8.2)
PROT UR STRIP-MCNC: 100 MG/DL
PROT UR STRIP-MCNC: 30 MG/DL
PROTHROMBIN TIME: 13.9 SEC (ref 9–11.1)
PROTHROMBIN TIME: 13.9 SEC (ref 9–11.1)
RBC # BLD AUTO: 3.21 M/UL (ref 4.1–5.7)
RBC # BLD AUTO: 3.4 M/UL (ref 4.1–5.7)
RBC # BLD AUTO: 3.45 M/UL (ref 4.1–5.7)
RBC #/AREA URNS HPF: ABNORMAL /HPF (ref 0–5)
RBC #/AREA URNS HPF: ABNORMAL /HPF (ref 0–5)
RBC MORPH BLD: ABNORMAL
SAMPLES BEING HELD,HOLD: NORMAL
SAO2 % BLD: 100 % (ref 92–97)
SAO2 % BLD: 98 % (ref 92–97)
SAO2% DEVICE SAO2% SENSOR NAME: ABNORMAL
SERVICE CMNT-IMP: ABNORMAL
SERVICE CMNT-IMP: ABNORMAL
SODIUM BLD-SCNC: 141 MMOL/L (ref 136–145)
SODIUM SERPL-SCNC: 138 MMOL/L (ref 136–145)
SODIUM SERPL-SCNC: 142 MMOL/L (ref 136–145)
SODIUM SERPL-SCNC: 142 MMOL/L (ref 136–145)
SP GR UR REFRACTOMETRY: 1.01 (ref 1–1.03)
SP GR UR REFRACTOMETRY: 1.01 (ref 1–1.03)
SPECIMEN SITE: ABNORMAL
SPECIMEN TYPE: ABNORMAL
THERAPEUTIC RANGE,PTTT: NORMAL SECS (ref 58–77)
THERAPEUTIC RANGE,PTTT: NORMAL SECS (ref 58–77)
TOTAL RESP. RATE, ITRR: 18
TROPONIN I BLD-MCNC: 0.05 NG/ML (ref 0–0.08)
TROPONIN I SERPL-MCNC: 0.07 NG/ML
TROPONIN I SERPL-MCNC: 0.82 NG/ML
TSH SERPL DL<=0.05 MIU/L-ACNC: 4.1 UIU/ML (ref 0.36–3.74)
UA: UC IF INDICATED,UAUC: ABNORMAL
URATE SERPL-MCNC: 4.5 MG/DL (ref 3.5–7.2)
UROBILINOGEN UR QL STRIP.AUTO: 0.2 EU/DL (ref 0.2–1)
UROBILINOGEN UR QL STRIP.AUTO: 0.2 EU/DL (ref 0.2–1)
VENTILATION MODE VENT: ABNORMAL
VENTILATION MODE VENT: ABNORMAL
VOLUME CONTROL IVLC: YES
VT SETTING VENT: 450 ML
VT SETTING VENT: 500 ML
WBC # BLD AUTO: 4.5 K/UL (ref 4.1–11.1)
WBC # BLD AUTO: 5.7 K/UL (ref 4.1–11.1)
WBC # BLD AUTO: 7.6 K/UL (ref 4.1–11.1)
WBC CASTS URNS QL MICRO: ABNORMAL /LPF
WBC URNS QL MICRO: ABNORMAL /HPF (ref 0–4)
WBC URNS QL MICRO: ABNORMAL /HPF (ref 0–4)

## 2019-02-04 PROCEDURE — 81001 URINALYSIS AUTO W/SCOPE: CPT

## 2019-02-04 PROCEDURE — 84484 ASSAY OF TROPONIN QUANT: CPT

## 2019-02-04 PROCEDURE — 74011250636 HC RX REV CODE- 250/636: Performed by: INTERNAL MEDICINE

## 2019-02-04 PROCEDURE — 80307 DRUG TEST PRSMV CHEM ANLYZR: CPT

## 2019-02-04 PROCEDURE — 83735 ASSAY OF MAGNESIUM: CPT

## 2019-02-04 PROCEDURE — 74011250636 HC RX REV CODE- 250/636

## 2019-02-04 PROCEDURE — 80047 BASIC METABLC PNL IONIZED CA: CPT

## 2019-02-04 PROCEDURE — 80053 COMPREHEN METABOLIC PANEL: CPT

## 2019-02-04 PROCEDURE — 36415 COLL VENOUS BLD VENIPUNCTURE: CPT

## 2019-02-04 PROCEDURE — 5A1945Z RESPIRATORY VENTILATION, 24-96 CONSECUTIVE HOURS: ICD-10-PCS | Performed by: HOSPITALIST

## 2019-02-04 PROCEDURE — 77030038203 HC BLD LARYN DISP STOR -B

## 2019-02-04 PROCEDURE — 75810000137 HC PLCMT CENT VENOUS CATH

## 2019-02-04 PROCEDURE — 74011250636 HC RX REV CODE- 250/636: Performed by: FAMILY MEDICINE

## 2019-02-04 PROCEDURE — 74011000258 HC RX REV CODE- 258: Performed by: FAMILY MEDICINE

## 2019-02-04 PROCEDURE — 71045 X-RAY EXAM CHEST 1 VIEW: CPT

## 2019-02-04 PROCEDURE — 94002 VENT MGMT INPAT INIT DAY: CPT

## 2019-02-04 PROCEDURE — 77030018729 HC ELECTRD DEFIB PAD CARD -B

## 2019-02-04 PROCEDURE — 77030032490 HC SLV COMPR SCD KNE COVD -B

## 2019-02-04 PROCEDURE — 74011250636 HC RX REV CODE- 250/636: Performed by: EMERGENCY MEDICINE

## 2019-02-04 PROCEDURE — 83605 ASSAY OF LACTIC ACID: CPT

## 2019-02-04 PROCEDURE — 65620000000 HC RM CCU GENERAL

## 2019-02-04 PROCEDURE — 80048 BASIC METABOLIC PNL TOTAL CA: CPT

## 2019-02-04 PROCEDURE — 84443 ASSAY THYROID STIM HORMONE: CPT

## 2019-02-04 PROCEDURE — 84550 ASSAY OF BLOOD/URIC ACID: CPT

## 2019-02-04 PROCEDURE — 85025 COMPLETE CBC W/AUTO DIFF WBC: CPT

## 2019-02-04 PROCEDURE — 87086 URINE CULTURE/COLONY COUNT: CPT

## 2019-02-04 PROCEDURE — 96374 THER/PROPH/DIAG INJ IV PUSH: CPT

## 2019-02-04 PROCEDURE — 74018 RADEX ABDOMEN 1 VIEW: CPT

## 2019-02-04 PROCEDURE — 92950 HEART/LUNG RESUSCITATION CPR: CPT

## 2019-02-04 PROCEDURE — 31500 INSERT EMERGENCY AIRWAY: CPT

## 2019-02-04 PROCEDURE — 77030037200 HC CATH CV HEAT EXCH QTRO ZOLL -G1

## 2019-02-04 PROCEDURE — 84100 ASSAY OF PHOSPHORUS: CPT

## 2019-02-04 PROCEDURE — 85610 PROTHROMBIN TIME: CPT

## 2019-02-04 PROCEDURE — 36600 WITHDRAWAL OF ARTERIAL BLOOD: CPT

## 2019-02-04 PROCEDURE — 82962 GLUCOSE BLOOD TEST: CPT

## 2019-02-04 PROCEDURE — 85027 COMPLETE CBC AUTOMATED: CPT

## 2019-02-04 PROCEDURE — 93005 ELECTROCARDIOGRAM TRACING: CPT

## 2019-02-04 PROCEDURE — 82803 BLOOD GASES ANY COMBINATION: CPT

## 2019-02-04 PROCEDURE — 95816 EEG AWAKE AND DROWSY: CPT | Performed by: INTERNAL MEDICINE

## 2019-02-04 PROCEDURE — 85730 THROMBOPLASTIN TIME PARTIAL: CPT

## 2019-02-04 PROCEDURE — 85652 RBC SED RATE AUTOMATED: CPT

## 2019-02-04 PROCEDURE — 74011250637 HC RX REV CODE- 250/637: Performed by: INTERNAL MEDICINE

## 2019-02-04 PROCEDURE — 85379 FIBRIN DEGRADATION QUANT: CPT

## 2019-02-04 PROCEDURE — 87040 BLOOD CULTURE FOR BACTERIA: CPT

## 2019-02-04 PROCEDURE — 74011000250 HC RX REV CODE- 250: Performed by: EMERGENCY MEDICINE

## 2019-02-04 PROCEDURE — 51702 INSERT TEMP BLADDER CATH: CPT

## 2019-02-04 PROCEDURE — 77030008683 HC TU ET CUF COVD -A

## 2019-02-04 PROCEDURE — 93306 TTE W/DOPPLER COMPLETE: CPT

## 2019-02-04 PROCEDURE — 99285 EMERGENCY DEPT VISIT HI MDM: CPT

## 2019-02-04 PROCEDURE — 70450 CT HEAD/BRAIN W/O DYE: CPT

## 2019-02-04 PROCEDURE — 77030005401 HC CATH RAD ARRO -A

## 2019-02-04 PROCEDURE — 77030034848

## 2019-02-04 RX ORDER — SODIUM CHLORIDE 450 MG/100ML
75 INJECTION, SOLUTION INTRAVENOUS CONTINUOUS
Status: DISCONTINUED | OUTPATIENT
Start: 2019-02-04 | End: 2019-02-06

## 2019-02-04 RX ORDER — ETOMIDATE 2 MG/ML
INJECTION INTRAVENOUS
Status: COMPLETED | OUTPATIENT
Start: 2019-02-04 | End: 2019-02-04

## 2019-02-04 RX ORDER — PHENYLEPHRINE HYDROCHLORIDE 10 MG/ML
INJECTION INTRAVENOUS
Status: COMPLETED | OUTPATIENT
Start: 2019-02-04 | End: 2019-02-04

## 2019-02-04 RX ORDER — SUCCINYLCHOLINE CHLORIDE 20 MG/ML
INJECTION INTRAMUSCULAR; INTRAVENOUS
Status: COMPLETED | OUTPATIENT
Start: 2019-02-04 | End: 2019-02-04

## 2019-02-04 RX ORDER — SODIUM CHLORIDE 0.9 % (FLUSH) 0.9 %
5-40 SYRINGE (ML) INJECTION AS NEEDED
Status: DISCONTINUED | OUTPATIENT
Start: 2019-02-04 | End: 2019-02-22 | Stop reason: HOSPADM

## 2019-02-04 RX ORDER — MIDAZOLAM HYDROCHLORIDE 1 MG/ML
INJECTION, SOLUTION INTRAMUSCULAR; INTRAVENOUS
Status: COMPLETED
Start: 2019-02-04 | End: 2019-02-04

## 2019-02-04 RX ORDER — SODIUM CHLORIDE 0.9 % (FLUSH) 0.9 %
5-40 SYRINGE (ML) INJECTION EVERY 8 HOURS
Status: DISCONTINUED | OUTPATIENT
Start: 2019-02-04 | End: 2019-02-22 | Stop reason: HOSPADM

## 2019-02-04 RX ORDER — MIDAZOLAM HYDROCHLORIDE 1 MG/ML
INJECTION, SOLUTION INTRAMUSCULAR; INTRAVENOUS
Status: COMPLETED | OUTPATIENT
Start: 2019-02-04 | End: 2019-02-04

## 2019-02-04 RX ORDER — PROPOFOL 10 MG/ML
0-50 VIAL (ML) INTRAVENOUS
Status: DISCONTINUED | OUTPATIENT
Start: 2019-02-04 | End: 2019-02-06

## 2019-02-04 RX ORDER — DEXTROSE 50 % IN WATER (D50W) INTRAVENOUS SYRINGE
25-50 AS NEEDED
Status: DISCONTINUED | OUTPATIENT
Start: 2019-02-04 | End: 2019-02-22 | Stop reason: HOSPADM

## 2019-02-04 RX ORDER — SODIUM CHLORIDE 9 MG/ML
3 INJECTION, SOLUTION INTRAVENOUS CONTINUOUS
Status: DISCONTINUED | OUTPATIENT
Start: 2019-02-04 | End: 2019-02-06

## 2019-02-04 RX ORDER — MAGNESIUM SULFATE HEPTAHYDRATE 40 MG/ML
2 INJECTION, SOLUTION INTRAVENOUS
Status: DISPENSED | OUTPATIENT
Start: 2019-02-04 | End: 2019-02-04

## 2019-02-04 RX ORDER — INSULIN LISPRO 100 [IU]/ML
INJECTION, SOLUTION INTRAVENOUS; SUBCUTANEOUS EVERY 6 HOURS
Status: DISCONTINUED | OUTPATIENT
Start: 2019-02-04 | End: 2019-02-18

## 2019-02-04 RX ORDER — SODIUM CHLORIDE 9 MG/ML
5 INJECTION, SOLUTION INTRAVENOUS CONTINUOUS
Status: DISCONTINUED | OUTPATIENT
Start: 2019-02-04 | End: 2019-02-13

## 2019-02-04 RX ORDER — ACETAMINOPHEN 650 MG/1
650 SUPPOSITORY RECTAL
Status: DISCONTINUED | OUTPATIENT
Start: 2019-02-04 | End: 2019-02-06

## 2019-02-04 RX ORDER — CHLORHEXIDINE GLUCONATE 1.2 MG/ML
15 RINSE ORAL EVERY 12 HOURS
Status: DISCONTINUED | OUTPATIENT
Start: 2019-02-04 | End: 2019-02-11

## 2019-02-04 RX ORDER — CHLORHEXIDINE GLUCONATE 1.2 MG/ML
15 RINSE ORAL 2 TIMES DAILY
Status: DISCONTINUED | OUTPATIENT
Start: 2019-02-04 | End: 2019-02-04 | Stop reason: SDUPTHER

## 2019-02-04 RX ORDER — SODIUM CHLORIDE 0.9 % (FLUSH) 0.9 %
5-10 SYRINGE (ML) INJECTION AS NEEDED
Status: ACTIVE | OUTPATIENT
Start: 2019-02-04 | End: 2019-02-05

## 2019-02-04 RX ORDER — MIDAZOLAM HYDROCHLORIDE 1 MG/ML
5 INJECTION, SOLUTION INTRAMUSCULAR; INTRAVENOUS
Status: COMPLETED | OUTPATIENT
Start: 2019-02-04 | End: 2019-02-04

## 2019-02-04 RX ORDER — MAGNESIUM SULFATE 100 %
4 CRYSTALS MISCELLANEOUS AS NEEDED
Status: DISCONTINUED | OUTPATIENT
Start: 2019-02-04 | End: 2019-02-22 | Stop reason: HOSPADM

## 2019-02-04 RX ORDER — MAGNESIUM SULFATE HEPTAHYDRATE 40 MG/ML
2 INJECTION, SOLUTION INTRAVENOUS
Status: COMPLETED | OUTPATIENT
Start: 2019-02-04 | End: 2019-02-05

## 2019-02-04 RX ORDER — SODIUM CHLORIDE 9 MG/ML
INJECTION, SOLUTION INTRAVENOUS
Status: COMPLETED | OUTPATIENT
Start: 2019-02-04 | End: 2019-02-04

## 2019-02-04 RX ADMIN — ETOMIDATE 25 MG: 2 INJECTION, SOLUTION INTRAVENOUS at 12:16

## 2019-02-04 RX ADMIN — DIPHENHYDRAMINE HYDROCHLORIDE 25 MG: 25 CAPSULE ORAL at 11:29

## 2019-02-04 RX ADMIN — PROPOFOL 25 MCG/KG/MIN: 10 INJECTION, EMULSION INTRAVENOUS at 17:18

## 2019-02-04 RX ADMIN — SODIUM CHLORIDE 75 ML/HR: 450 INJECTION, SOLUTION INTRAVENOUS at 17:39

## 2019-02-04 RX ADMIN — PROPOFOL 50 MCG/KG/MIN: 10 INJECTION, EMULSION INTRAVENOUS at 21:06

## 2019-02-04 RX ADMIN — Medication 10 ML: at 20:37

## 2019-02-04 RX ADMIN — MAGNESIUM SULFATE HEPTAHYDRATE 2 G: 40 INJECTION, SOLUTION INTRAVENOUS at 20:36

## 2019-02-04 RX ADMIN — SODIUM CHLORIDE 3 ML/HR: 900 INJECTION, SOLUTION INTRAVENOUS at 20:31

## 2019-02-04 RX ADMIN — ACETAMINOPHEN 650 MG: 325 TABLET ORAL at 11:29

## 2019-02-04 RX ADMIN — Medication 10 ML: at 20:38

## 2019-02-04 RX ADMIN — PHENYLEPHRINE HYDROCHLORIDE 25 MCG/MIN: 10 INJECTION INTRAVENOUS at 21:04

## 2019-02-04 RX ADMIN — Medication 50 MCG/HR: at 20:00

## 2019-02-04 RX ADMIN — Medication 10 ML: at 17:45

## 2019-02-04 RX ADMIN — MIDAZOLAM 5 MG: 1 INJECTION INTRAMUSCULAR; INTRAVENOUS at 12:34

## 2019-02-04 RX ADMIN — PROPOFOL 45 MCG/KG/MIN: 10 INJECTION, EMULSION INTRAVENOUS at 19:50

## 2019-02-04 RX ADMIN — PHENYLEPHRINE HYDROCHLORIDE 5 MCG/MIN: 10 INJECTION INTRAVENOUS at 15:29

## 2019-02-04 RX ADMIN — SODIUM CHLORIDE 1000 ML: 900 INJECTION, SOLUTION INTRAVENOUS at 12:22

## 2019-02-04 RX ADMIN — MIDAZOLAM HYDROCHLORIDE 5 MG: 1 INJECTION, SOLUTION INTRAMUSCULAR; INTRAVENOUS at 12:58

## 2019-02-04 RX ADMIN — MAGNESIUM SULFATE HEPTAHYDRATE 2 G: 40 INJECTION, SOLUTION INTRAVENOUS at 22:00

## 2019-02-04 RX ADMIN — FAMOTIDINE 20 MG: 20 TABLET, FILM COATED ORAL at 11:29

## 2019-02-04 RX ADMIN — SODIUM CHLORIDE 5 ML/HR: 900 INJECTION, SOLUTION INTRAVENOUS at 20:31

## 2019-02-04 RX ADMIN — SODIUM CHLORIDE 1000 ML: 900 INJECTION, SOLUTION INTRAVENOUS at 12:44

## 2019-02-04 RX ADMIN — METHYLPREDNISOLONE SODIUM SUCCINATE 125 MG: 125 INJECTION, POWDER, FOR SOLUTION INTRAMUSCULAR; INTRAVENOUS at 11:37

## 2019-02-04 RX ADMIN — MIDAZOLAM 5 MG: 1 INJECTION INTRAMUSCULAR; INTRAVENOUS at 12:58

## 2019-02-04 RX ADMIN — Medication 10 ML: at 11:33

## 2019-02-04 RX ADMIN — PHENYLEPHRINE HYDROCHLORIDE 100 MCG: 10 INJECTION INTRAVENOUS at 12:28

## 2019-02-04 RX ADMIN — SUCCINYLCHOLINE CHLORIDE 100 MG: 20 INJECTION, SOLUTION INTRAMUSCULAR; INTRAVENOUS; PARENTERAL at 12:16

## 2019-02-04 RX ADMIN — CHLORHEXIDINE GLUCONATE 15 ML: 1.2 RINSE ORAL at 20:36

## 2019-02-04 NOTE — ED NOTES
CCU at bedside; will transport to CCU when Hospitalist enters ADT order; assessment remains unchanged; family updated on plan of care and patient status, both daughter and wife are nurses and understand the situation; PIVs flushed with blood return; all ports on ICY have positive blood return; CCU to draw PBCs

## 2019-02-04 NOTE — Clinical Note
Left chest prepped with ChloraPrep Wet prep solution applied at: 724. Wet prep solution dried at: 728. Wet prep elapsed drying time: 4 mins.

## 2019-02-04 NOTE — Clinical Note
TRANSFER - OUT REPORT:  
 
Verbal report given to: Corby Caldera . Report consisted of patient's Situation, Background, Assessment and  
Recommendations(SBAR). Opportunity for questions and clarification was provided. Patient transported with a Registered Nurse, Monitor and Oxygen. Patient transported to: CCU.

## 2019-02-04 NOTE — ED NOTES
[address] [place] [address] in ED. Bedside and Verbal shift change report given to Dalton Kerr (oncoming nurse) by Nicko Urbina (offgoing nurse). Report included the following information SBAR, Kardex, ED Summary, MAR, Recent Results and Cardiac Rhythm Afib.

## 2019-02-04 NOTE — PROGRESS NOTES
Recd consult for renal failure Chart revd. D/W Rn 
OOH PEA arrest post Gout med infusion Making urine, Cr slight better and not too far from baseline. Continue supportive care. Consult to follow in jarett Montez MD 
[address] [facility]

## 2019-02-04 NOTE — ED NOTES
Wife given pants, underwear, shoes, wallet, medic alert bracelet, and upper dentures. Wedding band to left hand remains with patient.

## 2019-02-04 NOTE — Clinical Note
A venogram was performed on the left subclavian. Injected with single hand injection. Injection volume  = 10 mL.

## 2019-02-04 NOTE — Clinical Note
Left chest prepped with ChloraPrep Wet prep solution applied at: 719. Wet prep solution dried at: 723. Wet prep elapsed drying time: 4 mins.

## 2019-02-04 NOTE — Clinical Note
Patient is on Propofol at 50 mcg/kg/min, Phenylphedrine 30 mcg/min, Isupril 1 mcg/min prior to coming into the room.

## 2019-02-04 NOTE — Clinical Note
Right femoral vein. Accessed successfully. using fluoro guidance. Number of attempts =  1.  Dr. Frida Subramanian

## 2019-02-04 NOTE — PROGRESS NOTES
Spiritual Care Assessment/Progress Note [address] 
 
 
NAME: Shani Lindsay      MRN: [id number] AGE: 72 y.o. SEX: male Christian Affiliation: Scientology Language: Trenda Yoruba 2/4/2019     Total Time (in minutes): 88  
 
Spiritual Assessment begun in [facility] EMERGENCY DEPT through conversation with: 
  
    []Patient        [x] Family    [] Friend(s) Reason for Consult: Request by staff, Crisis Spiritual beliefs: (Please include comment if needed) [x] Identifies with a azul tradition: Scientology     
   [] Supported by a azul community:        
   [] Claims no spiritual orientation:       
   [] Seeking spiritual identity:            
   [] Adheres to an individual form of spirituality:       
   [] Not able to assess:                   
 
    
Identified resources for coping:  
   [] Prayer                           
   [] Music                  [] Guided Imagery [x] Family/friends                 [] Pet visits [] Devotional reading                         [] Unknown 
   [] Other:                                        
 
 
Interventions offered during this visit: (See comments for more details) Family/Friend(s): Affirmation of emotions/emotional suffering, Affirmation of azul, Catharsis/review of pertinent events in supportive environment, Coping skills reviewed/reinforced, Iconic (affirming the presence of God/Higher Power), Normalization of emotional/spiritual concerns(Wife, daughter, ANNETTE) Plan of Care: 
 
 [x] Support spiritual and/or cultural needs  
 [] Support AMD and/or advance care planning process    
 [] Support grieving process 
 [] Coordinate Rites and/or Rituals  
 [] Coordination with community clergy [] No spiritual needs identified at this time 
 [] Detailed Plan of Care below (See Comments)  [] Make referral to Music Therapy 
[] Make referral to Pet Therapy    
[] Make referral to Addiction services 
[] Make referral to [facility] [] Make referral to Spiritual Care Partner 
[] No future visits requested       
[x] Follow up visits as needed Comments:  requested, patient brought in by ambulance after [facility], family is on their way to hospital.  Met daughter in consultation room, patient's wife and son-in-law (ANNETTE) arrived shortly after. Provided spiritual presence, listening, and care as they gave voice to their thoughts, feelings, emotions, and concerns. Expressing feelings of anticipatory grief, but hopeful. Provided spiritual and emotional care as patient was prepared for transport to [facility] [place] [facility]. Family appeared comforted as a result of this care and expressed gratitude for this care. Visited by Rev. Naman Funez, [facility]  paging service: [phone])

## 2019-02-04 NOTE — PROGRESS NOTES
1549 Pt arrived to CCU. TRANSFER - IN REPORT: Verbal report received from Puja Javier RN(name) on Gordon Denson  being received from ER (unit) for routine progression of care  Report consisted of patients Situation, Background, Assessment and Recommendations(SBAR). Information from the following report(s) SBAR, ED Summary, Intake/Output, MAR, Recent Results and Cardiac Rhythm Afib was reviewed with the receiving nurse. Opportunity for questions and clarification was provided. Assessment completed upon patients arrival to unit and care assumed. Primary Nurse Julieta Landry RN and Andree Juárez RN performed a dual skin assessment on this patient No impairment noted Dom score is 16 Patient resting on Total Care Sport bed. Patient: Gordon Denson MRN: [id number]    Age: 72 y.o. YOB: 1945 Room/Bed: [address] [place] [address] Consent for video monitoring obtained after the below has been explained to the patient/family on 2/4/2019: 
1. They are being monitored continuously in an effort to promote their safety. 2. That there may be times when the camera will be discontinued to provide care to me to ensure my dignity, such as during bathing or any activity that risks me being exposed. 3. They have the right to opt out of having this surveillance monitoring at any time. 4. It has been explained to the patient/family and they understand that the hospital does not maintain any recording of this surveillance monitoring. Julieta Landry RN 
 
3735 Upon arrival to unit, Bladder temp 35.1. Pt in AFib. Fine twitching noted with tongue and lips 1600 Consent obtained for Arterial Line Placement 1606 Intravascular cooling started 1626 EEG Tech at bedside 1640 Goal Temp Achieved. Bladder temp 33.8 
 
1930 Bedside shift change report given to Areli Solomon (oncoming nurse) by Antonella Hill (offgoing nurse).  Report included the following information SBAR, Intake/Output, MAR, Recent Results and Cardiac Rhythm Afib.

## 2019-02-04 NOTE — PROGRESS NOTES
Spiritual Care Assessment/Progress Note [address] 
 
 
NAME: Elan Flores      MRN: [id number] AGE: 72 y.o. SEX: male Gnosticist Affiliation: Amish Language: [place] 2/4/2019     Total Time (in minutes): 15 Spiritual Assessment begun in [address] [facility] [address] DEP through conversation with: 
  
    []Patient        [x] Family    [] Friend(s) Reason for Consult: Other (comment)(Code Ice) Spiritual beliefs: (Please include comment if needed) [x] Identifies with a azul tradition:     
   [] Supported by a azul community:        
   [] Claims no spiritual orientation:       
   [] Seeking spiritual identity:            
   [] Adheres to an individual form of spirituality:       
   [] Not able to assess:                   
 
    
Identified resources for coping:  
   [x] Prayer                           
   [] Music                  [] Guided Imagery [x] Family/friends                 [] Pet visits [] Devotional reading                         [] Unknown 
   [] Other:                                       
 
 
Interventions offered during this visit: (See comments for more details) Family/Friend(s): Affirmation of emotions/emotional suffering, Prayer (assurance of) Plan of Care: 
 
 [x] Support spiritual and/or cultural needs  
 [] Support AMD and/or advance care planning process    
 [] Support grieving process 
 [] Coordinate Rites and/or Rituals  
 [] Coordination with community clergy [] No spiritual needs identified at this time 
 [] Detailed Plan of Care below (See Comments)  [] Make referral to Music Therapy 
[] Make referral to Pet Therapy    
[] Make referral to Addiction services 
[] Make referral to [facility] 
[] Make referral to Spiritual Care Partner 
[] No future visits requested       
[x] Follow up visits as needed Comments:Responded to [facility] in ER 12. Consulted with wife of pt. . Wife of pt asked this  to look out for her daughter who was to arrive soon.  met daughter of pt in the ER waiting area and escorted her to pt's room. Family asked to be kept in prayer. Provided support to patient/family. Advised of  Availability. Chaplains will follow as able and/or needed. Radha Lopez  Intern, MDiv 
[phone])

## 2019-02-04 NOTE — PROGRESS NOTES
[facility] Progress Note Date: 2019 Name: Nilesh Thompson MRN: [id number] : 1945 
 
1115. Mr. Aditya Rouse Arrived ambulatory and in no distress for Krystexxa. Assessment was completed, no acute issues at this time, no new complaints voiced. 24 G PIV established to L arm without difficulty, + blood return. Patient's uric acid went from 2.0 on 19 to 7.2 on 19- Dr. Wendi Telles notified. Orders received to draw a uric acid level prior to and after infusion. Medications given: 
Tylenol PO Pepcid PO Benadryl PO Solumedrol IV  
 
1137. Solumedrol 125 mg/10ml administered. With 5 ml remaining in syringe of solumedrol, patient complained of \"feeling a little woozy. \" Instructed patient to lay back in the bed, however, patient began falling forward and was unresponsive. Patient rigid. Called for help and BLS protocol initiated. 1141.  911 called. During continued CPR, AED advised 'no shock.' 
1147. Dr. Wendi Telles called and notified of patient's condition. 1149. EMS arrived. 1155. [facility] [place] [facility] [place]. 1203. Wife notified of patient's condition. 1204. Cardiologist, Dr. Josh Ji notified of patient's condition per patient's wife's request.  
 
 
 
Mr. Jiang Stage vitals were reviewed. Patient Vitals for the past 12 hrs: 
 Temp Pulse Resp BP  
19 1112 96.4 °F (35.8 °C) 91 18 118/60 Jewel Whitney RN 2019

## 2019-02-04 NOTE — ED NOTES
TRANSFER - OUT REPORT: 
 
Verbal report given to Jeni Marquez(name) on Priscilla Orozco  being transferred to [facility] ED(unit) for urgent transfer Report consisted of patients Situation, Background, Assessment and  
Recommendations(SBAR). Information from the following report(s) SBAR, Kardex, ED Summary, MAR, Recent Results and Cardiac Rhythm Afib was reviewed with the receiving nurse. Lines:  
Peripheral IV 02/04/19 Right Antecubital (Active) Site Assessment Clean, dry, & intact 2/4/2019 12:15 PM  
Phlebitis Assessment 0 2/4/2019 12:15 PM  
Infiltration Assessment 0 2/4/2019 12:15 PM  
Dressing Status Clean, dry, & intact 2/4/2019 12:15 PM  
Hub Color/Line Status Green 2/4/2019 12:15 PM  
   
Peripheral IV 02/04/19 Left Antecubital (Active) Site Assessment Clean, dry, & intact 2/4/2019 12:15 PM  
Phlebitis Assessment 0 2/4/2019 12:15 PM  
Infiltration Assessment 0 2/4/2019 12:15 PM  
Dressing Status Clean, dry, & intact; New 2/4/2019 12:15 PM  
Hub Color/Line Status Green 2/4/2019 12:15 PM  
  
 
Opportunity for questions and clarification was provided. Patient transported with: 
 Monitor O2 @ 15 liters Patient-specific medications from Pharmacy Registered Nurse Tech Pt intubated

## 2019-02-04 NOTE — CONSULTS
S Cardiology Consult Note    Date of consult:  02/04/19  Date of admission: 2/4/2019  Primary Cardiologist: Dr Sid Saleem  Physician Requesting consult: Dr Danae Klein / Reason for consult: Cardiac arrest, hypothermia protocol    History of the presenting illness:  Volodymyr Melendez is a 72 y.o. who was transferred to [facility] today for management post cardiac arrest  He was over at [facility] today for an OP infusion related to gout. He had already had about 7-8 infusions with no issue. Had a syncopal episode with the last one, and today had a PEA cardiac arrest. I do not have a rhythm strip from the event. Apparently he has chronic a.fib and was in a.fib throughout this episode. He had CPR for <10mins per chart review but was non-responsive, so a code ICE was called. He has been hemodynamically stable    Past Medical History:   Diagnosis Date    Acute encephalopathy 1/14/2016    Anemia 5/11/2017    Atrial fibrillation (HCC)     Painter esophagus     CAD (coronary artery disease)     Diabetes (HCC)     Heart failure (HCC)     Cardiomyopathy, myocarditis    HTN, goal below 140/90     Retinopathy, diabetic, bilateral ([facility] [address] 3/11/2016    Stenosis of right carotid artery without cerebral infarction 1/18/2016    TIA (transient ischemic attack) 1/14/2016    Vitamin D deficiency 3/1/2016    Nephrology ordered and follow 2/22/16        Prior to Admission medications    Medication Sig Start Date End Date Taking? Authorizing Provider   levothyroxine (SYNTHROID) 25 mcg tablet TAKE 1 TABLET BY MOUTH EVERY DAY BEFORE BREAKFAST 1/22/19   Greg Morataya MD   insulin glargine (LANTUS,BASAGLAR) 100 unit/mL (3 mL) inpn 14 Units by SubCUTAneous route nightly. 1/21/19   Greg Morataya MD   leflunomide (ARAVA) 20 mg tablet Take 20 mg by mouth every evening. Provider, Historical   diphenhydrAMINE (BENADRYL) 25 mg capsule Take 25 mg by mouth every fourteen (14) days.  Premed for Car[place]allo Oil, Historical   colchicine 0.6 mg tablet Take 1 Tab by mouth daily as needed. 12/14/18   Rosario Fernandez MD   ELIQUIS 5 mg tablet TAKE 1 TABLET BY MOUTH TWICE A DAY 11/9/18   Provider, Historical   pegloticase (KRYSTEXXA) 8 mg/mL soln injection 8 mg by IntraVENous route Once every 2 weeks. Provider, Historical   digoxin (LANOXIN) 0.125 mg tablet Take 0.125 mg by mouth. Pt takes daily x 2 days, then skips a day and repeats this schedule. Provider, Historical   dextran 70-hypromellose (ARTIFICIAL TEARS,[id number],) ophthalmic solution Administer 1 Drop to both eyes as needed. Provider, Historical   loperamide (IMODIUM) 2 mg capsule Take 2 mg by mouth four (4) times daily as needed for Diarrhea. Provider, Historical   furosemide (LASIX) 40 mg tablet Take 40 mg by mouth daily. 5/10/16   Provider, Historical   hydrALAZINE (APRESOLINE) 25 mg tablet Take 25 mg by mouth three (3) times daily. 5/20/16   Provider, Historical   omeprazole (PRILOSEC) 20 mg capsule Take 20 mg by mouth nightly. Provider, Historical   multivitamin (ONE A DAY) tablet Take 1 Tab by mouth daily. Provider, Historical   pravastatin (PRAVACHOL) 20 mg tablet Take 1 Tab by mouth nightly. 1/18/16   Balaji Guajardo NP   carvedilol (COREG) 3.125 mg tablet Take 1 Tab by mouth two (2) times daily (with meals).  1/18/16   Balaji Guajardo NP       Family History   Problem Relation Age of Onset    Heart Failure Mother     Diabetes Father     No Known Problems Sister     Other Daughter         Ginger Gargs Other Daughter         Ginger Gargs Other Daughter         Hashimoto       Social History     Socioeconomic History    Marital status:      Spouse name: Not on file    Number of children: Not on file    Years of education: Not on file    Highest education level: Not on file   Social Needs    Financial resource strain: Not on file    Food insecurity - worry: Not on file    Food insecurity - inability: Not on file   [facility] Transportation needs - medical: Not on file   [employer] needs - non-medical: Not on file   Occupational History    Not on file   Tobacco Use    Smoking status: Never Smoker    Smokeless tobacco: Never Used   Substance and Sexual Activity    Alcohol use: Yes     Comment: 2 drinks/week, never a heavy drinker    Drug use: No    Sexual activity: Not Currently   Other Topics Concern    Not on file   Social History Narrative        Generally active, works out in the yard often       Review of Systems   Unable to perform ROS: Intubated       Visit Vitals  /64 (BP 1 Location: Left arm, BP Patient Position: At rest)   Pulse 94   Temp 95 °F (35 °C)   Resp 12   Ht 5' 10\" (1.778 m)   Wt 81.6 kg (179 lb 16 oz)   SpO2 100%   BMI 25.83 kg/m²     Physical Exam   Constitutional: He is well-developed, well-nourished, and in no distress. He is intubated. HENT:   Head: Normocephalic and atraumatic. Eyes: Conjunctivae are normal. No scleral icterus. Neck: No JVD present. Cardiovascular: Normal rate and normal heart sounds. An irregularly irregular rhythm present. Exam reveals no gallop. No murmur heard. Pulmonary/Chest: Effort normal and breath sounds normal. No stridor. He is intubated. No respiratory distress. He has no wheezes. Abdominal: Soft. He exhibits no distension. Musculoskeletal: Normal range of motion. He exhibits no deformity. Neurological:   Sedated, with some non-purposeful arm movements   Skin: Skin is warm and dry.    Psychiatric: Mood and affect normal.       Lab review:  BMP:   Lab Results   Component Value Date/Time     02/04/2019 04:08 PM    K 3.6 02/04/2019 04:08 PM     02/04/2019 04:08 PM    CO2 24 02/04/2019 04:08 PM    AGAP 9 02/04/2019 04:08 PM     (H) 02/04/2019 04:08 PM    BUN 46 (H) 02/04/2019 04:08 PM    CREA 1.97 (H) 02/04/2019 04:08 PM    GFRAA 41 (L) 02/04/2019 04:08 PM    GFRNA 34 (L) 02/04/2019 04:08 PM        CBC:  Lab Results   Component Value Date/Time    WBC 7.6 02/04/2019 04:08 PM    Hemoglobin (POC) 6.2 09/04/2018 02:17 PM    HGB 9.1 (L) 02/04/2019 04:08 PM    Hematocrit (POC) 32 (L) 02/04/2019 12:23 PM    HCT 30.4 (L) 02/04/2019 04:08 PM    PLATELET 75 (L) 36/75/7068 04:08 PM    MCV 94.7 02/04/2019 04:08 PM       All Cardiac Markers in the last 24 hours:    Lab Results   Component Value Date/Time    TROIQ 0.82 (H) 02/04/2019 04:08 PM       Data review:  EKG tracing personally reviewed: a.fib with lateral TW inversions - old    Echocardiogram: pending    Other imaging: CT head - no acute infarct or bleed    Assessment:    1. Cardiac arrest. PEA. Unclear etiology. ? Anaphylaxis but not sure to what agent    2. Possible anoxic brain injury    3. Positive troponin - likely from CPR    4. Chronic a.fib    5. CKD stage III    6. Anemia, probably from CKD    7. Gout    Recommendations / Plan:    Hypothermia protcol - cooled to 33deg C already. Maintain x 24hrs, then slowly rewarm as per protocol  Sedation as needed  Consults to Pulm, Nephrology, Neurology  Echo pending  Hold Eliquis for now. Signed:  Lucretia HUANG  [facility]  Interventional Cardiology  02/04/19

## 2019-02-04 NOTE — ED PROVIDER NOTES
72 y.o. male with past medical history significant for cardiomyopathy, CAD, chronic AFIB, diabetes, HTN, h/o TIA, stenosis of right carotid artery, retinopathy, encephalopathy, anemia, vitamin D deficiency, Painter esophagus, who presents to the ED via EMS accompanied by family members, with chief complaint of cardiac arrest. EMS personnel reports that they transferred the patient from [facility] ED who was intubated after cardiac arrest. Code ICE was called on arrival. Family reports that the patient was supposed to receive an infusion for gout today, but became unresponsive after the nurses gave the patient Pepcid PO and pushed Solumedrol prior to infusion. They deny that patient received infusion today. Family notes that patient has had similar episode ~1 month ago, where patient had a syncopal episode after receiving Pepcid and Solumedrol, but regained conciousness. They state that the patient may have had an allergic reaction to one of those medications. Note that the patient did not have any issue with medications with last \"5-6\" infusions prior to episode last month. Family notes that the patient had a \"normal cardiac output\" with Echocardiogram \"within the last month\". There are no other acute medical concerns at this time. Full history, physical exam, and ROS unable to be obtained due to:  intubated. Social hx: Negative for Tobacco use; Positive for EtOH use; Negative for Illicit Drug use PCP: Austin Grossman MD 
Cardiology: Kaylynn Leroy MD 
 
Note written by Macho Frey, as dictated by Jesus Alberto Wilhelm MD 3:08 PM 
 
 
The history is provided by a relative and medical records. The history is limited by the condition of the patient (intubated). No  was used. Past Medical History:  
Diagnosis Date  Acute encephalopathy 1/14/2016  Anemia 5/11/2017  Atrial fibrillation ([address]  Painter esophagus  CAD (coronary artery disease)  Diabetes ([address]  Heart failure [facility] Cardiomyopathy, myocarditis  HTN, goal below 140/90  Retinopathy, diabetic, bilateral ([facility] 3/11/2016  Stenosis of right carotid artery without cerebral infarction 1/18/2016  TIA (transient ischemic attack) 1/14/2016  Vitamin D deficiency 3/1/2016 Nephrology ordered and follow 2/22/16 Past Surgical History:  
Procedure Laterality Date  COLONOSCOPY N/A 6/23/2016 COLONOSCOPY performed by Juan A Ventura MD at [facility] ENDOSCOPY  COLONOSCOPY N/A 8/6/2018 COLONOSCOPY performed by Juan A Ventura MD at [facility] ENDOSCOPY  COLONOSCOPY N/A 8/30/2018 COLONOSCOPY performed by Georgie Bazzi MD at [address] HX ENDOSCOPY  06/27/2016 CAPSULE; normal small bowel  HX ENDOSCOPY  06/23/2016  
 upper endoscopy  HX UROLOGICAL  2013 Family History:  
Problem Relation Age of Onset  Heart Failure Mother  Diabetes Father  No Known Problems Sister  Other Daughter Janey Frees  Other Daughter Janey Frees  Other Daughter Janey Frees Social History Socioeconomic History  Marital status:  Spouse name: Not on file  Number of children: Not on file  Years of education: Not on file  Highest education level: Not on file Social Needs  Financial resource strain: Not on file  Food insecurity - worry: Not on file  Food insecurity - inability: Not on file  Transportation needs - medical: Not on file  Transportation needs - non-medical: Not on file Occupational History  Not on file Tobacco Use  Smoking status: Never Smoker  Smokeless tobacco: Never Used Substance and Sexual Activity  Alcohol use: Yes Comment: 2 drinks/week, never a heavy drinker  Drug use: No  
 Sexual activity: Not Currently Other Topics Concern  Not on file Social History Narrative  Generally active, works out in the yard often ALLERGIES: Patient has no known allergies. Review of Systems Unable to perform ROS: Intubated Vitals:  
 02/04/19 1457 02/04/19 1458 BP: 159/77 Pulse: 88 82 Resp: 13 12 SpO2: 100% 100% Physical Exam  
Constitutional: He appears well-developed and well-nourished. Intubated. Ice bags underneath the axilla and groin. HENT:  
Head: Normocephalic and atraumatic. Nose: Nose normal.  
Mouth/Throat: Oropharynx is clear and moist.  
Eyes: Conjunctivae and EOM are normal. Pupils are equal, round, and reactive to light. No scleral icterus. Neck: Normal range of motion. Neck supple. No JVD present. No tracheal deviation present. No thyromegaly present. No carotid bruits noted. Cardiovascular: Normal rate, regular rhythm, normal heart sounds and intact distal pulses. Exam reveals no gallop and no friction rub. No murmur heard. Pulmonary/Chest: Breath sounds normal. No respiratory distress. He has no wheezes. He has no rales. He exhibits no tenderness. Ventilated. Abdominal: Soft. Bowel sounds are normal. He exhibits no distension and no mass. There is no tenderness. There is no rebound and no guarding. Musculoskeletal: Normal range of motion. He exhibits no edema or tenderness. Lymphadenopathy:  
  He has no cervical adenopathy. Neurological: He has normal reflexes. He is unresponsive. Skin: Skin is warm and dry. No rash noted. No erythema. Bilateral feet appear dusky. Nursing note and vitals reviewed. Note written by Macho Ley, as dictated by Brandie Vazquez MD 3:08 PM 
 
MDM Number of Diagnoses or Management Options Amount and/or Complexity of Data Reviewed Decide to obtain previous medical records or to obtain history from someone other than the patient: yes Review and summarize past medical records: yes Discuss the patient with other providers: yes Risk of Complications, Morbidity, and/or Mortality Presenting problems: high Diagnostic procedures: low Management options: moderate Patient Progress Patient progress: stable Procedures CONSULT NOTE: 
3:41 PM Amira Gray MD spoke with Dr. Lupillo Brody MD, Consult for Cardiology. Discussed available diagnostic tests and clinical findings. Dr. Yousuf Frye reports that Dr. Prema Sarabia has been assigned to evaluate the patient. Patient was admitted to the ed with ice to groin and axillae. On vent. Unresponsive. Transfer from another facility to be admitted here. Cardiology and hospitalist are consulted for admission.

## 2019-02-04 NOTE — Clinical Note
TRANSFER - IN REPORT:  
 
Verbal report received from: CCU Nurse. Report consisted of patient's Situation, Background, Assessment and  
Recommendations(SBAR). Opportunity for questions and clarification was provided. Assessment completed upon patient's arrival to unit and care assumed. Patient transported with a Registered Nurse, [address] [place] [address] / Patient Care Tech, Monitor and Oxygen. Oxygen used for patient = @ 12 - 20 Liters.

## 2019-02-04 NOTE — PROGRESS NOTES
responded to Code ICE in ER 12.  spoke with staff who transported pt and he mentioned pt's wife Edith Arroyo was on her way to the hospital. Please contact [phone] [address] [facility] for further support.  follow up as needed. Lorenzo Ruiz, [facility] 
 [phone])

## 2019-02-04 NOTE — ED TRIAGE NOTES
Patient arrives by Critical Care Transport from [facility]. Patient was at their infusion center and post solumedrol administration, patient had a witnessed arrest. CPR initiated immediately; on EMS arrival, CPR continued; PEA on monitor; 1 mg epinephrine administered and patient regained pulse. Patient was admitted last month after receiving similar medications at the infusion center and immediately collapsed and was syncopal. 
 
Arrives with #18 gauge in LEFT and RIGHT AC & ICY catheter to LEFT femoral; temperature sensing dsouza & ETT @ [address] [facility] Per sending RN, patient responds to extreme noxious stimuli (ABG, dsouza) but no response to nail bed pressure; + radial and pedal pulse; CT of Head in process at time of report, not resulted Recent vital signs as of report (4277)  97.2, 91, 158/88, 18 & 100% on Ventilator Head CT negative

## 2019-02-04 NOTE — PROGRESS NOTES
Went to see pt for code ice. Spoke with ER Dr. Trsih James who communicated that pt's primary cardiologist Dr. Anibal Arreguin had already communicated with Dr. Jaya Thomas who was going to see pt on transfer here. Will defer cardiac management to Dr. Za Stafford.

## 2019-02-04 NOTE — Clinical Note
RV lead testing showed P/R: 8.6 mV, Slew: more than 4.0 V/s, Impedence 716 ohms at 2 V, Threshold: 0.7 V at 0.50 ms, Current 1.4 mA

## 2019-02-04 NOTE — Clinical Note
Mallampati: Class III - soft palate, base of uvula visible. ASA: Class 4 - patient with severe systemic disease that is a constant threat to life.

## 2019-02-04 NOTE — ED NOTES
Strong femoral pulse palpated by Dr. Castillo Fall. 1 L NS completed. Starting Cold fluids at this time. Dr. Castillo Fall at bedside to start code ice catheter placement.

## 2019-02-04 NOTE — CONSULTS
Initial Pulmonary / Critical Care Consultation    Assessment / Plan:    Acute resp failure - 2/2 OOH cardiac arrest - on mechanical ventilation - FiO2 40%. No asdz on initial cxr    PEA arrest    Gout - s/p Krystexxa    Chronic afib    DM    --vent support  --vent bundle / lung protective ventilation  --code ice per protocol  --Cardiology following - echo pnd  --neuro eval -- EEG being performed - Head CT OK    The patient is critically ill and is at significant risk of decompensation. Critical Care time spent exclusive of procedures 30 minutes. History / Subjective:  Reason:  Respiratory failure  Requesting Provider:  Dr César Sarmiento is a 72 y.o.  male who  has a past medical history of Acute encephalopathy (1/14/2016), Anemia (5/11/2017), Atrial fibrillation ([address] Painter esophagus, CAD (coronary artery disease), Diabetes ([address] Heart failure ([address] HTN, goal below 140/90, Retinopathy, diabetic, bilateral ([address] (3/11/2016), Stenosis of right carotid artery without cerebral infarction (1/18/2016), TIA (transient ischemic attack) (1/14/2016), and Vitamin D deficiency (3/1/2016). admitted 2/4/2019 with cardiac arrest    Pt receiving infusion for gout when becam unresponsive  PEA arrest intubated and given epi  ROSC after approximately 10 minutes  Head CT OK  Placed on code ice protocol  Not on vasopressors  Has chronic afib.   Current rhythm is afib    No Known Allergies  Past Medical History:   Diagnosis Date    Acute encephalopathy 1/14/2016    Anemia 5/11/2017    Atrial fibrillation (HCC)     Painter esophagus     CAD (coronary artery disease)     Diabetes (HCC)     Heart failure (HCC)     Cardiomyopathy, myocarditis    HTN, goal below 140/90     Retinopathy, diabetic, bilateral ([address] 3/11/2016    Stenosis of right carotid artery without cerebral infarction 1/18/2016    TIA (transient ischemic attack) 1/14/2016    Vitamin D deficiency 3/1/2016    Nephrology ordered and follow 2/22/16       Past Surgical History:   Procedure Laterality Date    COLONOSCOPY N/A 6/23/2016    COLONOSCOPY performed by Jordana Ohara MD at [address] COLONOSCOPY N/A 8/6/2018    COLONOSCOPY performed by Jordana Ohara MD at [facility]. N/A 8/30/2018    COLONOSCOPY performed by Bertrand Kang MD at [address] HX ENDOSCOPY  06/27/2016    CAPSULE; normal small bowel    HX ENDOSCOPY  06/23/2016    upper endoscopy    HX UROLOGICAL  2013      Prior to Admission medications    Medication Sig Start Date End Date Taking? Authorizing Provider   levothyroxine (SYNTHROID) 25 mcg tablet TAKE 1 TABLET BY MOUTH EVERY DAY BEFORE BREAKFAST 1/22/19   Jose Antonio Machuca MD   insulin glargine (LANTUS,BASAGLAR) 100 unit/mL (3 mL) inpn 14 Units by SubCUTAneous route nightly. 1/21/19   Jose Antonio Machuca MD   leflunomide (ARAVA) 20 mg tablet Take 20 mg by mouth every evening. Provider, Historical   diphenhydrAMINE (BENADRYL) 25 mg capsule Take 25 mg by mouth every fourteen (14) days. Premed for [place]    Provider, Historical   colchicine 0.6 mg tablet Take 1 Tab by mouth daily as needed. 12/14/18   Radha Caballero MD   ELIQUIS 5 mg tablet TAKE 1 TABLET BY MOUTH TWICE A DAY 11/9/18   Provider, Historical   pegloticase (KRYSTEXXA) 8 mg/mL soln injection 8 mg by IntraVENous route Once every 2 weeks. Provider, Historical   digoxin (LANOXIN) 0.125 mg tablet Take 0.125 mg by mouth. Pt takes daily x 2 days, then skips a day and repeats this schedule. Provider, Historical   dextran 70-hypromellose (ARTIFICIAL TEARS,[id number],) ophthalmic solution Administer 1 Drop to both eyes as needed. Provider, Historical   loperamide (IMODIUM) 2 mg capsule Take 2 mg by mouth four (4) times daily as needed for Diarrhea. Provider, Historical   furosemide (LASIX) 40 mg tablet Take 40 mg by mouth daily.  5/10/16   Provider, Historical   hydrALAZINE (APRESOLINE) 25 mg tablet Take 25 mg by mouth three (3) times daily. 16   Provider, Historical   omeprazole (PRILOSEC) 20 mg capsule Take 20 mg by mouth nightly. Provider, Historical   multivitamin (ONE A DAY) tablet Take 1 Tab by mouth daily. Provider, Historical   pravastatin (PRAVACHOL) 20 mg tablet Take 1 Tab by mouth nightly. 16   Balaji Guajardo NP   carvedilol (COREG) 3.125 mg tablet Take 1 Tab by mouth two (2) times daily (with meals). 16   Balaji Guajardo NP      Family History   Problem Relation Age of Onset    Heart Failure Mother     Diabetes Father     No Known Problems Sister     Other Daughter         Ginger Jensen Other Daughter         Ginger Gargs Other Daughter         Hashimoto     Social History     Tobacco Use    Smoking status: Never Smoker    Smokeless tobacco: Never Used   Substance Use Topics    Alcohol use: Yes     Comment: 2 drinks/week, never a heavy drinker      ROS:  Review of systems not obtained due to patient factors. Objective:  Patient Vitals for the past 4 hrs:   BP Temp Pulse Resp SpO2 Height Weight   19 1535 164/64 95 °F (35 °C) 94  100 %     19 1458   82 12 100 %     19 1457 159/77 95.4 °F (35.2 °C) 88 13 100 % 5' 10\" (1.778 m) 81.6 kg (179 lb 16 oz)     Temp (24hrs), Av.2 °F (35.7 °C), Min:95 °F (35 °C), Max:97.2 °F (36.2 °C)    CVP:          Intake/Output Summary (Last 24 hours) at 2019 1700  Last data filed at 2019 1515  Gross per 24 hour   Intake    Output 550 ml   Net -550 ml     Blood Sugar:  Glucose   Date Value Ref Range Status   2019 148 (H) 65 - 100 mg/dL Final   2019 82 65 - 100 mg/dL Final   2019 132 (H) 65 - 100 mg/dL Final   01/15/2019 255 (H) 65 - 100 mg/dL Final   2019 115 (H) 65 - 100 mg/dL Final     Exam:  [place]  Oral ett   moves all extremities spontaneously  No accessory use  Symmetrical chest expansion  Scattered rhonchi  irreg  Soft  Warm and dry   No edema      Lab data was reviewed.     Radiology images were independently viewed and available reports were reviewed. CXR - ETT, no acute process    Lab:  Recent Labs     02/04/19  1608 02/04/19  1219 02/04/19  1218 02/04/19  1128   WBC 7.6 5.7  --  4.5   HGB 9.1* 9.8*  --  9.6*   PLT 75* 96*  --  84*   NA  --  142  --  142   K  --  3.2*  --  3.8   CL  --  102  --  103   CO2  --  27  --  29   BUN  --  45*  --  45*   CREA  --  2.26*  --  1.86*   GLU  --  148*  --  82   CA  --  8.9  --  9.2   MG  --   --  1.9  --    INR  --  1.4*  --   --    TROIQ  --  0.07*  --   --    TBILI  --  0.9  --  0.9   SGOT  --  45*  --  27   LAC 1.2  --   --   --      ABG:  No results for input(s): PHI, PCO2I, PO2I, HCO3I, SO2I, FIO2I in the last 72 hours.   All Micro Results     Procedure Component Value Units Date/Time    CULTURE, BLOOD, PAIRED [[id number]] Collected:  02/04/19 1608    Order Status:  Completed Specimen:  Blood Updated:  02/04/19 1630            Jerryl Saint, MD

## 2019-02-04 NOTE — ED NOTES
TRANSFER - OUT REPORT: 
 
Verbal report given to [facility] (name) on Adriel Jarquin  being transferred to CCU 25 (unit) for routine progression of care Report consisted of patients Situation, Background, Assessment and  
Recommendations(SBAR). Information from the following report(s) SBAR and ED Summary was reviewed with the receiving nurse. Lines:  
Triple Lumen Code ICE Catheter-Quattros 02/04/19 Left Femoral (Active) Central Line Being Utilized Yes 2/4/2019  3:09 PM  
Site Assessment Clean, dry, & intact 2/4/2019  3:09 PM  
Infiltration Assessment 0 2/4/2019  3:09 PM  
Affected Extremity/Extremities Color distal to insertion site pink (or appropriate for race) 2/4/2019  3:09 PM  
Date of Last Dressing Change 02/04/19 2/4/2019  3:09 PM  
Dressing Status Clean, dry, & intact 2/4/2019  3:09 PM  
Dressing Type Transparent 2/4/2019  3:09 PM  
Proximal Hub Color/Line Status Blue 2/4/2019  3:09 PM  
Positive Blood Return (Medial Site) Yes 2/4/2019  3:09 PM  
Medial Hub Color/Line Status White 2/4/2019  3:09 PM  
Positive Blood Return (Lateral Site) Yes 2/4/2019  3:09 PM  
Distal Hub Color/Line Status Brown 2/4/2019  3:09 PM  
Positive Blood Return (Site #3) Yes 2/4/2019  3:09 PM  
   
Peripheral IV 02/04/19 Right Antecubital (Active) Site Assessment Clean, dry, & intact 2/4/2019  3:02 PM  
Phlebitis Assessment 0 2/4/2019  3:02 PM  
Infiltration Assessment 0 2/4/2019  3:02 PM  
Dressing Status Clean, dry, & intact 2/4/2019  3:02 PM  
Dressing Type Tape;Transparent 2/4/2019  3:02 PM  
Hub Color/Line Status Green;Capped;Flushed 2/4/2019  3:02 PM  
   
Peripheral IV 02/04/19 Left Antecubital (Active) Site Assessment Clean, dry, & intact 2/4/2019  3:02 PM  
Phlebitis Assessment 0 2/4/2019  3:02 PM  
Infiltration Assessment 0 2/4/2019  3:02 PM  
Dressing Status Clean, dry, & intact 2/4/2019  3:02 PM  
Dressing Type Tape;Transparent 2/4/2019  3:02 PM  
 Hub Color/Line Status Green;Capped;Flushed 2/4/2019  3:02 PM  
  
 
Opportunity for questions and clarification was provided. Patient transported with: 
 Monitor Registered Nurse 
 
& RT & CCU RN

## 2019-02-04 NOTE — Clinical Note
TRANSFER - OUT REPORT:  
 
Verbal report given to: CCU nurse. Report consisted of patient's Situation, Background, Assessment and  
Recommendations(SBAR). Opportunity for questions and clarification was provided. Patient transported with a Registered Nurse, [address] [place] [address] / Patient Care Tech, Monitor and Oxygen. Oxygen used for patient = @ 12 - 20 Liters. Patient transported to: CCU.

## 2019-02-04 NOTE — Clinical Note
Provider notified patient is already on antibiotic. Deemed not needing another antibiotic for the procedure.

## 2019-02-04 NOTE — ED PROVIDER NOTES
72 y.o. male with past medical history significant for atrial fibrillation, CAD, and heart failure who presents from the [facility] via EMS for a cardiac arrest. EMS reports the pt was at the [facility] today for a treatment. EMS states the pt said he was not feeling well and suddenly became unresponsive while nursing staff was pushing Solumedrol. EMS reports they were called and CPR was started immediately. EMS states their was no airway established when they arrived, so they established airway and an IV. EMS states the pt had PEA upon their arrival. EMS reports they were pushing in the first epi when the pt gained ROSC and had a pulse of 50 bpm. EMS reports giving the pt one of epi and transferring him for further care. EMS states the pt remained unconscious throughout but his most recent pulse was 97 bpm. EMS reports the pt was likely in PEA for less than 10 minutes. There are no other acute medical concerns at this time. Full history, physical exam, and ROS unable to be obtained due to: pt unresponsive. Social hx - Tobacco use: none, Alcohol Use: yes PCP: Lauryn Singh MD 
 
Note written by Macho Interiano, as dictated by Fartun Wray MD 12:05 PM. The history is provided by the EMS personnel. The history is limited by the condition of the patient. No  was used. Past Medical History:  
Diagnosis Date  Acute encephalopathy 1/14/2016  Anemia 5/11/2017  Atrial fibrillation ([address]  Painter esophagus  CAD (coronary artery disease)  Diabetes ([address]  Heart failure ([address] Cardiomyopathy, myocarditis  HTN, goal below 140/90  Retinopathy, diabetic, bilateral ([address] 3/11/2016  Stenosis of right carotid artery without cerebral infarction 1/18/2016  TIA (transient ischemic attack) 1/14/2016  Vitamin D deficiency 3/1/2016 Nephrology ordered and follow 2/22/16 Past Surgical History: Procedure Laterality Date  COLONOSCOPY N/A 6/23/2016 COLONOSCOPY performed by Jorge Esquivel MD at [facility] ENDOSCOPY  COLONOSCOPY N/A 8/6/2018 COLONOSCOPY performed by Jorge Esquivel MD at [facility] ENDOSCOPY  COLONOSCOPY N/A 8/30/2018 COLONOSCOPY performed by Kaykay Joseph MD at [address] HX ENDOSCOPY  06/27/2016 CAPSULE; normal small bowel  HX ENDOSCOPY  06/23/2016  
 upper endoscopy  HX UROLOGICAL  2013 Family History:  
Problem Relation Age of Onset  Heart Failure Mother  Diabetes Father  No Known Problems Sister  Other Daughter Sonja Polanco  Other Daughter Sonja Polanco  Other Daughter Sonja Polanco Social History Socioeconomic History  Marital status:  Spouse name: Not on file  Number of children: Not on file  Years of education: Not on file  Highest education level: Not on file Social Needs  Financial resource strain: Not on file  Food insecurity - worry: Not on file  Food insecurity - inability: Not on file  Transportation needs - medical: Not on file  Transportation needs - non-medical: Not on file Occupational History  Not on file Tobacco Use  Smoking status: Never Smoker  Smokeless tobacco: Never Used Substance and Sexual Activity  Alcohol use: Yes Comment: 2 drinks/week, never a heavy drinker  Drug use: No  
 Sexual activity: Not Currently Other Topics Concern  Not on file Social History Narrative  Generally active, works out in the yard often ALLERGIES: Patient has no known allergies. Review of Systems Unable to perform ROS: Patient unresponsive Vitals:  
 02/04/19 1212 02/04/19 1214 02/04/19 1214 02/04/19 1219 Pulse: 94 87  94 Resp: 19 23  21 SpO2:   100% 98% Weight:      
      
 
Physical Exam  
Constitutional:  
Was being bagged by a Pawel airway. Eyes:  
Pupils midsize and not reactive. Cardiovascular: Normal rate. An irregularly irregular rhythm present. Strong carotid and femoral pulses. Palpable radial pulse. 2/6 murmur heard. Pulmonary/Chest:  
Breath sounds equal with bagging. Abdominal: Soft. He exhibits no distension. Neurological: He is unresponsive. Does not respond to verbal or painful stimuli. Nursing note and vitals reviewed. Note written by Macho Jameson, as dictated by Lb Natarajan MD 12:05 PM. MDM Number of Diagnoses or Management Options Cardiac arrest [facility]): Amount and/or Complexity of Data Reviewed Clinical lab tests: ordered and reviewed Obtain history from someone other than the patient: yes Discuss the patient with other providers: yes Independent visualization of images, tracings, or specimens: yes Risk of Complications, Morbidity, and/or Mortality Presenting problems: high Diagnostic procedures: high Management options: high Critical Care Total time providing critical care:  minutes Intubation Date/Time: 2/4/2019 12:16 PM 
Performed by: Lb Natarajan MD 
Authorized by: Lb Natarajan MD  
 
Consent:  
  Consent obtained:  Emergent situation Pre-procedure details:  
  Patient status:  Unresponsive Paralytics:  Succinylcholine (Also given atomidate) Procedure details: CPR in progress: no Intubation method:  Oral 
  Oral intubation technique:  Video-assisted Tube size (mm):  7.5 Tube type:  Cuffed Number of attempts:  1 Post-procedure details:  
  Patient tolerance of procedure: Tolerated well, no immediate complications Central Line 
Date/Time: 2/4/2019 1:19 PM 
Performed by: Lb Natarajan MD 
Authorized by: Lb Natarajan MD  
 
Consent:  
  Consent obtained:  Emergent situation Procedure details: Location:  L femoral 
Comments:  
   Code Ice Catheter inserted. Note written by Macho Jameson, as dictated by Lb Natarajan MD 12:25 PM. 
 
ED EKG interpretation: Rhythm: atrial fib; and irregular. Rate (approx.): 96 bpm; non-specific ST changes Note written by Macho Cervantes, as dictated by Jame Diallo MD 12:13 PM. PROGRESS NOTE: 
1:13 PM 
Dr. Easton Caban spoke with Dr. Cliff Galindo and Dr. Orly Perdomo while I was placing a code ice catheter. Dr. Cliff Galindo will consult for cardiology, but recommends internal medicine admission. Dr. Orly Perdomo, ED provider, will consult with Dr. Dg Enriquez, for admission. PROGRESS NOTE: 
1:26 PM 
Spoke with pt's family. CONSULT NOTE: 
1:40 PM Jame Diallo MD spoke with Dr. Desiree Jamil for Hospitalist.  Discussed available diagnostic tests and clinical findings. Dr. Cami Guidry accepts the pt for admission, but the pt will have to go to the ED. PROGRESS NOTE: 
1:47 PM 
Dr. Orly Perdomo is aware the pt is coming to the ED.  
 
1:47 PM  
Pt is being transferred to [facility] for further management.

## 2019-02-05 ENCOUNTER — APPOINTMENT (OUTPATIENT)
Dept: GENERAL RADIOLOGY | Age: 74
DRG: 242 | End: 2019-02-05
Attending: INTERNAL MEDICINE
Payer: MEDICARE

## 2019-02-05 LAB
ANION GAP SERPL CALC-SCNC: 11 MMOL/L (ref 5–15)
ANION GAP SERPL CALC-SCNC: 12 MMOL/L (ref 5–15)
APTT PPP: 32.7 SEC (ref 22.1–32)
ARTERIAL PATENCY WRIST A: NO
ARTERIAL PATENCY WRIST A: YES
ATRIAL RATE: 234 BPM
ATRIAL RATE: 70 BPM
ATRIAL RATE: 81 BPM
BASE DEFICIT BLD-SCNC: 1 MMOL/L
BASE DEFICIT BLD-SCNC: 2 MMOL/L
BASE DEFICIT BLD-SCNC: 2 MMOL/L
BASE EXCESS BLD CALC-SCNC: 1 MMOL/L
BDY SITE: ABNORMAL
BUN SERPL-MCNC: 50 MG/DL (ref 6–20)
BUN SERPL-MCNC: 52 MG/DL (ref 6–20)
BUN SERPL-MCNC: 55 MG/DL (ref 6–20)
BUN SERPL-MCNC: 57 MG/DL (ref 6–20)
BUN/CREAT SERPL: 23 (ref 12–20)
BUN/CREAT SERPL: 25 (ref 12–20)
CA-I BLD-SCNC: 0.97 MMOL/L (ref 1.12–1.32)
CA-I BLD-SCNC: 1 MMOL/L (ref 1.12–1.32)
CA-I BLD-SCNC: 1.04 MMOL/L (ref 1.12–1.32)
CA-I BLD-SCNC: 1.05 MMOL/L (ref 1.12–1.32)
CALCIUM SERPL-MCNC: 8 MG/DL (ref 8.5–10.1)
CALCIUM SERPL-MCNC: 8.4 MG/DL (ref 8.5–10.1)
CALCIUM SERPL-MCNC: 8.5 MG/DL (ref 8.5–10.1)
CALCIUM SERPL-MCNC: 9.1 MG/DL (ref 8.5–10.1)
CALCULATED R AXIS, ECG10: 0 DEGREES
CALCULATED R AXIS, ECG10: 39 DEGREES
CALCULATED R AXIS, ECG10: 45 DEGREES
CALCULATED T AXIS, ECG11: -114 DEGREES
CALCULATED T AXIS, ECG11: 158 DEGREES
CALCULATED T AXIS, ECG11: 162 DEGREES
CHLORIDE SERPL-SCNC: 103 MMOL/L (ref 97–108)
CHLORIDE SERPL-SCNC: 103 MMOL/L (ref 97–108)
CHLORIDE SERPL-SCNC: 104 MMOL/L (ref 97–108)
CHLORIDE SERPL-SCNC: 104 MMOL/L (ref 97–108)
CO2 SERPL-SCNC: 23 MMOL/L (ref 21–32)
CO2 SERPL-SCNC: 24 MMOL/L (ref 21–32)
CREAT SERPL-MCNC: 2.12 MG/DL (ref 0.7–1.3)
CREAT SERPL-MCNC: 2.19 MG/DL (ref 0.7–1.3)
CREAT SERPL-MCNC: 2.21 MG/DL (ref 0.7–1.3)
CREAT SERPL-MCNC: 2.31 MG/DL (ref 0.7–1.3)
DIAGNOSIS, 93000: NORMAL
ECHO AO ROOT DIAM: 2.77 CM
ECHO AV AREA PEAK VELOCITY: 1.4 CM2
ECHO AV AREA VTI: 1.6 CM2
ECHO AV AREA/BSA PEAK VELOCITY: 0.7 CM2/M2
ECHO AV AREA/BSA VTI: 0.8 CM2/M2
ECHO AV MEAN GRADIENT: 8.3 MMHG
ECHO AV PEAK GRADIENT: 18.1 MMHG
ECHO AV PEAK VELOCITY: 212.64 CM/S
ECHO AV VTI: 47.31 CM
ECHO LA AREA 4C: 25.5 CM2
ECHO LA MAJOR AXIS: 5.56 CM
ECHO LA TO AORTIC ROOT RATIO: 2.01
ECHO LA VOL 2C: 75.69 ML (ref 18–58)
ECHO LA VOL 4C: 72.32 ML (ref 18–58)
ECHO LA VOL BP: 79.5 ML (ref 18–58)
ECHO LA VOL/BSA BIPLANE: 39.93 ML/M2 (ref 16–28)
ECHO LA VOLUME INDEX A2C: 38.01 ML/M2 (ref 16–28)
ECHO LA VOLUME INDEX A4C: 36.32 ML/M2 (ref 16–28)
ECHO LV E' LATERAL VELOCITY: 6.99 CM/S
ECHO LV E' SEPTAL VELOCITY: 5.82 CM/S
ECHO LV INTERNAL DIMENSION DIASTOLIC: 4.29 CM (ref 4.2–5.9)
ECHO LV INTERNAL DIMENSION SYSTOLIC: 2.71 CM
ECHO LV IVSD: 1.46 CM (ref 0.6–1)
ECHO LV MASS 2D: 302.7 G (ref 88–224)
ECHO LV MASS INDEX 2D: 152 G/M2 (ref 49–115)
ECHO LV POSTERIOR WALL DIASTOLIC: 1.51 CM (ref 0.6–1)
ECHO LVOT DIAM: 2.04 CM
ECHO LVOT PEAK GRADIENT: 3.3 MMHG
ECHO LVOT PEAK VELOCITY: 91.27 CM/S
ECHO LVOT SV: 73.4 ML
ECHO LVOT VTI: 22.5 CM
ECHO MV E VELOCITY: 1.05 CM/S
ECHO MV E/E' LATERAL: 0.15
ECHO MV E/E' RATIO (AVERAGED): 0.17
ECHO MV E/E' SEPTAL: 0.18
ECHO PV MAX VELOCITY: 116.83 CM/S
ECHO PV PEAK GRADIENT: 5.5 MMHG
ECHO RV INTERNAL DIMENSION: 3.4 CM
ECHO RV TAPSE: 1.58 CM (ref 1.5–2)
ECHO TV REGURGITANT MAX VELOCITY: 393.18 CM/S
ECHO TV REGURGITANT PEAK GRADIENT: 61.8 MMHG
ERYTHROCYTE [DISTWIDTH] IN BLOOD BY AUTOMATED COUNT: 13.8 % (ref 11.5–14.5)
ERYTHROCYTE [DISTWIDTH] IN BLOOD BY AUTOMATED COUNT: 13.8 % (ref 11.5–14.5)
ERYTHROCYTE [DISTWIDTH] IN BLOOD BY AUTOMATED COUNT: 13.9 % (ref 11.5–14.5)
ERYTHROCYTE [DISTWIDTH] IN BLOOD BY AUTOMATED COUNT: 14 % (ref 11.5–14.5)
EST. AVERAGE GLUCOSE BLD GHB EST-MCNC: 137 MG/DL
GAS FLOW.O2 O2 DELIVERY SYS: ABNORMAL L/MIN
GAS FLOW.O2 SETTING OXYMISER: 12 BPM
GLUCOSE BLD STRIP.AUTO-MCNC: 179 MG/DL (ref 65–100)
GLUCOSE BLD STRIP.AUTO-MCNC: 213 MG/DL (ref 65–100)
GLUCOSE BLD STRIP.AUTO-MCNC: 220 MG/DL (ref 65–100)
GLUCOSE BLD STRIP.AUTO-MCNC: 231 MG/DL (ref 65–100)
GLUCOSE SERPL-MCNC: 166 MG/DL (ref 65–100)
GLUCOSE SERPL-MCNC: 184 MG/DL (ref 65–100)
GLUCOSE SERPL-MCNC: 194 MG/DL (ref 65–100)
GLUCOSE SERPL-MCNC: 195 MG/DL (ref 65–100)
HBA1C MFR BLD: 6.4 % (ref 4.2–6.3)
HCO3 BLD-SCNC: 23.4 MMOL/L (ref 22–26)
HCO3 BLD-SCNC: 24.1 MMOL/L (ref 22–26)
HCO3 BLD-SCNC: 24.2 MMOL/L (ref 22–26)
HCO3 BLD-SCNC: 24.2 MMOL/L (ref 22–26)
HCO3 BLD-SCNC: 24.3 MMOL/L (ref 22–26)
HCO3 BLD-SCNC: 24.8 MMOL/L (ref 22–26)
HCO3 BLD-SCNC: 25.9 MMOL/L (ref 22–26)
HCT VFR BLD AUTO: 27 % (ref 36.6–50.3)
HCT VFR BLD AUTO: 27.1 % (ref 36.6–50.3)
HCT VFR BLD AUTO: 28 % (ref 36.6–50.3)
HCT VFR BLD AUTO: 28.3 % (ref 36.6–50.3)
HGB BLD-MCNC: 8.2 G/DL (ref 12.1–17)
HGB BLD-MCNC: 8.4 G/DL (ref 12.1–17)
HGB BLD-MCNC: 8.4 G/DL (ref 12.1–17)
HGB BLD-MCNC: 8.5 G/DL (ref 12.1–17)
INR PPP: 1.4 (ref 0.9–1.1)
MAGNESIUM SERPL-MCNC: 2.5 MG/DL (ref 1.6–2.4)
MAGNESIUM SERPL-MCNC: 2.9 MG/DL (ref 1.6–2.4)
MAGNESIUM SERPL-MCNC: 3 MG/DL (ref 1.6–2.4)
MAGNESIUM SERPL-MCNC: 3.2 MG/DL (ref 1.6–2.4)
MCH RBC QN AUTO: 27.6 PG (ref 26–34)
MCH RBC QN AUTO: 27.8 PG (ref 26–34)
MCH RBC QN AUTO: 28 PG (ref 26–34)
MCH RBC QN AUTO: 28.4 PG (ref 26–34)
MCHC RBC AUTO-ENTMCNC: 29.7 G/DL (ref 30–36.5)
MCHC RBC AUTO-ENTMCNC: 30.3 G/DL (ref 30–36.5)
MCHC RBC AUTO-ENTMCNC: 30.4 G/DL (ref 30–36.5)
MCHC RBC AUTO-ENTMCNC: 31.1 G/DL (ref 30–36.5)
MCV RBC AUTO: 91.2 FL (ref 80–99)
MCV RBC AUTO: 91.2 FL (ref 80–99)
MCV RBC AUTO: 92.1 FL (ref 80–99)
MCV RBC AUTO: 93.7 FL (ref 80–99)
NRBC # BLD: 0 K/UL (ref 0–0.01)
NRBC BLD-RTO: 0 PER 100 WBC
O2/TOTAL GAS SETTING VFR VENT: 35 %
O2/TOTAL GAS SETTING VFR VENT: 40 %
O2/TOTAL GAS SETTING VFR VENT: 40 %
O2/TOTAL GAS SETTING VFR VENT: 70 %
PCO2 BLD: 37.6 MMHG (ref 35–45)
PCO2 BLD: 41.1 MMHG (ref 35–45)
PCO2 BLD: 42.7 MMHG (ref 35–45)
PCO2 BLD: 42.7 MMHG (ref 35–45)
PCO2 BLD: 44.8 MMHG (ref 35–45)
PCO2 BLD: 44.8 MMHG (ref 35–45)
PCO2 BLD: 50.5 MMHG (ref 35–45)
PEEP RESPIRATORY: 5 CMH2O
PEEP RESPIRATORY: 8 CMH2O
PH BLD: 7.3 [PH] (ref 7.35–7.45)
PH BLD: 7.34 [PH] (ref 7.35–7.45)
PH BLD: 7.36 [PH] (ref 7.35–7.45)
PH BLD: 7.36 [PH] (ref 7.35–7.45)
PH BLD: 7.37 [PH] (ref 7.35–7.45)
PH BLD: 7.38 [PH] (ref 7.35–7.45)
PH BLD: 7.4 [PH] (ref 7.35–7.45)
PLATELET # BLD AUTO: 56 K/UL (ref 150–400)
PLATELET # BLD AUTO: 59 K/UL (ref 150–400)
PLATELET # BLD AUTO: 61 K/UL (ref 150–400)
PLATELET # BLD AUTO: 62 K/UL (ref 150–400)
PMV BLD AUTO: 11.2 FL (ref 8.9–12.9)
PMV BLD AUTO: 11.2 FL (ref 8.9–12.9)
PMV BLD AUTO: 11.7 FL (ref 8.9–12.9)
PMV BLD AUTO: 11.9 FL (ref 8.9–12.9)
PO2 BLD: 110 MMHG (ref 80–100)
PO2 BLD: 129 MMHG (ref 80–100)
PO2 BLD: 135 MMHG (ref 80–100)
PO2 BLD: 151 MMHG (ref 80–100)
PO2 BLD: 162 MMHG (ref 80–100)
PO2 BLD: 168 MMHG (ref 80–100)
PO2 BLD: 181 MMHG (ref 80–100)
POTASSIUM SERPL-SCNC: 3.8 MMOL/L (ref 3.5–5.1)
POTASSIUM SERPL-SCNC: 3.8 MMOL/L (ref 3.5–5.1)
POTASSIUM SERPL-SCNC: 3.9 MMOL/L (ref 3.5–5.1)
POTASSIUM SERPL-SCNC: 3.9 MMOL/L (ref 3.5–5.1)
PROTHROMBIN TIME: 14 SEC (ref 9–11.1)
Q-T INTERVAL, ECG07: 400 MS
Q-T INTERVAL, ECG07: 402 MS
Q-T INTERVAL, ECG07: 554 MS
QRS DURATION, ECG06: 102 MS
QRS DURATION, ECG06: 106 MS
QRS DURATION, ECG06: 98 MS
QTC CALCULATION (BEZET), ECG08: 472 MS
QTC CALCULATION (BEZET), ECG08: 507 MS
QTC CALCULATION (BEZET), ECG08: 529 MS
RBC # BLD AUTO: 2.96 M/UL (ref 4.1–5.7)
RBC # BLD AUTO: 2.97 M/UL (ref 4.1–5.7)
RBC # BLD AUTO: 3.02 M/UL (ref 4.1–5.7)
RBC # BLD AUTO: 3.04 M/UL (ref 4.1–5.7)
SAO2 % BLD: 100 % (ref 92–97)
SAO2 % BLD: 98 % (ref 92–97)
SAO2 % BLD: 99 % (ref 92–97)
SERVICE CMNT-IMP: ABNORMAL
SODIUM SERPL-SCNC: 138 MMOL/L (ref 136–145)
SPECIMEN TYPE: ABNORMAL
THERAPEUTIC RANGE,PTTT: ABNORMAL SECS (ref 58–77)
TOTAL RESP. RATE, ITRR: 12
TOTAL RESP. RATE, ITRR: 12
TOTAL RESP. RATE, ITRR: 18
TROPONIN I SERPL-MCNC: 1.07 NG/ML
TROPONIN I SERPL-MCNC: 1.2 NG/ML
VENTILATION MODE VENT: ABNORMAL
VENTRICULAR RATE, ECG03: 55 BPM
VENTRICULAR RATE, ECG03: 84 BPM
VENTRICULAR RATE, ECG03: 96 BPM
VOLUME CONTROL IVLC: YES
VT SETTING VENT: 400 ML
VT SETTING VENT: 500 ML
WBC # BLD AUTO: 3.7 K/UL (ref 4.1–11.1)
WBC # BLD AUTO: 3.8 K/UL (ref 4.1–11.1)
WBC # BLD AUTO: 4.4 K/UL (ref 4.1–11.1)
WBC # BLD AUTO: 5.6 K/UL (ref 4.1–11.1)

## 2019-02-05 PROCEDURE — 83735 ASSAY OF MAGNESIUM: CPT

## 2019-02-05 PROCEDURE — 74011000250 HC RX REV CODE- 250: Performed by: HOSPITALIST

## 2019-02-05 PROCEDURE — 74011250636 HC RX REV CODE- 250/636: Performed by: HOSPITALIST

## 2019-02-05 PROCEDURE — 77030034848

## 2019-02-05 PROCEDURE — 82962 GLUCOSE BLOOD TEST: CPT

## 2019-02-05 PROCEDURE — 74011250636 HC RX REV CODE- 250/636: Performed by: FAMILY MEDICINE

## 2019-02-05 PROCEDURE — 84484 ASSAY OF TROPONIN QUANT: CPT

## 2019-02-05 PROCEDURE — 85027 COMPLETE CBC AUTOMATED: CPT

## 2019-02-05 PROCEDURE — 85730 THROMBOPLASTIN TIME PARTIAL: CPT

## 2019-02-05 PROCEDURE — 71045 X-RAY EXAM CHEST 1 VIEW: CPT

## 2019-02-05 PROCEDURE — 85610 PROTHROMBIN TIME: CPT

## 2019-02-05 PROCEDURE — 80048 BASIC METABOLIC PNL TOTAL CA: CPT

## 2019-02-05 PROCEDURE — 83036 HEMOGLOBIN GLYCOSYLATED A1C: CPT

## 2019-02-05 PROCEDURE — 74011000258 HC RX REV CODE- 258: Performed by: INTERNAL MEDICINE

## 2019-02-05 PROCEDURE — 36600 WITHDRAWAL OF ARTERIAL BLOOD: CPT

## 2019-02-05 PROCEDURE — 36415 COLL VENOUS BLD VENIPUNCTURE: CPT

## 2019-02-05 PROCEDURE — 74011000250 HC RX REV CODE- 250: Performed by: INTERNAL MEDICINE

## 2019-02-05 PROCEDURE — 74011636637 HC RX REV CODE- 636/637: Performed by: FAMILY MEDICINE

## 2019-02-05 PROCEDURE — 77030029065 HC DRSG HEMO QCLOT ZMED -B

## 2019-02-05 PROCEDURE — C9113 INJ PANTOPRAZOLE SODIUM, VIA: HCPCS | Performed by: HOSPITALIST

## 2019-02-05 PROCEDURE — 51798 US URINE CAPACITY MEASURE: CPT

## 2019-02-05 PROCEDURE — 74011000258 HC RX REV CODE- 258: Performed by: HOSPITALIST

## 2019-02-05 PROCEDURE — 74011250636 HC RX REV CODE- 250/636: Performed by: INTERNAL MEDICINE

## 2019-02-05 PROCEDURE — 93005 ELECTROCARDIOGRAM TRACING: CPT

## 2019-02-05 PROCEDURE — 94003 VENT MGMT INPAT SUBQ DAY: CPT

## 2019-02-05 PROCEDURE — 65620000000 HC RM CCU GENERAL

## 2019-02-05 PROCEDURE — 74011000258 HC RX REV CODE- 258: Performed by: FAMILY MEDICINE

## 2019-02-05 RX ADMIN — PROPOFOL 50 MCG/KG/MIN: 10 INJECTION, EMULSION INTRAVENOUS at 19:54

## 2019-02-05 RX ADMIN — PHENYLEPHRINE HYDROCHLORIDE 25 MCG/MIN: 10 INJECTION INTRAVENOUS at 22:21

## 2019-02-05 RX ADMIN — INSULIN LISPRO 2 UNITS: 100 INJECTION, SOLUTION INTRAVENOUS; SUBCUTANEOUS at 11:34

## 2019-02-05 RX ADMIN — Medication 10 ML: at 22:18

## 2019-02-05 RX ADMIN — SODIUM CHLORIDE 40 MG: 9 INJECTION, SOLUTION INTRAMUSCULAR; INTRAVENOUS; SUBCUTANEOUS at 12:59

## 2019-02-05 RX ADMIN — Medication 10 ML: at 13:00

## 2019-02-05 RX ADMIN — Medication 10 ML: at 06:11

## 2019-02-05 RX ADMIN — PROPOFOL 30 MCG/KG/MIN: 10 INJECTION, EMULSION INTRAVENOUS at 14:30

## 2019-02-05 RX ADMIN — CALCIUM GLUCONATE 1 G: 98 INJECTION, SOLUTION INTRAVENOUS at 18:25

## 2019-02-05 RX ADMIN — CHLORHEXIDINE GLUCONATE 15 ML: 1.2 RINSE ORAL at 22:22

## 2019-02-05 RX ADMIN — Medication 10 ML: at 22:17

## 2019-02-05 RX ADMIN — INSULIN LISPRO 2 UNITS: 100 INJECTION, SOLUTION INTRAVENOUS; SUBCUTANEOUS at 06:11

## 2019-02-05 RX ADMIN — PROPOFOL 35 MCG/KG/MIN: 10 INJECTION, EMULSION INTRAVENOUS at 08:52

## 2019-02-05 RX ADMIN — Medication 100 MCG/HR: at 19:51

## 2019-02-05 RX ADMIN — SODIUM CHLORIDE 75 ML/HR: 450 INJECTION, SOLUTION INTRAVENOUS at 08:51

## 2019-02-05 RX ADMIN — CHLORHEXIDINE GLUCONATE 15 ML: 1.2 RINSE ORAL at 08:01

## 2019-02-05 RX ADMIN — INSULIN LISPRO 2 UNITS: 100 INJECTION, SOLUTION INTRAVENOUS; SUBCUTANEOUS at 00:09

## 2019-02-05 RX ADMIN — PROPOFOL 50 MCG/KG/MIN: 10 INJECTION, EMULSION INTRAVENOUS at 04:39

## 2019-02-05 RX ADMIN — PROPOFOL 50 MCG/KG/MIN: 10 INJECTION, EMULSION INTRAVENOUS at 00:15

## 2019-02-05 RX ADMIN — CALCIUM CHLORIDE 1 G: 100 INJECTION, SOLUTION INTRAVENOUS at 02:00

## 2019-02-05 NOTE — PROGRESS NOTES
2000 Received report from [facility] and assumed care. VSS, no signs of pain or distress. Remains intubated and sedated at goal temperature. 2100 Complete chlorhexidine bed bath provided, tolerated well. Anesthesiologist at bedside to place arterial line. 2200 Multiple attempts to place arterial line, unsuccessful. 2300 Family at bedside. 0000 Labs drawn. 0400 Portable chest xray completed. 0500 EKG done. 0600 Labs drawn. 0730 Bedside and Verbal shift change report given to [facility] (oncoming nurse) by Kristen Ortiz (offgoing nurse). Report included the following information SBAR, Kardex, Intake/Output, MAR, Recent Results and Alarm Parameters .

## 2019-02-05 NOTE — INTERDISCIPLINARY ROUNDS
IDR/SLIDR Summary Patient: Elan Flores MRN: [id number]    Age: 72 y.o. YOB: 1945 Room/Bed: [address] [place] [address] Admit Diagnosis: Cardiac arrest [facility]) [I46.9]  Principal Diagnosis: Cardiac arrest [address] Goals: stable VS; rewarm at 1640 Readmission: yes Quality Measure: Not applicable VTE Prophylaxis: Mechanical 
Influenza Vaccine screening completed? YES Pneumococcal Vaccine screening completed? NO Mobility needs: Yes   Nutrition plan:No 
Consults:P.T, O.T., Speech, Respiratory and Case Management Financial concerns:Yes  Escalated to CM? YES 
RRAT Score: 39   Interventions:H2H Testing due for pt today? YES 
LOS: 0 days Expected length of stay 7? days Discharge plan: home   PCP: Vani Lackey MD 
Transportation needs: Yes Days before discharge:two or more days before discharge Discharge disposition: Home Signed:  
 
Williams Lenz RN 
2/4/2019 
11:26 PM

## 2019-02-05 NOTE — H&P
[address]  HISTORY AND PHYSICAL Chun Patel 
MR#: [id number] : 1945 ACCOUNT #: [de-identified] ADMIT DATE: 2019 CHIEF COMPLAINT:  Cardiac arrest. 
 
HISTORY OF PRESENT ILLNESS:  The patient is a 72-year-old gentleman with past medical history of anemia, atrial fibrillation, Painter's esophagus, coronary artery disease, diabetes, heart failure, cardiomyopathy, myocarditis, history of hypertension, diabetic retinopathy, right carotid artery stenosis, TIA and vitamin D deficiency who presents to the hospital from [facility]. Patient presented to [facility] today apparently in PEA. The history was primarily obtained from the ER physician as the patient is intubated and his wife is not present at the bedside. Apparently, the patient went to [facility] today and while he was there he reported that \"he was not feeling well and suddenly became unresponsive while nursing staff was pushing Solu-Medrol. \"  EMS was called. CPR was started. At this time, no airway was established prior to arrival of EMS. Airway was established when EMS came and the patient was found to be in PEA upon their arrival.  The first dose of epi and the patient gained return of spontaneous circulation, had a pulse of 50 beats per minute. EMS gave him one more epi and he was brought to the hospital.  In [facility], patient was deemed to be appropriate for cold ice per the cardiologist.  A catheter was placed and the patient was transferred to [facility] for \"ice\" protocol. Currently, the patient is in the CCU and cardiology has been consulted. No further history could be obtained from the patient or from the ER physician discussion. PAST MEDICAL HISTORY:  See above.  
 
HOME MEDICATIONS:  Currently, the  patient, per chart, is on the following home medications:  Levothyroxine 25 mcg every day, Lantus 14 units daily, Arava 20 mg daily, Benadryl 25 mg daily, colchicine 0.6 mg daily as needed, Eliquis 5 mg b.i.d., Krystexxa  8 mg once every 2 weeks, digoxin 0.125 mg by mouth,  Lasix 40 mg daily, hydralazine 25 mg t.i.d., omeprazole 20 mg nightly, multivitamin, Pravachol 20 mg nightly and Coreg 3.125 mg b.i.d. SOCIAL HISTORY:  No history of tobacco abuse. Occasional alcohol. No IV drug abuse. ALLERGIES:  NO KNOWN DRUG ALLERGIES. FAMILY HISTORY:  Mother has a history of heart failure. Father had history of diabetes. Daughter has history of Hashimoto thyroiditis. REVIEW OF SYSTEMS:  Cannot be obtained from the patient due to being intubated. PHYSICAL EXAMINATION: 
VITAL SIGNS:  Temperature 97.2, pulse 94, respiratory rate 12, blood pressure 164/64, pulse ox 100%, intubated. GENERAL: Intubated. HEENT:  Pupils equal and reactive to light. Mouth is slightly sluggish. Moist mucous membranes. Tympanic membranes clear. NECK:  Supple. CHEST:  Decreased basal breath sounds. CORONARY:  S1, S2 were heard. ABDOMEN:  Soft, nontender, nondistended. Bowel sounds are physiological. 
EXTREMITIES:  No clubbing, no cyanosis, no edema. NEUROPSYCHIATRIC:  DTR 1+. SKIN:  Cool. LABORATORY DATA:  White count 5.7, hemoglobin 9.8, hematocrit 32.8, platelets 31,775. INR 1.4, sodium 142, potassium 3.2, chloride 102, bicarb 27, anion gap 13, glucose 148, BUN 45, creatinine 2.26, calcium 8.9, magnesium 1.9, bilirubin total 0.9, ALT 44, AST 45, alkaline phosphatase 90. Troponin 0.07. ABG shows a pH of 7.29, pCO2 of 48, pO2 of 40.9 INR 1.4. CT of the head, no acute intracranial mass. X-ray of the chest shows endotracheal tube appears to be in satisfactory position. ASSESSMENT AND PLAN: 
1.  status post cardiac arrest/pulseless electrical activity. Patient currently on hypothermic protocol and admitted to CCU, continue modality per protocol. Continue the patient on pressors as needed.   Cardiology consult has been requested and awaiting recommendations from cardiology. Will monitor. Will get an echocardiogram.  Cycle troponins. Will get an EEG. Continue patient intubated and further intervention will be per hospital course. Provide neurovascular checks. Will defer to cardiology for further plan of care. Continue to closely monitor. 2.  Diabetes. The patient will be on sliding scale NovoLog insulin and Accu-Cheks, diet control and close monitoring. 3.  Hypothyroidism. Put patient on IV Synthroid. 4.  Anemia, appears to be chronic. Monitor H and H. Further intervention per hospital course. 5.  Hypernatremia. Patient is on half normal saline. 6.  Gastrointestinal and deep venous thrombosis prophylaxis. Patient is on sequential compression devices. Dorota Salgado MD MM/MN 
D: 02/04/2019 16:29    
T: 02/04/2019 22:16 JOB #: [id number]

## 2019-02-05 NOTE — CONSULTS
NEUROLOGY CONSULT NOTE    Name Daryn Vivas Age 72 y.o. MRN [id number]  1945     Consulting Physician: Miquel Carl MD      Chief Complaint:  S/P Cardiac arrest     Assessment/Plan:     · Principal Problem:  ·   Cardiac arrest ([facility] (2019)  ·   ·   72year old male with a h/o Afib on Eliquis, carotid stenosis, HTN, DM, CAD, CHF, TIA presenting with PEA arrest s/p brief CPR and epi x 1 with ROSC, now on CODE ICE protocol. Neurologically he is withdrawing x 4 despite sedation and cooling. EEG without evidence of epileptiform activity, noted mild generalized slowing. Head CT on admission without acute intracranial process. Will reassess for prognostic determination after he is rewarmed. Thank you very much for this referral. I appreciate the opportunity to participate in this patient's care. History of Present Illness: This is a 72 y.o.   male, we were asked to see for cardiac arrest/CODE ICE protocol. PMH notable for Afib on Eliquis, carotid stenosis, HTN, DM, CAD, CHF, TIA. Patient was at [facility] HEART awaiting infusion. He received pre-medication with Solumedrol and became acutely unresponsive with PEA arrest, ROSC after first dose of epi. Now on CODE ICE protocol. CT head on admission without acute process, noted mild atrophy. He is intubated and sedated. EEG performed showing no evidence of epileptiform activity, noted diffuse slowing. No Known Allergies     Prior to Admission medications    Medication Sig Start Date End Date Taking? Authorizing Provider   insulin glargine (LANTUS,BASAGLAR) 100 unit/mL (3 mL) inpn 14 Units by SubCUTAneous route nightly. 19  Yes Sander Scruggs MD   ELIQUIS 5 mg tablet TAKE 1 TABLET BY MOUTH TWICE A DAY 18  Yes Provider, Historical   pegloticase (KRYSTEXXA) 8 mg/mL soln injection 8 mg by IntraVENous route Once every 2 weeks.    Yes Provider, Historical   digoxin (LANOXIN) 0.125 mg tablet Take 0.125 mg by mouth. Pt takes daily x 2 days, then skips a day and repeats this schedule. Yes Provider, Historical   dextran 70-hypromellose (ARTIFICIAL TEARS,[id number],) ophthalmic solution Administer 1 Drop to both eyes as needed. Yes Provider, Historical   hydrALAZINE (APRESOLINE) 25 mg tablet Take 25 mg by mouth three (3) times daily. 5/20/16  Yes Provider, Historical   multivitamin (ONE A DAY) tablet Take 1 Tab by mouth daily. Yes Provider, Historical   pravastatin (PRAVACHOL) 20 mg tablet Take 1 Tab by mouth nightly. 1/18/16  Yes Vikki Snider NP   carvedilol (COREG) 3.125 mg tablet Take 1 Tab by mouth two (2) times daily (with meals). 1/18/16  Yes Vikki Snider NP   levothyroxine (SYNTHROID) 25 mcg tablet TAKE 1 TABLET BY MOUTH EVERY DAY BEFORE BREAKFAST 1/22/19   Ronit Linares MD   leflunomide (ARAVA) 20 mg tablet Take 20 mg by mouth every evening. Provider, Historical   furosemide (LASIX) 40 mg tablet Take 40 mg by mouth daily. 5/10/16   Provider, Historical   omeprazole (PRILOSEC) 20 mg capsule Take 20 mg by mouth nightly.     Provider, Historical       Past Medical History:   Diagnosis Date    Acute encephalopathy 1/14/2016    Anemia 5/11/2017    Atrial fibrillation (HCC)     Painter esophagus     CAD (coronary artery disease)     Diabetes (HCC)     Heart failure (HCC)     Cardiomyopathy, myocarditis    HTN, goal below 140/90     Retinopathy, diabetic, bilateral ([facility] 3/11/2016    Stenosis of right carotid artery without cerebral infarction 1/18/2016    TIA (transient ischemic attack) 1/14/2016    Vitamin D deficiency 3/1/2016    Nephrology ordered and follow 2/22/16         Past Surgical History:   Procedure Laterality Date    COLONOSCOPY N/A 6/23/2016    COLONOSCOPY performed by Maryjean Boeck, MD at [address] COLONOSCOPY N/A 8/6/2018    COLONOSCOPY performed by Maryjean Boeck, MD at [facility] N/A 8/30/2018    COLONOSCOPY performed by Tong Welch MD at [address] HX ENDOSCOPY  06/27/2016    CAPSULE; normal small bowel    HX ENDOSCOPY  06/23/2016    upper endoscopy    HX UROLOGICAL  2013        Social History     Tobacco Use    Smoking status: Never Smoker    Smokeless tobacco: Never Used   Substance Use Topics    Alcohol use: Yes     Comment: 2 drinks/week, never a heavy drinker        Family History   Problem Relation Age of Onset    Heart Failure Mother     Diabetes Father     No Known Problems Sister     Other Daughter         Jaison Lezama Other Daughter         Jaison Lezama Other Daughter         Bianca Choudhary        Review of Systems:   Comprehensive review of systems: unable to assess     Exam:     Visit Vitals  /47   Pulse (!) 43   Temp (!) 91.4 °F (33 °C)   Resp 12   Ht 5' 10\" (1.778 m)   Wt 83.7 kg (184 lb 8.4 oz)   SpO2 100%   BMI 26.48 kg/m²        General: Intubated, sedated   Head: Normocephalic, atraumatic, anicteric sclera   Lungs:  Clear to auscultation bilaterally, No wheezes or rubs   Cardiac: Regular rate and rhythm with no murmurs. Abd: Bowel sounds were audible. No tenderness on palpation   Ext: No pedal edema   Skin: No overt signs of rash     Neurological Exam:  Mental Status: Intubated, sedated, no commands       Cranial Nerves:   PERR pinpoint/sluggish. +Corneals, (-)gag. EOM-ML w/o blink to threat. No grimace. Motor:  W/D x 4   Reflexes:   Deep tendon reflexes 1+/4 and symmetrical.  Plantar response is downgoing b/l. Sensory:   Symmetrically intact to noxious stimulation   Gait:  Unable to assess   Tremor:   Tremulousness noted in extremities with sedation held on CODE ICE   Cerebellar:  Finger to nose and heel over shin to knee was demonstrated competently. Neurovascular: No carotid bruits. Imaging  CT Results (maximum last 3):   Results from [place] encounter on 02/04/19   CT HEAD WO CONT    Narrative INDICATION: ams; ams     Exam: Noncontrast CT of the brain is performed with 5 mm collimation. CT dose reduction was achieved with the use of the standardized protocol  tailored for this examination and automatic exposure control for dose  modulation. Direct comparison is made to prior CT dated January 2016. FINDINGS: There is mild diffuse cortical atrophy. There is no acute intracranial  hemorrhage, mass, mass effect or herniation. Ventricular system is normal. The  gray-white matter differentiation is well-preserved. The mastoid air cells are  well pneumatized. The visualized paranasal sinuses are normal.      Impression IMPRESSION: No acute intracranial hemorrhage, mass or infarct. Lab Review  Lab Results   Component Value Date/Time    WBC 3.8 (L) 02/05/2019 11:28 AM    HCT 27.1 (L) 02/05/2019 11:28 AM    HGB 8.2 (L) 02/05/2019 11:28 AM    PLATELET 59 (L) 75/12/3770 11:28 AM     Lab Results   Component Value Date/Time    Sodium 138 02/05/2019 11:28 AM    Potassium 3.9 02/05/2019 11:28 AM    Chloride 104 02/05/2019 11:28 AM    CO2 23 02/05/2019 11:28 AM    Glucose 184 (H) 02/05/2019 11:28 AM    BUN 55 (H) 02/05/2019 11:28 AM    Creatinine 2.21 (H) 02/05/2019 11:28 AM    Calcium 8.4 (L) 02/05/2019 11:28 AM     No components found for: TROPQUANT  No results found for: CLEMENCIA    Signed:  Ramakrishna Frank DO  2/5/2019  3:44 PM

## 2019-02-05 NOTE — PROGRESS NOTES
NUTRITION Consult received to start tube feeding once pt re-warmed and bowel sounds have returned. Will monitor patient's progress-if unable to extubate then will need to start feeding. RD to follow. Thank you.  
 
 
Niall Rubio RD [facility]

## 2019-02-05 NOTE — PROGRESS NOTES
[facility] Cardiology Progress Note Date of service: 2/5/2019 Subjective: 
Mr Nayla Ford remains intubated and ventilated On the hypothermia protocol and remains at goal temp Has been bradycardic but hemodynamically stable Nursing reports some withdrawal to pain, and at times moving all 4 limbs when sedation was paused earlier Objective: 
 
Visit Vitals /44 Pulse (!) 50 Temp (!) 91.6 °F (33.1 °C) Resp 15 Ht 5' 10\" (1.778 m) Wt 83.7 kg (184 lb 8.4 oz) SpO2 100% BMI 26.48 kg/m² Physical Exam  
Constitutional: He appears well-developed and well-nourished. He is intubated. HENT:  
Head: Normocephalic and atraumatic. Eyes: Conjunctivae are normal. No scleral icterus. Neck: No JVD present. Cardiovascular: An irregularly irregular rhythm present. Bradycardia present. Exam reveals no gallop. Murmur heard. Early systolic murmur is present with a grade of 2/6. Pulmonary/Chest: Effort normal and breath sounds normal. No stridor. He is intubated. No respiratory distress. He has no wheezes. He has no rales. Abdominal: Soft. He exhibits no distension. Musculoskeletal: He exhibits no edema or deformity. Neurological:  
Sedated Skin: Skin is warm and dry. Data reviewed: 
Recent Results (from the past 12 hour(s)) EKG, 12 LEAD, SUBSEQUENT Collection Time: 02/05/19  4:25 AM  
Result Value Ref Range Ventricular Rate 55 BPM  
 Atrial Rate 234 BPM  
 QRS Duration 106 ms  
 Q-T Interval 554 ms QTC Calculation (Bezet) 529 ms Calculated R Axis 45 degrees Calculated T Axis -114 degrees Diagnosis Atrial fibrillation with slow ventricular response ST & T wave abnormality, consider inferior ischemia or digitalis effect ST & T wave abnormality, consider anterolateral ischemia or digitalis effect Prolonged QT When compared with ECG of 04-FEB-2019 15:25, 
Vent. rate has decreased BY  29 BPM 
T wave inversion now evident in Inferior leads T wave inversion more evident in Anterior leads Confirmed by Paul Newton M.D., [facility] ([id number]) on 2/5/2019 65:37:24 AM 
  
METABOLIC PANEL, BASIC Collection Time: 02/05/19  6:05 AM  
Result Value Ref Range Sodium 138 136 - 145 mmol/L Potassium 3.8 3.5 - 5.1 mmol/L Chloride 103 97 - 108 mmol/L  
 CO2 23 21 - 32 mmol/L Anion gap 12 5 - 15 mmol/L Glucose 194 (H) 65 - 100 mg/dL BUN 52 (H) 6 - 20 MG/DL Creatinine 2.12 (H) 0.70 - 1.30 MG/DL  
 BUN/Creatinine ratio 25 (H) 12 - 20 GFR est AA 38 (L) >60 ml/min/1.73m2 GFR est non-AA 31 (L) >60 ml/min/1.73m2 Calcium 9.1 8.5 - 10.1 MG/DL MAGNESIUM Collection Time: 02/05/19  6:05 AM  
Result Value Ref Range Magnesium 3.2 (H) 1.6 - 2.4 mg/dL CBC W/O DIFF Collection Time: 02/05/19  6:05 AM  
Result Value Ref Range WBC 4.4 4.1 - 11.1 K/uL  
 RBC 3.04 (L) 4.10 - 5.70 M/uL HGB 8.5 (L) 12.1 - 17.0 g/dL HCT 28.0 (L) 36.6 - 50.3 % MCV 92.1 80.0 - 99.0 FL  
 MCH 28.0 26.0 - 34.0 PG  
 MCHC 30.4 30.0 - 36.5 g/dL  
 RDW 13.8 11.5 - 14.5 % PLATELET 62 (L) 436 - 400 K/uL MPV 11.7 8.9 - 12.9 FL  
 NRBC 0.0 0  WBC ABSOLUTE NRBC 0.00 0.00 - 0.01 K/uL  
TROPONIN I Collection Time: 02/05/19  6:05 AM  
Result Value Ref Range Troponin-I, Qt. 1.07 (H) <0.05 ng/mL GLUCOSE, POC Collection Time: 02/05/19  6:10 AM  
Result Value Ref Range Glucose (POC) 220 (H) 65 - 100 mg/dL Performed by Ike Pelaez POC EG7 Collection Time: 02/05/19  6:15 AM  
Result Value Ref Range Calcium, ionized (POC) 1.05 (L) 1.12 - 1.32 mmol/L  
 FIO2 (POC) 40 % pH (POC) 7.401 7.35 - 7.45    
 pCO2 (POC) 37.6 35.0 - 45.0 MMHG  
 pO2 (POC) 110 (H) 80 - 100 MMHG  
 HCO3 (POC) 23.4 22 - 26 MMOL/L Base deficit (POC) 1 mmol/L  
 sO2 (POC) 98 (H) 92 - 97 % Site RIGHT RADIAL Device: VENT Mode ASSIST CONTROL Tidal volume 500 ml Set Rate 12 bpm  
 PEEP/CPAP (POC) 5 cmH2O  Allens test (POC) YES    
 Specimen type (POC) ARTERIAL    
GLUCOSE, POC Collection Time: 02/05/19 11:27 AM  
Result Value Ref Range Glucose (POC) 213 (H) 65 - 100 mg/dL Performed by Marty Mohan POC EG7 Collection Time: 02/05/19 11:27 AM  
Result Value Ref Range Calcium, ionized (POC) 1.04 (L) 1.12 - 1.32 mmol/L  
 FIO2 (POC) 35 % pH (POC) 7.376 7.35 - 7.45    
 pCO2 (POC) 41.1 35.0 - 45.0 MMHG  
 pO2 (POC) 129 (H) 80 - 100 MMHG  
 HCO3 (POC) 24.1 22 - 26 MMOL/L Base deficit (POC) 1 mmol/L  
 sO2 (POC) 99 (H) 92 - 97 % Site RIGHT RADIAL Device: VENT Mode ASSIST CONTROL Tidal volume 500 ml Set Rate 12 bpm  
 PEEP/CPAP (POC) 5 cmH2O Allens test (POC) YES Specimen type (POC) ARTERIAL Total resp. rate 12 METABOLIC PANEL, BASIC Collection Time: 02/05/19 11:28 AM  
Result Value Ref Range Sodium 138 136 - 145 mmol/L Potassium 3.9 3.5 - 5.1 mmol/L Chloride 104 97 - 108 mmol/L  
 CO2 23 21 - 32 mmol/L Anion gap 11 5 - 15 mmol/L Glucose 184 (H) 65 - 100 mg/dL BUN 55 (H) 6 - 20 MG/DL Creatinine 2.21 (H) 0.70 - 1.30 MG/DL  
 BUN/Creatinine ratio 25 (H) 12 - 20 GFR est AA 36 (L) >60 ml/min/1.73m2 GFR est non-AA 30 (L) >60 ml/min/1.73m2 Calcium 8.4 (L) 8.5 - 10.1 MG/DL MAGNESIUM Collection Time: 02/05/19 11:28 AM  
Result Value Ref Range Magnesium 3.0 (H) 1.6 - 2.4 mg/dL CBC W/O DIFF Collection Time: 02/05/19 11:28 AM  
Result Value Ref Range WBC 3.8 (L) 4.1 - 11.1 K/uL  
 RBC 2.97 (L) 4.10 - 5.70 M/uL HGB 8.2 (L) 12.1 - 17.0 g/dL HCT 27.1 (L) 36.6 - 50.3 % MCV 91.2 80.0 - 99.0 FL  
 MCH 27.6 26.0 - 34.0 PG  
 MCHC 30.3 30.0 - 36.5 g/dL  
 RDW 13.9 11.5 - 14.5 % PLATELET 59 (L) 114 - 400 K/uL MPV 11.2 8.9 - 12.9 FL  
 NRBC 0.0 0  WBC ABSOLUTE NRBC 0.00 0.00 - 0.01 K/uL POC G3 - PUL Collection Time: 02/05/19 11:30 AM  
Result Value Ref Range FIO2 (POC) 35 %  pH (POC) 7.370 7.35 - 7.45    
 pCO2 (POC) 44.8 35.0 - 45.0 MMHG  
 pO2 (POC) 135 (H) 80 - 100 MMHG  
 HCO3 (POC) 25.9 22 - 26 MMOL/L  
 sO2 (POC) 99 (H) 92 - 97 % Base excess (POC) 1 mmol/L Site RIGHT RADIAL Device: VENT Mode ASSIST CONTROL Tidal volume 500 ml Set Rate 12 bpm  
 PEEP/CPAP (POC) 5 cmH2O Allens test (POC) YES Specimen type (POC) ARTERIAL Total resp. rate 12 Tele: a.fib, rate in the upper 30s to 40s Echo: 
2/5/19 · Estimated left ventricular ejection fraction is 56 - 60%. Left ventricular moderate concentric hypertrophy. Normal left ventricular wall motion, no regional wall motion abnormality noted. Normal left ventricular strain. · Left atrial cavity size is severely dilated. · Right atrial cavity size is mildly dilated. · Mild aortic valve sclerosis with no significant stenosis. · Moderate mitral annular calcification. Mild mitral valve regurgitation. · Mild tricuspid valve regurgitation is present. Severe pulmonary hypertension is present. Assessment: 1. Cardiac arrest. PEA. Unclear etiology. ? Anaphylaxis but not sure to what agent 
  
2. Possible anoxic brain injury 
  
3. Positive troponin - likely from CPR 
  
4. Chronic a.fib Slow rate currently 
  
5. CKD stage III 
  
6. Anemia, probably from CKD 
  
7. Gout 8. Thrombocytopenia: unclear etiology 9. Pulmonary hypertension 
  
Plan: To commence controlled re-warming this afternoon Continue to hold Eliquis for now Probably resume that late tomorrow Signed: 
Grant Montalvo MD 
Interventional Cardiology 2/5/2019

## 2019-02-05 NOTE — PROGRESS NOTES
0730 Bedside shift change report given to Peter Johnson (oncoming nurse) by [facility] (offgoing nurse). Report included the following information SBAR, Intake/Output, MAR, Recent Results and Cardiac Rhythm A fib.  
 
1000 Minimum urine output. Bladder scan 120mL urine present. [place] irrigated without improvement in urine output. Will replace [address]. [address] using sterile technique. 1200 Dr. Skye Alaniz with Neurology at bedside. Propofol paused. Pt withdrawing from pain. 1 Dr. Calvin Andre and Dr. Marcello Mcniar at bedside. No new orders received. Plan to continue to monitor patient and begin to rewarm at 1640.  
 
1641 Began rewarming pt. Machine settings verified by Peter Lopez, RN and Alexander Colindres RN. 
 
7716 Pt reaching both hands up to face and wiggling toes bilaterally. Propofol and fentanyl gtt increased. See MAR for further titration details. 1741 Ionized Calcium 1.0. Dr. Marecllo Mcnair notified. Order received for 1 gram Calcium Gluconate. 1930 Bedside shift change report given to Adonay Valenzuela (oncoming nurse) by Peter Johnson (offgoing nurse). Report included the following information SBAR, Intake/Output, MAR, Recent Results and Cardiac Rhythm A fib.

## 2019-02-05 NOTE — PROGRESS NOTES
Problem: Falls - Risk of 
Goal: *Absence of Falls Document Samara Marques Fall Risk and appropriate interventions in the flowsheet. Outcome: Progressing Towards Goal 
Fall Risk Interventions: 
  
 
  
 
Medication Interventions: Evaluate medications/consider consulting pharmacy Elimination Interventions: Patient to call for help with toileting needs Problem: Pressure Injury - Risk of 
Goal: *Prevention of pressure injury Document Dom Scale and appropriate interventions in the flowsheet. Outcome: Progressing Towards Goal 
Pressure Injury Interventions: 
Sensory Interventions: Maintain/enhance activity level Activity Interventions: Pressure redistribution bed/mattress(bed type) Mobility Interventions: HOB 30 degrees or less, Pressure redistribution bed/mattress (bed type) Nutrition Interventions: Discuss nutritional consult with provider Friction and Shear Interventions: HOB 30 degrees or less, Lift sheet

## 2019-02-05 NOTE — PROCEDURES
ELECTROENCEPHALOGRAM REPORT    HISTORY: Patient is a 81-year-old male who is being evaluated for altered  mental status s/p cardiac arrest.     DESCRIPTION: This is an 18-channel EEG performed on an unresponsive  patient. The dominant posterior background rhythm consists of  medium-voltage rhythms in the 5 Hz frequency range out of the posterior  head region. Photic stimulation does not elicit a significant driving response. Hyperventilation was not performed. ELECTROENCEPHALOGRAM SUMMARY: Mildly abnormal EEG due to mild slowing of  the background rhythms. CLINICAL INTERPRETATION: This EEG is suggestive of some mild generalized  encephalopathic process, nonspecific in type. This may be related to  underlying structural brain injury and/or toxic/metabolic abnormality. No  clearly lateralizing or epileptiform features were seen. Please correlate  clinically.     Lisa Carlos DO  02/05/19

## 2019-02-05 NOTE — PROGRESS NOTES
Initial Pulmonary / Critical Care Consultation Assessment / Plan:   
Acute resp failure - 2/2 OOH cardiac arrest - on mechanical ventilation - FiO2 40%. No asdz on initial cxr, follow up with mild basilar atelectasis PEA arrest 
 
Gout - s/p Farzaneh Lyon Chronic afib DM 
 
CKD with anemia 
 
--vent support 
--vent bundle / lung protective ventilation 
--code ice per protocol 
-- On pepcid  
--Cardiology following  
--neuro eval --- Head CT OK The patient is critically ill and is at significant risk of decompensation. D/W RN and family at bedside History / Subjective: 
Reason:  Respiratory failure Requesting Provider:  Dr Adria Terry 2/5 Seen and examined. Sedated, on  protocol. CXR with mild bibasilar haziness- suspected atelectasis. Await rewarming 2/4 Jerri Chamberlain is a 72 y.o.  male who  has a past medical history of Acute encephalopathy (1/14/2016), Anemia (5/11/2017), Atrial fibrillation ([address] Painter esophagus, CAD (coronary artery disease), Diabetes ([address] Heart failure ([address] HTN, goal below 140/90, Retinopathy, diabetic, bilateral ([address] (3/11/2016), Stenosis of right carotid artery without cerebral infarction (1/18/2016), TIA (transient ischemic attack) (1/14/2016), and Vitamin D deficiency (3/1/2016). admitted 2/4/2019 with cardiac arrest 
 
Pt receiving infusion for gout when becam unresponsive PEA arrest intubated and given epi ROSC after approximately 10 minutes Head CT OK Placed on code ice protocol Not on vasopressors Has chronic afib. Current rhythm is afib No Known Allergies Past Medical History:  
Diagnosis Date  Acute encephalopathy 1/14/2016  Anemia 5/11/2017  Atrial fibrillation ([address]  Painter esophagus  CAD (coronary artery disease)  Diabetes ([address]  Heart failure ([address] Cardiomyopathy, myocarditis  HTN, goal below 140/90  Retinopathy, diabetic, bilateral ([address] 3/11/2016  Stenosis of right carotid artery without cerebral infarction 1/18/2016  TIA (transient ischemic attack) 1/14/2016  Vitamin D deficiency 3/1/2016 Nephrology ordered and follow 2/22/16 Past Surgical History:  
Procedure Laterality Date  COLONOSCOPY N/A 6/23/2016 COLONOSCOPY performed by Mallika Tinsley MD at [facility] ENDOSCOPY  COLONOSCOPY N/A 8/6/2018 COLONOSCOPY performed by Mallika Tinsley MD at [facility] ENDOSCOPY  COLONOSCOPY N/A 8/30/2018 COLONOSCOPY performed by Torrey Butler MD at [address] HX ENDOSCOPY  06/27/2016 CAPSULE; normal small bowel  HX ENDOSCOPY  06/23/2016  
 upper endoscopy  HX UROLOGICAL  2013 Prior to Admission medications Medication Sig Start Date End Date Taking? Authorizing Provider  
levothyroxine (SYNTHROID) 25 mcg tablet TAKE 1 TABLET BY MOUTH EVERY DAY BEFORE BREAKFAST 1/22/19   Lise Fairchild MD  
insulin glargine (LANTUS,BASAGLAR) 100 unit/mL (3 mL) inpn 14 Units by SubCUTAneous route nightly. 1/21/19   Lise Fairchild MD  
leflunomide (ARAVA) 20 mg tablet Take 20 mg by mouth every evening. Provider, Historical  
diphenhydrAMINE (BENADRYL) 25 mg capsule Take 25 mg by mouth every fourteen (14) days. Premed for [place]    Provider, Historical  
colchicine 0.6 mg tablet Take 1 Tab by mouth daily as needed. 12/14/18   Rhonda Espitia MD  
ELIQUIS 5 mg tablet TAKE 1 TABLET BY MOUTH TWICE A DAY 11/9/18   Provider, Historical  
pegloticase (KRYSTEXXA) 8 mg/mL soln injection 8 mg by IntraVENous route Once every 2 weeks. Provider, Historical  
digoxin (LANOXIN) 0.125 mg tablet Take 0.125 mg by mouth. Pt takes daily x 2 days, then skips a day and repeats this schedule. Provider, Historical  
dextran 70-hypromellose (ARTIFICIAL TEARS,[id number],) ophthalmic solution Administer 1 Drop to both eyes as needed.     Provider, Historical  
loperamide (IMODIUM) 2 mg capsule Take 2 mg by mouth four (4) times daily as needed for Diarrhea. Provider, Historical  
furosemide (LASIX) 40 mg tablet Take 40 mg by mouth daily. 5/10/16   Provider, Historical  
hydrALAZINE (APRESOLINE) 25 mg tablet Take 25 mg by mouth three (3) times daily. 16   Provider, Historical  
omeprazole (PRILOSEC) 20 mg capsule Take 20 mg by mouth nightly. Provider, Historical  
multivitamin (ONE A DAY) tablet Take 1 Tab by mouth daily. Provider, Historical  
pravastatin (PRAVACHOL) 20 mg tablet Take 1 Tab by mouth nightly. 16   Enedina Zimmerman NP  
carvedilol (COREG) 3.125 mg tablet Take 1 Tab by mouth two (2) times daily (with meals). 16   Enedina Zimmerman NP Family History Problem Relation Age of Onset  Heart Failure Mother  Diabetes Father  No Known Problems Sister  Other Daughter Janny Mock  Other Daughter Janny Mock  Other Daughter Janny Mock Social History Tobacco Use  Smoking status: Never Smoker  Smokeless tobacco: Never Used Substance Use Topics  Alcohol use: Yes Comment: 2 drinks/week, never a heavy drinker ROS:  Review of systems not obtained due to patient factors. Objective: 
Patient Vitals for the past 4 hrs: 
 BP Temp Pulse Resp SpO2  
19 0900 116/59 (!) 91.4 °F (33 °C) (!) 48 14 100 % 19 0800 115/49 (!) 91.6 °F (33.1 °C) (!) 44 16 99 % 19 0700 130/52 (!) 91.6 °F (33.1 °C) (!) 49 13 99 % 19 0630 127/48  (!) 49 13 100 % 19 0600 150/75 (!) 91.6 °F (33.1 °C) (!) 51 15 100 % 19 0530 144/61  (!) 53 15 100 % Temp (24hrs), Av.9 °F (33.8 °C), Min:91.2 °F (32.9 °C), Max:97.2 °F (36.2 °C) CVP:       
 
Intake/Output Summary (Last 24 hours) at 2019 6430 Last data filed at 2019 0800 Gross per 24 hour Intake 1611.23 ml Output 2325 ml Net -713.77 ml Blood Sugar: 
Glucose Date Value Ref Range Status 2019 194 (H) 65 - 100 mg/dL Final  
 02/05/2019 195 (H) 65 - 100 mg/dL Final  
02/04/2019 145 (H) 65 - 100 mg/dL Final  
02/04/2019 148 (H) 65 - 100 mg/dL Final  
02/04/2019 82 65 - 100 mg/dL Final  
 
Exam: 
Sedate Oral ett 
 moves all extremities spontaneously No accessory use Symmetrical chest expansion Scattered rhonchi 
irreg Soft Warm and dry No edema Lab data was reviewed. Radiology images were independently viewed and available reports were reviewed. CXR - ETT, no acute process Lab: 
Recent Labs 02/05/19 
6774 02/05/19 
0002 02/04/19 
1622 02/04/19 
1608 02/04/19 
1219  02/04/19 
1128 WBC 4.4 5.6  --  7.6 5.7  --  4.5 HGB 8.5* 8.4*  --  9.1* 9.8*  --  9.6* PLT 62* 61*  --  75* 96*  --  84*  138  --  138 142  --  142  
K 3.8 3.9  --  3.6 3.2*  --  3.8  103  --  105 102  --  103 CO2 23 24  --  24 27  --  29  
BUN 52* 50*  --  46* 45*  --  45* CREA 2.12* 2.19*  --  1.97* 2.26*  --  1.86* * 195*  --  145* 148*  --  82  
CA 9.1 8.0*  --  8.1* 8.9  --  9.2 MG 3.2* 2.5*  --  1.9  --    < >  --   
PHOS  --   --   --  3.9  --   --   --   
INR  --  1.4* 1.4*  --  1.4*  --   --   
TROIQ 1.07* 1.20*  --  0.82* 0.07*   < >  --   
TBILI  --   --   --   --  0.9  --  0.9 SGOT  --   --   --   --  45*  --  27 LAC  --   --   --  1.2  --   --   --   
 < > = values in this interval not displayed. ABG: 
Recent Labs 02/05/19 
0615 02/05/19 
0020 02/04/19 
1655 PHI 7.401 7.341* 7.302* PCO2I 37.6 44.8 49.1*  
PO2I 110* 151* 111* HCO3I 23.4 24.2 24.3 SO2I 98* 99* 98* FIO2I 40 40 40 All Micro Results Procedure Component Value Units Date/Time CULTURE, BLOOD, PAIRED [[id number]] Collected:  02/04/19 1608 Order Status:  Completed Specimen:  Blood Updated:  02/05/19 9178 Special Requests: NO SPECIAL REQUESTS Culture result: NO GROWTH AFTER 11 HOURS Luigi Nur MD

## 2019-02-05 NOTE — PROGRESS NOTES
Problem: Falls - Risk of 
Goal: *Absence of Falls Document Richland Valdo Fall Risk and appropriate interventions in the flowsheet. Outcome: Progressing Towards Goal 
Fall Risk Interventions: 
  
 
  
 
Medication Interventions: Evaluate medications/consider consulting pharmacy Elimination Interventions: Call light in reach Problem: Pressure Injury - Risk of 
Goal: *Prevention of pressure injury Document Dom Scale and appropriate interventions in the flowsheet. Outcome: Progressing Towards Goal 
Pressure Injury Interventions: 
Sensory Interventions: Assess changes in LOC, Check visual cues for pain, Keep linens dry and wrinkle-free Activity Interventions: Pressure redistribution bed/mattress(bed type) Mobility Interventions: Pressure redistribution bed/mattress (bed type) Nutrition Interventions: Discuss nutritional consult with provider Friction and Shear Interventions: Lift sheet

## 2019-02-05 NOTE — PROGRESS NOTES
Rounded on [facility] patients and provided Anointing of the Sick  at request of family Fr. Elder Marrero

## 2019-02-05 NOTE — PROGRESS NOTES
Hospitalist Progress Note Elvira George MD 
Answering service: [phone] OR [phone] from in house phone Date of Service:  2019 NAME:  Gabi Zafar :  1945 MRN:  [id number] Admission Summary:  
The patient is a 61-year-old gentleman with past medical history of anemia, atrial fibrillation, Painter's esophagus, coronary artery disease, diabetes, heart failure, cardiomyopathy, myocarditis, history of hypertension, diabetic retinopathy, right carotid artery stenosis, TIA and vitamin D deficiency who presents to the hospital from [facility]. Patient presented to [facility] today apparently in PEA. The history was primarily obtained from the ER physician as the patient is intubated and his wife is not present at the bedside. Interval history / Subjective:  
Remains intubated and rewarming started Assessment & Plan: 1.  status post cardiac arrest/pulseless electrical activity. - Patient currently on hypothermic protocol and started to re warm -  Cardiology following 
- ECHO - EF WNL 
- Cycle troponins. - trending down may be from CPR  
- Will get an EEG. non specific no seizure activity 2. Diabetes. The patient will be on sliding scale NovoLog insulin and Accu-Cheks, diet control and close monitoring Rena Lopez 3. Hypothyroidism. Put patient on IV Synthroid. 4. CKD stage 3 - worsen from hypotension from CPR / cardiac arrest? 
- nephrology on board 4. Anemia, appears to be chronic. Monitor H and H. Further intervention per hospital course. 5.  Hypernatremia. Patient is on half normal saline. 6.  Gastrointestinal PPX 7. Low plt from the biologic agents? Code status: Full DVT prophylaxis: SCD Care Plan discussed with: Patient/Family and Nurse Disposition: TBD d/w family at bedside Hospital Problems  Date Reviewed: 2019 Codes Class Noted POA * (Principal) Cardiac arrest [facility]) ICD-10-CM: I46.9 ICD-9-CM: 427.5  2/4/2019 Unknown Review of Systems:  
Review of systems not obtained due to patient factors. Vital Signs:  
 Last 24hrs VS reviewed since prior progress note. Most recent are: 
Visit Vitals /44 Pulse (!) 50 Temp (!) 91.6 °F (33.1 °C) Resp 15 Ht 5' 10\" (1.778 m) Wt 83.7 kg (184 lb 8.4 oz) SpO2 100% BMI 26.48 kg/m² Intake/Output Summary (Last 24 hours) at 2/5/2019 1500 Last data filed at 2/5/2019 1400 Gross per 24 hour Intake 2324.17 ml Output 2665 ml Net -340.83 ml Physical Examination:  
 
 
     
Constitutional:  Sedated ENT:  Oral mucous moist   
Resp:  CTA bilaterally. CV:  Regular rhythm, ,bradycardia GI:  Soft, non distended, non tender. bs+ Musculoskeletal:  No edema, warm, 2+ pulses throughout Neurologic:  Moves all extremities. AAOx0,   
   
  
 
Data Review:  
 I personally reviewed  Image and labs Labs:  
 
Recent Labs 02/05/19 
1128 02/05/19 
7518 WBC 3.8* 4.4 HGB 8.2* 8.5* HCT 27.1* 28.0*  
PLT 59* 62* Recent Labs 02/05/19 
1128 02/05/19 
0605 02/05/19 
0002 02/04/19 
1608  02/04/19 
1128  138 138 138   < > 142 K 3.9 3.8 3.9 3.6   < > 3.8  103 103 105   < > 103 CO2 23 23 24 24   < > 29 BUN 55* 52* 50* 46*   < > 45* CREA 2.21* 2.12* 2.19* 1.97*   < > 1.86* * 194* 195* 145*   < > 82  
CA 8.4* 9.1 8.0* 8.1*   < > 9.2 MG 3.0* 3.2* 2.5* 1.9   < >  --   
PHOS  --   --   --  3.9  --   --   
URICA  --   --   --   --   --  4.5  
 < > = values in this interval not displayed. Recent Labs 02/04/19 
1219 02/04/19 
1128 SGOT 45* 27 ALT 44 29 AP 90 86 TBILI 0.9 0.9 TP 7.2 7.4 ALB 3.4* 3.6 GLOB 3.8 3.8 Recent Labs 02/05/19 
0002 02/04/19 
1622 02/04/19 
1219 INR 1.4* 1.4* 1.4* PTP 14.0* 13.9* 13.9* APTT 32.7* 30.5 28.7 No results for input(s): FE, TIBC, PSAT, FERR in the last 72 hours. No results found for: FOL, RBCF Recent Labs 02/04/19 
1238 PH 7.29* PCO2 48* PO2 429* Recent Labs 02/05/19 
1312 02/05/19 
0002 02/04/19 
1608 TROIQ 1.07* 1.20* 0.82* Lab Results Component Value Date/Time Cholesterol, total 135 02/23/2018 11:31 AM  
 HDL Cholesterol 34 (L) 02/23/2018 11:31 AM  
 LDL, calculated 79 02/23/2018 11:31 AM  
 Triglyceride 111 02/23/2018 11:31 AM  
 CHOL/HDL Ratio 5.0 01/15/2016 03:49 AM  
 
Lab Results Component Value Date/Time Glucose (POC) 213 (H) 02/05/2019 11:27 AM  
 Glucose (POC) 220 (H) 02/05/2019 06:10 AM  
 Glucose (POC) 231 (H) 02/05/2019 12:06 AM  
 Glucose (POC) 174 (H) 02/04/2019 07:24 PM  
 Glucose (POC) 151 (H) 02/04/2019 12:23 PM  
 Glucose (POC) 211 (H) 01/15/2019 11:33 AM  
 
Lab Results Component Value Date/Time Color YELLOW/STRAW 02/04/2019 04:08 PM  
 Appearance CLOUDY (A) 02/04/2019 04:08 PM  
 Specific gravity 1.012 02/04/2019 04:08 PM  
 pH (UA) 5.0 02/04/2019 04:08 PM  
 Protein 30 (A) 02/04/2019 04:08 PM  
 Glucose NEGATIVE  02/04/2019 04:08 PM  
 Ketone TRACE (A) 02/04/2019 04:08 PM  
 Bilirubin NEGATIVE  02/04/2019 04:08 PM  
 Urobilinogen 0.2 02/04/2019 04:08 PM  
 Nitrites NEGATIVE  02/04/2019 04:08 PM  
 Leukocyte Esterase NEGATIVE  02/04/2019 04:08 PM  
 Epithelial cells FEW 02/04/2019 04:08 PM  
 Bacteria NEGATIVE  02/04/2019 04:08 PM  
 WBC 10-20 02/04/2019 04:08 PM  
 RBC  02/04/2019 04:08 PM  
 
 
 
Medications Reviewed:  
 
Current Facility-Administered Medications Medication Dose Route Frequency  pantoprazole (PROTONIX) 40 mg in sodium chloride 0.9% 10 mL injection  40 mg IntraVENous DAILY  sodium chloride (NS) flush 5-40 mL  5-40 mL IntraVENous Q8H  
 sodium chloride (NS) flush 5-40 mL  5-40 mL IntraVENous PRN  
 acetaminophen (TYLENOL) suppository 650 mg  650 mg Rectal Q4H PRN  
  chlorhexidine (PERIDEX) 0.12 % mouthwash 15 mL  15 mL Oral Q12H  
 sodium chloride (NS) flush 5-40 mL  5-40 mL IntraVENous Q8H  
 sodium chloride (NS) flush 5-40 mL  5-40 mL IntraVENous PRN  
 0.45% sodium chloride infusion  75 mL/hr IntraVENous CONTINUOUS  
 glucose chewable tablet 16 g  4 Tab Oral PRN  
 dextrose (D50W) injection syrg 12.5-25 g  25-50 mL IntraVENous PRN  
 glucagon (GLUCAGEN) injection 1 mg  1 mg IntraMUSCular PRN  
 insulin lispro (HUMALOG) injection   SubCUTAneous Q6H  
 [START ON 2/7/2019] levothyroxine (SYNTHROID) injection 18.75 mcg  18.75 mcg IntraVENous Q24H  
 fentaNYL (PF) 1,500 mcg/30 mL (50 mcg/mL) infusion  0-200 mcg/hr IntraVENous TITRATE  propofol (DIPRIVAN) infusion  0-50 mcg/kg/min IntraVENous TITRATE  
 0.9% sodium chloride infusion  3 mL/hr IntraVENous CONTINUOUS  
 0.9% sodium chloride infusion  5 mL/hr IntraVENous CONTINUOUS  
 PHENYLephrine (GINA-SYNEPHRINE) 30 mg in 0.9% sodium chloride 250 mL infusion   mcg/min IntraVENous TITRATE  
 
______________________________________________________________________ EXPECTED LENGTH OF STAY: 2d 0h 
ACTUAL LENGTH OF STAY:          1 Radha Ortez MD

## 2019-02-05 NOTE — CONSULTS
[address]    Name:  Clark Montoya  MR#:  [id number]  :  1945  ACCOUNT #:  [de-identified]  DATE OF SERVICE:  2019      ATTENDING PHYSICIAN:  Rose Quiles MD    REQUESTING PHYSICIAN:  Rajesh Villanueva MD    REASON FOR CONSULTATION:  Management of renal failure. The patient was seen and examined. History obtained mostly  from chart review. HISTORY OF PRESENT ILLNESS:  The patient is a 47-year-old   male with past medical history significant for  CAD, diabetes, cardiomyopathy, history of myocarditis,  hypertension, CKD, anemia, AFib, right carotid artery  stenosis, history of TIA who presented to [facility] with a PEA cardiac arrest.  The patient was  supposed to get infusion for treatment of gout as  outpatient. He was not feeling well and suddenly became  unresponsive when he was receiving premedication with IV  Solu-Medrol and Pepcid. Apparently, he did not receive the  gout medication yet. He had 10-minute out-of-hospital  resuscitation. He is on code ICE protocol. He was  transferred to [facility] for further management. He is currently intubated and not responsive. Family at the  bedside. He is making urine. He is not on pressors. He is  borderline bradycardic with his AFib. From renal standpoint, he has underlying CKD. Baseline  creatinine has fluctuated widely and ranges anywhere from  1.6 to 2 range. His renal function has been relatively  stable through the segment with creatinine ranging from 1.97  to 2.26. It is close to his baseline.     Past Medical History:   Diagnosis Date    Acute encephalopathy 2016    Anemia 2017    Atrial fibrillation (HCC)     Painter esophagus     CAD (coronary artery disease)     Diabetes (HCC)     Heart failure (HCC)     Cardiomyopathy, myocarditis    HTN, goal below 140/90     Retinopathy, diabetic, bilateral ([facility] [address] 3/11/2016    Stenosis of right carotid artery without cerebral infarction 1/18/2016    TIA (transient ischemic attack) 1/14/2016    Vitamin D deficiency 3/1/2016    Nephrology ordered and follow 2/22/16      Past Surgical History:   Procedure Laterality Date    COLONOSCOPY N/A 6/23/2016    COLONOSCOPY performed by Jordana Ohara MD at [facility]. N/A 8/6/2018    COLONOSCOPY performed by Jordana Ohara MD at [address] 8/30/2018    COLONOSCOPY performed by Bertrand Kang MD at [facility] HX ENDOSCOPY  06/27/2016    CAPSULE; normal small bowel    HX ENDOSCOPY  06/23/2016    upper endoscopy    HX UROLOGICAL  2013     No Known Allergies     Medications Prior to Admission   Medication Sig    insulin glargine (LANTUS,BASAGLAR) 100 unit/mL (3 mL) inpn 14 Units by SubCUTAneous route nightly.  ELIQUIS 5 mg tablet TAKE 1 TABLET BY MOUTH TWICE A DAY    pegloticase (KRYSTEXXA) 8 mg/mL soln injection 8 mg by IntraVENous route Once every 2 weeks.  digoxin (LANOXIN) 0.125 mg tablet Take 0.125 mg by mouth. Pt takes daily x 2 days, then skips a day and repeats this schedule.  dextran 70-hypromellose (ARTIFICIAL TEARS,[id number],) ophthalmic solution Administer 1 Drop to both eyes as needed.  hydrALAZINE (APRESOLINE) 25 mg tablet Take 25 mg by mouth three (3) times daily.  multivitamin (ONE A DAY) tablet Take 1 Tab by mouth daily.  pravastatin (PRAVACHOL) 20 mg tablet Take 1 Tab by mouth nightly.  carvedilol (COREG) 3.125 mg tablet Take 1 Tab by mouth two (2) times daily (with meals).  levothyroxine (SYNTHROID) 25 mcg tablet TAKE 1 TABLET BY MOUTH EVERY DAY BEFORE BREAKFAST    leflunomide (ARAVA) 20 mg tablet Take 20 mg by mouth every evening.  furosemide (LASIX) 40 mg tablet Take 40 mg by mouth daily.  omeprazole (PRILOSEC) 20 mg capsule Take 20 mg by mouth nightly.      Social History     Tobacco Use    Smoking status: Never Smoker    Smokeless tobacco: Never Used   Substance Use Topics    Alcohol use: Yes     Comment: 2 drinks/week, never a heavy drinker    Drug use: No     Family History   Problem Relation Age of Onset    Heart Failure Mother     Diabetes Father     No Known Problems Sister     Other Daughter         Rajendra Small Other Daughter         Rajendra Small Other Daughter         Hashimoto         REVIEW OF SYSTEMS:  Unable to obtain. PHYSICAL EXAMINATION:  GENERAL:  Elderly male who is intubated and unresponsive on  vent. VITAL SIGNS:  He is hypothermic with code ICE protocol. Pulse 49-51; -785 systolic, 39-54 diastolic;  respiration of 14, saturating 100% on ventilator. HEENT:  Head is atraumatic, normocephalic. No scleral  icterus. NECK:  Endotracheal tube in place. LUNGS:  Symmetrical chest expansion. Scattered rhonchi. HEART:  Irregular rhythm with bradycardia. No murmur, no  rub. ABDOMEN:  Soft, nontender, normal bowel sounds. EXTREMITIES:  No clubbing, cyanosis or edema. GENITOURINARY:  Chacon catheter in place. SKIN:  Without any rashes. Peripheries are warm. LABORATORY DATA:  CBC:  Hemoglobin 8.5, WBC and platelets  are normal.  Chemistry:  BUN 52, creatinine 2.12, potassium  bicarbonate normal.  Blood culture has been drawn and so far  negative. Chest x-ray:  No significant change compared to  yesterday. It showed mild bibasilar haziness favoring  atelectasis. No overt pulmonary edema. CT head did not  show any acute intracranial process. Previous ultrasound of the abdomen in December 2018 showed  normal size bilateral kidneys without any hydronephrosis. ASSESSMENT:  1. Chronic kidney disease stage 3.  2.  Status post pulseless electrical activity arrest.  3.  Chronic atrial fibrillation with bradycardia. 4.  Gout with status post Krystexxa. 5.  Acute respiratory failure secondary to out-of-hospital  cardiac arrest.  Stable oxygenation. 6.  Anemia with likely component of anemia of chronic kidney  disease.   7.  Diabetes with diabetic chronic kidney disease. 8.  Hypertension. The patient is stable from renal standpoint. Creatinine is  close to baseline. He is nonoliguric. Electrolytes are  acceptable. RECOMMENDATIONS:  1. Continue vent support. 2.  Rate and rhythm control as possible. 3.  Avoid nephrotoxins. 4.  Pressors if needed for any hypotension. 5.  Monitor I's and O's.  6.  We will consider epoetin once he is extubated and stable  at a later date. Thanks for renal consult. We will follow the patient with  you. Discuss with patient's family and CCU nurse.         Jey Forrester MD      [id number]  D:  02/05/2019 10:25  T:  02/05/2019 13:28  JOB #:  [id number]

## 2019-02-05 NOTE — CONSULTS
044115- consult dictated    Pt with DM-2, HTN, CKD, Gout OOH PEA arrest prior to receiving Gout med via infusion; code ice protocol  Consulted for CKD  Cr close to baseline. Non oliguric.  Lytes stable  Ct supp care  Will consider EPO once extubated and more stable    Ike Smith MD  [facility]

## 2019-02-06 ENCOUNTER — APPOINTMENT (OUTPATIENT)
Dept: GENERAL RADIOLOGY | Age: 74
DRG: 242 | End: 2019-02-06
Attending: INTERNAL MEDICINE
Payer: MEDICARE

## 2019-02-06 ENCOUNTER — APPOINTMENT (OUTPATIENT)
Dept: GENERAL RADIOLOGY | Age: 74
DRG: 242 | End: 2019-02-06
Attending: HOSPITALIST
Payer: MEDICARE

## 2019-02-06 LAB
ANION GAP SERPL CALC-SCNC: 10 MMOL/L (ref 5–15)
ANION GAP SERPL CALC-SCNC: 11 MMOL/L (ref 5–15)
ANION GAP SERPL CALC-SCNC: 14 MMOL/L (ref 5–15)
APTT PPP: 29.4 SEC (ref 22.1–32)
ARTERIAL PATENCY WRIST A: ABNORMAL
ARTERIAL PATENCY WRIST A: YES
ATRIAL RATE: 0 BPM
ATRIAL RATE: 77 BPM
BACTERIA SPEC CULT: NORMAL
BASE DEFICIT BLD-SCNC: 1 MMOL/L
BASE DEFICIT BLD-SCNC: 4 MMOL/L
BASE DEFICIT BLD-SCNC: 5 MMOL/L
BASE DEFICIT BLD-SCNC: 6 MMOL/L
BASE DEFICIT BLD-SCNC: 9 MMOL/L
BASE EXCESS BLD CALC-SCNC: 0 MMOL/L
BDY SITE: ABNORMAL
BUN SERPL-MCNC: 56 MG/DL (ref 6–20)
BUN SERPL-MCNC: 61 MG/DL (ref 6–20)
BUN SERPL-MCNC: 65 MG/DL (ref 6–20)
BUN/CREAT SERPL: 19 (ref 12–20)
BUN/CREAT SERPL: 24 (ref 12–20)
BUN/CREAT SERPL: 24 (ref 12–20)
CA-I BLD-SCNC: 1.01 MMOL/L (ref 1.12–1.32)
CA-I BLD-SCNC: 1.04 MMOL/L (ref 1.12–1.32)
CALCIUM SERPL-MCNC: 8 MG/DL (ref 8.5–10.1)
CALCIUM SERPL-MCNC: 8.3 MG/DL (ref 8.5–10.1)
CALCIUM SERPL-MCNC: 8.4 MG/DL (ref 8.5–10.1)
CALCULATED R AXIS, ECG10: 0 DEGREES
CALCULATED R AXIS, ECG10: 31 DEGREES
CALCULATED T AXIS, ECG11: -129 DEGREES
CALCULATED T AXIS, ECG11: 0 DEGREES
CC UR VC: NORMAL
CHLORIDE SERPL-SCNC: 103 MMOL/L (ref 97–108)
CHLORIDE SERPL-SCNC: 104 MMOL/L (ref 97–108)
CHLORIDE SERPL-SCNC: 104 MMOL/L (ref 97–108)
CO2 SERPL-SCNC: 21 MMOL/L (ref 21–32)
CO2 SERPL-SCNC: 23 MMOL/L (ref 21–32)
CO2 SERPL-SCNC: 23 MMOL/L (ref 21–32)
CREAT SERPL-MCNC: 2.38 MG/DL (ref 0.7–1.3)
CREAT SERPL-MCNC: 2.5 MG/DL (ref 0.7–1.3)
CREAT SERPL-MCNC: 3.46 MG/DL (ref 0.7–1.3)
DIAGNOSIS, 93000: NORMAL
DIAGNOSIS, 93000: NORMAL
ERYTHROCYTE [DISTWIDTH] IN BLOOD BY AUTOMATED COUNT: 14.2 % (ref 11.5–14.5)
ERYTHROCYTE [DISTWIDTH] IN BLOOD BY AUTOMATED COUNT: 14.2 % (ref 11.5–14.5)
ERYTHROCYTE [DISTWIDTH] IN BLOOD BY AUTOMATED COUNT: 14.5 % (ref 11.5–14.5)
GAS FLOW.O2 O2 DELIVERY SYS: ABNORMAL L/MIN
GAS FLOW.O2 SETTING OXYMISER: 12 BPM
GAS FLOW.O2 SETTING OXYMISER: 12 L/M
GAS FLOW.O2 SETTING OXYMISER: 18 BPM
GLUCOSE BLD STRIP.AUTO-MCNC: 101 MG/DL (ref 65–100)
GLUCOSE BLD STRIP.AUTO-MCNC: 116 MG/DL (ref 65–100)
GLUCOSE BLD STRIP.AUTO-MCNC: 120 MG/DL (ref 65–100)
GLUCOSE BLD STRIP.AUTO-MCNC: 141 MG/DL (ref 65–100)
GLUCOSE SERPL-MCNC: 103 MG/DL (ref 65–100)
GLUCOSE SERPL-MCNC: 121 MG/DL (ref 65–100)
GLUCOSE SERPL-MCNC: 140 MG/DL (ref 65–100)
HCO3 BLD-SCNC: 19.9 MMOL/L (ref 22–26)
HCO3 BLD-SCNC: 22.4 MMOL/L (ref 22–26)
HCO3 BLD-SCNC: 22.8 MMOL/L (ref 22–26)
HCO3 BLD-SCNC: 23.5 MMOL/L (ref 22–26)
HCO3 BLD-SCNC: 24.1 MMOL/L (ref 22–26)
HCO3 BLD-SCNC: 25.1 MMOL/L (ref 22–26)
HCT VFR BLD AUTO: 24.8 % (ref 36.6–50.3)
HCT VFR BLD AUTO: 26.5 % (ref 36.6–50.3)
HCT VFR BLD AUTO: 30 % (ref 36.6–50.3)
HGB BLD-MCNC: 7.6 G/DL (ref 12.1–17)
HGB BLD-MCNC: 8.2 G/DL (ref 12.1–17)
HGB BLD-MCNC: 9 G/DL (ref 12.1–17)
INR PPP: 1.3 (ref 0.9–1.1)
LACTATE SERPL-SCNC: 1.7 MMOL/L (ref 0.4–2)
MAGNESIUM SERPL-MCNC: 2.8 MG/DL (ref 1.6–2.4)
MAGNESIUM SERPL-MCNC: 2.9 MG/DL (ref 1.6–2.4)
MAGNESIUM SERPL-MCNC: 2.9 MG/DL (ref 1.6–2.4)
MCH RBC QN AUTO: 28 PG (ref 26–34)
MCH RBC QN AUTO: 28.2 PG (ref 26–34)
MCH RBC QN AUTO: 28.4 PG (ref 26–34)
MCHC RBC AUTO-ENTMCNC: 30 G/DL (ref 30–36.5)
MCHC RBC AUTO-ENTMCNC: 30.6 G/DL (ref 30–36.5)
MCHC RBC AUTO-ENTMCNC: 30.9 G/DL (ref 30–36.5)
MCV RBC AUTO: 91.5 FL (ref 80–99)
MCV RBC AUTO: 91.7 FL (ref 80–99)
MCV RBC AUTO: 94 FL (ref 80–99)
NRBC # BLD: 0 K/UL (ref 0–0.01)
NRBC BLD-RTO: 0 PER 100 WBC
O2/TOTAL GAS SETTING VFR VENT: 0.35 %
O2/TOTAL GAS SETTING VFR VENT: 100 %
O2/TOTAL GAS SETTING VFR VENT: 35 %
O2/TOTAL GAS SETTING VFR VENT: 80 %
O2/TOTAL GAS SETTING VFR VENT: 80 %
PCO2 BLD: 41.1 MMHG (ref 35–45)
PCO2 BLD: 43.4 MMHG (ref 35–45)
PCO2 BLD: 53.5 MMHG (ref 35–45)
PCO2 BLD: 54.9 MMHG (ref 35–45)
PCO2 BLD: 57.6 MMHG (ref 35–45)
PCO2 BLD: 59.7 MMHG (ref 35–45)
PEEP RESPIRATORY: 5 CMH2O
PH BLD: 7.17 [PH] (ref 7.35–7.45)
PH BLD: 7.18 [PH] (ref 7.35–7.45)
PH BLD: 7.22 [PH] (ref 7.35–7.45)
PH BLD: 7.24 [PH] (ref 7.35–7.45)
PH BLD: 7.37 [PH] (ref 7.35–7.45)
PH BLD: 7.38 [PH] (ref 7.35–7.45)
PIP ISTAT,IPIP: 19
PIP ISTAT,IPIP: 20
PIP ISTAT,IPIP: 20
PLATELET # BLD AUTO: 107 K/UL (ref 150–400)
PLATELET # BLD AUTO: 152 K/UL (ref 150–400)
PLATELET # BLD AUTO: 96 K/UL (ref 150–400)
PMV BLD AUTO: 11 FL (ref 8.9–12.9)
PMV BLD AUTO: 11.1 FL (ref 8.9–12.9)
PMV BLD AUTO: 11.7 FL (ref 8.9–12.9)
PO2 BLD: 126 MMHG (ref 80–100)
PO2 BLD: 134 MMHG (ref 80–100)
PO2 BLD: 180 MMHG (ref 80–100)
PO2 BLD: 183 MMHG (ref 80–100)
PO2 BLD: 224 MMHG (ref 80–100)
PO2 BLD: 53 MMHG (ref 80–100)
PO2 BLD: 74 MMHG (ref 80–100)
POTASSIUM SERPL-SCNC: 3.7 MMOL/L (ref 3.5–5.1)
POTASSIUM SERPL-SCNC: 3.8 MMOL/L (ref 3.5–5.1)
POTASSIUM SERPL-SCNC: 4.5 MMOL/L (ref 3.5–5.1)
PRESSURE SUPPORT SETTING VENT: 12 CMH2O
PROTHROMBIN TIME: 12.7 SEC (ref 9–11.1)
Q-T INTERVAL, ECG07: 0 MS
Q-T INTERVAL, ECG07: 560 MS
QRS DURATION, ECG06: 0 MS
QRS DURATION, ECG06: 98 MS
QTC CALCULATION (BEZET), ECG08: 0 MS
QTC CALCULATION (BEZET), ECG08: 554 MS
RBC # BLD AUTO: 2.71 M/UL (ref 4.1–5.7)
RBC # BLD AUTO: 2.89 M/UL (ref 4.1–5.7)
RBC # BLD AUTO: 3.19 M/UL (ref 4.1–5.7)
SAO2 % BLD: 100 % (ref 92–97)
SAO2 % BLD: 78 % (ref 92–97)
SAO2 % BLD: 91 % (ref 92–97)
SAO2 % BLD: 99 % (ref 92–97)
SERVICE CMNT-IMP: ABNORMAL
SERVICE CMNT-IMP: NORMAL
SODIUM SERPL-SCNC: 137 MMOL/L (ref 136–145)
SODIUM SERPL-SCNC: 138 MMOL/L (ref 136–145)
SODIUM SERPL-SCNC: 138 MMOL/L (ref 136–145)
SPECIMEN TYPE: ABNORMAL
THERAPEUTIC RANGE,PTTT: NORMAL SECS (ref 58–77)
TOTAL RESP. RATE, ITRR: 12
TOTAL RESP. RATE, ITRR: 17
TOTAL RESP. RATE, ITRR: 18
TOTAL RESP. RATE, ITRR: 20
TOTAL RESP. RATE, ITRR: 20
VENTILATION MODE VENT: ABNORMAL
VENTRICULAR RATE, ECG03: 0 BPM
VENTRICULAR RATE, ECG03: 59 BPM
VOLUME CONTROL IVLC: YES
VOLUME CONTROL IVLC: YES
VT SETTING VENT: 500 ML
WBC # BLD AUTO: 14.4 K/UL (ref 4.1–11.1)
WBC # BLD AUTO: 8.1 K/UL (ref 4.1–11.1)
WBC # BLD AUTO: 9.3 K/UL (ref 4.1–11.1)

## 2019-02-06 PROCEDURE — 36415 COLL VENOUS BLD VENIPUNCTURE: CPT

## 2019-02-06 PROCEDURE — 82962 GLUCOSE BLOOD TEST: CPT

## 2019-02-06 PROCEDURE — 80048 BASIC METABOLIC PNL TOTAL CA: CPT

## 2019-02-06 PROCEDURE — C9113 INJ PANTOPRAZOLE SODIUM, VIA: HCPCS | Performed by: HOSPITALIST

## 2019-02-06 PROCEDURE — 74011000250 HC RX REV CODE- 250: Performed by: INTERNAL MEDICINE

## 2019-02-06 PROCEDURE — 77030029065 HC DRSG HEMO QCLOT ZMED -B

## 2019-02-06 PROCEDURE — 83735 ASSAY OF MAGNESIUM: CPT

## 2019-02-06 PROCEDURE — 74011000258 HC RX REV CODE- 258: Performed by: INTERNAL MEDICINE

## 2019-02-06 PROCEDURE — 74011000258 HC RX REV CODE- 258: Performed by: HOSPITALIST

## 2019-02-06 PROCEDURE — 74018 RADEX ABDOMEN 1 VIEW: CPT

## 2019-02-06 PROCEDURE — 94660 CPAP INITIATION&MGMT: CPT

## 2019-02-06 PROCEDURE — 85730 THROMBOPLASTIN TIME PARTIAL: CPT

## 2019-02-06 PROCEDURE — 71045 X-RAY EXAM CHEST 1 VIEW: CPT

## 2019-02-06 PROCEDURE — 74011250636 HC RX REV CODE- 250/636: Performed by: INTERNAL MEDICINE

## 2019-02-06 PROCEDURE — 74011250636 HC RX REV CODE- 250/636

## 2019-02-06 PROCEDURE — 94003 VENT MGMT INPAT SUBQ DAY: CPT

## 2019-02-06 PROCEDURE — 33210 INSERT ELECTRD/PM CATH SNGL: CPT | Performed by: INTERNAL MEDICINE

## 2019-02-06 PROCEDURE — 93005 ELECTROCARDIOGRAM TRACING: CPT

## 2019-02-06 PROCEDURE — 77030005320 HC CATH PACE TEMP STJU -B: Performed by: INTERNAL MEDICINE

## 2019-02-06 PROCEDURE — 0BH17EZ INSERTION OF ENDOTRACHEAL AIRWAY INTO TRACHEA, VIA NATURAL OR ARTIFICIAL OPENING: ICD-10-PCS | Performed by: ANESTHESIOLOGY

## 2019-02-06 PROCEDURE — 74011250636 HC RX REV CODE- 250/636: Performed by: FAMILY MEDICINE

## 2019-02-06 PROCEDURE — 65620000000 HC RM CCU GENERAL

## 2019-02-06 PROCEDURE — 5A12012 PERFORMANCE OF CARDIAC OUTPUT, SINGLE, MANUAL: ICD-10-PCS | Performed by: INTERNAL MEDICINE

## 2019-02-06 PROCEDURE — 74011250636 HC RX REV CODE- 250/636: Performed by: HOSPITALIST

## 2019-02-06 PROCEDURE — 5A09357 ASSISTANCE WITH RESPIRATORY VENTILATION, LESS THAN 24 CONSECUTIVE HOURS, CONTINUOUS POSITIVE AIRWAY PRESSURE: ICD-10-PCS | Performed by: HOSPITALIST

## 2019-02-06 PROCEDURE — 74011000250 HC RX REV CODE- 250: Performed by: HOSPITALIST

## 2019-02-06 PROCEDURE — 5A1945Z RESPIRATORY VENTILATION, 24-96 CONSECUTIVE HOURS: ICD-10-PCS | Performed by: HOSPITALIST

## 2019-02-06 PROCEDURE — 87040 BLOOD CULTURE FOR BACTERIA: CPT

## 2019-02-06 PROCEDURE — 77030027138 HC INCENT SPIROMETER -A

## 2019-02-06 PROCEDURE — 83605 ASSAY OF LACTIC ACID: CPT

## 2019-02-06 PROCEDURE — 74011000258 HC RX REV CODE- 258: Performed by: FAMILY MEDICINE

## 2019-02-06 PROCEDURE — 85027 COMPLETE CBC AUTOMATED: CPT

## 2019-02-06 PROCEDURE — 82803 BLOOD GASES ANY COMBINATION: CPT

## 2019-02-06 PROCEDURE — 74011250637 HC RX REV CODE- 250/637: Performed by: HOSPITALIST

## 2019-02-06 PROCEDURE — 5A1223Z PERFORMANCE OF CARDIAC PACING, CONTINUOUS: ICD-10-PCS | Performed by: INTERNAL MEDICINE

## 2019-02-06 PROCEDURE — 85610 PROTHROMBIN TIME: CPT

## 2019-02-06 PROCEDURE — C1894 INTRO/SHEATH, NON-LASER: HCPCS | Performed by: INTERNAL MEDICINE

## 2019-02-06 PROCEDURE — 36600 WITHDRAWAL OF ARTERIAL BLOOD: CPT

## 2019-02-06 PROCEDURE — 03HY32Z INSERTION OF MONITORING DEVICE INTO UPPER ARTERY, PERCUTANEOUS APPROACH: ICD-10-PCS | Performed by: INTERNAL MEDICINE

## 2019-02-06 RX ORDER — ATROPINE SULFATE 0.1 MG/ML
INJECTION INTRAVENOUS
Status: COMPLETED | OUTPATIENT
Start: 2019-02-06 | End: 2019-02-06

## 2019-02-06 RX ORDER — FENTANYL CITRATE 50 UG/ML
25-50 INJECTION, SOLUTION INTRAMUSCULAR; INTRAVENOUS
Status: DISCONTINUED | OUTPATIENT
Start: 2019-02-06 | End: 2019-02-08 | Stop reason: ALTCHOICE

## 2019-02-06 RX ORDER — PROPOFOL 10 MG/ML
0-50 VIAL (ML) INTRAVENOUS
Status: DISCONTINUED | OUTPATIENT
Start: 2019-02-06 | End: 2019-02-10

## 2019-02-06 RX ORDER — VANCOMYCIN 1.75 GRAM/500 ML IN 0.9 % SODIUM CHLORIDE INTRAVENOUS
1750
Status: COMPLETED | OUTPATIENT
Start: 2019-02-06 | End: 2019-02-07

## 2019-02-06 RX ORDER — BACITRACIN 500 UNIT/G
1 PACKET (EA) TOPICAL AS NEEDED
Status: DISCONTINUED | OUTPATIENT
Start: 2019-02-06 | End: 2019-02-22 | Stop reason: HOSPADM

## 2019-02-06 RX ORDER — LIDOCAINE HYDROCHLORIDE 10 MG/ML
INJECTION INFILTRATION; PERINEURAL AS NEEDED
Status: DISCONTINUED | OUTPATIENT
Start: 2019-02-06 | End: 2019-02-06 | Stop reason: HOSPADM

## 2019-02-06 RX ORDER — PROPOFOL 10 MG/ML
INJECTION, EMULSION INTRAVENOUS
Status: COMPLETED | OUTPATIENT
Start: 2019-02-06 | End: 2019-02-06

## 2019-02-06 RX ORDER — EPINEPHRINE 0.1 MG/ML
INJECTION INTRACARDIAC; INTRAVENOUS
Status: COMPLETED
Start: 2019-02-06 | End: 2019-02-06

## 2019-02-06 RX ORDER — SODIUM BICARBONATE 1 MEQ/ML
SYRINGE (ML) INTRAVENOUS
Status: COMPLETED | OUTPATIENT
Start: 2019-02-06 | End: 2019-02-06

## 2019-02-06 RX ORDER — SUCCINYLCHOLINE CHLORIDE 20 MG/ML
INJECTION INTRAMUSCULAR; INTRAVENOUS
Status: COMPLETED | OUTPATIENT
Start: 2019-02-06 | End: 2019-02-06

## 2019-02-06 RX ORDER — EPINEPHRINE 0.1 MG/ML
INJECTION INTRACARDIAC; INTRAVENOUS
Status: COMPLETED | OUTPATIENT
Start: 2019-02-06 | End: 2019-02-06

## 2019-02-06 RX ORDER — SUCCINYLCHOLINE CHLORIDE 20 MG/ML
INJECTION INTRAMUSCULAR; INTRAVENOUS
Status: DISPENSED
Start: 2019-02-06 | End: 2019-02-07

## 2019-02-06 RX ORDER — ACETAMINOPHEN 325 MG/1
650 TABLET ORAL
Status: DISCONTINUED | OUTPATIENT
Start: 2019-02-06 | End: 2019-02-19

## 2019-02-06 RX ORDER — BUMETANIDE 0.25 MG/ML
2 INJECTION INTRAMUSCULAR; INTRAVENOUS ONCE
Status: COMPLETED | OUTPATIENT
Start: 2019-02-06 | End: 2019-02-06

## 2019-02-06 RX ADMIN — PROPOFOL 10 MG: 10 INJECTION, EMULSION INTRAVENOUS at 19:31

## 2019-02-06 RX ADMIN — PHENYLEPHRINE HYDROCHLORIDE 35 MCG/MIN: 10 INJECTION INTRAVENOUS at 08:32

## 2019-02-06 RX ADMIN — SODIUM BICARBONATE 100 MEQ: 84 INJECTION, SOLUTION INTRAVENOUS at 20:46

## 2019-02-06 RX ADMIN — ATROPINE SULFATE 1 MG: 0.1 INJECTION, SOLUTION ENDOTRACHEAL; INTRAMUSCULAR; INTRAVENOUS; SUBCUTANEOUS at 19:50

## 2019-02-06 RX ADMIN — PROPOFOL 25 MCG/KG/MIN: 10 INJECTION, EMULSION INTRAVENOUS at 20:00

## 2019-02-06 RX ADMIN — ATROPINE SULFATE 1 MG: 0.1 INJECTION, SOLUTION ENDOTRACHEAL; INTRAMUSCULAR; INTRAVENOUS; SUBCUTANEOUS at 19:26

## 2019-02-06 RX ADMIN — PHENYLEPHRINE HYDROCHLORIDE 200 MCG/MIN: 10 INJECTION, SOLUTION INTRAMUSCULAR; INTRAVENOUS; SUBCUTANEOUS at 23:29

## 2019-02-06 RX ADMIN — BUMETANIDE 2 MG: 0.25 INJECTION INTRAMUSCULAR; INTRAVENOUS at 15:25

## 2019-02-06 RX ADMIN — SODIUM CHLORIDE 1 MCG/MIN: 900 INJECTION, SOLUTION INTRAVENOUS at 21:07

## 2019-02-06 RX ADMIN — EPINEPHRINE 1 MG: 0.1 INJECTION INTRACARDIAC; INTRAVENOUS at 19:25

## 2019-02-06 RX ADMIN — SODIUM BICARBONATE 100 MEQ: 84 INJECTION, SOLUTION INTRAVENOUS at 19:26

## 2019-02-06 RX ADMIN — SODIUM CHLORIDE 40 MG: 9 INJECTION, SOLUTION INTRAMUSCULAR; INTRAVENOUS; SUBCUTANEOUS at 08:10

## 2019-02-06 RX ADMIN — PROPOFOL 50 MCG/KG/MIN: 10 INJECTION, EMULSION INTRAVENOUS at 00:05

## 2019-02-06 RX ADMIN — PHENYLEPHRINE HYDROCHLORIDE 75 MCG/MIN: 10 INJECTION INTRAVENOUS at 17:41

## 2019-02-06 RX ADMIN — PROPOFOL 50 MCG/KG/MIN: 10 INJECTION, EMULSION INTRAVENOUS at 04:01

## 2019-02-06 RX ADMIN — EPINEPHRINE 1 MG: 0.1 INJECTION INTRACARDIAC; INTRAVENOUS at 20:00

## 2019-02-06 RX ADMIN — EPINEPHRINE 1 MG: 0.1 INJECTION INTRACARDIAC; INTRAVENOUS at 20:41

## 2019-02-06 RX ADMIN — PROPOFOL 10 MG: 10 INJECTION, EMULSION INTRAVENOUS at 19:30

## 2019-02-06 RX ADMIN — EPINEPHRINE 1 MG: 0.1 INJECTION INTRACARDIAC; INTRAVENOUS at 19:52

## 2019-02-06 RX ADMIN — Medication 10 ML: at 23:15

## 2019-02-06 RX ADMIN — CHLORHEXIDINE GLUCONATE 15 ML: 1.2 RINSE ORAL at 08:25

## 2019-02-06 RX ADMIN — EPINEPHRINE 1 MG: 0.1 INJECTION INTRACARDIAC; INTRAVENOUS at 21:08

## 2019-02-06 RX ADMIN — SODIUM CHLORIDE 75 ML/HR: 450 INJECTION, SOLUTION INTRAVENOUS at 09:24

## 2019-02-06 RX ADMIN — FENTANYL CITRATE 50 MCG: 50 INJECTION, SOLUTION INTRAMUSCULAR; INTRAVENOUS at 20:13

## 2019-02-06 RX ADMIN — PIPERACILLIN AND TAZOBACTAM 3.38 G: 3; .375 INJECTION, POWDER, FOR SOLUTION INTRAVENOUS at 19:59

## 2019-02-06 RX ADMIN — CHLORHEXIDINE GLUCONATE 15 ML: 1.2 RINSE ORAL at 23:27

## 2019-02-06 RX ADMIN — PHENYLEPHRINE HYDROCHLORIDE 20 MCG/MIN: 10 INJECTION INTRAVENOUS at 09:28

## 2019-02-06 RX ADMIN — Medication 10 ML: at 15:06

## 2019-02-06 RX ADMIN — EPINEPHRINE 1 MCG/MIN: 1 INJECTION PARENTERAL at 21:00

## 2019-02-06 RX ADMIN — Medication 10 ML: at 04:42

## 2019-02-06 RX ADMIN — Medication 10 ML: at 15:05

## 2019-02-06 RX ADMIN — SODIUM BICARBONATE 50 MEQ: 84 INJECTION, SOLUTION INTRAVENOUS at 21:09

## 2019-02-06 RX ADMIN — ACETAMINOPHEN 650 MG: 325 TABLET ORAL at 12:03

## 2019-02-06 RX ADMIN — VANCOMYCIN HYDROCHLORIDE 1750 MG: 10 INJECTION, POWDER, LYOPHILIZED, FOR SOLUTION INTRAVENOUS at 22:30

## 2019-02-06 NOTE — PROGRESS NOTES
Neurology Progress Note Patient ID: 
Enrico Stephen 
[id number] 
28 y.o. 
1945 Subjective:  
  
Patient has completed CODE ICE, rewarmed and extubated this AM. He is following commands and making some jokes. Current Facility-Administered Medications Medication Dose Route Frequency  fentaNYL citrate (PF) injection 25-50 mcg  25-50 mcg IntraVENous Q4H PRN  
 acetaminophen (TYLENOL) tablet 650 mg  650 mg Oral Q6H PRN  
 bacitracin 500 unit/gram packet 1 Packet  1 Packet Topical PRN  pantoprazole (PROTONIX) 40 mg in sodium chloride 0.9% 10 mL injection  40 mg IntraVENous DAILY  sodium chloride (NS) flush 5-40 mL  5-40 mL IntraVENous Q8H  
 sodium chloride (NS) flush 5-40 mL  5-40 mL IntraVENous PRN  chlorhexidine (PERIDEX) 0.12 % mouthwash 15 mL  15 mL Oral Q12H  
 sodium chloride (NS) flush 5-40 mL  5-40 mL IntraVENous Q8H  
 sodium chloride (NS) flush 5-40 mL  5-40 mL IntraVENous PRN  
 glucose chewable tablet 16 g  4 Tab Oral PRN  
 dextrose (D50W) injection syrg 12.5-25 g  25-50 mL IntraVENous PRN  
 glucagon (GLUCAGEN) injection 1 mg  1 mg IntraMUSCular PRN  
 insulin lispro (HUMALOG) injection   SubCUTAneous Q6H  
 [START ON 2/7/2019] levothyroxine (SYNTHROID) injection 18.75 mcg  18.75 mcg IntraVENous Q24H  
 0.9% sodium chloride infusion  5 mL/hr IntraVENous CONTINUOUS  
 PHENYLephrine (GINA-SYNEPHRINE) 30 mg in 0.9% sodium chloride 250 mL infusion   mcg/min IntraVENous TITRATE Objective:  
 
Patient Vitals for the past 8 hrs: 
 BP Temp Pulse Resp SpO2  
02/06/19 1300   (!) 102 16 93 % 02/06/19 1200 (!) 125/109 99 °F (37.2 °C) 94 15 94 % 02/06/19 1100 114/70  93 17 96 % 02/06/19 1030 95/49  96 17 (!) 89 % 02/06/19 1000 147/75  98 18 92 % 02/06/19 0930   95 14 (!) 83 % 02/06/19 0900 133/77  86 14 90 % 02/06/19 0830 140/65  87 16 90 % 02/06/19 0800 122/52 98.6 °F (37 °C) 70 14 97 % 02/06/19 0730 118/46  64 13 97 % 02/06/19 0700 125/50 98.2 °F (36.8 °C) 66 12 98 % 02/06 0701 - 02/06 1900 In: 318.7 [I.V.:318.7] Out: 243 [Urine:243] 02/04 1901 - 02/06 0700 In: 4202.3 [I.V.:4082.3] Out: 9231 [Urine:2505] Lab Review Recent Results (from the past 24 hour(s)) METABOLIC PANEL, BASIC Collection Time: 02/05/19  5:35 PM  
Result Value Ref Range Sodium 138 136 - 145 mmol/L Potassium 3.8 3.5 - 5.1 mmol/L Chloride 104 97 - 108 mmol/L  
 CO2 23 21 - 32 mmol/L Anion gap 11 5 - 15 mmol/L Glucose 166 (H) 65 - 100 mg/dL BUN 57 (H) 6 - 20 MG/DL Creatinine 2.31 (H) 0.70 - 1.30 MG/DL  
 BUN/Creatinine ratio 25 (H) 12 - 20 GFR est AA 34 (L) >60 ml/min/1.73m2 GFR est non-AA 28 (L) >60 ml/min/1.73m2 Calcium 8.5 8.5 - 10.1 MG/DL MAGNESIUM Collection Time: 02/05/19  5:35 PM  
Result Value Ref Range Magnesium 2.9 (H) 1.6 - 2.4 mg/dL CBC W/O DIFF Collection Time: 02/05/19  5:35 PM  
Result Value Ref Range WBC 3.7 (L) 4.1 - 11.1 K/uL  
 RBC 2.96 (L) 4.10 - 5.70 M/uL HGB 8.4 (L) 12.1 - 17.0 g/dL HCT 27.0 (L) 36.6 - 50.3 % MCV 91.2 80.0 - 99.0 FL  
 MCH 28.4 26.0 - 34.0 PG  
 MCHC 31.1 30.0 - 36.5 g/dL  
 RDW 13.8 11.5 - 14.5 % PLATELET 56 (L) 345 - 400 K/uL MPV 11.9 8.9 - 12.9 FL  
 NRBC 0.0 0  WBC ABSOLUTE NRBC 0.00 0.00 - 0.01 K/uL GLUCOSE, POC Collection Time: 02/05/19  5:35 PM  
Result Value Ref Range Glucose (POC) 179 (H) 65 - 100 mg/dL Performed by Tiffany Duarte POC [id number] Collection Time: 02/05/19  5:36 PM  
Result Value Ref Range Calcium, ionized (POC) 1.00 (L) 1.12 - 1.32 mmol/L  
 FIO2 (POC) 35 % pH (POC) 7.364 7.35 - 7.45    
 pCO2 (POC) 42.7 35.0 - 45.0 MMHG  
 pO2 (POC) 168 (H) 80 - 100 MMHG  
 HCO3 (POC) 24.3 22 - 26 MMOL/L Base deficit (POC) 1 mmol/L  
 sO2 (POC) 99 (H) 92 - 97 % Site DRAWN FROM ARTERIAL LINE Device: VENT Mode ASSIST CONTROL Tidal volume 500 ml  Set Rate 12 bpm  
 PEEP/CPAP (POC) 5 cmH2O  
 Allens test (POC) NO Specimen type (POC) ARTERIAL    
POC G3 - PUL Collection Time: 02/05/19  5:40 PM  
Result Value Ref Range FIO2 (POC) 35 % pH (POC) 7.362 7.35 - 7.45    
 pCO2 (POC) 42.7 35.0 - 45.0 MMHG  
 pO2 (POC) 181 (H) 80 - 100 MMHG  
 HCO3 (POC) 24.2 22 - 26 MMOL/L  
 sO2 (POC) 100 (H) 92 - 97 % Base deficit (POC) 1 mmol/L Site RIGHT RADIAL Device: VENT Mode ASSIST CONTROL Tidal volume 500 ml Set Rate 12 bpm  
 PEEP/CPAP (POC) 5 cmH2O Allens test (POC) YES Specimen type (POC) ARTERIAL METABOLIC PANEL, BASIC Collection Time: 02/06/19 12:01 AM  
Result Value Ref Range Sodium 138 136 - 145 mmol/L Potassium 3.7 3.5 - 5.1 mmol/L Chloride 103 97 - 108 mmol/L  
 CO2 21 21 - 32 mmol/L Anion gap 14 5 - 15 mmol/L Glucose 140 (H) 65 - 100 mg/dL BUN 56 (H) 6 - 20 MG/DL Creatinine 2.38 (H) 0.70 - 1.30 MG/DL  
 BUN/Creatinine ratio 24 (H) 12 - 20 GFR est AA 33 (L) >60 ml/min/1.73m2 GFR est non-AA 27 (L) >60 ml/min/1.73m2 Calcium 8.4 (L) 8.5 - 10.1 MG/DL MAGNESIUM Collection Time: 02/06/19 12:01 AM  
Result Value Ref Range Magnesium 2.9 (H) 1.6 - 2.4 mg/dL CBC W/O DIFF Collection Time: 02/06/19 12:01 AM  
Result Value Ref Range WBC 8.1 4.1 - 11.1 K/uL  
 RBC 2.71 (L) 4.10 - 5.70 M/uL HGB 7.6 (L) 12.1 - 17.0 g/dL HCT 24.8 (L) 36.6 - 50.3 % MCV 91.5 80.0 - 99.0 FL  
 MCH 28.0 26.0 - 34.0 PG  
 MCHC 30.6 30.0 - 36.5 g/dL  
 RDW 14.2 11.5 - 14.5 % PLATELET 96 (L) 417 - 400 K/uL MPV 11.7 8.9 - 12.9 FL  
 NRBC 0.0 0  WBC ABSOLUTE NRBC 0.00 0.00 - 0.01 K/uL PROTHROMBIN TIME + INR Collection Time: 02/06/19 12:01 AM  
Result Value Ref Range INR 1.3 (H) 0.9 - 1.1 Prothrombin time 12.7 (H) 9.0 - 11.1 sec PTT Collection Time: 02/06/19 12:01 AM  
Result Value Ref Range  aPTT 29.4 22.1 - 32.0 sec  
 aPTT, therapeutic range     58.0 - 77.0 SECS  
GLUCOSE, POC  
 Collection Time: 02/06/19 12:04 AM  
Result Value Ref Range Glucose (POC) 141 (H) 65 - 100 mg/dL Performed by [place] POC EG7 Collection Time: 02/06/19 12:30 AM  
Result Value Ref Range Calcium, ionized (POC) 1.01 (L) 1.12 - 1.32 mmol/L  
 FIO2 (POC) 0.35 % pH (POC) 7.377 7.35 - 7.45    
 pCO2 (POC) 41.1 35.0 - 45.0 MMHG  
 pO2 (POC) 134 (H) 80 - 100 MMHG  
 HCO3 (POC) 24.1 22 - 26 MMOL/L Base deficit (POC) 1 mmol/L  
 sO2 (POC) 99 (H) 92 - 97 % Site RIGHT RADIAL Device: VENT Mode ASSIST CONTROL Tidal volume 500 ml Set Rate 12 bpm  
 PEEP/CPAP (POC) 5 cmH2O Allens test (POC) YES Specimen type (POC) ARTERIAL Total resp. rate 12 Volume control YES    
EKG, 12 LEAD, SUBSEQUENT Collection Time: 02/06/19  4:26 AM  
Result Value Ref Range Ventricular Rate 59 BPM  
 Atrial Rate 77 BPM  
 QRS Duration 98 ms Q-T Interval 560 ms QTC Calculation (Bezet) 554 ms Calculated R Axis 31 degrees Calculated T Axis -129 degrees Diagnosis Atrial fibrillation with slow ventricular response Incomplete right bundle branch block ST & Marked T wave abnormality, consider anterolateral ischemia Prolonged QT When compared with ECG of 05-FEB-2019 04:25, No significant change was found Confirmed by Shoaib Oleary MD, Jaron Dangelo ([id number]) on 2/6/2019 7:21:39 AM 
  
METABOLIC PANEL, BASIC Collection Time: 02/06/19  6:01 AM  
Result Value Ref Range Sodium 138 136 - 145 mmol/L Potassium 3.8 3.5 - 5.1 mmol/L Chloride 104 97 - 108 mmol/L  
 CO2 23 21 - 32 mmol/L Anion gap 11 5 - 15 mmol/L Glucose 121 (H) 65 - 100 mg/dL BUN 61 (H) 6 - 20 MG/DL Creatinine 2.50 (H) 0.70 - 1.30 MG/DL  
 BUN/Creatinine ratio 24 (H) 12 - 20 GFR est AA 31 (L) >60 ml/min/1.73m2 GFR est non-AA 26 (L) >60 ml/min/1.73m2 Calcium 8.0 (L) 8.5 - 10.1 MG/DL MAGNESIUM Collection Time: 02/06/19  6:01 AM  
Result Value Ref Range Magnesium 2.9 (H) 1.6 - 2.4 mg/dL CBC W/O DIFF Collection Time: 02/06/19  6:01 AM  
Result Value Ref Range WBC 9.3 4.1 - 11.1 K/uL  
 RBC 2.89 (L) 4.10 - 5.70 M/uL HGB 8.2 (L) 12.1 - 17.0 g/dL HCT 26.5 (L) 36.6 - 50.3 % MCV 91.7 80.0 - 99.0 FL  
 MCH 28.4 26.0 - 34.0 PG  
 MCHC 30.9 30.0 - 36.5 g/dL  
 RDW 14.2 11.5 - 14.5 % PLATELET 359 (L) 093 - 400 K/uL MPV 11.1 8.9 - 12.9 FL  
 NRBC 0.0 0  WBC ABSOLUTE NRBC 0.00 0.00 - 0.01 K/uL GLUCOSE, POC Collection Time: 02/06/19  6:09 AM  
Result Value Ref Range Glucose (POC) 120 (H) 65 - 100 mg/dL Performed by Javier Zamora POC EG7 Collection Time: 02/06/19  6:13 AM  
Result Value Ref Range Calcium, ionized (POC) 1.04 (L) 1.12 - 1.32 mmol/L  
 FIO2 (POC) 35 % pH (POC) 7.370 7.35 - 7.45    
 pCO2 (POC) 43.4 35.0 - 45.0 MMHG  
 pO2 (POC) 126 (H) 80 - 100 MMHG  
 HCO3 (POC) 25.1 22 - 26 MMOL/L Base excess (POC) 0 mmol/L  
 sO2 (POC) 99 (H) 92 - 97 % Site RIGHT RADIAL Device: VENT Mode ASSIST CONTROL Tidal volume 500 ml Set Rate 12 bpm  
 PEEP/CPAP (POC) 5 cmH2O Allens test (POC) YES Specimen type (POC) ARTERIAL Volume control YES    
GLUCOSE, POC Collection Time: 02/06/19 11:28 AM  
Result Value Ref Range Glucose (POC) 116 (H) 65 - 100 mg/dL Performed by Calixto Mendoza Neurological Exam: 
Mental Status: Awake, oriented to place not date, follows commands briskly/appropriately  
     
Cranial Nerves:   PERRL, EOMI. VF intact, smile symmetric  
     
Motor:  GARCIA 5/5 Reflexes:   Deep tendon reflexes 1+/4 and symmetrical.  Plantar response is downgoing b/l. Sensory:   Symmetrically intact Gait:  Deferred Tremor:   Slight b/l UE action tremor Cerebellar:  Finger to nose and heel over shin to knee was demonstrated competently. Assessment/Plan:  
 
Principal Problem: 
  Cardiac arrest ([facility] [address] (2/4/2019) 72year old male with a h/o Afib on Eliquis, carotid stenosis, HTN, DM, CAD, CHF, TIA presenting with PEA arrest s/p brief CPR and epi x 1 with ROSC, s/p CODE ICE protocol. EEG without evidence of epileptiform activity, noted mild generalized slowing. Head CT on admission without acute intracranial process. Now rewarmed and extubated this AM.  He is awake/interactive and following commands appropriately without focal deficits. Some mild confusion to date. I suspect this will hopefully resolve with time. He is recovering well post arrest.  Family updated. Please call if we may be of further assistance. Will sign off.    
  
Signed: 
Lily Peralta DO 
2/6/2019 
2:39 PM

## 2019-02-06 NOTE — INTERDISCIPLINARY ROUNDS
IDR/SLIDR Summary Patient: Ramón Salomon MRN: [id number]    Age: 72 y.o. YOB: 1945 Room/Bed: [address] [place] [address] Admit Diagnosis: Cardiac arrest [facility]) [I46.9]  Principal Diagnosis: Cardiac arrest ([facility] [address] Goals: stable VS; rewarm Readmission: yes Quality Measure: Not applicable VTE Prophylaxis: Mechanical 
Influenza Vaccine screening completed? YES Pneumococcal Vaccine screening completed? NO Mobility needs: Yes   Nutrition plan:No 
Consults:P.T, O.T., Speech, Respiratory and Case Management Financial concerns:Yes  Escalated to CM? YES 
RRAT Score: 39   Interventions:H2H Testing due for pt today? YES 
LOS: 1 days Expected length of stay 7? days Discharge plan: home   PCP: Sheila Hannah MD 
Transportation needs: Yes Days before discharge:two or more days before discharge Discharge disposition: Home Signed:  
 
Gopi Marquez RN 
2/5/2019 
11:26 PM

## 2019-02-06 NOTE — PROGRESS NOTES
Problem: Falls - Risk of 
Goal: *Absence of Falls Document Davidtimothy Da SilvaMleony Fall Risk and appropriate interventions in the flowsheet. Outcome: Progressing Towards Goal 
Fall Risk Interventions: 
  
 
Mentation Interventions: Adequate sleep, hydration, pain control, Evaluate medications/consider consulting pharmacy, Family/sitter at bedside, Reorient patient, Update white board Medication Interventions: Evaluate medications/consider consulting pharmacy Elimination Interventions: Call light in reach Problem: Pressure Injury - Risk of 
Goal: *Prevention of pressure injury Document Dom Scale and appropriate interventions in the flowsheet. Outcome: Progressing Towards Goal 
Pressure Injury Interventions: 
Sensory Interventions: Check visual cues for pain, Float heels, Keep linens dry and wrinkle-free, Minimize linen layers, Turn and reposition approx. every two hours (pillows and wedges if needed) Activity Interventions: Pressure redistribution bed/mattress(bed type) Mobility Interventions: Float heels, HOB 30 degrees or less, Pressure redistribution bed/mattress (bed type), Turn and reposition approx. every two hours(pillow and wedges) Nutrition Interventions: Document food/fluid/supplement intake, Discuss nutritional consult with provider Friction and Shear Interventions: HOB 30 degrees or less, Lift sheet

## 2019-02-06 NOTE — PROGRESS NOTES
0730: Received report from Oaklyn ThingWorx and assumed care. 0830: Stopped Propofol for SBT. 0900: Dr Rin Elena pulmonary, at bedside orders received for SBT. RT at bedside to change vent settings. 5589: Per Dr Rin Elena pulmonary, extubated pt to nasal cannula 4L. 0930: Pt SPO2 88%, pt placed on face mask @ 50%. Stopped Fentanyl per Dr Rin Elena pulmonary. 1000: Pt's O2 sats 92% 1030: Pt SPO2 88%, pt placed on NRB. 1100: Pt SPO2 92%, pt placed on face mask @ 50%. 1200: Per Dr Yasmin Martinez, nephrology, stopped 1/2 NS. Stopped Chung. Assessed pt's swallow with ice chips and sips of water, with no abnormalities. Pt given PO Tylenol for mild pain. Pt put on NC @ 6L with SPO2 96%. 1215: Portable CXR at bedside. 1400: Pt SPO2 88% on NRB. Lung sounds were coarse. Performed deep oral suction. Pt's work of breathing increased and SPO2 decreased to 79%. 1420: RT beside to get ABG. Results included a critical pH of 7.18 and a pCO2 of 59.  
 
1430: Per Dr Rin Elena pulmonary, RT placed pt on BIPAP at 17/5, with a rate of 20 and 100% oxygen. Chung restarted and will titrate to keep MAP > 55 per Dr Betty Gonzalez. 1530: Repeat ABG performed, per Dr Rin Elena pulmonary. RT at bedside to change BIPAP settings. Will repeat gas @ 1900 per MD.  
 
0643: Dr. Macario Madison, cardiology, notified of pt status; -120. 
 
1620: Pt with significant decreased urine output. Pt's [place] flushed and patent. Bladder scan performed with insignificant findings. 1700: Paged Cristopher Thomas, nephrology, regarding pt urine output following administration of Bumex. Per MD, keep MAP > 65. No additional orders received, will continue to monitor pt.  
 
1730: Family voiced concern regarding pt's overall change in status earlier today. Notified Dr. Betty Gonzalez, hospitalist. MD spoke with family by phone. Orders received for STAT labs. 1845: Received orders from Dr Betty Gonzalez hospitalist, for Vanc, Zosyn, Eliquis, Blood Culture, Lactic Acid. 1900: at pt's bedside to turn and reposition. Additional labs drawn. Pt taken off bipap for 1 minute to perform mouth care. Pt alert and talkative at this time. 1928: Pt HR decreased to 50s. Pt unresponsive with thready pulse. Atropine given. Pt without pulse. Code blue called. See code documentation. 1933: Pt pulse regained. Anesthesia at bedside to intubate pt. Family at bedside. Update given. 1934: Spoke with Dr. Star Valle, cardiology, regarding pt's status. No orders received. Notified Dr. Brittanie Allan, nephrology, of recent events and Cr increased to 3.46. No orders received. MD advised to expect no urine output after events. Notified Dr. Mari Ortiz, pulmonary, of recent events will be at pt's bedside to discuss with family. 1950: Pt PEA on monitor. Code Blue called. See code documentation. 1953: Pt pulse regained. 2000: Bedside shift change report given to Shana Gray RN (oncoming nurse) by Lane Powell RN (offgoing nurse). Report included the following information SBAR, Kardex, ED Summary, Procedure Summary, Intake/Output, MAR, Recent Results and Cardiac Rhythm Afib.

## 2019-02-06 NOTE — PROGRESS NOTES
Initial Pulmonary / Critical Care Consultation Assessment / Plan:   
Acute resp failure - 2/2 OOH cardiac arrest - on mechanical ventilation - FiO2 40%. No asdz on initial cxr, follow up with mild basilar atelectasis. Extubated on 2/6, placed on bipap for resp acidosis PEA arrest 
 
Gout - s/p Horacio Lala Chronic afib DM 
 
CKD with anemia -- Bipap/O2: settings reviewed and adjusted, monitor gas exchange closely -- Diuretic 
-- Minimize opiates -- On pepcid  
--Cardiology following  
--neuro eval --- Head CT OK The patient is critically ill and is at significant risk of decompensation. D/W RN and family at bedside. CCT 35' History / Subjective: 
Reason:  Respiratory failure Requesting Provider:  Dr Diana Presley 2/6 Seen and examined earlier today. Did well with SBT with RSBI of 60 and was extubated. Subsequently developed confusion and ABG showed resp acidosis and he was placed on bipap. 2/5 Seen and examined. Sedated, on  protocol. CXR with mild bibasilar haziness- suspected atelectasis. Await rewarming 2/4 Jace Crow is a 72 y.o.  male who  has a past medical history of Acute encephalopathy (1/14/2016), Anemia (5/11/2017), Atrial fibrillation ([address] Painter esophagus, CAD (coronary artery disease), Diabetes ([address] Heart failure ([address] HTN, goal below 140/90, Retinopathy, diabetic, bilateral ([address] (3/11/2016), Stenosis of right carotid artery without cerebral infarction (1/18/2016), TIA (transient ischemic attack) (1/14/2016), and Vitamin D deficiency (3/1/2016). admitted 2/4/2019 with cardiac arrest 
 
Pt receiving infusion for gout when becam unresponsive PEA arrest intubated and given epi ROSC after approximately 10 minutes Head CT OK Placed on code ice protocol Not on vasopressors Has chronic afib. Current rhythm is afib No Known Allergies Past Medical History:  
Diagnosis Date  Acute encephalopathy 1/14/2016  Anemia 5/11/2017  Atrial fibrillation ([facility]  Painter esophagus  CAD (coronary artery disease)  Diabetes ([facility]  [facility] Cardiomyopathy, myocarditis  HTN, goal below 140/90  Retinopathy, diabetic, bilateral ([facility] 3/11/2016  Stenosis of right carotid artery without cerebral infarction 1/18/2016  TIA (transient ischemic attack) 1/14/2016  Vitamin D deficiency 3/1/2016 Nephrology ordered and follow 2/22/16 Past Surgical History:  
Procedure Laterality Date  COLONOSCOPY N/A 6/23/2016 COLONOSCOPY performed by Naif Valerio MD at [address] [place] [address] ENDOSCOPY  COLONOSCOPY N/A 8/6/2018 COLONOSCOPY performed by Naif Valerio MD at [address] [place] [address] ENDOSCOPY  COLONOSCOPY N/A 8/30/2018 COLONOSCOPY performed by Crystal Arredondo MD at [address] [place] [address] HX ENDOSCOPY  06/27/2016 CAPSULE; normal small bowel  HX ENDOSCOPY  06/23/2016  
 upper endoscopy  HX UROLOGICAL  2013 Prior to Admission medications Medication Sig Start Date End Date Taking? Authorizing Provider  
insulin glargine (LANTUS,BASAGLAR) 100 unit/mL (3 mL) inpn 14 Units by SubCUTAneous route nightly. 1/21/19  Yes Donald Scruggs MD  
ELIQUIS 5 mg tablet TAKE 1 TABLET BY MOUTH TWICE A DAY 11/9/18  Yes Provider, Historical  
pegloticase (KRYSTEXXA) 8 mg/mL soln injection 8 mg by IntraVENous route Once every 2 weeks. Yes Provider, Historical  
digoxin (LANOXIN) 0.125 mg tablet Take 0.125 mg by mouth. Pt takes daily x 2 days, then skips a day and repeats this schedule. Yes Provider, Historical  
dextran 70-hypromellose (ARTIFICIAL TEARS,[id number],) ophthalmic solution Administer 1 Drop to both eyes as needed. Yes Provider, Historical  
hydrALAZINE (APRESOLINE) 25 mg tablet Take 25 mg by mouth three (3) times daily. 5/20/16  Yes Provider, Historical  
multivitamin (ONE A DAY) tablet Take 1 Tab by mouth daily.    Yes Provider, Historical  
 pravastatin (PRAVACHOL) 20 mg tablet Take 1 Tab by mouth nightly. 16  Yes Mayviewwilson Avila NP  
carvedilol (COREG) 3.125 mg tablet Take 1 Tab by mouth two (2) times daily (with meals). 16  Yes May[place]wilson Avila NP  
levothyroxine (SYNTHROID) 25 mcg tablet TAKE 1 TABLET BY MOUTH EVERY DAY BEFORE BREAKFAST 19   Vani Lackey MD  
leflunomide (ARAVA) 20 mg tablet Take 20 mg by mouth every evening. Provider, Historical  
furosemide (LASIX) 40 mg tablet Take 40 mg by mouth daily. 5/10/16   Provider, Historical  
omeprazole (PRILOSEC) 20 mg capsule Take 20 mg by mouth nightly. Provider, Historical  
  
Family History Problem Relation Age of Onset  Heart Failure Mother  Diabetes Father  No Known Problems Sister  Other Daughter Andrew Banuelos  Other Daughter Andrew Banuelos  Other Daughter Andrew Banuelos Social History Tobacco Use  Smoking status: Never Smoker  Smokeless tobacco: Never Used Substance Use Topics  Alcohol use: Yes Comment: 2 drinks/week, never a heavy drinker ROS:  Review of systems not obtained due to patient factors. Objective: 
Patient Vitals for the past 4 hrs: 
 BP Temp Pulse Resp SpO2  
19 1500 92/75  (!) 111 11 100 % 19 1400   67 18 (!) 81 % 19 1300   (!) 102 16 93 % 19 1200 (!) 125/109 99 °F (37.2 °C) 94 15 94 % Temp (24hrs), Av °F (35 °C), Min:91.4 °F (33 °C), Max:99 °F (37.2 °C) CVP:       
 
Intake/Output Summary (Last 24 hours) at 2019 1515 Last data filed at 2019 1441 Gross per 24 hour Intake 2137.14 ml Output 1198 ml Net 939.14 ml Blood Sugar: 
Glucose Date Value Ref Range Status 2019 121 (H) 65 - 100 mg/dL Final  
2019 140 (H) 65 - 100 mg/dL Final  
2019 166 (H) 65 - 100 mg/dL Final  
2019 184 (H) 65 - 100 mg/dL Final  
2019 194 (H) 65 - 100 mg/dL Final  
 
Exam: 
Sedate Oral ett moves all extremities spontaneously No accessory use Symmetrical chest expansion Scattered rhonchi 
irreg Soft Warm and dry No edema Lab data was reviewed. Radiology images were independently viewed and available reports were reviewed. CXR - ETT, no acute process Lab: 
Recent Labs 02/06/19 
0601 02/06/19 
0001 02/05/19 
1735  02/05/19 
3211 02/05/19 
0002 02/04/19 
1622 02/04/19 
1608 02/04/19 
1219  02/04/19 
1128 WBC 9.3 8.1 3.7*   < > 4.4 5.6  --  7.6 5.7  --  4.5 HGB 8.2* 7.6* 8.4*   < > 8.5* 8.4*  --  9.1* 9.8*  --  9.6*  
* 96* 56*   < > 62* 61*  --  75* 96*  --  84*  138 138   < > 138 138  --  138 142  --  142  
K 3.8 3.7 3.8   < > 3.8 3.9  --  3.6 3.2*  --  3.8  103 104   < > 103 103  --  105 102  --  103 CO2 23 21 23   < > 23 24  --  24 27  --  29 BUN 61* 56* 57*   < > 52* 50*  --  46* 45*  --  45* CREA 2.50* 2.38* 2.31*   < > 2.12* 2.19*  --  1.97* 2.26*  --  1.86* * 140* 166*   < > 194* 195*  --  145* 148*  --  82  
CA 8.0* 8.4* 8.5   < > 9.1 8.0*  --  8.1* 8.9  --  9.2 MG 2.9* 2.9* 2.9*   < > 3.2* 2.5*  --  1.9  --    < >  --   
PHOS  --   --   --   --   --   --   --  3.9  --   --   --   
INR  --  1.3*  --   --   --  1.4* 1.4*  --  1.4*   < >  --   
TROIQ  --   --   --   --  1.07* 1.20*  --  0.82* 0.07*   < >  --   
TBILI  --   --   --   --   --   --   --   --  0.9  --  0.9 SGOT  --   --   --   --   --   --   --   --  45*  --  27 LAC  --   --   --   --   --   --   --  1.2  --   --   --   
 < > = values in this interval not displayed. ABG: 
Recent Labs 02/06/19 
1420 02/06/19 
2605 02/06/19 
0030 PHI 7.183* 7.370 7.377 PCO2I 59.7* 43.4 41.1 PO2I 53* 126* 134* HCO3I 22.4 25.1 24.1 SO2I 78* 99* 99* FIO2I 100 35 0.35 All Micro Results Procedure Component Value Units Date/Time CULTURE, BLOOD, PAIRED [[id number]] Collected:  02/04/19 1608 Order Status:  Completed Specimen:  Blood Updated:  02/06/19 5096 Special Requests: NO SPECIAL REQUESTS Culture result: NO GROWTH 2 DAYS Rory Aden MD

## 2019-02-06 NOTE — PROGRESS NOTES
Hospitalist Progress Note Mitchell Underwood MD 
Answering service: [phone] OR [phone] from in house phone Date of Service:  2019 NAME:  Jace Crow :  1945 MRN:  [id number] Admission Summary:  
The patient is a 61-year-old gentleman with past medical history of anemia, atrial fibrillation, Painter's esophagus, coronary artery disease, diabetes, heart failure, cardiomyopathy, myocarditis, history of hypertension, diabetic retinopathy, right carotid artery stenosis, TIA and vitamin D deficiency who presents to the hospital from [facility]. Patient presented to [facility] today apparently in PEA. The history was primarily obtained from the ER physician as the patient is intubated and his wife is not present at the bedside. Interval history / Subjective:  
Extubated on BIPAP In AM extubated and than after few hours on NRB he got confused AGB looks acidotic with some CO 2 re tension PCCM notified increased rate and    
 
Assessment & Plan: 1. AHRF status post cardiac arrest/pulseless electrical activity. - Patient initially on hypothermic protocol and started to re warm now normothermic - Cardiology following 
- ECHO - EF WNL 
- Cycle troponins. - trending down may be from CPR  
- Will get an EEG. non specific no seizure activity 
- Bradycardia resolved  
- extubated today on BIPAP 2. Diabetes. The patient will be on sliding scale NovoLog insulin and Accu-Cheks, diet control and close monitoring 3. Hypothyroidism. Put patient on IV Synthroid. 4. CKD stage 3 - worsen from hypotension from CPR / cardiac arrest? 
- nephrology on board avoid nephrotoxin 5. Chronic a.fib Slow rate currently- On Eliquis will resume once can swallow 6  Anemia, appears to be chronic. Monitor H and H. Further intervention per hospital course Audrey Carlson 7.  Hypernatremia. Patient is on half normal saline. 8.  Gastrointestinal PPX 9. Low plt from the biologic agents? plt > 100 Code status: Full DVT prophylaxis: SCD Care Plan discussed with: Patient/Family and Nurse Disposition: TBD d/w family at bedside Critically ill may detoriorate Hospital Problems  Date Reviewed: 2/4/2019 Codes Class Noted POA * (Principal) Cardiac arrest [facility]) ICD-10-CM: I46.9 ICD-9-CM: 427.5  2/4/2019 Unknown Review of Systems:  
Review of systems not obtained due to patient factors. Vital Signs:  
 Last 24hrs VS reviewed since prior progress note. Most recent are: 
Visit Vitals BP (!) 125/109 Pulse (!) 102 Temp 99 °F (37.2 °C) Resp 16 Ht 5' 10\" (1.778 m) Wt 83.6 kg (184 lb 4.9 oz) SpO2 93% BMI 26.44 kg/m² Intake/Output Summary (Last 24 hours) at 2/6/2019 1450 Last data filed at 2/6/2019 1441 Gross per 24 hour Intake 2232.84 ml Output 1223 ml Net 1009.84 ml Physical Examination:  
 
 
     
Constitutional:  on BIPAP  
ENT:  Oral mucous moist   
Resp:  CTA bilaterally. CV:  Regular rhythm, ,bradycardia GI:  Soft, non distended, non tender. bs+ Musculoskeletal:  No edema, warm, 2+ pulses throughout Neurologic:  Moves all extremities. AAOx 1 can speak through mask Data Review:  
 I personally reviewed  Image and labs Labs:  
 
Recent Labs 02/06/19 
0601 02/06/19 
0001 WBC 9.3 8.1 HGB 8.2* 7.6* HCT 26.5* 24.8*  
* 96* Recent Labs 02/06/19 
0601 02/06/19 
0001 02/05/19 
1735  02/04/19 
1608  02/04/19 
1128  138 138   < > 138   < > 142 K 3.8 3.7 3.8   < > 3.6   < > 3.8  103 104   < > 105   < > 103 CO2 23 21 23   < > 24   < > 29 BUN 61* 56* 57*   < > 46*   < > 45* CREA 2.50* 2.38* 2.31*   < > 1.97*   < > 1.86* * 140* 166*   < > 145*   < > 82  
CA 8.0* 8.4* 8.5   < > 8.1*   < > 9.2 MG 2.9* 2.9* 2.9*   < > 1.9   < >  --   
PHOS  --   --   --   --  3.9  --   --   
URICA  --   --   --   --   --   --  4.5  
 < > = values in this interval not displayed. Recent Labs 02/04/19 
1219 02/04/19 
1128 SGOT 45* 27 ALT 44 29 AP 90 86 TBILI 0.9 0.9 TP 7.2 7.4 ALB 3.4* 3.6 GLOB 3.8 3.8 Recent Labs 02/06/19 
0001 02/05/19 
0002 02/04/19 
1622 INR 1.3* 1.4* 1.4* PTP 12.7* 14.0* 13.9* APTT 29.4 32.7* 30.5 No results for input(s): FE, TIBC, PSAT, FERR in the last 72 hours. No results found for: FOL, RBCF Recent Labs 02/04/19 
1238 PH 7.29* PCO2 48* PO2 429* Recent Labs 02/05/19 
8760 02/05/19 
0002 02/04/19 
1608 TROIQ 1.07* 1.20* 0.82* Lab Results Component Value Date/Time Cholesterol, total 135 02/23/2018 11:31 AM  
 HDL Cholesterol 34 (L) 02/23/2018 11:31 AM  
 LDL, calculated 79 02/23/2018 11:31 AM  
 Triglyceride 111 02/23/2018 11:31 AM  
 CHOL/HDL Ratio 5.0 01/15/2016 03:49 AM  
 
Lab Results Component Value Date/Time Glucose (POC) 116 (H) 02/06/2019 11:28 AM  
 Glucose (POC) 120 (H) 02/06/2019 06:09 AM  
 Glucose (POC) 141 (H) 02/06/2019 12:04 AM  
 Glucose (POC) 179 (H) 02/05/2019 05:35 PM  
 Glucose (POC) 213 (H) 02/05/2019 11:27 AM  
 
Lab Results Component Value Date/Time Color YELLOW/STRAW 02/04/2019 04:08 PM  
 Appearance CLOUDY (A) 02/04/2019 04:08 PM  
 Specific gravity 1.012 02/04/2019 04:08 PM  
 pH (UA) 5.0 02/04/2019 04:08 PM  
 Protein 30 (A) 02/04/2019 04:08 PM  
 Glucose NEGATIVE  02/04/2019 04:08 PM  
 Ketone TRACE (A) 02/04/2019 04:08 PM  
 Bilirubin NEGATIVE  02/04/2019 04:08 PM  
 Urobilinogen 0.2 02/04/2019 04:08 PM  
 Nitrites NEGATIVE  02/04/2019 04:08 PM  
 Leukocyte Esterase NEGATIVE  02/04/2019 04:08 PM  
 Epithelial cells FEW 02/04/2019 04:08 PM  
 Bacteria NEGATIVE  02/04/2019 04:08 PM  
 WBC 10-20 02/04/2019 04:08 PM  
 RBC  02/04/2019 04:08 PM  
 
 
 
Medications Reviewed: Current Facility-Administered Medications Medication Dose Route Frequency  fentaNYL citrate (PF) injection 25-50 mcg  25-50 mcg IntraVENous Q4H PRN  
 acetaminophen (TYLENOL) tablet 650 mg  650 mg Oral Q6H PRN  
 bacitracin 500 unit/gram packet 1 Packet  1 Packet Topical PRN  pantoprazole (PROTONIX) 40 mg in sodium chloride 0.9% 10 mL injection  40 mg IntraVENous DAILY  sodium chloride (NS) flush 5-40 mL  5-40 mL IntraVENous Q8H  
 sodium chloride (NS) flush 5-40 mL  5-40 mL IntraVENous PRN  chlorhexidine (PERIDEX) 0.12 % mouthwash 15 mL  15 mL Oral Q12H  
 sodium chloride (NS) flush 5-40 mL  5-40 mL IntraVENous Q8H  
 sodium chloride (NS) flush 5-40 mL  5-40 mL IntraVENous PRN  
 glucose chewable tablet 16 g  4 Tab Oral PRN  
 dextrose (D50W) injection syrg 12.5-25 g  25-50 mL IntraVENous PRN  
 glucagon (GLUCAGEN) injection 1 mg  1 mg IntraMUSCular PRN  
 insulin lispro (HUMALOG) injection   SubCUTAneous Q6H  
 [START ON 2/7/2019] levothyroxine (SYNTHROID) injection 18.75 mcg  18.75 mcg IntraVENous Q24H  
 0.9% sodium chloride infusion  5 mL/hr IntraVENous CONTINUOUS  
 PHENYLephrine (GINA-SYNEPHRINE) 30 mg in 0.9% sodium chloride 250 mL infusion   mcg/min IntraVENous TITRATE  
 
______________________________________________________________________ EXPECTED LENGTH OF STAY: 2d 0h 
ACTUAL LENGTH OF STAY:          2 Blake Shane MD

## 2019-02-06 NOTE — PROGRESS NOTES
Problem: Falls - Risk of 
Goal: *Absence of Falls Document Sherry Snaz Fall Risk and appropriate interventions in the flowsheet. Outcome: Progressing Towards Goal 
Fall Risk Interventions: 
  
 
  
 
Medication Interventions: Evaluate medications/consider consulting pharmacy Elimination Interventions: Call light in reach Problem: Pressure Injury - Risk of 
Goal: *Prevention of pressure injury Document Dom Scale and appropriate interventions in the flowsheet. Outcome: Progressing Towards Goal 
Pressure Injury Interventions: 
Sensory Interventions: Check visual cues for pain, Float heels Activity Interventions: Pressure redistribution bed/mattress(bed type) Mobility Interventions: HOB 30 degrees or less, Pressure redistribution bed/mattress (bed type) Nutrition Interventions: Discuss nutritional consult with provider Friction and Shear Interventions: HOB 30 degrees or less, Lift sheet

## 2019-02-06 NOTE — PROGRESS NOTES
Name: Jerri Chamberlain MRN: [id number] : 1945 Assessment: 
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 Cr close to basln but slight bump to 2.5 today, which is likely hemodynamically mediated from low nl BP and bradycardia yesterday HTN- with mild intermittent hypotension DM-2 Cardiac arrest; PEA- ? etiology. Code ice protocol- now warmed up to normothermia Gout Anemia- with component of ACKD A Resp failure- s/p extubation earlier today Plan/Recommendations: D/C 1/2 NS Use IV NS- if needed for hypotension Prn IV lasix for worsening sob or hypoxia Rate/rythm control with Afib- as able; no longer bradycardic Avoid nephrotoxins Subjective: 
Extubated. Feels OK. No acute c/o 
HR in 90's on monitor ROS:  
Deferred Exam: 
Visit Vitals BP 95/49 Pulse 96 Temp 98.6 °F (37 °C) Resp 17 Ht 5' 10\" (1.778 m) Wt 83.6 kg (184 lb 4.9 oz) SpO2 (!) 89% BMI 26.44 kg/m² WB/WN in NAD 
AT/NC No icterus No JVD B/l rhonchi+ Irregular, +SM, no tachy or niurka Soft, NT Chacon+ No peripheral edema Alert awake Current Facility-Administered Medications Medication Dose Route Frequency Last Dose  fentaNYL citrate (PF) injection 25-50 mcg  25-50 mcg IntraVENous Q4H PRN  pantoprazole (PROTONIX) 40 mg in sodium chloride 0.9% 10 mL injection  40 mg IntraVENous DAILY 40 mg at 19 0810  
 sodium chloride (NS) flush 5-40 mL  5-40 mL IntraVENous Q8H 10 mL at 19 0442  sodium chloride (NS) flush 5-40 mL  5-40 mL IntraVENous PRN 10 mL at 19 0442  acetaminophen (TYLENOL) suppository 650 mg  650 mg Rectal Q4H PRN  chlorhexidine (PERIDEX) 0.12 % mouthwash 15 mL  15 mL Oral Q12H 15 mL at 19 0825  sodium chloride (NS) flush 5-40 mL  5-40 mL IntraVENous Q8H 10 mL at 19 0442  sodium chloride (NS) flush 5-40 mL  5-40 mL IntraVENous PRN 10 mL at 02/06/19 0442  
 glucose chewable tablet 16 g  4 Tab Oral PRN    
 dextrose (D50W) injection syrg 12.5-25 g  25-50 mL IntraVENous PRN    
 glucagon (GLUCAGEN) injection 1 mg  1 mg IntraMUSCular PRN    
 insulin lispro (HUMALOG) injection   SubCUTAneous Q6H Stopped at 02/05/19 1800  [START ON 2/7/2019] levothyroxine (SYNTHROID) injection 18.75 mcg  18.75 mcg IntraVENous Q24H    
 0.9% sodium chloride infusion  5 mL/hr IntraVENous CONTINUOUS Stopped at 02/06/19 9986  PHENYLephrine (GINA-SYNEPHRINE) 30 mg in 0.9% sodium chloride 250 mL infusion   mcg/min IntraVENous TITRATE 20 mcg/min at 02/06/19 6773 Labs/Data: 
 
Lab Results Component Value Date/Time WBC 9.3 02/06/2019 06:01 AM  
 Hemoglobin (POC) 6.2 09/04/2018 02:17 PM  
 HGB 8.2 (L) 02/06/2019 06:01 AM  
 Hematocrit (POC) 32 (L) 02/04/2019 12:23 PM  
 HCT 26.5 (L) 02/06/2019 06:01 AM  
 PLATELET 898 (L) 09/20/4564 06:01 AM  
 MCV 91.7 02/06/2019 06:01 AM  
 
 
Lab Results Component Value Date/Time Sodium 138 02/06/2019 06:01 AM  
 Potassium 3.8 02/06/2019 06:01 AM  
 Chloride 104 02/06/2019 06:01 AM  
 CO2 23 02/06/2019 06:01 AM  
 Anion gap 11 02/06/2019 06:01 AM  
 Glucose 121 (H) 02/06/2019 06:01 AM  
 BUN 61 (H) 02/06/2019 06:01 AM  
 Creatinine 2.50 (H) 02/06/2019 06:01 AM  
 BUN/Creatinine ratio 24 (H) 02/06/2019 06:01 AM  
 GFR est AA 31 (L) 02/06/2019 06:01 AM  
 GFR est non-AA 26 (L) 02/06/2019 06:01 AM  
 Calcium 8.0 (L) 02/06/2019 06:01 AM  
 
 
Wt Readings from Last 3 Encounters:  
02/06/19 83.6 kg (184 lb 4.9 oz) 02/04/19 81.6 kg (180 lb) 01/17/19 81.6 kg (180 lb) Intake/Output Summary (Last 24 hours) at 2/6/2019 1134 Last data filed at 2/6/2019 1125 Gross per 24 hour Intake 2528.25 ml Output 1305 ml Net 1223.25 ml Patient seen and examined. Chart reviewed. Labs, data and other pertinent notes reviewed in last 24 hrs. PMH/SH/FH reviewed and unchanged compared to H&P Discussed with pt/RN Uriel Preston MD

## 2019-02-06 NOTE — PROGRESS NOTES
[facility] Cardiology Progress Note Date of service: 2/6/2019 Subjective: 
Mr Diana Lim remains intubated and ventilated He has been gradually re-warmed and is close to normothermia now On a small dose of chantell for BP Her daughter reports he has been opening eyes and tracking to some extent Currently sedated on propfol Objective: 
 
Visit Vitals /50 Pulse 66 Temp 98.2 °F (36.8 °C) Resp 12 Ht 5' 10\" (1.778 m) Wt 83.6 kg (184 lb 4.9 oz) SpO2 98% BMI 26.44 kg/m² Physical Exam  
Constitutional: He appears well-developed and well-nourished. He is intubated. HENT:  
Head: Normocephalic and atraumatic. Eyes: Conjunctivae are normal. No scleral icterus. Neck: No JVD present. Cardiovascular: Normal rate. An irregularly irregular rhythm present. Exam reveals no gallop. Murmur heard. Early systolic murmur is present with a grade of 2/6. Pulmonary/Chest: Effort normal and breath sounds normal. No stridor. He is intubated. No respiratory distress. He has no wheezes. He has no rales. Abdominal: Soft. He exhibits no distension. Musculoskeletal: He exhibits no edema or deformity. Neurological:  
Sedated Skin: Skin is warm and dry. Data reviewed: 
Recent Results (from the past 12 hour(s)) METABOLIC PANEL, BASIC Collection Time: 02/06/19 12:01 AM  
Result Value Ref Range Sodium 138 136 - 145 mmol/L Potassium 3.7 3.5 - 5.1 mmol/L Chloride 103 97 - 108 mmol/L  
 CO2 21 21 - 32 mmol/L Anion gap 14 5 - 15 mmol/L Glucose 140 (H) 65 - 100 mg/dL BUN 56 (H) 6 - 20 MG/DL Creatinine 2.38 (H) 0.70 - 1.30 MG/DL  
 BUN/Creatinine ratio 24 (H) 12 - 20 GFR est AA 33 (L) >60 ml/min/1.73m2 GFR est non-AA 27 (L) >60 ml/min/1.73m2 Calcium 8.4 (L) 8.5 - 10.1 MG/DL MAGNESIUM Collection Time: 02/06/19 12:01 AM  
Result Value Ref Range Magnesium 2.9 (H) 1.6 - 2.4 mg/dL CBC W/O DIFF Collection Time: 02/06/19 12:01 AM  
Result Value Ref Range WBC 8.1 4.1 - 11.1 K/uL  
 RBC 2.71 (L) 4.10 - 5.70 M/uL HGB 7.6 (L) 12.1 - 17.0 g/dL HCT 24.8 (L) 36.6 - 50.3 % MCV 91.5 80.0 - 99.0 FL  
 MCH 28.0 26.0 - 34.0 PG  
 MCHC 30.6 30.0 - 36.5 g/dL  
 RDW 14.2 11.5 - 14.5 % PLATELET 96 (L) 710 - 400 K/uL MPV 11.7 8.9 - 12.9 FL  
 NRBC 0.0 0  WBC ABSOLUTE NRBC 0.00 0.00 - 0.01 K/uL PROTHROMBIN TIME + INR Collection Time: 02/06/19 12:01 AM  
Result Value Ref Range INR 1.3 (H) 0.9 - 1.1 Prothrombin time 12.7 (H) 9.0 - 11.1 sec PTT Collection Time: 02/06/19 12:01 AM  
Result Value Ref Range aPTT 29.4 22.1 - 32.0 sec  
 aPTT, therapeutic range     58.0 - 77.0 SECS  
GLUCOSE, POC Collection Time: 02/06/19 12:04 AM  
Result Value Ref Range Glucose (POC) 141 (H) 65 - 100 mg/dL Performed by Sailaja Jacinto EKG, 12 LEAD, SUBSEQUENT Collection Time: 02/06/19  4:26 AM  
Result Value Ref Range Ventricular Rate 59 BPM  
 Atrial Rate 77 BPM  
 QRS Duration 98 ms Q-T Interval 560 ms QTC Calculation (Bezet) 554 ms Calculated R Axis 31 degrees Calculated T Axis -129 degrees Diagnosis Atrial fibrillation with slow ventricular response Incomplete right bundle branch block ST & Marked T wave abnormality, consider anterolateral ischemia Prolonged QT When compared with ECG of 05-FEB-2019 04:25, No significant change was found METABOLIC PANEL, BASIC Collection Time: 02/06/19  6:01 AM  
Result Value Ref Range Sodium 138 136 - 145 mmol/L Potassium 3.8 3.5 - 5.1 mmol/L Chloride 104 97 - 108 mmol/L  
 CO2 23 21 - 32 mmol/L Anion gap 11 5 - 15 mmol/L Glucose 121 (H) 65 - 100 mg/dL BUN 61 (H) 6 - 20 MG/DL Creatinine 2.50 (H) 0.70 - 1.30 MG/DL  
 BUN/Creatinine ratio 24 (H) 12 - 20 GFR est AA 31 (L) >60 ml/min/1.73m2 GFR est non-AA 26 (L) >60 ml/min/1.73m2 Calcium 8.0 (L) 8.5 - 10.1 MG/DL MAGNESIUM Collection Time: 02/06/19  6:01 AM  
Result Value Ref Range Magnesium 2.9 (H) 1.6 - 2.4 mg/dL CBC W/O DIFF Collection Time: 02/06/19  6:01 AM  
Result Value Ref Range WBC 9.3 4.1 - 11.1 K/uL  
 RBC 2.89 (L) 4.10 - 5.70 M/uL HGB 8.2 (L) 12.1 - 17.0 g/dL HCT 26.5 (L) 36.6 - 50.3 % MCV 91.7 80.0 - 99.0 FL  
 MCH 28.4 26.0 - 34.0 PG  
 MCHC 30.9 30.0 - 36.5 g/dL  
 RDW 14.2 11.5 - 14.5 % PLATELET 115 (L) 265 - 400 K/uL MPV 11.1 8.9 - 12.9 FL  
 NRBC 0.0 0  WBC ABSOLUTE NRBC 0.00 0.00 - 0.01 K/uL GLUCOSE, POC Collection Time: 02/06/19  6:09 AM  
Result Value Ref Range Glucose (POC) 120 (H) 65 - 100 mg/dL Performed by Rafat Perez POC EG7 Collection Time: 02/06/19  6:13 AM  
Result Value Ref Range Calcium, ionized (POC) 1.04 (L) 1.12 - 1.32 mmol/L  
 FIO2 (POC) 35 % pH (POC) 7.370 7.35 - 7.45    
 pCO2 (POC) 43.4 35.0 - 45.0 MMHG  
 pO2 (POC) 126 (H) 80 - 100 MMHG  
 HCO3 (POC) 25.1 22 - 26 MMOL/L Base excess (POC) 0 mmol/L  
 sO2 (POC) 99 (H) 92 - 97 % Site RIGHT RADIAL Device: VENT Mode ASSIST CONTROL Tidal volume 500 ml Set Rate 12 bpm  
 PEEP/CPAP (POC) 5 cmH2O Allens test (POC) YES Specimen type (POC) ARTERIAL Volume control YES Tele: a.fib, rate in the 60s Echo: 
2/5/19 · Estimated left ventricular ejection fraction is 56 - 60%. Left ventricular moderate concentric hypertrophy. Normal left ventricular wall motion, no regional wall motion abnormality noted. Normal left ventricular strain. · Left atrial cavity size is severely dilated. · Right atrial cavity size is mildly dilated. · Mild aortic valve sclerosis with no significant stenosis. · Moderate mitral annular calcification. Mild mitral valve regurgitation. · Mild tricuspid valve regurgitation is present. Severe pulmonary hypertension is present. Assessment: 1. Cardiac arrest. PEA. Unclear etiology. ? Anaphylaxis but not sure to what agent 
  
2.  Possible anoxic brain injury 
  
 3. Positive troponin - likely from CPR 
  
4. Chronic a.fib Rate better now that re-warmed 
  
5. CKD stage III 
  
6. Anemia, probably from CKD 
  
7. Gout 8. Thrombocytopenia: unclear etiology Improving 9. Pulmonary hypertension 
  
Plan: 
 
Likely wean sedation and if neurologic status allows, aim to extubate today Leave cooling catheter in place to maintain normothermia today Resume Eliquis if able to extubate Signed: 
Quin Edwards MD 
Interventional Cardiology 2/6/2019

## 2019-02-06 NOTE — PROGRESS NOTES
2000 Received report from Millie Connell and assumed care. VSS, no signs of pain or distress. Family at bedside. 2100 Complete chlorhexidine bed bath provided, tolerated well. 2220 Chung drip started at 25mcg/min for MAP <65.  
0000 Labs drawn, ABG completed. 0400 Assessment unchanged. Daughter remains at bedside. Weaning sedation for possible SBT this morning.  
0600 Labs drawn, ABG completed. EKG done. SAT passed. 0730 Bedside and Verbal shift change report given to Berwyn Severs and Gurdeep Galdamez (oncoming nurse) by Conchis Hou (offgoing nurse). Report included the following information SBAR, Kardex, Intake/Output, MAR, Recent Results and Alarm Parameters .

## 2019-02-06 NOTE — PROGRESS NOTES
Day #1 of Zosyn Indication:  pneumonia Current regimen:  2.5g IV [id number] Abx regimen: Zosyn and vancomycin Recent Labs 19 
1803 19 
0601 19 
0001 19 
1735 WBC 14.4* 9.3 8.1 3.7*  
CREA  --  2.50* 2.38* 2.31* BUN  --  61* 56* 57* Est CrCl: ~28 ml/min Temp (24hrs), Av °F (35.6 °C), Min:92.7 °F (33.7 °C), Max:99 °F (37.2 °C) Cultures:  Urine:  NGTD 
 Blood:  NGTD Plan: Change to 3.375g IV [id number] over four hours for CrCl > 20mL/min.

## 2019-02-07 ENCOUNTER — APPOINTMENT (OUTPATIENT)
Dept: GENERAL RADIOLOGY | Age: 74
DRG: 242 | End: 2019-02-07
Attending: INTERNAL MEDICINE
Payer: MEDICARE

## 2019-02-07 LAB
ALBUMIN SERPL-MCNC: 2.8 G/DL (ref 3.5–5)
ALBUMIN SERPL-MCNC: 2.9 G/DL (ref 3.5–5)
ALBUMIN/GLOB SERPL: 0.8 {RATIO} (ref 1.1–2.2)
ALBUMIN/GLOB SERPL: 0.9 {RATIO} (ref 1.1–2.2)
ALP SERPL-CCNC: 72 U/L (ref 45–117)
ALP SERPL-CCNC: 76 U/L (ref 45–117)
ALT SERPL-CCNC: 63 U/L (ref 12–78)
ALT SERPL-CCNC: 64 U/L (ref 12–78)
ANION GAP SERPL CALC-SCNC: 13 MMOL/L (ref 5–15)
ANION GAP SERPL CALC-SCNC: 13 MMOL/L (ref 5–15)
ANION GAP SERPL CALC-SCNC: 18 MMOL/L (ref 5–15)
APTT PPP: 29.4 SEC (ref 22.1–32)
ARTERIAL PATENCY WRIST A: ABNORMAL
AST SERPL-CCNC: 114 U/L (ref 15–37)
AST SERPL-CCNC: 99 U/L (ref 15–37)
BASE EXCESS BLD CALC-SCNC: 3 MMOL/L
BASOPHILS # BLD: 0 K/UL (ref 0–0.1)
BASOPHILS # BLD: 0.1 K/UL (ref 0–0.1)
BASOPHILS NFR BLD: 0 % (ref 0–1)
BASOPHILS NFR BLD: 1 % (ref 0–1)
BDY SITE: ABNORMAL
BILIRUB SERPL-MCNC: 0.9 MG/DL (ref 0.2–1)
BILIRUB SERPL-MCNC: 1.1 MG/DL (ref 0.2–1)
BUN SERPL-MCNC: 71 MG/DL (ref 6–20)
BUN SERPL-MCNC: 74 MG/DL (ref 6–20)
BUN SERPL-MCNC: 77 MG/DL (ref 6–20)
BUN/CREAT SERPL: 19 (ref 12–20)
BUN/CREAT SERPL: 20 (ref 12–20)
BUN/CREAT SERPL: 23 (ref 12–20)
CA-I BLD-SCNC: 0.84 MMOL/L (ref 1.12–1.32)
CALCIUM SERPL-MCNC: 7.5 MG/DL (ref 8.5–10.1)
CALCIUM SERPL-MCNC: 7.6 MG/DL (ref 8.5–10.1)
CALCIUM SERPL-MCNC: 7.6 MG/DL (ref 8.5–10.1)
CHLORIDE SERPL-SCNC: 102 MMOL/L (ref 97–108)
CHLORIDE SERPL-SCNC: 104 MMOL/L (ref 97–108)
CHLORIDE SERPL-SCNC: 105 MMOL/L (ref 97–108)
CO2 SERPL-SCNC: 20 MMOL/L (ref 21–32)
CO2 SERPL-SCNC: 23 MMOL/L (ref 21–32)
CO2 SERPL-SCNC: 23 MMOL/L (ref 21–32)
CREAT SERPL-MCNC: 3.42 MG/DL (ref 0.7–1.3)
CREAT SERPL-MCNC: 3.64 MG/DL (ref 0.7–1.3)
CREAT SERPL-MCNC: 3.75 MG/DL (ref 0.7–1.3)
DIFFERENTIAL METHOD BLD: ABNORMAL
DIFFERENTIAL METHOD BLD: ABNORMAL
EOSINOPHIL # BLD: 0 K/UL (ref 0–0.4)
EOSINOPHIL # BLD: 0 K/UL (ref 0–0.4)
EOSINOPHIL NFR BLD: 0 % (ref 0–7)
EOSINOPHIL NFR BLD: 0 % (ref 0–7)
ERYTHROCYTE [DISTWIDTH] IN BLOOD BY AUTOMATED COUNT: 14.5 % (ref 11.5–14.5)
ERYTHROCYTE [DISTWIDTH] IN BLOOD BY AUTOMATED COUNT: 14.6 % (ref 11.5–14.5)
GAS FLOW.O2 O2 DELIVERY SYS: ABNORMAL L/MIN
GAS FLOW.O2 SETTING OXYMISER: 22 BPM
GLOBULIN SER CALC-MCNC: 3.2 G/DL (ref 2–4)
GLOBULIN SER CALC-MCNC: 3.5 G/DL (ref 2–4)
GLUCOSE BLD STRIP.AUTO-MCNC: 158 MG/DL (ref 65–100)
GLUCOSE BLD STRIP.AUTO-MCNC: 190 MG/DL (ref 65–100)
GLUCOSE BLD STRIP.AUTO-MCNC: 198 MG/DL (ref 65–100)
GLUCOSE BLD STRIP.AUTO-MCNC: 232 MG/DL (ref 65–100)
GLUCOSE BLD STRIP.AUTO-MCNC: 242 MG/DL (ref 65–100)
GLUCOSE SERPL-MCNC: 203 MG/DL (ref 65–100)
GLUCOSE SERPL-MCNC: 224 MG/DL (ref 65–100)
GLUCOSE SERPL-MCNC: 225 MG/DL (ref 65–100)
HCO3 BLD-SCNC: 27.5 MMOL/L (ref 22–26)
HCT VFR BLD AUTO: 27 % (ref 36.6–50.3)
HCT VFR BLD AUTO: 28.1 % (ref 36.6–50.3)
HGB BLD-MCNC: 8.3 G/DL (ref 12.1–17)
HGB BLD-MCNC: 8.8 G/DL (ref 12.1–17)
IMM GRANULOCYTES # BLD AUTO: 0 K/UL
IMM GRANULOCYTES # BLD AUTO: 0 K/UL
IMM GRANULOCYTES NFR BLD AUTO: 0 %
IMM GRANULOCYTES NFR BLD AUTO: 0 %
INR PPP: 1.3 (ref 0.9–1.1)
LACTATE SERPL-SCNC: 2.5 MMOL/L (ref 0.4–2)
LYMPHOCYTES # BLD: 0.2 K/UL (ref 0.8–3.5)
LYMPHOCYTES # BLD: 0.2 K/UL (ref 0.8–3.5)
LYMPHOCYTES NFR BLD: 2 % (ref 12–49)
LYMPHOCYTES NFR BLD: 2 % (ref 12–49)
MAGNESIUM SERPL-MCNC: 2.6 MG/DL (ref 1.6–2.4)
MAGNESIUM SERPL-MCNC: 2.6 MG/DL (ref 1.6–2.4)
MCH RBC QN AUTO: 28.3 PG (ref 26–34)
MCH RBC QN AUTO: 28.9 PG (ref 26–34)
MCHC RBC AUTO-ENTMCNC: 30.7 G/DL (ref 30–36.5)
MCHC RBC AUTO-ENTMCNC: 31.3 G/DL (ref 30–36.5)
MCV RBC AUTO: 92.1 FL (ref 80–99)
MCV RBC AUTO: 92.2 FL (ref 80–99)
MONOCYTES # BLD: 0.3 K/UL (ref 0–1)
MONOCYTES # BLD: 0.9 K/UL (ref 0–1)
MONOCYTES NFR BLD: 4 % (ref 5–13)
MONOCYTES NFR BLD: 8 % (ref 5–13)
NEUTS BAND NFR BLD MANUAL: 8 % (ref 0–6)
NEUTS BAND NFR BLD MANUAL: 8 % (ref 0–6)
NEUTS SEG # BLD: 10.5 K/UL (ref 1.8–8)
NEUTS SEG # BLD: 7.7 K/UL (ref 1.8–8)
NEUTS SEG NFR BLD: 82 % (ref 32–75)
NEUTS SEG NFR BLD: 85 % (ref 32–75)
NRBC # BLD: 0 K/UL (ref 0–0.01)
NRBC # BLD: 0 K/UL (ref 0–0.01)
NRBC BLD-RTO: 0 PER 100 WBC
NRBC BLD-RTO: 0 PER 100 WBC
O2/TOTAL GAS SETTING VFR VENT: 100 %
PCO2 BLD: 44.8 MMHG (ref 35–45)
PEEP RESPIRATORY: 8 CMH2O
PH BLD: 7.4 [PH] (ref 7.35–7.45)
PHOSPHATE SERPL-MCNC: 7.7 MG/DL (ref 2.6–4.7)
PIP ISTAT,IPIP: 25
PLATELET # BLD AUTO: 106 K/UL (ref 150–400)
PLATELET # BLD AUTO: 152 K/UL (ref 150–400)
PLATELET COMMENTS,PCOM: ABNORMAL
PLATELET COMMENTS,PCOM: ABNORMAL
PMV BLD AUTO: 11.3 FL (ref 8.9–12.9)
PMV BLD AUTO: 12.1 FL (ref 8.9–12.9)
PO2 BLD: 401 MMHG (ref 80–100)
POTASSIUM SERPL-SCNC: 4 MMOL/L (ref 3.5–5.1)
POTASSIUM SERPL-SCNC: 4.2 MMOL/L (ref 3.5–5.1)
POTASSIUM SERPL-SCNC: 4.4 MMOL/L (ref 3.5–5.1)
PROT SERPL-MCNC: 6.1 G/DL (ref 6.4–8.2)
PROT SERPL-MCNC: 6.3 G/DL (ref 6.4–8.2)
PROTHROMBIN TIME: 13 SEC (ref 9–11.1)
RBC # BLD AUTO: 2.93 M/UL (ref 4.1–5.7)
RBC # BLD AUTO: 3.05 M/UL (ref 4.1–5.7)
RBC MORPH BLD: ABNORMAL
SAO2 % BLD: 100 % (ref 92–97)
SERVICE CMNT-IMP: ABNORMAL
SODIUM SERPL-SCNC: 140 MMOL/L (ref 136–145)
SODIUM SERPL-SCNC: 140 MMOL/L (ref 136–145)
SODIUM SERPL-SCNC: 141 MMOL/L (ref 136–145)
SPECIMEN TYPE: ABNORMAL
THERAPEUTIC RANGE,PTTT: NORMAL SECS (ref 58–77)
TOTAL RESP. RATE, ITRR: 22
VENTILATION MODE VENT: ABNORMAL
VT SETTING VENT: 500 ML
WBC # BLD AUTO: 11.6 K/UL (ref 4.1–11.1)
WBC # BLD AUTO: 8.3 K/UL (ref 4.1–11.1)

## 2019-02-07 PROCEDURE — 80053 COMPREHEN METABOLIC PANEL: CPT

## 2019-02-07 PROCEDURE — C1892 INTRO/SHEATH,FIXED,PEEL-AWAY: HCPCS | Performed by: INTERNAL MEDICINE

## 2019-02-07 PROCEDURE — 74011000250 HC RX REV CODE- 250: Performed by: HOSPITALIST

## 2019-02-07 PROCEDURE — 77030039046 HC PAD DEFIB RADIOTRNSPNT CNMD -B: Performed by: INTERNAL MEDICINE

## 2019-02-07 PROCEDURE — 5A12012 PERFORMANCE OF CARDIAC OUTPUT, SINGLE, MANUAL: ICD-10-PCS | Performed by: HOSPITALIST

## 2019-02-07 PROCEDURE — 74011636637 HC RX REV CODE- 636/637: Performed by: HOSPITALIST

## 2019-02-07 PROCEDURE — 33207 INSERT HEART PM VENTRICULAR: CPT | Performed by: INTERNAL MEDICINE

## 2019-02-07 PROCEDURE — 85730 THROMBOPLASTIN TIME PARTIAL: CPT

## 2019-02-07 PROCEDURE — 74011636637 HC RX REV CODE- 636/637: Performed by: FAMILY MEDICINE

## 2019-02-07 PROCEDURE — 74011250636 HC RX REV CODE- 250/636: Performed by: INTERNAL MEDICINE

## 2019-02-07 PROCEDURE — 74011250636 HC RX REV CODE- 250/636

## 2019-02-07 PROCEDURE — 74011250637 HC RX REV CODE- 250/637: Performed by: HOSPITALIST

## 2019-02-07 PROCEDURE — 77030031139 HC SUT VCRL2 J&J -A: Performed by: INTERNAL MEDICINE

## 2019-02-07 PROCEDURE — 65620000000 HC RM CCU GENERAL

## 2019-02-07 PROCEDURE — C9113 INJ PANTOPRAZOLE SODIUM, VIA: HCPCS | Performed by: HOSPITALIST

## 2019-02-07 PROCEDURE — 74011000272 HC RX REV CODE- 272: Performed by: INTERNAL MEDICINE

## 2019-02-07 PROCEDURE — 85610 PROTHROMBIN TIME: CPT

## 2019-02-07 PROCEDURE — C1785 PMKR, DUAL, RATE-RESP: HCPCS | Performed by: INTERNAL MEDICINE

## 2019-02-07 PROCEDURE — 99153 MOD SED SAME PHYS/QHP EA: CPT | Performed by: INTERNAL MEDICINE

## 2019-02-07 PROCEDURE — 77030012935 HC DRSG AQUACEL BMS -B: Performed by: INTERNAL MEDICINE

## 2019-02-07 PROCEDURE — 82962 GLUCOSE BLOOD TEST: CPT

## 2019-02-07 PROCEDURE — 85025 COMPLETE CBC W/AUTO DIFF WBC: CPT

## 2019-02-07 PROCEDURE — 87086 URINE CULTURE/COLONY COUNT: CPT

## 2019-02-07 PROCEDURE — 82803 BLOOD GASES ANY COMBINATION: CPT

## 2019-02-07 PROCEDURE — 83735 ASSAY OF MAGNESIUM: CPT

## 2019-02-07 PROCEDURE — 71045 X-RAY EXAM CHEST 1 VIEW: CPT

## 2019-02-07 PROCEDURE — 74011000250 HC RX REV CODE- 250: Performed by: INTERNAL MEDICINE

## 2019-02-07 PROCEDURE — 02HK3JZ INSERTION OF PACEMAKER LEAD INTO RIGHT VENTRICLE, PERCUTANEOUS APPROACH: ICD-10-PCS | Performed by: INTERNAL MEDICINE

## 2019-02-07 PROCEDURE — 94003 VENT MGMT INPAT SUBQ DAY: CPT

## 2019-02-07 PROCEDURE — 0BH17EZ INSERTION OF ENDOTRACHEAL AIRWAY INTO TRACHEA, VIA NATURAL OR ARTIFICIAL OPENING: ICD-10-PCS | Performed by: HOSPITALIST

## 2019-02-07 PROCEDURE — C1898 LEAD, PMKR, OTHER THAN TRANS: HCPCS | Performed by: INTERNAL MEDICINE

## 2019-02-07 PROCEDURE — 83605 ASSAY OF LACTIC ACID: CPT

## 2019-02-07 PROCEDURE — 84100 ASSAY OF PHOSPHORUS: CPT

## 2019-02-07 PROCEDURE — 74011636320 HC RX REV CODE- 636/320: Performed by: INTERNAL MEDICINE

## 2019-02-07 PROCEDURE — 36415 COLL VENOUS BLD VENIPUNCTURE: CPT

## 2019-02-07 PROCEDURE — 77030018673: Performed by: INTERNAL MEDICINE

## 2019-02-07 PROCEDURE — 74011000258 HC RX REV CODE- 258: Performed by: HOSPITALIST

## 2019-02-07 PROCEDURE — 02H63JZ INSERTION OF PACEMAKER LEAD INTO RIGHT ATRIUM, PERCUTANEOUS APPROACH: ICD-10-PCS | Performed by: INTERNAL MEDICINE

## 2019-02-07 PROCEDURE — 74011250636 HC RX REV CODE- 250/636: Performed by: HOSPITALIST

## 2019-02-07 PROCEDURE — 99152 MOD SED SAME PHYS/QHP 5/>YRS: CPT | Performed by: INTERNAL MEDICINE

## 2019-02-07 PROCEDURE — 0JH604Z INSERTION OF PACEMAKER, SINGLE CHAMBER INTO CHEST SUBCUTANEOUS TISSUE AND FASCIA, OPEN APPROACH: ICD-10-PCS | Performed by: INTERNAL MEDICINE

## 2019-02-07 PROCEDURE — 74011000250 HC RX REV CODE- 250: Performed by: FAMILY MEDICINE

## 2019-02-07 DEVICE — IPG W3SR01 AZURE S SR MRI WL USA
Type: IMPLANTABLE DEVICE | Site: HEART | Status: FUNCTIONAL
Brand: AZURE™ S SR MRI SURESCAN™

## 2019-02-07 DEVICE — LEAD PCMKR CAPSUR SP NOVUS 58 --: Type: IMPLANTABLE DEVICE | Site: CHEST  WALL | Status: FUNCTIONAL

## 2019-02-07 RX ORDER — MIDAZOLAM HYDROCHLORIDE 1 MG/ML
INJECTION, SOLUTION INTRAMUSCULAR; INTRAVENOUS AS NEEDED
Status: DISCONTINUED | OUTPATIENT
Start: 2019-02-07 | End: 2019-02-07 | Stop reason: HOSPADM

## 2019-02-07 RX ORDER — SODIUM BICARBONATE 1 MEQ/ML
SYRINGE (ML) INTRAVENOUS
Status: COMPLETED | OUTPATIENT
Start: 2019-02-06 | End: 2019-02-06

## 2019-02-07 RX ORDER — INSULIN GLARGINE 100 [IU]/ML
5 INJECTION, SOLUTION SUBCUTANEOUS
Status: DISCONTINUED | OUTPATIENT
Start: 2019-02-07 | End: 2019-02-22 | Stop reason: HOSPADM

## 2019-02-07 RX ORDER — EPINEPHRINE 0.1 MG/ML
INJECTION INTRACARDIAC; INTRAVENOUS
Status: COMPLETED | OUTPATIENT
Start: 2019-02-06 | End: 2019-02-06

## 2019-02-07 RX ORDER — LIDOCAINE HYDROCHLORIDE AND EPINEPHRINE 10; 10 MG/ML; UG/ML
INJECTION, SOLUTION INFILTRATION; PERINEURAL AS NEEDED
Status: DISCONTINUED | OUTPATIENT
Start: 2019-02-07 | End: 2019-02-07 | Stop reason: HOSPADM

## 2019-02-07 RX ORDER — FENTANYL CITRATE 50 UG/ML
INJECTION, SOLUTION INTRAMUSCULAR; INTRAVENOUS AS NEEDED
Status: DISCONTINUED | OUTPATIENT
Start: 2019-02-07 | End: 2019-02-07 | Stop reason: HOSPADM

## 2019-02-07 RX ADMIN — LEVOTHYROXINE SODIUM 18.75 MCG: 100 INJECTION, POWDER, LYOPHILIZED, FOR SOLUTION INTRAVENOUS at 12:32

## 2019-02-07 RX ADMIN — PIPERACILLIN AND TAZOBACTAM 3.38 G: 3; .375 INJECTION, POWDER, FOR SOLUTION INTRAVENOUS at 03:22

## 2019-02-07 RX ADMIN — CHLORHEXIDINE GLUCONATE 15 ML: 1.2 RINSE ORAL at 22:58

## 2019-02-07 RX ADMIN — Medication 10 ML: at 21:20

## 2019-02-07 RX ADMIN — SODIUM CHLORIDE 40 MG: 9 INJECTION, SOLUTION INTRAMUSCULAR; INTRAVENOUS; SUBCUTANEOUS at 09:25

## 2019-02-07 RX ADMIN — PHENYLEPHRINE HYDROCHLORIDE 55 MCG/MIN: 10 INJECTION, SOLUTION INTRAMUSCULAR; INTRAVENOUS; SUBCUTANEOUS at 08:30

## 2019-02-07 RX ADMIN — PROPOFOL 30 MCG/KG/MIN: 10 INJECTION, EMULSION INTRAVENOUS at 22:40

## 2019-02-07 RX ADMIN — PIPERACILLIN AND TAZOBACTAM 3.38 G: 3; .375 INJECTION, POWDER, FOR SOLUTION INTRAVENOUS at 11:27

## 2019-02-07 RX ADMIN — INSULIN GLARGINE 5 UNITS: 100 INJECTION, SOLUTION SUBCUTANEOUS at 23:22

## 2019-02-07 RX ADMIN — CHLORHEXIDINE GLUCONATE 15 ML: 1.2 RINSE ORAL at 09:26

## 2019-02-07 RX ADMIN — ACETAMINOPHEN 650 MG: 325 TABLET ORAL at 17:06

## 2019-02-07 RX ADMIN — PROPOFOL 40 MCG/KG/MIN: 10 INJECTION, EMULSION INTRAVENOUS at 04:44

## 2019-02-07 RX ADMIN — INSULIN LISPRO 2 UNITS: 100 INJECTION, SOLUTION INTRAVENOUS; SUBCUTANEOUS at 12:31

## 2019-02-07 RX ADMIN — Medication 10 ML: at 21:19

## 2019-02-07 RX ADMIN — PIPERACILLIN AND TAZOBACTAM 3.38 G: 3; .375 INJECTION, POWDER, FOR SOLUTION INTRAVENOUS at 18:55

## 2019-02-07 RX ADMIN — INSULIN LISPRO 2 UNITS: 100 INJECTION, SOLUTION INTRAVENOUS; SUBCUTANEOUS at 06:43

## 2019-02-07 RX ADMIN — Medication 10 ML: at 10:15

## 2019-02-07 RX ADMIN — PHENYLEPHRINE HYDROCHLORIDE 70 MCG/MIN: 10 INJECTION, SOLUTION INTRAMUSCULAR; INTRAVENOUS; SUBCUTANEOUS at 14:15

## 2019-02-07 RX ADMIN — PROPOFOL 10 MCG/KG/MIN: 10 INJECTION, EMULSION INTRAVENOUS at 09:01

## 2019-02-07 RX ADMIN — ACETAMINOPHEN 650 MG: 325 TABLET ORAL at 23:03

## 2019-02-07 RX ADMIN — PROPOFOL 50 MCG/KG/MIN: 10 INJECTION, EMULSION INTRAVENOUS at 00:10

## 2019-02-07 RX ADMIN — Medication 10 ML: at 14:16

## 2019-02-07 NOTE — PROGRESS NOTES
Intubation Note Called to bedside secondary to code plue, PEA. Pulse recovered, some spont. resp effort. Not arouseable. .   
Patient pre-oxygenated with 100% oxygen. RSI with propofol 100mg IVP. DVL x 1 . 
 
7.5 OETT taped and secured at 22 cm at the teeth/gum.+ bilateral BS's, + chest rise, + ETCO2. CXR pending. Ventilator settings as per Critical Care attending physician. Ce Fajardo

## 2019-02-07 NOTE — INTERDISCIPLINARY ROUNDS
IDR/SLIDR Summary Patient: Miquel Redd MRN: [id number]    Age: 72 y.o. YOB: 1945 Room/Bed: [address] [place] [address] Admit Diagnosis: Cardiac arrest [facility]) [I46.9]  Principal Diagnosis: Cardiac arrest ([facility] [address] Goals: stable VS 
Readmission: yes Quality Measure: Not applicable VTE Prophylaxis: Mechanical 
Influenza Vaccine screening completed? YES Pneumococcal Vaccine screening completed? NO Mobility needs: Yes   Nutrition plan:No 
Consults:P.T, O.T., Speech, Respiratory and Case Management Financial concerns:Yes  Escalated to CM? YES 
RRAT Score: 39   Interventions:H2H Testing due for pt today? YES 
LOS: 3 days Expected length of stay 7? days Discharge plan: home   PCP: Aleksandar Padilla MD 
Transportation needs: Yes Days before discharge:two or more days before discharge Discharge disposition: Home Signed:  
 
Amado Vieira 2/7/2019 
11:26 PM

## 2019-02-07 NOTE — PROGRESS NOTES
Bedside and Verbal shift change report given to Ronal Espinal 86 by [facility]. Report included the following information SBAR, Kardex, MAR, Recent Results and Cardiac Rhythm Afib. 
 
0830: TRANSFER - IN REPORT: 
 
Verbal report received from [facility] on Noreen Hughes  being received from Cath lab(unit) for change in patient condition(Pacer placement) Report consisted of patients Situation, Background, Assessment and  
Recommendations(SBAR). Information from the following report(s) SBAR, Kardex, MAR, Recent Results and Cardiac Rhythm Afib was reviewed with the receiving nurse. Opportunity for questions and clarification was provided. Assessment completed upon patients arrival to unit and care assumed. 6559: When pt arrived BP low in 50s. Increased Chung and stopped Propofol. 0840: Continuing to increase pressors. 0930: Pt coughing on vent restarted on propofol. BP stable on 120 mcg of Chung. Family asking numerous questions to all treatment team. 
 
1200: Pt's BP more stable. Able to titrate Chung and able to increase Propofol for pt's comfort  
 
1600: Consents signed by wife Family aware of need of bronch and central line 1930: Bedside and Verbal shift change report given to [facility] by Nasreen Olmos RN. Report included the following information SBAR, Kardex, MAR, Recent Results and Cardiac Rhythm Afib/Paced.

## 2019-02-07 NOTE — PROGRESS NOTES
[facility] Cardiology Progress Note Date of service: 2/7/2019 Subjective: 
Mr Henny Parikh had several PEA codes yesterday evening, with sudden episodes of severe bradycardia A temporary pacemaker catheter was placed emergently last night, and he has now had a PPM placed by Dr Anguiano Amen this AM 
He remains intubated and is on Chung for hypotension Objective: 
 
Visit Vitals /61 Pulse 92 Temp 98.8 °F (37.1 °C) Resp 22 Ht 5' 10\" (1.778 m) Wt 85.2 kg (187 lb 13.3 oz) SpO2 99% BMI 26.95 kg/m² Physical Exam  
Constitutional: He appears well-developed and well-nourished. He is intubated. HENT:  
Head: Normocephalic and atraumatic. Eyes: Conjunctivae are normal. No scleral icterus. Neck: No JVD present. Cardiovascular: Normal rate. An irregularly irregular rhythm present. Exam reveals no gallop. Murmur heard. Early systolic murmur is present with a grade of 2/6. Pulmonary/Chest: Effort normal and breath sounds normal. No stridor. He is intubated. No respiratory distress. He has no wheezes. He has no rales. Abdominal: Soft. He exhibits no distension. Musculoskeletal: He exhibits no edema or deformity. Neurological:  
Sedated Skin: Skin is warm and dry. Data reviewed: 
Recent Results (from the past 12 hour(s)) EKG, 12 LEAD, INITIAL Collection Time: 02/06/19 10:36 PM  
Result Value Ref Range Ventricular Rate 0 BPM  
 Atrial Rate 0 BPM  
 QRS Duration 0 ms Q-T Interval 0 ms QTC Calculation (Bezet) 0 ms Calculated R Axis 0 degrees Calculated T Axis 0 degrees Diagnosis ** No QRS complexes found, no ECG analysis possible ** When compared with ECG of 06-FEB-2019 04:26, 
Current undetermined rhythm precludes rhythm comparison, needs review CBC WITH AUTOMATED DIFF Collection Time: 02/07/19 12:01 AM  
Result Value Ref Range WBC 11.6 (H) 4.1 - 11.1 K/uL  
 RBC 3.05 (L) 4.10 - 5.70 M/uL HGB 8.8 (L) 12.1 - 17.0 g/dL HCT 28.1 (L) 36.6 - 50.3 % MCV 92.1 80.0 - 99.0 FL  
 MCH 28.9 26.0 - 34.0 PG  
 MCHC 31.3 30.0 - 36.5 g/dL  
 RDW 14.6 (H) 11.5 - 14.5 % PLATELET 410 506 - 335 K/uL MPV 11.3 8.9 - 12.9 FL  
 NRBC 0.0 0  WBC ABSOLUTE NRBC 0.00 0.00 - 0.01 K/uL NEUTROPHILS 82 (H) 32 - 75 % BAND NEUTROPHILS 8 (H) 0 - 6 % LYMPHOCYTES 2 (L) 12 - 49 % MONOCYTES 8 5 - 13 % EOSINOPHILS 0 0 - 7 % BASOPHILS 0 0 - 1 % IMMATURE GRANULOCYTES 0 %  
 ABS. NEUTROPHILS 10.5 (H) 1.8 - 8.0 K/UL  
 ABS. LYMPHOCYTES 0.2 (L) 0.8 - 3.5 K/UL  
 ABS. MONOCYTES 0.9 0.0 - 1.0 K/UL  
 ABS. EOSINOPHILS 0.0 0.0 - 0.4 K/UL  
 ABS. BASOPHILS 0.0 0.0 - 0.1 K/UL  
 ABS. IMM. GRANS. 0.0 K/UL  
 DF MANUAL PLATELET COMMENTS Large Platelets RBC COMMENTS OVALOCYTES PRESENT 
    
METABOLIC PANEL, COMPREHENSIVE Collection Time: 02/07/19 12:01 AM  
Result Value Ref Range Sodium 140 136 - 145 mmol/L Potassium 4.2 3.5 - 5.1 mmol/L Chloride 102 97 - 108 mmol/L  
 CO2 20 (L) 21 - 32 mmol/L Anion gap 18 (H) 5 - 15 mmol/L Glucose 203 (H) 65 - 100 mg/dL BUN 71 (H) 6 - 20 MG/DL Creatinine 3.75 (H) 0.70 - 1.30 MG/DL  
 BUN/Creatinine ratio 19 12 - 20 GFR est AA 19 (L) >60 ml/min/1.73m2 GFR est non-AA 16 (L) >60 ml/min/1.73m2 Calcium 7.6 (L) 8.5 - 10.1 MG/DL Bilirubin, total 1.1 (H) 0.2 - 1.0 MG/DL  
 ALT (SGPT) 63 12 - 78 U/L  
 AST (SGOT) 99 (H) 15 - 37 U/L Alk. phosphatase 76 45 - 117 U/L Protein, total 6.3 (L) 6.4 - 8.2 g/dL Albumin 2.8 (L) 3.5 - 5.0 g/dL Globulin 3.5 2.0 - 4.0 g/dL A-G Ratio 0.8 (L) 1.1 - 2.2 MAGNESIUM Collection Time: 02/07/19 12:01 AM  
Result Value Ref Range Magnesium 2.6 (H) 1.6 - 2.4 mg/dL PHOSPHORUS Collection Time: 02/07/19 12:01 AM  
Result Value Ref Range Phosphorus 7.7 (H) 2.6 - 4.7 MG/DL  
GLUCOSE, POC Collection Time: 02/07/19 12:27 AM  
Result Value Ref Range Glucose (POC) 198 (H) 65 - 100 mg/dL Performed by Jadon Friend METABOLIC PANEL, COMPREHENSIVE Collection Time: 02/07/19  4:23 AM  
Result Value Ref Range Sodium 140 136 - 145 mmol/L Potassium 4.4 3.5 - 5.1 mmol/L Chloride 104 97 - 108 mmol/L  
 CO2 23 21 - 32 mmol/L Anion gap 13 5 - 15 mmol/L Glucose 224 (H) 65 - 100 mg/dL BUN 74 (H) 6 - 20 MG/DL Creatinine 3.64 (H) 0.70 - 1.30 MG/DL  
 BUN/Creatinine ratio 20 12 - 20 GFR est AA 20 (L) >60 ml/min/1.73m2 GFR est non-AA 17 (L) >60 ml/min/1.73m2 Calcium 7.6 (L) 8.5 - 10.1 MG/DL Bilirubin, total 0.9 0.2 - 1.0 MG/DL  
 ALT (SGPT) 64 12 - 78 U/L  
 AST (SGOT) 114 (H) 15 - 37 U/L Alk. phosphatase 72 45 - 117 U/L Protein, total 6.1 (L) 6.4 - 8.2 g/dL Albumin 2.9 (L) 3.5 - 5.0 g/dL Globulin 3.2 2.0 - 4.0 g/dL A-G Ratio 0.9 (L) 1.1 - 2.2    
CBC WITH AUTOMATED DIFF Collection Time: 02/07/19  4:23 AM  
Result Value Ref Range WBC 8.3 4.1 - 11.1 K/uL  
 RBC 2.93 (L) 4.10 - 5.70 M/uL HGB 8.3 (L) 12.1 - 17.0 g/dL HCT 27.0 (L) 36.6 - 50.3 % MCV 92.2 80.0 - 99.0 FL  
 MCH 28.3 26.0 - 34.0 PG  
 MCHC 30.7 30.0 - 36.5 g/dL  
 RDW 14.5 11.5 - 14.5 % PLATELET 359 (L) 005 - 400 K/uL MPV 12.1 8.9 - 12.9 FL  
 NRBC 0.0 0  WBC ABSOLUTE NRBC 0.00 0.00 - 0.01 K/uL NEUTROPHILS 85 (H) 32 - 75 % BAND NEUTROPHILS 8 (H) 0 - 6 % LYMPHOCYTES 2 (L) 12 - 49 % MONOCYTES 4 (L) 5 - 13 % EOSINOPHILS 0 0 - 7 % BASOPHILS 1 0 - 1 % IMMATURE GRANULOCYTES 0 %  
 ABS. NEUTROPHILS 7.7 1.8 - 8.0 K/UL  
 ABS. LYMPHOCYTES 0.2 (L) 0.8 - 3.5 K/UL  
 ABS. MONOCYTES 0.3 0.0 - 1.0 K/UL  
 ABS. EOSINOPHILS 0.0 0.0 - 0.4 K/UL  
 ABS. BASOPHILS 0.1 0.0 - 0.1 K/UL  
 ABS. IMM. GRANS. 0.0 K/UL  
 DF MANUAL PLATELET COMMENTS Large Platelets RBC COMMENTS ANISOCYTOSIS 1+ 
    
 RBC COMMENTS TEARDROP CELLS 
PRESENT 
    
 RBC COMMENTS OVALOCYTES PRESENT 
    
MAGNESIUM Collection Time: 02/07/19  4:23 AM  
Result Value Ref Range Magnesium 2.6 (H) 1.6 - 2.4 mg/dL PROTHROMBIN TIME + INR Collection Time: 02/07/19  4:23 AM  
Result Value Ref Range INR 1.3 (H) 0.9 - 1.1 Prothrombin time 13.0 (H) 9.0 - 11.1 sec PTT Collection Time: 02/07/19  4:23 AM  
Result Value Ref Range aPTT 29.4 22.1 - 32.0 sec  
 aPTT, therapeutic range     58.0 - 77.0 SECS  
LACTIC ACID Collection Time: 02/07/19  4:23 AM  
Result Value Ref Range Lactic acid 2.5 (HH) 0.4 - 2.0 MMOL/L  
POC EG7 Collection Time: 02/07/19  6:31 AM  
Result Value Ref Range Calcium, ionized (POC) 0.84 (L) 1.12 - 1.32 mmol/L  
 FIO2 (POC) 100 % pH (POC) 7.397 7.35 - 7.45    
 pCO2 (POC) 44.8 35.0 - 45.0 MMHG  
 pO2 (POC) 401 (H) 80 - 100 MMHG  
 HCO3 (POC) 27.5 (H) 22 - 26 MMOL/L Base excess (POC) 3 mmol/L  
 sO2 (POC) 100 (H) 92 - 97 % Site DRAWN FROM ARTERIAL LINE Device: VENT Mode ASSIST CONTROL Tidal volume 500 ml Set Rate 22 bpm  
 PEEP/CPAP (POC) 8 cmH2O  
 PIP (POC) 25 Allens test (POC) N/A Specimen type (POC) ARTERIAL Total resp. rate 22 GLUCOSE, POC Collection Time: 02/07/19  6:38 AM  
Result Value Ref Range Glucose (POC) 232 (H) 65 - 100 mg/dL Performed by Thad Ingram Assessment: 1. Cardiac arrest. PEA. With events of 2/6 it now seems like that his underlying mechanism may have been bradyarrhythmia from SSS.  
  
2. SSS: s/p PPM 2/7. 
  
3. Acute respiratory failure S/p cardiac arrest. May have some element of aspiration pneumonia 4. Hypotension 
  
5. Chronic a.fib 
  
6. CKD stage III Renal indices trending a bit worse, likely from hypotension 
  
7. Anemia, probably from CKD 
  
8. Gout 9. Thrombocytopenia: unclear etiology Improving 10. Pulmonary hypertension 
  
Plan: 
 
Continue current supportive cares Appreciate help from Dr Diana Gomez with pacemaker Weaning sedation may help with hypotension Signed: 
Nichelle Wilson MD 
Interventional Cardiology 2/7/2019

## 2019-02-07 NOTE — PROGRESS NOTES
1924 pt Jeremiah Brand in 40's, 1mg Atropine given which was unsuccessful. Pt in PEA arrest, CPR started, Code Blue called, 1 mg Epi given, 1 amp Bicarb given 1930 received ROSC, pt responsive,Code ended 1933 Pt intubated by Dr Abena Garland.Ariane paged Cardiology & Pulmonary 1952 PEA arrest, CPR started, Code Blue called, 1 mg Atropine given, 1 mg Epi given, 1 amp Bicarb given 1957 received ROSC, Code ended 
 
2000 Dr Usha Vaca at bedside 2005 Dr Josh Saenz at bedside completing bedside ECHO, paged Dr Milvia Ceballos for transvenous pacer. 2010 ABG completed 2027 OGT placed 2041 PEA arrest, CPR started, Code Blue called, 1 mg Epi given, 2 amps Bicarb given 2044 received ROSC 
 
2050 CXR & KUB completed 2057 Epi & Isuprel started per Dr Josh Saenz 2059 ABG completed 2107 PEA arrest, CPR started, Code Blue called. Attempted external pacing rate 70/Ma's 80 per Dr Milvia Ceballos @ bedside which was unseccessful. 1 mg Epi given, 1 amp Bicarb given. 2112 Received ROSC, Code ended 2125 Arterial line placed by Dr Usha Vaca 2139 transported pt to Cath lab with Dr Milvia Ceballos, Cath Lab RN & CCU RN x2 
 
2211 [place], [place] Lab RN called report R vein 5 Fr sheath Temp pacer rate 80 Ma 10 sutured 2225 pt back from Cath lab, EKG performed, pt in Afib HR  
 
2240 Epi down to 1 
 
2300 paged Nephrology, spoke with Dr Lori Acuña regarding recent events, advised CVVH may be considered in the morning 2314 Epi off 
 
0011 Chung down to 175 mcg/min 
 
0020 labs & urine culture sent per order 
 
0021 Chung down to 150 mcg/min 
 
0031 Chung down to 125 mcg/min 
 
0159 Chung down to 100 mcg/min 
 
0320 Chung down to 75 mcg/min 
 
0329 Chung down to 60 mcg/min 
 
0542 Chung down to 30 mcg/min 
 
0400 CXR completed 5975 labs sent per order 1220 paged Hospitalist for critical lactic results, new orders to repeat lactic in 4 hrs. 
 
0545 Dr Binu Butler here to see pt, speaking with family 0630 Yaya from Cath lab called for report [id number] Cath lab here to transport pt. 
 
0730 Bedside shift change report given to Ronal Espinal 86 (oncoming nurse) by David Snell RN (offgoing nurse). Report included the following information SBAR, Kardex, OR Summary, Procedure Summary, Intake/Output, MAR, Recent Results and Cardiac Rhythm Afib.

## 2019-02-07 NOTE — PROGRESS NOTES
Name: Zee Christina MRN: [id number] : 1945 Assessment: 
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 HTN- now with hypotension DM-2 Cardiac arrest; PEA- ? etiology. Code ice protocol; recurrence of arrest last night/early AM overnight Gout Anemia- with component of ACKD A Resp failure- reintubated Pt had PEA bradycardic arrest again last night. Got temporary Pacer last night and got permanent pacemaker today am. BP was low but improving. Started on pressors UOP dropped and Cr bumped, but lytes OK. UOP of around 400 ml in bag. D/W pts family at bedside- possibility of needing dialysis vs he may turn around if hemodynamics are maintained. HR is now controlled post pacemaker Plan/Recommendations: 
Pressors to keep SBP >100 mm Hg 
Monitor IOs for next couple hrs Rpt labs at noon I will follow back to see how he is doing hemodynamically, how's his UOP and f/u labs If he is worsening, will get mingo and start CVVH Subjective: 
Events noted from overnight. Coded again Intubated again Got perm pacer this am. HR now in 70's ROS:  
UTO Exam: 
Visit Vitals /73 Pulse 79 Temp 98.9 °F (37.2 °C) Resp 8 Ht 5' 10\" (1.778 m) Wt 85.2 kg (187 lb 13.3 oz) SpO2 99% BMI 26.95 kg/m² NAD 
+ETT No icterus B/l rhonchi+ Irregular, +SM, no tachy or niurka Soft, NT [place]+ No peripheral edema Sedated on vent Current Facility-Administered Medications Medication Dose Route Frequency Last Dose  fentaNYL citrate (PF) injection 25-50 mcg  25-50 mcg IntraVENous Q4H PRN 50 mcg at 19  acetaminophen (TYLENOL) tablet 650 mg  650 mg Oral Q6H  mg at 19 1203  bacitracin 500 unit/gram packet 1 Packet  1 Packet Topical PRN    
  piperacillin-tazobactam (ZOSYN) 3.375 g in 0.9% sodium chloride (MBP/ADV) 100 mL  3.375 g IntraVENous Q8H 3.375 g at 02/07/19 0322  apixaban (ELIQUIS) tablet 2.5 mg  2.5 mg Oral BID Stopped at 02/06/19 2000  propofol (DIPRIVAN) infusion  0-50 mcg/kg/min IntraVENous TITRATE 10 mcg/kg/min at 02/07/19 0901  isoproterenol (ISUPREL) 1 mg in 0.9% sodium chloride 100 mL infusion  0-10 mcg/min IntraVENous TITRATE Stopped at 02/07/19 0757  EPINEPHrine (ADRENALIN) 5 mg in 0.9% sodium chloride 250 mL infusion  1-10 mcg/min IntraVENous TITRATE Stopped at 02/06/19 2314  PHENYLephrine (GINA-SYNEPHRINE) 100 mg in 0.9% sodium chloride 250 mL infusion   mcg/min IntraVENous TITRATE 135 mcg/min at 02/07/19 0927  
 pantoprazole (PROTONIX) 40 mg in sodium chloride 0.9% 10 mL injection  40 mg IntraVENous DAILY 40 mg at 02/07/19 9332  sodium chloride (NS) flush 5-40 mL  5-40 mL IntraVENous Q8H 10 mL at 02/06/19 2315  sodium chloride (NS) flush 5-40 mL  5-40 mL IntraVENous PRN 10 mL at 02/06/19 0442  chlorhexidine (PERIDEX) 0.12 % mouthwash 15 mL  15 mL Oral Q12H 15 mL at 02/07/19 7347  sodium chloride (NS) flush 5-40 mL  5-40 mL IntraVENous Q8H 10 mL at 02/06/19 2315  sodium chloride (NS) flush 5-40 mL  5-40 mL IntraVENous PRN 10 mL at 02/06/19 0442  
 glucose chewable tablet 16 g  4 Tab Oral PRN    
 dextrose (D50W) injection syrg 12.5-25 g  25-50 mL IntraVENous PRN    
 glucagon (GLUCAGEN) injection 1 mg  1 mg IntraMUSCular PRN    
 insulin lispro (HUMALOG) injection   SubCUTAneous Q6H 2 Units at 02/07/19 1299  levothyroxine (SYNTHROID) injection 18.75 mcg  18.75 mcg IntraVENous Q24H    
 0.9% sodium chloride infusion  5 mL/hr IntraVENous CONTINUOUS Stopped at 02/06/19 0908 Labs/Data: 
 
Lab Results Component Value Date/Time  WBC 8.3 02/07/2019 04:23 AM  
 Hemoglobin (POC) 6.2 09/04/2018 02:17 PM  
 HGB 8.3 (L) 02/07/2019 04:23 AM  
 Hematocrit (POC) 32 (L) 02/04/2019 12:23 PM  
 HCT 27.0 (L) 02/07/2019 04:23 AM  
 PLATELET 654 (L) 09/74/1895 04:23 AM  
 MCV 92.2 02/07/2019 04:23 AM  
 
 
Lab Results Component Value Date/Time Sodium 140 02/07/2019 04:23 AM  
 Potassium 4.4 02/07/2019 04:23 AM  
 Chloride 104 02/07/2019 04:23 AM  
 CO2 23 02/07/2019 04:23 AM  
 Anion gap 13 02/07/2019 04:23 AM  
 Glucose 224 (H) 02/07/2019 04:23 AM  
 BUN 74 (H) 02/07/2019 04:23 AM  
 Creatinine 3.64 (H) 02/07/2019 04:23 AM  
 BUN/Creatinine ratio 20 02/07/2019 04:23 AM  
 GFR est AA 20 (L) 02/07/2019 04:23 AM  
 GFR est non-AA 17 (L) 02/07/2019 04:23 AM  
 Calcium 7.6 (L) 02/07/2019 04:23 AM  
 
 
Wt Readings from Last 3 Encounters:  
02/07/19 85.2 kg (187 lb 13.3 oz) 02/04/19 81.6 kg (180 lb) 01/17/19 81.6 kg (180 lb) Intake/Output Summary (Last 24 hours) at 2/7/2019 9665 Last data filed at 2/7/2019 7266 Gross per 24 hour Intake 1189.53 ml Output 1003 ml Net 186.53 ml Patient seen and examined. Chart reviewed. Labs, data and other pertinent notes reviewed in last 24 hrs. PMH/SH/FH reviewed and unchanged compared to H&P Discussed with RN, pts dtrs and Dr. Bethany Webb MD

## 2019-02-07 NOTE — PROGRESS NOTES
Code blue called for patient . He was admitted following cardiac arrest to ICU. CPR in progress on arrival , patient received Atropine and Epi and regained pulse. Intubated at bedside by anaesthesia. Patient had another episode of bradycardia followed by PEA, code blue was called, CPR initiated and patient regained pulse. O: HEENT: Normocephalic, atraumatic Chest: few expiratory wheezes Heart: Normal S1 and S2 irregular Abdomen: soft, non tender, normal BS 
CNS: unresponsive A/P : S/p cardiac arrest 
Management as per cardiologist who came in and took over care .

## 2019-02-07 NOTE — PROGRESS NOTES
Received call that pt had developed a gradually progressive bradycardia and degenerated into a PEA arrest. Rebounded quickly after short series of CPR and back in sinus tachycardia. Bedside echo was suboptimal but showed grossly normal systolic function. Isuprel and epi drips ordered for HR support.' During procedure, noted that pt continued to have more ectopy and developed more significant evidence of conduction abnormality. D/w Dr. Nicolasa Montoya and will call in cath lab for placement of temporary pacemaker wire.

## 2019-02-07 NOTE — DIABETES MGMT
DTC Progress Note Recommendations/ Comments: Chart reviewed due to hyperglycemia. If appropriate, please consider ordering Lantus 14 units (pt's home dose). Current hospital DM medication: correction scale Humalog, high sensitivity. Chart reviewed on Daryn Vivas. Patient is a 72 y.o. male with known diabetes on Lantus 14 units at home. A1c:  
Lab Results Component Value Date/Time Hemoglobin A1c 6.4 (H) 02/05/2019 12:02 AM  
 Hemoglobin A1c 6.3 08/29/2018 09:34 PM  
 
 
Recent Glucose Results:  
Lab Results Component Value Date/Time  (H) 02/07/2019 04:23 AM  
  (H) 02/07/2019 12:01 AM  
  (H) 02/06/2019 06:03 PM  
 GLUCPOC 232 (H) 02/07/2019 06:38 AM  
 GLUCPOC 198 (H) 02/07/2019 12:27 AM  
 GLUCPOC 101 (H) 02/06/2019 05:16 PM  
  
 
Lab Results Component Value Date/Time Creatinine 3.64 (H) 02/07/2019 04:23 AM  
 
Estimated Creatinine Clearance: 19.2 mL/min (A) (based on SCr of 3.64 mg/dL (H)). Active Orders Diet DIET NPO  
  
 
PO intake: No data found. Will continue to follow as needed. Thank you Cass Bernheim, RD, CDE Time spent: 4 minutes

## 2019-02-07 NOTE — PROGRESS NOTES
Initial Pulmonary / Critical Care Consultation Assessment / Plan:   
Acute resp failure - 2/2 OOH cardiac arrest - on mechanical ventilation - FiO2 40%. No asdz on initial cxr, follow up with mild basilar atelectasis. Extubated on 2/6, placed on bipap for resp acidosis, intubated again for bradycardic/PEA arrest, S/P permanent PM 
 
H/O preceding syncope X 1, PEA arrest, ? Sick sinus, PM placed 2/7 Gout - s/p Bernell Schlichter Chronic afib DM 
 
CKD with anemia Mild left basilar atelectasis -- VACV- gas exchange is preserved on the ventilator- wean FiO2. Rest today, will try SBT to extubate again tomorrow if stable from hemodynamic/rhythm stand point -- Possible bronchoscopy to clear airway secretions if CXR still suggests need tomorrow -- Minimize opiates -- On protonix -- On antibiotics -- Send ETT aspirate for culture  
--Cardiology following  
--neuro following The patient is critically ill and is at significant risk of decompensation. D/W RN and family at bedside. CCT 35' History / Subjective: 
Reason:  Respiratory failure Requesting Provider:  Dr Massiel Huang 2/7 Seen and examined. Events noted. Mechanically ventilated. Gas exchange preserved. S/P permanent PM. On Chung, weaning down 2/6 Seen and examined earlier today. Did well with SBT with RSBI of 60 and was extubated. Subsequently developed confusion and ABG showed resp acidosis and he was placed on bipap. 2/5 Seen and examined. Sedated, on  protocol. CXR with mild bibasilar haziness- suspected atelectasis. Await rewarming 2/4 Miriam Jo is a 72 y.o.  male who  has a past medical history of Acute encephalopathy (1/14/2016), Anemia (5/11/2017), Atrial fibrillation ([address] Painter esophagus, CAD (coronary artery disease), Diabetes ([address] Heart failure ([address] HTN, goal below 140/90, Retinopathy, diabetic, bilateral ([address] (3/11/2016), Stenosis of right carotid artery without cerebral infarction (1/18/2016), TIA (transient ischemic attack) (1/14/2016), and Vitamin D deficiency (3/1/2016). admitted 2/4/2019 with cardiac arrest 
 
Pt receiving infusion for gout when becam unresponsive PEA arrest intubated and given epi ROSC after approximately 10 minutes Head CT OK Placed on code ice protocol Not on vasopressors Has chronic afib. Current rhythm is afib Allergies Allergen Reactions  Solu-Medrol [Methylprednisolone Sodium Succ] Other (comments) Syncope / Cardiac Arrest  
 
Past Medical History:  
Diagnosis Date  Acute encephalopathy 1/14/2016  Anemia 5/11/2017  Atrial fibrillation [address]  Painter esophagus  CAD (coronary artery disease)  Diabetes ([facility]  Heart failure ([facility] Cardiomyopathy, myocarditis  HTN, goal below 140/90  Retinopathy, diabetic, bilateral ([facility] 3/11/2016  Stenosis of right carotid artery without cerebral infarction 1/18/2016  TIA (transient ischemic attack) 1/14/2016  Vitamin D deficiency 3/1/2016 Nephrology ordered and follow 2/22/16 Past Surgical History:  
Procedure Laterality Date  COLONOSCOPY N/A 6/23/2016 COLONOSCOPY performed by Reinaldo Pacheco MD at [facility] ENDOSCOPY  COLONOSCOPY N/A 8/6/2018 COLONOSCOPY performed by Reinaldo Pacheco MD at [facility] ENDOSCOPY  COLONOSCOPY N/A 8/30/2018 COLONOSCOPY performed by Lord Omid MD at [address] [place] [address] HX ENDOSCOPY  06/27/2016 CAPSULE; normal small bowel  HX ENDOSCOPY  06/23/2016  
 upper endoscopy  HX UROLOGICAL  2013 Prior to Admission medications Medication Sig Start Date End Date Taking? Authorizing Provider  
insulin glargine (LANTUS,BASAGLAR) 100 unit/mL (3 mL) inpn 14 Units by SubCUTAneous route nightly.  1/21/19  Yes Kendra Scruggs MD  
ELIQUIS 5 mg tablet TAKE 1 TABLET BY MOUTH TWICE A DAY 11/9/18  Yes Provider, Historical  
pegloticase (KRYSTEXXA) 8 mg/mL soln injection 8 mg by IntraVENous route Once every 2 weeks. Yes Provider, Historical  
digoxin (LANOXIN) 0.125 mg tablet Take 0.125 mg by mouth. Pt takes daily x 2 days, then skips a day and repeats this schedule. Yes Provider, Historical  
dextran 70-hypromellose (ARTIFICIAL TEARS,[id number],) ophthalmic solution Administer 1 Drop to both eyes as needed. Yes Provider, Historical  
hydrALAZINE (APRESOLINE) 25 mg tablet Take 25 mg by mouth three (3) times daily. 5/20/16  Yes Provider, Historical  
multivitamin (ONE A DAY) tablet Take 1 Tab by mouth daily. Yes Provider, Historical  
pravastatin (PRAVACHOL) 20 mg tablet Take 1 Tab by mouth nightly. 1/18/16  Yes Janki Garcia NP  
carvedilol (COREG) 3.125 mg tablet Take 1 Tab by mouth two (2) times daily (with meals). 1/18/16  Yes Janki Garcia NP  
levothyroxine (SYNTHROID) 25 mcg tablet TAKE 1 TABLET BY MOUTH EVERY DAY BEFORE BREAKFAST 1/22/19   Roman Santana MD  
leflunomide (ARAVA) 20 mg tablet Take 20 mg by mouth every evening. Provider, Historical  
furosemide (LASIX) 40 mg tablet Take 40 mg by mouth daily. 5/10/16   Provider, Historical  
omeprazole (PRILOSEC) 20 mg capsule Take 20 mg by mouth nightly. Provider, Historical  
  
Family History Problem Relation Age of Onset  Heart Failure Mother  Diabetes Father  No Known Problems Sister  Other Daughter Beltran Garcia  Other Daughter Beltran Garcia  Other Daughter [facility] Social History Tobacco Use  Smoking status: Never Smoker  Smokeless tobacco: Never Used Substance Use Topics  Alcohol use: Yes Comment: 2 drinks/week, never a heavy drinker ROS:  Review of systems not obtained due to patient factors. Objective: 
Patient Vitals for the past 4 hrs: 
 BP Temp Pulse Resp SpO2  
02/07/19 0945   84 22 98 % 02/07/19 0930 114/77  79 22 98 % 02/07/19 0915   76 22 100 % 02/07/19 0905   79 8 99 % 02/07/19 0900 127/73  82 (!) 7 99 % 19 0855   76 11 98 % 19 0850   71 22 96 % 19 0845 (!) 76/47  72 (!) 0 98 % 19 0840 (!) 65/40 98.9 °F (37.2 °C) 74 (!) 0 98 % 19 0830 (!) 81/48  83 (!) 6 99 % 19 0809 116/61  92 22 99 % Temp (24hrs), Av.7 °F (37.1 °C), Min:98.1 °F (36.7 °C), Max:99 °F (37.2 °C) CVP:       
 
Intake/Output Summary (Last 24 hours) at 2019 1040 Last data filed at 2019 0679 Gross per 24 hour Intake 1096.23 ml Output 953 ml Net 143.23 ml Blood Sugar: 
Glucose Date Value Ref Range Status 2019 224 (H) 65 - 100 mg/dL Final  
2019 203 (H) 65 - 100 mg/dL Final  
2019 103 (H) 65 - 100 mg/dL Final  
2019 121 (H) 65 - 100 mg/dL Final  
2019 140 (H) 65 - 100 mg/dL Final  
 
Exam: 
Sedate Oral ett 
 moves all extremities spontaneously No accessory use Symmetrical chest expansion Scattered rhonchi 
irreg Soft Warm and dry No edema Lab data was reviewed. Radiology images were independently viewed and available reports were reviewed. CXR - ETT, no acute process Lab: 
Recent Labs 19 
0423 19 
0001 19 
1908 19 
1803  19 
0001  19 
3845 19 
0002  19 
1608 19 
1219 WBC 8.3 11.6*  --  14.4*   < > 8.1   < > 4.4 5.6  --  7.6 5.7 HGB 8.3* 8.8*  --  9.0*   < > 7.6*   < > 8.5* 8.4*  --  9.1* 9.8* * 152  --  152   < > 96*   < > 62* 61*  --  75* 96*  140  --  137   < > 138   < > 138 138  --  138 142  
K 4.4 4.2  --  4.5   < > 3.7   < > 3.8 3.9  --  3.6 3.2*  
 102  --  104   < > 103   < > 103 103  --  105 102 CO2 23 20*  --  23   < > 21   < > 23 24  --  24 27 BUN 74* 71*  --  65*   < > 56*   < > 52* 50*  --  46* 45* CREA 3.64* 3.75*  --  3.46*   < > 2.38*   < > 2.12* 2.19*  --  1.97* 2.26* * 203*  --  103*   < > 140*   < > 194* 195*  --  145* 148* CA 7.6* 7.6*  --  8.3*   < > 8.4*   < > 9.1 8.0*  --  8.1* 8.9 MG 2.6* 2.6*  --  2.8*   < > 2.9*   < > 3.2* 2.5*  --  1.9  --   
PHOS  --  7.7*  --   --   --   --   --   --   --   --  3.9  --   
INR 1.3*  --   --   --   --  1.3*  --   --  1.4*   < >  --  1.4*  
TROIQ  --   --   --   --   --   --   --  1.07* 1.20*  --  0.82* 0.07* TBILI 0.9 1.1*  --   --   --   --   --   --   --   --   --  0.9 SGOT 114* 99*  --   --   --   --   --   --   --   --   --  45* LAC 2.5*  --  1.7  --   --   --   --   --   --   --  1.2  --   
 < > = values in this interval not displayed. ABG: 
Recent Labs 02/07/19 0631 02/06/19 2059 02/06/19 2010 PHI 7.397 7.237* 7.167* PCO2I 44.8 53.5* 54.9*  
PO2I 401* 74* 180* HCO3I 27.5* 22.8 19.9* SO2I 100* 91* 99* FIO2I 100 80 100 All Micro Results Procedure Component Value Units Date/Time CULTURE, RESPIRATORY/SPUTUM/BRONCH Blanquita Hedges STAIN [[id number]] Order Status:  Sent Specimen:  Sputum,ET Suction CULTURE, URINE [[id number]] Collected:  02/07/19 0021 Order Status:  Completed Specimen:  Urine from [place] Specimen Updated:  02/07/19 3100 CULTURE, BLOOD, PAIRED [[id number]] Collected:  02/04/19 1608 Order Status:  Completed Specimen:  Blood Updated:  02/07/19 0532 Special Requests: NO SPECIAL REQUESTS Culture result: NO GROWTH 3 DAYS     
 CULTURE, BLOOD [[id number]] Collected:  02/06/19 1902 Order Status:  Completed Specimen:  Blood Updated:  02/07/19 0532 Special Requests: NO SPECIAL REQUESTS Culture result: NO GROWTH AFTER 9 HOURS Eli Hoang MD

## 2019-02-07 NOTE — PROGRESS NOTES
Cardiac Cath Lab Procedure Area Arrival Note: 
 
Cate Woody arrived to Cardiac Cath Lab, Procedure Area. Patient identifiers verified with NAME and DATE OF BIRTH. Procedure verified with patient. Consent forms verified. Allergies verified. Patient informed of procedure and plan of care. Questions answered with review. Patient voiced understanding of procedure and plan of care. Patient on cardiac monitor, non-invasive blood pressure, SPO2 monitor. On vent  A/C 22  FIO2 .50 Peep of 5 cm O2 . IV of NS at 25 ml/hr Neosynephrine at 30 mcg/min. Propofol at 50 mcg/kg/min Isuprel at 1 MCG/ min. Patient status doing well at this time on current management. Patient is A&Ox pt is sedated and  Not responsive Patient reports no communication. Patient medicated during procedure with orders obtained and verified by Dr. Abena Pineda. Refer to patients Cardiac Cath Lab PROCEDURE REPORT for vital signs, assessment, status, and response during procedure, printed at end of case. Printed report on chart or scanned into chart.

## 2019-02-07 NOTE — PROGRESS NOTES
Problem: Falls - Risk of 
Goal: *Absence of Falls Document Tia Manus Fall Risk and appropriate interventions in the flowsheet. Outcome: Progressing Towards Goal 
Fall Risk Interventions: 
  
 
Mentation Interventions: Adequate sleep, hydration, pain control, Evaluate medications/consider consulting pharmacy, Family/sitter at bedside, Reorient patient, Update white board Medication Interventions: Evaluate medications/consider consulting pharmacy Elimination Interventions: Call light in reach Problem: Pressure Injury - Risk of 
Goal: *Prevention of pressure injury Document Dom Scale and appropriate interventions in the flowsheet. Outcome: Progressing Towards Goal 
Pressure Injury Interventions: 
Sensory Interventions: Assess changes in LOC, Check visual cues for pain, Float heels, Minimize linen layers, Monitor skin under medical devices Activity Interventions: Assess need for specialty bed, Pressure redistribution bed/mattress(bed type) Mobility Interventions: Float heels, HOB 30 degrees or less, Pressure redistribution bed/mattress (bed type) Nutrition Interventions: Document food/fluid/supplement intake Friction and Shear Interventions: Apply protective barrier, creams and emollients, Lift sheet, Minimize layers

## 2019-02-07 NOTE — PROGRESS NOTES
TRANSFER - OUT REPORT: 
 
Verbal report given to Ronal Bernardo on Burt Florian being transferred to CCU for routine post - op Report consisted of patients Situation, Background, Assessment and  
Recommendations(SBAR). Information from the following report(s) SBAR, Procedure Summary, Intake/Output, MAR and Cardiac Rhythm A fib was reviewed with the receiving nurse. Opportunity for questions and clarification was provided.

## 2019-02-07 NOTE — PROGRESS NOTES
uop picking up slowly F/u labs at noon showed Cr trending down. K and CO2- stable Hold off on HD. Hopefully wont need it Link Celis MD 
[address]

## 2019-02-07 NOTE — PROCEDURES
Rt wrist was drapped and prepped. A radial A line was placed with proper wave form noted. Minimal bleeding. No immediate complications.

## 2019-02-07 NOTE — PROGRESS NOTES
2/7/19; 10:00 -  
JOVANNI participated in IDRs on patient. Patient was a CODE ICE on Monday, 2/4. Patient was extubated yesterday, 2/6 and placed on BiPAP. Patient experienced 4 cardiac arrests overnight and was re-intubated. Patient was taken to cath lab this morning, and patient will have a bronchoscopy tomorrow, 2/8. Patient will remain intubated today, 2/7, with possible extubation post bronch tomorrow. CRM: Jimmie Horn, MPH, [address] [place] [address]; Z: [phone]

## 2019-02-07 NOTE — PROGRESS NOTES
Hospitalist Progress Note Raffaele Scruggs MD 
Answering service: [phone] OR [phone] from in house phone Date of Service:  2019 NAME:  Gus Alexis :  1945 MRN:  [id number] Admission Summary:  
The patient is a 72-year-old gentleman with past medical history of anemia, atrial fibrillation, Painter's esophagus, coronary artery disease, diabetes, heart failure, cardiomyopathy, myocarditis, history of hypertension, diabetic retinopathy, right carotid artery stenosis, TIA and vitamin D deficiency who presents to the hospital from [facility]. Patient presented to [address] [place] [address] today apparently in PEA. The history was primarily obtained from the ER physician as the patient is intubated and his wife is not present at the bedside. Interval history / Subjective:  
Events noted last night now s/p pace maker Remains intubated, may need CVVH as well Had a discussion with all members of the family and wife and answered all questions Assessment & Plan: 1. AHRF status post cardiac arrest/pulseless electrical activity POA  
- Patient initially on hypothermic protocol and started to re warm now normothermic 
- ECHO - EF WNL 
- Cycle troponins. - trending down may be from CPR  
-  EEG. non specific no seizure activity 
- Bradycardia resolved but coded again after extubation on  multiple times finally got a pacer thinking SSS 
- re intubated for airway 2. Diabetes. The patient will be on sliding scale NovoLog insulin and Accu-Cheks, diet control and close monitoring 3. Hypothyroidism.- Put patient on IV Synthroid. 4. CKD stage 3  
- worsen from hypotension from CPR / cardiac arrest? 
- nephrology on board 
- considering CVVH today 5. Chronic a.fib Slow rate currently - On Eliquis will resume once can swallow 6  Anemia, appears to be chronic. Monitor H and H.  Further intervention per hospital course Hilda Berry 7. Hypernatremia. Patient is on half normal saline. 8.  Gastrointestinal PPX 9. Low plt from the biologic agents? plt > 100 Code status: Full DVT prophylaxis: SCD Prognosis Guarded Care Plan discussed with: Patient/Family and Nurse Disposition: TBD d/w family at bedside Critically ill may detoriorate Hospital Problems  Date Reviewed: 2/4/2019 Codes Class Noted POA * (Principal) Cardiac arrest [facility]) ICD-10-CM: I46.9 ICD-9-CM: 427.5  2/4/2019 Unknown Review of Systems:  
Review of systems not obtained due to patient factors. Vital Signs:  
 Last 24hrs VS reviewed since prior progress note. Most recent are: 
Visit Vitals /67 Pulse 80 Temp 98.9 °F (37.2 °C) Resp 22 Ht 5' 10\" (1.778 m) Wt 85.2 kg (187 lb 13.3 oz) SpO2 99% BMI 26.95 kg/m² Intake/Output Summary (Last 24 hours) at 2/7/2019 1332 Last data filed at 2/7/2019 5137 Gross per 24 hour Intake 1070.06 ml Output 935 ml Net 135.06 ml Physical Examination:  
 
 
     
Constitutional:  Intubated and sedated ENT:  MMM Resp:  CTA   
CV:  Regular rhythm, ,bradycardia GI:  Soft, non distended, non tender. bs+ Musculoskeletal:  No edema, warm, 2+ pulses throughout Neurologic:  Sedated, folay cath + A line + Data Review:  
 I personally reviewed  Image and labs Labs:  
 
Recent Labs 02/07/19 
0423 02/07/19 
0001 WBC 8.3 11.6* HGB 8.3* 8.8* HCT 27.0* 28.1*  
* 152 Recent Labs 02/07/19 
1217 02/07/19 
0423 02/07/19 
0001 02/06/19 
1803  02/04/19 
1608  140 140 137   < > 138  
K 4.0 4.4 4.2 4.5   < > 3.6  104 102 104   < > 105 CO2 23 23 20* 23   < > 24 BUN 77* 74* 71* 65*   < > 46* CREA 3.42* 3.64* 3.75* 3.46*   < > 1.97* * 224* 203* 103*   < > 145* CA 7.5* 7.6* 7.6* 8.3*   < > 8.1*  
MG  --  2.6* 2.6* 2.8*   < > 1.9 PHOS  --   --  7.7*  --   --  3.9 < > = values in this interval not displayed. Recent Labs 02/07/19 
0423 02/07/19 
0001 SGOT 114* 99* ALT 64 63 AP 72 76 TBILI 0.9 1.1* TP 6.1* 6.3* ALB 2.9* 2.8*  
GLOB 3.2 3.5 Recent Labs 02/07/19 
0423 02/06/19 
0001 02/05/19 
0002 INR 1.3* 1.3* 1.4* PTP 13.0* 12.7* 14.0* APTT 29.4 29.4 32.7* No results for input(s): FE, TIBC, PSAT, FERR in the last 72 hours. No results found for: FOL, RBCF No results for input(s): PH, PCO2, PO2 in the last 72 hours. Recent Labs 02/05/19 
2689 02/05/19 
0002 02/04/19 
1608 TROIQ 1.07* 1.20* 0.82* Lab Results Component Value Date/Time Cholesterol, total 135 02/23/2018 11:31 AM  
 HDL Cholesterol 34 (L) 02/23/2018 11:31 AM  
 LDL, calculated 79 02/23/2018 11:31 AM  
 Triglyceride 111 02/23/2018 11:31 AM  
 CHOL/HDL Ratio 5.0 01/15/2016 03:49 AM  
 
Lab Results Component Value Date/Time Glucose (POC) 242 (H) 02/07/2019 12:16 PM  
 Glucose (POC) 232 (H) 02/07/2019 06:38 AM  
 Glucose (POC) 198 (H) 02/07/2019 12:27 AM  
 Glucose (POC) 101 (H) 02/06/2019 05:16 PM  
 Glucose (POC) 116 (H) 02/06/2019 11:28 AM  
 
Lab Results Component Value Date/Time Color YELLOW/STRAW 02/04/2019 04:08 PM  
 Appearance CLOUDY (A) 02/04/2019 04:08 PM  
 Specific gravity 1.012 02/04/2019 04:08 PM  
 pH (UA) 5.0 02/04/2019 04:08 PM  
 Protein 30 (A) 02/04/2019 04:08 PM  
 Glucose NEGATIVE  02/04/2019 04:08 PM  
 Ketone TRACE (A) 02/04/2019 04:08 PM  
 Bilirubin NEGATIVE  02/04/2019 04:08 PM  
 Urobilinogen 0.2 02/04/2019 04:08 PM  
 Nitrites NEGATIVE  02/04/2019 04:08 PM  
 Leukocyte Esterase NEGATIVE  02/04/2019 04:08 PM  
 Epithelial cells FEW 02/04/2019 04:08 PM  
 Bacteria NEGATIVE  02/04/2019 04:08 PM  
 WBC 10-20 02/04/2019 04:08 PM  
 RBC  02/04/2019 04:08 PM  
 
 
 
Medications Reviewed:  
 
Current Facility-Administered Medications Medication Dose Route Frequency  fentaNYL citrate (PF) injection 25-50 mcg  25-50 mcg IntraVENous Q4H PRN  
 acetaminophen (TYLENOL) tablet 650 mg  650 mg Oral Q6H PRN  
 bacitracin 500 unit/gram packet 1 Packet  1 Packet Topical PRN  piperacillin-tazobactam (ZOSYN) 3.375 g in 0.9% sodium chloride (MBP/ADV) 100 mL  3.375 g IntraVENous Q8H  propofol (DIPRIVAN) infusion  0-50 mcg/kg/min IntraVENous TITRATE  isoproterenol (ISUPREL) 1 mg in 0.9% sodium chloride 100 mL infusion  0-10 mcg/min IntraVENous TITRATE  EPINEPHrine (ADRENALIN) 5 mg in 0.9% sodium chloride 250 mL infusion  1-10 mcg/min IntraVENous TITRATE  PHENYLephrine (GINA-SYNEPHRINE) 100 mg in 0.9% sodium chloride 250 mL infusion   mcg/min IntraVENous TITRATE  pantoprazole (PROTONIX) 40 mg in sodium chloride 0.9% 10 mL injection  40 mg IntraVENous DAILY  sodium chloride (NS) flush 5-40 mL  5-40 mL IntraVENous Q8H  
 sodium chloride (NS) flush 5-40 mL  5-40 mL IntraVENous PRN  chlorhexidine (PERIDEX) 0.12 % mouthwash 15 mL  15 mL Oral Q12H  
 sodium chloride (NS) flush 5-40 mL  5-40 mL IntraVENous Q8H  
 sodium chloride (NS) flush 5-40 mL  5-40 mL IntraVENous PRN  
 glucose chewable tablet 16 g  4 Tab Oral PRN  
 dextrose (D50W) injection syrg 12.5-25 g  25-50 mL IntraVENous PRN  
 glucagon (GLUCAGEN) injection 1 mg  1 mg IntraMUSCular PRN  
 insulin lispro (HUMALOG) injection   SubCUTAneous Q6H  
 levothyroxine (SYNTHROID) injection 18.75 mcg  18.75 mcg IntraVENous Q24H  
 0.9% sodium chloride infusion  5 mL/hr IntraVENous CONTINUOUS  
 
______________________________________________________________________ EXPECTED LENGTH OF STAY: 2d 0h 
ACTUAL LENGTH OF STAY:          3 Maryjane Morel MD

## 2019-02-07 NOTE — PROGRESS NOTES
I was notified that the patient went into PEA arrest this evening. Upon my arrival the pt had already achieved ROSC and he was oxygenating adequately. While speaking with family he again became bradycardic and went into PEA. He was given Epi and bicarb while CPR was initiated. Again ROSC was achieved after a few minutes. He was started on Epi gtt and Rt radial A line was placed. CXR demonstrates increased left lung opacification. ?mucus plugging ? PNA. Lucas Crawford and John Ingram are at bedside and are planning to go to cath lab for temp pacer placement. Unclear etiology of recurrent PEA but question acidemia coupled with underlying conduction abnormality. At this point we will: 
 
-increased RR to 22, PEEP8, UfR763% 
-cont Epi and Chung gtts 
-Empiric Zosyn 
-checking labs 
-Appreciate Nathalie and Rosana Crawford assistance with temp pacer D/w family at bedside . All questions answered

## 2019-02-07 NOTE — INTERDISCIPLINARY ROUNDS
IDR/SLIDR Summary Patient: Rafal De La Torre MRN: [id number]    Age: 72 y.o. YOB: 1945 Room/Bed: [address] [place] [address] Admit Diagnosis: Cardiac arrest [facility]) [I46.9]  Principal Diagnosis: Cardiac arrest ([facility] [address] Goals: stable VS 
Readmission: yes Quality Measure: Not applicable VTE Prophylaxis: Mechanical 
Influenza Vaccine screening completed? YES Pneumococcal Vaccine screening completed? NO Mobility needs: Yes   Nutrition plan:No 
Consults:P.T, O.T., Speech, Respiratory and Case Management Financial concerns:Yes  Escalated to CM? YES 
RRAT Score: 39   Interventions:H2H Testing due for pt today? YES 
LOS: 3 days Expected length of stay 7? days Discharge plan: home   PCP: Austin Grossman MD 
Transportation needs: Yes Days before discharge:two or more days before discharge Discharge disposition: Home Signed:  
 
Nakul Thapa 2/7/2019 
11:26 PM

## 2019-02-07 NOTE — PROGRESS NOTES
Spiritual Care Assessment/Progress Note [address] 
 
 
NAME: Volodymyr Melendez      MRN: [id number] AGE: 72 y.o. SEX: male Faith Affiliation: Yazidism Language: [place] 2/6/2019     Total Time (in minutes): 12 Spiritual Assessment begun in [facility] 4 CORONARY CARE through conversation with: 
  
    []Patient        [x] Family    [] Friend(s) Reason for Consult: Code Blue/Stevo Spiritual beliefs: (Please include comment if needed) 
   [] Identifies with a azul tradition:     
   [] Supported by a azul community:        
   [] Claims no spiritual orientation:       
   [] Seeking spiritual identity:            
   [] Adheres to an individual form of spirituality:       
   [x] Not able to assess:                   
 
    
Identified resources for coping:  
   [] Prayer                           
   [] Music                  [] Guided Imagery 
   [] Family/friends                 [] Pet visits [] Devotional reading                         [x] Unknown 
   [] Other:                                          
 
 
Interventions offered during this visit: (See comments for more details) Patient Interventions: Anointing of the sick [employer]) Family/Friend(s): Affirmation of emotions/emotional suffering, Prayer (assurance of) Plan of Care: 
 
 [] Support spiritual and/or cultural needs  
 [] Support AMD and/or advance care planning process    
 [] Support grieving process 
 [] Coordinate Rites and/or Rituals  
 [] Coordination with community clergy [] No spiritual needs identified at this time 
 [] Detailed Plan of Care below (See Comments)  [] Make referral to Music Therapy 
[] Make referral to Pet Therapy    
[] Make referral to Addiction services 
[] Make referral to [facility] 
[] Make referral to Spiritual Care Partner 
[] No future visits requested       
[x] Follow up visits as needed Responded to Code Blue in CCU. Consulted with nurse.   Family stated they did not want to talk. Rev. Maria De Jesus Obrien. Andrea Alegria

## 2019-02-08 ENCOUNTER — HOSPITAL ENCOUNTER (OUTPATIENT)
Dept: INFUSION THERAPY | Age: 74
Discharge: HOME | End: 2019-02-08

## 2019-02-08 ENCOUNTER — APPOINTMENT (OUTPATIENT)
Dept: GENERAL RADIOLOGY | Age: 74
DRG: 242 | End: 2019-02-08
Attending: INTERNAL MEDICINE
Payer: MEDICARE

## 2019-02-08 LAB
ALBUMIN SERPL-MCNC: 2.4 G/DL (ref 3.5–5)
ALBUMIN/GLOB SERPL: 0.7 {RATIO} (ref 1.1–2.2)
ALP SERPL-CCNC: 64 U/L (ref 45–117)
ALT SERPL-CCNC: 126 U/L (ref 12–78)
ANION GAP SERPL CALC-SCNC: 14 MMOL/L (ref 5–15)
APTT PPP: 33 SEC (ref 22.1–32)
AST SERPL-CCNC: 152 U/L (ref 15–37)
BACTERIA SPEC CULT: NORMAL
BILIRUB SERPL-MCNC: 1 MG/DL (ref 0.2–1)
BUN SERPL-MCNC: 72 MG/DL (ref 6–20)
BUN/CREAT SERPL: 23 (ref 12–20)
CALCIUM SERPL-MCNC: 7.5 MG/DL (ref 8.5–10.1)
CC UR VC: NORMAL
CHLORIDE SERPL-SCNC: 108 MMOL/L (ref 97–108)
CO2 SERPL-SCNC: 21 MMOL/L (ref 21–32)
CREAT SERPL-MCNC: 3.13 MG/DL (ref 0.7–1.3)
DATE LAST DOSE: NORMAL
ERYTHROCYTE [DISTWIDTH] IN BLOOD BY AUTOMATED COUNT: 14.6 % (ref 11.5–14.5)
GLOBULIN SER CALC-MCNC: 3.4 G/DL (ref 2–4)
GLUCOSE BLD STRIP.AUTO-MCNC: 172 MG/DL (ref 65–100)
GLUCOSE BLD STRIP.AUTO-MCNC: 174 MG/DL (ref 65–100)
GLUCOSE BLD STRIP.AUTO-MCNC: 188 MG/DL (ref 65–100)
GLUCOSE BLD STRIP.AUTO-MCNC: 214 MG/DL (ref 65–100)
GLUCOSE SERPL-MCNC: 156 MG/DL (ref 65–100)
HCT VFR BLD AUTO: 22 % (ref 36.6–50.3)
HCT VFR BLD AUTO: 24 % (ref 36.6–50.3)
HGB BLD-MCNC: 6.8 G/DL (ref 12.1–17)
HGB BLD-MCNC: 7.4 G/DL (ref 12.1–17)
INR PPP: 1.4 (ref 0.9–1.1)
MAGNESIUM SERPL-MCNC: 2.6 MG/DL (ref 1.6–2.4)
MCH RBC QN AUTO: 28 PG (ref 26–34)
MCHC RBC AUTO-ENTMCNC: 30.8 G/DL (ref 30–36.5)
MCV RBC AUTO: 90.9 FL (ref 80–99)
NRBC # BLD: 0 K/UL (ref 0–0.01)
NRBC BLD-RTO: 0 PER 100 WBC
PLATELET # BLD AUTO: 83 K/UL (ref 150–400)
PMV BLD AUTO: 11.9 FL (ref 8.9–12.9)
POTASSIUM SERPL-SCNC: 3.5 MMOL/L (ref 3.5–5.1)
PROT SERPL-MCNC: 5.8 G/DL (ref 6.4–8.2)
PROTHROMBIN TIME: 13.5 SEC (ref 9–11.1)
RBC # BLD AUTO: 2.64 M/UL (ref 4.1–5.7)
REPORTED DOSE,DOSE: NORMAL UNITS
REPORTED DOSE/TIME,TMG: NORMAL
SERVICE CMNT-IMP: ABNORMAL
SERVICE CMNT-IMP: NORMAL
SODIUM SERPL-SCNC: 143 MMOL/L (ref 136–145)
THERAPEUTIC RANGE,PTTT: ABNORMAL SECS (ref 58–77)
VANCOMYCIN TROUGH SERPL-MCNC: 9.7 UG/ML (ref 5–10)
WBC # BLD AUTO: 5.6 K/UL (ref 4.1–11.1)

## 2019-02-08 PROCEDURE — 85027 COMPLETE CBC AUTOMATED: CPT

## 2019-02-08 PROCEDURE — 85730 THROMBOPLASTIN TIME PARTIAL: CPT

## 2019-02-08 PROCEDURE — 94640 AIRWAY INHALATION TREATMENT: CPT

## 2019-02-08 PROCEDURE — 77030013140 HC MSK NEB VYRM -A

## 2019-02-08 PROCEDURE — 74011250636 HC RX REV CODE- 250/636: Performed by: HOSPITALIST

## 2019-02-08 PROCEDURE — 74011000258 HC RX REV CODE- 258: Performed by: HOSPITALIST

## 2019-02-08 PROCEDURE — 74011636637 HC RX REV CODE- 636/637: Performed by: FAMILY MEDICINE

## 2019-02-08 PROCEDURE — P9016 RBC LEUKOCYTES REDUCED: HCPCS

## 2019-02-08 PROCEDURE — 85018 HEMOGLOBIN: CPT

## 2019-02-08 PROCEDURE — 0BC78ZZ EXTIRPATION OF MATTER FROM LEFT MAIN BRONCHUS, VIA NATURAL OR ARTIFICIAL OPENING ENDOSCOPIC: ICD-10-PCS | Performed by: INTERNAL MEDICINE

## 2019-02-08 PROCEDURE — 85610 PROTHROMBIN TIME: CPT

## 2019-02-08 PROCEDURE — 65620000000 HC RM CCU GENERAL

## 2019-02-08 PROCEDURE — 74011250636 HC RX REV CODE- 250/636: Performed by: INTERNAL MEDICINE

## 2019-02-08 PROCEDURE — 87070 CULTURE OTHR SPECIMN AEROBIC: CPT

## 2019-02-08 PROCEDURE — 74011250637 HC RX REV CODE- 250/637: Performed by: HOSPITALIST

## 2019-02-08 PROCEDURE — 74011000250 HC RX REV CODE- 250: Performed by: HOSPITALIST

## 2019-02-08 PROCEDURE — 80053 COMPREHEN METABOLIC PANEL: CPT

## 2019-02-08 PROCEDURE — 86900 BLOOD TYPING SEROLOGIC ABO: CPT

## 2019-02-08 PROCEDURE — 87040 BLOOD CULTURE FOR BACTERIA: CPT

## 2019-02-08 PROCEDURE — 36430 TRANSFUSION BLD/BLD COMPNT: CPT

## 2019-02-08 PROCEDURE — C9113 INJ PANTOPRAZOLE SODIUM, VIA: HCPCS | Performed by: HOSPITALIST

## 2019-02-08 PROCEDURE — 0BC38ZZ EXTIRPATION OF MATTER FROM RIGHT MAIN BRONCHUS, VIA NATURAL OR ARTIFICIAL OPENING ENDOSCOPIC: ICD-10-PCS | Performed by: INTERNAL MEDICINE

## 2019-02-08 PROCEDURE — 80202 ASSAY OF VANCOMYCIN: CPT

## 2019-02-08 PROCEDURE — 30233N1 TRANSFUSION OF NONAUTOLOGOUS RED BLOOD CELLS INTO PERIPHERAL VEIN, PERCUTANEOUS APPROACH: ICD-10-PCS | Performed by: HOSPITALIST

## 2019-02-08 PROCEDURE — 74011636637 HC RX REV CODE- 636/637: Performed by: HOSPITALIST

## 2019-02-08 PROCEDURE — 82962 GLUCOSE BLOOD TEST: CPT

## 2019-02-08 PROCEDURE — 0B938ZZ DRAINAGE OF RIGHT MAIN BRONCHUS, VIA NATURAL OR ARTIFICIAL OPENING ENDOSCOPIC: ICD-10-PCS | Performed by: INTERNAL MEDICINE

## 2019-02-08 PROCEDURE — 83735 ASSAY OF MAGNESIUM: CPT

## 2019-02-08 PROCEDURE — 36415 COLL VENOUS BLD VENIPUNCTURE: CPT

## 2019-02-08 PROCEDURE — 71045 X-RAY EXAM CHEST 1 VIEW: CPT

## 2019-02-08 PROCEDURE — 94003 VENT MGMT INPAT SUBQ DAY: CPT

## 2019-02-08 PROCEDURE — 74011000250 HC RX REV CODE- 250: Performed by: INTERNAL MEDICINE

## 2019-02-08 PROCEDURE — 0B978ZZ DRAINAGE OF LEFT MAIN BRONCHUS, VIA NATURAL OR ARTIFICIAL OPENING ENDOSCOPIC: ICD-10-PCS | Performed by: INTERNAL MEDICINE

## 2019-02-08 PROCEDURE — 74011000250 HC RX REV CODE- 250: Performed by: FAMILY MEDICINE

## 2019-02-08 PROCEDURE — 76010000379 HC BRONCHOSCOPY DIAG/THERAPEUTIC

## 2019-02-08 PROCEDURE — 86923 COMPATIBILITY TEST ELECTRIC: CPT

## 2019-02-08 RX ORDER — SODIUM CHLORIDE 0.9 % (FLUSH) 0.9 %
5-40 SYRINGE (ML) INJECTION EVERY 8 HOURS
Status: DISCONTINUED | OUTPATIENT
Start: 2019-02-08 | End: 2019-02-13

## 2019-02-08 RX ORDER — SODIUM CHLORIDE 9 MG/ML
5 INJECTION, SOLUTION INTRAVENOUS CONTINUOUS
Status: DISCONTINUED | OUTPATIENT
Start: 2019-02-08 | End: 2019-02-13

## 2019-02-08 RX ORDER — IPRATROPIUM BROMIDE AND ALBUTEROL SULFATE 2.5; .5 MG/3ML; MG/3ML
3 SOLUTION RESPIRATORY (INHALATION)
Status: DISCONTINUED | OUTPATIENT
Start: 2019-02-08 | End: 2019-02-13

## 2019-02-08 RX ORDER — SODIUM CHLORIDE 0.9 % (FLUSH) 0.9 %
5-40 SYRINGE (ML) INJECTION AS NEEDED
Status: DISCONTINUED | OUTPATIENT
Start: 2019-02-08 | End: 2019-02-22 | Stop reason: HOSPADM

## 2019-02-08 RX ORDER — ACETYLCYSTEINE 200 MG/ML
2 SOLUTION ORAL; RESPIRATORY (INHALATION)
Status: DISCONTINUED | OUTPATIENT
Start: 2019-02-08 | End: 2019-02-13

## 2019-02-08 RX ORDER — MORPHINE SULFATE 2 MG/ML
2 INJECTION, SOLUTION INTRAMUSCULAR; INTRAVENOUS
Status: DISCONTINUED | OUTPATIENT
Start: 2019-02-08 | End: 2019-02-22 | Stop reason: HOSPADM

## 2019-02-08 RX ORDER — SODIUM CHLORIDE 9 MG/ML
250 INJECTION, SOLUTION INTRAVENOUS AS NEEDED
Status: DISCONTINUED | OUTPATIENT
Start: 2019-02-08 | End: 2019-02-22 | Stop reason: HOSPADM

## 2019-02-08 RX ORDER — LINEZOLID 2 MG/ML
600 INJECTION, SOLUTION INTRAVENOUS EVERY 12 HOURS
Status: DISCONTINUED | OUTPATIENT
Start: 2019-02-08 | End: 2019-02-12

## 2019-02-08 RX ORDER — SODIUM CHLORIDE 9 MG/ML
3 INJECTION, SOLUTION INTRAVENOUS CONTINUOUS
Status: DISCONTINUED | OUTPATIENT
Start: 2019-02-08 | End: 2019-02-10

## 2019-02-08 RX ADMIN — LEVOTHYROXINE SODIUM 18.75 MCG: 100 INJECTION, POWDER, LYOPHILIZED, FOR SOLUTION INTRAVENOUS at 12:18

## 2019-02-08 RX ADMIN — SODIUM CHLORIDE 3 ML/HR: 900 INJECTION, SOLUTION INTRAVENOUS at 21:52

## 2019-02-08 RX ADMIN — IPRATROPIUM BROMIDE AND ALBUTEROL SULFATE 3 ML: .5; 3 SOLUTION RESPIRATORY (INHALATION) at 17:06

## 2019-02-08 RX ADMIN — Medication 10 ML: at 21:12

## 2019-02-08 RX ADMIN — INSULIN GLARGINE 5 UNITS: 100 INJECTION, SOLUTION SUBCUTANEOUS at 21:49

## 2019-02-08 RX ADMIN — SODIUM CHLORIDE 5 ML/HR: 900 INJECTION, SOLUTION INTRAVENOUS at 21:52

## 2019-02-08 RX ADMIN — PROPOFOL 30 MCG/KG/MIN: 10 INJECTION, EMULSION INTRAVENOUS at 09:59

## 2019-02-08 RX ADMIN — Medication 10 ML: at 21:11

## 2019-02-08 RX ADMIN — CHLORHEXIDINE GLUCONATE 15 ML: 1.2 RINSE ORAL at 21:13

## 2019-02-08 RX ADMIN — ACETYLCYSTEINE 400 MG: 200 SOLUTION ORAL; RESPIRATORY (INHALATION) at 08:56

## 2019-02-08 RX ADMIN — PROPOFOL 30 MCG/KG/MIN: 10 INJECTION, EMULSION INTRAVENOUS at 04:09

## 2019-02-08 RX ADMIN — ACETYLCYSTEINE 400 MG: 200 SOLUTION ORAL; RESPIRATORY (INHALATION) at 13:20

## 2019-02-08 RX ADMIN — Medication 10 ML: at 21:00

## 2019-02-08 RX ADMIN — ACETYLCYSTEINE 800 MG: 200 SOLUTION ORAL; RESPIRATORY (INHALATION) at 20:35

## 2019-02-08 RX ADMIN — PROPOFOL 35 MCG/KG/MIN: 10 INJECTION, EMULSION INTRAVENOUS at 19:46

## 2019-02-08 RX ADMIN — ACETAMINOPHEN 650 MG: 325 TABLET ORAL at 09:59

## 2019-02-08 RX ADMIN — Medication 10 ML: at 13:37

## 2019-02-08 RX ADMIN — Medication 10 ML: at 09:37

## 2019-02-08 RX ADMIN — PROPOFOL 35 MCG/KG/MIN: 10 INJECTION, EMULSION INTRAVENOUS at 17:37

## 2019-02-08 RX ADMIN — PROPOFOL 35 MCG/KG/MIN: 10 INJECTION, EMULSION INTRAVENOUS at 22:33

## 2019-02-08 RX ADMIN — PIPERACILLIN AND TAZOBACTAM 3.38 G: 3; .375 INJECTION, POWDER, FOR SOLUTION INTRAVENOUS at 12:00

## 2019-02-08 RX ADMIN — PIPERACILLIN AND TAZOBACTAM 3.38 G: 3; .375 INJECTION, POWDER, FOR SOLUTION INTRAVENOUS at 21:11

## 2019-02-08 RX ADMIN — IPRATROPIUM BROMIDE AND ALBUTEROL SULFATE 3 ML: .5; 3 SOLUTION RESPIRATORY (INHALATION) at 08:56

## 2019-02-08 RX ADMIN — INSULIN LISPRO 2 UNITS: 100 INJECTION, SOLUTION INTRAVENOUS; SUBCUTANEOUS at 18:31

## 2019-02-08 RX ADMIN — IPRATROPIUM BROMIDE AND ALBUTEROL SULFATE 3 ML: .5; 3 SOLUTION RESPIRATORY (INHALATION) at 20:36

## 2019-02-08 RX ADMIN — PIPERACILLIN AND TAZOBACTAM 3.38 G: 3; .375 INJECTION, POWDER, FOR SOLUTION INTRAVENOUS at 03:24

## 2019-02-08 RX ADMIN — LINEZOLID 600 MG: 2 INJECTION, SOLUTION INTRAVENOUS at 13:24

## 2019-02-08 RX ADMIN — SODIUM CHLORIDE 40 MG: 9 INJECTION, SOLUTION INTRAMUSCULAR; INTRAVENOUS; SUBCUTANEOUS at 09:38

## 2019-02-08 RX ADMIN — IPRATROPIUM BROMIDE AND ALBUTEROL SULFATE 3 ML: .5; 3 SOLUTION RESPIRATORY (INHALATION) at 13:20

## 2019-02-08 RX ADMIN — CHLORHEXIDINE GLUCONATE 15 ML: 1.2 RINSE ORAL at 08:32

## 2019-02-08 NOTE — PROGRESS NOTES
Hospitalist Progress Note Elvira George MD 
Answering service: [phone] OR [phone] from in house phone Date of Service:  2019 NAME:  Gabi Zafar :  1945 MRN:  [id number] Admission Summary:  
The patient is a 61-year-old gentleman with past medical history of anemia, atrial fibrillation, Painter's esophagus, coronary artery disease, diabetes, heart failure, cardiomyopathy, myocarditis, history of hypertension, diabetic retinopathy, right carotid artery stenosis, TIA and vitamin D deficiency who presents to the hospital from [facility]. Patient presented to [facility] today apparently in PEA. The history was primarily obtained from the ER physician as the patient is intubated and his wife is not present at the bedside. Interval history / Subjective:  
Remains on vent/ sedated/ on pressors/  
S/p bronch - Copious secretions prohibitive for extubation noted on bronch today Had a discussion with all members of the family and wife and answered all questions Assessment & Plan: 1. AHRF status post cardiac arrest/pulseless electrical activity POA  
- Patient initially on hypothermic protocol and started to re warm now normothermic 
- ECHO - EF WNL 
- Cycle troponins. - trending down may be from CPR  
-  EEG. non specific no seizure activity 
- Bradycardia resolved but coded again after extubation on  multiple times finally got a pacer thinking SSS 
- re intubated for airway remains intubated today 2. Diabetes. The patient will be on sliding scale NovoLog insulin and Accu-Cheks, diet control and close monitoring BS at ICU goal 
 
3. Hypothyroidism.- Put patient on IV Synthroid. 4. CKD stage 3  
- worsen from hypotension from CPR / cardiac arrest? 
- nephrology on board 
- cr improving 3.7->3.6-> 3.4-> 3.1 no need for CVVH for now 5. Chronic a.fib Slow rate currently - On Eliquis will resume once can swallow 6  Anemia, appears to be chronic. Monitor H and H. Further intervention per hospital course hgb 7.2 Jacob Solitario 7. Fever -- The cause of the fever is not certain but CXR has gotten progressively worse at the left base and he is at risk for aspiration 
- on zyvox and zosyn 
- urine culture negative 8. Gastrointestinal PPX 9. Low plt from the biologic agents? plt > 100 
 
10. Abnormal LFT's from shock liver? 11. Coagulopathy from? Shock liver INR 1.4 Code status: Full DVT prophylaxis: SCD Prognosis Guarded Care Plan discussed with: Patient/Family and Nurse Disposition: TBD d/w family at bedside Critically ill may detoriorate Hospital Problems  Date Reviewed: 2/4/2019 Codes Class Noted POA * (Principal) Cardiac arrest [facility]) ICD-10-CM: I46.9 ICD-9-CM: 427.5  2/4/2019 Unknown Review of Systems:  
Review of systems not obtained due to patient factors. Vital Signs:  
 Last 24hrs VS reviewed since prior progress note. Most recent are: 
Visit Vitals /52 Pulse 78 Temp 99.7 °F (37.6 °C) Resp 22 Ht 5' 10\" (1.778 m) Wt 83.1 kg (183 lb 3.2 oz) SpO2 99% BMI 26.29 kg/m² Intake/Output Summary (Last 24 hours) at 2/8/2019 1336 Last data filed at 2/8/2019 1200 Gross per 24 hour Intake 859.82 ml Output 1485 ml Net -625.18 ml Physical Examination:  
 
 
     
Constitutional:  Intubated and sedated ENT:  MMM Resp:  CTA   
CV:  Regular rhythm, ,bradycardia GI:  Soft, non distended, non tender. bs+ Musculoskeletal:  No edema, warm, 2+ pulses throughout Neurologic:  Sedated, folay cath + A line + Data Review:  
 I personally reviewed  Image and labs Ct Head Wo Cont Result Date: 2/4/2019 IMPRESSION: No acute intracranial hemorrhage, mass or infarct. Xr Chest [facility] Result Date: 2/8/2019 IMPRESSION: ET tube is in satisfactory position.  There is slight increasing haziness overlying left hemithorax with opacification left base consistent with left basilar atelectasis /airspace disease and left effusion. Xr Chest [facility] Result Date: 2/7/2019 IMPRESSION: Left lung base opacification. No significant change. Xr Chest [facility] Result Date: 2/6/2019 IMPRESSION: ET tube in satisfactory position. Worsening aeration of the left lung. Xr Chest [facility] Result Date: 2/6/2019 Impression: Stable bilateral lower lobe atelectasis. Xr Chest [facility] Result Date: 2/5/2019 IMPRESSION: No significant change. Xr Chest [facility] Result Date: 2/4/2019 IMPRESSION: Endotracheal tube appears to be in satisfactory position. Xr Abd Port  1 V Result Date: 2/6/2019 IMPRESSION: NG tube in the stomach. Xr Abd Port  1 V Result Date: 2/4/2019 IMPRESSION: NG tube in appropriate position. Labs:  
 
Recent Labs 02/08/19 
0551 02/07/19 0423 WBC 5.6 8.3 HGB 7.4* 8.3* HCT 24.0* 27.0*  
PLT 83* 106* Recent Labs 02/08/19 
0414 02/07/19 
1217 02/07/19 
0423 02/07/19 
0001  141 140 140  
K 3.5 4.0 4.4 4.2  105 104 102 CO2 21 23 23 20* BUN 72* 77* 74* 71* CREA 3.13* 3.42* 3.64* 3.75* * 225* 224* 203* CA 7.5* 7.5* 7.6* 7.6*  
MG 2.6*  --  2.6* 2.6* PHOS  --   --   --  7.7* Recent Labs 02/08/19 
0414 02/07/19 
0423 02/07/19 
0001 SGOT 152* 114* 99* * 64 63 AP 64 72 76 TBILI 1.0 0.9 1.1* TP 5.8* 6.1* 6.3* ALB 2.4* 2.9* 2.8*  
GLOB 3.4 3.2 3.5 Recent Labs 02/08/19 
0414 02/07/19 
0423 02/06/19 
0001 INR 1.4* 1.3* 1.3* PTP 13.5* 13.0* 12.7* APTT 33.0* 29.4 29.4 No results for input(s): FE, TIBC, PSAT, FERR in the last 72 hours. No results found for: FOL, RBCF No results for input(s): PH, PCO2, PO2 in the last 72 hours. No results for input(s): CPK, CKNDX, TROIQ in the last 72 hours. No lab exists for component: CPKMB Lab Results Component Value Date/Time Cholesterol, total 135 02/23/2018 11:31 AM  
 HDL Cholesterol 34 (L) 02/23/2018 11:31 AM  
 LDL, calculated 79 02/23/2018 11:31 AM  
 Triglyceride 111 02/23/2018 11:31 AM  
 CHOL/HDL Ratio 5.0 01/15/2016 03:49 AM  
 
Lab Results Component Value Date/Time Glucose (POC) 174 (H) 02/08/2019 12:14 PM  
 Glucose (POC) 172 (H) 02/08/2019 05:48 AM  
 Glucose (POC) 158 (H) 02/07/2019 11:00 PM  
 Glucose (POC) 190 (H) 02/07/2019 06:47 PM  
 Glucose (POC) 242 (H) 02/07/2019 12:16 PM  
 
Lab Results Component Value Date/Time Color YELLOW/STRAW 02/04/2019 04:08 PM  
 Appearance CLOUDY (A) 02/04/2019 04:08 PM  
 Specific gravity 1.012 02/04/2019 04:08 PM  
 pH (UA) 5.0 02/04/2019 04:08 PM  
 Protein 30 (A) 02/04/2019 04:08 PM  
 Glucose NEGATIVE  02/04/2019 04:08 PM  
 Ketone TRACE (A) 02/04/2019 04:08 PM  
 Bilirubin NEGATIVE  02/04/2019 04:08 PM  
 Urobilinogen 0.2 02/04/2019 04:08 PM  
 Nitrites NEGATIVE  02/04/2019 04:08 PM  
 Leukocyte Esterase NEGATIVE  02/04/2019 04:08 PM  
 Epithelial cells FEW 02/04/2019 04:08 PM  
 Bacteria NEGATIVE  02/04/2019 04:08 PM  
 WBC 10-20 02/04/2019 04:08 PM  
 RBC  02/04/2019 04:08 PM  
 
 
 
Medications Reviewed:  
 
Current Facility-Administered Medications Medication Dose Route Frequency  sodium chloride (NS) flush 5-40 mL  5-40 mL IntraVENous Q8H  
 sodium chloride (NS) flush 5-40 mL  5-40 mL IntraVENous PRN  
 albuterol-ipratropium (DUO-NEB) 2.5 MG-0.5 MG/3 ML  3 mL Nebulization QID RT  
 acetylcysteine (MUCOMYST) 200 mg/mL (20 %) solution 400 mg  2 mL Nebulization TID RT  
 linezolid in dextrose 5% (ZYVOX) IVPB premix in D5W 600 mg  600 mg IntraVENous Q12H  
 insulin glargine (LANTUS) injection 5 Units  5 Units SubCUTAneous QHS  acetaminophen (TYLENOL) tablet 650 mg  650 mg Oral Q6H PRN  
 bacitracin 500 unit/gram packet 1 Packet  1 Packet Topical PRN  piperacillin-tazobactam (ZOSYN) 3.375 g in 0.9% sodium chloride (MBP/ADV) 100 mL  3.375 g IntraVENous Q8H  propofol (DIPRIVAN) infusion  0-50 mcg/kg/min IntraVENous TITRATE  isoproterenol (ISUPREL) 1 mg in 0.9% sodium chloride 100 mL infusion  0-10 mcg/min IntraVENous TITRATE  EPINEPHrine (ADRENALIN) 5 mg in 0.9% sodium chloride 250 mL infusion  1-10 mcg/min IntraVENous TITRATE  PHENYLephrine (GINA-SYNEPHRINE) 100 mg in 0.9% sodium chloride 250 mL infusion   mcg/min IntraVENous TITRATE  pantoprazole (PROTONIX) 40 mg in sodium chloride 0.9% 10 mL injection  40 mg IntraVENous DAILY  sodium chloride (NS) flush 5-40 mL  5-40 mL IntraVENous Q8H  
 sodium chloride (NS) flush 5-40 mL  5-40 mL IntraVENous PRN  chlorhexidine (PERIDEX) 0.12 % mouthwash 15 mL  15 mL Oral Q12H  
 sodium chloride (NS) flush 5-40 mL  5-40 mL IntraVENous Q8H  
 sodium chloride (NS) flush 5-40 mL  5-40 mL IntraVENous PRN  
 glucose chewable tablet 16 g  4 Tab Oral PRN  
 dextrose (D50W) injection syrg 12.5-25 g  25-50 mL IntraVENous PRN  
 glucagon (GLUCAGEN) injection 1 mg  1 mg IntraMUSCular PRN  
 insulin lispro (HUMALOG) injection   SubCUTAneous Q6H  
 levothyroxine (SYNTHROID) injection 18.75 mcg  18.75 mcg IntraVENous Q24H  
 0.9% sodium chloride infusion  5 mL/hr IntraVENous CONTINUOUS  
 
______________________________________________________________________ EXPECTED LENGTH OF STAY: 2d 0h 
ACTUAL LENGTH OF STAY:          4 Blake Shane MD

## 2019-02-08 NOTE — PROGRESS NOTES
Problem: Falls - Risk of 
Goal: *Absence of Falls Document Colleen Anders Fall Risk and appropriate interventions in the flowsheet. Outcome: Progressing Towards Goal 
Fall Risk Interventions: 
  
 
Mentation Interventions: Adequate sleep, hydration, pain control, Door open when patient unattended, Evaluate medications/consider consulting pharmacy Medication Interventions: Evaluate medications/consider consulting pharmacy Elimination Interventions: Patient to call for help with toileting needs Problem: Pressure Injury - Risk of 
Goal: *Prevention of pressure injury Document Dom Scale and appropriate interventions in the flowsheet. Outcome: Progressing Towards Goal 
Pressure Injury Interventions: 
Sensory Interventions: Assess changes in LOC, Check visual cues for pain, Float heels Moisture Interventions: Absorbent underpads, Check for incontinence Q2 hours and as needed, Internal/External urinary devices Activity Interventions: Pressure redistribution bed/mattress(bed type) Mobility Interventions: Float heels, Pressure redistribution bed/mattress (bed type) Nutrition Interventions: Document food/fluid/supplement intake Friction and Shear Interventions: HOB 30 degrees or less, Lift sheet, Minimize layers

## 2019-02-08 NOTE — PROGRESS NOTES
1930 Bedside shift change report given to [address] (oncoming nurse) by Danny Yeager RN (offgoing nurse). Report included the following information SBAR, Kardex, Procedure Summary, Intake/Output, MAR, Accordion, Recent Results and Cardiac Rhythm Afib.  
2300 Tylenol administered for temp 101.3 
0015 Dr Kareem Choe to see pt 
0115 paired blood cultures sent per order 
0400 CXR completed 0415 labs sent per order 
0500 CHG bath completed, pt tolerated well 
0630 called lab for chemistry still pending [id number] pt family requesting another dose of Tylenol, pt temp 100.4 
0730 Bedside shift change report given to [address] [place] [address] (oncoming nurse) by [address] (offgoing nurse). Report included the following information SBAR, Kardex, Procedure Summary, Intake/Output, MAR, Recent Results and Cardiac Rhythm Afib, Paced.

## 2019-02-08 NOTE — PROGRESS NOTES
Initial Pulmonary / Critical Care Consultation Assessment / Plan:   
Acute resp failure - 2/2 OOH cardiac arrest - on mechanical ventilation - FiO2 40%. No asdz on initial cxr, follow up with mild basilar atelectasis. Extubated on 2/6, placed on bipap for resp acidosis, intubated again for bradycardic/PEA arrest, S/P permanent PM 
 
H/O preceding syncope X 1, PEA arrest, ? Sick sinus, PM placed 2/7 Gout - s/p Yodit Carrier Chronic afib DM 
 
CKD with anemia LLL pneumonia and tracheobronchitis Mild left basilar atelectasis- Therapeutic bronchoscopy # 1 on 2/8 
 
-- VACV- gas exchange is preserved on the ventilator- wean FiO2. Copious secretions prohibitive for extubation noted on bronch today- will send for micro -- SAT/SBT as tolerated -- Minimize opiates -- On protonix -- On antibiotics- await new cultures 
--Cardiology following  
-- Hold off on anticoagulation today given easy tendency for airway bleeding 
--neuro following The patient is critically ill and is at significant risk of decompensation. D/W Dr. Anibal Wilson, RN, RT and family at bedside. CCT 35' History / Subjective: 
Reason:  Respiratory failure Requesting Provider:  Dr Roberts Prom 2/8 Seen and examined. Awakens to voice, following commands. Several thick mucus plugs cleared by bronchoscopy- mucosa inflamed and bleeding to touch of scope 2/7 Seen and examined. Events noted. Mechanically ventilated. Gas exchange preserved. S/P permanent PM. On Chung, weaning down 2/6 Seen and examined earlier today. Did well with SBT with RSBI of 60 and was extubated. Subsequently developed confusion and ABG showed resp acidosis and he was placed on bipap. 2/5 Seen and examined. Sedated, on  protocol. CXR with mild bibasilar haziness- suspected atelectasis. Await rewarming 2/4 Gabi Zafar is a 72 y.o.  male who  has a past medical history of Acute encephalopathy (1/14/2016), Anemia (5/11/2017), Atrial fibrillation ([address] Painter esophagus, CAD (coronary artery disease), Diabetes ([address] Heart failure ([address] HTN, goal below 140/90, Retinopathy, diabetic, bilateral ([address] (3/11/2016), Stenosis of right carotid artery without cerebral infarction (1/18/2016), TIA (transient ischemic attack) (1/14/2016), and Vitamin D deficiency (3/1/2016). admitted 2/4/2019 with cardiac arrest 
 
Pt receiving infusion for gout when becam unresponsive PEA arrest intubated and given epi ROSC after approximately 10 minutes Head CT OK Placed on code ice protocol Not on vasopressors Has chronic afib. Current rhythm is afib Allergies Allergen Reactions  Solu-Medrol [Methylprednisolone Sodium Succ] Other (comments) Syncope / Cardiac Arrest  
 
Past Medical History:  
Diagnosis Date  Acute encephalopathy 1/14/2016  Anemia 5/11/2017  Atrial fibrillation ([address]  Painter esophagus  CAD (coronary artery disease)  Diabetes ([address]  Heart failure ([address] Cardiomyopathy, myocarditis  HTN, goal below 140/90  Retinopathy, diabetic, bilateral ([address] 3/11/2016  Stenosis of right carotid artery without cerebral infarction 1/18/2016  TIA (transient ischemic attack) 1/14/2016  Vitamin D deficiency 3/1/2016 Nephrology ordered and follow 2/22/16 Past Surgical History:  
Procedure Laterality Date  COLONOSCOPY N/A 6/23/2016 COLONOSCOPY performed by Allison Anne MD at [facility] ENDOSCOPY  COLONOSCOPY N/A 8/6/2018 COLONOSCOPY performed by Allison Anne MD at [facility] ENDOSCOPY  COLONOSCOPY N/A 8/30/2018 COLONOSCOPY performed by Juanita Robles MD at [address] [place] [address] HX ENDOSCOPY  06/27/2016 CAPSULE; normal small bowel  HX ENDOSCOPY  06/23/2016  
 upper endoscopy  HX UROLOGICAL  2013  DE INS NEW/RPLC PRM PACEMAKER W/TRANSV ELTRD VENTR N/A 2/7/2019 INSERT PPM SINGLE VENTRICULAR performed by Clark Grimm MD at [place] [address], [address] CATH LAB Prior to Admission medications Medication Sig Start Date End Date Taking? Authorizing Provider  
insulin glargine (LANTUS,BASAGLAR) 100 unit/mL (3 mL) inpn 14 Units by SubCUTAneous route nightly. 1/21/19  Yes Masterson, Theophilus Phoenix, MD  
ELIQUIS 5 mg tablet TAKE 1 TABLET BY MOUTH TWICE A DAY 11/9/18  Yes Provider, Historical  
pegloticase (KRYSTEXXA) 8 mg/mL soln injection 8 mg by IntraVENous route Once every 2 weeks. Yes Provider, Historical  
digoxin (LANOXIN) 0.125 mg tablet Take 0.125 mg by mouth. Pt takes daily x 2 days, then skips a day and repeats this schedule. Yes Provider, Historical  
dextran 70-hypromellose (ARTIFICIAL TEARS,ZORS84-FYFLC,) ophthalmic solution Administer 1 Drop to both eyes as needed. Yes Provider, Historical  
hydrALAZINE (APRESOLINE) 25 mg tablet Take 25 mg by mouth three (3) times daily. 5/20/16  Yes Provider, Historical  
multivitamin (ONE A DAY) tablet Take 1 Tab by mouth daily. Yes Provider, Historical  
pravastatin (PRAVACHOL) 20 mg tablet Take 1 Tab by mouth nightly. 1/18/16  Yes Enedina Zimmerman NP  
carvedilol (COREG) 3.125 mg tablet Take 1 Tab by mouth two (2) times daily (with meals). 1/18/16  Yes Enedina Zimmerman NP  
levothyroxine (SYNTHROID) 25 mcg tablet TAKE 1 TABLET BY MOUTH EVERY DAY BEFORE BREAKFAST 1/22/19   Lise Fairchild MD  
leflunomide (ARAVA) 20 mg tablet Take 20 mg by mouth every evening. Provider, Historical  
furosemide (LASIX) 40 mg tablet Take 40 mg by mouth daily. 5/10/16   Provider, Historical  
omeprazole (PRILOSEC) 20 mg capsule Take 20 mg by mouth nightly. Provider, Historical  
  
Family History Problem Relation Age of Onset  Heart Failure Mother  Diabetes Father  No Known Problems Sister  Other Daughter Janny Mock  Other Daughter Janny Mock  Other Daughter Janny Mock Social History Tobacco Use  Smoking status: Never Smoker  Smokeless tobacco: Never Used Substance Use Topics  Alcohol use:  Yes  
 Comment: 2 drinks/week, never a heavy drinker ROS:  Review of systems not obtained due to patient factors. Objective: 
Patient Vitals for the past 4 hrs: 
 BP Temp Pulse Resp SpO2 Height Weight  
19 1000 126/47  84 22 99 %    
19 0944      5' 10\" (1.778 m) 83.1 kg (183 lb 3.2 oz) 19 0900 122/48  78 22 99 %    
19 0857     99 %    
19 0835   79 22 100 %    
19 0800 120/51 100.2 °F (37.9 °C) 79 22 99 %    
19 0700 120/50 100.4 °F (38 °C) 82 22 99 %   Temp (24hrs), Av.9 °F (38.3 °C), Min:98.5 °F (36.9 °C), Max:102.6 °F (39.2 °C) CVP:       
 
Intake/Output Summary (Last 24 hours) at 2019 1006 Last data filed at 2019 0945 Gross per 24 hour Intake 941.09 ml Output 1450 ml Net -508.91 ml Blood Sugar: 
Glucose Date Value Ref Range Status 2019 156 (H) 65 - 100 mg/dL Final  
2019 225 (H) 65 - 100 mg/dL Final  
2019 224 (H) 65 - 100 mg/dL Final  
2019 203 (H) 65 - 100 mg/dL Final  
2019 103 (H) 65 - 100 mg/dL Final  
 
Exam: 
Sedate Oral ett 
 moves all extremities spontaneously No accessory use Symmetrical chest expansion Scattered rhonchi 
irreg Soft Warm and dry No edema Lab data was reviewed. Radiology images were independently viewed and available reports were reviewed. CXR - ETT, no acute process Lab: 
Recent Labs 19 
0414 19 
1217 19 
0423 19 
0001 19 
1908  19 
0001 WBC 5.6  --  8.3 11.6*  --    < > 8.1 HGB 7.4*  --  8.3* 8.8*  --    < > 7.6* PLT 83*  --  106* 152  --    < > 96*  141 140 140  --    < > 138  
K 3.5 4.0 4.4 4.2  --    < > 3.7  105 104 102  --    < > 103 CO2 21 23 23 20*  --    < > 21 BUN 72* 77* 74* 71*  --    < > 56* CREA 3.13* 3.42* 3.64* 3.75*  --    < > 2.38* * 225* 224* 203*  --    < > 140* CA 7.5* 7.5* 7.6* 7.6*  --    < > 8.4*  
 MG 2.6*  --  2.6* 2.6*  --    < > 2.9*  
PHOS  --   --   --  7.7*  --   --   --   
INR 1.4*  --  1.3*  --   --   --  1.3* TBILI 1.0  --  0.9 1.1*  --   --   --   
SGOT 152*  --  114* 99*  --   --   --   
LAC  --   --  2.5*  --  1.7  --   --   
 < > = values in this interval not displayed. ABG: 
Recent Labs 02/07/19 0631 02/06/19 2059 02/06/19 2010 PHI 7.397 7.237* 7.167* PCO2I 44.8 53.5* 54.9*  
PO2I 401* 74* 180* HCO3I 27.5* 22.8 19.9* SO2I 100* 91* 99* FIO2I 100 80 100 All Micro Results Procedure Component Value Units Date/Time CULTURE, RESPIRATORY/SPUTUM/BRONCH Jessi Bello [[id number]] Collected:  02/08/19 0934 Order Status:  Completed Specimen:  Sputum from Bronchial Washing Updated:  02/08/19 3087 CULTURE, URINE [[id number]] Collected:  02/07/19 0021 Order Status:  Completed Specimen:  Urine from [place] Specimen Updated:  02/08/19 5521 Special Requests: NO SPECIAL REQUESTS [place] Count <1,000 CFU/ML Culture result: NO GROWTH 1 DAY     
 CULTURE, BLOOD, PAIRED [[id number]] Collected:  02/08/19 0116 Order Status:  Completed Specimen:  Blood Updated:  02/08/19 0557 CULTURE, BLOOD, PAIRED [[id number]] Collected:  02/04/19 1608 Order Status:  Completed Specimen:  Blood Updated:  02/08/19 1110 Special Requests: NO SPECIAL REQUESTS Culture result: NO GROWTH 4 DAYS     
 CULTURE, BLOOD [[id number]] Collected:  02/06/19 1902 Order Status:  Completed Specimen:  Blood Updated:  02/08/19 0510 Special Requests: NO SPECIAL REQUESTS Culture result: NO GROWTH 2 DAYS     
 CULTURE, RESPIRATORY/SPUTUM/BRONCH Walker Fluke STAIN [[id number]] Order Status:  Sent Specimen:  Sputum,ET Suction Denice Quinteros MD

## 2019-02-08 NOTE — PROGRESS NOTES
Problem: Falls - Risk of 
Goal: *Absence of Falls Document Janki Hover Fall Risk and appropriate interventions in the flowsheet. Outcome: Progressing Towards Goal 
Fall Risk Interventions: 
  
 
Mentation Interventions: Adequate sleep, hydration, pain control, Door open when patient unattended, Evaluate medications/consider consulting pharmacy, More frequent rounding, Reorient patient, Room close to nurse's station, Update white board Medication Interventions: Assess postural VS orthostatic hypotension, Evaluate medications/consider consulting pharmacy Elimination Interventions: Call light in reach, Patient to call for help with toileting needs, Toileting schedule/hourly rounds Problem: Pressure Injury - Risk of 
Goal: *Prevention of pressure injury Document Dom Scale and appropriate interventions in the flowsheet. Outcome: Progressing Towards Goal 
Pressure Injury Interventions: 
Sensory Interventions: Assess changes in LOC, Keep linens dry and wrinkle-free, Minimize linen layers, Monitor skin under medical devices, Turn and reposition approx. every two hours (pillows and wedges if needed) Moisture Interventions: Absorbent underpads, Apply protective barrier, creams and emollients, Check for incontinence Q2 hours and as needed, Internal/External urinary devices, Maintain skin hydration (lotion/cream), Minimize layers Activity Interventions: Assess need for specialty bed, Pressure redistribution bed/mattress(bed type), PT/OT evaluation Mobility Interventions: Float heels, Pressure redistribution bed/mattress (bed type), Turn and reposition approx. every two hours(pillow and wedges) Nutrition Interventions: Document food/fluid/supplement intake, Discuss nutritional consult with provider Friction and Shear Interventions: Apply protective barrier, creams and emollients, Lift sheet, Lift team/patient mobility team, Transferring/repositioning devices

## 2019-02-08 NOTE — PROCEDURES
[facility]  Pulmonary, Critical Care, and Sleep Medicine    Name: Sunday Du MRN: [id number]   : 1945 Hospital: [address]   Date: 2019        Bronchoscopy Report    Procedure: Therapeutic bronchoscopy. Indication: Mucus Plugging    Consent/Treatment: Informed consent was obtained from the  family after risks, benefits and alternatives were explained. Timeout verified the correct patient and correct procedure. Anesthesia:   Patient on ventilator and receiving  Propofol drip 45   Moderate (conscious) sedation was administered by the endoscopy nurse and supervised by the endoscopist.  The following parameters were monitored: oxygen saturation, heart rate, blood pressure, respiratory rate, EKG, adequacy of pulmonary ventilation, and response to care. Total physician intraservice time was 20 minutes. Procedure Details:   -- The bronchoscope was introduced through an endotracheal tube. -- The vocal cords bypassed. -- The trachea and ana lilia were completely inspected and were found to be normal.  -- The right-sided endobronchial anatomy was completely inspected and showed copious thick secretions and mucus plugs, suctioned clear. -- The left-sided endobronchial anatomy was completely inspected and copious thick secretions and mucus plugs were suctioned clear.    -- Airway mucosa inflamed and with tendency to easy bleeding with touch of scope    Specimens:   Bronchial washings were sent for  microbiology    Rapid On-Site Evaluation: none    Complications: none    Estimated Blood Loss: Minimal    Rancho Cruz MD

## 2019-02-08 NOTE — PROGRESS NOTES
NUTRITION COMPLETE ASSESSMENT 
 
RECOMMENDATIONS:  
Enteral nutrition support if unable to extubate 2/9 On Diprivan: Glucerna 1.2 @ 60 ml/hr with 100 ml water flush q 4 hr Off Diprivan: Glucerna 1.2 @ 75 ml/hr with 120 ml water flush q 4 hr 
      
   *Once Synthroid changed to oral route-will need to hold feeding 1 hr before and 2 hr after administration Interventions/Plan:  
Food/Nutrient Delivery:  NPO Assessment:  
Reason for Assessment:  
[x] Provider Consult-Tube Feeding management Diet: NPO Nutritionally Significant Medications: [x] Reviewed & Includes: Diprivan, Chung, Lantus, correction scale insulin, Synthroid, Chung 
Meal Intake: No data found. Subjective: Wife reports pt watches his diet pretty strictly to keep BG under control. Objective: 
Mr Rachid Barney was admitted with Cardiac arrest. Noted: Code ICE on admission, rewarmed and extubated 2/6; bradycardic/PEA arrest 2/6, intubated and PPM placed 2/7; MARYCRUZ on CKD 3 2/2 ATN; ID consulted-Fevers. PMHx: CAD, HTN, DM, heart failure/CM, others noted. Patient appears well-nourished; negative MST on admission. Discussed with hospitalist-will hold off on starting enteral feeding at this time in hopes that patient will be extubated tomorrow. If unable to extubate and advance diet recommend starting enteral nutrition. Diprivan at current rate of 15 ml/hr provides 396 lipid calories. Recommended tube feeding on Diprivan:Glucerna 1.2 @ 60 ml/hr with 100 ml water flush q 4 hr. This will provide 1440 ml, 1730 calories, 86 gm protein and 1760 ml free water (tube feeding/flush) per day. Estimated Nutrition Needs:  
Kcals/day: 1315 Kcals/day Protein: 82 g(1g/kg) Fluid: 2050 ml(25 ml/kg) Based On: [facility] (7691A) Weight Used: Actual wt(82 kg) Pt expected to meet estimated nutrient needs:  []   Yes     []  No  [x] Unable to predict at this time Nutrition Diagnosis: 1. Inadequate protein-energy intake related to PEA arrest x 2 as evidenced by respiratory failure/intubation without nutrition support. Goals:   
 Initiate enteral feeding in next 24 hr if unable to extubate. Monitoring & Evaluation: - Enteral/parenteral nutrition intake - Electrolyte and renal profile, Weight/weight change, Glucose profile Previous Nutrition Goals Met: N/A Previous Recommendations: N/A Education & Discharge Needs: 
 [x] None Identified 
 [] Identified and addressed [x] Participated in care plan, discharge planning, and/or interdisciplinary rounds Cultural, Zoroastrianism and ethnic food preferences identified: 
 None Skin Integrity: [x]Intact  []Other Edema: [x]None []Other Last BM: PTA Food Allergies: [x]None []Other Anthropometrics:   
Weight Loss Metrics 2/8/2019 2/4/2019 2/4/2019 2/4/2019 1/17/2019 1/15/2019 1/14/2019 Today's Wt 183 lb 3.2 oz - 180 lb - 180 lb 185 lb 3 oz -  
BMI - 26.29 kg/m2 - 25.83 kg/m2 25.83 kg/m2 - 26.57 kg/m2 Last 3 Recorded Weights in this Encounter 02/07/19 0400 02/08/19 0400 02/08/19 0944 Weight: 85.2 kg (187 lb 13.3 oz) 83.1 kg (183 lb 3.2 oz) 83.1 kg (183 lb 3.2 oz) Weight Source: Bed Height: 5' 10\" (177.8 cm), Body mass index is 26.29 kg/m². IBW : 75.3 kg (166 lb), % IBW (Calculated): 110.36 % 
 ,   
 
Labs:   
Lab Results Component Value Date/Time Sodium 143 02/08/2019 04:14 AM  
 Potassium 3.5 02/08/2019 04:14 AM  
 Chloride 108 02/08/2019 04:14 AM  
 CO2 21 02/08/2019 04:14 AM  
 Glucose 156 (H) 02/08/2019 04:14 AM  
 BUN 72 (H) 02/08/2019 04:14 AM  
 Creatinine 3.13 (H) 02/08/2019 04:14 AM  
 Calcium 7.5 (L) 02/08/2019 04:14 AM  
 Magnesium 2.6 (H) 02/08/2019 04:14 AM  
 Phosphorus 7.7 (H) 02/07/2019 12:01 AM  
 Albumin 2.4 (L) 02/08/2019 04:14 AM  
 
Lab Results Component Value Date/Time  Hemoglobin A1c 6.4 (H) 02/05/2019 12:02 AM  
 Hemoglobin A1c (POC) 6.3 08/02/2016 12:53 PM  
 
 Lab Results Component Value Date/Time Glucose (POC) 172 (H) 02/08/2019 05:48 AM  
  
Lab Results Component Value Date/Time ALT (SGPT) 126 (H) 02/08/2019 04:14 AM  
 AST (SGOT) 152 (H) 02/08/2019 04:14 AM  
 Alk.  phosphatase 64 02/08/2019 04:14 AM  
 Bilirubin, direct 0.2 06/05/2009 12:10 PM  
 Bilirubin, total 1.0 02/08/2019 04:14 AM  
  
 
Birdie Ward RD [facility]

## 2019-02-08 NOTE — PROGRESS NOTES
26 - Bedside and Verbal shift change report given to Germania Hgih RN (oncoming nurse) by Beba Lo RN (offgoing nurse). Report included the following information SBAR, MAR, Recent Results and Cardiac Rhythm paced / atrial fibrillation. 0921 - Dr. Christal Silver and respiratory therapist at bedside for bronchoscopy. 0930 - Dr. Yousuf Frye at bedside to see patient. 1330 -Dr. Sallyanne Dance at bedside to see patient. Discussed the necessity to pull femoral central line ICE catheter due to infection risk and replacement of a new central line. Patient's wife, Edith Arroyo, is concerned that central line may not be needed after patient is extubated and off propofol and able to wean off vasopressors. Concerns discussed with Dr. Sallyanne Dance and Dr. Matt Glover called per [facility] request over concern of potential pacemaker lead dislodgement with central line replacement. After discussion with Dr. Sallyanne Dance and Dr. Matt Glover, the plan is to leave the femoral central line in for one more day and re-evaluate removal and replacement of central line tomorrow. Continuing to attempt to wean Chung as patient tolerates. 1500 - Pharmacist stated if patient resumes Chung, hold Zyvox and notified ID due to potential drug interactions. 1930 - Bedside and Verbal shift change report given to Chanell (oncoming nurse) by Germania High RN (offgoing nurse). Report included the following information SBAR, MAR, Recent Results and Cardiac Rhythm paced.

## 2019-02-08 NOTE — PROGRESS NOTES
[facility] Cardiology Progress Note Date of service: 2/8/2019 Subjective: 
Mr Lacho Hess remains intubated and ventilated Intermittent pacing on tele On 15 of Chung Objective: 
 
Visit Vitals BP (!) 147/38 Pulse 82 Temp 100.4 °F (38 °C) Resp 22 Ht 5' 10\" (1.778 m) Wt 83.1 kg (183 lb 3.2 oz) SpO2 99% BMI 26.29 kg/m² Physical Exam  
Constitutional: He appears well-developed and well-nourished. He is intubated. HENT:  
Head: Normocephalic and atraumatic. Eyes: Conjunctivae are normal. No scleral icterus. Neck: No JVD present. Cardiovascular: Normal rate. An irregularly irregular rhythm present. Exam reveals no gallop. Murmur heard. Early systolic murmur is present with a grade of 2/6. Pulmonary/Chest: Effort normal and breath sounds normal. No stridor. He is intubated. No respiratory distress. He has no wheezes. He has no rales. Abdominal: Soft. He exhibits no distension. Musculoskeletal: He exhibits no edema or deformity. Neurological:  
Sedated Skin: Skin is warm and dry. Data reviewed: 
Recent Results (from the past 12 hour(s)) GLUCOSE, POC Collection Time: 02/07/19 11:00 PM  
Result Value Ref Range Glucose (POC) 158 (H) 65 - 100 mg/dL Performed by Lenora Flavors CBC W/O DIFF Collection Time: 02/08/19  4:14 AM  
Result Value Ref Range WBC 5.6 4.1 - 11.1 K/uL  
 RBC 2.64 (L) 4.10 - 5.70 M/uL HGB 7.4 (L) 12.1 - 17.0 g/dL HCT 24.0 (L) 36.6 - 50.3 % MCV 90.9 80.0 - 99.0 FL  
 MCH 28.0 26.0 - 34.0 PG  
 MCHC 30.8 30.0 - 36.5 g/dL  
 RDW 14.6 (H) 11.5 - 14.5 % PLATELET 83 (L) 807 - 400 K/uL MPV 11.9 8.9 - 12.9 FL  
 NRBC 0.0 0  WBC ABSOLUTE NRBC 0.00 0.00 - 0.01 K/uL PROTHROMBIN TIME + INR Collection Time: 02/08/19  4:14 AM  
Result Value Ref Range INR 1.4 (H) 0.9 - 1.1 Prothrombin time 13.5 (H) 9.0 - 11.1 sec PTT Collection Time: 02/08/19  4:14 AM  
Result Value Ref Range aPTT 33.0 (H) 22.1 - 32.0 sec  
 aPTT, therapeutic range     58.0 - 19.7 SECS  
METABOLIC PANEL, COMPREHENSIVE Collection Time: 02/08/19  4:14 AM  
Result Value Ref Range Sodium 143 136 - 145 mmol/L Potassium 3.5 3.5 - 5.1 mmol/L Chloride 108 97 - 108 mmol/L  
 CO2 21 21 - 32 mmol/L Anion gap 14 5 - 15 mmol/L Glucose 156 (H) 65 - 100 mg/dL BUN 72 (H) 6 - 20 MG/DL Creatinine 3.13 (H) 0.70 - 1.30 MG/DL  
 BUN/Creatinine ratio 23 (H) 12 - 20 GFR est AA 24 (L) >60 ml/min/1.73m2 GFR est non-AA 20 (L) >60 ml/min/1.73m2 Calcium 7.5 (L) 8.5 - 10.1 MG/DL Bilirubin, total 1.0 0.2 - 1.0 MG/DL  
 ALT (SGPT) 126 (H) 12 - 78 U/L  
 AST (SGOT) 152 (H) 15 - 37 U/L Alk. phosphatase 64 45 - 117 U/L Protein, total 5.8 (L) 6.4 - 8.2 g/dL Albumin 2.4 (L) 3.5 - 5.0 g/dL Globulin 3.4 2.0 - 4.0 g/dL A-G Ratio 0.7 (L) 1.1 - 2.2 MAGNESIUM Collection Time: 02/08/19  4:14 AM  
Result Value Ref Range Magnesium 2.6 (H) 1.6 - 2.4 mg/dL [place] Farrah Collection Time: 02/08/19  4:19 AM  
Result Value Ref Range Vancomycin,trough 9.7 5.0 - 10.0 ug/mL Reported dose date: NOT REQUESTED Reported dose time: NOT REQUESTED Reported dose: NOT REQUESTED UNITS  
GLUCOSE, POC Collection Time: 02/08/19  5:48 AM  
Result Value Ref Range Glucose (POC) 172 (H) 65 - 100 mg/dL Performed by Rashawn Blackmon NGTD Assessment: 1. Cardiac arrest. PEA. With events of 2/6 it now seems like that his underlying mechanism may have been bradyarrhythmia from SSS.  
  
2. SSS: s/p PPM 2/7. 
  
3. Acute respiratory failure S/p cardiac arrest. Fevers. May have some element of aspiration pneumonia 4. Hypotension 
  
5. Chronic a.fib 
  
6. CKD stage III Renal indices trending a bit worse, likely from hypotension 
  
7. Anemia, probably from CKD 
  
8. Gout 9. Thrombocytopenia: unclear etiology Improving 10. Pulmonary hypertension 
  
Plan: Continue current supportive cares Note plans for likely bronchoscopy today / respiratory optimization Signed: 
Marielos Woods MD 
Interventional Cardiology 2/8/2019

## 2019-02-08 NOTE — CONSULTS
[address]    Name:  Bernice Lopes  MR#:  [id number]  :  1945  ACCOUNT #:  [de-identified]  DATE OF SERVICE:  2019      The patient is in room 25 in the CCU. ATTENDING PHYSICIAN:  Parish Rhodes MD    CHIEF COMPLAINT:  Unobtainable from the patient. REASON FOR CONSULTATION:  Fever and leukocytosis following a code ICE. CONSULTATION REQUESTED BY:  Parish Rhodes MD    History is obtained by interviewing the patient's daughter, interviewing the  patient's wife, discussed with the patient's nurse, and review of the medical  records. The patient is unable to provide any additional history since he is  intubated and sedated. Kusum Camacho HISTORY OF PRESENT ILLNESS:  This patient is a 47-year-old white male with the  history of congestive cardiomyopathy, chronic atrial fibrillation, anemia, NIDDM,  cerebrovascular disease, hypertension, and gout. This patient has been receiving  infusions of pegloticase (Krystexxa) every 2 weeks for control of gout. About 3  weeks ago, he went to the infusion center. On each occasion, he has received a dose  of Solu-Medrol and Pepcid as a pre medication. He received the same premedication  but while receiving that he passed out or fainted for about 3 minutes. By history,  \"he did not lose his pulse. \"  About 3 minutes later, he awakened. He appeared he did  not require any resuscitation. The infusion was not given that day. A week later,  he returned and wants to again receive the same premedication with Solu-Medrol and  Pepcid and received the infusion of the medication without any adverse effects. On  2019, he went back for another infusion. While receiving solu-medrol, he  developed PEA arrest.  He required cardiac impressions and resuscitations. He was  transported to the [address] [place] [address] Emergency Room. It was felt that he was a candidate  for code ICE.   He was transferred to [facility].  Left femoral line was  placed. Code ICE was initiated. Yesterday, on 02/06/2019, he was warmed. He then  rested four different times due to PEA. On each occasion, he required resuscitation. I noted that he had mildly elevated white count yesterday 14,000 but no fever. Cultures were obtained and he was started on Zosyn and vancomycin. Today, his white  count is back to normal but this evening he has temperature of 102.6 degrees  Fahrenheit. We are asked to see him for further evaluation. PAST MEDICAL HISTORY:  1. Tobacco, he used to smoke but quit many, many years ago. 2.  Alcohol, occasional use. MEDICATIONS PRIOR TO ADMISSION:  1. Carvedilol 3.125 mg p.o. b.i.d.  2.  Digoxin 0.125 mg on an intermittent schedule. 3.  Eliquis 5 mg p.o. b.i.d.  4.  Lasix 40 mg p.o. daily. 5.  Hydralazine 25 mg p.o. t.i.d.  6.  Insulin. 7.  Arava 20 mg p.o. each evening. 8.  Synthroid 0.025 mg p.o. daily. 9.  Multivitamin-1 p.o. daily. 10.  Prilosec 20 mg p.o. nightly. 11.  Pegloticase (Krystexxa) 8 mg infusion every 2 weeks. 12.  Prevacid 20 mg p.o. nightly. ALLERGIES:  1. NOVOCAIN. 2.  SOLU-MEDROL-HE HAD PEA ARREST DURING THE SOLU-MEDROL INFUSIONS OUTLINED ABOVE. ILLNESSES:  1. Organic heart disease/cardiomyopathy and chronic atrial fibrillation. 2.  NIDDM- diagnosed at the age of 38.  1. Hypertension. 4.  Cerebrovascular disease. 5.  Gout. 6.  Painter esophagus. 7.  Anemia. SURGERIES:  Placement of permanent cardiac pacemaker today. SOCIAL HISTORY:  He lives with his wife. FAMILY HISTORY:  Unobtainable. REVIEW OF SYSTEMS:  Unobtainable. PHYSICAL EXAMINATION:  GENERAL:  Intubated and sedated male. VITAL SIGNS:  Blood pressure is 137/70, heart rate 87, respiratory rate 22, T-max was  102.6 degrees Fahrenheit. HEENT:  Sclerae and conjunctivae benign, oropharynx unremarkable, tracheal tubes in  place, no meningismus. No history of adenopathy. SKIN:  No rashes.   LUNGS:  No basal rales.  CARDIOVASCULAR:  Heart Irregular rhythm. ABDOMEN:  Soft, nondistended, nontender, no masses, bowel sounds are present. No  pressure ulcers are seen. EXTREMITIES:  No edema or cellulitis. MUSCULOSKELETAL:  Muscles nontender and joints are unremarkable. NEUROLOGIC:  Currently sedated. LABORATORY DATA:  CBC reveals hemoglobin 8.3 and white count 8300 and platelets  479,508. His platelet had been normal until now. Chemistry data reveals BUN 77,  creatinine 3.42  and liver function test unremarkable. Microbiology data, blood  cultures done on 02/06/2019, with no growth so far. RADIOLOGY DATA:  Serial chest x-rays show increasing density at the left base and  haziness throughout the left chest and perhaps decreased volume in the left chest.    IMPRESSION:  1. Fever - cause of today's fever is not certain. However, the chest x-ray has  gotten progressively worse in the left base and he is at risk for aspiration. I  agree with the antibiotics. He is on Zosyn and vancomycin. I would like to start  vancomycin. He has is no history of MRSA and with combination of vancomycin and  Zosyn, I think he has more potential nephrotoxicity especially in diabetics. 2.  Organic heart disease/cardiomyopathy and history of chronic atrial fibrillation. 3.  Non-insulin dependent diabetes mellitus. 4.  Cerebrovascular disease. 5.  Hypertension. 6.  Gout. PLAN:  1. Continue Zosyn. 2.  Bronchial cultures in the morning. 3.  Hold vancomycin and check level in the morning. 4. If we need continued MRSA coverage, I will consider using linezolid instead. Thank you very much for allowing us to see this patient. I will discuss this patient  at length with his wife.         Yves Veloz MD      [id number]  D:  02/08/2019 1:41  T:  02/08/2019 12:19  JOB #:  [id number]  CC:  MD Susanne Joshi MD

## 2019-02-08 NOTE — PROGRESS NOTES
Name: Patricia Ellsworth MRN: [id number] : 1945 Assessment: 
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  
HTN>>hypotension. Improving. On low dose Chung DM-2 Cardiac arrest; PEA- ? etiology. Code ice protocol; recurrence of niurka arrest on . S/p PPM 
Gout Anemia- with component of ACKD A Resp failure- reintubated; s/p bronch today with copious secretions With stable hemodynamics, his UOP picked up and Cr trending down. We were able to avoid dialysis Plan/Recommendations: 
Ct supp care; no need for dialysis and mostly we should be able to avoid it Pressors as needed Avoid nephrotoxins Am labs Subjective: S/p bronch earlier today BP improving Good UOP Sedated on vent Wife at bedside ROS:  
UTO Exam: 
Visit Vitals /54 Pulse 87 Temp 99.7 °F (37.6 °C) Resp 12 Ht 5' 10\" (1.778 m) Wt 83.1 kg (183 lb 3.2 oz) SpO2 100% BMI 26.29 kg/m² NAD 
+ETT Dec BS LLL Irregular, +SM, no tachy or niurka [place]+ No peripheral edema Sedated on vent Current Facility-Administered Medications Medication Dose Route Frequency Last Dose  sodium chloride (NS) flush 5-40 mL  5-40 mL IntraVENous Q8H 10 mL at 19 1337  
 sodium chloride (NS) flush 5-40 mL  5-40 mL IntraVENous PRN    
 albuterol-ipratropium (DUO-NEB) 2.5 MG-0.5 MG/3 ML  3 mL Nebulization QID RT 3 mL at 19 1320  acetylcysteine (MUCOMYST) 200 mg/mL (20 %) solution 400 mg  2 mL Nebulization TID  mg at 19 1320  
 linezolid in dextrose 5% (ZYVOX) IVPB premix in D5W 600 mg  600 mg IntraVENous Q12H 600 mg at 19 1324  insulin glargine (LANTUS) injection 5 Units  5 Units SubCUTAneous QHS 5 Units at 19 3652  acetaminophen (TYLENOL) tablet 650 mg  650 mg Oral Q6H  mg at 19 6389  bacitracin 500 unit/gram packet 1 Packet  1 Packet Topical PRN  piperacillin-tazobactam (ZOSYN) 3.375 g in 0.9% sodium chloride (MBP/ADV) 100 mL  3.375 g IntraVENous Q8H 3.375 g at 02/08/19 1200  propofol (DIPRIVAN) infusion  0-50 mcg/kg/min IntraVENous TITRATE 35 mcg/kg/min at 02/08/19 1431  isoproterenol (ISUPREL) 1 mg in 0.9% sodium chloride 100 mL infusion  0-10 mcg/min IntraVENous TITRATE Stopped at 02/07/19 0757  EPINEPHrine (ADRENALIN) 5 mg in 0.9% sodium chloride 250 mL infusion  1-10 mcg/min IntraVENous TITRATE Stopped at 02/06/19 2314  PHENYLephrine (GINA-SYNEPHRINE) 100 mg in 0.9% sodium chloride 250 mL infusion   mcg/min IntraVENous TITRATE 10 mcg/min at 02/08/19 1431  pantoprazole (PROTONIX) 40 mg in sodium chloride 0.9% 10 mL injection  40 mg IntraVENous DAILY 40 mg at 02/08/19 0566  sodium chloride (NS) flush 5-40 mL  5-40 mL IntraVENous Q8H 10 mL at 02/08/19 1337  
 sodium chloride (NS) flush 5-40 mL  5-40 mL IntraVENous PRN 10 mL at 02/06/19 0442  chlorhexidine (PERIDEX) 0.12 % mouthwash 15 mL  15 mL Oral Q12H 15 mL at 02/08/19 2472  sodium chloride (NS) flush 5-40 mL  5-40 mL IntraVENous Q8H 10 mL at 02/08/19 1337  
 sodium chloride (NS) flush 5-40 mL  5-40 mL IntraVENous PRN 10 mL at 02/06/19 0442  
 glucose chewable tablet 16 g  4 Tab Oral PRN    
 dextrose (D50W) injection syrg 12.5-25 g  25-50 mL IntraVENous PRN    
 glucagon (GLUCAGEN) injection 1 mg  1 mg IntraMUSCular PRN    
 insulin lispro (HUMALOG) injection   SubCUTAneous Q6H Stopped at 02/07/19 1850  levothyroxine (SYNTHROID) injection 18.75 mcg  18.75 mcg IntraVENous Q24H 18.75 mcg at 02/08/19 1218  
 0.9% sodium chloride infusion  5 mL/hr IntraVENous CONTINUOUS Stopped at 02/06/19 3618 Labs/Data: 
 
Lab Results Component Value Date/Time  WBC 5.6 02/08/2019 04:14 AM  
 Hemoglobin (POC) 6.2 09/04/2018 02:17 PM  
 HGB 7.4 (L) 02/08/2019 04:14 AM  
 Hematocrit (POC) 32 (L) 02/04/2019 12:23 PM  
 HCT 24.0 (L) 02/08/2019 04:14 AM  
 PLATELET 83 (L) 53/35/7792 04:14 AM  
 MCV 90.9 02/08/2019 04:14 AM  
 
 
Lab Results Component Value Date/Time Sodium 143 02/08/2019 04:14 AM  
 Potassium 3.5 02/08/2019 04:14 AM  
 Chloride 108 02/08/2019 04:14 AM  
 CO2 21 02/08/2019 04:14 AM  
 Anion gap 14 02/08/2019 04:14 AM  
 Glucose 156 (H) 02/08/2019 04:14 AM  
 BUN 72 (H) 02/08/2019 04:14 AM  
 Creatinine 3.13 (H) 02/08/2019 04:14 AM  
 BUN/Creatinine ratio 23 (H) 02/08/2019 04:14 AM  
 GFR est AA 24 (L) 02/08/2019 04:14 AM  
 GFR est non-AA 20 (L) 02/08/2019 04:14 AM  
 Calcium 7.5 (L) 02/08/2019 04:14 AM  
 
 
Wt Readings from Last 3 Encounters:  
02/08/19 83.1 kg (183 lb 3.2 oz) 02/04/19 81.6 kg (180 lb) 01/17/19 81.6 kg (180 lb) Intake/Output Summary (Last 24 hours) at 2/8/2019 1448 Last data filed at 2/8/2019 1400 Gross per 24 hour Intake 794.39 ml Output 1460 ml Net -665.61 ml Patient seen and examined. Chart reviewed. Labs, data and other pertinent notes reviewed in last 24 hrs. PMH/SH/FH reviewed and unchanged compared to H&P Discussed with pts wife Kelle Davis MD

## 2019-02-08 NOTE — PROGRESS NOTES
Reason for Admission:  Cardiac arrest  
            
RRAT Score:  39 Resources/supports as identified by patient/family:  Patient has very supportive family, a wife who is a retired nurse, a daughter who is a physician, and another a  and 2 other children. Top Challenges facing patient (as identified by patient/family and CM): Complications from cardiac arrest and intubation. Finances/Medication cost?  Patient has health insurance and family did not voice any financial stressors Transportation? family may provide transportation if appropriate at discharge Support system or lack thereof? See above, three of his children and wife were at the bedside Living arrangements? He lives with his wife Self-care/ADLs/Cognition? He was alert oriented x 4 and independent without any assistive devices prior to admission. Current Advanced Directive/Advance Care Plan:  Not on file Plan for utilizing home health:  TBD Likelihood of readmission: High 
              
Transition of Care Plan: CM met with patient's wife and three of his five children to discuss discharge planning. Patient remained intubated. Patient lives with his wife and was independent without any assistive devices. Patient saw his PCP as needed and used his local [facility] pharmacy for prescriptions. Disposition will be determined when patient is medically stable. He may need Inpatient Rehab vs [facility]. CM will continue to follow for progress and appropriate disposition. Julianna Askew MSA, RN, CRM. Care Management Interventions PCP Verified by CM: Yes 
Palliative Care Criteria Met (RRAT>21 & CHF Dx)?: Yes 
Palliative Consult Recommended?: No 
Mode of Transport at Discharge: Other (see comment) Transition of Care Consult (CM Consult): Discharge Planning MyChart Signup: No 
 Discharge Durable Medical Equipment: No 
Health Maintenance Reviewed: Yes Physical Therapy Consult: No 
Occupational Therapy Consult: No 
Speech Therapy Consult: No 
Current Support Network: Lives with Spouse Confirm Follow Up Transport: Family Plan discussed with Pt/Family/Caregiver: Yes Discharge Location Discharge Placement: Home with family assistance

## 2019-02-08 NOTE — PROGRESS NOTES
Infectious Diseases Consultation Please see dictated note Impression 1. Fever -- The cause of the fever is not certain but CXR has gotten progressively worse at the left base and he is at risk for aspiration 2. OHD -- cardiomyopathy and hx of atrial fib -- S/P initial arrest with Code Ice and several subsequent episodes of PEA 3. NIDDM 4. CVD 5. HTN 
 
6. GOUT Recommend: 1. Continue Zosyn 2. Bronch with cultures 2/8 3. Hold Vanc and check level in AM  
 
4. If we need continued MRSA coverage, will consider linezolid instead Thanks,  
Dasha Moffett MD 
2/7/2019 
10:23 PM

## 2019-02-08 NOTE — CONSULTS
Asked by primary team to evaluate for central line. Risks and benefits explained to family. They wish to defer line placement at this time.

## 2019-02-09 ENCOUNTER — APPOINTMENT (OUTPATIENT)
Dept: GENERAL RADIOLOGY | Age: 74
DRG: 242 | End: 2019-02-09
Attending: INTERNAL MEDICINE
Payer: MEDICARE

## 2019-02-09 LAB
ABO + RH BLD: NORMAL
ALBUMIN SERPL-MCNC: 2.3 G/DL (ref 3.5–5)
ALBUMIN/GLOB SERPL: 0.6 {RATIO} (ref 1.1–2.2)
ALP SERPL-CCNC: 76 U/L (ref 45–117)
ALT SERPL-CCNC: 120 U/L (ref 12–78)
ANION GAP SERPL CALC-SCNC: 12 MMOL/L (ref 5–15)
APTT PPP: 30.8 SEC (ref 22.1–32)
ARTERIAL PATENCY WRIST A: ABNORMAL
AST SERPL-CCNC: 106 U/L (ref 15–37)
ATRIAL RATE: 0 BPM
BACTERIA SPEC CULT: NORMAL
BASE EXCESS BLD CALC-SCNC: 2 MMOL/L
BASOPHILS # BLD: 0 K/UL (ref 0–0.1)
BASOPHILS NFR BLD: 0 % (ref 0–1)
BDY SITE: ABNORMAL
BILIRUB SERPL-MCNC: 1.3 MG/DL (ref 0.2–1)
BLD PROD TYP BPU: NORMAL
BLD PROD TYP BPU: NORMAL
BLOOD GROUP ANTIBODIES SERPL: NORMAL
BPU ID: NORMAL
BPU ID: NORMAL
BUN SERPL-MCNC: 67 MG/DL (ref 6–20)
BUN/CREAT SERPL: 25 (ref 12–20)
CALCIUM SERPL-MCNC: 8 MG/DL (ref 8.5–10.1)
CALCULATED R AXIS, ECG10: 44 DEGREES
CALCULATED T AXIS, ECG11: -162 DEGREES
CHLORIDE SERPL-SCNC: 110 MMOL/L (ref 97–108)
CO2 SERPL-SCNC: 22 MMOL/L (ref 21–32)
CREAT SERPL-MCNC: 2.7 MG/DL (ref 0.7–1.3)
CROSSMATCH RESULT,%XM: NORMAL
CROSSMATCH RESULT,%XM: NORMAL
DIAGNOSIS, 93000: NORMAL
DIFFERENTIAL METHOD BLD: ABNORMAL
EOSINOPHIL # BLD: 0 K/UL (ref 0–0.4)
EOSINOPHIL NFR BLD: 1 % (ref 0–7)
ERYTHROCYTE [DISTWIDTH] IN BLOOD BY AUTOMATED COUNT: 14.6 % (ref 11.5–14.5)
GAS FLOW.O2 O2 DELIVERY SYS: ABNORMAL L/MIN
GLOBULIN SER CALC-MCNC: 3.6 G/DL (ref 2–4)
GLUCOSE BLD STRIP.AUTO-MCNC: 153 MG/DL (ref 65–100)
GLUCOSE BLD STRIP.AUTO-MCNC: 159 MG/DL (ref 65–100)
GLUCOSE BLD STRIP.AUTO-MCNC: 181 MG/DL (ref 65–100)
GLUCOSE BLD STRIP.AUTO-MCNC: 196 MG/DL (ref 65–100)
GLUCOSE SERPL-MCNC: 204 MG/DL (ref 65–100)
HCO3 BLD-SCNC: 25.9 MMOL/L (ref 22–26)
HCT VFR BLD AUTO: 27.2 % (ref 36.6–50.3)
HGB BLD-MCNC: 8.5 G/DL (ref 12.1–17)
IMM GRANULOCYTES # BLD AUTO: 0 K/UL
IMM GRANULOCYTES NFR BLD AUTO: 0 %
INR PPP: 1.2 (ref 0.9–1.1)
LACTATE SERPL-SCNC: 0.9 MMOL/L (ref 0.4–2)
LYMPHOCYTES # BLD: 0.4 K/UL (ref 0.8–3.5)
LYMPHOCYTES NFR BLD: 10 % (ref 12–49)
MAGNESIUM SERPL-MCNC: 2.7 MG/DL (ref 1.6–2.4)
MCH RBC QN AUTO: 28 PG (ref 26–34)
MCHC RBC AUTO-ENTMCNC: 31.3 G/DL (ref 30–36.5)
MCV RBC AUTO: 89.5 FL (ref 80–99)
MONOCYTES # BLD: 0.3 K/UL (ref 0–1)
MONOCYTES NFR BLD: 9 % (ref 5–13)
NEUTS BAND NFR BLD MANUAL: 3 % (ref 0–6)
NEUTS SEG # BLD: 3.1 K/UL (ref 1.8–8)
NEUTS SEG NFR BLD: 77 % (ref 32–75)
NRBC # BLD: 0 K/UL (ref 0–0.01)
NRBC BLD-RTO: 0 PER 100 WBC
O2/TOTAL GAS SETTING VFR VENT: 30 %
PCO2 BLD: 36.8 MMHG (ref 35–45)
PEEP RESPIRATORY: 5 CMH2O
PH BLD: 7.46 [PH] (ref 7.35–7.45)
PHOSPHATE SERPL-MCNC: 3.7 MG/DL (ref 2.6–4.7)
PLATELET # BLD AUTO: 68 K/UL (ref 150–400)
PLATELET COMMENTS,PCOM: ABNORMAL
PMV BLD AUTO: 11.2 FL (ref 8.9–12.9)
PO2 BLD: 115 MMHG (ref 80–100)
POTASSIUM SERPL-SCNC: 3.6 MMOL/L (ref 3.5–5.1)
PRESSURE SUPPORT SETTING VENT: 5 CMH2O
PROT SERPL-MCNC: 5.9 G/DL (ref 6.4–8.2)
PROTHROMBIN TIME: 12.3 SEC (ref 9–11.1)
Q-T INTERVAL, ECG07: 434 MS
QRS DURATION, ECG06: 138 MS
QTC CALCULATION (BEZET), ECG08: 509 MS
RBC # BLD AUTO: 3.04 M/UL (ref 4.1–5.7)
RBC MORPH BLD: ABNORMAL
RBC MORPH BLD: ABNORMAL
SAO2 % BLD: 99 % (ref 92–97)
SERVICE CMNT-IMP: ABNORMAL
SERVICE CMNT-IMP: NORMAL
SODIUM SERPL-SCNC: 144 MMOL/L (ref 136–145)
SPECIMEN EXP DATE BLD: NORMAL
SPECIMEN TYPE: ABNORMAL
STATUS OF UNIT,%ST: NORMAL
STATUS OF UNIT,%ST: NORMAL
THERAPEUTIC RANGE,PTTT: NORMAL SECS (ref 58–77)
UNIT DIVISION, %UDIV: 0
UNIT DIVISION, %UDIV: 0
VENTILATION MODE VENT: ABNORMAL
VENTRICULAR RATE, ECG03: 83 BPM
WBC # BLD AUTO: 3.8 K/UL (ref 4.1–11.1)

## 2019-02-09 PROCEDURE — C9113 INJ PANTOPRAZOLE SODIUM, VIA: HCPCS | Performed by: HOSPITALIST

## 2019-02-09 PROCEDURE — 85025 COMPLETE CBC W/AUTO DIFF WBC: CPT

## 2019-02-09 PROCEDURE — 85730 THROMBOPLASTIN TIME PARTIAL: CPT

## 2019-02-09 PROCEDURE — 74011250636 HC RX REV CODE- 250/636: Performed by: INTERNAL MEDICINE

## 2019-02-09 PROCEDURE — 94003 VENT MGMT INPAT SUBQ DAY: CPT

## 2019-02-09 PROCEDURE — 77030029684 HC NEB SM VOL KT MONA -A

## 2019-02-09 PROCEDURE — 82962 GLUCOSE BLOOD TEST: CPT

## 2019-02-09 PROCEDURE — 84100 ASSAY OF PHOSPHORUS: CPT

## 2019-02-09 PROCEDURE — 83605 ASSAY OF LACTIC ACID: CPT

## 2019-02-09 PROCEDURE — 80053 COMPREHEN METABOLIC PANEL: CPT

## 2019-02-09 PROCEDURE — 83735 ASSAY OF MAGNESIUM: CPT

## 2019-02-09 PROCEDURE — 74011250637 HC RX REV CODE- 250/637: Performed by: HOSPITALIST

## 2019-02-09 PROCEDURE — 94640 AIRWAY INHALATION TREATMENT: CPT

## 2019-02-09 PROCEDURE — 71045 X-RAY EXAM CHEST 1 VIEW: CPT

## 2019-02-09 PROCEDURE — 74011250636 HC RX REV CODE- 250/636: Performed by: HOSPITALIST

## 2019-02-09 PROCEDURE — 77030013140 HC MSK NEB VYRM -A

## 2019-02-09 PROCEDURE — 74011000250 HC RX REV CODE- 250: Performed by: INTERNAL MEDICINE

## 2019-02-09 PROCEDURE — 74011000258 HC RX REV CODE- 258: Performed by: HOSPITALIST

## 2019-02-09 PROCEDURE — 93005 ELECTROCARDIOGRAM TRACING: CPT

## 2019-02-09 PROCEDURE — 82803 BLOOD GASES ANY COMBINATION: CPT

## 2019-02-09 PROCEDURE — 74011636637 HC RX REV CODE- 636/637: Performed by: HOSPITALIST

## 2019-02-09 PROCEDURE — 36415 COLL VENOUS BLD VENIPUNCTURE: CPT

## 2019-02-09 PROCEDURE — 36430 TRANSFUSION BLD/BLD COMPNT: CPT

## 2019-02-09 PROCEDURE — 74011000250 HC RX REV CODE- 250: Performed by: FAMILY MEDICINE

## 2019-02-09 PROCEDURE — 74011000250 HC RX REV CODE- 250: Performed by: HOSPITALIST

## 2019-02-09 PROCEDURE — 85610 PROTHROMBIN TIME: CPT

## 2019-02-09 PROCEDURE — 65620000000 HC RM CCU GENERAL

## 2019-02-09 PROCEDURE — 94760 N-INVAS EAR/PLS OXIMETRY 1: CPT

## 2019-02-09 RX ORDER — HYDRALAZINE HYDROCHLORIDE 20 MG/ML
20 INJECTION INTRAMUSCULAR; INTRAVENOUS
Status: DISCONTINUED | OUTPATIENT
Start: 2019-02-09 | End: 2019-02-22 | Stop reason: HOSPADM

## 2019-02-09 RX ORDER — SIMETHICONE 80 MG
80 TABLET,CHEWABLE ORAL 2 TIMES DAILY
Status: DISCONTINUED | OUTPATIENT
Start: 2019-02-09 | End: 2019-02-22 | Stop reason: HOSPADM

## 2019-02-09 RX ADMIN — IPRATROPIUM BROMIDE AND ALBUTEROL SULFATE 3 ML: .5; 3 SOLUTION RESPIRATORY (INHALATION) at 11:44

## 2019-02-09 RX ADMIN — ACETYLCYSTEINE 400 MG: 200 SOLUTION ORAL; RESPIRATORY (INHALATION) at 20:59

## 2019-02-09 RX ADMIN — SODIUM CHLORIDE 3 ML/HR: 900 INJECTION, SOLUTION INTRAVENOUS at 21:18

## 2019-02-09 RX ADMIN — IPRATROPIUM BROMIDE AND ALBUTEROL SULFATE 3 ML: .5; 3 SOLUTION RESPIRATORY (INHALATION) at 21:00

## 2019-02-09 RX ADMIN — SODIUM CHLORIDE 40 MG: 9 INJECTION, SOLUTION INTRAMUSCULAR; INTRAVENOUS; SUBCUTANEOUS at 09:18

## 2019-02-09 RX ADMIN — Medication 10 ML: at 05:54

## 2019-02-09 RX ADMIN — LINEZOLID 600 MG: 2 INJECTION, SOLUTION INTRAVENOUS at 14:19

## 2019-02-09 RX ADMIN — PIPERACILLIN AND TAZOBACTAM 3.38 G: 3; .375 INJECTION, POWDER, FOR SOLUTION INTRAVENOUS at 12:04

## 2019-02-09 RX ADMIN — CHLORHEXIDINE GLUCONATE 15 ML: 1.2 RINSE ORAL at 09:18

## 2019-02-09 RX ADMIN — ACETYLCYSTEINE 400 MG: 200 SOLUTION ORAL; RESPIRATORY (INHALATION) at 08:41

## 2019-02-09 RX ADMIN — PROPOFOL 15 MCG/KG/MIN: 10 INJECTION, EMULSION INTRAVENOUS at 09:43

## 2019-02-09 RX ADMIN — ACETAMINOPHEN 650 MG: 325 TABLET ORAL at 03:33

## 2019-02-09 RX ADMIN — PROPOFOL 40 MCG/KG/MIN: 10 INJECTION, EMULSION INTRAVENOUS at 23:31

## 2019-02-09 RX ADMIN — Medication 10 ML: at 05:08

## 2019-02-09 RX ADMIN — INSULIN GLARGINE 5 UNITS: 100 INJECTION, SOLUTION SUBCUTANEOUS at 21:22

## 2019-02-09 RX ADMIN — ACETYLCYSTEINE: 200 SOLUTION ORAL; RESPIRATORY (INHALATION) at 16:37

## 2019-02-09 RX ADMIN — PIPERACILLIN AND TAZOBACTAM 3.38 G: 3; .375 INJECTION, POWDER, FOR SOLUTION INTRAVENOUS at 21:16

## 2019-02-09 RX ADMIN — Medication 10 ML: at 14:19

## 2019-02-09 RX ADMIN — Medication 10 ML: at 21:25

## 2019-02-09 RX ADMIN — PIPERACILLIN AND TAZOBACTAM 3.38 G: 3; .375 INJECTION, POWDER, FOR SOLUTION INTRAVENOUS at 03:42

## 2019-02-09 RX ADMIN — PROPOFOL 40 MCG/KG/MIN: 10 INJECTION, EMULSION INTRAVENOUS at 02:13

## 2019-02-09 RX ADMIN — PROPOFOL 30 MCG/KG/MIN: 10 INJECTION, EMULSION INTRAVENOUS at 17:24

## 2019-02-09 RX ADMIN — IPRATROPIUM BROMIDE AND ALBUTEROL SULFATE 3 ML: .5; 3 SOLUTION RESPIRATORY (INHALATION) at 08:41

## 2019-02-09 RX ADMIN — SODIUM CHLORIDE 5 ML/HR: 900 INJECTION, SOLUTION INTRAVENOUS at 21:17

## 2019-02-09 RX ADMIN — IPRATROPIUM BROMIDE AND ALBUTEROL SULFATE 3 ML: .5; 3 SOLUTION RESPIRATORY (INHALATION) at 16:37

## 2019-02-09 RX ADMIN — SODIUM CHLORIDE 5 ML/HR: 900 INJECTION, SOLUTION INTRAVENOUS at 21:18

## 2019-02-09 RX ADMIN — LINEZOLID 600 MG: 2 INJECTION, SOLUTION INTRAVENOUS at 01:12

## 2019-02-09 RX ADMIN — SIMETHICONE CHEW TAB 80 MG 80 MG: 80 TABLET ORAL at 17:56

## 2019-02-09 RX ADMIN — HYDRALAZINE HYDROCHLORIDE 20 MG: 20 INJECTION INTRAMUSCULAR; INTRAVENOUS at 17:17

## 2019-02-09 RX ADMIN — CHLORHEXIDINE GLUCONATE 15 ML: 1.2 RINSE ORAL at 21:21

## 2019-02-09 RX ADMIN — LEVOTHYROXINE SODIUM 18.75 MCG: 100 INJECTION, POWDER, LYOPHILIZED, FOR SOLUTION INTRAVENOUS at 12:05

## 2019-02-09 RX ADMIN — PROPOFOL 40 MCG/KG/MIN: 10 INJECTION, EMULSION INTRAVENOUS at 07:00

## 2019-02-09 NOTE — PROGRESS NOTES
Problem: Falls - Risk of 
Goal: *Absence of Falls Document Mark Hadley Fall Risk and appropriate interventions in the flowsheet. Outcome: Progressing Towards Goal 
Fall Risk Interventions: 
Mobility Interventions: Communicate number of staff needed for ambulation/transfer, Bed/chair exit alarm Mentation Interventions: Door open when patient unattended Medication Interventions: Evaluate medications/consider consulting pharmacy, Patient to call before getting OOB Elimination Interventions: Patient to call for help with toileting needs Problem: Pressure Injury - Risk of 
Goal: *Prevention of pressure injury Document Dom Scale and appropriate interventions in the flowsheet. Outcome: Progressing Towards Goal 
Pressure Injury Interventions: 
Sensory Interventions: Minimize linen layers, Monitor skin under medical devices Moisture Interventions: Check for incontinence Q2 hours and as needed Activity Interventions: Pressure redistribution bed/mattress(bed type), Increase time out of bed Mobility Interventions: Pressure redistribution bed/mattress (bed type) Nutrition Interventions: Discuss nutritional consult with provider Friction and Shear Interventions: HOB 30 degrees or less

## 2019-02-09 NOTE — INTERDISCIPLINARY ROUNDS
IDR/SLIDR Summary Patient: Jace Crow MRN: [id number]    Age: 72 y.o. YOB: 1945 Room/Bed: [address] [place] [address] Admit Diagnosis: Cardiac arrest [facility]) [I46.9]  Principal Diagnosis: Cardiac arrest ([facility] [address] Goals: stable VS, SAT/SBT Readmission:no Quality Measure: Not applicable VTE Prophylaxis: Mechanical 
Influenza Vaccine screening completed? YES Pneumococcal Vaccine screening completed? NO Mobility needs: Yes   Nutrition plan:No 
Consults:P.T, O.T., Speech, Respiratory and Case Management Financial concerns:Yes  Escalated to CM? YES 
RRAT Score: 39   Interventions:H2H Testing due for pt today? YES 
LOS: 5 days Expected length of stay 7? days Discharge plan: home   PCP: Bud Choudhary MD 
Transportation needs: Yes Days before discharge:two or more days before discharge Discharge disposition: Home Signed:  
 
Frances Russo RN 
2/9/2019 
11:26 PM

## 2019-02-09 NOTE — PROGRESS NOTES
Initial Pulmonary / Critical Care Consultation Impression Acute hypoxic resp failure LLL pna/atx with copious resp secretions Failed extibation 2/6/19 PEA arrest s/p PM 2/7/19 CRI, DM, AFIB, Gout Plan 
==== Cont CPAP trials Nebs with mucolmyst 
Not ready for extubation due to secretions Will bronch again Sun if not improved Sedation prn Glycemic control Disc with family at bedside, rt and ICU RN History / Subjective: 
Reason:  Respiratory failure Requesting Provider:  Dr Chidi Epstein 2/7 Seen and examined. Events noted. Mechanically ventilated. Gas exchange preserved. S/P permanent PM. On Chung, weaning down 2/6 Seen and examined earlier today. Did well with SBT with RSBI of 60 and was extubated. Subsequently developed confusion and ABG showed resp acidosis and he was placed on bipap. 2/5 Seen and examined. Sedated, on  protocol. CXR with mild bibasilar haziness- suspected atelectasis. Await rewarming 2/4 Noreen Hughes is a 72 y.o.  male who  has a past medical history of Acute encephalopathy (1/14/2016), Anemia (5/11/2017), Atrial fibrillation ([address] Painter esophagus, CAD (coronary artery disease), Diabetes ([facility] [address] Heart failure ([address] HTN, goal below 140/90, Retinopathy, diabetic, bilateral ([address] (3/11/2016), Stenosis of right carotid artery without cerebral infarction (1/18/2016), TIA (transient ischemic attack) (1/14/2016), and Vitamin D deficiency (3/1/2016). admitted 2/4/2019 with cardiac arrest 
 
Pt receiving infusion for gout when becam unresponsive PEA arrest intubated and given epi ROSC after approximately 10 minutes Head CT OK Placed on code ice protocol Not on vasopressors Has chronic afib. Current rhythm is afib Allergies Allergen Reactions  Solu-Medrol [Methylprednisolone Sodium Succ] Other (comments) Syncope / Cardiac Arrest  
 
Past Medical History:  
Diagnosis Date  Acute encephalopathy 1/14/2016  Anemia 5/11/2017  Atrial fibrillation ([facility] [address]  Painter esophagus  CAD (coronary artery disease)  Diabetes ([facility] [address]  Heart failure ([facility] [address] Cardiomyopathy, myocarditis  HTN, goal below 140/90  Retinopathy, diabetic, bilateral ([facility] [address] 3/11/2016  Stenosis of right carotid artery without cerebral infarction 1/18/2016  TIA (transient ischemic attack) 1/14/2016  Vitamin D deficiency 3/1/2016 Nephrology ordered and follow 2/22/16 Past Surgical History:  
Procedure Laterality Date  COLONOSCOPY N/A 6/23/2016 COLONOSCOPY performed by Antionette Hernández MD at [facility] ENDOSCOPY  COLONOSCOPY N/A 8/6/2018 COLONOSCOPY performed by Antionette Hernández MD at [facility] ENDOSCOPY  COLONOSCOPY N/A 8/30/2018 COLONOSCOPY performed by Praneeth Joe MD at [address] [place] [address] HX ENDOSCOPY  06/27/2016 CAPSULE; normal small bowel  HX ENDOSCOPY  06/23/2016  
 upper endoscopy  HX UROLOGICAL  2013  [place] INS NEW/RPLC PRM PACEMAKER W/TRANSV ELTRD VENTR N/A 2/7/2019 INSERT PPM SINGLE VENTRICULAR performed by Cindy Varghese MD at [place] [address], [address] CATH LAB Prior to Admission medications Medication Sig Start Date End Date Taking? Authorizing Provider  
insulin glargine (LANTUS,BASAGLAR) 100 unit/mL (3 mL) inpn 14 Units by SubCUTAneous route nightly. 1/21/19  Yes Dirk Scruggs MD  
ELIQUIS 5 mg tablet TAKE 1 TABLET BY MOUTH TWICE A DAY 11/9/18  Yes Provider, Historical  
pegloticase (KRYSTEXXA) 8 mg/mL soln injection 8 mg by IntraVENous route Once every 2 weeks. Yes Provider, Historical  
digoxin (LANOXIN) 0.125 mg tablet Take 0.125 mg by mouth. Pt takes daily x 2 days, then skips a day and repeats this schedule. Yes Provider, Historical  
dextran 70-hypromellose (ARTIFICIAL TEARS,[id number],) ophthalmic solution Administer 1 Drop to both eyes as needed. Yes Provider, Historical  
hydrALAZINE (APRESOLINE) 25 mg tablet Take 25 mg by mouth three (3) times daily.  5/20/16  Yes Provider, Historical  
 multivitamin (ONE A DAY) tablet Take 1 Tab by mouth daily. Yes Provider, Historical  
pravastatin (PRAVACHOL) 20 mg tablet Take 1 Tab by mouth nightly. 16  Yes Balaji Guajardo NP  
carvedilol (COREG) 3.125 mg tablet Take 1 Tab by mouth two (2) times daily (with meals). 16  Yes Balaji Guajardo NP  
levothyroxine (SYNTHROID) 25 mcg tablet TAKE 1 TABLET BY MOUTH EVERY DAY BEFORE BREAKFAST 19   Mariano Payne MD  
leflunomide (ARAVA) 20 mg tablet Take 20 mg by mouth every evening. Provider, Historical  
furosemide (LASIX) 40 mg tablet Take 40 mg by mouth daily. 5/10/16   Provider, Historical  
omeprazole (PRILOSEC) 20 mg capsule Take 20 mg by mouth nightly. Provider, Historical  
  
Family History Problem Relation Age of Onset  Heart Failure Mother  Diabetes Father  No Known Problems Sister  Other Daughter Jesse Acuña  Other Daughter Jesse Acuña  Other Daughter Jesse Acuña Social History Tobacco Use  Smoking status: Never Smoker  Smokeless tobacco: Never Used Substance Use Topics  Alcohol use: Yes Comment: 2 drinks/week, never a heavy drinker ROS:  Review of systems not obtained due to patient factors. Objective: 
Patient Vitals for the past 4 hrs: 
 BP Temp Pulse Resp SpO2  
19 1100 147/76  94 17 98 % 19 1017   97 24 97 % 19 1000 135/82  86 14 94 % 19 0956   95 20 98 % 19 0900 150/81  86 22 99 % 19 0843   91 22 99 % 19 0842     98 % 19 0800 148/74 100.2 °F (37.9 °C) 88 22 98 % Temp (24hrs), Av.7 °F (37.6 °C), Min:99.1 °F (37.3 °C), Max:100.4 °F (38 °C) CVP:       
 
Intake/Output Summary (Last 24 hours) at 2019 1129 Last data filed at 2019 1000 Gross per 24 hour Intake 2178.46 ml Output 1750 ml Net 428.46 ml Blood Sugar: 
Glucose Date Value Ref Range Status 2019 204 (H) 65 - 100 mg/dL Final  
 02/08/2019 156 (H) 65 - 100 mg/dL Final  
02/07/2019 225 (H) 65 - 100 mg/dL Final  
02/07/2019 224 (H) 65 - 100 mg/dL Final  
02/07/2019 203 (H) 65 - 100 mg/dL Final  
 
Exam: 
Sedate Oral ett 
 moves all extremities spontaneously No accessory use Symmetrical chest expansion Scattered rhonchi 
irreg Soft Warm and dry No edema Lab data was reviewed. Radiology images were independently viewed and available reports were reviewed. CXR - ETT, no acute process Lab: 
Recent Labs 02/09/19 
0348 02/09/19 
0344 02/08/19 
1659 02/08/19 
0414 02/07/19 
1217 02/07/19 
0423 02/07/19 
0001  02/06/19 
1908 WBC  --  3.8*  --  5.6  --  8.3 11.6*  --   --   
HGB  --  8.5* 6.8* 7.4*  --  8.3* 8.8*  --   --   
PLT  --  68*  --  83*  --  106* 152  --   --   
NA  --  144  --  143 141 140 140  --   --   
K  --  3.6  --  3.5 4.0 4.4 4.2  --   --   
CL  --  110*  --  108 105 104 102  --   --   
CO2  --  22  --  21 23 23 20*  --   --   
BUN  --  67*  --  72* 77* 74* 71*  --   --   
CREA  --  2.70*  --  3.13* 3.42* 3.64* 3.75*  --   --   
GLU  --  204*  --  156* 225* 224* 203*  --   --   
CA  --  8.0*  --  7.5* 7.5* 7.6* 7.6*  --   --   
MG  --  2.7*  --  2.6*  --  2.6* 2.6*  --   --   
PHOS  --  3.7  --   --   --   --  7.7*  --   --   
INR  --  1.2*  --  1.4*  --  1.3*  --   --   --   
TBILI  --  1.3*  --  1.0  --  0.9 1.1*   < >  --   
SGOT  --  106*  --  152*  --  114* 99*   < >  --   
LAC 0.9  --   --   --   --  2.5*  --   --  1.7  
 < > = values in this interval not displayed. ABG: 
Recent Labs 02/09/19 
1035 02/07/19 
0631 02/06/19 2059 PHI 7.456* 7.397 7.237* PCO2I 36.8 44.8 53.5*  
PO2I 115* 401* 74* HCO3I 25.9 27.5* 22.8 SO2I 99* 100* 91* FIO2I 30 100 80 All Micro Results Procedure Component Value Units Date/Time CULTURE, RESPIRATORY/SPUTUM/BRONCH Nancy Martinez [[id number]] Collected:  02/08/19 0934  Order Status:  Completed Specimen:  Sputum from Bronchial Washing Updated:  02/09/19 1104 Special Requests: NO SPECIAL REQUESTS     
  GRAM STAIN 2+ WBCS SEEN     
   NO DEFINITE ORGANISM SEEN Culture result:    
  LIGHT NORMAL RESPIRATORY [place]ER[place]  
     
 CULTURE, BLOOD, PAIRED [[id number]] Collected:  02/08/19 0116 Order Status:  Completed Specimen:  Blood Updated:  02/09/19 0543 Special Requests: NO SPECIAL REQUESTS Culture result: NO GROWTH AFTER 23 HOURS     
 CULTURE, BLOOD, PAIRED [[id number]] Collected:  02/04/19 1608 Order Status:  Completed Specimen:  Blood Updated:  02/09/19 0543 Special Requests: NO SPECIAL REQUESTS Culture result: NO GROWTH 5 DAYS     
 CULTURE, BLOOD [[id number]] Collected:  02/06/19 1902 Order Status:  Completed Specimen:  Blood Updated:  02/09/19 0543 Special Requests: NO SPECIAL REQUESTS Culture result: NO GROWTH 3 DAYS     
 CULTURE, URINE [[id number]] Collected:  02/07/19 0021 Order Status:  Completed Specimen:  Urine from [place] Specimen Updated:  02/08/19 7470 Special Requests: NO SPECIAL REQUESTS [place] Count <1,000 CFU/ML Culture result: NO GROWTH 1 DAY     
 CULTURE, RESPIRATORY/SPUTUM/BRONCH Lorretta Dice [[id number]] Collected:  02/07/19 0930 Order Status:  Canceled Specimen:  Sputum,ET Suction Deangelo Molina MD

## 2019-02-09 NOTE — PROGRESS NOTES
Infectious Diseases Progress Note Antibiotic Summary: 
Vancomycin 2/6 x 1 dose Zosyn   -- present Zyvox   -- present Subjective: He remains intubated and is on propofol but can nod yes/no and follow commands Objective:  
 
Vitals:  
Visit Vitals /47 Pulse 79 Temp 99.1 °F (37.3 °C) Resp 22 Ht 5' 10\" (1.778 m) Wt 83.1 kg (183 lb 3.2 oz) SpO2 100% BMI 26.29 kg/m² Tmax:  Temp (24hrs), Av.4 °F (38 °C), Min:99.1 °F (37.3 °C), Max:101.3 °F (38.5 °C) Exam:  General appearance: no distress Eyes: PERRL Throat: ETT and orogastric tube Neck: supple Lungs: bilateral posterior rales Heart: irregularly irregular rhythm with intermittent pacing Abdomen: soft, not distended, non-tender. Bowel sounds hypoactive Extremities: no edema; feet and hands warm; good pedal pulses Neurologic: responds appropriately to questions and commands IV Lines: Left femoral CVL inserted 2019 Right radial A-line inserted 2019 Labs:   
Recent Labs 19 
1659 19 
4877 19 
1217 19 
0423 19 
0001 19 
1803 19 
0601 WBC  --  5.6  --  8.3 11.6* 14.4* 9.3 HGB 6.8* 7.4*  --  8.3* 8.8* 9.0* 8.2*  
PLT  --  83*  --  106* 152 152 107* BUN  --  72* 77* 74* 71* 65* 61* CREA  --  3.13* 3.42* 3.64* 3.75* 3.46* 2.50* TBILI  --  1.0  --  0.9 1.1*  --   --   
SGOT  --  152*  --  114* 99*  --   --   
AP  --  64  --  72 76  --   --   
 
Urine culture  = NG 
 
BLOOD CULTURES: 
  = NGSF 
  = NGSF 
  = pending Bronchoscopy sample : 
 Gram stain = 2+ WBCs; NOS 
 culture = pending CXR  reviewed = continues to worsen on left with diffuse haziness and probable volume loss c/w atelectasis +/- pneumonia +/- effusion Assessment: 1. Fever -- The cause of the fever is not certain but CXR has gotten progressively worse throughout the left lung.  The visual findings at bronch noted with diffuse mucus impactions bilaterally. Gram stain of the bronch specimen showed 2+ WBCs but no organisms. The culture is pending. I will continue broad spectrum antibiotics for possible pneumonia. I stopped Vanc today (he received only 1 dose on 2/6) and began Zyvox for MRSA coverage 
  
2. OHD -- cardiomyopathy and hx of atrial fib -- S/P initial arrest with Code Ice and several subsequent episodes of PEA 
  
3. NIDDM 
  
4. CVD 
  
5. HTN 
  
6. GOUT Plan: 1. Continue Zosyn + Zyvox Discussed at length with his daughter and showed her his CXR films Marcio Caldwell MD

## 2019-02-09 NOTE — PROGRESS NOTES
Problem: Falls - Risk of 
Goal: *Absence of Falls Document Krishnasanjivestella Reese Fall Risk and appropriate interventions in the flowsheet. Outcome: Progressing Towards Goal 
Fall Risk Interventions: 
Mobility Interventions: Communicate number of staff needed for ambulation/transfer, Patient to call before getting OOB, PT Consult for assist device competence, Strengthening exercises (ROM-active/passive) Mentation Interventions: Door open when patient unattended, Adequate sleep, hydration, pain control, Increase mobility, More frequent rounding, Reorient patient, Room close to nurse's station, Update white board Medication Interventions: Evaluate medications/consider consulting pharmacy Elimination Interventions: Call light in reach, Patient to call for help with toileting needs, Toileting schedule/hourly rounds Problem: Pressure Injury - Risk of 
Goal: *Prevention of pressure injury Document Dom Scale and appropriate interventions in the flowsheet. Outcome: Progressing Towards Goal 
Pressure Injury Interventions: 
Sensory Interventions: Assess changes in LOC, Discuss PT/OT consult with provider, Keep linens dry and wrinkle-free, Maintain/enhance activity level, Minimize linen layers, Turn and reposition approx. every two hours (pillows and wedges if needed) Moisture Interventions: Apply protective barrier, creams and emollients, Internal/External urinary devices, Limit adult briefs, Maintain skin hydration (lotion/cream) Activity Interventions: Increase time out of bed, Pressure redistribution bed/mattress(bed type), PT/OT evaluation Mobility Interventions: Float heels, Pressure redistribution bed/mattress (bed type), Turn and reposition approx. every two hours(pillow and wedges) Nutrition Interventions: Document food/fluid/supplement intake, Discuss nutritional consult with provider Friction and Shear Interventions: Apply protective barrier, creams and emollients, Lift sheet, Minimize layers

## 2019-02-09 NOTE — PROGRESS NOTES
0740 - Bedside and Verbal shift change report given to Wanda Brush RN (oncoming nurse) by Oanh Wheat, RN (offgoing nurse). Report included the following information SBAR, MAR, Recent Results and Cardiac Rhythm paced / atrial fibrillation. 2166 - Propofol turned off for SAT and SBT in progress. 1030 - Patient tolerated SBT well, ABG obtained by RT and Dr. Starr Rodriguez paged with results. 200 - Dr. Starr Rodriguez in to see patient. Due to patient's worsened CXR, will hold off on extubating patient today. 1140 - Removed left femoral ICE catheter. Patient tolerated well and no bleeding noted to site. 1800 - Tube feeding started. Please turn off @ 0300 per request of Dr. Starr Rodriguez for AM SBT. 1930 - Bedside and Verbal shift change report given to Oanh Wheat RN (oncoming nurse) by Wanda Brush RN (offgoing nurse). Report included the following information SBAR, MAR, Recent Results and Cardiac Rhythm atrial fibrillation / paced.

## 2019-02-09 NOTE — PROGRESS NOTES
Name: David Jiménez MRN: [id number] : 1945 Assessment: 
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 DM-2 Cardiac arrest; PEA- ? etiology. Code ice protocol; recurrence of niurka arrest on . S/p PPM 
Gout PANCYTOPENIA,chronic A Resp failure- reintubated; s/p bronch today with copious secretions Gout: was on pegloticase Plan/Recommendations: 
Creat better Broaden work up if labs stop improving AB Subjective: On vent,blood tinged lung asp Exam: 
Visit Vitals /76 Pulse 93 Temp 100.2 °F (37.9 °C) Resp 13 Ht 5' 10\" (1.778 m) Wt 83.2 kg (183 lb 6.8 oz) SpO2 97% BMI 26.32 kg/m² NAD 
+ETT 
 decrease BS Labs/Data: 
 
Lab Results Component Value Date/Time WBC 3.8 (L) 2019 03:44 AM  
 Hemoglobin (POC) 6.2 2018 02:17 PM  
 HGB 8.5 (L) 2019 03:44 AM  
 Hematocrit (POC) 32 (L) 2019 12:23 PM  
 HCT 27.2 (L) 2019 03:44 AM  
 PLATELET 68 (L)  03:44 AM  
 MCV 89.5 2019 03:44 AM  
 
 
Lab Results Component Value Date/Time Sodium 144 2019 03:44 AM  
 Potassium 3.6 2019 03:44 AM  
 Chloride 110 (H) 2019 03:44 AM  
 CO2 22 2019 03:44 AM  
 Anion gap 12 2019 03:44 AM  
 Glucose 204 (H) 2019 03:44 AM  
 BUN 67 (H) 2019 03:44 AM  
 Creatinine 2.70 (H) 2019 03:44 AM  
 BUN/Creatinine ratio 25 (H) 2019 03:44 AM  
 GFR est AA 28 (L) 2019 03:44 AM  
 GFR est non-AA 23 (L) 2019 03:44 AM  
 Calcium 8.0 (L) 2019 03:44 AM  
 
 
Wt Readings from Last 3 Encounters:  
19 83.2 kg (183 lb 6.8 oz) 19 81.6 kg (180 lb) 19 81.6 kg (180 lb) Intake/Output Summary (Last 24 hours) at 2019 1221 Last data filed at 2019 1000 Gross per 24 hour Intake 2161.16 ml Output 1705 ml Net 456.16 ml Discussed with pts wife Erik Abreu MD

## 2019-02-09 NOTE — PROGRESS NOTES
2000 Received report from Olga and [facility] care. VSS, no signs of pain. Family at bedside. 2100 PRBC transfusion started. 0000 2nd unit of PRBC started. 0400 Labs drawn. 0500 Portable chest xray completed. SAT passed, able to knod appropriately and follow commands. 0730 Bedside and Verbal shift change report given to Olga (oncoming nurse) by Mary Singh (offgoing nurse). Report included the following information SBAR, Kardex, Intake/Output, MAR, Recent Results and Alarm Parameters .

## 2019-02-09 NOTE — PROGRESS NOTES
Hospitalist Progress Note Jung Smyth MD 
Answering service: [phone] OR [phone] from in house phone Date of Service:  2019 NAME:  Asah Webster :  1945 MRN:  [id number] Admission Summary:  
The patient is a 70-year-old gentleman with past medical history of anemia, atrial fibrillation, Painter's esophagus, coronary artery disease, diabetes, heart failure, cardiomyopathy, myocarditis, history of hypertension, diabetic retinopathy, right carotid artery stenosis, TIA and vitamin D deficiency who presents to the hospital from [facility]. Patient presented to [address] [place] [address] today apparently in PEA. The history was primarily obtained from the ER physician as the patient is intubated and his wife is not present at the bedside. Interval history / Subjective:  
Remains on vent awake SBT per PCCM He can communicate through pointing Copious secretions prohibitive for extubation Had a discussion with all members of the family and wife and answered all questions Assessment & Plan: 1. AHRF status post cardiac arrest/pulseless electrical activity POA  
- Patient initially on hypothermic protocol and started to re warm now normothermic 
- ECHO - EF WNL 
- Cycle troponins. - trending down may be from CPR  
- EEG. non specific no seizure activity 
- Bradycardia resolved but coded again after extubation on  multiple times finally got a pacer thinking SSS 
- re intubated for airway remains intubated today - Copious secretions prohibitive for extubation  
- he is on vanc and zyvox ID following 2. Diabetes. The patient will be on sliding scale NovoLog insulin and Accu-Cheks, diet control and close monitoring BS at ICU goal 
 
3. Hypothyroidism.- Put patient on IV Synthroid. 4. CKD stage 3  
- worsen from hypotension from CPR / cardiac arrest? 
- nephrology on board - cr improving 3.7->3.6-> 3.4-> 3.1--> 2.7 no need for CVVH for now 5. Chronic a.fib Slow rate currently - On Eliquis will resume once can swallow 6  Anemia, appears to be chronic.   
- Monitor H and H. Further intervention per hospital course hgb 7.2 
- s/p 2 unit PRBC 2/8  hgb > 8 Audrey Carlson 7. Fever -- The cause of the fever is not certain but CXR has gotten progressively worse at the left base and he is at risk for aspiration 
- on zyvox and zosyn 
- urine culture negative 8. Gastrointestinal PPX added simethicone 9. Low plt from the biologic agents? plt > 68 10. Abnormal LFT's from shock liver? 11. Coagulopathy from? Shock liver INR 1.4 Code status: Full DVT prophylaxis: SCD Prognosis Guarded Care Plan discussed with: Patient/Family and Nurse Disposition: TBD d/w family at bedside Critically ill may detoriorate Hospital Problems  Date Reviewed: 2/4/2019 Codes Class Noted POA * (Principal) Cardiac arrest [facility]) ICD-10-CM: I46.9 ICD-9-CM: 427.5  2/4/2019 Unknown Review of Systems:  
Review of systems not obtained due to patient factors. Vital Signs:  
 Last 24hrs VS reviewed since prior progress note. Most recent are: 
Visit Vitals /78 (BP 1 Location: Right arm, BP Patient Position: At rest) Pulse 95 Temp 99.9 °F (37.7 °C) Resp 13 Ht 5' 10\" (1.778 m) Wt 83.2 kg (183 lb 6.8 oz) SpO2 98% BMI 26.32 kg/m² Intake/Output Summary (Last 24 hours) at 2/9/2019 1236 Last data filed at 2/9/2019 1200 Gross per 24 hour Intake 2161.16 ml Output 1870 ml Net 291.16 ml Physical Examination:  
 
 
     
Constitutional:  Intubated and awake ENT:  MMM Resp:  CTA   
CV:  Regular rhythm, ,bradycardia GI:  Soft, non distended, non tender. bs+ Musculoskeletal:  No edema, warm, 2+ pulses throughout Neurologic:  Sedated, folay cath + A line + Data Review:  
 I personally reviewed  Image and labs Ct Head Wo Cont Result Date: 2/4/2019 IMPRESSION: No acute intracranial hemorrhage, mass or infarct. Xr Chest [facility] Result Date: 2/9/2019 IMPRESSION: No interval change. Xr Chest [facility] Result Date: 2/8/2019 IMPRESSION: ET tube is in satisfactory position. There is slight increasing haziness overlying left hemithorax with opacification left base consistent with left basilar atelectasis /airspace disease and left effusion. Xr Chest [facility] Result Date: 2/7/2019 IMPRESSION: Left lung base opacification. No significant change. Xr Chest [facility] Result Date: 2/6/2019 IMPRESSION: ET tube in satisfactory position. Worsening aeration of the left lung. Xr Chest [facility] Result Date: 2/6/2019 Impression: Stable bilateral lower lobe atelectasis. Xr Chest [facility] Result Date: 2/5/2019 IMPRESSION: No significant change. Xr Chest [facility] Result Date: 2/4/2019 IMPRESSION: Endotracheal tube appears to be in satisfactory position. Xr Abd Port  1 V Result Date: 2/6/2019 IMPRESSION: NG tube in the stomach. Xr Abd Port  1 V Result Date: 2/4/2019 IMPRESSION: NG tube in appropriate position. Labs:  
 
Recent Labs 02/09/19 
9922 02/08/19 
1659 02/08/19 
1957 WBC 3.8*  --  5.6 HGB 8.5* 6.8* 7.4* HCT 27.2* 22.0* 24.0*  
PLT 68*  --  83* Recent Labs 02/09/19 
0344 02/08/19 
0414 02/07/19 
1217 02/07/19 
0423 02/07/19 
0001  143 141 140 140  
K 3.6 3.5 4.0 4.4 4.2 * 108 105 104 102 CO2 22 21 23 23 20* BUN 67* 72* 77* 74* 71* CREA 2.70* 3.13* 3.42* 3.64* 3.75* * 156* 225* 224* 203* CA 8.0* 7.5* 7.5* 7.6* 7.6*  
MG 2.7* 2.6*  --  2.6* 2.6* PHOS 3.7  --   --   --  7.7* Recent Labs 02/09/19 2106 02/08/19 
5641 02/07/19 
0423 SGOT 106* 152* 114* * 126* 64  
AP 76 64 72 TBILI 1.3* 1.0 0.9 TP 5.9* 5.8* 6.1* ALB 2.3* 2.4* 2.9*  
GLOB 3.6 3.4 3.2 Recent Labs 02/09/19 2070 02/08/19 
5501 02/07/19 
0423 INR 1.2* 1.4* 1.3* PTP 12.3* 13.5* 13.0* APTT 30.8 33.0* 29.4 No results for input(s): FE, TIBC, PSAT, FERR in the last 72 hours. No results found for: FOL, RBCF No results for input(s): PH, PCO2, PO2 in the last 72 hours. No results for input(s): CPK, CKNDX, TROIQ in the last 72 hours. No lab exists for component: CPKMB Lab Results Component Value Date/Time Cholesterol, total 135 02/23/2018 11:31 AM  
 HDL Cholesterol 34 (L) 02/23/2018 11:31 AM  
 LDL, calculated 79 02/23/2018 11:31 AM  
 Triglyceride 111 02/23/2018 11:31 AM  
 CHOL/HDL Ratio 5.0 01/15/2016 03:49 AM  
 
Lab Results Component Value Date/Time Glucose (POC) 159 (H) 02/09/2019 12:22 PM  
 Glucose (POC) 196 (H) 02/09/2019 05:04 AM  
 Glucose (POC) 188 (H) 02/08/2019 09:49 PM  
 Glucose (POC) 214 (H) 02/08/2019 06:01 PM  
 Glucose (POC) 174 (H) 02/08/2019 12:14 PM  
 
Lab Results Component Value Date/Time Color YELLOW/STRAW 02/04/2019 04:08 PM  
 Appearance CLOUDY (A) 02/04/2019 04:08 PM  
 Specific gravity 1.012 02/04/2019 04:08 PM  
 pH (UA) 5.0 02/04/2019 04:08 PM  
 Protein 30 (A) 02/04/2019 04:08 PM  
 Glucose NEGATIVE  02/04/2019 04:08 PM  
 Ketone TRACE (A) 02/04/2019 04:08 PM  
 Bilirubin NEGATIVE  02/04/2019 04:08 PM  
 Urobilinogen 0.2 02/04/2019 04:08 PM  
 Nitrites NEGATIVE  02/04/2019 04:08 PM  
 Leukocyte Esterase NEGATIVE  02/04/2019 04:08 PM  
 Epithelial cells FEW 02/04/2019 04:08 PM  
 Bacteria NEGATIVE  02/04/2019 04:08 PM  
 WBC 10-20 02/04/2019 04:08 PM  
 RBC  02/04/2019 04:08 PM  
 
 
 
Medications Reviewed:  
 
Current Facility-Administered Medications Medication Dose Route Frequency  simethicone (MYLICON) tablet 80 mg  80 mg Oral BID  sodium chloride (NS) flush 5-40 mL  5-40 mL IntraVENous Q8H  
 sodium chloride (NS) flush 5-40 mL  5-40 mL IntraVENous PRN  
 albuterol-ipratropium (DUO-NEB) 2.5 MG-0.5 MG/3 ML  3 mL Nebulization QID RT  
  acetylcysteine (MUCOMYST) 200 mg/mL (20 %) solution 400 mg  2 mL Nebulization TID RT  
 linezolid in dextrose 5% (ZYVOX) IVPB premix in D5W 600 mg  600 mg IntraVENous Q12H  
 0.9% sodium chloride infusion 250 mL  250 mL IntraVENous PRN  
 0.9% sodium chloride infusion  3 mL/hr IntraVENous CONTINUOUS  
 0.9% sodium chloride infusion  5 mL/hr IntraVENous CONTINUOUS  
 morphine injection 2 mg  2 mg IntraVENous Q6H PRN  
 insulin glargine (LANTUS) injection 5 Units  5 Units SubCUTAneous QHS  acetaminophen (TYLENOL) tablet 650 mg  650 mg Oral Q6H PRN  
 bacitracin 500 unit/gram packet 1 Packet  1 Packet Topical PRN  piperacillin-tazobactam (ZOSYN) 3.375 g in 0.9% sodium chloride (MBP/ADV) 100 mL  3.375 g IntraVENous Q8H  propofol (DIPRIVAN) infusion  0-50 mcg/kg/min IntraVENous TITRATE  isoproterenol (ISUPREL) 1 mg in 0.9% sodium chloride 100 mL infusion  0-10 mcg/min IntraVENous TITRATE  EPINEPHrine (ADRENALIN) 5 mg in 0.9% sodium chloride 250 mL infusion  1-10 mcg/min IntraVENous TITRATE  PHENYLephrine (GINA-SYNEPHRINE) 100 mg in 0.9% sodium chloride 250 mL infusion   mcg/min IntraVENous TITRATE  pantoprazole (PROTONIX) 40 mg in sodium chloride 0.9% 10 mL injection  40 mg IntraVENous DAILY  sodium chloride (NS) flush 5-40 mL  5-40 mL IntraVENous Q8H  
 sodium chloride (NS) flush 5-40 mL  5-40 mL IntraVENous PRN  chlorhexidine (PERIDEX) 0.12 % mouthwash 15 mL  15 mL Oral Q12H  
 sodium chloride (NS) flush 5-40 mL  5-40 mL IntraVENous Q8H  
 sodium chloride (NS) flush 5-40 mL  5-40 mL IntraVENous PRN  
 glucose chewable tablet 16 g  4 Tab Oral PRN  
 dextrose (D50W) injection syrg 12.5-25 g  25-50 mL IntraVENous PRN  
 glucagon (GLUCAGEN) injection 1 mg  1 mg IntraMUSCular PRN  
 insulin lispro (HUMALOG) injection   SubCUTAneous Q6H  
 levothyroxine (SYNTHROID) injection 18.75 mcg  18.75 mcg IntraVENous Q24H  0.9% sodium chloride infusion  5 mL/hr IntraVENous CONTINUOUS  
 
______________________________________________________________________ EXPECTED LENGTH OF STAY: 2d 0h 
ACTUAL LENGTH OF STAY:          5 Kyle Amor MD

## 2019-02-09 NOTE — PROGRESS NOTES
[facility] Cardiology Progress Note Date of service: 2/9/2019 Subjective: 
Mr Dex Baker remains intubated and ventilated Off [place]ung Objective: 
 
Visit Vitals /81 Pulse 86 Temp 100.2 °F (37.9 °C) Resp 22 Ht 5' 10\" (1.778 m) Wt 83.2 kg (183 lb 6.8 oz) SpO2 99% BMI 26.32 kg/m² Physical Exam  
Constitutional: He appears well-developed and well-nourished. He is intubated. HENT:  
Head: Normocephalic and atraumatic. Eyes: Conjunctivae are normal. No scleral icterus. Neck: No JVD present. Cardiovascular: Normal rate. An irregularly irregular rhythm present. Exam reveals no gallop. Murmur heard. Early systolic murmur is present with a grade of 2/6. Pulmonary/Chest: Effort normal and breath sounds normal. No stridor. He is intubated. No respiratory distress. He has no wheezes. He has no rales. Abdominal: Soft. He exhibits no distension. Musculoskeletal: He exhibits no edema or deformity. Neurological:  
Sedated Skin: Skin is warm and dry. Data reviewed: 
Recent Results (from the past 12 hour(s)) GLUCOSE, POC Collection Time: 02/08/19  9:49 PM  
Result Value Ref Range Glucose (POC) 188 (H) 65 - 100 mg/dL Performed by Luis Fernando Siegel PROTHROMBIN TIME + INR Collection Time: 02/09/19  3:44 AM  
Result Value Ref Range INR 1.2 (H) 0.9 - 1.1 Prothrombin time 12.3 (H) 9.0 - 11.1 sec PTT Collection Time: 02/09/19  3:44 AM  
Result Value Ref Range aPTT 30.8 22.1 - 32.0 sec  
 aPTT, therapeutic range     58.0 - 77.0 SECS  
CBC WITH AUTOMATED DIFF Collection Time: 02/09/19  3:44 AM  
Result Value Ref Range WBC 3.8 (L) 4.1 - 11.1 K/uL  
 RBC 3.04 (L) 4.10 - 5.70 M/uL HGB 8.5 (L) 12.1 - 17.0 g/dL HCT 27.2 (L) 36.6 - 50.3 % MCV 89.5 80.0 - 99.0 FL  
 MCH 28.0 26.0 - 34.0 PG  
 MCHC 31.3 30.0 - 36.5 g/dL  
 RDW 14.6 (H) 11.5 - 14.5 % PLATELET 68 (L) 538 - 400 K/uL MPV 11.2 8.9 - 12.9 FL  
 NRBC 0.0 0  WBC ABSOLUTE NRBC 0.00 0.00 - 0.01 K/uL NEUTROPHILS 77 (H) 32 - 75 % BAND NEUTROPHILS 3 0 - 6 % LYMPHOCYTES 10 (L) 12 - 49 % MONOCYTES 9 5 - 13 % EOSINOPHILS 1 0 - 7 % BASOPHILS 0 0 - 1 % IMMATURE GRANULOCYTES 0 %  
 ABS. NEUTROPHILS 3.1 1.8 - 8.0 K/UL  
 ABS. LYMPHOCYTES 0.4 (L) 0.8 - 3.5 K/UL  
 ABS. MONOCYTES 0.3 0.0 - 1.0 K/UL  
 ABS. EOSINOPHILS 0.0 0.0 - 0.4 K/UL  
 ABS. BASOPHILS 0.0 0.0 - 0.1 K/UL  
 ABS. IMM. GRANS. 0.0 K/UL  
 DF MANUAL PLATELET COMMENTS Large Platelets RBC COMMENTS OVALOCYTES PRESENT 
    
 RBC COMMENTS POLYCHROMASIA PRESENT 
    
METABOLIC PANEL, COMPREHENSIVE Collection Time: 02/09/19  3:44 AM  
Result Value Ref Range Sodium 144 136 - 145 mmol/L Potassium 3.6 3.5 - 5.1 mmol/L Chloride 110 (H) 97 - 108 mmol/L  
 CO2 22 21 - 32 mmol/L Anion gap 12 5 - 15 mmol/L Glucose 204 (H) 65 - 100 mg/dL BUN 67 (H) 6 - 20 MG/DL Creatinine 2.70 (H) 0.70 - 1.30 MG/DL  
 BUN/Creatinine ratio 25 (H) 12 - 20 GFR est AA 28 (L) >60 ml/min/1.73m2 GFR est non-AA 23 (L) >60 ml/min/1.73m2 Calcium 8.0 (L) 8.5 - 10.1 MG/DL Bilirubin, total 1.3 (H) 0.2 - 1.0 MG/DL  
 ALT (SGPT) 120 (H) 12 - 78 U/L  
 AST (SGOT) 106 (H) 15 - 37 U/L Alk. phosphatase 76 45 - 117 U/L Protein, total 5.9 (L) 6.4 - 8.2 g/dL Albumin 2.3 (L) 3.5 - 5.0 g/dL Globulin 3.6 2.0 - 4.0 g/dL A-G Ratio 0.6 (L) 1.1 - 2.2 MAGNESIUM Collection Time: 02/09/19  3:44 AM  
Result Value Ref Range Magnesium 2.7 (H) 1.6 - 2.4 mg/dL PHOSPHORUS Collection Time: 02/09/19  3:44 AM  
Result Value Ref Range Phosphorus 3.7 2.6 - 4.7 MG/DL  
LACTIC ACID Collection Time: 02/09/19  3:48 AM  
Result Value Ref Range Lactic acid 0.9 0.4 - 2.0 MMOL/L  
GLUCOSE, POC Collection Time: 02/09/19  5:04 AM  
Result Value Ref Range Glucose (POC) 196 (H) 65 - 100 mg/dL Performed by Hugo Meléndez EKG, 12 LEAD, INITIAL  Collection Time: 02/09/19  5:10 AM  
 Result Value Ref Range Ventricular Rate 83 BPM  
 Atrial Rate 0 BPM  
 QRS Duration 138 ms Q-T Interval 434 ms QTC Calculation (Bezet) 509 ms Calculated R Axis 44 degrees Calculated T Axis -162 degrees Diagnosis Atrial fibrillation with premature ventricular or aberrantly conducted  
complexes Right bundle branch block Marked T wave abnormality, consider inferolateral ischemia When compared with ECG of 06-FEB-2019 04:26, 
Right bundle branch block has replaced Incomplete right bundle branch block Cultures NGTD Assessment: 1. Cardiac arrest. PEA. With events of 2/6 it now seems like that his underlying mechanism may have been bradyarrhythmia from SSS.  
  
2. SSS: s/p PPM 2/7. 
  
3. Acute respiratory failure S/p cardiac arrest. Fevers. May have some element of aspiration pneumonia 4. Hypotension: resolved - now off pressors 
  
5. Chronic a.fib - rate controlled currently 
  
6. CKD stage III Renal indices are improving 
  
7. Anemia, probably from CKD 
  
8. Gout 9. Thrombocytopenia: unclear etiology Fluctuating 10. Pulmonary hypertension 
  
Plan: 
 
Continue current supportive cares Focus is now on respiratory optimization and hopefully moving towards extubation Signed: 
Tila Abreu MD 
Interventional Cardiology 2/9/2019

## 2019-02-10 ENCOUNTER — APPOINTMENT (OUTPATIENT)
Dept: GENERAL RADIOLOGY | Age: 74
DRG: 242 | End: 2019-02-10
Attending: INTERNAL MEDICINE
Payer: MEDICARE

## 2019-02-10 LAB
ANION GAP SERPL CALC-SCNC: 9 MMOL/L (ref 5–15)
APTT PPP: 29.1 SEC (ref 22.1–32)
BUN SERPL-MCNC: 59 MG/DL (ref 6–20)
BUN/CREAT SERPL: 25 (ref 12–20)
CALCIUM SERPL-MCNC: 8.1 MG/DL (ref 8.5–10.1)
CHLORIDE SERPL-SCNC: 112 MMOL/L (ref 97–108)
CO2 SERPL-SCNC: 24 MMOL/L (ref 21–32)
CREAT SERPL-MCNC: 2.37 MG/DL (ref 0.7–1.3)
ERYTHROCYTE [DISTWIDTH] IN BLOOD BY AUTOMATED COUNT: 15 % (ref 11.5–14.5)
GLUCOSE BLD STRIP.AUTO-MCNC: 142 MG/DL (ref 65–100)
GLUCOSE BLD STRIP.AUTO-MCNC: 173 MG/DL (ref 65–100)
GLUCOSE BLD STRIP.AUTO-MCNC: 175 MG/DL (ref 65–100)
GLUCOSE BLD STRIP.AUTO-MCNC: 210 MG/DL (ref 65–100)
GLUCOSE SERPL-MCNC: 190 MG/DL (ref 65–100)
HCT VFR BLD AUTO: 28.3 % (ref 36.6–50.3)
HGB BLD-MCNC: 8.7 G/DL (ref 12.1–17)
INR PPP: 1.1 (ref 0.9–1.1)
MAGNESIUM SERPL-MCNC: 2.6 MG/DL (ref 1.6–2.4)
MCH RBC QN AUTO: 27.7 PG (ref 26–34)
MCHC RBC AUTO-ENTMCNC: 30.7 G/DL (ref 30–36.5)
MCV RBC AUTO: 90.1 FL (ref 80–99)
NRBC # BLD: 0 K/UL (ref 0–0.01)
NRBC BLD-RTO: 0 PER 100 WBC
PLATELET # BLD AUTO: 69 K/UL (ref 150–400)
PMV BLD AUTO: 11.7 FL (ref 8.9–12.9)
POTASSIUM SERPL-SCNC: 3.5 MMOL/L (ref 3.5–5.1)
PROTHROMBIN TIME: 11.4 SEC (ref 9–11.1)
RBC # BLD AUTO: 3.14 M/UL (ref 4.1–5.7)
SERVICE CMNT-IMP: ABNORMAL
SODIUM SERPL-SCNC: 145 MMOL/L (ref 136–145)
THERAPEUTIC RANGE,PTTT: NORMAL SECS (ref 58–77)
WBC # BLD AUTO: 3.7 K/UL (ref 4.1–11.1)

## 2019-02-10 PROCEDURE — 77030029684 HC NEB SM VOL KT MONA -A

## 2019-02-10 PROCEDURE — C9113 INJ PANTOPRAZOLE SODIUM, VIA: HCPCS | Performed by: HOSPITALIST

## 2019-02-10 PROCEDURE — 74011000250 HC RX REV CODE- 250: Performed by: HOSPITALIST

## 2019-02-10 PROCEDURE — 65620000000 HC RM CCU GENERAL

## 2019-02-10 PROCEDURE — 85027 COMPLETE CBC AUTOMATED: CPT

## 2019-02-10 PROCEDURE — 77010033678 HC OXYGEN DAILY

## 2019-02-10 PROCEDURE — 77030029065 HC DRSG HEMO QCLOT ZMED -B

## 2019-02-10 PROCEDURE — 74011000258 HC RX REV CODE- 258: Performed by: HOSPITALIST

## 2019-02-10 PROCEDURE — 36415 COLL VENOUS BLD VENIPUNCTURE: CPT

## 2019-02-10 PROCEDURE — 74011000250 HC RX REV CODE- 250: Performed by: INTERNAL MEDICINE

## 2019-02-10 PROCEDURE — 71045 X-RAY EXAM CHEST 1 VIEW: CPT

## 2019-02-10 PROCEDURE — 76010000379 HC BRONCHOSCOPY DIAG/THERAPEUTIC

## 2019-02-10 PROCEDURE — 83735 ASSAY OF MAGNESIUM: CPT

## 2019-02-10 PROCEDURE — 77030018836 HC SOL IRR NACL ICUM -A

## 2019-02-10 PROCEDURE — 82962 GLUCOSE BLOOD TEST: CPT

## 2019-02-10 PROCEDURE — 74011000250 HC RX REV CODE- 250: Performed by: FAMILY MEDICINE

## 2019-02-10 PROCEDURE — 74011250636 HC RX REV CODE- 250/636: Performed by: INTERNAL MEDICINE

## 2019-02-10 PROCEDURE — 85610 PROTHROMBIN TIME: CPT

## 2019-02-10 PROCEDURE — 77030027138 HC INCENT SPIROMETER -A

## 2019-02-10 PROCEDURE — 74011636637 HC RX REV CODE- 636/637: Performed by: HOSPITALIST

## 2019-02-10 PROCEDURE — 74011636637 HC RX REV CODE- 636/637: Performed by: FAMILY MEDICINE

## 2019-02-10 PROCEDURE — 74011250637 HC RX REV CODE- 250/637: Performed by: HOSPITALIST

## 2019-02-10 PROCEDURE — 85730 THROMBOPLASTIN TIME PARTIAL: CPT

## 2019-02-10 PROCEDURE — 80048 BASIC METABOLIC PNL TOTAL CA: CPT

## 2019-02-10 PROCEDURE — 74011250637 HC RX REV CODE- 250/637: Performed by: INTERNAL MEDICINE

## 2019-02-10 PROCEDURE — 94003 VENT MGMT INPAT SUBQ DAY: CPT

## 2019-02-10 PROCEDURE — 74011250636 HC RX REV CODE- 250/636

## 2019-02-10 PROCEDURE — 94760 N-INVAS EAR/PLS OXIMETRY 1: CPT

## 2019-02-10 PROCEDURE — 74011250636 HC RX REV CODE- 250/636: Performed by: HOSPITALIST

## 2019-02-10 PROCEDURE — 94640 AIRWAY INHALATION TREATMENT: CPT

## 2019-02-10 RX ORDER — MIDAZOLAM HYDROCHLORIDE 1 MG/ML
INJECTION, SOLUTION INTRAMUSCULAR; INTRAVENOUS
Status: COMPLETED
Start: 2019-02-10 | End: 2019-02-10

## 2019-02-10 RX ORDER — ACETYLCYSTEINE 200 MG/ML
3200 SOLUTION ORAL; RESPIRATORY (INHALATION)
Status: COMPLETED | OUTPATIENT
Start: 2019-02-10 | End: 2019-02-10

## 2019-02-10 RX ORDER — MIDAZOLAM HYDROCHLORIDE 1 MG/ML
2 INJECTION, SOLUTION INTRAMUSCULAR; INTRAVENOUS ONCE
Status: COMPLETED | OUTPATIENT
Start: 2019-02-10 | End: 2019-02-10

## 2019-02-10 RX ORDER — PROPOFOL 10 MG/ML
0-50 VIAL (ML) INTRAVENOUS
Status: DISCONTINUED | OUTPATIENT
Start: 2019-02-10 | End: 2019-02-10

## 2019-02-10 RX ORDER — CARVEDILOL 3.12 MG/1
3.12 TABLET ORAL 2 TIMES DAILY WITH MEALS
Status: DISCONTINUED | OUTPATIENT
Start: 2019-02-10 | End: 2019-02-18

## 2019-02-10 RX ADMIN — Medication 10 ML: at 20:50

## 2019-02-10 RX ADMIN — Medication 1 CAPSULE: at 08:25

## 2019-02-10 RX ADMIN — IPRATROPIUM BROMIDE AND ALBUTEROL SULFATE 3 ML: .5; 3 SOLUTION RESPIRATORY (INHALATION) at 08:25

## 2019-02-10 RX ADMIN — IPRATROPIUM BROMIDE AND ALBUTEROL SULFATE 3 ML: .5; 3 SOLUTION RESPIRATORY (INHALATION) at 19:35

## 2019-02-10 RX ADMIN — PROPOFOL 40 MCG/KG/MIN: 10 INJECTION, EMULSION INTRAVENOUS at 02:28

## 2019-02-10 RX ADMIN — Medication 10 ML: at 20:52

## 2019-02-10 RX ADMIN — ACETYLCYSTEINE 3200 MG: 200 SOLUTION ORAL; RESPIRATORY (INHALATION) at 12:39

## 2019-02-10 RX ADMIN — CARVEDILOL 3.12 MG: 6.25 TABLET, FILM COATED ORAL at 17:01

## 2019-02-10 RX ADMIN — INSULIN LISPRO 2 UNITS: 100 INJECTION, SOLUTION INTRAVENOUS; SUBCUTANEOUS at 21:40

## 2019-02-10 RX ADMIN — LINEZOLID 600 MG: 2 INJECTION, SOLUTION INTRAVENOUS at 14:22

## 2019-02-10 RX ADMIN — SODIUM CHLORIDE 5 ML/HR: 900 INJECTION, SOLUTION INTRAVENOUS at 20:48

## 2019-02-10 RX ADMIN — ACETYLCYSTEINE 400 MG: 200 SOLUTION ORAL; RESPIRATORY (INHALATION) at 08:25

## 2019-02-10 RX ADMIN — LEVOTHYROXINE SODIUM 18.75 MCG: 100 INJECTION, POWDER, LYOPHILIZED, FOR SOLUTION INTRAVENOUS at 12:48

## 2019-02-10 RX ADMIN — CARVEDILOL 3.12 MG: 6.25 TABLET, FILM COATED ORAL at 09:43

## 2019-02-10 RX ADMIN — MIDAZOLAM 2 MG: 1 INJECTION INTRAMUSCULAR; INTRAVENOUS at 12:21

## 2019-02-10 RX ADMIN — Medication 10 ML: at 13:53

## 2019-02-10 RX ADMIN — Medication 10 ML: at 05:53

## 2019-02-10 RX ADMIN — LINEZOLID 600 MG: 2 INJECTION, SOLUTION INTRAVENOUS at 01:11

## 2019-02-10 RX ADMIN — IPRATROPIUM BROMIDE AND ALBUTEROL SULFATE 3 ML: .5; 3 SOLUTION RESPIRATORY (INHALATION) at 11:22

## 2019-02-10 RX ADMIN — SIMETHICONE CHEW TAB 80 MG 80 MG: 80 TABLET ORAL at 08:25

## 2019-02-10 RX ADMIN — PIPERACILLIN AND TAZOBACTAM 3.38 G: 3; .375 INJECTION, POWDER, FOR SOLUTION INTRAVENOUS at 20:47

## 2019-02-10 RX ADMIN — SODIUM CHLORIDE 40 MG: 9 INJECTION, SOLUTION INTRAMUSCULAR; INTRAVENOUS; SUBCUTANEOUS at 08:25

## 2019-02-10 RX ADMIN — CHLORHEXIDINE GLUCONATE 15 ML: 1.2 RINSE ORAL at 08:25

## 2019-02-10 RX ADMIN — SIMETHICONE CHEW TAB 80 MG 80 MG: 80 TABLET ORAL at 17:01

## 2019-02-10 RX ADMIN — IPRATROPIUM BROMIDE AND ALBUTEROL SULFATE 3 ML: .5; 3 SOLUTION RESPIRATORY (INHALATION) at 16:11

## 2019-02-10 RX ADMIN — MIDAZOLAM HYDROCHLORIDE 2 MG: 1 INJECTION, SOLUTION INTRAMUSCULAR; INTRAVENOUS at 12:21

## 2019-02-10 RX ADMIN — ACETYLCYSTEINE 400 MG: 200 SOLUTION ORAL; RESPIRATORY (INHALATION) at 19:34

## 2019-02-10 RX ADMIN — INSULIN GLARGINE 5 UNITS: 100 INJECTION, SOLUTION SUBCUTANEOUS at 21:36

## 2019-02-10 RX ADMIN — PROPOFOL 40 MCG/KG/MIN: 10 INJECTION, EMULSION INTRAVENOUS at 08:21

## 2019-02-10 RX ADMIN — PIPERACILLIN AND TAZOBACTAM 3.38 G: 3; .375 INJECTION, POWDER, FOR SOLUTION INTRAVENOUS at 11:44

## 2019-02-10 RX ADMIN — CHLORHEXIDINE GLUCONATE 15 ML: 1.2 RINSE ORAL at 20:49

## 2019-02-10 RX ADMIN — HYDRALAZINE HYDROCHLORIDE 20 MG: 20 INJECTION INTRAMUSCULAR; INTRAVENOUS at 10:58

## 2019-02-10 RX ADMIN — PIPERACILLIN AND TAZOBACTAM 3.38 G: 3; .375 INJECTION, POWDER, FOR SOLUTION INTRAVENOUS at 03:18

## 2019-02-10 NOTE — PROGRESS NOTES
Infectious Diseases Progress Note Antibiotic Summary: 
Vancomycin 2/6 x 1 dose Zosyn   -- present Zyvox   -- present Subjective: He remains intubated and appears comfortable. No new problems noted. Objective:  
 
Vitals:  
Visit Vitals /75 Pulse 86 Temp 98.8 °F (37.1 °C) Resp 22 Ht 5' 10\" (1.778 m) Wt 83.2 kg (183 lb 6.8 oz) SpO2 100% BMI 26.32 kg/m² Tmax:  Temp (24hrs), Av.8 °F (37.7 °C), Min:98.8 °F (37.1 °C), Max:100.4 °F (38 °C) Exam:  General appearance: no distress Eyes: PERRL Throat: ETT and orogastric tube in place; TFs are running Neck: supple Lungs: bilateral posterior rales Heart: irregularly irregular rhythm with intermittent pacing Abdomen: soft, not distended, non-tender. Bowel sounds hypoactive Extremities: no edema; feet and hands warm; good pedal pulses IV Lines: peripheral 
      Right radial A-line inserted 2019 Labs:   
Recent Labs 19 
0344 19 
1659 19 
0414 19 
1217 19 
0423 19 
0001 WBC 3.8*  --  5.6  --  8.3 11.6* HGB 8.5* 6.8* 7.4*  --  8.3* 8.8* PLT 68*  --  83*  --  106* 152 BUN 67*  --  72* 77* 74* 71* CREA 2.70*  --  3.13* 3.42* 3.64* 3.75* TBILI 1.3*  --  1.0  --  0.9 1.1*  
SGOT 106*  --  152*  --  114* 99* AP 76  --  64  --  72 76 Urine culture  = NG 
 
BLOOD CULTURES: 
  = NGSF 
  = NGSF 
  = NGSF Bronchoscopy sample : 
 Gram stain = 2+ WBCs; NOS 
 culture = light normal resp shahrzad CXR  reviewed = unchanged from  with diffuse haziness and probable volume loss c/w atelectasis +/- pneumonia +/- effusion Assessment: 1. Fever -- The cause of the fever is not certain but pulmonary source suspected. Bronch cultures unremarkable 
  
2. OHD -- cardiomyopathy and hx of atrial fib -- S/P initial arrest with Code Ice and several subsequent episodes of PEA 
  
3. NIDDM 
  
4. CVD 
  
5. HTN 
  
6. GOUT Plan: 1. Continue Zosyn + Zyvox Discussed at length with his daughter Penelope Hanna., MD

## 2019-02-10 NOTE — PROGRESS NOTES
Initial Pulmonary / Critical Care Consultation Impression Acute hypoxic resp failure LLL pna/atx with copious resp secretions Code chill Failed extibation 2/6/19 PEA arrest s/p PM 2/7/19 Pancytopenia Pmhx\" CRI, DM, AFIB, Gout Plan 
==== 
broch for airway inspection and removal LLL mucous plug completed When awake, CPAP trial and hopefully extubate Nebs with mucolmyst 
IVF 
CV meds Sedation prn Glycemic control Disc with family at bedside, rt and ICU RN 
I reviewed meds and labs and images High risk for medical deterioration 30cc eOP History / Subjective: 
Reason:  Respiratory failure Requesting Provider:  Dr Ab Pace 2/7 Seen and examined. Events noted. Mechanically ventilated. Gas exchange preserved. S/P permanent PM. On Chung, weaning down 2/6 Seen and examined earlier today. Did well with SBT with RSBI of 60 and was extubated. Subsequently developed confusion and ABG showed resp acidosis and he was placed on bipap. 2/5 Seen and examined. Sedated, on TH protocol. CXR with mild bibasilar haziness- suspected atelectasis. Await rewarming 2/4 Gabrielle Kowalski is a 72 y.o.  male who  has a past medical history of Acute encephalopathy (1/14/2016), Anemia (5/11/2017), Atrial fibrillation ([address] Paitner esophagus, CAD (coronary artery disease), Diabetes ([address] Heart failure ([address] HTN, goal below 140/90, Retinopathy, diabetic, bilateral ([address] (3/11/2016), Stenosis of right carotid artery without cerebral infarction (1/18/2016), TIA (transient ischemic attack) (1/14/2016), and Vitamin D deficiency (3/1/2016). admitted 2/4/2019 with cardiac arrest 
 
Pt receiving infusion for gout when becam unresponsive PEA arrest intubated and given epi ROSC after approximately 10 minutes Head CT OK Placed on code ice protocol Not on vasopressors Has chronic afib. Current rhythm is afib Allergies Allergen Reactions  Solu-Medrol [Methylprednisolone Sodium Succ] Other (comments) Syncope / Cardiac Arrest  
 
Past Medical History:  
Diagnosis Date  Acute encephalopathy 1/14/2016  Anemia 5/11/2017  Atrial fibrillation ([facility]  Painter esophagus  CAD (coronary artery disease)  Diabetes ([facility]  Heart failure ([facility] Cardiomyopathy, myocarditis  HTN, goal below 140/90  Retinopathy, diabetic, bilateral ([facility] 3/11/2016  Stenosis of right carotid artery without cerebral infarction 1/18/2016  TIA (transient ischemic attack) 1/14/2016  Vitamin D deficiency 3/1/2016 Nephrology ordered and follow 2/22/16 Past Surgical History:  
Procedure Laterality Date  COLONOSCOPY N/A 6/23/2016 COLONOSCOPY performed by Dioni Aldana MD at [facility] ENDOSCOPY  COLONOSCOPY N/A 8/6/2018 COLONOSCOPY performed by Dioni Aldana MD at [facility] ENDOSCOPY  COLONOSCOPY N/A 8/30/2018 COLONOSCOPY performed by Salima Cam MD at [address] [place] [address] HX ENDOSCOPY  06/27/2016 CAPSULE; normal small bowel  HX ENDOSCOPY  06/23/2016  
 upper endoscopy  HX UROLOGICAL  2013  MO INS NEW/RPLC PRM PACEMAKER W/TRANSV ELTRD VENTR N/A 2/7/2019 INSERT PPM SINGLE VENTRICULAR performed by Dalia Shelby MD at [place] [address], [facility]/s Dr CATH LAB Prior to Admission medications Medication Sig Start Date End Date Taking? Authorizing Provider  
insulin glargine (LANTUS,BASAGLAR) 100 unit/mL (3 mL) inpn 14 Units by SubCUTAneous route nightly. 1/21/19  Yes Lanny Scruggs MD  
ELIQUIS 5 mg tablet TAKE 1 TABLET BY MOUTH TWICE A DAY 11/9/18  Yes Provider, Historical  
pegloticase (KRYSTEXXA) 8 mg/mL soln injection 8 mg by IntraVENous route Once every 2 weeks. Yes Provider, Historical  
digoxin (LANOXIN) 0.125 mg tablet Take 0.125 mg by mouth. Pt takes daily x 2 days, then skips a day and repeats this schedule.    Yes Provider, Historical  
dextran 70-hypromellose (ARTIFICIAL TEARS,[id number],) ophthalmic solution Administer 1 Drop to both eyes as needed. Yes Provider, Historical  
hydrALAZINE (APRESOLINE) 25 mg tablet Take 25 mg by mouth three (3) times daily. 16  Yes Provider, Historical  
multivitamin (ONE A DAY) tablet Take 1 Tab by mouth daily. Yes Provider, Historical  
pravastatin (PRAVACHOL) 20 mg tablet Take 1 Tab by mouth nightly. 16  Yes Crystal Moore, JAMEL  
carvedilol (COREG) 3.125 mg tablet Take 1 Tab by mouth two (2) times daily (with meals). 16  Yes Crystal Moore, JAMEL  
levothyroxine (SYNTHROID) 25 mcg tablet TAKE 1 TABLET BY MOUTH EVERY DAY BEFORE BREAKFAST 19   Cierra Reid MD  
leflunomide (ARAVA) 20 mg tablet Take 20 mg by mouth every evening. Provider, Historical  
furosemide (LASIX) 40 mg tablet Take 40 mg by mouth daily. 5/10/16   Provider, Historical  
omeprazole (PRILOSEC) 20 mg capsule Take 20 mg by mouth nightly. Provider, Historical  
  
Family History Problem Relation Age of Onset  Heart Failure Mother  Diabetes Father  No Known Problems Sister  Other Daughter Tessie Ryan  Other Daughter Tessie Ryan  Other Daughter Tessie Ryan Social History Tobacco Use  Smoking status: Never Smoker  Smokeless tobacco: Never Used Substance Use Topics  Alcohol use: Yes Comment: 2 drinks/week, never a heavy drinker ROS:  Review of systems not obtained due to patient factors. Objective: 
Patient Vitals for the past 4 hrs: 
 BP Temp Pulse Resp SpO2  
02/10/19 1200 136/78 98.6 °F (37 °C) 83 19 96 % 02/10/19 1123   78 22 98 % 02/10/19 1122     98 % 02/10/19 1100 181/80  79 22 96 % 02/10/19 1058 171/73  77    
02/10/19 1000 (!) 183/97  85 22 100 % 02/10/19 0943 179/74  86    
02/10/19 0900 198/86  79 22 99 % Temp (24hrs), Av.9 °F (37.2 °C), Min:98.6 °F (37 °C), Max:99.1 °F (37.3 °C) CVP:       
 
Intake/Output Summary (Last 24 hours) at 2/10/2019 1246 Last data filed at 2/10/2019 1200 Gross per 24 hour Intake 2026.08 ml Output 1930 ml Net 96.08 ml Blood Sugar: 
Glucose Date Value Ref Range Status 02/10/2019 190 (H) 65 - 100 mg/dL Final  
02/09/2019 204 (H) 65 - 100 mg/dL Final  
02/08/2019 156 (H) 65 - 100 mg/dL Final  
02/07/2019 225 (H) 65 - 100 mg/dL Final  
02/07/2019 224 (H) 65 - 100 mg/dL Final  
 
Exam: 
Sedate Oral ett 
 moves all extremities spontaneously No accessory use Symmetrical chest expansion Scattered rhonchi 
irreg Soft Warm and dry No edema Lab data was reviewed. Radiology images were independently viewed and available reports were reviewed. CXR - ETT, no acute process Lab: 
Recent Labs  
  02/10/19 
0223 02/09/19 
3412 02/09/19 
0344 02/08/19 
1659 02/08/19 
0414 WBC 3.7*  --  3.8*  --  5.6 HGB 8.7*  --  8.5* 6.8* 7.4* PLT 69*  --  68*  --  83*   --  144  --  143  
K 3.5  --  3.6  --  3.5 *  --  110*  --  108 CO2 24  --  22  --  21  
BUN 59*  --  67*  --  72* CREA 2.37*  --  2.70*  --  3.13* *  --  204*  --  156* CA 8.1*  --  8.0*  --  7.5* MG 2.6*  --  2.7*  --  2.6* PHOS  --   --  3.7  --   --   
INR 1.1  --  1.2*  --  1.4* TBILI  --   --  1.3*  --  1.0 SGOT  --   --  106*  --  152* LAC  --  0.9  --   --   --   
 
ABG: 
Recent Labs 02/09/19 
1035 PHI 7.456* PCO2I 36.8 PO2I 115* HCO3I 25.9 SO2I 99* FIO2I 30 All Micro Results Procedure Component Value Units Date/Time CULTURE, BLOOD, PAIRED [[id number]] Collected:  02/08/19 0116 Order Status:  Completed Specimen:  Blood Updated:  02/10/19 5563 Special Requests: NO SPECIAL REQUESTS Culture result: NO GROWTH 2 DAYS     
 CULTURE, BLOOD [[id number]] Collected:  02/06/19 1902 Order Status:  Completed Specimen:  Blood Updated:  02/10/19 8952 Special Requests: NO SPECIAL REQUESTS Culture result: NO GROWTH 4 DAYS CULTURE, RESPIRATORY/SPUTUM/BRONCH Joshuae Grjacki [[id number]] Collected:  02/08/19 0934 Order Status:  Completed Specimen:  Sputum from Bronchial Washing Updated:  02/09/19 1104 Special Requests: NO SPECIAL REQUESTS     
  GRAM STAIN 2+ WBCS SEEN     
   NO DEFINITE ORGANISM SEEN Culture result:    
  LIGHT NORMAL RESPIRATORY LAVERNE  
     
 CULTURE, BLOOD, PAIRED [[id number]] Collected:  02/04/19 1608 Order Status:  Completed Specimen:  Blood Updated:  02/09/19 0543 Special Requests: NO SPECIAL REQUESTS Culture result: NO GROWTH 5 DAYS     
 CULTURE, URINE [[id number]] Collected:  02/07/19 0021 Order Status:  Completed Specimen:  Urine from [place] Specimen Updated:  02/08/19 9806 Special Requests: NO SPECIAL REQUESTS [place] Count <1,000 CFU/ML Culture result: NO GROWTH 1 DAY     
 CULTURE, RESPIRATORY/SPUTUM/BRONCH Joshuae Rafael [[id number]] Collected:  02/07/19 0930 Order Status:  Canceled Specimen:  Sputum,ET Suction Peet Angeles MD  
Addendum: 
 
Pt is awake and interactive, tolerating CPAP. Bronch demonstrated limited plug in LLL improved with mucomyst 
Improved LLL BS on exam 
 
Plan: extubate to NS

## 2019-02-10 NOTE — PROGRESS NOTES
Name: Zee Christina MRN: [id number] : 1945 Assessment: 
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 DM-2 Cardiac arrest; PEA- ? etiology. Code ice protocol; recurrence of niurka arrest on . S/p PPM 
Gout PANCYTOPENIA,chronic A Resp failure- reintubated; s/p bronch today with copious secretions Gout: was on pegloticase Plan/Recommendations: 
Creat better AB Subjective: On vent,,family at bedside Exam: 
Visit Vitals /80 Pulse 78 Temp 99.1 °F (37.3 °C) Resp 22 Ht 5' 10\" (1.778 m) Wt 81.5 kg (179 lb 10.8 oz) SpO2 98% BMI 25.78 kg/m² NAD 
+ETT 
 decrease BS Labs/Data: 
 
Lab Results Component Value Date/Time WBC 3.7 (L) 02/10/2019 02:23 AM  
 Hemoglobin (POC) 6.2 2018 02:17 PM  
 HGB 8.7 (L) 02/10/2019 02:23 AM  
 Hematocrit (POC) 32 (L) 2019 12:23 PM  
 HCT 28.3 (L) 02/10/2019 02:23 AM  
 PLATELET 69 (L)  02:23 AM  
 MCV 90.1 02/10/2019 02:23 AM  
 
 
Lab Results Component Value Date/Time Sodium 145 02/10/2019 02:23 AM  
 Potassium 3.5 02/10/2019 02:23 AM  
 Chloride 112 (H) 02/10/2019 02:23 AM  
 CO2 24 02/10/2019 02:23 AM  
 Anion gap 9 02/10/2019 02:23 AM  
 Glucose 190 (H) 02/10/2019 02:23 AM  
 BUN 59 (H) 02/10/2019 02:23 AM  
 Creatinine 2.37 (H) 02/10/2019 02:23 AM  
 BUN/Creatinine ratio 25 (H) 02/10/2019 02:23 AM  
 GFR est AA 33 (L) 02/10/2019 02:23 AM  
 GFR est non-AA 27 (L) 02/10/2019 02:23 AM  
 Calcium 8.1 (L) 02/10/2019 02:23 AM  
 
 
Wt Readings from Last 3 Encounters:  
02/10/19 81.5 kg (179 lb 10.8 oz) 19 81.6 kg (180 lb) 19 81.6 kg (180 lb) Intake/Output Summary (Last 24 hours) at 2/10/2019 1139 Last data filed at 2/10/2019 1100 Gross per 24 hour Intake 2134.08 ml Output 1860 ml  
 Net 274.08 ml Discussed with pts kids Aaron Mccormick MD

## 2019-02-10 NOTE — PROGRESS NOTES
[facility] Cardiology Progress Note Date of service: 2/10/2019 Subjective: 
Mr Lacho Hess remains intubated and ventilated BP trending higher Objective: 
 
Visit Vitals /81 (BP 1 Location: Left arm, BP Patient Position: Post activity) Pulse 78 Temp 99.1 °F (37.3 °C) Resp 20 Ht 5' 10\" (1.778 m) Wt 81.5 kg (179 lb 10.8 oz) SpO2 100% BMI 25.78 kg/m² Physical Exam  
Constitutional: He appears well-developed and well-nourished. He is intubated. HENT:  
Head: Normocephalic and atraumatic. Eyes: Conjunctivae are normal. No scleral icterus. Neck: No JVD present. Cardiovascular: Normal rate. An irregularly irregular rhythm present. Exam reveals no gallop. Murmur heard. Early systolic murmur is present with a grade of 2/6. Pulmonary/Chest: Effort normal. No stridor. He is intubated. No respiratory distress. He has no wheezes. He has rhonchi. He has no rales. Abdominal: Soft. He exhibits no distension. Musculoskeletal: He exhibits no edema or deformity. Neurological:  
Sedated Skin: Skin is warm and dry. Data reviewed: 
Recent Results (from the past 12 hour(s)) GLUCOSE, POC Collection Time: 02/09/19  9:21 PM  
Result Value Ref Range Glucose (POC) 153 (H) 65 - 100 mg/dL Performed by Rosalee Patiño PROTHROMBIN TIME + INR Collection Time: 02/10/19  2:23 AM  
Result Value Ref Range INR 1.1 0.9 - 1.1 Prothrombin time 11.4 (H) 9.0 - 11.1 sec PTT Collection Time: 02/10/19  2:23 AM  
Result Value Ref Range aPTT 29.1 22.1 - 32.0 sec  
 aPTT, therapeutic range     58.0 - 77.0 SECS  
CBC W/O DIFF Collection Time: 02/10/19  2:23 AM  
Result Value Ref Range WBC 3.7 (L) 4.1 - 11.1 K/uL  
 RBC 3.14 (L) 4.10 - 5.70 M/uL HGB 8.7 (L) 12.1 - 17.0 g/dL HCT 28.3 (L) 36.6 - 50.3 % MCV 90.1 80.0 - 99.0 FL  
 MCH 27.7 26.0 - 34.0 PG  
 MCHC 30.7 30.0 - 36.5 g/dL  
 RDW 15.0 (H) 11.5 - 14.5 % PLATELET 69 (L) 692 - 400 K/uL MPV 11.7 8.9 - 12.9 FL  
 NRBC 0.0 0  WBC ABSOLUTE NRBC 0.00 0.00 - 0.01 K/uL MAGNESIUM Collection Time: 02/10/19  2:23 AM  
Result Value Ref Range Magnesium 2.6 (H) 1.6 - 2.4 mg/dL METABOLIC PANEL, BASIC Collection Time: 02/10/19  2:23 AM  
Result Value Ref Range Sodium 145 136 - 145 mmol/L Potassium 3.5 3.5 - 5.1 mmol/L Chloride 112 (H) 97 - 108 mmol/L  
 CO2 24 21 - 32 mmol/L Anion gap 9 5 - 15 mmol/L Glucose 190 (H) 65 - 100 mg/dL BUN 59 (H) 6 - 20 MG/DL Creatinine 2.37 (H) 0.70 - 1.30 MG/DL  
 BUN/Creatinine ratio 25 (H) 12 - 20 GFR est AA 33 (L) >60 ml/min/1.73m2 GFR est non-AA 27 (L) >60 ml/min/1.73m2 Calcium 8.1 (L) 8.5 - 10.1 MG/DL  
GLUCOSE, POC Collection Time: 02/10/19  5:55 AM  
Result Value Ref Range Glucose (POC) 175 (H) 65 - 100 mg/dL Performed by Rafat CALVOTD Assessment: 1. Cardiac arrest. PEA. With events of 2/6 it now seems like that his underlying mechanism may have been bradyarrhythmia from SSS.  
  
2. SSS: s/p PPM 2/7. 
  
3. Acute respiratory failure S/p cardiac arrest. Fevers. May have some element of aspiration pneumonia 4. Hypotension: resolved - now off pressors 
  
5. Chronic a.fib - rate controlled currently 
  
6. CKD stage III Renal indices are improving 
  
7. Anemia, probably from CKD 
  
8. Gout 9. Thrombocytopenia: unclear etiology Fluctuating 10. Pulmonary hypertension 
  
Plan: 
 
Continue current supportive cares Focus is now on respiratory optimization and hopefully moving towards extubation Add back coreg 3.125mg bid Signed: 
Latonya Renae MD 
Interventional Cardiology 2/10/2019

## 2019-02-10 NOTE — CARDIO/PULMONARY
Bronch Haft Versed 2mg and dip gtt Pt on vent 
ic from pt 
ind - mucous plug/LLL atx Findings : ETT ok, min secretions right lung and DANETTE, mod thrombus/plug LLL - mucomyst applied mult times

## 2019-02-10 NOTE — PROGRESS NOTES
Hospitalist Progress Note Aayush Claudio MD 
Answering service: [phone] OR [phone] from in house phone Date of Service:  2/10/2019 NAME:  Gordon Denson :  1945 MRN:  [id number] Admission Summary:  
The patient is a 29-year-old gentleman with past medical history of anemia, atrial fibrillation, Painter's esophagus, coronary artery disease, diabetes, heart failure, cardiomyopathy, myocarditis, history of hypertension, diabetic retinopathy, right carotid artery stenosis, TIA and vitamin D deficiency who presents to the hospital from [facility]. Patient presented to [facility] today apparently in PEA. The history was primarily obtained from the ER physician as the patient is intubated and his wife is not present at the bedside. Interval history / Subjective:  
Remains on vent awake SBT per PCCM Bronch planned for today Copious secretions prohibitive for extubation Had a discussion with all members of the family and wife and answered all questions Assessment & Plan: 1. AHRF status post cardiac arrest/pulseless electrical activity POA  
- Patient initially on hypothermic protocol and started to re warm now normothermic 
- ECHO - EF WNL 
- Cycle troponins. - trending down may be from CPR  
- EEG. non specific no seizure activity 
- Bradycardia resolved but coded again after extubation on  multiple times finally got a pacer thinking SSS 
- re intubated for airway remains intubated today - Copious secretions prohibitive for extubation bronch planned today 
- he is on vanc and zyvox ID following 2. Diabetes. The patient will be on sliding scale NovoLog insulin and Accu-Cheks, diet control and close monitoring BS at ICU goal 
 
3. Hypothyroidism.- Put patient on IV Synthroid. 4. CKD stage 3  
- worsen from hypotension from CPR / cardiac arrest? 
- nephrology on board - cr improving 3.7->3.6-> 3.4-> 3.1--> 2.7--> 2.37 no need for CVVH for now 5. Chronic a.fib Slow rate currently - On Eliquis will resume once can swallow 6  Anemia, appears to be chronic.   
- Monitor H and H. Further intervention per hospital course hgb 7.2 
- s/p 2 unit PRBC 2/8  hgb > 8 Curly Hoffman 7. Fever -- The cause of the fever is not certain but CXR has gotten progressively worse at the left base and he is at risk for aspiration 
- on zyvox and zosyn 
- urine culture negative 8. Gastrointestinal PPX added simethicone 9. Low plt from the biologic agents? plt > 68 10. Abnormal LFT's from shock liver? 11. Coagulopathy from? Shock liver recovering  INR 1.2 Code status: Full DVT prophylaxis: SCD Prognosis Guarded Care Plan discussed with: Patient/Family and Nurse Disposition: TBD d/w family at bedside Critically ill may detoriorate Hospital Problems  Date Reviewed: 2/4/2019 Codes Class Noted POA * (Principal) Cardiac arrest [facility]) ICD-10-CM: I46.9 ICD-9-CM: 427.5  2/4/2019 Unknown Review of Systems:  
Review of systems not obtained due to patient factors. Vital Signs:  
 Last 24hrs VS reviewed since prior progress note. Most recent are: 
Visit Vitals /78 (BP 1 Location: Left arm, BP Patient Position: At rest) Pulse 83 Temp 98.6 °F (37 °C) Resp 19 Ht 5' 10\" (1.778 m) Wt 81.5 kg (179 lb 10.8 oz) SpO2 96% BMI 25.78 kg/m² Intake/Output Summary (Last 24 hours) at 2/10/2019 1206 Last data filed at 2/10/2019 1100 Gross per 24 hour Intake 2026.08 ml Output 1780 ml Net 246.08 ml Physical Examination:  
 
 
     
Constitutional:  Intubated and awake ENT:  MMM Resp:  CTA   
CV:  Regular rhythm, ,bradycardia GI:  Soft, non distended, non tender. bs+ Musculoskeletal:  No edema, warm, 2+ pulses throughout Neurologic:  Sedated, folay cath + A line + Data Review: I personally reviewed  Image and labs Ct Head Wo Cont Result Date: 2/4/2019 IMPRESSION: No acute intracranial hemorrhage, mass or infarct. Xr Chest [facility] Result Date: 2/10/2019 IMPRESSION: Stable left effusion and underlying atelectasis Xr Chest [facility] Result Date: 2/9/2019 IMPRESSION: No interval change. Xr Chest [facility] Result Date: 2/8/2019 IMPRESSION: ET tube is in satisfactory position. There is slight increasing haziness overlying left hemithorax with opacification left base consistent with left basilar atelectasis /airspace disease and left effusion. Xr Chest [facility] Result Date: 2/7/2019 IMPRESSION: Left lung base opacification. No significant change. Xr Chest [facility] Result Date: 2/6/2019 IMPRESSION: ET tube in satisfactory position. Worsening aeration of the left lung. Xr Chest [facility] Result Date: 2/6/2019 Impression: Stable bilateral lower lobe atelectasis. Xr Chest [facility] Result Date: 2/5/2019 IMPRESSION: No significant change. Xr [facility] Result Date: 2/4/2019 IMPRESSION: Endotracheal tube appears to be in satisfactory position. Xr Abd Port  1 V Result Date: 2/6/2019 IMPRESSION: NG tube in the stomach. Xr Abd Port  1 V Result Date: 2/4/2019 IMPRESSION: NG tube in appropriate position. Labs:  
 
Recent Labs  
  02/10/19 
0223 02/09/19 
0344 WBC 3.7* 3.8* HGB 8.7* 8.5* HCT 28.3* 27.2*  
PLT 69* 68* Recent Labs  
  02/10/19 
0223 02/09/19 
0344 02/08/19 
0414  144 143  
K 3.5 3.6 3.5 * 110* 108 CO2 24 22 21 BUN 59* 67* 72* CREA 2.37* 2.70* 3.13* * 204* 156* CA 8.1* 8.0* 7.5* MG 2.6* 2.7* 2.6* PHOS  --  3.7  --   
 
Recent Labs 02/09/19 
79 770 20 12 02/08/19 
3739 SGOT 106* 152* * 126* AP 76 64 TBILI 1.3* 1.0 TP 5.9* 5.8* ALB 2.3* 2.4*  
GLOB 3.6 3.4 Recent Labs  
  02/10/19 
0223 02/09/19 
0344 02/08/19 
0414 INR 1.1 1.2* 1.4* PTP 11.4* 12.3* 13.5*  
 APTT 29.1 30.8 33.0* No results for input(s): FE, TIBC, PSAT, FERR in the last 72 hours. No results found for: FOL, RBCF No results for input(s): PH, PCO2, PO2 in the last 72 hours. No results for input(s): CPK, CKNDX, TROIQ in the last 72 hours. No lab exists for component: CPKMB Lab Results Component Value Date/Time Cholesterol, total 135 02/23/2018 11:31 AM  
 HDL Cholesterol 34 (L) 02/23/2018 11:31 AM  
 LDL, calculated 79 02/23/2018 11:31 AM  
 Triglyceride 111 02/23/2018 11:31 AM  
 CHOL/HDL Ratio 5.0 01/15/2016 03:49 AM  
 
Lab Results Component Value Date/Time Glucose (POC) 142 (H) 02/10/2019 12:01 PM  
 Glucose (POC) 175 (H) 02/10/2019 05:55 AM  
 Glucose (POC) 153 (H) 02/09/2019 09:21 PM  
 Glucose (POC) 181 (H) 02/09/2019 05:54 PM  
 Glucose (POC) 159 (H) 02/09/2019 12:22 PM  
 
Lab Results Component Value Date/Time Color YELLOW/STRAW 02/04/2019 04:08 PM  
 Appearance CLOUDY (A) 02/04/2019 04:08 PM  
 Specific gravity 1.012 02/04/2019 04:08 PM  
 pH (UA) 5.0 02/04/2019 04:08 PM  
 Protein 30 (A) 02/04/2019 04:08 PM  
 Glucose NEGATIVE  02/04/2019 04:08 PM  
 Ketone TRACE (A) 02/04/2019 04:08 PM  
 Bilirubin NEGATIVE  02/04/2019 04:08 PM  
 Urobilinogen 0.2 02/04/2019 04:08 PM  
 Nitrites NEGATIVE  02/04/2019 04:08 PM  
 Leukocyte Esterase NEGATIVE  02/04/2019 04:08 PM  
 Epithelial cells FEW 02/04/2019 04:08 PM  
 Bacteria NEGATIVE  02/04/2019 04:08 PM  
 WBC 10-20 02/04/2019 04:08 PM  
 RBC  02/04/2019 04:08 PM  
 
 
 
Medications Reviewed:  
 
Current Facility-Administered Medications Medication Dose Route Frequency  carvedilol (COREG) tablet 3.125 mg  3.125 mg Oral BID WITH MEALS  propofol (DIPRIVAN) infusion  0-50 mcg/kg/min IntraVENous TITRATE  simethicone (MYLICON) tablet 80 mg  80 mg Oral BID  hydrALAZINE (APRESOLINE) 20 mg/mL injection 20 mg  20 mg IntraVENous Q6H PRN  
 lactobac ac& pc-s.therm-b.anim (LAVERNE Q/RISAQUAD)  1 Cap Oral DAILY  sodium chloride (NS) flush 5-40 mL  5-40 mL IntraVENous Q8H  
 sodium chloride (NS) flush 5-40 mL  5-40 mL IntraVENous PRN  
 albuterol-ipratropium (DUO-NEB) 2.5 MG-0.5 MG/3 ML  3 mL Nebulization QID RT  
 acetylcysteine (MUCOMYST) 200 mg/mL (20 %) solution 400 mg  2 mL Nebulization TID RT  
 linezolid in dextrose 5% (ZYVOX) IVPB premix in D5W 600 mg  600 mg IntraVENous Q12H  
 0.9% sodium chloride infusion 250 mL  250 mL IntraVENous PRN  
 0.9% sodium chloride infusion  3 mL/hr IntraVENous CONTINUOUS  
 0.9% sodium chloride infusion  5 mL/hr IntraVENous CONTINUOUS  
 morphine injection 2 mg  2 mg IntraVENous Q6H PRN  
 insulin glargine (LANTUS) injection 5 Units  5 Units SubCUTAneous QHS  acetaminophen (TYLENOL) tablet 650 mg  650 mg Oral Q6H PRN  
 bacitracin 500 unit/gram packet 1 Packet  1 Packet Topical PRN  piperacillin-tazobactam (ZOSYN) 3.375 g in 0.9% sodium chloride (MBP/ADV) 100 mL  3.375 g IntraVENous Q8H  
 pantoprazole (PROTONIX) 40 mg in sodium chloride 0.9% 10 mL injection  40 mg IntraVENous DAILY  sodium chloride (NS) flush 5-40 mL  5-40 mL IntraVENous Q8H  
 sodium chloride (NS) flush 5-40 mL  5-40 mL IntraVENous PRN  chlorhexidine (PERIDEX) 0.12 % mouthwash 15 mL  15 mL Oral Q12H  
 sodium chloride (NS) flush 5-40 mL  5-40 mL IntraVENous Q8H  
 sodium chloride (NS) flush 5-40 mL  5-40 mL IntraVENous PRN  
 glucose chewable tablet 16 g  4 Tab Oral PRN  
 dextrose (D50W) injection syrg 12.5-25 g  25-50 mL IntraVENous PRN  
 glucagon (GLUCAGEN) injection 1 mg  1 mg IntraMUSCular PRN  
 insulin lispro (HUMALOG) injection   SubCUTAneous Q6H  
 levothyroxine (SYNTHROID) injection 18.75 mcg  18.75 mcg IntraVENous Q24H  
 0.9% sodium chloride infusion  5 mL/hr IntraVENous CONTINUOUS  
 
______________________________________________________________________ EXPECTED LENGTH OF STAY: 2d 0h 
ACTUAL LENGTH OF STAY:          6 Sherren Flasher, MD

## 2019-02-10 NOTE — INTERDISCIPLINARY ROUNDS
IDR/SLIDR Summary Patient: Tayo Jefferson MRN: [id number]    Age: 72 y.o. YOB: 1945 Room/Bed: [address] [place] [address] Admit Diagnosis: Cardiac arrest [facility]) [I46.9]  Principal Diagnosis: Cardiac arrest ([facility] [address] Goals: stable VS, SAT/SBT Readmission:no Quality Measure: Not applicable VTE Prophylaxis: Mechanical 
Influenza Vaccine screening completed? YES Pneumococcal Vaccine screening completed? NO Mobility needs: Yes   Nutrition plan:No 
Consults:P.T, O.T., Speech, Respiratory and Case Management Financial concerns:Yes  Escalated to CM? YES 
RRAT Score: 39   Interventions:H2H Testing due for pt today? YES 
LOS: 6 days Expected length of stay 7? days Discharge plan: home   PCP: Andrea Trinidad MD 
Transportation needs: Yes Days before discharge:two or more days before discharge Discharge disposition: Home Signed:  
 
Jim Lock RN 
2/10/2019 
11:26 PM

## 2019-02-10 NOTE — PROGRESS NOTES
2000 Received report from Olga and assumed care. VSS, no signs of pain or distress. Family at bedside. 2100 Incontinent of large liquid stool, complete chlorhexidine bed bath provided, tolerated fair. 2300 Incontinent of large liquid stool, discussed with daughter and pt about placing a flexi seal to protect skin (8th BM today). Approval received, flexi seal placed. 18 Daughter concerned about flexi seal, flexi seal removed. 0000 Incontinent of large stool, incontinence care provided. 0200 Labs drawn. Incontinence care provided. 0300 Feeding tubes paused for SBT/possible extubation later today. 0400 Incontinence care provided for BM. 0500 Portable chest xray completed. 0730 Bedside and Verbal shift change report given to Olga (oncoming nurse) by Aj Arana (offgoing nurse). Report included the following information SBAR, Kardex, Intake/Output, MAR, Recent Results and Alarm Parameters .

## 2019-02-10 NOTE — PROGRESS NOTES
0730 - Bedside and Verbal shift change report given to Dontae Shin RN (oncoming nurse) by Noelle Fisher RN (offgoing nurse). Report included the following information SBAR, MAR, Recent Results and Cardiac Rhythm paced and atrial fibrillation. 65 - Dr. Germain Garcia in to see patient. 200 - Dr. Shira Garcia at bedside to see patient. RT at bedside for bronchoscopy. Will  
26 - Dr. Shira Garcia switched ventilator to SBT. 1430 Patient extubated to 4L nasal cannula by respiratory therapy. Patient tolerated well and is awake and following commands. STAND completed and patient swallowing without difficulty. Dr. Joaquin Caballero updated and patient may have clear liquids and oral medications (patient takes crushed in applesauce). 1930 - Bedside and Verbal shift change report given to Stephan Obrien RN (oncoming nurse) by Dontae Shin RN (offgoing nurse). Report included the following information SBAR, MAR, Recent Results and Cardiac Rhythm atrial fibrillation / paced.

## 2019-02-10 NOTE — PROGRESS NOTES
Problem: Falls - Risk of 
Goal: *Absence of Falls Document Eliud Erazo Fall Risk and appropriate interventions in the flowsheet. Outcome: Progressing Towards Goal 
Fall Risk Interventions: 
Mobility Interventions: OT consult for ADLs Mentation Interventions: Bed/chair exit alarm Medication Interventions: Evaluate medications/consider consulting pharmacy Elimination Interventions: Call light in reach Problem: Pressure Injury - Risk of 
Goal: *Prevention of pressure injury Document Dom Scale and appropriate interventions in the flowsheet. Outcome: Progressing Towards Goal 
Pressure Injury Interventions: 
Sensory Interventions: Discuss PT/OT consult with provider, Float heels Moisture Interventions: Check for incontinence Q2 hours and as needed Activity Interventions: Increase time out of bed, Pressure redistribution bed/mattress(bed type) Mobility Interventions: HOB 30 degrees or less, Pressure redistribution bed/mattress (bed type) Nutrition Interventions: Discuss nutritional consult with provider Friction and Shear Interventions: HOB 30 degrees or less, Lift sheet

## 2019-02-11 ENCOUNTER — APPOINTMENT (OUTPATIENT)
Dept: INFUSION THERAPY | Age: 74
End: 2019-02-11
Payer: MEDICARE

## 2019-02-11 ENCOUNTER — APPOINTMENT (OUTPATIENT)
Dept: NON INVASIVE DIAGNOSTICS | Age: 74
DRG: 242 | End: 2019-02-11
Attending: HOSPITALIST
Payer: MEDICARE

## 2019-02-11 ENCOUNTER — APPOINTMENT (OUTPATIENT)
Dept: GENERAL RADIOLOGY | Age: 74
DRG: 242 | End: 2019-02-11
Attending: INTERNAL MEDICINE
Payer: MEDICARE

## 2019-02-11 LAB
ALBUMIN SERPL-MCNC: 2.3 G/DL (ref 3.5–5)
ALBUMIN/GLOB SERPL: 0.6 {RATIO} (ref 1.1–2.2)
ALP SERPL-CCNC: 170 U/L (ref 45–117)
ALT SERPL-CCNC: 126 U/L (ref 12–78)
ANION GAP SERPL CALC-SCNC: 8 MMOL/L (ref 5–15)
APTT PPP: 29.9 SEC (ref 22.1–32)
AST SERPL-CCNC: 72 U/L (ref 15–37)
BACTERIA SPEC CULT: NORMAL
BILIRUB DIRECT SERPL-MCNC: 0.8 MG/DL (ref 0–0.2)
BILIRUB SERPL-MCNC: 1.3 MG/DL (ref 0.2–1)
BUN SERPL-MCNC: 54 MG/DL (ref 6–20)
BUN/CREAT SERPL: 23 (ref 12–20)
CALCIUM SERPL-MCNC: 8.1 MG/DL (ref 8.5–10.1)
CHLORIDE SERPL-SCNC: 111 MMOL/L (ref 97–108)
CO2 SERPL-SCNC: 25 MMOL/L (ref 21–32)
CREAT SERPL-MCNC: 2.33 MG/DL (ref 0.7–1.3)
ERYTHROCYTE [DISTWIDTH] IN BLOOD BY AUTOMATED COUNT: 14.7 % (ref 11.5–14.5)
GLOBULIN SER CALC-MCNC: 4 G/DL (ref 2–4)
GLUCOSE BLD STRIP.AUTO-MCNC: 143 MG/DL (ref 65–100)
GLUCOSE BLD STRIP.AUTO-MCNC: 194 MG/DL (ref 65–100)
GLUCOSE BLD STRIP.AUTO-MCNC: 201 MG/DL (ref 65–100)
GLUCOSE BLD STRIP.AUTO-MCNC: 206 MG/DL (ref 65–100)
GLUCOSE SERPL-MCNC: 155 MG/DL (ref 65–100)
GRAM STN SPEC: NORMAL
GRAM STN SPEC: NORMAL
HCT VFR BLD AUTO: 30.1 % (ref 36.6–50.3)
HGB BLD-MCNC: 8.9 G/DL (ref 12.1–17)
INR PPP: 1.1 (ref 0.9–1.1)
MAGNESIUM SERPL-MCNC: 2.6 MG/DL (ref 1.6–2.4)
MCH RBC QN AUTO: 28 PG (ref 26–34)
MCHC RBC AUTO-ENTMCNC: 29.6 G/DL (ref 30–36.5)
MCV RBC AUTO: 94.7 FL (ref 80–99)
NRBC # BLD: 0 K/UL (ref 0–0.01)
NRBC BLD-RTO: 0 PER 100 WBC
PLATELET # BLD AUTO: 63 K/UL (ref 150–400)
PMV BLD AUTO: 11 FL (ref 8.9–12.9)
POTASSIUM SERPL-SCNC: 3.8 MMOL/L (ref 3.5–5.1)
PROT SERPL-MCNC: 6.3 G/DL (ref 6.4–8.2)
PROTHROMBIN TIME: 11.5 SEC (ref 9–11.1)
RBC # BLD AUTO: 3.18 M/UL (ref 4.1–5.7)
SERVICE CMNT-IMP: ABNORMAL
SERVICE CMNT-IMP: NORMAL
SODIUM SERPL-SCNC: 144 MMOL/L (ref 136–145)
THERAPEUTIC RANGE,PTTT: NORMAL SECS (ref 58–77)
WBC # BLD AUTO: 3.7 K/UL (ref 4.1–11.1)

## 2019-02-11 PROCEDURE — 74011000258 HC RX REV CODE- 258: Performed by: HOSPITALIST

## 2019-02-11 PROCEDURE — 80076 HEPATIC FUNCTION PANEL: CPT

## 2019-02-11 PROCEDURE — 94640 AIRWAY INHALATION TREATMENT: CPT

## 2019-02-11 PROCEDURE — 77010033678 HC OXYGEN DAILY

## 2019-02-11 PROCEDURE — 74011000250 HC RX REV CODE- 250: Performed by: FAMILY MEDICINE

## 2019-02-11 PROCEDURE — 74011250636 HC RX REV CODE- 250/636: Performed by: HOSPITALIST

## 2019-02-11 PROCEDURE — C9113 INJ PANTOPRAZOLE SODIUM, VIA: HCPCS | Performed by: HOSPITALIST

## 2019-02-11 PROCEDURE — 74011250636 HC RX REV CODE- 250/636: Performed by: INTERNAL MEDICINE

## 2019-02-11 PROCEDURE — 74011250637 HC RX REV CODE- 250/637: Performed by: HOSPITALIST

## 2019-02-11 PROCEDURE — 74011000250 HC RX REV CODE- 250: Performed by: INTERNAL MEDICINE

## 2019-02-11 PROCEDURE — 85610 PROTHROMBIN TIME: CPT

## 2019-02-11 PROCEDURE — 80048 BASIC METABOLIC PNL TOTAL CA: CPT

## 2019-02-11 PROCEDURE — 83735 ASSAY OF MAGNESIUM: CPT

## 2019-02-11 PROCEDURE — 74011636637 HC RX REV CODE- 636/637: Performed by: FAMILY MEDICINE

## 2019-02-11 PROCEDURE — 74011636637 HC RX REV CODE- 636/637: Performed by: HOSPITALIST

## 2019-02-11 PROCEDURE — 85027 COMPLETE CBC AUTOMATED: CPT

## 2019-02-11 PROCEDURE — 82962 GLUCOSE BLOOD TEST: CPT

## 2019-02-11 PROCEDURE — 74011250637 HC RX REV CODE- 250/637: Performed by: INTERNAL MEDICINE

## 2019-02-11 PROCEDURE — 65660000001 HC RM ICU INTERMED STEPDOWN

## 2019-02-11 PROCEDURE — 93306 TTE W/DOPPLER COMPLETE: CPT

## 2019-02-11 PROCEDURE — 85730 THROMBOPLASTIN TIME PARTIAL: CPT

## 2019-02-11 PROCEDURE — 71045 X-RAY EXAM CHEST 1 VIEW: CPT

## 2019-02-11 PROCEDURE — 36415 COLL VENOUS BLD VENIPUNCTURE: CPT

## 2019-02-11 PROCEDURE — 74011000250 HC RX REV CODE- 250: Performed by: HOSPITALIST

## 2019-02-11 RX ORDER — PANTOPRAZOLE SODIUM 40 MG/1
40 TABLET, DELAYED RELEASE ORAL
Status: DISCONTINUED | OUTPATIENT
Start: 2019-02-12 | End: 2019-02-18

## 2019-02-11 RX ORDER — LEVOTHYROXINE SODIUM 25 UG/1
25 TABLET ORAL
Status: DISCONTINUED | OUTPATIENT
Start: 2019-02-12 | End: 2019-02-22 | Stop reason: HOSPADM

## 2019-02-11 RX ORDER — POTASSIUM CHLORIDE 750 MG/1
40 TABLET, FILM COATED, EXTENDED RELEASE ORAL
Status: COMPLETED | OUTPATIENT
Start: 2019-02-11 | End: 2019-02-11

## 2019-02-11 RX ADMIN — Medication 10 ML: at 06:22

## 2019-02-11 RX ADMIN — LINEZOLID 600 MG: 2 INJECTION, SOLUTION INTRAVENOUS at 14:55

## 2019-02-11 RX ADMIN — Medication 10 ML: at 06:21

## 2019-02-11 RX ADMIN — Medication 10 ML: at 19:56

## 2019-02-11 RX ADMIN — LEVOTHYROXINE SODIUM 18.75 MCG: 100 INJECTION, POWDER, LYOPHILIZED, FOR SOLUTION INTRAVENOUS at 14:52

## 2019-02-11 RX ADMIN — Medication 10 ML: at 19:57

## 2019-02-11 RX ADMIN — ACETYLCYSTEINE 400 MG: 200 SOLUTION ORAL; RESPIRATORY (INHALATION) at 19:42

## 2019-02-11 RX ADMIN — IPRATROPIUM BROMIDE AND ALBUTEROL SULFATE 3 ML: .5; 3 SOLUTION RESPIRATORY (INHALATION) at 19:42

## 2019-02-11 RX ADMIN — SODIUM CHLORIDE 40 MG: 9 INJECTION, SOLUTION INTRAMUSCULAR; INTRAVENOUS; SUBCUTANEOUS at 08:24

## 2019-02-11 RX ADMIN — INSULIN LISPRO 2 UNITS: 100 INJECTION, SOLUTION INTRAVENOUS; SUBCUTANEOUS at 12:15

## 2019-02-11 RX ADMIN — Medication 10 ML: at 15:28

## 2019-02-11 RX ADMIN — INSULIN GLARGINE 5 UNITS: 100 INJECTION, SOLUTION SUBCUTANEOUS at 22:02

## 2019-02-11 RX ADMIN — LINEZOLID 600 MG: 2 INJECTION, SOLUTION INTRAVENOUS at 01:09

## 2019-02-11 RX ADMIN — ACETYLCYSTEINE 400 MG: 200 SOLUTION ORAL; RESPIRATORY (INHALATION) at 08:21

## 2019-02-11 RX ADMIN — SIMETHICONE CHEW TAB 80 MG 80 MG: 80 TABLET ORAL at 17:30

## 2019-02-11 RX ADMIN — ACETAMINOPHEN 650 MG: 325 TABLET ORAL at 21:02

## 2019-02-11 RX ADMIN — IPRATROPIUM BROMIDE AND ALBUTEROL SULFATE 3 ML: .5; 3 SOLUTION RESPIRATORY (INHALATION) at 11:31

## 2019-02-11 RX ADMIN — ACETAMINOPHEN 650 MG: 325 TABLET ORAL at 04:01

## 2019-02-11 RX ADMIN — Medication 1 CAPSULE: at 08:13

## 2019-02-11 RX ADMIN — ACETYLCYSTEINE 400 MG: 200 SOLUTION ORAL; RESPIRATORY (INHALATION) at 15:07

## 2019-02-11 RX ADMIN — CARVEDILOL 3.12 MG: 6.25 TABLET, FILM COATED ORAL at 17:28

## 2019-02-11 RX ADMIN — PIPERACILLIN AND TAZOBACTAM 3.38 G: 3; .375 INJECTION, POWDER, FOR SOLUTION INTRAVENOUS at 12:29

## 2019-02-11 RX ADMIN — CARVEDILOL 3.12 MG: 6.25 TABLET, FILM COATED ORAL at 08:17

## 2019-02-11 RX ADMIN — SIMETHICONE CHEW TAB 80 MG 80 MG: 80 TABLET ORAL at 08:18

## 2019-02-11 RX ADMIN — PIPERACILLIN AND TAZOBACTAM 3.38 G: 3; .375 INJECTION, POWDER, FOR SOLUTION INTRAVENOUS at 19:53

## 2019-02-11 RX ADMIN — Medication 10 ML: at 15:29

## 2019-02-11 RX ADMIN — POTASSIUM CHLORIDE 40 MEQ: 750 TABLET, EXTENDED RELEASE ORAL at 12:17

## 2019-02-11 RX ADMIN — INSULIN LISPRO 2 UNITS: 100 INJECTION, SOLUTION INTRAVENOUS; SUBCUTANEOUS at 22:03

## 2019-02-11 RX ADMIN — PIPERACILLIN AND TAZOBACTAM 3.38 G: 3; .375 INJECTION, POWDER, FOR SOLUTION INTRAVENOUS at 03:13

## 2019-02-11 RX ADMIN — IPRATROPIUM BROMIDE AND ALBUTEROL SULFATE 3 ML: .5; 3 SOLUTION RESPIRATORY (INHALATION) at 15:07

## 2019-02-11 RX ADMIN — HYDRALAZINE HYDROCHLORIDE 20 MG: 20 INJECTION INTRAMUSCULAR; INTRAVENOUS at 21:02

## 2019-02-11 RX ADMIN — IPRATROPIUM BROMIDE AND ALBUTEROL SULFATE 3 ML: .5; 3 SOLUTION RESPIRATORY (INHALATION) at 08:21

## 2019-02-11 NOTE — PROGRESS NOTES
Infectious Diseases Progress Note Antibiotic Summary: 
Vancomycin 2/6 x 1 dose Zosyn   -- present Zyvox   -- present Subjective: He is extubated, awake, alert, animated, and feels well Objective:  
 
Vitals:  
Visit Vitals /62 (BP 1 Location: Left arm, BP Patient Position: At rest) Pulse 84 Temp 99.1 °F (37.3 °C) Resp 17 Ht 5' 10\" (1.778 m) Wt 81.5 kg (179 lb 10.8 oz) SpO2 100% BMI 25.78 kg/m² Tmax:  Temp (24hrs), Av.9 °F (37.2 °C), Min:98.5 °F (36.9 °C), Max:99.1 °F (37.3 °C) Exam:  General appearance: no distress; awake and alert Neck: supple Lungs: bilateral posterior rales Heart: irregularly irregular rhythm with intermittent pacing Abdomen: soft, not distended, non-tender. Bowel sounds normal 
Extremities: no edema; feet and hands warm; good pedal pulses Neuro: alert & oriented IV Lines: peripheral 
      
Labs:   
Recent Labs  
  02/10/19 
0223 19 
0344 19 
1659 19 
0414 WBC 3.7* 3.8*  --  5.6 HGB 8.7* 8.5* 6.8* 7.4* PLT 69* 68*  --  83* BUN 59* 67*  --  72* CREA 2.37* 2.70*  --  3.13* TBILI  --  1.3*  --  1.0 SGOT  --  106*  --  152* AP  --  76  --  64  
 
Urine culture  = NG 
 
BLOOD CULTURES: 
  = NGSF 
  = NGSF 
  = NGSF Bronchoscopy sample : 
 Gram stain = 2+ WBCs; NOS 
 culture = light normal resp shahrzad CXR  reviewed = unchanged from  with diffuse haziness and probable volume loss c/w atelectasis +/- pneumonia +/- effusion Assessment: 1. Fever -- resolving: The cause of the fever is not certain but pulmonary source suspected. Bronch cultures unremarkable 
  
2. OHD -- cardiomyopathy and hx of atrial fib -- S/P initial arrest with Code Ice and several subsequent episodes of PEA 
  
3. NIDDM 
  
4. CVD 
  
5. HTN 
  
6. GOUT Plan: 1. Continue Zosyn + Zyvox -- plan to stop Zyvox soon Discussed with the patient and his family Pernell Rinne., MD

## 2019-02-11 NOTE — PROGRESS NOTES
[facility] Cardiology Progress Note Date of service: 2/11/2019 Subjective: 
Mr Norah Hare has been extubated and has no complaints other than some chest soreness Objective: 
 
Visit Vitals /56 Pulse 82 Temp 98.6 °F (37 °C) Resp 20 Ht 5' 10\" (1.778 m) Wt 83.1 kg (183 lb 3.2 oz) SpO2 98% BMI 26.29 kg/m² Physical Exam  
Constitutional: He appears well-developed and well-nourished. HENT:  
Head: Normocephalic and atraumatic. Eyes: Conjunctivae are normal. No scleral icterus. Neck: No JVD present. Cardiovascular: Normal rate. An irregularly irregular rhythm present. Exam reveals no gallop. Murmur heard. Early systolic murmur is present with a grade of 2/6. Pulmonary/Chest: Effort normal. No stridor. No respiratory distress. He has no wheezes. He has no rales. Abdominal: Soft. He exhibits no distension. Musculoskeletal: He exhibits no edema or deformity. Neurological: He is alert. Skin: Skin is warm and dry. Data reviewed: 
Recent Results (from the past 12 hour(s)) GLUCOSE, POC Collection Time: 02/10/19  9:34 PM  
Result Value Ref Range Glucose (POC) 210 (H) 65 - 100 mg/dL Performed by Milli Ohara PROTHROMBIN TIME + INR Collection Time: 02/11/19  2:24 AM  
Result Value Ref Range INR 1.1 0.9 - 1.1 Prothrombin time 11.5 (H) 9.0 - 11.1 sec PTT Collection Time: 02/11/19  2:24 AM  
Result Value Ref Range aPTT 29.9 22.1 - 32.0 sec  
 aPTT, therapeutic range     58.0 - 77.0 SECS  
CBC W/O DIFF Collection Time: 02/11/19  2:24 AM  
Result Value Ref Range WBC 3.7 (L) 4.1 - 11.1 K/uL  
 RBC 3.18 (L) 4.10 - 5.70 M/uL HGB 8.9 (L) 12.1 - 17.0 g/dL HCT 30.1 (L) 36.6 - 50.3 % MCV 94.7 80.0 - 99.0 FL  
 MCH 28.0 26.0 - 34.0 PG  
 MCHC 29.6 (L) 30.0 - 36.5 g/dL  
 RDW 14.7 (H) 11.5 - 14.5 % PLATELET 63 (L) 398 - 400 K/uL MPV 11.0 8.9 - 12.9 FL  
 NRBC 0.0 0  WBC ABSOLUTE NRBC 0.00 0.00 - 0.01 K/uL MAGNESIUM  
 Collection Time: 02/11/19  2:24 AM  
Result Value Ref Range Magnesium 2.6 (H) 1.6 - 2.4 mg/dL METABOLIC PANEL, BASIC Collection Time: 02/11/19  2:24 AM  
Result Value Ref Range Sodium 144 136 - 145 mmol/L Potassium 3.8 3.5 - 5.1 mmol/L Chloride 111 (H) 97 - 108 mmol/L  
 CO2 25 21 - 32 mmol/L Anion gap 8 5 - 15 mmol/L Glucose 155 (H) 65 - 100 mg/dL BUN 54 (H) 6 - 20 MG/DL Creatinine 2.33 (H) 0.70 - 1.30 MG/DL  
 BUN/Creatinine ratio 23 (H) 12 - 20 GFR est AA 34 (L) >60 ml/min/1.73m2 GFR est non-AA 28 (L) >60 ml/min/1.73m2 Calcium 8.1 (L) 8.5 - 10.1 MG/DL  
HEPATIC FUNCTION PANEL Collection Time: 02/11/19  2:24 AM  
Result Value Ref Range Protein, total 6.3 (L) 6.4 - 8.2 g/dL Albumin 2.3 (L) 3.5 - 5.0 g/dL Globulin 4.0 2.0 - 4.0 g/dL A-G Ratio 0.6 (L) 1.1 - 2.2 Bilirubin, total 1.3 (H) 0.2 - 1.0 MG/DL Bilirubin, direct 0.8 (H) 0.0 - 0.2 MG/DL Alk. phosphatase 170 (H) 45 - 117 U/L  
 AST (SGOT) 72 (H) 15 - 37 U/L  
 ALT (SGPT) 126 (H) 12 - 78 U/L  
GLUCOSE, POC Collection Time: 02/11/19  6:17 AM  
Result Value Ref Range Glucose (POC) 143 (H) 65 - 100 mg/dL Performed by Arlette [facility] Neymar NGTD Assessment: 1. Cardiac arrest. PEA. With events of 2/6 it now seems like that his underlying mechanism may have been bradyarrhythmia from SSS.  
  
2. SSS: s/p PPM 2/7. 
  
3. Acute respiratory failure Resolved - extubated 2/10 4. Hypotension: resolved - now off pressors 
  
5. Chronic a.fib - rate controlled currently 
  
6. CKD stage III Renal indices are improving 
  
7. Anemia, probably from CKD 
  
8. Gout 9. Thrombocytopenia: unclear etiology Fluctuating - fairly stable currently 10. Pulmonary hypertension 
  
Plan: 
 
Continue current cares & medical therapies Signed: 
Tiffany Garcia MD 
Interventional Cardiology 2/11/2019

## 2019-02-11 NOTE — PROGRESS NOTES
Problem: Falls - Risk of 
Goal: *Absence of Falls Document Dima Guaman Fall Risk and appropriate interventions in the flowsheet. Outcome: Resolved/Met Date Met: 02/10/19 Fall Risk Interventions: 
Mobility Interventions: Bed/chair exit alarm, Communicate number of staff needed for ambulation/transfer, PT Consult for mobility concerns, PT Consult for assist device competence, Patient to call before getting OOB, Utilize walker, cane, or other assistive device, Utilize gait belt for transfers/ambulation Mentation Interventions: Adequate sleep, hydration, pain control, Bed/chair exit alarm, Door open when patient unattended, Family/sitter at bedside, More frequent rounding, Reorient patient, Room close to nurse's station, Toileting rounds, Update white board Medication Interventions: Evaluate medications/consider consulting pharmacy, Patient to call before getting OOB Elimination Interventions: Call light in reach, Patient to call for help with toileting needs, Toileting schedule/hourly rounds Problem: Pressure Injury - Risk of 
Goal: *Prevention of pressure injury Document Dom Scale and appropriate interventions in the flowsheet. Outcome: Progressing Towards Goal 
Pressure Injury Interventions: 
Sensory Interventions: Assess changes in LOC, Float heels, Keep linens dry and wrinkle-free, Minimize linen layers, Monitor skin under medical devices, Turn and reposition approx. every two hours (pillows and wedges if needed) Moisture Interventions: Absorbent underpads, Apply protective barrier, creams and emollients, Internal/External urinary devices, Maintain skin hydration (lotion/cream) Activity Interventions: Increase time out of bed, Pressure redistribution bed/mattress(bed type), PT/OT evaluation Mobility Interventions: Assess need for specialty bed, Pressure redistribution bed/mattress (bed type), PT/OT evaluation, Turn and reposition approx. every two hours(pillow and wedges) Nutrition Interventions: Discuss nutritional consult with provider Friction and Shear Interventions: Apply protective barrier, creams and emollients, Lift sheet, Lift team/patient mobility team, Transferring/repositioning devices

## 2019-02-11 NOTE — PROCEDURES
[address]  PULMONARY FUNCTION TEST    Name:  Braulio Malhotra  MR#:  [id number]  :  1945  ACCOUNT #:  [de-identified]  DATE OF SERVICE:  02/10/2019    :  Keena Schulte MD    PROCEDURE:  Bronchoscopy. MEDICATIONS:  IV Diprivan and Versed 2 mg. Informed consent from the patient's family. INDICATIONS:  Left lower lobe atelectasis secondary to mucus plugging. PROCEDURE:  Timeout was completed. The bronchoscope was introduced through the  endotracheal tube through the appropriate adaptor. The scope was positioned in the  distal trachea revealing normal ana lilia. There were minimal secretions in the right  upper, right middle, and right lower lobes. These were easily aspirated. There was  a large mucus clog at the proximal orifice of the left lower lobe. Multiple _____  Mucomyst was applied to this area with only a relatively modest decrease in size of  the clot. However, there were clear airways distal to the mucus plug/clog in the  left lower lobe. The left upper lobe airways also appeared free of large more distal  mucus plugging. The patient tolerated the procedure well. There was no  complication. The patient's daughter who was a family physician in [place], [place] was present during the procedure.       Miguel Angel Mon MD      [id number]  D:  02/10/2019 12:45  T:  2019 2:48  JOB #:  [id number]

## 2019-02-11 NOTE — PROGRESS NOTES
Bedside and Verbal shift change report given to Lucas Fofana RN/Dimitris SN by James Snyder. Report included the following information SBAR, Kardex, MAR, Recent Results and Cardiac Rhythm A-Fib with paced beats. 1000: Family and patient concerned about ambulation. Placed PT order. 1300: Asked nephrologist about Chacon removal. Will leave in one more day for accurate I/O's.  
 
1400: Patient complained of having hallucinations last night while attempting to sleep. No signs and symptoms of hallucinations during the day. Patient denies need for sleep medication. 1630: Called medical services to inquire about ordered Echo. Medical services stated they page Echo Tech. 1643: Echo completed.

## 2019-02-11 NOTE — PROGRESS NOTES
Initial Pulmonary / Critical Care Consultation Impression Acute hypoxic resp failure LLL pna/atx with copious resp secretions- s/p bronch 2/10 S/p Code ICE Extubated 2/10 after bronch Former smoker- none 30 years PEA arrest s/p PM 2/7/19 
afib Pancytopenia Pmhx\" CRI, DM, AFIB, Gout Plan 
==== Pulmonary toilet Wean oxygen Flutter valve Nebs with mucomyst 
IVF 
CV meds Glycemic control History / Subjective: 
Reason:  Respiratory failure Requesting Provider:  Dr Radha Hunter 2/7 Seen and examined. Events noted. Mechanically ventilated. Gas exchange preserved. S/P permanent PM. On Chung, weaning down 2/6 Seen and examined earlier today. Did well with SBT with RSBI of 60 and was extubated. Subsequently developed confusion and ABG showed resp acidosis and he was placed on bipap. 2/5 Seen and examined. Sedated, on TH protocol. CXR with mild bibasilar haziness- suspected atelectasis. Await rewarming 2/4 Didier Olson is a 72 y.o.  male who  has a past medical history of Acute encephalopathy (1/14/2016), Anemia (5/11/2017), Atrial fibrillation ([facility] [address] Painter esophagus, CAD (coronary artery disease), Diabetes ([facility] [address] Heart failure ([facility] [address] HTN, goal below 140/90, Retinopathy, diabetic, bilateral ([facility] [address] (3/11/2016), Stenosis of right carotid artery without cerebral infarction (1/18/2016), TIA (transient ischemic attack) (1/14/2016), and Vitamin D deficiency (3/1/2016). admitted 2/4/2019 with cardiac arrest 
 
Pt receiving infusion for gout when becam unresponsive PEA arrest intubated and given epi ROSC after approximately 10 minutes Head CT OK Placed on code ice protocol Not on vasopressors Has chronic afib. Current rhythm is afib Objective: 
 
Patient Vitals for the past 24 hrs: 
 Temp Pulse Resp BP SpO2  
02/11/19 1000  78 19 134/68 98 % 02/11/19 0900  88 18 (!) 133/95 98 % 02/11/19 0821     98 % 02/11/19 0800 97.2 °F (36.2 °C) 80 18 155/73 98 % 02/11/19 0700  82 20 105/56 98 % 02/11/19 0600  78 26 149/71 100 % 02/11/19 0500  86 17 136/69 99 % 02/11/19 0400 98.6 °F (37 °C) 82 19 136/72 100 % 02/11/19 0300  83 21 143/79 100 % 02/11/19 0200  86 18 138/74 100 % 02/11/19 0100  82 15 122/63 100 % 02/11/19 0000 99.3 °F (37.4 °C) 87 19 151/72 100 % 02/10/19 2300  84 17 121/61 100 % 02/10/19 2200  91 15 130/62 98 % 02/10/19 2100  86 18 149/67 98 % 02/10/19 2000 99.1 °F (37.3 °C) 85 22 154/82 100 % 02/10/19 1900  92 23 120/59 98 % 02/10/19 1800  97 30 126/69 97 % 02/10/19 1701  90  132/72   
02/10/19 1700  92 26 132/72 98 % 02/10/19 1615     100 % 02/10/19 1600 98.5 °F (36.9 °C) 90 21 122/58 98 % 02/10/19 1500  89 21 124/67 98 % 02/10/19 1432     99 % 02/10/19 1411  87 16  100 % 02/10/19 1400  82 18 157/66 100 % 02/10/19 1300  84 21 131/73 93 % 02/10/19 1200 98.6 °F (37 °C) 83 19 136/78 96 % 02/10/19 1123  78 22  98 % 02/10/19 1122     98 % 02/10/19 1100  79 22 181/80 96 % 02/10/19 1058  77  171/73  Intake/Output Summary (Last 24 hours) at 2/11/2019 1045 Last data filed at 2/11/2019 1019 Gross per 24 hour Intake 1541.41 ml Output 1150 ml Net 391.41 ml Blood Sugar: 
Glucose Date Value Ref Range Status 02/11/2019 155 (H) 65 - 100 mg/dL Final  
02/10/2019 190 (H) 65 - 100 mg/dL Final  
02/09/2019 204 (H) 65 - 100 mg/dL Final  
02/08/2019 156 (H) 65 - 100 mg/dL Final  
02/07/2019 225 (H) 65 - 100 mg/dL Final  
 
Exam: 
 
Awake alert No accessory use Symmetrical chest expansion Few loose rhonchi CV irreg 
abd Soft Warm and dry No edema Lab data was reviewed. Radiology images were independently viewed and available reports were reviewed. CXR - improved LLL atx Lab: 
Recent Labs  
  02/11/19 
0224 02/10/19 
0223 02/09/19 
0348 02/09/19 
0344 WBC 3.7* 3.7*  --  3.8* HGB 8.9* 8.7*  --  8.5* PLT 63* 69*  --  68*  145  --  144 K 3.8 3.5  --  3.6 * 112*  --  110* CO2 25 24  --  22 BUN 54* 59*  --  67* CREA 2.33* 2.37*  --  2.70* * 190*  --  204* CA 8.1* 8.1*  --  8.0*  
MG 2.6* 2.6*  --  2.7* PHOS  --   --   --  3.7 INR 1.1 1.1  --  1.2* TBILI 1.3*  --   --  1.3*  
SGOT 72*  --   --  106* LAC  --   --  0.9  --   
 
ABG: 
Recent Labs 02/09/19 
1035 PHI 7.456* PCO2I 36.8 PO2I 115* HCO3I 25.9 SO2I 99* FIO2I 30 All Micro Results Procedure Component Value Units Date/Time CULTURE, BLOOD, PAIRED [[id number]] Collected:  02/08/19 0116 Order Status:  Completed Specimen:  Blood Updated:  02/11/19 3513 Special Requests: NO SPECIAL REQUESTS Culture result: NO GROWTH 3 DAYS     
 CULTURE, BLOOD [[id number]] Collected:  02/06/19 1902 Order Status:  Completed Specimen:  Blood Updated:  02/11/19 6732 Special Requests: NO SPECIAL REQUESTS Culture result: NO GROWTH 5 DAYS     
 CULTURE, RESPIRATORY/SPUTUM/BRONCH Danna Bracket STAIN [[id number]] Collected:  02/08/19 0934 Order Status:  Completed Specimen:  Sputum from Bronchial Washing Updated:  02/09/19 1104 Special Requests: NO SPECIAL REQUESTS     
  GRAM STAIN 2+ WBCS SEEN     
   NO DEFINITE ORGANISM SEEN Culture result:    
  LIGHT NORMAL RESPIRATORY LAVERNE  
     
 CULTURE, BLOOD, PAIRED [[id number]] Collected:  02/04/19 1608 Order Status:  Completed Specimen:  Blood Updated:  02/09/19 0543 Special Requests: NO SPECIAL REQUESTS Culture result: NO GROWTH 5 DAYS     
 CULTURE, URINE [[id number]] Collected:  02/07/19 0021 Order Status:  Completed Specimen:  Urine from Chacon Specimen Updated:  02/08/19 5368 Special Requests: NO SPECIAL REQUESTS [place] Count <1,000 CFU/ML Culture result: NO GROWTH 1 DAY     
 CULTURE, RESPIRATORY/SPUTUM/BRONCH Orlene Blades [[id number]] Collected:  02/07/19 0930 Order Status:  Canceled Specimen:  Sputum,ET Suction Ariel Garcia MD  
 Addendum: 
 
Pt is awake and interactive, tolerating CPAP. Bronch demonstrated limited plug in LLL improved with mucomyst 
Improved LLL BS on exam 
 
Plan: extubate to NS

## 2019-02-11 NOTE — INTERDISCIPLINARY ROUNDS
IDR/SLIDR Summary Patient: Ivon Ramos MRN: [id number]    Age: 72 y.o. YOB: 1945 Room/Bed: [address] [place] [address] Admit Diagnosis: Cardiac arrest [facility]) [I46.9]  Principal Diagnosis: Cardiac arrest ([facility] [address] Goals: stable VS, stable breathing, remove dsouza Readmission:no Quality Measure: Not applicable VTE Prophylaxis: Mechanical 
Influenza Vaccine screening completed? YES Pneumococcal Vaccine screening completed? NO Mobility needs: Yes   Nutrition plan:No 
Consults:P.T, O.T., Speech, Respiratory and Case Management Financial concerns:Yes  Escalated to CM? YES 
RRAT Score: 39   Interventions:H2H Testing due for pt today? YES 
LOS: 7 days Expected length of stay 7? days Discharge plan: home   PCP: Melina Jay MD 
Transportation needs: Yes Days before discharge:two or more days before discharge Discharge disposition: Home Signed:  
 
Darwin Rosa RN 
2/11/2019 
11:26 PM

## 2019-02-11 NOTE — PROGRESS NOTES
Name: Paula Ramon MRN: [id number] : 1945 Assessment: 
MARYCRUZ- due to ATN from hypotension/Arrest/bradycardia. Showing renal recovery. Non-oliguric. Cr down to 2.3mg/dl CKD-3 due to DN and HTN. DM-2 Cardiac arrest; PEA- ? etiology. Code ice protocol; recurrence of niurka arrest on . S/p PPM 
Gout PANCYTOPENIA,chronic A Resp failure- reintubated; s/p bronch today with copious secretions Gout: was on pegloticase Plan/Recommendations: 
Ct supportive care Strict I/Os->leave dsouza in place Avoid nephrotoxins Am labs Subjective: 
Extubated yesterday. Family at bedside Exam: 
Visit Vitals /69 Pulse 80 Temp 97.2 °F (36.2 °C) Resp 16 Ht 5' 10\" (1.778 m) Wt 83.1 kg (183 lb 3.2 oz) SpO2 99% BMI 26.29 kg/m² NAD Decreased AE b/l bases No LE edema Labs/Data: 
 
Lab Results Component Value Date/Time WBC 3.7 (L) 2019 02:24 AM  
 Hemoglobin (POC) 6.2 2018 02:17 PM  
 HGB 8.9 (L) 2019 02:24 AM  
 Hematocrit (POC) 32 (L) 2019 12:23 PM  
 HCT 30.1 (L) 2019 02:24 AM  
 PLATELET 63 (L)  02:24 AM  
 MCV 94.7 2019 02:24 AM  
 
 
Lab Results Component Value Date/Time Sodium 144 2019 02:24 AM  
 Potassium 3.8 2019 02:24 AM  
 Chloride 111 (H) 2019 02:24 AM  
 CO2 25 2019 02:24 AM  
 Anion gap 8 2019 02:24 AM  
 Glucose 155 (H) 2019 02:24 AM  
 BUN 54 (H) 2019 02:24 AM  
 Creatinine 2.33 (H) 2019 02:24 AM  
 BUN/Creatinine ratio 23 (H) 2019 02:24 AM  
 GFR est AA 34 (L) 2019 02:24 AM  
 GFR est non-AA 28 (L) 2019 02:24 AM  
 Calcium 8.1 (L) 2019 02:24 AM  
 
 
Wt Readings from Last 3 Encounters:  
19 83.1 kg (183 lb 3.2 oz) 19 81.6 kg (180 lb) 19 81.6 kg (180 lb) Intake/Output Summary (Last 24 hours) at 2/11/2019 1349 Last data filed at 2/11/2019 1300 Gross per 24 hour Intake 1473.38 ml Output 1210 ml Net 263.38 ml Discussed with pts kids/wife and CCU RN 
 
 
Saide Dudley MD

## 2019-02-11 NOTE — PROGRESS NOTES
2000 Received report from Olga and [facility]. VSS, denies pain. Family at bedside. 2200 Complete chlorhexidine bed bath provided, tolerated well. 0200 Labs drawn. 0430 Portable chest xray completed. 0730 Bedside and Verbal shift change report given to Rio (oncoming nurse) by kM Tarango (offgoing nurse). Report included the following information SBAR, Kardex, Intake/Output, MAR, Recent Results and Alarm Parameters .

## 2019-02-11 NOTE — PROGRESS NOTES
Problem: Falls - Risk of 
Goal: *Absence of Falls Document Laurel Bravob Fall Risk and appropriate interventions in the flowsheet. Outcome: Progressing Towards Goal 
Fall Risk Interventions: 
Mobility Interventions: Patient to call before getting OOB Mentation Interventions: Evaluate medications/consider consulting pharmacy Medication Interventions: Patient to call before getting OOB, Evaluate medications/consider consulting pharmacy, Teach patient to arise slowly Elimination Interventions: Call light in reach Problem: Pressure Injury - Risk of 
Goal: *Prevention of pressure injury Document Dom Scale and appropriate interventions in the flowsheet. Outcome: Progressing Towards Goal 
Pressure Injury Interventions: 
Sensory Interventions: Discuss PT/OT consult with provider, Float heels Moisture Interventions: Check for incontinence Q2 hours and as needed Activity Interventions: Pressure redistribution bed/mattress(bed type), Increase time out of bed Mobility Interventions: HOB 30 degrees or less Nutrition Interventions: Discuss nutritional consult with provider Friction and Shear Interventions: Lift sheet, Lift team/patient mobility team

## 2019-02-11 NOTE — PROGRESS NOTES
Problem: Falls - Risk of 
Goal: *Absence of Falls Document Eliud Erazo Fall Risk and appropriate interventions in the flowsheet. Outcome: Progressing Towards Goal 
Fall Risk Interventions: 
Mobility Interventions: Patient to call before getting OOB Mentation Interventions: Evaluate medications/consider consulting pharmacy Medication Interventions: Patient to call before getting OOB, Evaluate medications/consider consulting pharmacy Elimination Interventions: Call light in reach Problem: Pressure Injury - Risk of 
Goal: *Prevention of pressure injury Document Dom Scale and appropriate interventions in the flowsheet. Outcome: Progressing Towards Goal 
Pressure Injury Interventions: 
Sensory Interventions: Discuss PT/OT consult with provider, Float heels Moisture Interventions: Check for incontinence Q2 hours and as needed, Assess need for specialty bed, Apply protective barrier, creams and emollients Activity Interventions: Pressure redistribution bed/mattress(bed type), Increase time out of bed Mobility Interventions: HOB 30 degrees or less, Pressure redistribution bed/mattress (bed type) Nutrition Interventions: Discuss nutritional consult with provider Friction and Shear Interventions: Lift team/patient mobility team, Lift sheet

## 2019-02-11 NOTE — PROGRESS NOTES
Hospitalist Progress Note Maryjane Morel MD 
Answering service: [phone] OR [phone] from in house phone Date of Service:  2019 NAME:  Chandu Montez :  1945 MRN:  [id number] Admission Summary:  
The patient is a 72-year-old gentleman with past medical history of anemia, atrial fibrillation, Painter's esophagus, coronary artery disease, diabetes, heart failure, cardiomyopathy, myocarditis, history of hypertension, diabetic retinopathy, right carotid artery stenosis, TIA and vitamin D deficiency who presents to the hospital from [facility]. Patient presented to [facility] today apparently in PEA. The history was primarily obtained from the ER physician as the patient is intubated and his wife is not present at the bedside. Interval history / Subjective:  
Extubated stable from cardiac pont doing better Transfer to CVSU if remains stable Assessment & Plan: 1. AHRF status post cardiac arrest/pulseless electrical activity POA  
- Patient initially on hypothermic protocol and started to re warm now normothermic 
- ECHO - EF WNL family requested a repeat one today - Cycle troponins. - trending down may be from CPR  
- EEG. non specific no seizure activity 
- Bradycardia resolved but coded again after extubation on  multiple times finally got a pacer thinking SSS 
- re intubated for airway and re extubated 2/10 after  bronch  
- he is on zosyn and zyvox ID following 2. Diabetes. The patient will be on sliding scale NovoLog insulin and Accu-Cheks, diet control and close monitoring BS at ICU goal 
 
3. Hypothyroidism.- Put patient on IV Synthroid. 4. CKD stage 3  
- worsen from hypotension from CPR / cardiac arrest? 
- nephrology on board 
- cr improving 3.7->3.6-> 3.4-> 3.1--> 2.7--> 2.37 no need for CVVH for now recovering 5. Chronic a.fib  
- On Eliquis 6  Anemia, appears to be chronic.   
- Monitor H and H. Further intervention per hospital course hgb 7.2 
- s/p 2 unit PRBC 2/8  hgb > 8 Mare Jean Baptiste 7. Fever -- The cause of the fever is not certain but CXR has gotten progressively worse at the left base and he is at risk for aspiration 
- on zyvox and zosyn 
- urine culture negative - will stop abx soon per ID 
 
8. Gastrointestinal PPX added simethicone 9. Low plt from the biologic agents? plt > 68 10. Abnormal LFT's from shock liver? Vs CLD can investigate as out pt 11. Coagulopathy from? Shock liver recovering  INR 1.1 Code status: Full DVT prophylaxis: SCD Prognosis Guarded Care Plan discussed with: Patient/Family and Nurse Disposition: TBD d/w family at bedside Hospital Problems  Date Reviewed: 2/4/2019 Codes Class Noted POA * (Principal) Cardiac arrest [facility]) ICD-10-CM: I46.9 ICD-9-CM: 427.5  2/4/2019 Unknown Review of Systems:  
Review of systems not obtained due to patient factors. Vital Signs:  
 Last 24hrs VS reviewed since prior progress note. Most recent are: 
Visit Vitals /68 Pulse 78 Temp 97.2 °F (36.2 °C) Resp 19 Ht 5' 10\" (1.778 m) Wt 83.1 kg (183 lb 3.2 oz) SpO2 98% BMI 26.29 kg/m² Intake/Output Summary (Last 24 hours) at 2/11/2019 1126 Last data filed at 2/11/2019 1019 Gross per 24 hour Intake 1513.3 ml Output 1340 ml Net 173.3 ml Physical Examination:  
 
 
     
Constitutional:  NAD  
ENT:  MMM Resp:  CTA   
CV:  Regular rhythm, PACED  
 GI:  Soft, non distended, non tender. bs+ Musculoskeletal:  No edema, warm, 2+ pulses throughout Neurologic:  ALERT AND AWAKE  folay cath + Data Review:  
 I personally reviewed  Image and labs Ct Head Wo Cont Result Date: 2/4/2019 IMPRESSION: No acute intracranial hemorrhage, mass or infarct. Xr Chest [facility] Result Date: 2/11/2019 IMPRESSION: Decreased small left pleural effusion and left basilar opacity. Xr Chest [facility] Result Date: 2/10/2019 IMPRESSION: Stable left effusion and underlying atelectasis Xr Chest [facility] Result Date: 2/9/2019 IMPRESSION: No interval change. Xr [facility] Result Date: 2/8/2019 IMPRESSION: ET tube is in satisfactory position. There is slight increasing haziness overlying left hemithorax with opacification left base consistent with left basilar atelectasis /airspace disease and left effusion. Xr [facility] Result Date: 2/7/2019 IMPRESSION: Left lung base opacification. No significant change. Xr [facility] Result Date: 2/6/2019 IMPRESSION: ET tube in satisfactory position. Worsening aeration of the left lung. Xr [facility] Result Date: 2/6/2019 Impression: Stable bilateral lower lobe atelectasis. Xr Chest [facility] Result Date: 2/5/2019 IMPRESSION: No significant change. Xr [facility] Result Date: 2/4/2019 IMPRESSION: Endotracheal tube appears to be in satisfactory position. Xr Abd Port  1 V Result Date: 2/6/2019 IMPRESSION: NG tube in the stomach. Xr Abd Port  1 V Result Date: 2/4/2019 IMPRESSION: NG tube in appropriate position. Labs:  
 
Recent Labs  
  02/11/19 
0224 02/10/19 
3432 WBC 3.7* 3.7* HGB 8.9* 8.7* HCT 30.1* 28.3*  
PLT 63* 69* Recent Labs  
  02/11/19 
0224 02/10/19 
0223 02/09/19 
0344  145 144  
K 3.8 3.5 3.6 * 112* 110* CO2 25 24 22 BUN 54* 59* 67* CREA 2.33* 2.37* 2.70* * 190* 204* CA 8.1* 8.1* 8.0*  
MG 2.6* 2.6* 2.7* PHOS  --   --  3.7 Recent Labs  
  02/11/19 0224 02/09/19 
0344 SGOT 72* 106* * 120* * 76 TBILI 1.3* 1.3* TP 6.3* 5.9* ALB 2.3* 2.3*  
GLOB 4.0 3.6 Recent Labs  
  02/11/19 
0224 02/10/19 
0223 02/09/19 
0344 INR 1.1 1.1 1.2* PTP 11.5* 11.4* 12.3* APTT 29.9 29.1 30.8 No results for input(s): FE, TIBC, PSAT, FERR in the last 72 hours. No results found for: FOL, RBCF No results for input(s): PH, PCO2, PO2 in the last 72 hours. No results for input(s): CPK, CKNDX, TROIQ in the last 72 hours. No lab exists for component: CPKMB Lab Results Component Value Date/Time Cholesterol, total 135 02/23/2018 11:31 AM  
 HDL Cholesterol 34 (L) 02/23/2018 11:31 AM  
 LDL, calculated 79 02/23/2018 11:31 AM  
 Triglyceride 111 02/23/2018 11:31 AM  
 CHOL/HDL Ratio 5.0 01/15/2016 03:49 AM  
 
Lab Results Component Value Date/Time Glucose (POC) 143 (H) 02/11/2019 06:17 AM  
 Glucose (POC) 210 (H) 02/10/2019 09:34 PM  
 Glucose (POC) 173 (H) 02/10/2019 05:36 PM  
 Glucose (POC) 142 (H) 02/10/2019 12:01 PM  
 Glucose (POC) 175 (H) 02/10/2019 05:55 AM  
 
Lab Results Component Value Date/Time Color YELLOW/STRAW 02/04/2019 04:08 PM  
 Appearance CLOUDY (A) 02/04/2019 04:08 PM  
 Specific gravity 1.012 02/04/2019 04:08 PM  
 pH (UA) 5.0 02/04/2019 04:08 PM  
 Protein 30 (A) 02/04/2019 04:08 PM  
 Glucose NEGATIVE  02/04/2019 04:08 PM  
 Ketone TRACE (A) 02/04/2019 04:08 PM  
 Bilirubin NEGATIVE  02/04/2019 04:08 PM  
 Urobilinogen 0.2 02/04/2019 04:08 PM  
 Nitrites NEGATIVE  02/04/2019 04:08 PM  
 Leukocyte Esterase NEGATIVE  02/04/2019 04:08 PM  
 Epithelial cells FEW 02/04/2019 04:08 PM  
 Bacteria NEGATIVE  02/04/2019 04:08 PM  
 WBC 10-20 02/04/2019 04:08 PM  
 RBC  02/04/2019 04:08 PM  
 
 
 
Medications Reviewed:  
 
Current Facility-Administered Medications Medication Dose Route Frequency  carvedilol (COREG) tablet 3.125 mg  3.125 mg Oral BID WITH MEALS  simethicone (MYLICON) tablet 80 mg  80 mg Oral BID  hydrALAZINE (APRESOLINE) 20 mg/mL injection 20 mg  20 mg IntraVENous Q6H PRN  
 lactobac ac& pc-s.therm-b.anim (LAVERNE Q/RISAQUAD)  1 Cap Oral DAILY  sodium chloride (NS) flush 5-40 mL  5-40 mL IntraVENous Q8H  
  sodium chloride (NS) flush 5-40 mL  5-40 mL IntraVENous PRN  
 albuterol-ipratropium (DUO-NEB) 2.5 MG-0.5 MG/3 ML  3 mL Nebulization QID RT  
 acetylcysteine (MUCOMYST) 200 mg/mL (20 %) solution 400 mg  2 mL Nebulization TID RT  
 linezolid in dextrose 5% (ZYVOX) IVPB premix in D5W 600 mg  600 mg IntraVENous Q12H  
 0.9% sodium chloride infusion 250 mL  250 mL IntraVENous PRN  
 0.9% sodium chloride infusion  5 mL/hr IntraVENous CONTINUOUS  
 morphine injection 2 mg  2 mg IntraVENous Q6H PRN  
 insulin glargine (LANTUS) injection 5 Units  5 Units SubCUTAneous QHS  acetaminophen (TYLENOL) tablet 650 mg  650 mg Oral Q6H PRN  
 bacitracin 500 unit/gram packet 1 Packet  1 Packet Topical PRN  piperacillin-tazobactam (ZOSYN) 3.375 g in 0.9% sodium chloride (MBP/ADV) 100 mL  3.375 g IntraVENous Q8H  
 pantoprazole (PROTONIX) 40 mg in sodium chloride 0.9% 10 mL injection  40 mg IntraVENous DAILY  sodium chloride (NS) flush 5-40 mL  5-40 mL IntraVENous Q8H  
 sodium chloride (NS) flush 5-40 mL  5-40 mL IntraVENous PRN  
 sodium chloride (NS) flush 5-40 mL  5-40 mL IntraVENous Q8H  
 sodium chloride (NS) flush 5-40 mL  5-40 mL IntraVENous PRN  
 glucose chewable tablet 16 g  4 Tab Oral PRN  
 dextrose (D50W) injection syrg 12.5-25 g  25-50 mL IntraVENous PRN  
 glucagon (GLUCAGEN) injection 1 mg  1 mg IntraMUSCular PRN  
 insulin lispro (HUMALOG) injection   SubCUTAneous Q6H  
 levothyroxine (SYNTHROID) injection 18.75 mcg  18.75 mcg IntraVENous Q24H  
 0.9% sodium chloride infusion  5 mL/hr IntraVENous CONTINUOUS  
 
______________________________________________________________________ EXPECTED LENGTH OF STAY: 2d 0h 
ACTUAL LENGTH OF STAY:          7 Tunde Ibrahim MD

## 2019-02-12 LAB
ALBUMIN SERPL-MCNC: 2.7 G/DL (ref 3.5–5)
ALBUMIN/GLOB SERPL: 0.6 {RATIO} (ref 1.1–2.2)
ALP SERPL-CCNC: 184 U/L (ref 45–117)
ALT SERPL-CCNC: 121 U/L (ref 12–78)
ANION GAP SERPL CALC-SCNC: 9 MMOL/L (ref 5–15)
APTT PPP: 27.2 SEC (ref 22.1–32)
AST SERPL-CCNC: 52 U/L (ref 15–37)
BACTERIA SPEC CULT: NORMAL
BILIRUB SERPL-MCNC: 1.1 MG/DL (ref 0.2–1)
BUN SERPL-MCNC: 54 MG/DL (ref 6–20)
BUN/CREAT SERPL: 24 (ref 12–20)
CALCIUM SERPL-MCNC: 8.8 MG/DL (ref 8.5–10.1)
CHLORIDE SERPL-SCNC: 108 MMOL/L (ref 97–108)
CO2 SERPL-SCNC: 24 MMOL/L (ref 21–32)
CREAT SERPL-MCNC: 2.27 MG/DL (ref 0.7–1.3)
CREAT UR-MCNC: 140 MG/DL
ECHO LA MAJOR AXIS: 5.31 CM
ECHO LV E' LATERAL VELOCITY: 9.56 CM/S
ECHO LV E' SEPTAL VELOCITY: 7.68 CM/S
ECHO LV INTERNAL DIMENSION DIASTOLIC: 4.39 CM (ref 4.2–5.9)
ECHO LV INTERNAL DIMENSION SYSTOLIC: 2.9 CM
ECHO LV IVSD: 1.45 CM (ref 0.6–1)
ECHO LV MASS 2D: 317.8 G (ref 88–224)
ECHO LV MASS INDEX 2D: 158.1 G/M2 (ref 49–115)
ECHO LV POSTERIOR WALL DIASTOLIC: 1.54 CM (ref 0.6–1)
ECHO MV A VELOCITY: 44.92 CM/S
ECHO MV AREA PHT: 5 CM2
ECHO MV E DECELERATION TIME (DT): 152.2 MS
ECHO MV E VELOCITY: 1.19 CM/S
ECHO MV E/A RATIO: 0.03
ECHO MV E/E' LATERAL: 0.12
ECHO MV E/E' RATIO (AVERAGED): 0.14
ECHO MV E/E' SEPTAL: 0.15
ECHO MV PRESSURE HALF TIME (PHT): 44.2 MS
ECHO PULMONARY ARTERY SYSTOLIC PRESSURE (PASP): 78 MMHG
ECHO TV REGURGITANT MAX VELOCITY: 435.19 CM/S
ECHO TV REGURGITANT PEAK GRADIENT: 75.8 MMHG
ERYTHROCYTE [DISTWIDTH] IN BLOOD BY AUTOMATED COUNT: 14.5 % (ref 11.5–14.5)
GLOBULIN SER CALC-MCNC: 4.5 G/DL (ref 2–4)
GLUCOSE BLD STRIP.AUTO-MCNC: 134 MG/DL (ref 65–100)
GLUCOSE BLD STRIP.AUTO-MCNC: 176 MG/DL (ref 65–100)
GLUCOSE BLD STRIP.AUTO-MCNC: 177 MG/DL (ref 65–100)
GLUCOSE BLD STRIP.AUTO-MCNC: 186 MG/DL (ref 65–100)
GLUCOSE BLD STRIP.AUTO-MCNC: 187 MG/DL (ref 65–100)
GLUCOSE SERPL-MCNC: 158 MG/DL (ref 65–100)
HCT VFR BLD AUTO: 32.9 % (ref 36.6–50.3)
HGB BLD-MCNC: 9.8 G/DL (ref 12.1–17)
INR PPP: 1.2 (ref 0.9–1.1)
MAGNESIUM SERPL-MCNC: 2.6 MG/DL (ref 1.6–2.4)
MCH RBC QN AUTO: 28.2 PG (ref 26–34)
MCHC RBC AUTO-ENTMCNC: 29.8 G/DL (ref 30–36.5)
MCV RBC AUTO: 94.8 FL (ref 80–99)
NRBC # BLD: 0 K/UL (ref 0–0.01)
NRBC BLD-RTO: 0 PER 100 WBC
PHOSPHATE SERPL-MCNC: 4.6 MG/DL (ref 2.6–4.7)
PLATELET # BLD AUTO: 71 K/UL (ref 150–400)
PMV BLD AUTO: 11.6 FL (ref 8.9–12.9)
POTASSIUM SERPL-SCNC: 4.3 MMOL/L (ref 3.5–5.1)
PROT SERPL-MCNC: 7.2 G/DL (ref 6.4–8.2)
PROTHROMBIN TIME: 11.6 SEC (ref 9–11.1)
RBC # BLD AUTO: 3.47 M/UL (ref 4.1–5.7)
SERVICE CMNT-IMP: ABNORMAL
SERVICE CMNT-IMP: NORMAL
SODIUM SERPL-SCNC: 141 MMOL/L (ref 136–145)
SODIUM UR-SCNC: 19 MMOL/L
THERAPEUTIC RANGE,PTTT: NORMAL SECS (ref 58–77)
WBC # BLD AUTO: 4.9 K/UL (ref 4.1–11.1)

## 2019-02-12 PROCEDURE — 93005 ELECTROCARDIOGRAM TRACING: CPT

## 2019-02-12 PROCEDURE — 74011250637 HC RX REV CODE- 250/637: Performed by: INTERNAL MEDICINE

## 2019-02-12 PROCEDURE — 74011250636 HC RX REV CODE- 250/636: Performed by: INTERNAL MEDICINE

## 2019-02-12 PROCEDURE — 85730 THROMBOPLASTIN TIME PARTIAL: CPT

## 2019-02-12 PROCEDURE — 74011636637 HC RX REV CODE- 636/637: Performed by: HOSPITALIST

## 2019-02-12 PROCEDURE — 97530 THERAPEUTIC ACTIVITIES: CPT

## 2019-02-12 PROCEDURE — 84100 ASSAY OF PHOSPHORUS: CPT

## 2019-02-12 PROCEDURE — 97116 GAIT TRAINING THERAPY: CPT

## 2019-02-12 PROCEDURE — 85027 COMPLETE CBC AUTOMATED: CPT

## 2019-02-12 PROCEDURE — 94664 DEMO&/EVAL PT USE INHALER: CPT

## 2019-02-12 PROCEDURE — 74011000258 HC RX REV CODE- 258: Performed by: HOSPITALIST

## 2019-02-12 PROCEDURE — 77010033678 HC OXYGEN DAILY

## 2019-02-12 PROCEDURE — 74011000250 HC RX REV CODE- 250: Performed by: INTERNAL MEDICINE

## 2019-02-12 PROCEDURE — 80053 COMPREHEN METABOLIC PANEL: CPT

## 2019-02-12 PROCEDURE — 97162 PT EVAL MOD COMPLEX 30 MIN: CPT

## 2019-02-12 PROCEDURE — 36415 COLL VENOUS BLD VENIPUNCTURE: CPT

## 2019-02-12 PROCEDURE — 74011250636 HC RX REV CODE- 250/636: Performed by: HOSPITALIST

## 2019-02-12 PROCEDURE — 94640 AIRWAY INHALATION TREATMENT: CPT

## 2019-02-12 PROCEDURE — 84300 ASSAY OF URINE SODIUM: CPT

## 2019-02-12 PROCEDURE — 85610 PROTHROMBIN TIME: CPT

## 2019-02-12 PROCEDURE — 82962 GLUCOSE BLOOD TEST: CPT

## 2019-02-12 PROCEDURE — 65660000001 HC RM ICU INTERMED STEPDOWN

## 2019-02-12 PROCEDURE — 82570 ASSAY OF URINE CREATININE: CPT

## 2019-02-12 PROCEDURE — 74011250637 HC RX REV CODE- 250/637: Performed by: HOSPITALIST

## 2019-02-12 PROCEDURE — 94760 N-INVAS EAR/PLS OXIMETRY 1: CPT

## 2019-02-12 PROCEDURE — 83735 ASSAY OF MAGNESIUM: CPT

## 2019-02-12 RX ORDER — SODIUM CHLORIDE 9 MG/ML
75 INJECTION, SOLUTION INTRAVENOUS CONTINUOUS
Status: DISCONTINUED | OUTPATIENT
Start: 2019-02-12 | End: 2019-02-14

## 2019-02-12 RX ORDER — GUAIFENESIN 600 MG/1
600 TABLET, EXTENDED RELEASE ORAL EVERY 12 HOURS
Status: DISCONTINUED | OUTPATIENT
Start: 2019-02-12 | End: 2019-02-22 | Stop reason: HOSPADM

## 2019-02-12 RX ADMIN — PIPERACILLIN AND TAZOBACTAM 3.38 G: 3; .375 INJECTION, POWDER, FOR SOLUTION INTRAVENOUS at 11:26

## 2019-02-12 RX ADMIN — IPRATROPIUM BROMIDE AND ALBUTEROL SULFATE 3 ML: .5; 3 SOLUTION RESPIRATORY (INHALATION) at 09:30

## 2019-02-12 RX ADMIN — LEVOTHYROXINE SODIUM 25 MCG: 25 TABLET ORAL at 07:10

## 2019-02-12 RX ADMIN — GUAIFENESIN 600 MG: 600 TABLET, EXTENDED RELEASE ORAL at 21:40

## 2019-02-12 RX ADMIN — Medication 10 ML: at 21:41

## 2019-02-12 RX ADMIN — CARVEDILOL 3.12 MG: 6.25 TABLET, FILM COATED ORAL at 08:08

## 2019-02-12 RX ADMIN — Medication 10 ML: at 21:40

## 2019-02-12 RX ADMIN — Medication 1 CAPSULE: at 09:27

## 2019-02-12 RX ADMIN — PIPERACILLIN AND TAZOBACTAM 3.38 G: 3; .375 INJECTION, POWDER, FOR SOLUTION INTRAVENOUS at 03:24

## 2019-02-12 RX ADMIN — Medication 10 ML: at 11:27

## 2019-02-12 RX ADMIN — ACETAMINOPHEN 650 MG: 325 TABLET ORAL at 08:08

## 2019-02-12 RX ADMIN — ACETYLCYSTEINE 400 MG: 200 SOLUTION ORAL; RESPIRATORY (INHALATION) at 17:29

## 2019-02-12 RX ADMIN — ACETYLCYSTEINE 400 MG: 200 SOLUTION ORAL; RESPIRATORY (INHALATION) at 09:30

## 2019-02-12 RX ADMIN — INSULIN GLARGINE 5 UNITS: 100 INJECTION, SOLUTION SUBCUTANEOUS at 21:40

## 2019-02-12 RX ADMIN — Medication 10 ML: at 07:16

## 2019-02-12 RX ADMIN — Medication 10 ML: at 07:14

## 2019-02-12 RX ADMIN — PIPERACILLIN AND TAZOBACTAM 3.38 G: 3; .375 INJECTION, POWDER, FOR SOLUTION INTRAVENOUS at 21:40

## 2019-02-12 RX ADMIN — IPRATROPIUM BROMIDE AND ALBUTEROL SULFATE 3 ML: .5; 3 SOLUTION RESPIRATORY (INHALATION) at 17:30

## 2019-02-12 RX ADMIN — PANTOPRAZOLE SODIUM 40 MG: 40 TABLET, DELAYED RELEASE ORAL at 07:10

## 2019-02-12 RX ADMIN — SODIUM CHLORIDE 75 ML/HR: 900 INJECTION, SOLUTION INTRAVENOUS at 11:55

## 2019-02-12 RX ADMIN — ACETAMINOPHEN 650 MG: 325 TABLET ORAL at 15:22

## 2019-02-12 RX ADMIN — SIMETHICONE CHEW TAB 80 MG 80 MG: 80 TABLET ORAL at 09:27

## 2019-02-12 RX ADMIN — SIMETHICONE CHEW TAB 80 MG 80 MG: 80 TABLET ORAL at 17:58

## 2019-02-12 NOTE — PROGRESS NOTES
Pulmonary / Critical Care progress note Impression Acute hypoxic resp failure LLL pna/atx with copious resp secretions- s/p bronch 2/10 S/p Code ICE Extubated 2/10 after bronch Former smoker- none 30 years PEA arrest s/p PM 2/7/19 
afib Pancytopenia Pmhx\" CRI, DM, AFIB, Gout Plan 
==== Pulmonary toilet Wean oxygen with saturations above 90% Flutter valve Nebs with mucomyst, continue for now OOB as tolerated Chest x-ray tomorrow History / Subjective: 
Reason:  Respiratory failure Requesting Provider:  Dr Wilson Service 2/12 Overall handling congestion better. No dyspnea 2/7 Seen and examined. Events noted. Mechanically ventilated. Gas exchange preserved. S/P permanent PM. On Chung, weaning down 2/6 Seen and examined earlier today. Did well with SBT with RSBI of 60 and was extubated. Subsequently developed confusion and ABG showed resp acidosis and he was placed on bipap. 2/5 Seen and examined. Sedated, on TH protocol. CXR with mild bibasilar haziness- suspected atelectasis. Await rewarming 2/4 David Jiménez is a 72 y.o.  male who  has a past medical history of Acute encephalopathy (1/14/2016), Anemia (5/11/2017), Atrial fibrillation ([address] Painter esophagus, CAD (coronary artery disease), Diabetes ([address] Heart failure ([address] HTN, goal below 140/90, Retinopathy, diabetic, bilateral ([address] (3/11/2016), Stenosis of right carotid artery without cerebral infarction (1/18/2016), TIA (transient ischemic attack) (1/14/2016), and Vitamin D deficiency (3/1/2016). admitted 2/4/2019 with cardiac arrest 
 
Pt receiving infusion for gout when becam unresponsive PEA arrest intubated and given epi ROSC after approximately 10 minutes Head CT OK Placed on code ice protocol Not on vasopressors Has chronic afib. Current rhythm is afib Objective: 
 
Patient Vitals for the past 24 hrs: 
 Temp Pulse Resp BP SpO2  
02/12/19 0755 97.7 °F (36.5 °C) 90 18 (!) 186/96 99 % 02/12/19 0312 98.2 °F (36.8 °C) 88 18 (!) 146/92 98 % 02/12/19 0113  89  130/86 99 % 02/12/19 0103 97.6 °F (36.4 °C) 91 18  100 % 02/12/19 0000 98.6 °F (37 °C) 80 17 139/85 97 % 02/11/19 2300  89 20 102/58 94 % 02/11/19 2200  99 17 151/84 96 % 02/11/19 2100  92 18 148/70 97 % 02/11/19 2000 98.8 °F (37.1 °C) 90 19 170/83 100 % 02/11/19 1942     100 % 02/11/19 1900  94 23 156/87 97 % 02/11/19 1800  91 18 (!) 160/91 97 % 02/11/19 1700  86 19 144/85 97 % 02/11/19 1632    167/87   
02/11/19 1600 97.4 °F (36.3 °C) 91 21 167/87 98 % 02/11/19 1507     100 % 02/11/19 1500  80 18 153/87 99 % 02/11/19 1400  80 18 153/87 99 % 02/11/19 1300  80 16  99 % 02/11/19 1200 97.2 °F (36.2 °C) 86 20 162/69 99 % 02/11/19 1131     97 % 02/11/19 1000  78 19 134/68 98 % 02/11/19 0900  88 18 (!) 133/95 98 % Intake/Output Summary (Last 24 hours) at 2/12/2019 6964 Last data filed at 2/12/2019 0000 Gross per 24 hour Intake 820 ml Output 820 ml Net 0 ml Blood Sugar: 
Glucose Date Value Ref Range Status 02/12/2019 158 (H) 65 - 100 mg/dL Final  
02/11/2019 155 (H) 65 - 100 mg/dL Final  
02/10/2019 190 (H) 65 - 100 mg/dL Final  
02/09/2019 204 (H) 65 - 100 mg/dL Final  
02/08/2019 156 (H) 65 - 100 mg/dL Final  
 
Exam: 
 
Awake alert No accessory use Symmetrical chest expansion Few loose rhonchi CV irreg 
abd Soft Warm and dry No edema Lab data was reviewed. Radiology images were independently viewed and available reports were reviewed. CXR - improved LLL atx Lab: 
Recent Labs  
  02/12/19 
0213 02/11/19 0224 02/10/19 
6836 WBC 4.9 3.7* 3.7* HGB 9.8* 8.9* 8.7* PLT 71* 63* 69*  144 145  
K 4.3 3.8 3.5  111* 112* CO2 24 25 24 BUN 54* 54* 59* CREA 2.27* 2.33* 2.37* * 155* 190* CA 8.8 8.1* 8.1*  
MG 2.6* 2.6* 2.6* PHOS 4.6  --   --   
INR 1.2* 1.1 1.1 TBILI 1.1* 1.3*  --   
SGOT 52* 72*  --   
 
 ABG: 
Recent Labs 02/09/19 
1035 PHI 7.456* PCO2I 36.8 PO2I 115* HCO3I 25.9 SO2I 99* FIO2I 30 All Micro Results Procedure Component Value Units Date/Time CULTURE, BLOOD, PAIRED [[id number]] Collected:  02/08/19 0116 Order Status:  Completed Specimen:  Blood Updated:  02/12/19 5593 Special Requests: NO SPECIAL REQUESTS Culture result: NO GROWTH 4 DAYS     
 CULTURE, BLOOD [[id number]] Collected:  02/06/19 1902 Order Status:  Completed Specimen:  Blood Updated:  02/12/19 8315 Special Requests: NO SPECIAL REQUESTS Culture result: NO GROWTH 6 DAYS     
 CULTURE, RESPIRATORY/SPUTUM/BRONCH Minnette Hoe STAIN [[id number]] Collected:  02/08/19 0934 Order Status:  Completed Specimen:  Sputum from Bronchial Washing Updated:  02/11/19 1110 Special Requests: NO SPECIAL REQUESTS     
  GRAM STAIN 2+ WBCS SEEN     
   NO DEFINITE ORGANISM SEEN Culture result:    
  LIGHT NORMAL RESPIRATORY LAVERNE  
     
 CULTURE, BLOOD, PAIRED [[id number]] Collected:  02/04/19 1608 Order Status:  Completed Specimen:  Blood Updated:  02/09/19 0543 Special Requests: NO SPECIAL REQUESTS Culture result: NO GROWTH 5 DAYS     
 CULTURE, URINE [[id number]] Collected:  02/07/19 0021 Order Status:  Completed Specimen:  Urine from [place] Specimen Updated:  02/08/19 6112 Special Requests: NO SPECIAL REQUESTS [place] Count <1,000 CFU/ML Culture result: NO GROWTH 1 DAY     
 CULTURE, RESPIRATORY/SPUTUM/BRONCH Aubrey Gallus [[id number]] Collected:  02/07/19 0930 Order Status:  Canceled Specimen:  Sputum,ET Suction MILO Maharaj

## 2019-02-12 NOTE — PROGRESS NOTES
Hospitalist Progress Note [place], DO Answering service: [phone] OR [phone] from in house phone Date of Service:  2019 NAME:  Asha Webster :  1945 MRN:  [id number] Admission Summary:  
The patient is a 29-year-old gentleman with past medical history of anemia, atrial fibrillation, Painter's esophagus, coronary artery disease, diabetes, heart failure, cardiomyopathy, myocarditis, history of hypertension, diabetic retinopathy, right carotid artery stenosis, TIA and vitamin D deficiency who presents to the hospital from [facility]. Patient presented to [address] [place] [address] today apparently in PEA. The history was primarily obtained from the ER physician as the patient is intubated and his wife is not present at the bedside. Interval history / Subjective:  
Patient seen and examined. Rapid response this AM when patient up with PT for first time. Patient then had hypotension with ambulation during the day. Also noted to have new onset hematuria, dsouza changed. Wean oxygen, continue incentive spirometer Assessment & Plan:  
 
AHRF status post cardiac arrest/pulseless electrical activity POA  
-s/p hypothermia protocol, extubated  
-EEG non specific no seizure activity 
-Bradycardia resolved but coded again after extubation on  multiple times -s/p pacemaker 
-re intubated for airway and re extubated 2/10 after bronch  
-s/p zosyn and zyvox. ID previously consulted Hematuria:  
-dsouza replaced without improvement, monitor H/H 
-per patient has penile implant, will consider urology consult Diabetes, Type II: Lantus and SSI Hypothyroidism: synthroid CKD stage 3  
- worsen from hypotension from CPR / cardiac arrest? 
- nephrology on board, creatinine improving Chronic a.fib: previously on Eliquis Anemia, appears to be chronic: stable 
-s/p 2 unit PRBC 2/8  hgb > 8 
 
 Fever: resolved, s/p antibiotics Gastrointestinal PPX added simethicone Low plt from the biologic agents? plt > 68 Abnormal LFT's from shock liver? Vs CLD can investigate as outpt Coagulopathy from? Shock liver recovering  INR 1.1 Code status: Full DVT prophylaxis: SCD Prognosis Guarded Care Plan discussed with: Patient/Family and Nurse Disposition: TBD Hospital Problems  Date Reviewed: 2/4/2019 Codes Class Noted POA * (Principal) Cardiac arrest [facility]) ICD-10-CM: I46.9 ICD-9-CM: 427.5  2/4/2019 Unknown Review of Systems:  
Review of systems not obtained due to patient factors. Vital Signs:  
 Last 24hrs VS reviewed since prior progress note. Most recent are: 
Visit Vitals /68 (BP 1 Location: Right arm, BP Patient Position: Post activity;Supine) Pulse 82 Temp 97.3 °F (36.3 °C) Resp 16 Ht 5' 10\" (1.778 m) Wt 85.6 kg (188 lb 11.4 oz) SpO2 96% BMI 27.08 kg/m² Intake/Output Summary (Last 24 hours) at 2/12/2019 1827 Last data filed at 2/12/2019 4477 Gross per 24 hour Intake 941.25 ml Output 935 ml Net 6.25 ml Physical Examination:  
 
 
     
Constitutional:  NAD  
ENT:  MMM Resp:  CTA   
CV:  Regular rhythm, PACED  
 GI:  Soft, non distended, non tender. bs+ Musculoskeletal:  No edema, warm, 2+ pulses throughout Neurologic:  ALERT AND AWAKE  folay cath + Data Review:  
 I personally reviewed  Image and labs Ct Head Wo Cont Result Date: 2/4/2019 IMPRESSION: No acute intracranial hemorrhage, mass or infarct. Xr Chest [facility] Result Date: 2/11/2019 IMPRESSION: Decreased small left pleural effusion and left basilar opacity. Xr Chest [facility] Result Date: 2/10/2019 IMPRESSION: Stable left effusion and underlying atelectasis Xr Chest [facility] Result Date: 2/9/2019 IMPRESSION: No interval change. Xr Chest [facility] Result Date: 2/8/2019 IMPRESSION: ET tube is in satisfactory position. There is slight increasing haziness overlying left hemithorax with opacification left base consistent with left basilar atelectasis /airspace disease and left effusion. Xr Chest [facility] Result Date: 2/7/2019 IMPRESSION: Left lung base opacification. No significant change. Xr Chest [facility] Result Date: 2/6/2019 IMPRESSION: ET tube in satisfactory position. Worsening aeration of the left lung. Xr Chest [facility] Result Date: 2/6/2019 Impression: Stable bilateral lower lobe atelectasis. Xr Chest [facility] Result Date: 2/5/2019 IMPRESSION: No significant change. Xr Chest [facility] Result Date: 2/4/2019 IMPRESSION: Endotracheal tube appears to be in satisfactory position. Xr Abd Port  1 V Result Date: 2/6/2019 IMPRESSION: NG tube in the stomach. Xr Abd Port  1 V Result Date: 2/4/2019 IMPRESSION: NG tube in appropriate position. Labs:  
 
Recent Labs  
  02/12/19 7268 02/11/19 0224 WBC 4.9 3.7* HGB 9.8* 8.9* HCT 32.9* 30.1* PLT 71* 63* Recent Labs  
  02/12/19 
5187 02/11/19 
0224 02/10/19 
1961  144 145  
K 4.3 3.8 3.5  111* 112* CO2 24 25 24 BUN 54* 54* 59* CREA 2.27* 2.33* 2.37* * 155* 190* CA 8.8 8.1* 8.1*  
MG 2.6* 2.6* 2.6* PHOS 4.6  --   --   
 
Recent Labs  
  02/12/19 
0213 02/11/19 0224 SGOT 52* 72* * 126* * 170* TBILI 1.1* 1.3* TP 7.2 6.3* ALB 2.7* 2.3*  
GLOB 4.5* 4.0 Recent Labs  
  02/12/19 
0148 02/11/19 
0224 02/10/19 
1597 INR 1.2* 1.1 1.1 PTP 11.6* 11.5* 11.4* APTT 27.2 29.9 29.1 No results for input(s): FE, TIBC, PSAT, FERR in the last 72 hours. No results found for: FOL, RBCF No results for input(s): PH, PCO2, PO2 in the last 72 hours. No results for input(s): CPK, CKNDX, TROIQ in the last 72 hours. No lab exists for component: CPKMB Lab Results Component Value Date/Time  Cholesterol, total 135 02/23/2018 11:31 AM  
 HDL Cholesterol 34 (L) 02/23/2018 11:31 AM  
 LDL, calculated 79 02/23/2018 11:31 AM  
 Triglyceride 111 02/23/2018 11:31 AM  
 CHOL/HDL Ratio 5.0 01/15/2016 03:49 AM  
 
Lab Results Component Value Date/Time Glucose (POC) 177 (H) 02/12/2019 04:08 PM  
 Glucose (POC) 176 (H) 02/12/2019 11:16 AM  
 Glucose (POC) 186 (H) 02/12/2019 10:31 AM  
 Glucose (POC) 134 (H) 02/12/2019 07:09 AM  
 Glucose (POC) 206 (H) 02/11/2019 09:46 PM  
 
Lab Results Component Value Date/Time Color YELLOW/STRAW 02/04/2019 04:08 PM  
 Appearance CLOUDY (A) 02/04/2019 04:08 PM  
 Specific gravity 1.012 02/04/2019 04:08 PM  
 pH (UA) 5.0 02/04/2019 04:08 PM  
 Protein 30 (A) 02/04/2019 04:08 PM  
 Glucose NEGATIVE  02/04/2019 04:08 PM  
 Ketone TRACE (A) 02/04/2019 04:08 PM  
 Bilirubin NEGATIVE  02/04/2019 04:08 PM  
 Urobilinogen 0.2 02/04/2019 04:08 PM  
 Nitrites NEGATIVE  02/04/2019 04:08 PM  
 Leukocyte Esterase NEGATIVE  02/04/2019 04:08 PM  
 Epithelial cells FEW 02/04/2019 04:08 PM  
 Bacteria NEGATIVE  02/04/2019 04:08 PM  
 WBC 10-20 02/04/2019 04:08 PM  
 RBC  02/04/2019 04:08 PM  
 
 
 
Medications Reviewed:  
 
Current Facility-Administered Medications Medication Dose Route Frequency  0.9% sodium chloride infusion  75 mL/hr IntraVENous CONTINUOUS  
 pantoprazole (PROTONIX) tablet 40 mg  40 mg Oral ACB  levothyroxine (SYNTHROID) tablet 25 mcg  25 mcg Oral ACB  carvedilol (COREG) tablet 3.125 mg  3.125 mg Oral BID WITH MEALS  simethicone (MYLICON) tablet 80 mg  80 mg Oral BID  hydrALAZINE (APRESOLINE) 20 mg/mL injection 20 mg  20 mg IntraVENous Q6H PRN  
 lactobac ac& pc-s.therm-b.anim (LAVERNE Q/RISAQUAD)  1 Cap Oral DAILY  sodium chloride (NS) flush 5-40 mL  5-40 mL IntraVENous Q8H  
 sodium chloride (NS) flush 5-40 mL  5-40 mL IntraVENous PRN  
 albuterol-ipratropium (DUO-NEB) 2.5 MG-0.5 MG/3 ML  3 mL Nebulization QID RT  
 acetylcysteine (MUCOMYST) 200 mg/mL (20 %) solution 400 mg  2 mL Nebulization TID RT  
 0.9% sodium chloride infusion 250 mL  250 mL IntraVENous PRN  
 0.9% sodium chloride infusion  5 mL/hr IntraVENous CONTINUOUS  
 morphine injection 2 mg  2 mg IntraVENous Q6H PRN  
 insulin glargine (LANTUS) injection 5 Units  5 Units SubCUTAneous QHS  acetaminophen (TYLENOL) tablet 650 mg  650 mg Oral Q6H PRN  
 bacitracin 500 unit/gram packet 1 Packet  1 Packet Topical PRN  piperacillin-tazobactam (ZOSYN) 3.375 g in 0.9% sodium chloride (MBP/ADV) 100 mL  3.375 g IntraVENous Q8H  
 sodium chloride (NS) flush 5-40 mL  5-40 mL IntraVENous Q8H  
 sodium chloride (NS) flush 5-40 mL  5-40 mL IntraVENous PRN  
 sodium chloride (NS) flush 5-40 mL  5-40 mL IntraVENous Q8H  
 sodium chloride (NS) flush 5-40 mL  5-40 mL IntraVENous PRN  
 glucose chewable tablet 16 g  4 Tab Oral PRN  
 dextrose (D50W) injection syrg 12.5-25 g  25-50 mL IntraVENous PRN  
 glucagon (GLUCAGEN) injection 1 mg  1 mg IntraMUSCular PRN  
 insulin lispro (HUMALOG) injection   SubCUTAneous Q6H  
 0.9% sodium chloride infusion  5 mL/hr IntraVENous CONTINUOUS  
 
______________________________________________________________________ EXPECTED LENGTH OF STAY: 2d 0h 
ACTUAL LENGTH OF STAY:          8 Cecy Hubbard DO

## 2019-02-12 NOTE — PROGRESS NOTES
2000 Received report from Sherren Pickles and assumed care. VSS, denies pain. Family at bedside. 2230 Complete chlorhexidine bed bath provided, tolerated well. 
2300 Dr. Ermelinda Banegas at bedside, update provided. Daughters updated and questions answered. 0015 TRANSFER - OUT REPORT: 
 
Verbal report given to Linda(name) on Daryn Vivas  being transferred to CVSU(unit) for routine progression of care Report consisted of patients Situation, Background, Assessment and  
Recommendations(SBAR). Information from the following report(s) SBAR, Kardex, Intake/Output, MAR, Recent Results and Alarm Parameters  was reviewed with the receiving nurse. Lines:  
Peripheral IV 02/09/19 Left Hand (Active) Site Assessment Clean 2/12/2019 12:00 AM  
Phlebitis Assessment 0 2/12/2019 12:00 AM  
Infiltration Assessment 0 2/12/2019 12:00 AM  
Dressing Status Clean, dry, & intact 2/12/2019 12:00 AM  
Dressing Type Transparent 2/12/2019 12:00 AM  
Hub Color/Line Status Pink; Infusing 2/12/2019 12:00 AM  
Action Taken Open ports on tubing capped 2/12/2019 12:00 AM  
Alcohol Cap Used Yes 2/12/2019 12:00 AM  
   
Peripheral IV 02/11/19 Left Antecubital (Active) Site Assessment Clean 2/12/2019 12:00 AM  
Phlebitis Assessment 0 2/12/2019 12:00 AM  
Infiltration Assessment 0 2/12/2019 12:00 AM  
Dressing Status Clean, dry, & intact 2/12/2019 12:00 AM  
Dressing Type Transparent 2/12/2019 12:00 AM  
Hub Color/Line Status Pink;Flushed;Capped 2/12/2019 12:00 AM  
Action Taken Open ports on tubing capped 2/12/2019 12:00 AM  
Alcohol Cap Used Yes 2/12/2019 12:00 AM  
  
 
Opportunity for questions and clarification was provided. Patient transported with: 
 Monitor O2 @ 2 liters 2 nurses and family

## 2019-02-12 NOTE — PROGRESS NOTES
NUTRITION COMPLETE ASSESSMENT 
 
RECOMMENDATIONS:  
1. Encourage high protein foods with each meal 
 - continue to document % meals consumed in flowsheet 2. Monitor BG with further insulin adjustment as needed 3. Daily weights Interventions/Plan:  
Food/Nutrient Delivery:  Trial Glucerna and [employer], Boost Pudding Assessment:  
Reason for Assessment: [x] Reassessment Diet: Cardiac, Consistent carb Nutritionally Significant Medications: [x] Reviewed & Includes: lantus (5 units), Elida-Q, synthroid, protonix, simethicone, NS @ 75ml/hr Meal Intake:  
Patient Vitals for the past 100 hrs: 
 % Diet Eaten 02/12/19 1109 25 % 02/12/19 0755 50 % 02/11/19 1019 30 % Subjective: \"I'm doing good with pudding, ice cream, fruit, and juice. \" 
Objective: 
Pt admitted with Cardiac arrest. PMHx: CAD, HTN, DM, heart failure/CM, others noted. Code ICE on admission with following cardiac arrest. Extubated x 2, lastly on 2/11. MARYCRUZ on CKD3 improved, renal following. Pt and family visited today. He reports fair/poor intake improving slowly. Most of foods low in protein so discussed trial of supplements. Pt agreeable to trial of Glucerna BID (440kcal, 20g protein) and Magic Cup (290kcal, 9g protein) and Boost Pudding (240kcal, 7g protein). Will continue to follow for PO intake, protein consumptions Estimated Nutrition Needs:  
Kcals/day: 2374 Kcals/day(1887-2045kcal) Protein: 82 g(1g/kg) Fluid: 2050 ml(25 ml/kg) Based On: [place](x 1.2-1.3) Weight Used: Actual wt(82 kg) Pt expected to meet estimated nutrient needs:  []   Yes     []  No  [x] Unable to predict at this time Nutrition Diagnosis:  
1. Inadequate protein intake related to poor appetite as evidenced by dietary recall of low protein foods Goals:   
 Consumption of at least 3 supplements per day and 50% meals in 3-4 days Monitoring & Evaluation:  
 - Liquid meal replacement, Total energy intake, Protein intake - Weight/weight change, Electrolyte and renal profile Previous Nutrition Goals Met:  Progressing Previous Recommendations:    N/A Education & Discharge Needs: 
 [] None Identified 
 [x] Identified and addressed  
 [] Participated in care plan, discharge planning, and/or interdisciplinary rounds Cultural, Jewish and ethnic food preferences identified:   None Skin Integrity: [x]Intact  []Other Edema: [x]None []Other Last BM: 2/12 Food Allergies: [x]None []Other Anthropometrics:   
Weight Loss Metrics 2/12/2019 2/4/2019 2/4/2019 2/4/2019 1/17/2019 1/15/2019 1/14/2019 Today's Wt 188 lb 11.4 oz - 180 lb - 180 lb 185 lb 3 oz -  
BMI - 27.08 kg/m2 - 25.83 kg/m2 25.83 kg/m2 - 26.57 kg/m2 Last 3 Recorded Weights in this Encounter 02/11/19 0400 02/11/19 1632 02/12/19 0000 Weight: 83.1 kg (183 lb 3.2 oz) 83 kg (183 lb) 85.6 kg (188 lb 11.4 oz) Weight Source: Bed Height: 5' 10\" (177.8 cm), Body mass index is 27.08 kg/m². IBW : 75.3 kg (166 lb), % IBW (Calculated): 110.36 % 
 ,   
 
Labs:   
Lab Results Component Value Date/Time Sodium 141 02/12/2019 02:13 AM  
 Potassium 4.3 02/12/2019 02:13 AM  
 Chloride 108 02/12/2019 02:13 AM  
 CO2 24 02/12/2019 02:13 AM  
 Glucose 158 (H) 02/12/2019 02:13 AM  
 BUN 54 (H) 02/12/2019 02:13 AM  
 Creatinine 2.27 (H) 02/12/2019 02:13 AM  
 Calcium 8.8 02/12/2019 02:13 AM  
 Magnesium 2.6 (H) 02/12/2019 02:13 AM  
 Phosphorus 4.6 02/12/2019 02:13 AM  
 Albumin 2.7 (L) 02/12/2019 02:13 AM  
 
Lab Results Component Value Date/Time Hemoglobin A1c 6.4 (H) 02/05/2019 12:02 AM  
 Hemoglobin A1c (POC) 6.3 08/02/2016 12:53 PM  
 
Jevon Guajardo RD Pager #[phone] or [phone]

## 2019-02-12 NOTE — PROGRESS NOTES
EKG reviewed Shows a.fib, rate controlled, intermittent pacing, incomplete RBBB and deep TW inversions in the anterolateral leads that have evolved over the course of this admission Could be neurogenic in origin, but ischemia is also possible Discussed with family Recommend a Lexiscan stress MPI to further risk stratify NPO from MN Stress tomorrow

## 2019-02-12 NOTE — PROGRESS NOTES
Name: Volodymyr Melendez MRN: [id number] : 1945 Assessment: 
MARYCRUZ- due to ATN from hypotension/Arrest/bradycardia. Showing renal recovery. Non-oliguric. Cr down to 2.27mg/dl CKD-3 due to DN and HTN. DM-2 Cardiac arrest; PEA- ? etiology. Code ice protocol; recurrence of niurka arrest on . S/p PPM 
Gout PANCYTOPENIA,chronic A Resp failure- reintubated; s/p bronch today with copious secretions Gout: was on pegloticase Plan/Recommendations: 
Start IV NS at 75cc/hr Urine Na/Urine Cr 
Encourage intake Strict I/Os->consider dsouza removal by tomorrow Avoid nephrotoxins Am labs Subjective: 
+Orthostasis event noted-> RR called. SBP in the 80s. Intake marginal per family Exam: 
Visit Vitals /65 (BP 1 Location: Left arm, BP Patient Position: At rest) Pulse 89 Temp 97.5 °F (36.4 °C) Resp 18 Ht 5' 10\" (1.778 m) Wt 85.6 kg (188 lb 11.4 oz) SpO2 92% BMI 27.08 kg/m² NAD Decreased AE b/l bases No LE edema Labs/Data: 
 
Lab Results Component Value Date/Time WBC 4.9 2019 02:13 AM  
 Hemoglobin (POC) 6.2 2018 02:17 PM  
 HGB 9.8 (L) 2019 02:13 AM  
 Hematocrit (POC) 32 (L) 2019 12:23 PM  
 HCT 32.9 (L) 2019 02:13 AM  
 PLATELET 71 (L)  02:13 AM  
 MCV 94.8 2019 02:13 AM  
 
 
Lab Results Component Value Date/Time  Sodium 141 2019 02:13 AM  
 Potassium 4.3 2019 02:13 AM  
 Chloride 108 2019 02:13 AM  
 CO2 24 2019 02:13 AM  
 Anion gap 9 2019 02:13 AM  
 Glucose 158 (H) 2019 02:13 AM  
 BUN 54 (H) 2019 02:13 AM  
 Creatinine 2.27 (H) 2019 02:13 AM  
 BUN/Creatinine ratio 24 (H) 2019 02:13 AM  
 GFR est AA 35 (L) 2019 02:13 AM  
 GFR est non-AA 29 (L) 2019 02:13 AM  
 Calcium 8.8 2019 02:13 AM  
 
 Wt Readings from Last 3 Encounters:  
02/12/19 85.6 kg (188 lb 11.4 oz) 02/04/19 81.6 kg (180 lb) 01/17/19 81.6 kg (180 lb) Intake/Output Summary (Last 24 hours) at 2/12/2019 1156 Last data filed at 2/12/2019 1053 Gross per 24 hour Intake 565 ml Output 980 ml Net -415 ml Discussed with pts kids/wife and RN Haja Stephen MD

## 2019-02-12 NOTE — PROGRESS NOTES
Problem: Mobility Impaired (Adult and Pediatric) Goal: *Acute Goals and Plan of Care (Insert Text) Physical Therapy Goals Initiated 2/12/2019 1. Patient will move from supine to sit and sit to supine , scoot up and down and roll side to side in bed with supervision/set-up within 7 days. 2.  Patient will perform sit to/from stand with minimal assistance/contact guard assist within 7 days. 3.  Patient will ambulate 50 feet with least restrictive assistive device and minimal assistance/contact guard assist within 7 days. 4.  Patient will ascend/descend 12 stairs with bilateral handrail(s) with minimal assistance/contact guard assist within 7 days. 5.  Patient will verbally and functionally recall 3/3 pacemaker precautions within 7 days. 6.  Patient will improve Tinetti score by 4-5 points within 7 days. physical Therapy EVALUATION Patient: Sia Deras (55 y.o. male) Date: 2/12/2019 Primary Diagnosis: Cardiac arrest ([facility] [address] [I46.9] Procedure(s) (LRB): 
INSERT PPM SINGLE VENTRICULAR (N/A) 5 Days Post-Op Precautions:   Fall, L pacemaker precautions ASSESSMENT : 
Based on the objective data described below, the patient presents with gross generalized weakness s/p prolonged best rest with intubation and complicated hospital course, impaired balance with increased fall risk, orthostatic BP but asymptomatic with supine to sit, and newly enforced pacemaker precautions all limiting safety and independence with functional mobility. Patient overall mod Ax1-2 people for functional mobility. Multiple cues to adhere to pacemaker precautions. Orthostatic with supine to sit but BP still stable at 120's 70's and patient asymptomatic. Requesting to ambulate to bathroom for BM on commode. Increased trunk sway, LOB, shuffling gait, and weakness noted during ambulation, requiring mod Ax2.  After sitting on commode, patient with increased L lateral lean, slumped over, slower to respond and less alert to both PT and daughter. Rapid response called. BP stable once taken and patient returned to baseline after increased time and stand pivot to w/c then back to bed. Patient will need inpatient rehab at d/c as he is well below baseline after prolonged bed rest and was previously independent. Patient will benefit from skilled intervention to address the above impairments. Patients rehabilitation potential is considered to be Good Factors which may influence rehabilitation potential include:  
[]         None noted 
[]         Mental ability/status [x]         Medical condition- complicated course with multiple arrests, code ICE, and pacemaker 
[]         Home/family situation and support systems 
[]         Safety awareness [x]         Pain tolerance/management- s/p chest compressions []         Other: PLAN : 
Recommendations and Planned Interventions: 
[x]           Bed Mobility Training             [x]    Neuromuscular Re-Education 
[x]           Transfer Training                   []    Orthotic/Prosthetic Training 
[x]           Gait Training                         []    Modalities [x]           Therapeutic Exercises           []    Edema Management/Control 
[x]           Therapeutic Activities            [x]    Patient and Family Training/Education 
[]           Other (comment): Frequency/Duration: Patient will be followed by physical therapy  5 times a week to address goals. Discharge Recommendations: Inpatient Rehab Further Equipment Recommendations for Discharge: TBD at rehab SUBJECTIVE:  
Patient stated I feel like a  baby again.  during ambulation to bathroom OBJECTIVE DATA SUMMARY:  
HISTORY:   
Past Medical History:  
Diagnosis Date  Acute encephalopathy 2016  Anemia 2017  Atrial fibrillation ([facility] [address]  Painter esophagus  CAD (coronary artery disease)  Diabetes ([facility] [address]  Heart failure ([facility] [address] Cardiomyopathy, myocarditis  HTN, goal below 140/90  Retinopathy, diabetic, bilateral ([address] 3/11/2016  Stenosis of right carotid artery without cerebral infarction 1/18/2016  TIA (transient ischemic attack) 1/14/2016  Vitamin D deficiency 3/1/2016 Nephrology ordered and follow 2/22/16 Past Surgical History:  
Procedure Laterality Date  COLONOSCOPY N/A 6/23/2016 COLONOSCOPY performed by Maryjean Boeck, MD at [facility] ENDOSCOPY  COLONOSCOPY N/A 8/6/2018 COLONOSCOPY performed by Maryjean Boeck, MD at [facility] ENDOSCOPY  COLONOSCOPY N/A 8/30/2018 COLONOSCOPY performed by Tong Welch MD at [address] [place] [address] HX ENDOSCOPY  06/27/2016 CAPSULE; normal small bowel  HX ENDOSCOPY  06/23/2016  
 upper endoscopy  HX UROLOGICAL  2013  NE INS NEW/RPLC PRM PACEMAKER W/TRANSV ELTRD VENTR N/A 2/7/2019 INSERT PPM SINGLE VENTRICULAR performed [place] [address], [facility] CATH LAB Prior Level of Function/Home Situation: Independent, wife previously on PICC team at [facility], daughter who is a critical care step down RN in [place], daughter who is a family medicine MD, active, enjoyed tennis until he began to suffer from gout Personal factors and/or comorbidities impacting plan of care: [facility] Home Situation Home Environment: Private residence # Steps to Enter: 2 Rails to Enter: Yes Hand Rails : Bilateral 
Wheelchair Ramp: No 
One/Two Story Residence: Two story # of Interior Steps: 12 Interior Rails: Both Lift Chair Available: No 
Living Alone: No 
Support Systems: Child(efren), Spouse/Significant Other/Partner Patient Expects to be Discharged to[de-identified] Private residence Current DME Used/Available at Home: None EXAMINATION/PRESENTATION/DECISION MAKING: Critical Behavior: 
Neurologic State: Alert Orientation Level: Oriented X4 Cognition: Appropriate decision making, Appropriate for age attention/concentration, Appropriate safety awareness, Follows commands Hearing: Auditory Auditory Impairment: NoneRange Of Motion: 
AROM: Generally decreased, functional 
Strength:   
Strength: Generally decreased, functional 
Tone & Sensation:  
Tone: Normal 
Sensation: Impaired(numbness/tingling in feet at baseline) Coordination: 
Coordination: Generally decreased, functional 
Functional Mobility: 
Bed Mobility: 
Supine to Sit: Moderate assistance Scooting: Maximum assistance Transfers: 
Sit to Stand: Moderate assistance;Assist x2 Stand to Sit: Moderate assistance Balance:  
Sitting: Impaired; Without support Sitting - Static: Fair (occasional); Poor (constant support) Sitting - Dynamic: Fair (occasional) Standing: Impaired Standing - Static: Fair Standing - Dynamic : Fair(worsening to poor with fatigue)Ambulation/Gait Training:Distance (ft): 10 Feet (ft) Assistive Device: Gait belt Ambulation - Level of Assistance: Moderate assistance;Assist x2 Gait Abnormalities: Decreased step clearance; Path deviations; Shuffling gait Base of Support: Widened Speed/Mariella: Shuffled; Slow Step Length: Right shortened;Left shortened Functional Measure: 
Tinetti test: 
 
Sitting Balance: 0 Arises: 0 Attempts to Rise: 0 Immediate Standing Balance: 0 Standing Balance: 0 Nudged: 0 Eyes Closed: 0 Turn 360 Degrees - Continuous/Discontinuous: 0 Turn 360 Degrees - Steady/Unsteady: 0 Sitting Down: 0 Balance Score: 0 Indication of Gait: 0 
R Step Length/Height: 1 L Step Length/Height: 1 
R Foot Clearance: 1 L Foot Clearance: 1 Step Symmetry: 1 Step Continuity: 0 Path: 0 Trunk: 0 Walking Time: 0 Gait Score: 5 Total Score: 5 Tinetti Tool Score Risk of Falls 
<19 = High Fall Risk 19-24 = Moderate Fall Risk 25-28 = Low Fall Risk Tinetti ME. Performance-Oriented Assessment of Mobility Problems in Elderly Patients. Cai 66; [id number]. (Scoring Description: PT Bulletin Feb. 10, 1993) Older adults: Naida Fitzgerald et al, 2009; n = [address] elderly evaluated with ABC, EBEN, ADL, and IADL) · Mean EBEN score for males aged 69-68 years = 26.21(3.40) · Mean EBEN score for females age 69-68 years = 25.16(4.30) · Mean EBEN score for males over 80 years = 23.29(6.02) · Mean EBEN score for females over 80 years = 17.20(8.32) Physical Therapy Evaluation Charge Determination History Examination Presentation Decision-Making HIGH Complexity :3+ comorbidities / personal factors will impact the outcome/ POC  HIGH Complexity : 4+ Standardized tests and measures addressing body structure, function, activity limitation and / or participation in recreation  MEDIUM Complexity : Evolving with changing characteristics  Other outcome measures Tinetti 5/28  HIGH Based on the above components, the patient evaluation is determined to be of the following complexity level: MEDIUM Pain: 
Pain Scale 1: Numeric (0 - 10) Pain Intensity 1: 0 Activity Tolerance:  
91% on room air, suspect vagal/orthostatic episode in bathroom leading to rapid response Please refer to the flowsheet for vital signs taken during this treatment. After treatment:  
[]         Patient left in no apparent distress sitting up in chair 
[x]         Patient left in no apparent distress in bed 
[x]         Call bell left within reach [x]         Nursing notified 
[x]         Caregiver present 
[]         Bed alarm activated COMMUNICATION/EDUCATION:  
The patients plan of care was discussed with: Registered Nurse. [x]         Fall prevention education was provided and the patient/caregiver indicated understanding. [x]         Patient/family have participated as able in goal setting and plan of care. [x]         Patient/family agree to work toward stated goals and plan of care. []         Patient understands intent and goals of therapy, but is neutral about his/her participation. []         Patient is unable to participate in goal setting and plan of care.  
 
Thank you for this referral. 
 Nerissa Mendiola, PT, DPT Time Calculation: 44 mins

## 2019-02-12 NOTE — PROGRESS NOTES
0730:  Bedside shift change report given to Scott Elias RN (oncoming nurse) by Alicia Cotton RN (offgoing nurse). Report included the following information SBAR, Kardex, Procedure Summary, Intake/Output, MAR and Recent Results. 1030:  Patient ambulated to restroom with PT, patient became weak and was \"slumped over\" and \"weak on R side\". PT stayed with patient and RN was notified by PT. Charge RN to bedside and Rapid Response initiated. Neuro exam complete and WDL. BP and HR WDL. Patient assisted to wheelchair and then to bed without difficulty. EKG obtained. Attending, Dr. Giselle Fisher, and Dr. Anita Perez made aware. 1109:  BP 80/40, patient c/o headache and dry mouth. BP rechecked 107/65. Dr. Giselle Fisher made aware and no new orders received. Dr. Schwab Phi at bedside and orders received to flush dsouza and for normal saline. 1330:  Dr. Howard Feeling rounding on patient, stress test ordered for tomorrow. 1530:  Vitals and reassessment complete. /84 supine in bed. Patient assisted to chair with two RNs. Patient stated he felt \"dizzy\". BP 70/51, 82/56, and then patient stated he felt \"better\" and /64. Will keep patient in chair for short time and then assist back to bed. Instructed patient to alert RN if feeling dizzy or lightheaded. 1615:  Patient assisted back to bed with two RNs. Patient had no complaints during ambulation and transfer but once resting in bed he stated he felt \"dizzy and like his blood pressure was low\". BP 70/38 and with 10 minutes 130/68. Urine draining from dsouza remains bloody. Dr. Giselle Fisher made aware and orders received to take out dsouza and replace with new dsouza. 1930:  Bedside shift change report given to Gloria Tapia RN (oncoming nurse) by Scott Elias RN (offgoing nurse). Report included the following information SBAR, Kardex, Procedure Summary, Intake/Output, MAR and Recent Results. Problem: Falls - Risk of 
Goal: *Absence of Falls Document Sunil Leigh Fall Risk and appropriate interventions in the flowsheet. Outcome: Progressing Towards Goal 
Fall Risk Interventions: 
Mobility Interventions: Patient to call before getting OOB, PT Consult for mobility concerns, PT Consult for assist device competence, Utilize gait belt for transfers/ambulation, Utilize walker, cane, or other assistive device Mentation Interventions: Adequate sleep, hydration, pain control, Door open when patient unattended, Evaluate medications/consider consulting pharmacy, Family/sitter at bedside, Increase mobility, More frequent rounding, Reorient patient, Room close to nurse's station Medication Interventions: Teach patient to arise slowly, Patient to call before getting OOB, Utilize gait belt for transfers/ambulation Elimination Interventions: Patient to call for help with toileting needs, Urinal in reach, Call light in reach Problem: Pressure Injury - Risk of 
Goal: *Prevention of pressure injury Document Dom Scale and appropriate interventions in the flowsheet. Outcome: Progressing Towards Goal 
Pressure Injury Interventions: 
Sensory Interventions: Keep linens dry and wrinkle-free, Minimize linen layers, Monitor skin under medical devices, Check visual cues for pain, Chair cushion, Assess changes in LOC, Discuss PT/OT consult with provider Moisture Interventions: Absorbent underpads, Minimize layers, Check for incontinence Q2 hours and as needed Activity Interventions: Increase time out of bed, Pressure redistribution bed/mattress(bed type), PT/OT evaluation Mobility Interventions: HOB 30 degrees or less, Pressure redistribution bed/mattress (bed type), PT/OT evaluation Nutrition Interventions: Document food/fluid/supplement intake, Discuss nutritional consult with provider, Offer support with meals,snacks and hydration Friction and Shear Interventions: HOB 30 degrees or less, Lift sheet

## 2019-02-12 NOTE — PROGRESS NOTES
0010: TRANSFER - IN REPORT: 
 
Verbal report received from Imelda Vora RN (name) on Rima Gonzales  being received from CCU (unit) for routine progression of care Report consisted of patients Situation, Background, Assessment and  
Recommendations(SBAR). Information from the following report(s) SBAR, Kardex, ED Summary, Procedure Summary, Intake/Output, MAR, Recent Results, Med Rec Status and Cardiac Rhythm Afib was reviewed with the receiving nurse. Opportunity for questions and clarification was provided. 7880: Pt arrived via bed, monitor, and oxygen from CCU and transferred to unit's bed. Assessment completed upon patients arrival to unit and care assumed. Daughter at bedside. 0800: Bedside and Verbal shift change report given to Jamel Miller RN (oncoming nurse) by Des Martin RN (offgoing nurse). Report included the following information SBAR, Kardex, ED Summary, Procedure Summary, Intake/Output, MAR, Recent Results, Med Rec Status and Cardiac Rhythm Afib vs Paced.

## 2019-02-12 NOTE — PROGRESS NOTES
Orders received, chart reviewed, patient currently in the middle of a code called for possible TIA. Will f/u for OT when appropriate. Currently, bedrest with OT and PT only. Please update activity orders for early mobility with nursing as well. Thank you. PLOF: independent. MI, 2/6 pacemaker, extubated 2/11. Wife independent, one daughter physician, one an , and other 3 live in area.

## 2019-02-12 NOTE — PROGRESS NOTES
Rounded on Uatsdin patients and provided Anointing of the Sick at request of patient Fr. Bib Quijano

## 2019-02-12 NOTE — PROGRESS NOTES
[facility] Cardiology Progress Note Date of service: 2/12/2019 Subjective: 
Mr Kalli Buitrago has been transferred to stepdown Feels well - no new complaints Hasn't really worked with PT yet Objective: 
 
Visit Vitals BP (!) 186/96 (BP 1 Location: Left arm, BP Patient Position: At rest) Pulse 90 Temp 97.7 °F (36.5 °C) Resp 18 Ht 5' 10\" (1.778 m) Wt 85.6 kg (188 lb 11.4 oz) SpO2 99% BMI 27.08 kg/m² Physical Exam  
Constitutional: He appears well-developed and well-nourished. HENT:  
Head: Normocephalic and atraumatic. Eyes: Conjunctivae are normal. No scleral icterus. Neck: No JVD present. Cardiovascular: Normal rate. An irregularly irregular rhythm present. Exam reveals no gallop. Murmur heard. Early systolic murmur is present with a grade of 2/6. Pulmonary/Chest: Effort normal. No stridor. No respiratory distress. He has no wheezes. He has rhonchi. He has no rales. Abdominal: Soft. He exhibits no distension. Musculoskeletal: He exhibits no edema or deformity. Neurological: He is alert. Skin: Skin is warm and dry. Data reviewed: 
Recent Results (from the past 12 hour(s)) GLUCOSE, POC Collection Time: 02/11/19  9:46 PM  
Result Value Ref Range Glucose (POC) 206 (H) 65 - 100 mg/dL Performed by Marco Corey PROTHROMBIN TIME + INR Collection Time: 02/12/19  2:13 AM  
Result Value Ref Range INR 1.2 (H) 0.9 - 1.1 Prothrombin time 11.6 (H) 9.0 - 11.1 sec PTT Collection Time: 02/12/19  2:13 AM  
Result Value Ref Range aPTT 27.2 22.1 - 32.0 sec  
 aPTT, therapeutic range     58.0 - 77.0 SECS  
CBC W/O DIFF Collection Time: 02/12/19  2:13 AM  
Result Value Ref Range WBC 4.9 4.1 - 11.1 K/uL  
 RBC 3.47 (L) 4.10 - 5.70 M/uL HGB 9.8 (L) 12.1 - 17.0 g/dL HCT 32.9 (L) 36.6 - 50.3 % MCV 94.8 80.0 - 99.0 FL  
 MCH 28.2 26.0 - 34.0 PG  
 MCHC 29.8 (L) 30.0 - 36.5 g/dL  
 RDW 14.5 11.5 - 14.5 % PLATELET 71 (L) 773 - 400 K/uL MPV 11.6 8.9 - 12.9 FL  
 NRBC 0.0 0  WBC ABSOLUTE NRBC 0.00 0.00 - 0.01 K/uL MAGNESIUM Collection Time: 02/12/19  2:13 AM  
Result Value Ref Range Magnesium 2.6 (H) 1.6 - 2.4 mg/dL PHOSPHORUS Collection Time: 02/12/19  2:13 AM  
Result Value Ref Range Phosphorus 4.6 2.6 - 4.7 MG/DL  
METABOLIC PANEL, COMPREHENSIVE Collection Time: 02/12/19  2:13 AM  
Result Value Ref Range Sodium 141 136 - 145 mmol/L Potassium 4.3 3.5 - 5.1 mmol/L Chloride 108 97 - 108 mmol/L  
 CO2 24 21 - 32 mmol/L Anion gap 9 5 - 15 mmol/L Glucose 158 (H) 65 - 100 mg/dL BUN 54 (H) 6 - 20 MG/DL Creatinine 2.27 (H) 0.70 - 1.30 MG/DL  
 BUN/Creatinine ratio 24 (H) 12 - 20 GFR est AA 35 (L) >60 ml/min/1.73m2 GFR est non-AA 29 (L) >60 ml/min/1.73m2 Calcium 8.8 8.5 - 10.1 MG/DL Bilirubin, total 1.1 (H) 0.2 - 1.0 MG/DL  
 ALT (SGPT) 121 (H) 12 - 78 U/L  
 AST (SGOT) 52 (H) 15 - 37 U/L Alk. phosphatase 184 (H) 45 - 117 U/L Protein, total 7.2 6.4 - 8.2 g/dL Albumin 2.7 (L) 3.5 - 5.0 g/dL Globulin 4.5 (H) 2.0 - 4.0 g/dL A-G Ratio 0.6 (L) 1.1 - 2.2 GLUCOSE, POC Collection Time: 02/12/19  7:09 AM  
Result Value Ref Range Glucose (POC) 134 (H) 65 - 100 mg/dL Performed by Prudence Matthias Cultures NGTD Assessment: 1. Cardiac arrest. PEA. With events of 2/6 it now seems like that his underlying mechanism may have been bradyarrhythmia from SSS.  
  
2. SSS: s/p PPM 2/7. 
  
3. Acute respiratory failure Resolved - extubated 2/10 4. Chronic a.fib - rate controlled currently 
  
5. CKD stage III Renal indices stable 6. Anemia, probably from CKD 
  
7. Gout 8. Thrombocytopenia: unclear etiology Fluctuating - fairly stable currently 9. Pulmonary hypertension 
  
Plan: 
 
Continue current cares & medical therapies Needs PT and incentive spirometry Discussed with family - will probably arrange for an outpatient stress test to r/o ischemia Signed: 
Latonya Renae MD 
Interventional Cardiology 2/12/2019

## 2019-02-12 NOTE — PROGRESS NOTES
Responded to RRT. No family present and no pastoral care needs at the time. Chaplains will follow as needed. Chaplain Peck MDiv, MS, [phone] 
[phone]

## 2019-02-13 ENCOUNTER — APPOINTMENT (OUTPATIENT)
Dept: NUCLEAR MEDICINE | Age: 74
DRG: 242 | End: 2019-02-13
Attending: INTERNAL MEDICINE
Payer: MEDICARE

## 2019-02-13 ENCOUNTER — APPOINTMENT (OUTPATIENT)
Dept: GENERAL RADIOLOGY | Age: 74
DRG: 242 | End: 2019-02-13
Attending: INTERNAL MEDICINE
Payer: MEDICARE

## 2019-02-13 ENCOUNTER — APPOINTMENT (OUTPATIENT)
Dept: NON INVASIVE DIAGNOSTICS | Age: 74
DRG: 242 | End: 2019-02-13
Attending: INTERNAL MEDICINE
Payer: MEDICARE

## 2019-02-13 LAB
ALBUMIN SERPL-MCNC: 2.6 G/DL (ref 3.5–5)
ALBUMIN/GLOB SERPL: 0.6 {RATIO} (ref 1.1–2.2)
ALP SERPL-CCNC: 174 U/L (ref 45–117)
ALT SERPL-CCNC: 91 U/L (ref 12–78)
ANION GAP SERPL CALC-SCNC: 10 MMOL/L (ref 5–15)
APTT PPP: 28.5 SEC (ref 22.1–32)
AST SERPL-CCNC: 35 U/L (ref 15–37)
ATRIAL RATE: 86 BPM
BACTERIA SPEC CULT: NORMAL
BILIRUB SERPL-MCNC: 1 MG/DL (ref 0.2–1)
BUN SERPL-MCNC: 63 MG/DL (ref 6–20)
BUN/CREAT SERPL: 25 (ref 12–20)
CALCIUM SERPL-MCNC: 8.7 MG/DL (ref 8.5–10.1)
CALCULATED R AXIS, ECG10: 30 DEGREES
CALCULATED T AXIS, ECG11: -170 DEGREES
CHLORIDE SERPL-SCNC: 109 MMOL/L (ref 97–108)
CO2 SERPL-SCNC: 20 MMOL/L (ref 21–32)
CREAT SERPL-MCNC: 2.54 MG/DL (ref 0.7–1.3)
DIAGNOSIS, 93000: NORMAL
ERYTHROCYTE [DISTWIDTH] IN BLOOD BY AUTOMATED COUNT: 14.3 % (ref 11.5–14.5)
GLOBULIN SER CALC-MCNC: 4.2 G/DL (ref 2–4)
GLUCOSE BLD STRIP.AUTO-MCNC: 125 MG/DL (ref 65–100)
GLUCOSE BLD STRIP.AUTO-MCNC: 145 MG/DL (ref 65–100)
GLUCOSE BLD STRIP.AUTO-MCNC: 153 MG/DL (ref 65–100)
GLUCOSE BLD STRIP.AUTO-MCNC: 153 MG/DL (ref 65–100)
GLUCOSE SERPL-MCNC: 154 MG/DL (ref 65–100)
HCT VFR BLD AUTO: 34 % (ref 36.6–50.3)
HGB BLD-MCNC: 10.2 G/DL (ref 12.1–17)
INR PPP: 1.2 (ref 0.9–1.1)
MAGNESIUM SERPL-MCNC: 2.5 MG/DL (ref 1.6–2.4)
MCH RBC QN AUTO: 28.1 PG (ref 26–34)
MCHC RBC AUTO-ENTMCNC: 30 G/DL (ref 30–36.5)
MCV RBC AUTO: 93.7 FL (ref 80–99)
NRBC # BLD: 0 K/UL (ref 0–0.01)
NRBC BLD-RTO: 0 PER 100 WBC
PHOSPHATE SERPL-MCNC: 5.3 MG/DL (ref 2.6–4.7)
PLATELET # BLD AUTO: 73 K/UL (ref 150–400)
PMV BLD AUTO: 11 FL (ref 8.9–12.9)
POTASSIUM SERPL-SCNC: 4.5 MMOL/L (ref 3.5–5.1)
PROT SERPL-MCNC: 6.8 G/DL (ref 6.4–8.2)
PROTHROMBIN TIME: 12.2 SEC (ref 9–11.1)
Q-T INTERVAL, ECG07: 402 MS
QRS DURATION, ECG06: 90 MS
QTC CALCULATION (BEZET), ECG08: 478 MS
RBC # BLD AUTO: 3.63 M/UL (ref 4.1–5.7)
SERVICE CMNT-IMP: ABNORMAL
SERVICE CMNT-IMP: NORMAL
SODIUM SERPL-SCNC: 139 MMOL/L (ref 136–145)
STRESS BASELINE HR: 111 BPM
STRESS ESTIMATED WORKLOAD: 1 METS
STRESS EXERCISE DUR MIN: NORMAL
STRESS PEAK DIAS BP: 116 MMHG
STRESS PEAK SYS BP: 185 MMHG
STRESS PERCENT HR ACHIEVED: 88 %
STRESS POST PEAK HR: 111 BPM
STRESS RATE PRESSURE PRODUCT: NORMAL BPM*MMHG
STRESS ST DEPRESSION: 0 MM
STRESS ST ELEVATION: 0 MM
STRESS TARGET HR: 127 BPM
THERAPEUTIC RANGE,PTTT: NORMAL SECS (ref 58–77)
VENTRICULAR RATE, ECG03: 85 BPM
WBC # BLD AUTO: 6 K/UL (ref 4.1–11.1)

## 2019-02-13 PROCEDURE — 36415 COLL VENOUS BLD VENIPUNCTURE: CPT

## 2019-02-13 PROCEDURE — 74011000250 HC RX REV CODE- 250: Performed by: INTERNAL MEDICINE

## 2019-02-13 PROCEDURE — 74011250636 HC RX REV CODE- 250/636: Performed by: INTERNAL MEDICINE

## 2019-02-13 PROCEDURE — 97535 SELF CARE MNGMENT TRAINING: CPT

## 2019-02-13 PROCEDURE — 74011636637 HC RX REV CODE- 636/637: Performed by: HOSPITALIST

## 2019-02-13 PROCEDURE — 80053 COMPREHEN METABOLIC PANEL: CPT

## 2019-02-13 PROCEDURE — 85027 COMPLETE CBC AUTOMATED: CPT

## 2019-02-13 PROCEDURE — 74011000258 HC RX REV CODE- 258: Performed by: HOSPITALIST

## 2019-02-13 PROCEDURE — 74011250637 HC RX REV CODE- 250/637: Performed by: INTERNAL MEDICINE

## 2019-02-13 PROCEDURE — A9500 TC99M SESTAMIBI: HCPCS

## 2019-02-13 PROCEDURE — 85610 PROTHROMBIN TIME: CPT

## 2019-02-13 PROCEDURE — 65660000001 HC RM ICU INTERMED STEPDOWN

## 2019-02-13 PROCEDURE — 74011250636 HC RX REV CODE- 250/636: Performed by: HOSPITALIST

## 2019-02-13 PROCEDURE — 84100 ASSAY OF PHOSPHORUS: CPT

## 2019-02-13 PROCEDURE — 94640 AIRWAY INHALATION TREATMENT: CPT

## 2019-02-13 PROCEDURE — 85730 THROMBOPLASTIN TIME PARTIAL: CPT

## 2019-02-13 PROCEDURE — 83735 ASSAY OF MAGNESIUM: CPT

## 2019-02-13 PROCEDURE — P9047 ALBUMIN (HUMAN), 25%, 50ML: HCPCS | Performed by: INTERNAL MEDICINE

## 2019-02-13 PROCEDURE — 97165 OT EVAL LOW COMPLEX 30 MIN: CPT

## 2019-02-13 PROCEDURE — 71045 X-RAY EXAM CHEST 1 VIEW: CPT

## 2019-02-13 PROCEDURE — 82962 GLUCOSE BLOOD TEST: CPT

## 2019-02-13 PROCEDURE — 94760 N-INVAS EAR/PLS OXIMETRY 1: CPT

## 2019-02-13 PROCEDURE — 74011250637 HC RX REV CODE- 250/637: Performed by: HOSPITALIST

## 2019-02-13 PROCEDURE — 78452 HT MUSCLE IMAGE SPECT MULT: CPT

## 2019-02-13 RX ORDER — SODIUM CHLORIDE 0.9 % (FLUSH) 0.9 %
20 SYRINGE (ML) INJECTION
Status: COMPLETED | OUTPATIENT
Start: 2019-02-13 | End: 2019-02-13

## 2019-02-13 RX ORDER — ACETYLCYSTEINE 200 MG/ML
2 SOLUTION ORAL; RESPIRATORY (INHALATION)
Status: DISCONTINUED | OUTPATIENT
Start: 2019-02-13 | End: 2019-02-22 | Stop reason: HOSPADM

## 2019-02-13 RX ORDER — FLUDROCORTISONE ACETATE 0.1 MG/1
100 TABLET ORAL DAILY
Status: DISCONTINUED | OUTPATIENT
Start: 2019-02-13 | End: 2019-02-13 | Stop reason: SDUPTHER

## 2019-02-13 RX ORDER — FLUDROCORTISONE ACETATE 0.1 MG/1
100 TABLET ORAL DAILY
Status: DISCONTINUED | OUTPATIENT
Start: 2019-02-13 | End: 2019-02-16

## 2019-02-13 RX ORDER — IPRATROPIUM BROMIDE AND ALBUTEROL SULFATE 2.5; .5 MG/3ML; MG/3ML
3 SOLUTION RESPIRATORY (INHALATION)
Status: DISCONTINUED | OUTPATIENT
Start: 2019-02-13 | End: 2019-02-22 | Stop reason: HOSPADM

## 2019-02-13 RX ORDER — ALBUMIN HUMAN 250 G/1000ML
12.5 SOLUTION INTRAVENOUS EVERY 6 HOURS
Status: DISPENSED | OUTPATIENT
Start: 2019-02-13 | End: 2019-02-14

## 2019-02-13 RX ADMIN — HYDRALAZINE HYDROCHLORIDE 10 MG: 20 INJECTION INTRAMUSCULAR; INTRAVENOUS at 23:46

## 2019-02-13 RX ADMIN — SIMETHICONE CHEW TAB 80 MG 80 MG: 80 TABLET ORAL at 06:45

## 2019-02-13 RX ADMIN — REGADENOSON 0.4 MG: 0.08 INJECTION, SOLUTION INTRAVENOUS at 11:22

## 2019-02-13 RX ADMIN — IPRATROPIUM BROMIDE AND ALBUTEROL SULFATE 3 ML: .5; 3 SOLUTION RESPIRATORY (INHALATION) at 08:12

## 2019-02-13 RX ADMIN — Medication 10 ML: at 04:36

## 2019-02-13 RX ADMIN — GUAIFENESIN 600 MG: 600 TABLET, EXTENDED RELEASE ORAL at 13:09

## 2019-02-13 RX ADMIN — GUAIFENESIN 600 MG: 600 TABLET, EXTENDED RELEASE ORAL at 20:46

## 2019-02-13 RX ADMIN — SODIUM CHLORIDE 75 ML/HR: 900 INJECTION, SOLUTION INTRAVENOUS at 02:12

## 2019-02-13 RX ADMIN — Medication 20 ML: at 11:23

## 2019-02-13 RX ADMIN — CARVEDILOL 3.12 MG: 6.25 TABLET, FILM COATED ORAL at 13:09

## 2019-02-13 RX ADMIN — ALBUMIN (HUMAN) 12.5 G: 0.25 INJECTION, SOLUTION INTRAVENOUS at 14:42

## 2019-02-13 RX ADMIN — CARVEDILOL 3.12 MG: 6.25 TABLET, FILM COATED ORAL at 20:46

## 2019-02-13 RX ADMIN — ACETYLCYSTEINE 400 MG: 200 SOLUTION ORAL; RESPIRATORY (INHALATION) at 08:12

## 2019-02-13 RX ADMIN — FLUDROCORTISONE ACETATE 100 MCG: 0.1 TABLET ORAL at 13:11

## 2019-02-13 RX ADMIN — PIPERACILLIN AND TAZOBACTAM 3.38 G: 3; .375 INJECTION, POWDER, FOR SOLUTION INTRAVENOUS at 12:58

## 2019-02-13 RX ADMIN — LEVOTHYROXINE SODIUM 25 MCG: 25 TABLET ORAL at 06:45

## 2019-02-13 RX ADMIN — SIMETHICONE CHEW TAB 80 MG 80 MG: 80 TABLET ORAL at 17:06

## 2019-02-13 RX ADMIN — PANTOPRAZOLE SODIUM 40 MG: 40 TABLET, DELAYED RELEASE ORAL at 06:45

## 2019-02-13 RX ADMIN — INSULIN GLARGINE 5 UNITS: 100 INJECTION, SOLUTION SUBCUTANEOUS at 23:17

## 2019-02-13 RX ADMIN — SODIUM CHLORIDE 75 ML/HR: 900 INJECTION, SOLUTION INTRAVENOUS at 19:32

## 2019-02-13 RX ADMIN — Medication 10 ML: at 20:48

## 2019-02-13 RX ADMIN — Medication 10 ML: at 13:11

## 2019-02-13 RX ADMIN — Medication 10 ML: at 23:47

## 2019-02-13 RX ADMIN — PIPERACILLIN AND TAZOBACTAM 3.38 G: 3; .375 INJECTION, POWDER, FOR SOLUTION INTRAVENOUS at 04:35

## 2019-02-13 NOTE — PROGRESS NOTES
[facility] Cardiology Progress Note Date of service: 2/13/2019 Subjective: 
Mr Michael Gee has been transferred to stepdown Feels well - no new complaints Hasn't really worked with PT yet Objective: 
 
Visit Vitals /84 (BP 1 Location: Right arm, BP Patient Position: At rest) Pulse 94 Temp 97.6 °F (36.4 °C) Resp 20 Ht 5' 10\" (1.778 m) Wt 86.1 kg (189 lb 13.1 oz) SpO2 94% BMI 27.24 kg/m² Physical Exam  
Constitutional: He appears well-developed and well-nourished. HENT:  
Head: Normocephalic and atraumatic. Eyes: Conjunctivae are normal. No scleral icterus. Neck: No JVD present. Cardiovascular: Normal rate. An irregularly irregular rhythm present. Exam reveals no gallop. Murmur heard. Early systolic murmur is present with a grade of 2/6. Pulmonary/Chest: Effort normal. No stridor. No respiratory distress. He has no wheezes. He has rhonchi. He has no rales. Abdominal: Soft. He exhibits no distension. Musculoskeletal: He exhibits no edema or deformity. Neurological: He is alert. Skin: Skin is warm and dry. Data reviewed: 
Recent Results (from the past 12 hour(s)) GLUCOSE, POC Collection Time: 02/12/19  9:16 PM  
Result Value Ref Range Glucose (POC) 187 (H) 65 - 100 mg/dL Performed by Brando Fraga PCT PROTHROMBIN TIME + INR Collection Time: 02/13/19  2:04 AM  
Result Value Ref Range INR 1.2 (H) 0.9 - 1.1 Prothrombin time 12.2 (H) 9.0 - 11.1 sec PTT Collection Time: 02/13/19  2:04 AM  
Result Value Ref Range aPTT 28.5 22.1 - 32.0 sec  
 aPTT, therapeutic range     58.0 - 77.0 SECS  
CBC W/O DIFF Collection Time: 02/13/19  2:04 AM  
Result Value Ref Range WBC 6.0 4.1 - 11.1 K/uL  
 RBC 3.63 (L) 4.10 - 5.70 M/uL  
 HGB 10.2 (L) 12.1 - 17.0 g/dL HCT 34.0 (L) 36.6 - 50.3 % MCV 93.7 80.0 - 99.0 FL  
 MCH 28.1 26.0 - 34.0 PG  
 MCHC 30.0 30.0 - 36.5 g/dL  
 RDW 14.3 11.5 - 14.5 % PLATELET 73 (L) 169 - 400 K/uL MPV 11.0 8.9 - 12.9 FL  
 NRBC 0.0 0  WBC ABSOLUTE NRBC 0.00 0.00 - 0.01 K/uL MAGNESIUM Collection Time: 02/13/19  2:04 AM  
Result Value Ref Range Magnesium 2.5 (H) 1.6 - 2.4 mg/dL PHOSPHORUS Collection Time: 02/13/19  2:04 AM  
Result Value Ref Range Phosphorus 5.3 (H) 2.6 - 4.7 MG/DL  
METABOLIC PANEL, COMPREHENSIVE Collection Time: 02/13/19  2:04 AM  
Result Value Ref Range Sodium 139 136 - 145 mmol/L Potassium 4.5 3.5 - 5.1 mmol/L Chloride 109 (H) 97 - 108 mmol/L  
 CO2 20 (L) 21 - 32 mmol/L Anion gap 10 5 - 15 mmol/L Glucose 154 (H) 65 - 100 mg/dL BUN 63 (H) 6 - 20 MG/DL Creatinine 2.54 (H) 0.70 - 1.30 MG/DL  
 BUN/Creatinine ratio 25 (H) 12 - 20 GFR est AA 30 (L) >60 ml/min/1.73m2 GFR est non-AA 25 (L) >60 ml/min/1.73m2 Calcium 8.7 8.5 - 10.1 MG/DL Bilirubin, total 1.0 0.2 - 1.0 MG/DL  
 ALT (SGPT) 91 (H) 12 - 78 U/L  
 AST (SGOT) 35 15 - 37 U/L Alk. phosphatase 174 (H) 45 - 117 U/L Protein, total 6.8 6.4 - 8.2 g/dL Albumin 2.6 (L) 3.5 - 5.0 g/dL Globulin 4.2 (H) 2.0 - 4.0 g/dL A-G Ratio 0.6 (L) 1.1 - 2.2 GLUCOSE, POC Collection Time: 02/13/19  5:36 AM  
Result Value Ref Range Glucose (POC) 153 (H) 65 - 100 mg/dL Performed by Sunday Cano PCT Cultures NGTD Assessment: 1. Cardiac arrest. PEA. With events of 2/6 it now seems like that his underlying mechanism may have been bradyarrhythmia from SSS.  
  
2. SSS: s/p PPM 2/7. 
  
3. Chronic a.fib - rate controlled currently 
  
4. Orthostatic hypotension May be due to prolonged bedrest, neuro injury from cardiac arrest, volume depletion ? Some degree of underlying systemic autonomic syndrome 5. Abnormal EKG: TW inversions in anterolateral leads ? Neuro vs ischemic etiology 6. CKD stage III to IV Renal indices stable 7. Anemia, probably from CKD 
  
8. Gout 9. Thrombocytopenia: unclear etiology Fluctuating - fairly stable currently 10. Pulmonary hypertension 
  
Plan: Add fludrocortisone for orthostatic hypotension Lexiscan stress test today to eval for ischemia Continue medical therapies Needs to start working cautiously with PT if tolerates. Signed: 
Roetta Crigler L. [facility], MD 
Interventional Cardiology 2/13/2019

## 2019-02-13 NOTE — PROGRESS NOTES
Problem: Self Care Deficits Care Plan (Adult) Goal: *Acute Goals and Plan of Care (Insert Text) Occupational Therapy Goals Initiated 2/13/2019 1. Patient will perform ADLs standing 5 mins without fatigue or LOB with minimal assistance/contact guard assistance within 5 day(s). 2.  Patient will perform upper body ADLs with supervision/set-up within 5 day(s). 3.  Patient will perform lower body ADLs using AE PRN with minimal assistance/contact guard assistance within 5 day(s). 4.  Patient will perform toilet transfers with minimal assistance/contact guard assistance within 5 day(s). 5.  Patient will perform all aspects of toileting with minimal assistance/contact guard assistance within 5 day(s). 6.  Patient will participate in cardiac/sternal upper extremity therapeutic exercise/activities to increase independence with ADLs with supervision/set-up for 5 minutes within 5 day(s). Occupational Therapy EVALUATION Patient: Gabrielle Kowalski (39 y.o. male) Date: 2/13/2019 Primary Diagnosis: Cardiac arrest ([facility] [address] [I46.9] Procedure(s) (LRB): 
INSERT PPM SINGLE VENTRICULAR (N/A) 6 Days Post-Op Precautions:  Fall ASSESSMENT : 
Based on the objective data described below, the patient presents with Setup - Minimum assistance upper body ADLs, Maximum assistance - Total assistance lower body ADLs, and Moderate Assistance with Additional time and Assist x1 for functional mobility. Patient noted to have very tangential thought processing during evaluation and impaired attention to task. Patient able to recall PPM precautions, however, required significant VCs in order to maintain them during movement tasks. Patient VSS throughout session. The following are barriers to independence with ADLs while in acute care:  
- Cognitive and/or behavioral: attention to task, processing, initiation, sequencing, safety awareness and insight into deficits - Medical condition: ROM, strength, functional reach, sitting balance, standing balance, cardiopulmonary tolerance, precautions and medical history   
- Other:    
 
Patient will benefit from skilled acute intervention to address the above impairments. Patients rehabilitation potential is considered to be Good Discharge recommendations: Rehab at inpatient facility: patient can tolerate 3 hours of therapy Barriers to discharging home, in addition to above listed impairments: level of physical assist required to maintain patient safety. Equipment recommendations for successful discharge (if) home: TBD by rehab PLAN : 
Recommendations and Planned Interventions: self care training, functional mobility training, therapeutic exercise, balance training, therapeutic activities, endurance activities, patient education, home safety training and family training/education Frequency/Duration: Patient will be followed by occupational therapy 5 times a week to address goals. SUBJECTIVE:  
Patient stated Not only do I have my bear, I have the dog Valentino.  (when discussing need for rehab) OBJECTIVE DATA SUMMARY:  
HISTORY:  
Past Medical History:  
Diagnosis Date  Acute encephalopathy 1/14/2016  Anemia 5/11/2017  Atrial fibrillation ([address]  Painter esophagus  CAD (coronary artery disease)  Diabetes ([address]  Heart failure ([address] Cardiomyopathy, myocarditis  HTN, goal below 140/90  Retinopathy, diabetic, bilateral ([address] 3/11/2016  Stenosis of right carotid artery without cerebral infarction 1/18/2016  TIA (transient ischemic attack) 1/14/2016  Vitamin D deficiency 3/1/2016 Nephrology ordered and follow 2/22/16 Past Surgical History:  
Procedure Laterality Date  COLONOSCOPY N/A 6/23/2016 COLONOSCOPY performed by Adenike Bautista MD at [facility] ENDOSCOPY  COLONOSCOPY N/A 8/6/2018 COLONOSCOPY performed by Adenike Bautista MD at [facility] ENDOSCOPY  COLONOSCOPY N/A 8/30/2018 COLONOSCOPY performed by Salima Cam MD at [address] HX ENDOSCOPY  06/27/2016 CAPSULE; normal small bowel  HX ENDOSCOPY  06/23/2016  
 upper endoscopy  HX UROLOGICAL  2013  [place] INS NEW/RPLC PRM PACEMAKER W/TRANSV ELTRD VENTR N/A 2/7/2019 INSERT PPM SINGLE VENTRICULAR performed by Dalia Shelby MD at [address] CATH LAB Prior Level of Function/Environment/Context: Patient lives in 2 level home with wife and was IND with ADLs, IADLs and functional mobility PTA. Patient was driving and working. Patient has no DME at baseline, built in seat in shower. Patient has 5 supportive children. Occupations in which the patient is/was successful, what are the barriers preventing that success:  
Performance Patterns (routines, roles, habits, and rituals):  
Personal Interests and/or values:  
Expanded or extensive additional review of patient history:  
 
Home Situation Home Environment: Private residence # Steps to Enter: 2 Rails to Enter: Yes Hand Rails : Bilateral 
Wheelchair Ramp: No 
One/Two Story Residence: Two story # of Interior Steps: 12 Interior Rails: Both Lift Chair Available: No 
Living Alone: No 
Support Systems: Spouse/Significant Other/Partner, Child(efren) Patient Expects to be Discharged to[de-identified] Private residence Current DME Used/Available at Home: Other (comment)(built in seat in shower) Tub or Shower Type: Shower Hand dominance: RightEXAMINATION OF PERFORMANCE DEFICITS: 
Cognitive/Behavioral Status: 
Neurologic State: Alert Orientation Level: Oriented X4 Cognition: Appropriate for age attention/concentration; Follows commands Perception: Appears intact Perseveration: No perseveration noted Safety/Judgement: Awareness of environment; Fall prevention Skin: Appears grossly intact Edema: none noted in BUEs Hearing: Auditory Auditory Impairment: None Vision/Perceptual:   
Tracking: Able to track stimulus in all quadrants w/o difficulty Diplopia: No   
 Acuity: Within Defined Limits Corrective Lenses: Reading glasses Range of Motion: In [address] LUE limited shoulder flexion ~90* 2* PPM precautions LUE shoulder flexion ~120* AROM: Generally decreased, functional 
Strength: In [address] LUE - grossly tested against gravity 3/5 RUE 4/5 Strength: Generally decreased, functional 
Coordination: 
Coordination: Generally decreased, functional 
Fine Motor Skills-Upper: Left Intact; Right Intact Gross Motor Skills-Upper: Left Intact; Right Intact Tone & Sensation: In [address] Tone: Normal 
Sensation: Impaired(ANGEL neuropathy at baseline) Balance: 
Sitting: Intact Sitting - Static: Good (unsupported) Sitting - Dynamic: Fair (occasional) Standing: Impaired; With support Standing - Static: Fair Standing - Dynamic : Fair Functional Mobility and Transfers for ADLs:Bed Mobility: 
Supine to Sit: Moderate assistance Sit to Supine: Moderate assistance Scooting: Moderate assistance Transfers: 
Sit to Stand: Moderate assistance Stand to Sit: Moderate assistance Toilet Transfer : Moderate assistance; Additional time;Assist x1;Assist x2(Infer per obs of func mob, strength, balance) ADL Assessment: 
Feeding: Setup Oral Facial Hygiene/Grooming: Setup Bathing: Moderate assistance(Infer min A upper and max A lower ) Upper Body Dressing: Minimum assistance(2* PM precautions ) Lower Body Dressing: Maximum assistance Toileting: Moderate assistance(Infer per obs of func mob, func reach, balance and strength) ADL Intervention and task modifications: 
 
Grooming Washing Face: Supervision/set-up Brushing Teeth: Supervision/set-up Lower Body Dressing Assistance Socks: Total assistance (dependent) Leg Crossed Method Used: Yes(attempted, unable) Position Performed: Seated edge of bed Cues: Don;Doff;Physical assistance;Verbal cues provided Cognitive Retraining Safety/Judgement: Awareness of environment; Fall prevention Functional Measure: Barthel Index: 
 
Bathin Bladder: 0(dsouza) Bowels: 10 
Groomin Dressin Feedin Mobility: 0 Stairs: 0 Toilet Use: 5 Transfer (Bed to Chair and Back): 5 Total: 35 The Barthel ADL Index: Guidelines 1. The index should be used as a record of what a patient does, not as a record of what a patient could do. 2. The main aim is to establish degree of independence from any help, physical or verbal, however minor and for whatever reason. 3. The need for supervision renders the patient not independent. 4. A patient's performance should be established using the best available evidence. Asking the patient, friends/relatives and nurses are the usual sources, but direct observation and common sense are also important. However direct testing is not needed. 5. Usually the patient's performance over the preceding 24-48 hours is important, but occasionally longer periods will be relevant. 6. Middle categories imply that the patient supplies over 50 per cent of the effort. 7. Use of aids to be independent is allowed. Raffaele Subramanian., Barthel, ZANA. (). Functional evaluation: the Barthel Index. [address] [facility] (14)2. REX Pereyra, Gurjit Zhong., Noel Cheng., [place], [address] [place] [address] (). Measuring the change indisability after inpatient rehabilitation; comparison of the responsiveness of the Barthel Index and Functional Hunker Measure. Journal of Neurology, Neurosurgery, and Psychiatry, 66(4), 628-951. Dorene Hunt N.J.A, ISAAC Whitmore, & Carl Heath MJoseA. (2004.) Assessment of post-stroke quality of life in cost-effectiveness studies: The usefulness of the Barthel Index and the EuroQoL-5D. Quality of [facility], 13, 452-93 Occupational Therapy Evaluation Charge Determination History Examination Decision-Making LOW Complexity : Brief history review  MEDIUM Complexity : 3-5 performance deficits relating to physical, cognitive , or psychosocial skils that result in activity limitations and / or participation restrictions HIGH Complexity : Patient presents with comorbidities that affect occupational performance. Signifigant modification of tasks or assistance (eg, physical or verbal) with assessment (s) is necessary to enable patient to complete evaluation Based on the above components, the patient evaluation is determined to be of the following complexity level: LOW Pain: 
Pre treatment: 0 /10 Post treatment:  0/10 Location: none Description:none Aggravating factors: Activity Tolerance:  
fair Please refer to the flowsheet for vital signs taken during this treatment. After treatment patient left:  
Supine in bed Bed in low position Call light within reach RN notified Family at bedside Side rails x 3  
COMMUNICATION/EDUCATION:  
The patients plan of care was discussed with: Physical Therapist and Registered Nurse. Home safety education was provided and the patient/caregiver indicated understanding. and Patient/family have participated as able in goal setting and plan of care. This patients plan of care is appropriate for delegation to [facility]. Thank you for this referral. 
Toni Nielsen OT Time Calculation: 44 mins

## 2019-02-13 NOTE — PROGRESS NOTES
Pulmonary / Critical Care progress note Impression Acute hypoxic resp failure LLL pna/atx with copious resp secretions- s/p bronch 2/10 S/p Code ICE Extubated 2/10 after bronch Former smoker- none 30 years PEA arrest s/p PM 2/7/19 
afib Pancytopenia Pmhx\" CRI, DM, AFIB, Gout Plan 
==== Pulmonary toilet Wean oxygen with saturations above 90% Flutter valve; CPT on the left base. Nebs with mucomyst, continue for now OOB as tolerated Chest x-ray noted; left base suspect it is more atx, no WBC elevation or fevers. Recommend chest x-ray in 4-6 wks History / Subjective: 
Reason:  Respiratory failure Requesting Provider:  Dr Robin Page 2/13 Chest film at left base looks a little worse. Breathing is better today 2/12 Overall handling congestion better. No dyspnea 2/7 Seen and examined. Events noted. Mechanically ventilated. Gas exchange preserved. S/P permanent PM. On Chung, weaning down 2/6 Seen and examined earlier today. Did well with SBT with RSBI of 60 and was extubated. Subsequently developed confusion and ABG showed resp acidosis and he was placed on bipap. 2/5 Seen and examined. Sedated, on  protocol. CXR with mild bibasilar haziness- suspected atelectasis. Await rewarming 2/4 Cate Woody is a 72 y.o.  male who  has a past medical history of Acute encephalopathy (1/14/2016), Anemia (5/11/2017), Atrial fibrillation ([address] Painter esophagus, CAD (coronary artery disease), Diabetes ([address] Heart failure ([address] HTN, goal below 140/90, Retinopathy, diabetic, bilateral ([address] (3/11/2016), Stenosis of right carotid artery without cerebral infarction (1/18/2016), TIA (transient ischemic attack) (1/14/2016), and Vitamin D deficiency (3/1/2016). admitted 2/4/2019 with cardiac arrest 
 
Pt receiving infusion for gout when becam unresponsive PEA arrest intubated and given epi ROSC after approximately 10 minutes Head CT OK Placed on code ice protocol Not on vasopressors Has chronic afib. Current rhythm is afib Objective: 
 
Patient Vitals for the past 24 hrs: 
 Temp Pulse Resp BP SpO2  
02/13/19 0813     94 % 02/13/19 0749 97.6 °F (36.4 °C) 94 20 123/84 94 % 02/13/19 0349 97.9 °F (36.6 °C) 89 20 99/66 95 % 02/12/19 2328 97.8 °F (36.6 °C) 84 20 141/75 95 % 02/12/19 1915 97.2 °F (36.2 °C) 93 20 146/79 90 % 02/12/19 1731     96 % 02/12/19 1630    130/68   
02/12/19 1615  82  (!) 70/38   
02/12/19 1535    103/64   
02/12/19 1532    (!) 82/56   
02/12/19 1531    (!) 70/51   
02/12/19 1500 97.3 °F (36.3 °C) 90 16 173/84 95 % 02/12/19 1300  100  (!) 144/97 97 % 02/12/19 1200  90  164/70 97 % Intake/Output Summary (Last 24 hours) at 2/13/2019 1119 Last data filed at 2/13/2019 7624 Gross per 24 hour Intake 1612.5 ml Output 665 ml Net 947.5 ml Blood Sugar: 
Glucose Date Value Ref Range Status 02/13/2019 154 (H) 65 - 100 mg/dL Final  
02/12/2019 158 (H) 65 - 100 mg/dL Final  
02/11/2019 155 (H) 65 - 100 mg/dL Final  
02/10/2019 190 (H) 65 - 100 mg/dL Final  
02/09/2019 204 (H) 65 - 100 mg/dL Final  
 
Exam: 
 
Awake alert No accessory use Symmetrical chest expansion Few loose rhonchi CV irreg 
abd Soft Warm and dry No edema Lab data was reviewed. Radiology images were independently viewed and available reports were reviewed. CXR - improved LLL atx Lab: 
Recent Labs  
  02/13/19 
0204 02/12/19 
0213 02/11/19 
0224 WBC 6.0 4.9 3.7* HGB 10.2* 9.8* 8.9*  
PLT 73* 71* 63*  141 144  
K 4.5 4.3 3.8 * 108 111* CO2 20* 24 25 BUN 63* 54* 54* CREA 2.54* 2.27* 2.33* * 158* 155* CA 8.7 8.8 8.1*  
MG 2.5* 2.6* 2.6* PHOS 5.3* 4.6  --   
INR 1.2* 1.2* 1.1 TBILI 1.0 1.1* 1.3*  
SGOT 35 52* 72* ABG: 
No results for input(s): PHI, PCO2I, PO2I, HCO3I, SO2I, FIO2I in the last 72 hours. All Micro Results Procedure Component Value Units Date/Time CULTURE, BLOOD, PAIRED [[id number]] Collected:  02/08/19 0116 Order Status:  Completed Specimen:  Blood Updated:  02/13/19 0505 Special Requests: NO SPECIAL REQUESTS Culture result: NO GROWTH 5 DAYS     
 CULTURE, BLOOD [[id number]] Collected:  02/06/19 1902 Order Status:  Completed Specimen:  Blood Updated:  02/12/19 0217 Special Requests: NO SPECIAL REQUESTS Culture result: NO GROWTH 6 DAYS     
 CULTURE, RESPIRATORY/SPUTUM/BRONCH Verneice Gasmen STAIN [[id number]] Collected:  02/08/19 0934 Order Status:  Completed Specimen:  Sputum from Bronchial Washing Updated:  02/11/19 1110 Special Requests: NO SPECIAL REQUESTS     
  GRAM STAIN 2+ WBCS SEEN     
   NO DEFINITE ORGANISM SEEN Culture result:    
  LIGHT NORMAL RESPIRATORY LAVERNE  
     
 CULTURE, BLOOD, PAIRED [[id number]] Collected:  02/04/19 1608 Order Status:  Completed Specimen:  Blood Updated:  02/09/19 0543 Special Requests: NO SPECIAL REQUESTS Culture result: NO GROWTH 5 DAYS     
 CULTURE, URINE [[id number]] Collected:  02/07/19 0021 Order Status:  Completed Specimen:  Urine from [place] Specimen Updated:  02/08/19 0715 Special Requests: NO SPECIAL REQUESTS [place] Count <1,000 CFU/ML Culture result: NO GROWTH 1 DAY     
 CULTURE, RESPIRATORY/SPUTUM/BRONCH Carissa Marcelle [[id number]] Collected:  02/07/19 0930 Order Status:  Canceled Specimen:  Sputum,ET Suction MILO Denise

## 2019-02-13 NOTE — PROGRESS NOTES
Physical Therapy 2.13.19 Chart reviewed. Patient off the floor for nuclear stress test. F/u later as able. Thank you.  
 
Nerissa Beckman, PT, DPT

## 2019-02-13 NOTE — PROGRESS NOTES
CM spoke with Dr. Roselia Ely - she is reccommending inpt rehab - spoke with patient and wife - expressed prefers to go home if possible but agreeable to referral being sent to rehab - choice obtained and referral made to [address] [place] [address].  MIGUELITO Silva

## 2019-02-13 NOTE — PROGRESS NOTES
Currently urine is clement and clear in the tubing. Pt reports no dsyuria or bladder spasms and that catheter was changed last evening. Hospitalist confirmed the hematuria started after ambulation on this unit which was after transport from CCU to CVICU. Mireille Florentino Pt is not on any anti coagulants. Possibly the dsouza was pulled on during transport. Dr. Hao Lee will see the patient tomorrow. [facility] RN 
[phone]  I spoke with Dr Shea Allan. I will have Syl Gar check on him today and make sure there are no acute needs. Dr Hao Lee will be available tomorrow in case action is needed.

## 2019-02-13 NOTE — PROGRESS NOTES
Infectious Diseases Progress Note Antibiotic Summary: 
Vancomycin 2/6 x 1 dose Zosyn   -- present Zyvox   -- present Subjective: He is comfortable. He is very weak. He noted sternal pain (from CPR) and he is sore at the pacemaker site. No SOB, abd pain, N, V, D. 
 
Objective:  
 
Vitals:  
Visit Vitals /79 (BP 1 Location: Left arm, BP Patient Position: At rest) Pulse 93 Temp 97.2 °F (36.2 °C) Resp 20 Ht 5' 10\" (1.778 m) Wt 85.6 kg (188 lb 11.4 oz) SpO2 90% BMI 27.08 kg/m² Tmax:  Temp (24hrs), Av.7 °F (36.5 °C), Min:97.2 °F (36.2 °C), Max:98.6 °F (37 °C) Exam:  General appearance: no distress; awake and alert Sternum: bruising over sternum seen Lungs: bilateral posterior rales at bases Heart: irregularly irregular rhythm with intermittent pacing; 2/6 systolic murmur at LSB Abdomen: soft, not distended, non-tender. Bowel sounds normal 
Extremities: no edema; feet and hands warm; good pedal pulses Neuro: alert & oriented IV Lines: peripheral 
      
Labs:   
Recent Labs  
  19 
0213 19 
0224 02/10/19 
1491 WBC 4.9 3.7* 3.7* HGB 9.8* 8.9* 8.7* PLT 71* 63* 69* BUN 54* 54* 59* CREA 2.27* 2.33* 2.37* TBILI 1.1* 1.3*  --   
SGOT 52* 72*  --   
* 170*  --   
 
Urine culture  = NG 
 
BLOOD CULTURES: 
  = NGSF 
  = NGSF 
  = NGSF Bronchoscopy sample : 
 Gram stain = 2+ WBCs; NOS 
 culture = light normal resp shahrzad CXR  reviewed = I believe there has been significant improvement on the left Assessment: 1. Fever -- resolving: The cause of the fever is not certain but pulmonary source suspected (aspiration ?). Bronch cultures unremarkable -- oxygenation better and CXR better 
  
2. OHD -- cardiomyopathy and hx of atrial fib -- S/P initial arrest with Code Ice and several subsequent episodes of PEA 
  
3. NIDDM 
  
4. CVD 
  
5. HTN 
  
6. GOUT Plan: 1. Continue Zosyn Discussed with the patient and his wife Nany Sunday., MD

## 2019-02-13 NOTE — PROGRESS NOTES
1025: off floor off tele to nuclear med per order. 1253: pt returned to floor tele placed and confirmed with monitor tech. 1930:Bedside and Verbal shift change report given to jennifer herrera rn (oncoming nurse) by luis miguel vee rn (offgoing nurse). Report included the following information SBAR, Kardex, ED Summary, OR Summary, Procedure Summary, Intake/Output, MAR and Recent Results.

## 2019-02-13 NOTE — PROGRESS NOTES
2000: Bedside shift change report given to [facility] [employer] Nigel [facility] by Hal Orozco (offgoing nurse). Report included the following information SBAR, Intake/Output, MAR, Accordion, Recent Results, Med Rec Status and Cardiac Rhythm AFib/Paced w/ intermittent BBB.  
 
0000: Pt NPO at midnight for scheduled stress test; education provided, sign placed on door, and food/drink removed from bedside 
 
0800: Bedside shift change report given to Nikkie (oncoming nurse) by [employer] (offgoing nurse). Report included the following information SBAR, Intake/Output, MAR, Accordion, Recent Results, Med Rec Status and Cardiac Rhythm AFib/Paced with intermittent BBB.

## 2019-02-13 NOTE — PROGRESS NOTES
Hospitalist Progress Note Carlos See DO Answering service: [phone] or [phone] from in house phone Date of Service:  2019 NAME:  Adriel Jarquin :  1945 MRN:  [id number] Admission Summary:  
The patient is a 47-year-old gentleman with past medical history of anemia, atrial fibrillation, Painter's esophagus, coronary artery disease, diabetes, heart failure, cardiomyopathy, myocarditis, history of hypertension, diabetic retinopathy, right carotid artery stenosis, TIA and vitamin D deficiency who presents to the hospital from [facility]. Patient presented to [address] [place] [address] today apparently in PEA. The history was primarily obtained from the ER physician as the patient is intubated and his wife is not present at the bedside. Interval history / Subjective:  
Patient seen and examined. Doing better this AM. Persistently orthostatic, now on fludrocortisone. Has cough, encouraged incentive spirometer. Zosyn scheduled to finish today. Nuclear stress test today Creatinine slightly increased Assessment & Plan:  
 
AHRF status post cardiac arrest/pulseless electrical activity POA:  
-s/p hypothermia protocol -EEG non specific no seizure activity 
-Coded  multiple times post hypothermia protocol. Possibly secondary to bradycardia 
-s/p pacemaker 
-reintubated for airway and reextubated 2/10 after bronch  
-s/p zosyn and zyvox. ID previously following 
-nuclear stress test  Orthostatic hypotension: 
-fludrocortisone, continue maintenance fluids MARYCRUZ on CKD stage 3: secondary to ATN 
-nephrology consulted 
-increased creatinine, possibly from hypotension, will continue fluids, albumin added Hematuria:  
-resolving, appreciate urology input Diabetes, Type II: Lantus and SSI Hypothyroidism: synthroid Chronic a.fib: previously on Eliquis Anemia, appears to be chronic: stable -s/p 2 unit PRBC 2/8  hgb > 8 Fever: resolved, s/p antibiotics Gastrointestinal PPX added simethicone Thrombocytopenia: stable, continue to monitor Abnormal LFT's from shock liver? Vs CLD can investigate as outpt Coagulopathy: suspect secondary to shock liver Code status: Full DVT prophylaxis: SCD Care Plan discussed with: Patient/Family and Nurse Disposition: TBD may require inpatient rehab Hospital Problems  Date Reviewed: 2/4/2019 Codes Class Noted POA * (Principal) Cardiac arrest [facility]) ICD-10-CM: I46.9 ICD-9-CM: 427.5  2/4/2019 Unknown Review of Systems:  
Review of systems not obtained due to patient factors. Vital Signs:  
 Last 24hrs VS reviewed since prior progress note. Most recent are: 
Visit Vitals BP 94/47 (BP 1 Location: Right arm, BP Patient Position: Standing) Pulse 95 Temp 97.7 °F (36.5 °C) Resp 18 Ht 5' 10\" (1.778 m) Wt 86.1 kg (189 lb 13.1 oz) SpO2 94% BMI 27.24 kg/m² Intake/Output Summary (Last 24 hours) at 2/13/2019 1427 Last data filed at 2/13/2019 1303 Gross per 24 hour Intake 1512.5 ml Output 965 ml Net 547.5 ml Physical Examination:  
 
 
     
Constitutional:  NAD  
ENT:  MMM Resp:  CTA   
CV:  Regular rhythm, PACED  
 GI:  Soft, non distended, non tender. bs+ Musculoskeletal:  No edema, warm, 2+ pulses throughout Neurologic:  ALERT AND AWAKE  folay cath + Data Review:  
 I personally reviewed  Image and labs Ct Head Wo Cont Result Date: 2/4/2019 IMPRESSION: No acute intracranial hemorrhage, mass or infarct. Xr Chest [facility] Result Date: 2/13/2019 IMPRESSION: Mild central vascular congestion. Pacemaker. Infiltrate/edema at left base plus/minus trace pleural effusion. Xr Chest [facility] Result Date: 2/11/2019 IMPRESSION: Decreased small left pleural effusion and left basilar opacity. Xr Chest [facility] Result Date: 2/10/2019 IMPRESSION: Stable left effusion and underlying atelectasis Xr Chest [facility] Result Date: 2/9/2019 IMPRESSION: No interval change. Xr Chest [facility] Result Date: 2/8/2019 IMPRESSION: ET tube is in satisfactory position. There is slight increasing haziness overlying left hemithorax with opacification left base consistent with left basilar atelectasis /airspace disease and left effusion. Xr Chest [facility] Result Date: 2/7/2019 IMPRESSION: Left lung base opacification. No significant change. Xr Chest [facility] Result Date: 2/6/2019 IMPRESSION: ET tube in satisfactory position. Worsening aeration of the left lung. Xr Chest [facility] Result Date: 2/6/2019 Impression: Stable bilateral lower lobe atelectasis. Xr Chest [facility] Result Date: 2/5/2019 IMPRESSION: No significant change. Xr Chest [facility] Result Date: 2/4/2019 IMPRESSION: Endotracheal tube appears to be in satisfactory position. Xr Abd Port  1 V Result Date: 2/6/2019 IMPRESSION: NG tube in the stomach. Xr Abd Port  1 V Result Date: 2/4/2019 IMPRESSION: NG tube in appropriate position. Labs:  
 
Recent Labs  
  02/13/19 0204 02/12/19 
6027 WBC 6.0 4.9 HGB 10.2* 9.8* HCT 34.0* 32.9*  
PLT 73* 71* Recent Labs  
  02/13/19 0204 02/12/19 0213 02/11/19 0224  141 144  
K 4.5 4.3 3.8 * 108 111* CO2 20* 24 25 BUN 63* 54* 54* CREA 2.54* 2.27* 2.33* * 158* 155* CA 8.7 8.8 8.1*  
MG 2.5* 2.6* 2.6* PHOS 5.3* 4.6  --   
 
Recent Labs  
  02/13/19 0204 02/12/19 0213 02/11/19 0224 SGOT 35 52* 72* ALT 91* 121* 126* * 184* 170* TBILI 1.0 1.1* 1.3* TP 6.8 7.2 6.3* ALB 2.6* 2.7* 2.3*  
GLOB 4.2* 4.5* 4.0 Recent Labs  
  02/13/19 0204 02/12/19 
0213 02/11/19 
0224 INR 1.2* 1.2* 1.1 PTP 12.2* 11.6* 11.5* APTT 28.5 27.2 29.9 No results for input(s): FE, TIBC, PSAT, FERR in the last 72 hours.   
No results found for: FOL, RBCF  
 No results for input(s): PH, PCO2, PO2 in the last 72 hours. No results for input(s): CPK, CKNDX, TROIQ in the last 72 hours. No lab exists for component: CPKMB Lab Results Component Value Date/Time Cholesterol, total 135 02/23/2018 11:31 AM  
 HDL Cholesterol 34 (L) 02/23/2018 11:31 AM  
 LDL, calculated 79 02/23/2018 11:31 AM  
 Triglyceride 111 02/23/2018 11:31 AM  
 CHOL/HDL Ratio 5.0 01/15/2016 03:49 AM  
 
Lab Results Component Value Date/Time Glucose (POC) 125 (H) 02/13/2019 01:08 PM  
 Glucose (POC) 153 (H) 02/13/2019 05:36 AM  
 Glucose (POC) 187 (H) 02/12/2019 09:16 PM  
 Glucose (POC) 177 (H) 02/12/2019 04:08 PM  
 Glucose (POC) 176 (H) 02/12/2019 11:16 AM  
 
Lab Results Component Value Date/Time Color YELLOW/STRAW 02/04/2019 04:08 PM  
 Appearance CLOUDY (A) 02/04/2019 04:08 PM  
 Specific gravity 1.012 02/04/2019 04:08 PM  
 pH (UA) 5.0 02/04/2019 04:08 PM  
 Protein 30 (A) 02/04/2019 04:08 PM  
 Glucose NEGATIVE  02/04/2019 04:08 PM  
 Ketone TRACE (A) 02/04/2019 04:08 PM  
 Bilirubin NEGATIVE  02/04/2019 04:08 PM  
 Urobilinogen 0.2 02/04/2019 04:08 PM  
 Nitrites NEGATIVE  02/04/2019 04:08 PM  
 Leukocyte Esterase NEGATIVE  02/04/2019 04:08 PM  
 Epithelial cells FEW 02/04/2019 04:08 PM  
 Bacteria NEGATIVE  02/04/2019 04:08 PM  
 WBC 10-20 02/04/2019 04:08 PM  
 RBC  02/04/2019 04:08 PM  
 
 
 
Medications Reviewed:  
 
Current Facility-Administered Medications Medication Dose Route Frequency  fludrocortisone (FLORINEF) tablet 100 mcg  100 mcg Oral DAILY  acetylcysteine (MUCOMYST) 200 mg/mL (20 %) solution 400 mg  2 mL Nebulization BID RT  
 albuterol-ipratropium (DUO-NEB) 2.5 MG-0.5 MG/3 ML  3 mL Nebulization BID RT  
 albumin human 25% (BUMINATE) solution 12.5 g  12.5 g IntraVENous Q6H  
 0.9% sodium chloride infusion  75 mL/hr IntraVENous CONTINUOUS  
 guaiFENesin ER (MUCINEX) tablet 600 mg  600 mg Oral Q12H  pantoprazole (PROTONIX) tablet 40 mg  40 mg Oral ACB  levothyroxine (SYNTHROID) tablet 25 mcg  25 mcg Oral ACB  carvedilol (COREG) tablet 3.125 mg  3.125 mg Oral BID WITH MEALS  simethicone (MYLICON) tablet 80 mg  80 mg Oral BID  hydrALAZINE (APRESOLINE) 20 mg/mL injection 20 mg  20 mg IntraVENous Q6H PRN  
 lactobac ac& pc-s.therm-b.anim (LAVERNE Q/RISAQUAD)  1 Cap Oral DAILY  sodium chloride (NS) flush 5-40 mL  5-40 mL IntraVENous PRN  
 0.9% sodium chloride infusion 250 mL  250 mL IntraVENous PRN  
 morphine injection 2 mg  2 mg IntraVENous Q6H PRN  
 insulin glargine (LANTUS) injection 5 Units  5 Units SubCUTAneous QHS  acetaminophen (TYLENOL) tablet 650 mg  650 mg Oral Q6H PRN  
 bacitracin 500 unit/gram packet 1 Packet  1 Packet Topical PRN  piperacillin-tazobactam (ZOSYN) 3.375 g in 0.9% sodium chloride (MBP/ADV) 100 mL  3.375 g IntraVENous Q8H  
 sodium chloride (NS) flush 5-40 mL  5-40 mL IntraVENous Q8H  
 sodium chloride (NS) flush 5-40 mL  5-40 mL IntraVENous PRN  
 sodium chloride (NS) flush 5-40 mL  5-40 mL IntraVENous Q8H  
 sodium chloride (NS) flush 5-40 mL  5-40 mL IntraVENous PRN  
 glucose chewable tablet 16 g  4 Tab Oral PRN  
 dextrose (D50W) injection syrg 12.5-25 g  25-50 mL IntraVENous PRN  
 glucagon (GLUCAGEN) injection 1 mg  1 mg IntraMUSCular PRN  
 insulin lispro (HUMALOG) injection   SubCUTAneous Q6H  
 
______________________________________________________________________ EXPECTED LENGTH OF STAY: 2d 0h 
ACTUAL LENGTH OF STAY:          [id number] [facility]

## 2019-02-14 LAB
ALBUMIN SERPL-MCNC: 2.6 G/DL (ref 3.5–5)
ALBUMIN/GLOB SERPL: 0.7 {RATIO} (ref 1.1–2.2)
ALP SERPL-CCNC: 163 U/L (ref 45–117)
ALT SERPL-CCNC: 69 U/L (ref 12–78)
ANION GAP SERPL CALC-SCNC: 11 MMOL/L (ref 5–15)
APTT PPP: 27.6 SEC (ref 22.1–32)
AST SERPL-CCNC: 29 U/L (ref 15–37)
BILIRUB SERPL-MCNC: 0.8 MG/DL (ref 0.2–1)
BUN SERPL-MCNC: 67 MG/DL (ref 6–20)
BUN/CREAT SERPL: 28 (ref 12–20)
CALCIUM SERPL-MCNC: 8.6 MG/DL (ref 8.5–10.1)
CHLORIDE SERPL-SCNC: 109 MMOL/L (ref 97–108)
CO2 SERPL-SCNC: 19 MMOL/L (ref 21–32)
CREAT SERPL-MCNC: 2.42 MG/DL (ref 0.7–1.3)
ERYTHROCYTE [DISTWIDTH] IN BLOOD BY AUTOMATED COUNT: 14.2 % (ref 11.5–14.5)
GLOBULIN SER CALC-MCNC: 3.9 G/DL (ref 2–4)
GLUCOSE BLD STRIP.AUTO-MCNC: 137 MG/DL (ref 65–100)
GLUCOSE BLD STRIP.AUTO-MCNC: 142 MG/DL (ref 65–100)
GLUCOSE BLD STRIP.AUTO-MCNC: 192 MG/DL (ref 65–100)
GLUCOSE BLD STRIP.AUTO-MCNC: 229 MG/DL (ref 65–100)
GLUCOSE SERPL-MCNC: 143 MG/DL (ref 65–100)
HCT VFR BLD AUTO: 31 % (ref 36.6–50.3)
HGB BLD-MCNC: 9 G/DL (ref 12.1–17)
INR PPP: 1.3 (ref 0.9–1.1)
MAGNESIUM SERPL-MCNC: 2.5 MG/DL (ref 1.6–2.4)
MCH RBC QN AUTO: 28.1 PG (ref 26–34)
MCHC RBC AUTO-ENTMCNC: 29 G/DL (ref 30–36.5)
MCV RBC AUTO: 96.9 FL (ref 80–99)
NRBC # BLD: 0 K/UL (ref 0–0.01)
NRBC BLD-RTO: 0 PER 100 WBC
PHOSPHATE SERPL-MCNC: 4.8 MG/DL (ref 2.6–4.7)
PLATELET # BLD AUTO: 72 K/UL (ref 150–400)
PMV BLD AUTO: 11 FL (ref 8.9–12.9)
POTASSIUM SERPL-SCNC: 4.2 MMOL/L (ref 3.5–5.1)
PROT SERPL-MCNC: 6.5 G/DL (ref 6.4–8.2)
PROTHROMBIN TIME: 13 SEC (ref 9–11.1)
RBC # BLD AUTO: 3.2 M/UL (ref 4.1–5.7)
SERVICE CMNT-IMP: ABNORMAL
SODIUM SERPL-SCNC: 139 MMOL/L (ref 136–145)
THERAPEUTIC RANGE,PTTT: NORMAL SECS (ref 58–77)
WBC # BLD AUTO: 8 K/UL (ref 4.1–11.1)

## 2019-02-14 PROCEDURE — 80053 COMPREHEN METABOLIC PANEL: CPT

## 2019-02-14 PROCEDURE — 97116 GAIT TRAINING THERAPY: CPT

## 2019-02-14 PROCEDURE — 85730 THROMBOPLASTIN TIME PARTIAL: CPT

## 2019-02-14 PROCEDURE — 74011636637 HC RX REV CODE- 636/637: Performed by: FAMILY MEDICINE

## 2019-02-14 PROCEDURE — 82962 GLUCOSE BLOOD TEST: CPT

## 2019-02-14 PROCEDURE — 36415 COLL VENOUS BLD VENIPUNCTURE: CPT

## 2019-02-14 PROCEDURE — 74011250637 HC RX REV CODE- 250/637: Performed by: INTERNAL MEDICINE

## 2019-02-14 PROCEDURE — 84100 ASSAY OF PHOSPHORUS: CPT

## 2019-02-14 PROCEDURE — 85027 COMPLETE CBC AUTOMATED: CPT

## 2019-02-14 PROCEDURE — 97530 THERAPEUTIC ACTIVITIES: CPT

## 2019-02-14 PROCEDURE — 77010033678 HC OXYGEN DAILY

## 2019-02-14 PROCEDURE — 97535 SELF CARE MNGMENT TRAINING: CPT

## 2019-02-14 PROCEDURE — 83735 ASSAY OF MAGNESIUM: CPT

## 2019-02-14 PROCEDURE — 74011000250 HC RX REV CODE- 250: Performed by: INTERNAL MEDICINE

## 2019-02-14 PROCEDURE — 94760 N-INVAS EAR/PLS OXIMETRY 1: CPT

## 2019-02-14 PROCEDURE — 65660000001 HC RM ICU INTERMED STEPDOWN

## 2019-02-14 PROCEDURE — P9047 ALBUMIN (HUMAN), 25%, 50ML: HCPCS | Performed by: INTERNAL MEDICINE

## 2019-02-14 PROCEDURE — 74011250637 HC RX REV CODE- 250/637: Performed by: HOSPITALIST

## 2019-02-14 PROCEDURE — 74011250636 HC RX REV CODE- 250/636: Performed by: INTERNAL MEDICINE

## 2019-02-14 PROCEDURE — 74011636637 HC RX REV CODE- 636/637: Performed by: HOSPITALIST

## 2019-02-14 PROCEDURE — 85610 PROTHROMBIN TIME: CPT

## 2019-02-14 PROCEDURE — 94640 AIRWAY INHALATION TREATMENT: CPT

## 2019-02-14 RX ADMIN — Medication 5 ML: at 22:00

## 2019-02-14 RX ADMIN — SIMETHICONE CHEW TAB 80 MG 80 MG: 80 TABLET ORAL at 17:45

## 2019-02-14 RX ADMIN — PANTOPRAZOLE SODIUM 40 MG: 40 TABLET, DELAYED RELEASE ORAL at 07:17

## 2019-02-14 RX ADMIN — GUAIFENESIN 600 MG: 600 TABLET, EXTENDED RELEASE ORAL at 09:25

## 2019-02-14 RX ADMIN — ALBUMIN (HUMAN) 12.5 G: 0.25 INJECTION, SOLUTION INTRAVENOUS at 09:07

## 2019-02-14 RX ADMIN — Medication 10 ML: at 06:10

## 2019-02-14 RX ADMIN — Medication 10 ML: at 06:11

## 2019-02-14 RX ADMIN — ACETYLCYSTEINE 400 MG: 200 SOLUTION ORAL; RESPIRATORY (INHALATION) at 22:15

## 2019-02-14 RX ADMIN — ACETYLCYSTEINE 400 MG: 200 SOLUTION ORAL; RESPIRATORY (INHALATION) at 06:59

## 2019-02-14 RX ADMIN — FLUDROCORTISONE ACETATE 100 MCG: 0.1 TABLET ORAL at 09:25

## 2019-02-14 RX ADMIN — SIMETHICONE CHEW TAB 80 MG 80 MG: 80 TABLET ORAL at 09:25

## 2019-02-14 RX ADMIN — CARVEDILOL 3.12 MG: 6.25 TABLET, FILM COATED ORAL at 16:43

## 2019-02-14 RX ADMIN — Medication 10 ML: at 22:21

## 2019-02-14 RX ADMIN — GUAIFENESIN 600 MG: 600 TABLET, EXTENDED RELEASE ORAL at 22:15

## 2019-02-14 RX ADMIN — Medication 10 ML: at 15:22

## 2019-02-14 RX ADMIN — IPRATROPIUM BROMIDE AND ALBUTEROL SULFATE 3 ML: .5; 3 SOLUTION RESPIRATORY (INHALATION) at 06:59

## 2019-02-14 RX ADMIN — INSULIN LISPRO 2 UNITS: 100 INJECTION, SOLUTION INTRAVENOUS; SUBCUTANEOUS at 17:46

## 2019-02-14 RX ADMIN — Medication 1 CAPSULE: at 09:25

## 2019-02-14 RX ADMIN — INSULIN GLARGINE 5 UNITS: 100 INJECTION, SOLUTION SUBCUTANEOUS at 22:15

## 2019-02-14 RX ADMIN — IPRATROPIUM BROMIDE AND ALBUTEROL SULFATE 3 ML: .5; 3 SOLUTION RESPIRATORY (INHALATION) at 22:15

## 2019-02-14 RX ADMIN — SODIUM CHLORIDE 75 ML/HR: 900 INJECTION, SOLUTION INTRAVENOUS at 09:08

## 2019-02-14 RX ADMIN — CARVEDILOL 3.12 MG: 6.25 TABLET, FILM COATED ORAL at 09:10

## 2019-02-14 RX ADMIN — LEVOTHYROXINE SODIUM 25 MCG: 25 TABLET ORAL at 07:17

## 2019-02-14 NOTE — PROGRESS NOTES
Name: Chandu Montez MRN: [id number] : 1945 Assessment: 
MARYCRUZ- due to ATN from hypotension/Arrest/bradycardia. Showing renal recovery. Non-oliguric. 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 Baseline Cr 1.7-2.0mg/dl DM-2 Cardiac arrest; PEA- ? etiology. Code ice protocol; recurrence of niurka arrest on . S/p PPM 
Gout PANCYTOPENIA,chronic A Resp failure- reintubated; s/p bronch today with copious secretions Gout: was on pegloticase Plan/Recommendations: 
Cr may continue to rise due to mild ATN Ct IV NS for one more day Trial of IV albumin Florinef Daily orthostatics F/u nuclear stress test 
Encourage intake Strict I/Os Avoid nephrotoxins Am labs Subjective: 
Remains orthostatic. No other complaints Exam: 
Visit Vitals BP (!) 170/99 Pulse (!) 102 Temp 98 °F (36.7 °C) Resp 16 Ht 5' 10\" (1.778 m) Wt 86.1 kg (189 lb 13.1 oz) SpO2 94% BMI 27.24 kg/m² NAD Decreased AE b/l bases No LE edema Labs/Data: 
 
Lab Results Component Value Date/Time WBC 6.0 2019 02:04 AM  
 Hemoglobin (POC) 6.2 2018 02:17 PM  
 HGB 10.2 (L) 2019 02:04 AM  
 Hematocrit (POC) 32 (L) 2019 12:23 PM  
 HCT 34.0 (L) 2019 02:04 AM  
 PLATELET 73 (L)  02:04 AM  
 MCV 93.7 2019 02:04 AM  
 
 
Lab Results Component Value Date/Time  Sodium 139 2019 02:04 AM  
 Potassium 4.5 2019 02:04 AM  
 Chloride 109 (H) 2019 02:04 AM  
 CO2 20 (L) 2019 02:04 AM  
 Anion gap 10 2019 02:04 AM  
 Glucose 154 (H) 2019 02:04 AM  
 BUN 63 (H) 2019 02:04 AM  
 Creatinine 2.54 (H) 2019 02:04 AM  
 BUN/Creatinine ratio 25 (H) 2019 02:04 AM  
 GFR est AA 30 (L) 2019 02:04 AM  
 GFR est non-AA 25 (L) 02/13/2019 02:04 AM  
 Calcium 8.7 02/13/2019 02:04 AM  
 
 
Wt Readings from Last 3 Encounters:  
02/13/19 86.1 kg (189 lb 13.1 oz) 02/04/19 81.6 kg (180 lb) 01/17/19 81.6 kg (180 lb) Intake/Output Summary (Last 24 hours) at 2/13/2019 2035 Last data filed at 2/13/2019 1932 Gross per 24 hour Intake 1140 ml Output 875 ml Net 265 ml Discussed with pts kids/wife and RN Michael Forrester MD

## 2019-02-14 NOTE — PROGRESS NOTES
Spoke with Dr Alfred Livingston in regards to a UA due to blood in the dsouza urine. Dr Alfred Livingston stated not at this time. Also spoke with Dr Skye Up in this regard ~1500. Plan is to dc lianna tomorrow.

## 2019-02-14 NOTE — PROGRESS NOTES
Hospitalist Progress Note [id number] [facility],  Answering service: [phone] or [phone] from in house phone Date of Service:  2019 NAME:  Carina Machuca YOB: 1945 MRN:  [id number] Admission Summary:  
The patient is a 27-year-old gentleman with past medical history of anemia, atrial fibrillation, Painter's esophagus, coronary artery disease, diabetes, heart failure, cardiomyopathy, myocarditis, history of hypertension, diabetic retinopathy, right carotid artery stenosis, TIA and vitamin D deficiency who presents to the hospital from [facility]. Patient presented to [facility] today apparently in PEA. The history was primarily obtained from the ER physician as the patient is intubated and his wife is not present at the bedside. Interval history / Subjective:  
Patient seen and examined. No complaints today. Persistently orthostatic, will defer fludrocortisone dosing to cardiology. Fluids discontinued. Hypertensive overnight. Creatinine mildly improved. Hematuria worsening- urology following. Case management for discharge planning. Assessment & Plan:  
 
AHRF status post cardiac arrest/pulseless electrical activity POA:  
-s/p hypothermia protocol -EEG non specific no seizure activity 
-Coded  multiple times post hypothermia protocol. Possibly secondary to bradycardia 
-s/p pacemaker 
-reintubated for airway and reextubated 2/10 after bronch  
-s/p zosyn for 7 days 
-nuclear stress test  with normal EF, no ischemia Orthostatic hypotension: 
-fludrocortisone, may need to increase MARYCRUZ on CKD stage 3: secondary to ATN 
-nephrology consulted 
-discontinued fluids, follow creatinine, finishing albumin Hematuria:  
-urology following, appreciate input Diabetes, Type II: Lantus and SSI Hypothyroidism: synthroid Chronic a.fib: previously on Eliquis Anemia, appears to be chronic: stable 
-s/p 2 unit PRBC 2/8  hgb > 8 Fever: resolved, s/p antibiotics Gastrointestinal PPX added simethicone Thrombocytopenia: stable, continue to monitor Abnormal LFT's from shock liver? Vs CLD can investigate as outpt Coagulopathy: suspect secondary to shock liver Code status: Full DVT prophylaxis: SCD Care Plan discussed with: Patient/Family and Nurse Disposition: TBD may require inpatient rehab Hospital Problems  Date Reviewed: 2/4/2019 Codes Class Noted POA * (Principal) Cardiac arrest [facility]) ICD-10-CM: I46.9 ICD-9-CM: 427.5  2/4/2019 Unknown Review of Systems:  
Review of systems not obtained due to patient factors. Vital Signs:  
 Last 24hrs VS reviewed since prior progress note. Most recent are: 
Visit Vitals /82 (BP 1 Location: Right arm, BP Patient Position: At rest;Post activity; Sitting) Pulse 94 Temp 97.7 °F (36.5 °C) Resp 18 Ht 5' 10\" (1.778 m) Wt 86.8 kg (191 lb 5.8 oz) SpO2 98% BMI 27.46 kg/m² Intake/Output Summary (Last 24 hours) at 2/14/2019 1610 Last data filed at 2/14/2019 0719 Gross per 24 hour Intake 2285 ml Output 450 ml Net 1835 ml Physical Examination:  
 
 
     
Constitutional:  NAD  
ENT:  MMM Resp:  CTA   
CV:  Regular rhythm, PACED  
 GI:  Soft, non distended, non tender. bs+ Musculoskeletal:  No edema, warm, 2+ pulses throughout Neurologic:  ALERT AND AWAKE  folay cath +hematuria Data Review:  
 I personally reviewed  Image and labs Ct Head Wo Cont Result Date: 2/4/2019 IMPRESSION: No acute intracranial hemorrhage, mass or infarct. Xr Chest [facility] Result Date: 2/13/2019 IMPRESSION: Mild central vascular congestion. Pacemaker. Infiltrate/edema at left base plus/minus trace pleural effusion. Xr Chest [facility] Result Date: 2/11/2019 IMPRESSION: Decreased small left pleural effusion and left basilar opacity. Xr Chest [facility] Result Date: 2/10/2019 IMPRESSION: Stable left effusion and underlying atelectasis Xr [facility] Result Date: 2/9/2019 IMPRESSION: No interval change. Xr [facility] Result Date: 2/8/2019 IMPRESSION: ET tube is in satisfactory position. There is slight increasing haziness overlying left hemithorax with opacification left base consistent with left basilar atelectasis /airspace disease and left effusion. Xr [facility] Result Date: 2/7/2019 IMPRESSION: Left lung base opacification. No significant change. Xr [facility] Result Date: 2/6/2019 IMPRESSION: ET tube in satisfactory position. Worsening aeration of the left lung. Xr [facility] Result Date: 2/6/2019 Impression: Stable bilateral lower lobe atelectasis. Xr [facility] Result Date: 2/5/2019 IMPRESSION: No significant change. Xr [facility] Result Date: 2/4/2019 IMPRESSION: Endotracheal tube appears to be in satisfactory position. Xr Abd Port  1 V Result Date: 2/6/2019 IMPRESSION: NG tube in the stomach. Xr Abd Port  1 V Result Date: 2/4/2019 IMPRESSION: NG tube in appropriate position. Labs:  
 
Recent Labs  
  02/14/19 
0437 02/13/19 
3081 WBC 8.0 6.0 HGB 9.0* 10.2* HCT 31.0* 34.0*  
PLT 72* 73* Recent Labs  
  02/14/19 0437 02/13/19 
0204 02/12/19 
9526  139 141  
K 4.2 4.5 4.3 * 109* 108 CO2 19* 20* 24 BUN 67* 63* 54* CREA 2.42* 2.54* 2.27* * 154* 158* CA 8.6 8.7 8.8 MG 2.5* 2.5* 2.6* PHOS 4.8* 5.3* 4.6 Recent Labs  
  02/14/19 0437 02/13/19 
0204 02/12/19 
4613 SGOT 29 35 52* ALT 69 91* 121* * 174* 184* TBILI 0.8 1.0 1.1* TP 6.5 6.8 7.2 ALB 2.6* 2.6* 2.7*  
GLOB 3.9 4.2* 4.5* Recent Labs  
  02/14/19 
0437 02/13/19 
0204 02/12/19 
4032 INR 1.3* 1.2* 1.2* PTP 13.0* 12.2* 11.6* APTT 27.6 28.5 27.2 No results for input(s): FE, TIBC, PSAT, FERR in the last 72 hours. No results found for: FOL, RBCF No results for input(s): PH, PCO2, PO2 in the last 72 hours. No results for input(s): CPK, CKNDX, TROIQ in the last 72 hours. No lab exists for component: CPKMB Lab Results Component Value Date/Time Cholesterol, total 135 02/23/2018 11:31 AM  
 HDL Cholesterol 34 (L) 02/23/2018 11:31 AM  
 LDL, calculated 79 02/23/2018 11:31 AM  
 Triglyceride 111 02/23/2018 11:31 AM  
 CHOL/HDL Ratio 5.0 01/15/2016 03:49 AM  
 
Lab Results Component Value Date/Time Glucose (POC) 192 (H) 02/14/2019 11:09 AM  
 Glucose (POC) 137 (H) 02/14/2019 06:06 AM  
 Glucose (POC) 145 (H) 02/13/2019 11:21 PM  
 Glucose (POC) 153 (H) 02/13/2019 04:31 PM  
 Glucose (POC) 125 (H) 02/13/2019 01:08 PM  
 
Lab Results Component Value Date/Time Color YELLOW/STRAW 02/04/2019 04:08 PM  
 Appearance CLOUDY (A) 02/04/2019 04:08 PM  
 Specific gravity 1.012 02/04/2019 04:08 PM  
 pH (UA) 5.0 02/04/2019 04:08 PM  
 Protein 30 (A) 02/04/2019 04:08 PM  
 Glucose NEGATIVE  02/04/2019 04:08 PM  
 Ketone TRACE (A) 02/04/2019 04:08 PM  
 Bilirubin NEGATIVE  02/04/2019 04:08 PM  
 Urobilinogen 0.2 02/04/2019 04:08 PM  
 Nitrites NEGATIVE  02/04/2019 04:08 PM  
 Leukocyte Esterase NEGATIVE  02/04/2019 04:08 PM  
 Epithelial cells FEW 02/04/2019 04:08 PM  
 Bacteria NEGATIVE  02/04/2019 04:08 PM  
 WBC 10-20 02/04/2019 04:08 PM  
 RBC  02/04/2019 04:08 PM  
 
 
 
Medications Reviewed:  
 
Current Facility-Administered Medications Medication Dose Route Frequency  fludrocortisone (FLORINEF) tablet 100 mcg  100 mcg Oral DAILY  acetylcysteine (MUCOMYST) 200 mg/mL (20 %) solution 400 mg  2 mL Nebulization BID RT  
 albuterol-ipratropium (DUO-NEB) 2.5 MG-0.5 MG/3 ML  3 mL Nebulization BID RT  
 guaiFENesin ER (MUCINEX) tablet 600 mg  600 mg Oral Q12H  pantoprazole (PROTONIX) tablet 40 mg  40 mg Oral ACB  levothyroxine (SYNTHROID) tablet 25 mcg  25 mcg Oral ACB  carvedilol (COREG) tablet 3.125 mg  3.125 mg Oral BID WITH MEALS  simethicone (MYLICON) tablet 80 mg  80 mg Oral BID  hydrALAZINE (APRESOLINE) 20 mg/mL injection 20 mg  20 mg IntraVENous Q6H PRN  
 lactobac ac& pc-s.therm-b.anim (LAVERNE Q/RISAQUAD)  1 Cap Oral DAILY  sodium chloride (NS) flush 5-40 mL  5-40 mL IntraVENous PRN  
 0.9% sodium chloride infusion 250 mL  250 mL IntraVENous PRN  
 morphine injection 2 mg  2 mg IntraVENous Q6H PRN  
 insulin glargine (LANTUS) injection 5 Units  5 Units SubCUTAneous QHS  acetaminophen (TYLENOL) tablet 650 mg  650 mg Oral Q6H PRN  
 bacitracin 500 unit/gram packet 1 Packet  1 Packet Topical PRN  
 sodium chloride (NS) flush 5-40 mL  5-40 mL IntraVENous Q8H  
 sodium chloride (NS) flush 5-40 mL  5-40 mL IntraVENous PRN  
 sodium chloride (NS) flush 5-40 mL  5-40 mL IntraVENous Q8H  
 sodium chloride (NS) flush 5-40 mL  5-40 mL IntraVENous PRN  
 glucose chewable tablet 16 g  4 Tab Oral PRN  
 dextrose (D50W) injection syrg 12.5-25 g  25-50 mL IntraVENous PRN  
 glucagon (GLUCAGEN) injection 1 mg  1 mg IntraMUSCular PRN  
 insulin lispro (HUMALOG) injection   SubCUTAneous Q6H  
 
______________________________________________________________________ EXPECTED LENGTH OF STAY: 2d 0h 
ACTUAL LENGTH OF STAY:          10 Annabelle Win DO

## 2019-02-14 NOTE — PROGRESS NOTES
Infectious Diseases Progress Note Antibiotic Summary: 
Vancomycin 2/6 x 1 dose Zosyn   -- present Zyvox   --  Subjective: No new symptoms but remains very weak and cognitive function is not yet back to normal 
 
Objective:  
 
Vitals:  
Visit Vitals BP (!) 170/99 Pulse (!) 102 Temp 98 °F (36.7 °C) Resp 16 Ht 5' 10\" (1.778 m) Wt 86.1 kg (189 lb 13.1 oz) SpO2 94% BMI 27.24 kg/m² Tmax:  Temp (24hrs), Av.7 °F (36.5 °C), Min:97.3 °F (36.3 °C), Max:98 °F (36.7 °C) Exam:  General appearance: no distress; awake and alert Sternum: bruising over sternum seen Lungs: bilateral posterior rales at bases Heart: irregularly irregular rhythm with intermittent pacing; 2/ systolic murmur at LSB Abdomen: soft, not distended, non-tender. Bowel sounds normal 
Extremities: no edema; feet and hands warm; good pedal pulses Neuro: alert & oriented IV Lines: peripheral 
      
Labs:   
Recent Labs  
  19 
0204 19 
0213 19 
0224 WBC 6.0 4.9 3.7* HGB 10.2* 9.8* 8.9*  
PLT 73* 71* 63*  
BUN 63* 54* 54* CREA 2.54* 2.27* 2.33* TBILI 1.0 1.1* 1.3*  
SGOT 35 52* 72* * 184* 170* Urine culture  = NG 
 
BLOOD CULTURES: 
  = NGSF 
  = NGSF 
  = NGSF Bronchoscopy sample : 
 Gram stain = 2+ WBCs; NOS 
 culture = light normal resp shahrzad CXR  reviewed = still hazy at the left base -- may be effusion, atelectasis, +/- pneumonia Assessment: 1. Fever -- resolving: The cause of the fever is not certain but pulmonary source suspected (aspiration ?). Bronch cultures unremarkable -- oxygenation better and CXR better 
  
2. OHD -- cardiomyopathy and hx of atrial fib -- S/P initial arrest with Code Ice and several subsequent episodes of PEA 
  
3. NIDDM 
  
4. CVD 
  
5. HTN 
  
6. GOUT Plan: 1. Continue Zosyn -- plan to stop soon Discussed with the patient and his wife Candy Carcamo MD

## 2019-02-14 NOTE — PROGRESS NOTES
Spoke with Wong Mccollum with [facility] and patient, wife and daughter are declining option of inpatient rehab at this time - [facility] is willing to continue to follow. Will await further input from MD and therapy staff at this time.  MIGUELITO Martinez

## 2019-02-14 NOTE — PROGRESS NOTES
NUTRITION brief Recommendations 1. Encourage PO with meals and supplements 2. Monitor K+ and Phos - if either continuously elevated may need diet restriction 3. Daily weights Diet: 2000kcal, consistent CHO, cardiac Supplements: Glucerna TID, Magic Cup PO intake: 
 % Diet Eaten 02/13/19 1753 100 % 02/13/19 0932 0 %  
02/12/19 1500 100 % 02/12/19 1109 25 % 02/12/19 0755 50 % 02/11/19 1019 30 % Pt visited for PO check. Spoke with wife and daughter since pt sleeping. Intake still remains poor/fair with complaints of fullness with meals. Pt had reported hx of gastroparesis to RN but wife notes \"self-diagnosed. \" Has never been on any medications for in the past and spoke with MD with no plans to start. Pt taking mostly liquids (soups, drinks, shakes, etc) which would be recommended diet for symptoms mgmt. Did well with Glucerna yesterday and liking sherbet, however Phos elevated today and K+ upper normal. Will switch supplements to Suplena TID (1275kcal, 33g protein) with Magic Cup 1x/day (290kcal, 9g protein). If Phos remains elevated may need to consider diet restriction. Plan to follow for PO intake, supplement consumption, renal labs, wt trends. Estimated Nutrition Needs:  
Kcals/day: 8549 Kcals/day(1887-2045kcal) Protein: 82 g(1g/kg) - MARYCRUZ = 66g (0.8g/kg) Fluid: 2050 ml(25 ml/kg) Based On: Patricia Machado(x 1.2-1.3) Weight Used: Actual wt(82 kg) Asad Sandoval, [address] [place] [address] Pager #[phone] or [phone]

## 2019-02-14 NOTE — CONSULTS
Requesting Provider: Elio Holland DO - Reason for Consultation: Gross hematuria (w/ Hcacon)  Pre-existing [facility] Patient:   No                Patient: Wei Garvey MRN: [id number]  SSN: [id number]    YOB: 1945  Age: 72 y.o. Sex: male     Location: [facility]       Code Status: Full Code   PCP: Lise Fairchild MD  - [phone]   Emergency Contact:  Primary Emergency Contact: Hai Montejo, Home Phone: [phone]   Race/Judaism/Language: [facility] / Andreas Duane / Tor Curtis   Payor: Payor: [employer] / Plan: [address] / Product Type: Medicare /    Prior Admission Data: 2/4/19 [facility] EMERGENCY DEPT     Hospitalized:  Hospital Day: 11 - Admitted 2/4/2019  2:50 PM   POD # 7 Days Post-Op Procedure(s):  INSERT PPM SINGLE VENTRICULAR by Inocencia Aponte MD - Blood Loss: * No values recorded between 2/7/2019  7:10 AM and 2/7/2019  8:22 AM * 1 Hr 11 Min 51 Sec     CONSULTANTS  IP CONSULT TO CARDIOLOGY  IP CONSULT TO CARDIOLOGY  IP CONSULT TO CARDIOLOGY  IP CONSULT TO HOSPITALIST  IP CONSULT TO PULMONOLOGY  IP CONSULT TO NEUROLOGY  IP CONSULT TO NEPHROLOGY  IP CONSULT TO INFECTIOUS DISEASES  IP CONSULT TO UROLOGY   ADMISSION DIAGNOSES    ICD-10-CM ICD-9-CM   1. Unresponsive R41.89 780.09   2. Cardiac arrest ([facility]) I46.9 427.5   3. Altered mental status, unspecified altered mental status type R41.82 780.97   4. ST elevation (STEMI) myocardial infarction ([facility] [address] I21.3 410.90   5. PEA (Pulseless electrical activity) ([facility]) I46.9 427.5         Assessment/Plan:     · Gross hematuria    Manual irrigation w/ sterile water was performed with very small clots evacuated. No resistance. Irrigation return was clear after 500mls. -Manually irrigate for visible clots, darkening urine, c/o bladder discomfort/distention.  -Bladder scan as needed.  -Contact urology if urine continues to darken.         CC: Cardiac arrest   HPI: He is a 72 y.o. male with a PMHx of anemia, atrial fibrillation, Painter's esophagus, coronary artery disease, diabetes, heart failure, cardiomyopathy, myocarditis, history of hypertension, diabetic retinopathy, right carotid artery stenosis, TIA and vitamin D deficiency. He presented to the ED in [facility]. He has been here since 19. We were consulted for hematuria that started after ambulation on this unit which was after transport from CCU to CVICU. He is not on anticoagulants at this time. Urine in the dsouza tubing today was medium-dark red. He reports intermittent dysuria, especially in the evenings. The patient is a 82-year-old gentleman with past medical history of who presents to the hospital from [facility]. Patient presented to [facility] hospital today apparently in [facility]. Problem: Gross hematuria; Location: Bladder; Severity: mild-moderate;  Context: as above; Better/Worse: manual irrigation      Temp (24hrs), Av.6 °F (36.4 °C), Min:97.2 °F (36.2 °C), Max:98 °F (36.7 °C)    Urinary Status: Dsouza  Creatinine   Date/Time Value Ref Range Status   2019 04:37 AM 2.42 (H) 0.70 - 1.30 MG/DL Final   2019 02:04 AM 2.54 (H) 0.70 - 1.30 MG/DL Final   2019 02:13 AM 2.27 (H) 0.70 - 1.30 MG/DL Final   2019 02:24 AM 2.33 (H) 0.70 - 1.30 MG/DL Final   02/10/2019 02:23 AM 2.37 (H) 0.70 - 1.30 MG/DL Final     Current Antimicrobial Therapy (168h ago, onward)    Zosyn          Diet: DIET CARDIAC Regular; Consistent Carb 2000kcal - % Diet Eaten: 200 %     Labs     Lab Results   Component Value Date/Time    Lactic acid 0.9 2019 03:48 AM    Lactic acid 2.5 (HH) 2019 04:23 AM    Lactic acid 1.7 2019 07:08 PM    WBC 8.0 2019 04:37 AM    HCT 31.0 (L) 2019 04:37 AM    PLATELET 72 (L)  04:37 AM    Sodium 139 2019 04:37 AM    Potassium 4.2 2019 04:37 AM    Chloride 109 (H) 2019 04:37 AM    CO2 19 (L) 2019 04:37 AM    BUN 67 (H) 2019 04:37 AM    Creatinine 2.42 (H) 2019 04:37 AM Glucose 143 (H) 02/14/2019 04:37 AM    Calcium 8.6 02/14/2019 04:37 AM    Magnesium 2.5 (H) 02/14/2019 04:37 AM    INR 1.3 (H) 02/14/2019 04:37 AM     UA:   Lab Results   Component Value Date/Time    Color YELLOW/STRAW 02/04/2019 04:08 PM    Appearance CLOUDY (A) 02/04/2019 04:08 PM    Specific gravity 1.012 02/04/2019 04:08 PM    pH (UA) 5.0 02/04/2019 04:08 PM    Protein 30 (A) 02/04/2019 04:08 PM    Glucose NEGATIVE  02/04/2019 04:08 PM    Ketone TRACE (A) 02/04/2019 04:08 PM    Bilirubin NEGATIVE  02/04/2019 04:08 PM    Urobilinogen 0.2 02/04/2019 04:08 PM    Nitrites NEGATIVE  02/04/2019 04:08 PM    Leukocyte Esterase NEGATIVE  02/04/2019 04:08 PM    Epithelial cells FEW 02/04/2019 04:08 PM    Bacteria NEGATIVE  02/04/2019 04:08 PM    WBC 10-20 02/04/2019 04:08 PM    RBC  02/04/2019 04:08 PM     Imaging     Results for orders placed during the hospital encounter of 02/04/19   CT HEAD WO CONT    Narrative INDICATION: ams; ams     Exam: Noncontrast CT of the brain is performed with 5 mm collimation. CT dose reduction was achieved with the use of the standardized protocol  tailored for this examination and automatic exposure control for dose  modulation. Direct comparison is made to prior CT dated January 2016. FINDINGS: There is mild diffuse cortical atrophy. There is no acute intracranial  hemorrhage, mass, mass effect or herniation. Ventricular system is normal. The  gray-white matter differentiation is well-preserved. The mastoid air cells are  well pneumatized. The visualized paranasal sinuses are normal.      Impression IMPRESSION: No acute intracranial hemorrhage, mass or infarct. US Results (most recent):  Results from [place] encounter on 12/07/18   US ABD COMP    Narrative EXAM:  US ABD COMP     INDICATION: Hepatosplenomegaly. Anemia. COMPARISON: 3/2/2018.     TECHNIQUE:   Real-time sonography of the abdomen was performed with multiple static images of  the liver, gallbladder, pancreas, spleen, kidneys and retroperitoneum obtained. FINDINGS:  LIVER:   The liver is normal in echotexture with no mass or other focal abnormality. LIVER VASCULATURE:   The portal vein flow is hepatopedal.    GALLBLADDER:  The gallbladder wall is thickened to 5 mm, but no stones are identified. There  is no wall thickening or fluid around the gallbladder. COMMON BILE DUCT:  There is no biliary duct dilatation and the common duct measures 4 mm in  diameter. PANCREAS:  The pancreas is obscured by bowel gas. SPLEEN:  The spleen is normal in echotexture and size and measures 13.5 cm in length. Splenic volume is 321 mL. RIGHT KIDNEY:  The right kidney demonstrates normal echogenicity with no mass, stone or  hydronephrosis. The right kidney measures 12 cm in length. The right kidney  contains a lower pole 1.5 x 1.3 x 1.5 cm anechoic avascular cyst.    LEFT KIDNEY:  The left kidney demonstrates normal echogenicity with no mass, stone or  hydronephrosis. The left kidney measures 11.5 cm in length. RETROPERITONEUM:  The aorta tapers normally. The IVC is normal.  No retroperitoneal mass is identified. Impression IMPRESSION:     Isolated gallbladder wall thickening. Please correlate with clinical parameters  differentiate between acute and chronic cholecystitis. Splenic volume 322 mL.    Normal appearance of the liver    23X       Cultures     All Micro Results     Procedure Component Value Units Date/Time    CULTURE, BLOOD, PAIRED [[id number]] Collected:  02/08/19 0116    Order Status:  Completed Specimen:  Blood Updated:  02/13/19 0505     Special Requests: NO SPECIAL REQUESTS        Culture result: NO GROWTH 5 DAYS       CULTURE, BLOOD [[id number]] Collected:  02/06/19 1902    Order Status:  Completed Specimen:  Blood Updated:  02/12/19 0524     Special Requests: NO SPECIAL REQUESTS        Culture result: NO GROWTH 6 DAYS       CULTURE, RESPIRATORY/SPUTUM/BRONCH W GRAM STAIN [[id number]] Collected:  02/08/19 0934    Order Status:  Completed Specimen:  Sputum from Bronchial Washing Updated:  02/11/19 1110     Special Requests: NO SPECIAL REQUESTS        GRAM STAIN 2+ WBCS SEEN         NO DEFINITE ORGANISM SEEN        Culture result:       LIGHT NORMAL RESPIRATORY LAVERNE          CULTURE, BLOOD, PAIRED [[id number]] Collected:  02/04/19 1608    Order Status:  Completed Specimen:  Blood Updated:  02/09/19 0543     Special Requests: NO SPECIAL REQUESTS        Culture result: NO GROWTH 5 DAYS       CULTURE, URINE [[id number]] Collected:  02/07/19 0021    Order Status:  Completed Specimen:  Urine from [place] Specimen Updated:  02/08/19 0911     Special Requests: NO SPECIAL REQUESTS        [place] Count <1,000 CFU/ML        Culture result: NO GROWTH 1 DAY       CULTURE, RESPIRATORY/SPUTUM/BRONCH Franck Ricardo [[id number]] Collected:  02/07/19 0930    Order Status:  Canceled Specimen:  Sputum,ET Suction            Past History: (Complete 2+/3 areas)     Allergies   Allergen Reactions    Solu-Medrol [Methylprednisolone Sodium Succ] Other (comments)     Syncope / Cardiac Arrest      Current Facility-Administered Medications   Medication Dose Route Frequency    fludrocortisone (FLORINEF) tablet 100 mcg  100 mcg Oral DAILY    acetylcysteine (MUCOMYST) 200 mg/mL (20 %) solution 400 mg  2 mL Nebulization BID RT    albuterol-ipratropium (DUO-NEB) 2.5 MG-0.5 MG/3 ML  3 mL Nebulization BID RT    albumin human 25% (BUMINATE) solution 12.5 g  12.5 g IntraVENous Q6H    guaiFENesin ER (MUCINEX) tablet 600 mg  600 mg Oral Q12H    pantoprazole (PROTONIX) tablet 40 mg  40 mg Oral ACB    levothyroxine (SYNTHROID) tablet 25 mcg  25 mcg Oral ACB    carvedilol (COREG) tablet 3.125 mg  3.125 mg Oral BID WITH MEALS    simethicone (MYLICON) tablet 80 mg  80 mg Oral BID    hydrALAZINE (APRESOLINE) 20 mg/mL injection 20 mg  20 mg IntraVENous Q6H PRN    lactobac ac& pc-s.therm-b.anim (LAVERNE Q/RISAQUAD)  1 Cap Oral DAILY    sodium chloride (NS) flush 5-40 mL  5-40 mL IntraVENous PRN    0.9% sodium chloride infusion 250 mL  250 mL IntraVENous PRN    morphine injection 2 mg  2 mg IntraVENous Q6H PRN    insulin glargine (LANTUS) injection 5 Units  5 Units SubCUTAneous QHS    acetaminophen (TYLENOL) tablet 650 mg  650 mg Oral Q6H PRN    bacitracin 500 unit/gram packet 1 Packet  1 Packet Topical PRN    sodium chloride (NS) flush 5-40 mL  5-40 mL IntraVENous Q8H    sodium chloride (NS) flush 5-40 mL  5-40 mL IntraVENous PRN    sodium chloride (NS) flush 5-40 mL  5-40 mL IntraVENous Q8H    sodium chloride (NS) flush 5-40 mL  5-40 mL IntraVENous PRN    glucose chewable tablet 16 g  4 Tab Oral PRN    dextrose (D50W) injection syrg 12.5-25 g  25-50 mL IntraVENous PRN    glucagon (GLUCAGEN) injection 1 mg  1 mg IntraMUSCular PRN    insulin lispro (HUMALOG) injection   SubCUTAneous Q6H    Prior to Admission medications    Medication Sig Start Date End Date Taking? Authorizing Provider   insulin glargine (LANTUS,BASAGLAR) 100 unit/mL (3 mL) inpn 14 Units by SubCUTAneous route nightly. 1/21/19  Yes Michaela Scruggs MD   ELIQUIS 5 mg tablet TAKE 1 TABLET BY MOUTH TWICE A DAY 11/9/18  Yes Provider, Historical   pegloticase (KRYSTEXXA) 8 mg/mL soln injection 8 mg by IntraVENous route Once every 2 weeks. Yes Provider, Historical   digoxin (LANOXIN) 0.125 mg tablet Take 0.125 mg by mouth. Pt takes daily x 2 days, then skips a day and repeats this schedule. Yes Provider, Historical   dextran 70-hypromellose (ARTIFICIAL TEARS,[id number],) ophthalmic solution Administer 1 Drop to both eyes as needed. Yes Provider, Historical   hydrALAZINE (APRESOLINE) 25 mg tablet Take 25 mg by mouth three (3) times daily. 5/20/16  Yes Provider, Historical   multivitamin (ONE A DAY) tablet Take 1 Tab by mouth daily. Yes Provider, Historical   pravastatin (PRAVACHOL) 20 mg tablet Take 1 Tab by mouth nightly.  1/18/16  Yes Holly Avila NP   carvedilol (COREG) 3.125 mg tablet Take 1 Tab by mouth two (2) times daily (with meals). 1/18/16  Yes Balaji Shadow, NP   levothyroxine (SYNTHROID) 25 mcg tablet TAKE 1 TABLET BY MOUTH EVERY DAY BEFORE BREAKFAST 1/22/19   Aiden Escamilla MD   leflunomide (ARAVA) 20 mg tablet Take 20 mg by mouth every evening. Provider, Historical   furosemide (LASIX) 40 mg tablet Take 40 mg by mouth daily. 5/10/16   Provider, Historical   omeprazole (PRILOSEC) 20 mg capsule Take 20 mg by mouth nightly. Provider, Historical        PMHx:  has a past medical history of Acute encephalopathy (1/14/2016), Anemia (5/11/2017), Atrial fibrillation ([facility] [address] Painter esophagus, CAD (coronary artery disease), Diabetes ([facility] [address] Heart failure ([facility] [address] HTN, goal below 140/90, Retinopathy, diabetic, bilateral ([facility] [address] (3/11/2016), Stenosis of right carotid artery without cerebral infarction (1/18/2016), TIA (transient ischemic attack) (1/14/2016), and Vitamin D deficiency (3/1/2016). PSurgHx:  has a past surgical history that includes hx urological (2013); colonoscopy (N/A, 6/23/2016); hx endoscopy (06/27/2016); hx endoscopy (06/23/2016); colonoscopy (N/A, 8/6/2018); colonoscopy (N/A, 8/30/2018); and pr ins new/rplc prm pacemaker w/transv eltrd ventr (N/A, 2/7/2019). PSocHx:  reports that  has never smoked. he has never used smokeless tobacco. He reports that he drinks alcohol. He reports that he does not use drugs.    ROS:  (Complete - 10 systems) - positives are bolded : Weightloss (Constitutional), Dry mouth (ENMT), Chest pain (CV), SOB (Respiratory), Constipation (GI), Weakness (MS), Pallor (Skin), TIA Sx (Neuro), Confusion (Psych), Easy bruising (Heme)    Physical Exam: (Comprehesive - 8+ 1995 Systems)     (1) Constitutional:  FIO2: FIO2 (%): 30 % on SpO2: O2 Sat (%): 95 %  O2 Device: Nasal cannula O2 Flow Rate (L/min): 1 l/min  Patient Vitals for the past 24 hrs:   BP Temp Pulse Resp SpO2 Weight   02/14/19 1151 (!) 128/106 97.4 °F (36.3 °C) (!) 102 18     02/14/19 1150 147/78        02/14/19 0810 143/88 97.7 °F (36.5 °C) 91 18 95 %    02/14/19 0313 118/79 97.2 °F (36.2 °C) (!) 103 18 95 %    02/14/19 0123      86.8 kg (191 lb 5.8 oz)   02/14/19 0022 142/81  95      02/14/19 0009 158/74  98      02/13/19 2316 (!) 200/91 97.8 °F (36.6 °C) 98 18 95 %    02/13/19 2030 (!) 183/102  96  97 %    02/13/19 1945 (!) 170/99 98 °F (36.7 °C) (!) 102 16 94 %    02/13/19 1650 120/84  (!) 102      02/13/19 1514 148/88 97.3 °F (36.3 °C) 93 16 94 %    02/13/19 1325 94/47  95      02/13/19 1320 113/77  100      02/13/19 1318 157/89  99      02/13/19 1255 (!) 142/91 97.7 °F (36.5 °C) 99 18 94 %        Date 02/13/19 0700 - 02/14/19 0659 02/14/19 0700 - 02/15/19 0659   Shift 9430-9208 9320-6533 24 Hour Total 9342-5154 2172-8101 24 Hour Total   INTAKE   P.O. 240 240 480        P. O. 240 240 480      I. V.(mL/kg/hr)  1805(1.7) 1805(0.9)        Volume (0.9% sodium chloride infusion)  1755 1755        Volume (albumin human 25% (BUMINATE) solution 12.5 g)  50 50      Other  0 0        Irrigation Volume Input (mL) (Urinary Catheter 02/12/19 [place])  0 0      Shift Total(mL/kg) 240(2.8) 0401(66.7) 7882(82.2)      OUTPUT   Urine(mL/kg/hr) 300(0.3) 450(0.4) 750(0.4)        Urine Output (mL) (Urinary Catheter 02/12/19 [place]) 300 450 750      Shift Total(mL/kg) 300(3.5) 450(5.2) 750(8.6)      NET -60 1595 1535      Weight (kg) 86.1 86.8 86.8 86.8 86.8 86.8      (2) ENMT:   moist mucous membranes    Cardiovascular  Paced   (3) Respiratory:  breathing easily   (4) GI:  no abdominal masses, tenderness   (5) :   [place] present, draining medium-dark red urine. Penile implant   (6) Lymphatic:  no adenopathy   (7) Muscloskeletal:  no gross deformity   (8) Skin:  no rash   (9) Neuro:  Alert, awake, oriented      Signed By: Bryan Arreaga NP  - February 14, 2019     Addendum,    Seen and examined on 2/14.  Based upon history, hematuria likely related to trauma during transfer. Currently, slowed down. Agree with plan as above.  He will require Urology follow up - he hematuria continues, he will require outpatient cystoscopy

## 2019-02-14 NOTE — PROGRESS NOTES
1930: Bedside shift change report given to Lexy (oncoming nurse) by Glendy Carr (offgoing nurse). Report included the following information SBAR, Intake/Output, MAR, Accordion, Recent Results, Med Rec Status and Cardiac Rhythm AF, paced with intermittent bundle branch. 2040: paged Dr. Tin Baker d/t -180s at rest; will not give prn hydralazine overnight as patient has had postural hypotension x2 days and started florinef today; will continue [email or web address], will hold overnight doses of albumin 2337: Called, spoke with Dr. Tin Baker. Will administer prn hydralazine for ; order is for 20mg, will start with 10 mg and then administer additional 10mg if SBP maintains >180 
 
0820: Bedside shift change report given to Corby Delgado (oncoming nurse) by Jesu De La Torre (offgoing nurse). Report included the following information SBAR, Intake/Output, MAR, Accordion, Recent Results, Med Rec Status and Cardiac Rhythm AF, Paced with intermittent BBB. 0830: Paged hospitalist, spoke with Dr. Roselia Ely to notify of pt reporting feeling of fullness impacting hunger/thirst and stated history of gastroparesis

## 2019-02-14 NOTE — PROGRESS NOTES
Name: Eladio Edwards MRN: [id number] : 1945 Assessment: 
MARYCRUZ- due to ATN from hypotension/Arrest/bradycardia. Showing renal recovery. Non-oliguric. 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 Baseline Cr 1.7-2.0mg/dl DM-2 Cardiac arrest; PEA- ? etiology. Code ice protocol; recurrence of niurka arrest on . S/p PPM. Stress test  neg Gout PANCYTOPENIA,chronic A Resp failure- reintubated; s/p bronch today with copious secretions Gout: was on pegloticase Plan/Recommendations: 
Stop IVF Finish IV albumin today Florinef Daily orthostatics Encourage intake Flush dsouza catheter. Lets try to remove by tomorrow Strict I/Os Avoid nephrotoxins Am labs Subjective: 
Did not sleep well. Orthostatic have not been checked yet. +Recurrent hematuria. Exam: 
Visit Vitals /88 (BP 1 Location: Right arm, BP Patient Position: Head of bed elevated (Comment degrees)) Pulse 91 Temp 97.7 °F (36.5 °C) Resp 18 Ht 5' 10\" (1.778 m) Wt 86.8 kg (191 lb 5.8 oz) SpO2 95% BMI 27.46 kg/m² NAD Decreased AE b/l bases No LE edema Labs/Data: 
 
Lab Results Component Value Date/Time WBC 8.0 2019 04:37 AM  
 Hemoglobin (POC) 6.2 2018 02:17 PM  
 HGB 9.0 (L) 2019 04:37 AM  
 Hematocrit (POC) 32 (L) 2019 12:23 PM  
 HCT 31.0 (L) 2019 04:37 AM  
 PLATELET 72 (L)  04:37 AM  
 MCV 96.9 2019 04:37 AM  
 
 
Lab Results Component Value Date/Time  Sodium 139 2019 04:37 AM  
 Potassium 4.2 2019 04:37 AM  
 Chloride 109 (H) 2019 04:37 AM  
 CO2 19 (L) 2019 04:37 AM  
 Anion gap 11 2019 04:37 AM  
 Glucose 143 (H) 2019 04:37 AM  
 BUN 67 (H) 02/14/2019 04:37 AM  
 Creatinine 2.42 (H) 02/14/2019 04:37 AM  
 BUN/Creatinine ratio 28 (H) 02/14/2019 04:37 AM  
 GFR est AA 32 (L) 02/14/2019 04:37 AM  
 GFR est non-AA 27 (L) 02/14/2019 04:37 AM  
 Calcium 8.6 02/14/2019 04:37 AM  
 
 
Wt Readings from Last 3 Encounters:  
02/14/19 86.8 kg (191 lb 5.8 oz) 02/04/19 81.6 kg (180 lb) 01/17/19 81.6 kg (180 lb) Intake/Output Summary (Last 24 hours) at 2/14/2019 1148 Last data filed at 2/14/2019 2011 Gross per 24 hour Intake 2285 ml Output 750 ml Net 1535 ml Discussed with pt/daughter and RN Suzan New MD

## 2019-02-14 NOTE — PROGRESS NOTES
Problem: Self Care Deficits Care Plan (Adult) Goal: *Acute Goals and Plan of Care (Insert Text) Occupational Therapy Goals Initiated 2/13/2019 1. Patient will perform ADLs standing 5 mins without fatigue or LOB with minimal assistance/contact guard assistance within 5 day(s). 2.  Patient will perform upper body ADLs with supervision/set-up within 5 day(s). 3.  Patient will perform lower body ADLs using AE PRN with minimal assistance/contact guard assistance within 5 day(s). 4.  Patient will perform toilet transfers with minimal assistance/contact guard assistance within 5 day(s). 5.  Patient will perform all aspects of toileting with minimal assistance/contact guard assistance within 5 day(s). 6.  Patient will participate in cardiac/sternal upper extremity therapeutic exercise/activities to increase independence with ADLs with supervision/set-up for 5 minutes within 5 day(s). Occupational Therapy TREATMENT Patient: Ramón Salomon (48 y.o. male) Date: 2/14/2019 Diagnosis: Cardiac arrest [facility]) [I46.9] Cardiac arrest ([facility] [address] Procedure(s) (LRB): 
INSERT PPM SINGLE VENTRICULAR (N/A) 7 Days Post-Op Precautions: Fall Chart, occupational therapy assessment, plan of care, and goals were reviewed. ASSESSMENT: 
Patient cleared by RN to be seen for OT session, received sitting in chair. Patient completed MoCA Cognitive Assessment and scored a 23/30, indicating a mild cognitive deficit. Patient required consistent cues throughout session in order to maintain PPM precautions (able to verbalize but not carryover during tasks). Patient noted to be orthostatic (asymptomatic) in standing, however, following activity and toileting, BP elevated (see flowsheet). Patient completed functional transfers with min/mod A. Patient required overall mod A for toileting and CGA to stand at sink and complete grooming/oral care.  Patient continues to function well below baseline and continue to recommend IP rehab in order to maximize functional independence and safety. Patient not safe to return home at this time 2* fall risk and inconsistent presentation of functional abilities. Noted patient more impaired during PT session per PT report. Recommend with nursing patient to complete as able in order to maintain strength, endurance and independence: ADLs with supervision/setup, OOB to chair 3x/day and mobilizing to the BSC/bathroom for toileting with 2 assist (with gait belt). Thank you for your assistance. Progression toward goals: 
[]       Improving appropriately and progressing toward goals [x]       Improving slowly and progressing toward goals 
[]       Not making progress toward goals and plan of care will be adjusted PLAN: 
Patient continues to benefit from skilled intervention to address the above impairments. Continue treatment per established plan of care. Discharge Recommendations:  Inpatient Rehab Further Equipment Recommendations for Discharge:  TBD by rehab SUBJECTIVE:  
Patient stated I just feel like my muscles are so heavy.  OBJECTIVE DATA SUMMARY:  
Cognitive/Behavioral Status: 
Neurologic State: Alert Orientation Level: Oriented X4 Cognition: Decreased attention/concentration; Follows commands; Impaired decision making;Poor safety awareness Perception: Appears intact Perseveration: No perseveration noted Safety/Judgement: Awareness of environment;Decreased awareness of need for assistance;Decreased awareness of need for safety;Decreased insight into deficits; Fall prevention Functional Mobility and Transfers for ADLs:Bed Mobility: 
Supine to Sit: Moderate assistance Scooting: Moderate assistance Transfers: 
Sit to Stand: Moderate assistance; Additional time;Assist x1 Functional Transfers Toilet Transfer : Minimum assistance; Additional time;Assist x1;Adaptive equipment Cues: Physical assistance;Verbal cues provided(to not use LUE 2* PPM precautions) Balance: 
Sitting: Intact Sitting - Static: Good (unsupported) Sitting - Dynamic: Fair (occasional) Standing: Impaired; With support Standing - Static: Fair;Constant support Standing - Dynamic : Fair ADL Intervention: 
Grooming Brushing Teeth: Contact guard assistance(Standing at sink ) Cues: Verbal cues provided Upper Body Dressing Assistance Hospital Gown: Minimum  assistance Cues: Physical assistance;Verbal cues provided Toileting Toileting Assistance: Moderate assistance Bladder Hygiene: Supervision/set-up Bowel Hygiene: Minimum assistance Clothing Management: Total assistance (dependent) Cues: Verbal cues provided Adaptive Equipment: Grab bars Cognitive Retraining Safety/Judgement: Awareness of environment;Decreased awareness of need for assistance;Decreased awareness of need for safety;Decreased insight into deficits; Fall prevention Pain: 
Pain Scale 1: Numeric (0 - 10) Pain Intensity 1: 0 Activity Tolerance:  
Fair, VSS (orthostatic hypotension in standing) Please refer to the flowsheet for vital signs taken during this treatment. After treatment:  
[x] Patient left in no apparent distress sitting up in chair 
[] Patient left in no apparent distress in bed 
[x] Call bell left within reach [x] Nursing notified 
[] Caregiver present 
[] Bed alarm activated COMMUNICATION/COLLABORATION:  
The patients plan of care was discussed with: Physical Therapist and Registered Nurse Jimmy Ashton OT Time Calculation: 45 mins

## 2019-02-14 NOTE — PROGRESS NOTES
Problem: Mobility Impaired (Adult and Pediatric) Goal: *Acute Goals and Plan of Care (Insert Text) Physical Therapy Goals Initiated 2/12/2019 1. Patient will move from supine to sit and sit to supine , scoot up and down and roll side to side in bed with supervision/set-up within 7 days. 2.  Patient will perform sit to/from stand with minimal assistance/contact guard assist within 7 days. 3.  Patient will ambulate 50 feet with least restrictive assistive device and minimal assistance/contact guard assist within 7 days. 4.  Patient will ascend/descend 12 stairs with bilateral handrail(s) with minimal assistance/contact guard assist within 7 days. 5.  Patient will verbally and functionally recall 3/3 pacemaker precautions within 7 days. 6.  Patient will improve Tinetti score by 4-5 points within 7 days. physical Therapy TREATMENT Patient: Gordon Denson (62 y.o. male) Date: 2/14/2019 Diagnosis: Cardiac arrest [facility]) [I46.9] Cardiac arrest ([facility] [address] Procedure(s) (LRB): 
INSERT PPM SINGLE VENTRICULAR (N/A) 7 Days Post-Op Precautions: Fall, PM precautions L UE Chart, physical therapy assessment, plan of care and goals were reviewed. ASSESSMENT: 
Patient with improved tolerance of activity overall, however he continues with orthostatic hypotension (denying symptoms this session). Currently limited by orthostatic hypotension, weakness, impaired balance, decreased insight into deficits, impaired safety awareness, and impaired attention to task. Patient with one major LOB requiring mod-max Ax2 to correct with gait x5ft by bed. Transferred to chair at end of session. BP began at 130/86 in supine and steadily declined with supine>sit>stand>gait to 90/57. Asymptomatic and recovered with seated rest and time, back to 151/73.   
 
Discussed therapist's recommendation for inpatient rehab and safety concerns for d/c home (including poor safety awareness, significant weakness, increased fall risk, and need for 2 assist). Patient's daughters (one in NP school and one an RN) encouraging patient and mother to reconsider inpatient rehab due to patient not being safe at home. They are agreeable now. Of concern, patient with very poor attention/insight into to conversation regarding d/c disposition. Joking that his rehab would be done by \"Valentino\" (family dog) and difficulty with word finding when describing handrails on staircase. Patient appears with hypoxic injury from his medical course. Progression toward goals: 
[x]    Improving appropriately and progressing toward goals 
[]    Improving slowly and progressing toward goals 
[]    Not making progress toward goals and plan of care will be adjusted PLAN: 
Patient continues to benefit from skilled intervention to address the above impairments. Continue treatment per established plan of care. Discharge Recommendations:  Inpatient Rehab Further Equipment Recommendations for Discharge:  TBD at rehab SUBJECTIVE:  
Patient stated \"I think it will take me 5 days to be subpar and 5 weeks to get back to normal. OBJECTIVE DATA SUMMARY:  
Critical Behavior: 
Neurologic State: Alert Orientation Level: Oriented X4 Cognition: Follows commands, Appropriate for age attention/concentration Safety/Judgement: Awareness of environment, Fall prevention Functional Mobility Training: 
Bed Mobility: 
Supine to Sit: Moderate assistance Scooting: Moderate assistance Transfers: 
Sit to Stand: Minimum assistance; Moderate assistance;Assist x2 Stand to Sit: Moderate assistance;Assist x1 Balance: 
Sitting: Intact Standing: Impaired; With support Standing - Static: Fair Standing - Dynamic : PoorAmbulation/Gait Training: 
Distance (ft): 5 Feet (ft) Assistive Device: Gait belt Ambulation - Level of Assistance: Moderate assistance;Assist x2 Gait Abnormalities: Decreased step clearance; Path deviations Base of Support: Widened Speed/Mariella: Slow;Shuffled Step Length: Right shortened;Left shortened Pain: 
Pain Scale 1: Numeric (0 - 10) Pain Intensity 1: 0 Activity Tolerance:  
Orthostatic as above beginning /86 Vitals:  
 02/14/19 1332 02/14/19 1335 02/14/19 1340 02/14/19 1345 BP: 94/66 90/57 101/76 151/73 BP 1 Location: Left arm Left arm Left arm Left arm BP Patient Position: Standing Standing;Post activity Comment: post gait x5 ft Sitting;Post activity Sitting Pulse:      
Resp:      
Temp:      
SpO2:      
Weight:      
Height:      
 
 
Please refer to the flowsheet for vital signs taken during this treatment. After treatment:  
[x]    Patient left in no apparent distress sitting up in chair 
[]    Patient left in no apparent distress in bed 
[x]    Call bell left within reach [x]    Nursing notified 
[x]    Caregiver present 
[]    Bed alarm activated COMMUNICATION/COLLABORATION:  
The patients plan of care was discussed with: Occupational Therapist, Registered Nurse, Physician and  Nerissa Evans, PT, DPT Time Calculation: 27 mins

## 2019-02-15 ENCOUNTER — APPOINTMENT (OUTPATIENT)
Dept: GENERAL RADIOLOGY | Age: 74
DRG: 242 | End: 2019-02-15
Attending: INTERNAL MEDICINE
Payer: MEDICARE

## 2019-02-15 ENCOUNTER — APPOINTMENT (OUTPATIENT)
Dept: INFUSION THERAPY | Age: 74
End: 2019-02-15

## 2019-02-15 LAB
ALBUMIN SERPL-MCNC: 2.6 G/DL (ref 3.5–5)
ALBUMIN/GLOB SERPL: 0.7 {RATIO} (ref 1.1–2.2)
ALP SERPL-CCNC: 164 U/L (ref 45–117)
ALT SERPL-CCNC: 63 U/L (ref 12–78)
ANION GAP SERPL CALC-SCNC: 10 MMOL/L (ref 5–15)
APTT PPP: 27.9 SEC (ref 22.1–32)
AST SERPL-CCNC: 30 U/L (ref 15–37)
BILIRUB SERPL-MCNC: 0.7 MG/DL (ref 0.2–1)
BUN SERPL-MCNC: 64 MG/DL (ref 6–20)
BUN/CREAT SERPL: 28 (ref 12–20)
CALCIUM SERPL-MCNC: 8.8 MG/DL (ref 8.5–10.1)
CHLORIDE SERPL-SCNC: 109 MMOL/L (ref 97–108)
CO2 SERPL-SCNC: 20 MMOL/L (ref 21–32)
CREAT SERPL-MCNC: 2.27 MG/DL (ref 0.7–1.3)
ERYTHROCYTE [DISTWIDTH] IN BLOOD BY AUTOMATED COUNT: 14.1 % (ref 11.5–14.5)
GLOBULIN SER CALC-MCNC: 3.7 G/DL (ref 2–4)
GLUCOSE BLD STRIP.AUTO-MCNC: 121 MG/DL (ref 65–100)
GLUCOSE BLD STRIP.AUTO-MCNC: 126 MG/DL (ref 65–100)
GLUCOSE BLD STRIP.AUTO-MCNC: 134 MG/DL (ref 65–100)
GLUCOSE BLD STRIP.AUTO-MCNC: 148 MG/DL (ref 65–100)
GLUCOSE BLD STRIP.AUTO-MCNC: 154 MG/DL (ref 65–100)
GLUCOSE SERPL-MCNC: 127 MG/DL (ref 65–100)
HCT VFR BLD AUTO: 30.1 % (ref 36.6–50.3)
HGB BLD-MCNC: 8.8 G/DL (ref 12.1–17)
INR PPP: 1.3 (ref 0.9–1.1)
MAGNESIUM SERPL-MCNC: 2.4 MG/DL (ref 1.6–2.4)
MCH RBC QN AUTO: 27.8 PG (ref 26–34)
MCHC RBC AUTO-ENTMCNC: 29.2 G/DL (ref 30–36.5)
MCV RBC AUTO: 95.3 FL (ref 80–99)
NRBC # BLD: 0 K/UL (ref 0–0.01)
NRBC BLD-RTO: 0 PER 100 WBC
PHOSPHATE SERPL-MCNC: 4.3 MG/DL (ref 2.6–4.7)
PLATELET # BLD AUTO: 73 K/UL (ref 150–400)
PMV BLD AUTO: 10.7 FL (ref 8.9–12.9)
POTASSIUM SERPL-SCNC: 4.2 MMOL/L (ref 3.5–5.1)
PROT SERPL-MCNC: 6.3 G/DL (ref 6.4–8.2)
PROTHROMBIN TIME: 13.1 SEC (ref 9–11.1)
RBC # BLD AUTO: 3.16 M/UL (ref 4.1–5.7)
SERVICE CMNT-IMP: ABNORMAL
SODIUM SERPL-SCNC: 139 MMOL/L (ref 136–145)
THERAPEUTIC RANGE,PTTT: NORMAL SECS (ref 58–77)
WBC # BLD AUTO: 6.7 K/UL (ref 4.1–11.1)

## 2019-02-15 PROCEDURE — 85610 PROTHROMBIN TIME: CPT

## 2019-02-15 PROCEDURE — 77010033678 HC OXYGEN DAILY

## 2019-02-15 PROCEDURE — 74011250637 HC RX REV CODE- 250/637: Performed by: INTERNAL MEDICINE

## 2019-02-15 PROCEDURE — 80053 COMPREHEN METABOLIC PANEL: CPT

## 2019-02-15 PROCEDURE — 97116 GAIT TRAINING THERAPY: CPT

## 2019-02-15 PROCEDURE — 36415 COLL VENOUS BLD VENIPUNCTURE: CPT

## 2019-02-15 PROCEDURE — 82962 GLUCOSE BLOOD TEST: CPT

## 2019-02-15 PROCEDURE — 74011000250 HC RX REV CODE- 250: Performed by: INTERNAL MEDICINE

## 2019-02-15 PROCEDURE — 84100 ASSAY OF PHOSPHORUS: CPT

## 2019-02-15 PROCEDURE — 83735 ASSAY OF MAGNESIUM: CPT

## 2019-02-15 PROCEDURE — 85027 COMPLETE CBC AUTOMATED: CPT

## 2019-02-15 PROCEDURE — 74011250637 HC RX REV CODE- 250/637: Performed by: HOSPITALIST

## 2019-02-15 PROCEDURE — 94640 AIRWAY INHALATION TREATMENT: CPT

## 2019-02-15 PROCEDURE — 94664 DEMO&/EVAL PT USE INHALER: CPT

## 2019-02-15 PROCEDURE — 71045 X-RAY EXAM CHEST 1 VIEW: CPT

## 2019-02-15 PROCEDURE — 97535 SELF CARE MNGMENT TRAINING: CPT

## 2019-02-15 PROCEDURE — 74011250636 HC RX REV CODE- 250/636: Performed by: HOSPITALIST

## 2019-02-15 PROCEDURE — 85730 THROMBOPLASTIN TIME PARTIAL: CPT

## 2019-02-15 PROCEDURE — 77030018846 HC SOL IRR STRL H20 ICUM -A

## 2019-02-15 PROCEDURE — 97530 THERAPEUTIC ACTIVITIES: CPT

## 2019-02-15 PROCEDURE — 74011636637 HC RX REV CODE- 636/637: Performed by: HOSPITALIST

## 2019-02-15 PROCEDURE — 65660000001 HC RM ICU INTERMED STEPDOWN

## 2019-02-15 RX ADMIN — GUAIFENESIN 600 MG: 600 TABLET, EXTENDED RELEASE ORAL at 21:22

## 2019-02-15 RX ADMIN — CARVEDILOL 3.12 MG: 6.25 TABLET, FILM COATED ORAL at 08:21

## 2019-02-15 RX ADMIN — GUAIFENESIN 600 MG: 600 TABLET, EXTENDED RELEASE ORAL at 08:21

## 2019-02-15 RX ADMIN — HYDRALAZINE HYDROCHLORIDE 20 MG: 20 INJECTION INTRAMUSCULAR; INTRAVENOUS at 04:10

## 2019-02-15 RX ADMIN — INSULIN GLARGINE 5 UNITS: 100 INJECTION, SOLUTION SUBCUTANEOUS at 21:22

## 2019-02-15 RX ADMIN — CARVEDILOL 3.12 MG: 6.25 TABLET, FILM COATED ORAL at 18:05

## 2019-02-15 RX ADMIN — ACETYLCYSTEINE 400 MG: 200 SOLUTION ORAL; RESPIRATORY (INHALATION) at 07:05

## 2019-02-15 RX ADMIN — Medication 10 ML: at 06:37

## 2019-02-15 RX ADMIN — IPRATROPIUM BROMIDE AND ALBUTEROL SULFATE 3 ML: .5; 3 SOLUTION RESPIRATORY (INHALATION) at 21:57

## 2019-02-15 RX ADMIN — LEVOTHYROXINE SODIUM 25 MCG: 25 TABLET ORAL at 06:37

## 2019-02-15 RX ADMIN — FLUDROCORTISONE ACETATE 100 MCG: 0.1 TABLET ORAL at 08:21

## 2019-02-15 RX ADMIN — ACETYLCYSTEINE 400 MG: 200 SOLUTION ORAL; RESPIRATORY (INHALATION) at 21:58

## 2019-02-15 RX ADMIN — Medication 40 ML: at 14:00

## 2019-02-15 RX ADMIN — IPRATROPIUM BROMIDE AND ALBUTEROL SULFATE 3 ML: .5; 3 SOLUTION RESPIRATORY (INHALATION) at 07:04

## 2019-02-15 RX ADMIN — SIMETHICONE CHEW TAB 80 MG 80 MG: 80 TABLET ORAL at 18:05

## 2019-02-15 RX ADMIN — Medication 10 ML: at 21:23

## 2019-02-15 NOTE — PROGRESS NOTES
[facility] Cardiology Progress Note Date of service: 2/15/2019 Subjective: 
Mr Caron Bell says he feels ok other than having some discomfort related to the Chacon catheter Hematuria present Objective: 
 
Visit Vitals /79 (BP 1 Location: Right arm, BP Patient Position: Sitting) Pulse (!) 114 Temp 98.2 °F (36.8 °C) Resp 16 Ht 5' 10\" (1.778 m) Wt 91.1 kg (200 lb 13.4 oz) SpO2 94% BMI 28.82 kg/m² Physical Exam  
Constitutional: He appears well-developed and well-nourished. HENT:  
Head: Normocephalic and atraumatic. Eyes: Conjunctivae are normal. No scleral icterus. Neck: No JVD present. Cardiovascular: Normal rate. An irregularly irregular rhythm present. Exam reveals no gallop. Murmur heard. Early systolic murmur is present with a grade of 2/6. Pulmonary/Chest: Effort normal. No stridor. No respiratory distress. He has no wheezes. He has no rales. Abdominal: Soft. He exhibits no distension. Genitourinary:  
Genitourinary Comments: Chacon present + hematuria Musculoskeletal: He exhibits no edema or deformity. Neurological: He is alert. Skin: Skin is warm and dry. Data reviewed: 
Recent Results (from the past 12 hour(s)) GLUCOSE, POC Collection Time: 02/14/19  9:16 PM  
Result Value Ref Range Glucose (POC) 142 (H) 65 - 100 mg/dL Performed by Ila CRENSHAW GLUCOSE, POC Collection Time: 02/15/19  1:09 AM  
Result Value Ref Range Glucose (POC) 126 (H) 65 - 100 mg/dL Performed by Nicole Segovia CBC W/O DIFF Collection Time: 02/15/19  3:48 AM  
Result Value Ref Range WBC 6.7 4.1 - 11.1 K/uL  
 RBC 3.16 (L) 4.10 - 5.70 M/uL HGB 8.8 (L) 12.1 - 17.0 g/dL HCT 30.1 (L) 36.6 - 50.3 % MCV 95.3 80.0 - 99.0 FL  
 MCH 27.8 26.0 - 34.0 PG  
 MCHC 29.2 (L) 30.0 - 36.5 g/dL  
 RDW 14.1 11.5 - 14.5 % PLATELET 73 (L) 449 - 400 K/uL MPV 10.7 8.9 - 12.9 FL  
 NRBC 0.0 0  WBC ABSOLUTE NRBC 0.00 0.00 - 0.01 K/uL MAGNESIUM Collection Time: 02/15/19  3:48 AM  
Result Value Ref Range Magnesium 2.4 1.6 - 2.4 mg/dL PHOSPHORUS Collection Time: 02/15/19  3:48 AM  
Result Value Ref Range Phosphorus 4.3 2.6 - 4.7 MG/DL  
METABOLIC PANEL, COMPREHENSIVE Collection Time: 02/15/19  3:48 AM  
Result Value Ref Range Sodium 139 136 - 145 mmol/L Potassium 4.2 3.5 - 5.1 mmol/L Chloride 109 (H) 97 - 108 mmol/L  
 CO2 20 (L) 21 - 32 mmol/L Anion gap 10 5 - 15 mmol/L Glucose 127 (H) 65 - 100 mg/dL BUN 64 (H) 6 - 20 MG/DL Creatinine 2.27 (H) 0.70 - 1.30 MG/DL  
 BUN/Creatinine ratio 28 (H) 12 - 20 GFR est AA 35 (L) >60 ml/min/1.73m2 GFR est non-AA 29 (L) >60 ml/min/1.73m2 Calcium 8.8 8.5 - 10.1 MG/DL Bilirubin, total 0.7 0.2 - 1.0 MG/DL  
 ALT (SGPT) 63 12 - 78 U/L  
 AST (SGOT) 30 15 - 37 U/L Alk. phosphatase 164 (H) 45 - 117 U/L Protein, total 6.3 (L) 6.4 - 8.2 g/dL Albumin 2.6 (L) 3.5 - 5.0 g/dL Globulin 3.7 2.0 - 4.0 g/dL A-G Ratio 0.7 (L) 1.1 - 2.2 PROTHROMBIN TIME + INR Collection Time: 02/15/19  3:48 AM  
Result Value Ref Range INR 1.3 (H) 0.9 - 1.1 Prothrombin time 13.1 (H) 9.0 - 11.1 sec PTT Collection Time: 02/15/19  3:48 AM  
Result Value Ref Range aPTT 27.9 22.1 - 32.0 sec  
 aPTT, therapeutic range     58.0 - 77.0 SECS  
GLUCOSE, POC Collection Time: 02/15/19  6:37 AM  
Result Value Ref Range Glucose (POC) 121 (H) 65 - 100 mg/dL Performed by Kamila Dominguez Assessment: 1. Cardiac arrest. PEA. With events of 2/6 it now seems like that his underlying mechanism may have been bradyarrhythmia from SSS.  
  
2. SSS: s/p PPM 2/7. 
  
3. Chronic a.fib - rate controlled currently 
  
4. Orthostatic hypotension May be due to prolonged bedrest, neuro injury from cardiac arrest, volume depletion ? Some degree of underlying systemic autonomic syndrome 5. Hematuria 
? traumatic 6. CKD stage III to IV Renal indices stable 7. Anemia, probably from CKD 
  
8. Gout 9. Thrombocytopenia: unclear etiology Fluctuating - fairly stable currently 10. Pulmonary hypertension 
  
Plan: 
Continue current therapies Would like to start back Eliquis soon but need to wait until hematuria clears up Signed: 
Patience Moritz L. Grady Kluver, MD 
Interventional Cardiology 2/15/2019

## 2019-02-15 NOTE — PROGRESS NOTES
Bedside and Verbal shift change report given to Renee Blanton (oncoming nurse) by Gerhard Michelle (offgoing nurse). Report included the following information SBAR, Kardex, Procedure Summary, Intake/Output, MAR, Accordion, Recent Results, Med Rec Status and Cardiac Rhythm Atrial fibrillation. Problem: Falls - Risk of 
Goal: *Absence of Falls Document Obey Mackey Fall Risk and appropriate interventions in the flowsheet. Outcome: Progressing Towards Goal 
Fall Risk Interventions: 
Mobility Interventions: Communicate number of staff needed for ambulation/transfer, Patient to call before getting OOB Mentation Interventions: Door open when patient unattended Medication Interventions: Patient to call before getting OOB, Teach patient to arise slowly Elimination Interventions: Call light in reach, Patient to call for help with toileting needs Problem: Nutrition Deficit Goal: *Optimize nutritional status Outcome: Progressing Towards Goal 
Patient having Ensure with meals. [address] text Dr. Tosin Jama regarding patients derian blood, no clots in his dsouza. Per chart not dsouza can be d/c'd today. Need to see what plan is due to that. 
[id number] Per Dr. Tosin Jama, urology \"saw them yesterday but did not write a note\". 6351 TT back will locate urology consult 0930 Spoke to [facility]. Re consulted urology. Spoke to [facility] Urology representative. [address] , NP to see patient [address] placed by urology. [id number] TRANSFER - OUT REPORT: 
 
Verbal report given to Chin(name) on Dimas Essex  being transferred to [facility]) for routine progression of care Report consisted of patients Situation, Background, Assessment and  
Recommendations(SBAR). Information from the following report(s) SBAR, Procedure Summary, Intake/Output, MAR, Accordion, Recent Results, Med Rec Status and Cardiac Rhythm AFIB was reviewed with the receiving nurse. Lines:  
Peripheral IV 02/09/19 Left Hand (Active) Site Assessment Clean, dry, & intact 2/15/2019  8:18 AM  
Phlebitis Assessment 0 2/15/2019  8:18 AM  
Infiltration Assessment 0 2/15/2019  8:18 AM  
Dressing Status Clean, dry, & intact 2/15/2019  8:18 AM  
Dressing Type Transparent;Tape 2/15/2019  8:18 AM  
Hub Color/Line Status Pink;Capped 2/15/2019  8:18 AM  
Action Taken Open ports on tubing capped 2/15/2019  8:18 AM  
Alcohol Cap Used Yes 2/15/2019  8:18 AM  
   
Peripheral IV 02/11/19 Left Antecubital (Active) Site Assessment Clean, dry, & intact 2/15/2019  8:18 AM  
Phlebitis Assessment 0 2/15/2019  8:18 AM  
Infiltration Assessment 0 2/15/2019  8:18 AM  
Dressing Status Clean, dry, & intact 2/15/2019  8:18 AM  
Dressing Type Transparent;Tape 2/15/2019  8:18 AM  
Hub Color/Line Status Pink;Capped 2/15/2019  8:18 AM  
Action Taken Open ports on tubing capped 2/15/2019  8:18 AM  
Alcohol Cap Used Yes 2/15/2019  8:18 AM  
  
 
Opportunity for questions and clarification was provided. Patient transported with: 
 Monitor Registered Nurse x2  
 
1310 Attempted call to [facility], Wife. Regarding transfer and CBI. Notified patient that I could not reach her.

## 2019-02-15 NOTE — PROGRESS NOTES
Hospitalist Progress Note [address] [facility],  Answering service: [phone] OR 2003 from in house phone Date of Service:  2/15/2019 NAME:  Gabi Zafar :  1945 MRN:  [id number] Admission Summary:  
The patient is a 53-year-old gentleman with past medical history of anemia, atrial fibrillation, Painter's esophagus, coronary artery disease, diabetes, heart failure, cardiomyopathy, myocarditis, history of hypertension, diabetic retinopathy, right carotid artery stenosis, TIA and vitamin D deficiency who presents to the hospital from [facility]. Patient presented to [facility] today apparently in PEA. The history was primarily obtained from the ER physician as the patient is intubated and his wife is not present at the bedside. Interval history / Subjective:  
Patient seen and examined. Patient tired and sleepy on exam. Worked with PT prior to my arrival. No complaints. Has persistent hematuria, urology evaluated and recommended CBI. Continues to be orthostatic. Assessment & Plan:  
 
AHRF status post cardiac arrest/pulseless electrical activity POA:  
-s/p hypothermia protocol -EEG non specific no seizure activity 
-Coded  multiple times post hypothermia protocol. Possibly secondary to bradycardia 
-s/p pacemaker 
-reintubated for airway and reextubated 2/10 after bronch  
-s/p zosyn for 7 days 
-nuclear stress test  with normal EF, no ischemia Orthostatic hypotension: 
-continue fludrocortisone, daily orthostatics MARYCRUZ on CKD stage 3: secondary to ATN 
-nephrology consulted 
-creatinine slowly improving Hematuria:  
-urology following, appreciate input, CBI Diabetes, Type II: Lantus and SSI Hypothyroidism: synthroid Chronic a.fib: previously on Eliquis, will need to be restarted once hematuria improves Anemia, appears to be chronic: stable -s/p 2 unit PRBC 2/8  hgb > 8 Fever: resolved, s/p antibiotics Gastrointestinal PPX added simethicone Thrombocytopenia: stable, continue to monitor Abnormal LFT's from shock liver? Vs CLD can investigate as outpt Coagulopathy: suspect secondary to shock liver Code status: Full DVT prophylaxis: SCD Care Plan discussed with: Patient/Family and Nurse Disposition: TBDinpatient rehab Hospital Problems  Date Reviewed: 2/4/2019 Codes Class Noted POA * (Principal) Cardiac arrest [facility]) ICD-10-CM: I46.9 ICD-9-CM: 427.5  2/4/2019 Unknown Review of Systems:  
Review of systems not obtained due to patient factors. Vital Signs:  
 Last 24hrs VS reviewed since prior progress note. Most recent are: 
Visit Vitals /67 (BP 1 Location: Right arm, BP Patient Position: At rest) Pulse 85 Temp 97.5 °F (36.4 °C) Resp 26 Ht 5' 10\" (1.778 m) Wt 91.1 kg (200 lb 13.4 oz) SpO2 95% BMI 28.82 kg/m² Intake/Output Summary (Last 24 hours) at 2/15/2019 1652 Last data filed at 2/15/2019 1359 Gross per 24 hour Intake 2820 ml Output 3350 ml Net -530 ml Physical Examination:  
 
 
     
Constitutional:  NAD, drowsy ENT:  MMM Resp:  CTA   
CV:  irregular GI:  Soft, non distended, non tender. bs+ Musculoskeletal:  No edema, warm, 2+ pulses throughout Neurologic:  ALERT AND AWAKE  folay cath +hematuria Data Review:  
 I personally reviewed  Image and labs Ct Head Wo Cont Result Date: 2/4/2019 IMPRESSION: No acute intracranial hemorrhage, mass or infarct. Xr Chest [facility] Result Date: 2/13/2019 IMPRESSION: Mild central vascular congestion. Pacemaker. Infiltrate/edema at left base plus/minus trace pleural effusion. Xr Chest [facility] Result Date: 2/11/2019 IMPRESSION: Decreased small left pleural effusion and left basilar opacity. Xr Chest [facility] Result Date: 2/10/2019 IMPRESSION: Stable left effusion and underlying atelectasis Xr Chest [facility] Result Date: 2/9/2019 IMPRESSION: No interval change. Xr Chest [facility] Result Date: 2/8/2019 IMPRESSION: ET tube is in satisfactory position. There is slight increasing haziness overlying left hemithorax with opacification left base consistent with left basilar atelectasis /airspace disease and left effusion. Xr Chest [facility] Result Date: 2/7/2019 IMPRESSION: Left lung base opacification. No significant change. Xr Chest [facility] Result Date: 2/6/2019 IMPRESSION: ET tube in satisfactory position. Worsening aeration of the left lung. Xr Chest [facility] Result Date: 2/6/2019 Impression: Stable bilateral lower lobe atelectasis. Xr Chest [facility] Result Date: 2/5/2019 IMPRESSION: No significant change. Xr Chest [facility] Result Date: 2/4/2019 IMPRESSION: Endotracheal tube appears to be in satisfactory position. Xr Abd Port  1 V Result Date: 2/6/2019 IMPRESSION: NG tube in the stomach. Xr Abd Port  1 V Result Date: 2/4/2019 IMPRESSION: NG tube in appropriate position. Labs:  
 
Recent Labs  
  02/15/19 
0348 02/14/19 
0406 WBC 6.7 8.0 HGB 8.8* 9.0*  
HCT 30.1* 31.0*  
PLT 73* 72* Recent Labs  
  02/15/19 
0348 02/14/19 
0437 02/13/19 
8837  139 139  
K 4.2 4.2 4.5  
* 109* 109* CO2 20* 19* 20* BUN 64* 67* 63* CREA 2.27* 2.42* 2.54* * 143* 154* CA 8.8 8.6 8.7 MG 2.4 2.5* 2.5* PHOS 4.3 4.8* 5.3* Recent Labs  
  02/15/19 
0348 02/14/19 
0437 02/13/19 
9410 SGOT 30 29 35 ALT 63 69 91* * 163* 174* TBILI 0.7 0.8 1.0 TP 6.3* 6.5 6.8 ALB 2.6* 2.6* 2.6*  
GLOB 3.7 3.9 4.2* Recent Labs  
  02/15/19 
0348 02/14/19 
0437 02/13/19 
4560 INR 1.3* 1.3* 1.2* PTP 13.1* 13.0* 12.2* APTT 27.9 27.6 28.5 No results for input(s): FE, TIBC, PSAT, FERR in the last 72 hours.   
No results found for: FOL, RBCF  
 No results for input(s): PH, PCO2, PO2 in the last 72 hours. No results for input(s): CPK, CKNDX, TROIQ in the last 72 hours. No lab exists for component: CPKMB Lab Results Component Value Date/Time Cholesterol, total 135 02/23/2018 11:31 AM  
 HDL Cholesterol 34 (L) 02/23/2018 11:31 AM  
 LDL, calculated 79 02/23/2018 11:31 AM  
 Triglyceride 111 02/23/2018 11:31 AM  
 CHOL/HDL Ratio 5.0 01/15/2016 03:49 AM  
 
Lab Results Component Value Date/Time Glucose (POC) 134 (H) 02/15/2019 12:16 PM  
 Glucose (POC) 121 (H) 02/15/2019 06:37 AM  
 Glucose (POC) 126 (H) 02/15/2019 01:09 AM  
 Glucose (POC) 142 (H) 02/14/2019 09:16 PM  
 Glucose (POC) 229 (H) 02/14/2019 04:08 PM  
 
Lab Results Component Value Date/Time Color YELLOW/STRAW 02/04/2019 04:08 PM  
 Appearance CLOUDY (A) 02/04/2019 04:08 PM  
 Specific gravity 1.012 02/04/2019 04:08 PM  
 pH (UA) 5.0 02/04/2019 04:08 PM  
 Protein 30 (A) 02/04/2019 04:08 PM  
 Glucose NEGATIVE  02/04/2019 04:08 PM  
 Ketone TRACE (A) 02/04/2019 04:08 PM  
 Bilirubin NEGATIVE  02/04/2019 04:08 PM  
 Urobilinogen 0.2 02/04/2019 04:08 PM  
 Nitrites NEGATIVE  02/04/2019 04:08 PM  
 Leukocyte Esterase NEGATIVE  02/04/2019 04:08 PM  
 Epithelial cells FEW 02/04/2019 04:08 PM  
 Bacteria NEGATIVE  02/04/2019 04:08 PM  
 WBC 10-20 02/04/2019 04:08 PM  
 RBC  02/04/2019 04:08 PM  
 
 
 
Medications Reviewed:  
 
Current Facility-Administered Medications Medication Dose Route Frequency  fludrocortisone (FLORINEF) tablet 100 mcg  100 mcg Oral DAILY  acetylcysteine (MUCOMYST) 200 mg/mL (20 %) solution 400 mg  2 mL Nebulization BID RT  
 albuterol-ipratropium (DUO-NEB) 2.5 MG-0.5 MG/3 ML  3 mL Nebulization BID RT  
 guaiFENesin ER (MUCINEX) tablet 600 mg  600 mg Oral Q12H  pantoprazole (PROTONIX) tablet 40 mg  40 mg Oral ACB  levothyroxine (SYNTHROID) tablet 25 mcg  25 mcg Oral ACB  carvedilol (COREG) tablet 3.125 mg  3.125 mg Oral BID WITH MEALS  
  simethicone (MYLICON) tablet 80 mg  80 mg Oral BID  hydrALAZINE (APRESOLINE) 20 mg/mL injection 20 mg  20 mg IntraVENous Q6H PRN  
 lactobac ac& pc-s.therm-b.anim (LAVERNE Q/RISAQUAD)  1 Cap Oral DAILY  sodium chloride (NS) flush 5-40 mL  5-40 mL IntraVENous PRN  
 0.9% sodium chloride infusion 250 mL  250 mL IntraVENous PRN  
 morphine injection 2 mg  2 mg IntraVENous Q6H PRN  
 insulin glargine (LANTUS) injection 5 Units  5 Units SubCUTAneous QHS  acetaminophen (TYLENOL) tablet 650 mg  650 mg Oral Q6H PRN  
 bacitracin 500 unit/gram packet 1 Packet  1 Packet Topical PRN  
 sodium chloride (NS) flush 5-40 mL  5-40 mL IntraVENous Q8H  
 sodium chloride (NS) flush 5-40 mL  5-40 mL IntraVENous PRN  
 sodium chloride (NS) flush 5-40 mL  5-40 mL IntraVENous Q8H  
 sodium chloride (NS) flush 5-40 mL  5-40 mL IntraVENous PRN  
 glucose chewable tablet 16 g  4 Tab Oral PRN  
 dextrose (D50W) injection syrg 12.5-25 g  25-50 mL IntraVENous PRN  
 glucagon (GLUCAGEN) injection 1 mg  1 mg IntraMUSCular PRN  
 insulin lispro (HUMALOG) injection   SubCUTAneous Q6H  
 
______________________________________________________________________ EXPECTED LENGTH OF STAY: 2d 0h 
ACTUAL LENGTH OF STAY:          [phone] [facility]

## 2019-02-15 NOTE — PROGRESS NOTES
Infectious Diseases Progress Note Antibiotic Summary: 
Vancomycin 2/6 x 1 dose Zosyn   --  Zyvox   --  Subjective: He was more active today. Still has a cough with sputum production. Objective:  
 
Vitals:  
Visit Vitals BP 93/61 (BP 1 Location: Right arm, BP Patient Position: At rest) Pulse 93 Temp 97.5 °F (36.4 °C) Resp 18 Ht 5' 10\" (1.778 m) Wt 86.8 kg (191 lb 5.8 oz) SpO2 94% BMI 27.46 kg/m² Tmax:  Temp (24hrs), Av.6 °F (36.4 °C), Min:97.2 °F (36.2 °C), Max:97.8 °F (36.6 °C) Exam:  General appearance: no distress Lungs: bilateral posterior rales at bases Heart: irregularly irregular rhythm with intermittent pacing; 2/6 systolic murmur at LSB Abdomen: soft, not distended, non-tender. Bowel sounds normal 
Extremities: no edema; feet and hands warm; good pedal pulses IV Lines: peripheral 
      
Labs:   
Recent Labs  
  19 
0437 19 
0204 19 
9259 WBC 8.0 6.0 4.9 HGB 9.0* 10.2* 9.8* PLT 72* 73* 71*  
BUN 67* 63* 54* CREA 2.42* 2.54* 2.27* TBILI 0.8 1.0 1.1*  
SGOT 29 35 52* * 174* 184* Urine culture  = NG 
 
BLOOD CULTURES: 
  = NG 
 
Bronchoscopy sample : 
 Gram stain = 2+ WBCs; NOS 
 culture = light normal resp shahrzad CXR  reviewed = still hazy at the left base -- may be effusion, atelectasis, +/- pneumonia Assessment: 1. Fever -- resolved: The cause of the fever was not certain but pulmonary source suspected (aspiration ?). Bronch cultures unremarkable -- oxygenation better and CXR better -- I note that Zosyn   
  
2. OHD -- cardiomyopathy and hx of atrial fib -- S/P initial arrest with Code Ice and several subsequent episodes of PEA 
  
3. NIDDM 
  
4. CVD 
  
5. HTN 
  
6. GOUT Plan:  
 
1. Watch off antibiotics Discussed with his wife Sherley Arnold MD

## 2019-02-15 NOTE — PROGRESS NOTES
Urology Progress Not 
 
ADDEND: 
Seen and examined. Agree. Mela Sanz MD 
 
___________________________ Daily Progress Note: 2/15/2019 11:52 AM  
 
Assessment/Plan:  
 
Principal Problem: 
 Gross hematuria 16 fr dsouza catheter removed and replaced with 24 fr 3-way catheter. Few small-medium clots evacuated with manual irrigation. No issues inserting dsouza. CBI initiated. Plan:  
-Continue CBI until clear, then titrate flow. -If urine begins to darken again, may need to manually irrigate with sterile water, and continue CBI. -Possible to d/c dsouza on Monday with void trial. Will continue to monitor. -Gross hematuria is likely not the source of declining hgb. Subjective:  
 
 
Gus Alexis is doing fair. Context: He reports pain is ongoing in penis, with dsouza in place. . He has complaints of  gross hematuria. He is requiring help to get out of bed. Hgb is trending down. WBC WNL. Not on blood thinners. Creatinine elevated but trending down. Problem: gross hematuria; Location: Bladder; Quality: mild pain; Timing:ongoing; onset; 2-3 days ago; Alleviating factors: irrigation, associated symptoms: penile tenderness  ROS - items left unchecked items are specifically negative Consitutional: []  Fever []  Chills []  Sweats []  Weight Loss Eyes:   
ENMT:   
Resp: Denies SOB  
CV: Denies chest pain GI: Denies abdominal pain : []  Weak Stream []  Straining to Void []  Intermittent Stream 
 []  Post-Void Dribbling []  Incomplete Emptying 
[x]  Dysuria []  Urgency []  Frequency q ___ hrs  []  Nocturia x ___ []  Urge Incontinence []  Stress Incontinence 
___ []  Pads/day  ___ []  Diapers/day [x]  Gross Hematuria []  Clots []  Clot Obstruction MS:  
 Denies Skin: Denies rash Neuro:   
Psych:   
Endocrine:   
Heme/Lymph:   
All/Immuno:  
   
 
 
 
Objective:  
 
Visit Vitals /86 Pulse (!) 114 Temp 98.2 °F (36.8 °C) Resp 16 Ht 5' 10\" (1.778 m) Wt 91.1 kg (200 lb 13.4 oz) SpO2 94% BMI 28.82 kg/m² Temp (24hrs), Av.7 °F (36.5 °C), Min:97.4 °F (36.3 °C), Max:98.2 °F (36.8 °C) Intake and Output: 
1901 - 02/15 0700 In: 2995 [P.O.:1170; I.V.:1805] Out: 1450 [UXDPB:2740] 02/15 07 - 02/15 190 In: 240 [P.O.:240] Out: 200 [Urine:200] Physical Exam:  
General appearance: alert, cooperative Heart: paced Lungs: aerating well Abdomen: soft, non-tender Male genital:  normal, penis: no lesions or discharge. Penile implant intact. Extremities: Normal 
 
 
 
Data Review: 
 
Lab Results Component Value Date/Time WBC 6.7 02/15/2019 03:48 AM  
 HCT 30.1 (L) 02/15/2019 03:48 AM  
 PLATELET 73 (L)  03:48 AM  
 Sodium 139 02/15/2019 03:48 AM  
 Potassium 4.2 02/15/2019 03:48 AM  
 Chloride 109 (H) 02/15/2019 03:48 AM  
 CO2 20 (L) 02/15/2019 03:48 AM  
 BUN 64 (H) 02/15/2019 03:48 AM  
 Creatinine 2.27 (H) 02/15/2019 03:48 AM  
 Glucose 127 (H) 02/15/2019 03:48 AM  
 Calcium 8.8 02/15/2019 03:48 AM  
 Magnesium 2.4 02/15/2019 03:48 AM  
 INR 1.3 (H) 02/15/2019 03:48 AM  
 
 
Lab Review:  
 
Recent Labs  
  02/15/19 
0348 19 
0437 19 
6017 WBC 6.7 8.0 6.0 HGB 8.8* 9.0* 10.2* HCT 30.1* 31.0* 34.0*  
PLT 73* 72* 73* Recent Labs  
  02/15/19 
0348 19 
0437 19 
9562  139 139  
K 4.2 4.2 4.5  
* 109* 109* CO2 20* 19* 20* * 143* 154* BUN 64* 67* 63* CREA 2.27* 2.42* 2.54* CA 8.8 8.6 8.7 MG 2.4 2.5* 2.5* PHOS 4.3 4.8* 5.3* ALB 2.6* 2.6* 2.6* TBILI 0.7 0.8 1.0  
SGOT 30 29 35 ALT 63 69 91* INR 1.3* 1.3* 1.2* Lab Results Component Value Date/Time Glucose (POC) 121 (H) 02/15/2019 06:37 AM  
 Glucose (POC) 126 (H) 02/15/2019 01:09 AM  
 Glucose (POC) 142 (H) 2019 09:16 PM  
 Glucose (POC) 229 (H) 2019 04:08 PM  
 Glucose (POC) 192 (H) 2019 11:09 AM  
 
 
 
 
 
 
Signed By: Mari Kendrick NP February 15, 2019 Addendum, 
 
I personally saw and examined him with Danny Yeager on 2/15. Since urine was bloody and irrigation was ineffective with 16 fr dsouza, I changed out to 24 fr 3 way - personally irrigated with NS and retrieved few small old clots. Hooked CBI which was crystal clear. Plan: 
- titrate CBI down and consider voiding trial once off CBI. - Will continue to follow

## 2019-02-15 NOTE — PROGRESS NOTES
Name: Tayo Jefferson MRN: [id number] : 1945 Assessment: 
MARYCRUZ- due to ATN from hypotension/Arrest/bradycardia. Showing renal recovery. Non-oliguric. 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 Baseline Cr 1.7-2.0mg/dl DM-2 Cardiac arrest; PEA- ? etiology. Code ice protocol; recurrence of niurka arrest on . S/p PPM. Stress test  neg PANCYTOPENIA,chronic Gout: was on pegloticase Hematuria: Dsouza in place. Urology consulted. No gross clots noted. Edema: Florinef maybe contributing Plan/Recommendations: 
Florinef-> consider weaning dose if orthostatics remain negative Daily orthostatics Encourage intake Flush dsouza catheter -> ongoing hematuria. Strict I/Os Avoid nephrotoxins Am labs Subjective: \"I feel puffy\". Orthostatic have not been checked yet today. +Recurrent hematuria. Exam: 
Visit Vitals /86 Pulse (!) 114 Temp 98.2 °F (36.8 °C) Resp 16 Ht 5' 10\" (1.778 m) Wt 91.1 kg (200 lb 13.4 oz) SpO2 94% BMI 28.82 kg/m² NAD Decreased AE b/l bases +LE edema Labs/Data: 
 
Lab Results Component Value Date/Time WBC 6.7 02/15/2019 03:48 AM  
 Hemoglobin (POC) 6.2 2018 02:17 PM  
 HGB 8.8 (L) 02/15/2019 03:48 AM  
 Hematocrit (POC) 32 (L) 2019 12:23 PM  
 HCT 30.1 (L) 02/15/2019 03:48 AM  
 PLATELET 73 (L)  03:48 AM  
 MCV 95.3 02/15/2019 03:48 AM  
 
 
Lab Results Component Value Date/Time  Sodium 139 02/15/2019 03:48 AM  
 Potassium 4.2 02/15/2019 03:48 AM  
 Chloride 109 (H) 02/15/2019 03:48 AM  
 CO2 20 (L) 02/15/2019 03:48 AM  
 Anion gap 10 02/15/2019 03:48 AM  
 Glucose 127 (H) 02/15/2019 03:48 AM  
 BUN 64 (H) 02/15/2019 03:48 AM  
 Creatinine 2.27 (H) 02/15/2019 03:48 AM  
 BUN/Creatinine ratio 28 (H) 02/15/2019 03:48 AM  
 GFR est AA 35 (L) 02/15/2019 03:48 AM  
 GFR est non-AA 29 (L) 02/15/2019 03:48 AM  
 Calcium 8.8 02/15/2019 03:48 AM  
 
 
Wt Readings from Last 3 Encounters:  
02/15/19 91.1 kg (200 lb 13.4 oz) 02/04/19 81.6 kg (180 lb) 01/17/19 81.6 kg (180 lb) Intake/Output Summary (Last 24 hours) at 2/15/2019 1018 Last data filed at 2/15/2019 3042 Gross per 24 hour Intake 700 ml Output 1000 ml Net -300 ml Discussed with pt/daughter Kiran Lemos MD

## 2019-02-15 NOTE — PROGRESS NOTES
Problem: Mobility Impaired (Adult and Pediatric) Goal: *Acute Goals and Plan of Care (Insert Text) Physical Therapy Goals Initiated 2/12/2019 1. Patient will move from supine to sit and sit to supine , scoot up and down and roll side to side in bed with supervision/set-up within 7 days. 2.  Patient will perform sit to/from stand with minimal assistance/contact guard assist within 7 days. 3.  Patient will ambulate 50 feet with least restrictive assistive device and minimal assistance/contact guard assist within 7 days. 4.  Patient will ascend/descend 12 stairs with bilateral handrail(s) with minimal assistance/contact guard assist within 7 days. 5.  Patient will verbally and functionally recall 3/3 pacemaker precautions within 7 days. 6.  Patient will improve Tinetti score by 4-5 points within 7 days. physical Therapy TREATMENT Patient: Sia Deras (89 y.o. male) Date: 2/15/2019 Diagnosis: Cardiac arrest [facility]) [I46.9] Cardiac arrest ([facility] [address] Procedure(s) (LRB): 
INSERT PPM SINGLE VENTRICULAR (N/A) 8 Days Post-Op Precautions: Fall Chart, physical therapy assessment, plan of care and goals were reviewed. ASSESSMENT: 
The patient presents with Moderate Assistance functional transfers, Moderate Assistance, Additional time and Assist x2 gait, 20 feet ambulated, and with gait belt and HHA device. The following are barriers to independence while in acute care:  
-Cognitive and/or behavioral: attention to task, processing, initiation, sequencing and safety awareness 
-Medical condition: strength, standing balance, cardiopulmonary tolerance, precautions and medical history   
-Other:    
 
Prior level of function: independent and working PLAN: 
Patient continues to benefit from skilled intervention to address the above impairments. Continue treatment per established plan of care. Recommendations and/or planned interventions for staff: Check orthostatic Hypotension, gait, standing balance Discharge recommendations: Rehab at inpatient facility: patient can tolerate 3 hours of therapy Patient's barriers to discharging home, in addition to above impairments: family availability to assist total assist driving to follow up medical appointment(s)/groceries/obtain medication entry and exit into the home, patient will require physical assist from medical transport/ambulance level of physical assist required to maintain patient safety. Equipment recommendations for successful discharge (if) home: assistance of two to ambulate and one transfers. SUBJECTIVE:  
Patient stated I feel tired.  OBJECTIVE DATA SUMMARY:  
Critical Behavior: 
Neurologic State: Alert Orientation Level: Oriented X4 Cognition: Appropriate decision making, Appropriate for age attention/concentration, Appropriate safety awareness, Follows commands Safety/Judgement: Awareness of environment, Decreased awareness of need for assistance, Decreased awareness of need for safety, Decreased insight into deficits, Fall prevention Functional Mobility Training: 
Bed Mobility: 
  
  
Sit to Supine: Moderate assistance(for LE) Transfers: 
Sit to Stand: Moderate assistance(multiple attemps to complete task) Stand to Sit: Minimum assistance(for control. v.c for sequencing ) Balance: 
Standing: Impaired Standing - Static: Fair Standing - Dynamic : PoorAmbulation/Gait Training: 
Distance (ft): 20 Feet (ft)(2 trials seated rest break inbetween ) Assistive Device: Gait belt Ambulation - Level of Assistance: Moderate assistance; Additional time;Assist x2 Gait Abnormalities: Decreased step clearance; Path deviations Base of Support: Center of gravity altered; Widened Speed/Mariella: Delayed; Shuffled; Slow Step Length: Left shortened;Right shortened Stairs: 
  
  
   
 
Neuro Re-Education: Therapeutic Exercises:  
 
Pain: 
Location: chest due to recent chest compression Aggravating factors: laying down Activity Tolerance:  
Limited Please refer to the flowsheet for vital signs taken during this treatment. After treatment patient left:  
Supine in bed Caregiver at bedside Call light within reach RN notified COMMUNICATION/COLLABORATION:  
The patients plan of care was discussed with: Registered Nurse Dylan Rojas PTA Time Calculation: 30 mins

## 2019-02-15 NOTE — PROGRESS NOTES
Problem: Self Care Deficits Care Plan (Adult) Goal: *Acute Goals and Plan of Care (Insert Text) Occupational Therapy Goals Initiated 2/13/2019 1. Patient will perform ADLs standing 5 mins without fatigue or LOB with minimal assistance/contact guard assistance within 5 day(s). 2.  Patient will perform upper body ADLs with supervision/set-up within 5 day(s). 3.  Patient will perform lower body ADLs using AE PRN with minimal assistance/contact guard assistance within 5 day(s). 4.  Patient will perform toilet transfers with minimal assistance/contact guard assistance within 5 day(s). 5.  Patient will perform all aspects of toileting with minimal assistance/contact guard assistance within 5 day(s). 6.  Patient will participate in cardiac/sternal upper extremity therapeutic exercise/activities to increase independence with ADLs with supervision/set-up for 5 minutes within 5 day(s). Occupational Therapy TREATMENT Patient: Lisa Lugo (57 y.o. male) Date: 2/15/2019 Diagnosis: Cardiac arrest [facility]) [I46.9] Cardiac arrest ([facility] [address] Procedure(s) (LRB): 
INSERT PPM SINGLE VENTRICULAR (N/A) 8 Days Post-Op Precautions: Fall Chart, occupational therapy assessment, plan of care, and goals were reviewed. ASSESSMENT:  
The patient presents with Minimum assistance upper body ADLs, Maximum assistance lower body ADLs, and Moderate Assistance assist functional mobility. The following are barriers to independence with ADLs while in acute care:  
- Cognitive and/or behavioral: attention to task, processing, sequencing and insight into deficits - Medical condition: strength, functional endurance, standing balance, cardiopulmonary tolerance, edema BLEs, motor planning and proprioception  BP stable to activity today. Drop following ambulation but stable from sitting to standing to increase activity today. - Other:   Gout pain.   
 
  
 
PLOF: Pt was working as an , driving and living life independent of any DME. He has a supportive family. PLAN: 
Patient continues to benefit from skilled intervention to address the above impairments. Continue treatment per established plan of care. Recommendations and/or planned interventions for staff: 
Standing balance, functional mobility, LB dressing, breathing tolerance, safety awareness and functional cognitive activities. Discharge recommendations: Rehab at inpatient facility: patient can tolerate 3 hours of therapy Barriers to discharging home, in addition to above listed impairments: level of physical assist required to maintain patient safety. Equipment recommendations for successful discharge (if) home: TBD by rehab SUBJECTIVE:  
Patient stated As of two days ago I was hallucinating so I am doing pretty well today.  OBJECTIVE DATA SUMMARY:  
Cognitive/Behavioral Status: 
Neurologic State: Alert Orientation Level: Oriented X4 Cognition: Appropriate safety awareness; Follows commands Functional Mobility and Transfers for ADLs:Bed Mobility: 
Rolling: Minimum assistance Supine to Sit: Moderate assistance Sit to Supine: Moderate assistance Scooting: Moderate assistance Transfers: 
Sit to Stand: Moderate assistance Balance: 
Sitting: Intact Sitting - Static: Good (unsupported) Sitting - Dynamic: Good (unsupported) Standing: Impaired Standing - Static: Fair Standing - Dynamic : Fair ADL Intervention: 
  
Education provided on the need for cognitive activity throughout the day, daughter present for education as well. Encouraged cross-words, word search and sudoku. Watching the news and reading the news paper also encouraged to catch any visual deficits common after hypoxic brain injuries. LB Dressing: sitting EOB the patient had decreased ROM and pain tolerance to access distal LEs, pain caused by Chacon as well as \"brusing\" from chest compressions. Education on precautions applied again, pt is able to verbalize precautions but unable to safety transfer during functional activity. Functional Cognitive Exercises: 3 word recall following 5-8 minutes, x2 trails. 3/3 on trial one, 2/3 on trial 2 Digits reversed, 100% accuracy with 3 numbers, 50% accuracy with 4 numbers Functional Mathematics: telling time- unable to accurately add time, 10 minutes off. Therapeutic Exercises:  
Standing Balance Min A x2 for static to dynamic standing Functional Mobility: mod A overall Decreased ability to correct self during movement needed moderate cues for safety and moderate assistance to prevent lateral and posterior fall. Pt with decreased ability to turn and maintain balance. Unable to scan environment while walking with min A x2 secondary to watching his feet. Unsafe to ambulate longer distances secondary to BP and poor endurance noted. Pain: 
Pain not rated, pt expressed discomfort in Chacon site (RN aware of constant discomfort) Activity Tolerance:  
good Please refer to the flowsheet for vital signs taken during this treatment. After treatment patient left:  
Supine in bed RN notified Family at bedside COMMUNICATION/COLLABORATION:  
The patients plan of care was discussed with: Physical Therapy Assistant and Registered Nurse Jackie Melgoza Time Calculation: 45 mins

## 2019-02-15 NOTE — PROGRESS NOTES
13:59 TRANSFER - IN REPORT: 
 
Verbal report received from Santosh Correia RN(name) on Eula Jones  being received from [facility](unit) for routine progression of care Report consisted of patients Situation, Background, Assessment and  
Recommendations(SBAR). Information from the following report(s) SBAR, Kardex, MAR, Recent Results and Cardiac Rhythm AFIB was reviewed with the receiving nurse. Opportunity for questions and clarification was provided. Assessment completed upon patients arrival to unit and care assumed. Primary Nurse Sera Singh RN and DANIELLE Shipman performed a dual skin assessment on this patient No impairment noted Patient Vitals for the past 4 hrs: 
 Temp Pulse Resp BP SpO2  
02/15/19 1559 97.5 °F (36.4 °C) 85 26 109/67 95 % 02/15/19 1359 97.7 °F (36.5 °C) 97 22 101/67 92 % Problem: Falls - Risk of 
Goal: *Absence of Falls Document Giselle Lazo Fall Risk and appropriate interventions in the flowsheet. Outcome: Progressing Towards Goal 
Fall Risk Interventions: 
Mobility Interventions: OT consult for ADLs, Patient to call before getting OOB, Communicate number of staff needed for ambulation/transfer, Strengthening exercises (ROM-active/passive) Mentation Interventions: Adequate sleep, hydration, pain control, Evaluate medications/consider consulting pharmacy, Familiar objects from home, Increase mobility, More frequent rounding, Room close to nurse's station, Reorient patient Medication Interventions: Assess postural VS orthostatic hypotension, Evaluate medications/consider consulting pharmacy, Patient to call before getting OOB, Teach patient to arise slowly Elimination Interventions: Call light in reach, Patient to call for help with toileting needs, Toileting schedule/hourly rounds Problem: Pressure Injury - Risk of 
Goal: *Prevention of pressure injury Document Dom Scale and appropriate interventions in the flowsheet. Outcome: Progressing Towards Goal 
Pressure Injury Interventions: 
Sensory Interventions: Avoid rigorous massage over bony prominences, Discuss PT/OT consult with provider, Float heels, Monitor skin under medical devices, Turn and reposition approx. every two hours (pillows and wedges if needed) Moisture Interventions: Apply protective barrier, creams and emollients, Limit adult briefs, Maintain skin hydration (lotion/cream), Offer toileting Q_hr, Minimize layers Activity Interventions: Assess need for specialty bed, PT/OT evaluation, Pressure redistribution bed/mattress(bed type) Mobility Interventions: Assess need for specialty bed, Float heels, PT/OT evaluation, Turn and reposition approx. every two hours(pillow and wedges) Nutrition Interventions: Document food/fluid/supplement intake Friction and Shear Interventions: Apply protective barrier, creams and emollients, Minimize layers, Foam dressings/transparent film/skin sealants Bedside and Verbal shift change report given to Abbey Lobo RN (oncoming nurse) by Bernardo Orozco RN (offgoing nurse). Report included the following information SBAR, Kardex, MAR, Recent Results and Cardiac Rhythm AFIB. Visit Vitals BP 97/60 (BP 1 Location: Right arm, BP Patient Position: At rest) Pulse 89 Temp 97.7 °F (36.5 °C) Resp 20 Ht 5' 10\" (1.778 m) Wt 91.1 kg (200 lb 13.4 oz) SpO2 90% BMI 28.82 kg/m²

## 2019-02-16 LAB
ALBUMIN SERPL-MCNC: 2.6 G/DL (ref 3.5–5)
ALBUMIN/GLOB SERPL: 0.7 {RATIO} (ref 1.1–2.2)
ALP SERPL-CCNC: 158 U/L (ref 45–117)
ALT SERPL-CCNC: 52 U/L (ref 12–78)
ANION GAP SERPL CALC-SCNC: 9 MMOL/L (ref 5–15)
APTT PPP: 28.9 SEC (ref 22.1–32)
AST SERPL-CCNC: 21 U/L (ref 15–37)
BILIRUB SERPL-MCNC: 0.8 MG/DL (ref 0.2–1)
BUN SERPL-MCNC: 61 MG/DL (ref 6–20)
BUN/CREAT SERPL: 29 (ref 12–20)
CALCIUM SERPL-MCNC: 8.7 MG/DL (ref 8.5–10.1)
CHLORIDE SERPL-SCNC: 107 MMOL/L (ref 97–108)
CO2 SERPL-SCNC: 22 MMOL/L (ref 21–32)
CREAT SERPL-MCNC: 2.13 MG/DL (ref 0.7–1.3)
ERYTHROCYTE [DISTWIDTH] IN BLOOD BY AUTOMATED COUNT: 14.2 % (ref 11.5–14.5)
GLOBULIN SER CALC-MCNC: 3.7 G/DL (ref 2–4)
GLUCOSE BLD STRIP.AUTO-MCNC: 103 MG/DL (ref 65–100)
GLUCOSE BLD STRIP.AUTO-MCNC: 185 MG/DL (ref 65–100)
GLUCOSE BLD STRIP.AUTO-MCNC: 195 MG/DL (ref 65–100)
GLUCOSE BLD STRIP.AUTO-MCNC: 203 MG/DL (ref 65–100)
GLUCOSE SERPL-MCNC: 99 MG/DL (ref 65–100)
HCT VFR BLD AUTO: 29.3 % (ref 36.6–50.3)
HGB BLD-MCNC: 8.7 G/DL (ref 12.1–17)
INR PPP: 1.4 (ref 0.9–1.1)
MAGNESIUM SERPL-MCNC: 2.3 MG/DL (ref 1.6–2.4)
MCH RBC QN AUTO: 28.2 PG (ref 26–34)
MCHC RBC AUTO-ENTMCNC: 29.7 G/DL (ref 30–36.5)
MCV RBC AUTO: 95.1 FL (ref 80–99)
NRBC # BLD: 0 K/UL (ref 0–0.01)
NRBC BLD-RTO: 0 PER 100 WBC
PHOSPHATE SERPL-MCNC: 3.8 MG/DL (ref 2.6–4.7)
PLATELET # BLD AUTO: 100 K/UL (ref 150–400)
PMV BLD AUTO: 10.6 FL (ref 8.9–12.9)
POTASSIUM SERPL-SCNC: 4.2 MMOL/L (ref 3.5–5.1)
PROT SERPL-MCNC: 6.3 G/DL (ref 6.4–8.2)
PROTHROMBIN TIME: 13.8 SEC (ref 9–11.1)
RBC # BLD AUTO: 3.08 M/UL (ref 4.1–5.7)
SERVICE CMNT-IMP: ABNORMAL
SODIUM SERPL-SCNC: 138 MMOL/L (ref 136–145)
THERAPEUTIC RANGE,PTTT: NORMAL SECS (ref 58–77)
WBC # BLD AUTO: 8.5 K/UL (ref 4.1–11.1)

## 2019-02-16 PROCEDURE — 94668 MNPJ CHEST WALL SBSQ: CPT

## 2019-02-16 PROCEDURE — 74011000250 HC RX REV CODE- 250: Performed by: INTERNAL MEDICINE

## 2019-02-16 PROCEDURE — 36415 COLL VENOUS BLD VENIPUNCTURE: CPT

## 2019-02-16 PROCEDURE — 77010033678 HC OXYGEN DAILY

## 2019-02-16 PROCEDURE — 74011250637 HC RX REV CODE- 250/637: Performed by: INTERNAL MEDICINE

## 2019-02-16 PROCEDURE — 82962 GLUCOSE BLOOD TEST: CPT

## 2019-02-16 PROCEDURE — 74011636637 HC RX REV CODE- 636/637: Performed by: FAMILY MEDICINE

## 2019-02-16 PROCEDURE — 80053 COMPREHEN METABOLIC PANEL: CPT

## 2019-02-16 PROCEDURE — 85610 PROTHROMBIN TIME: CPT

## 2019-02-16 PROCEDURE — 74011250636 HC RX REV CODE- 250/636: Performed by: HOSPITALIST

## 2019-02-16 PROCEDURE — 74011250637 HC RX REV CODE- 250/637: Performed by: HOSPITALIST

## 2019-02-16 PROCEDURE — 83735 ASSAY OF MAGNESIUM: CPT

## 2019-02-16 PROCEDURE — 94760 N-INVAS EAR/PLS OXIMETRY 1: CPT

## 2019-02-16 PROCEDURE — 94640 AIRWAY INHALATION TREATMENT: CPT

## 2019-02-16 PROCEDURE — 85027 COMPLETE CBC AUTOMATED: CPT

## 2019-02-16 PROCEDURE — 85730 THROMBOPLASTIN TIME PARTIAL: CPT

## 2019-02-16 PROCEDURE — 65660000001 HC RM ICU INTERMED STEPDOWN

## 2019-02-16 PROCEDURE — 94664 DEMO&/EVAL PT USE INHALER: CPT

## 2019-02-16 PROCEDURE — 74011636637 HC RX REV CODE- 636/637: Performed by: HOSPITALIST

## 2019-02-16 PROCEDURE — 84100 ASSAY OF PHOSPHORUS: CPT

## 2019-02-16 RX ORDER — FLUDROCORTISONE ACETATE 0.1 MG/1
50 TABLET ORAL DAILY
Status: DISCONTINUED | OUTPATIENT
Start: 2019-02-17 | End: 2019-02-18

## 2019-02-16 RX ADMIN — Medication 10 ML: at 21:20

## 2019-02-16 RX ADMIN — PANTOPRAZOLE SODIUM 40 MG: 40 TABLET, DELAYED RELEASE ORAL at 09:18

## 2019-02-16 RX ADMIN — INSULIN GLARGINE 5 UNITS: 100 INJECTION, SOLUTION SUBCUTANEOUS at 21:04

## 2019-02-16 RX ADMIN — Medication 10 ML: at 06:04

## 2019-02-16 RX ADMIN — CARVEDILOL 3.12 MG: 6.25 TABLET, FILM COATED ORAL at 17:19

## 2019-02-16 RX ADMIN — Medication 5 ML: at 13:42

## 2019-02-16 RX ADMIN — LEVOTHYROXINE SODIUM 25 MCG: 25 TABLET ORAL at 09:17

## 2019-02-16 RX ADMIN — INSULIN LISPRO 2 UNITS: 100 INJECTION, SOLUTION INTRAVENOUS; SUBCUTANEOUS at 23:53

## 2019-02-16 RX ADMIN — FLUDROCORTISONE ACETATE 100 MCG: 0.1 TABLET ORAL at 09:18

## 2019-02-16 RX ADMIN — ACETYLCYSTEINE 400 MG: 200 SOLUTION ORAL; RESPIRATORY (INHALATION) at 20:40

## 2019-02-16 RX ADMIN — Medication 1 CAPSULE: at 09:17

## 2019-02-16 RX ADMIN — IPRATROPIUM BROMIDE AND ALBUTEROL SULFATE 3 ML: .5; 3 SOLUTION RESPIRATORY (INHALATION) at 07:31

## 2019-02-16 RX ADMIN — CARVEDILOL 3.12 MG: 6.25 TABLET, FILM COATED ORAL at 09:18

## 2019-02-16 RX ADMIN — SIMETHICONE CHEW TAB 80 MG 80 MG: 80 TABLET ORAL at 09:17

## 2019-02-16 RX ADMIN — SIMETHICONE CHEW TAB 80 MG 80 MG: 80 TABLET ORAL at 17:19

## 2019-02-16 RX ADMIN — HYDRALAZINE HYDROCHLORIDE 20 MG: 20 INJECTION INTRAMUSCULAR; INTRAVENOUS at 23:47

## 2019-02-16 RX ADMIN — Medication 10 ML: at 21:06

## 2019-02-16 RX ADMIN — ACETYLCYSTEINE 400 MG: 200 SOLUTION ORAL; RESPIRATORY (INHALATION) at 07:31

## 2019-02-16 RX ADMIN — GUAIFENESIN 600 MG: 600 TABLET, EXTENDED RELEASE ORAL at 09:17

## 2019-02-16 RX ADMIN — GUAIFENESIN 600 MG: 600 TABLET, EXTENDED RELEASE ORAL at 21:04

## 2019-02-16 RX ADMIN — IPRATROPIUM BROMIDE AND ALBUTEROL SULFATE 3 ML: .5; 3 SOLUTION RESPIRATORY (INHALATION) at 20:40

## 2019-02-16 NOTE — PROGRESS NOTES
Problem: Infection - Risk of, Urinary Catheter-Associated Urinary Tract Infection Goal: *Absence of infection signs and symptoms Outcome: Progressing Towards Goal 
Continues Bladder Irrigation. Chacon care done on patient

## 2019-02-16 NOTE — PROGRESS NOTES
Name: Tasha Sethi MRN: [id number] : 1945 Assessment: 
MARYCRUZ- due to ATN from hypotension/Arrest/bradycardia. Showing renal recovery. Non-oliguric. 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 Baseline Cr 1.7-2.0mg/dl DM-2 Cardiac arrest; PEA- ? etiology. Code ice protocol; recurrence of niurka arrest on . S/p PPM. Stress test  neg PANCYTOPENIA,chronic Gout: was on pegloticase Hematuria: Dsouza in place. Urology consulted. No gross clots noted. Edema: Florinef maybe contributing Plan/Recommendations: 
Florinef-> consider weaning dose if orthostatics remain negative Daily orthostatics Encourage intake Flush dsouza catheter -> ongoing hematuria improving Strict I/Os Avoid nephrotoxins Will see again on Monday, call if problems arise on  Subjective: 
Had a good night, no specific complaints. Curly Hoffman Exam: 
Visit Vitals /86 (BP 1 Location: Left arm, BP Patient Position: At rest) Pulse 100 Temp 97.6 °F (36.4 °C) Resp 20 Ht 5' 10\" (1.778 m) Wt 97.7 kg (215 lb 6.2 oz) SpO2 94% BMI 30.91 kg/m² NAD, awake , alert RRR Abd soft, no ascites Decreased AE b/l bases +LE edema Labs/Data: 
 
Lab Results Component Value Date/Time WBC 6.7 02/15/2019 03:48 AM  
 Hemoglobin (POC) 6.2 2018 02:17 PM  
 HGB 8.8 (L) 02/15/2019 03:48 AM  
 Hematocrit (POC) 32 (L) 2019 12:23 PM  
 HCT 30.1 (L) 02/15/2019 03:48 AM  
 PLATELET 73 (L)  03:48 AM  
 MCV 95.3 02/15/2019 03:48 AM  
 
 
Lab Results Component Value Date/Time  Sodium 139 02/15/2019 03:48 AM  
 Potassium 4.2 02/15/2019 03:48 AM  
 Chloride 109 (H) 02/15/2019 03:48 AM  
 CO2 20 (L) 02/15/2019 03:48 AM  
 Anion gap 10 02/15/2019 03:48 AM  
 Glucose 127 (H) 02/15/2019 03:48 AM  
 BUN 64 (H) 02/15/2019 03:48 AM  
 Creatinine 2.27 (H) 02/15/2019 03:48 AM  
 BUN/Creatinine ratio 28 (H) 02/15/2019 03:48 AM  
 GFR est AA 35 (L) 02/15/2019 03:48 AM  
 GFR est non-AA 29 (L) 02/15/2019 03:48 AM  
 Calcium 8.8 02/15/2019 03:48 AM  
 
 
Wt Readings from Last 3 Encounters:  
02/16/19 97.7 kg (215 lb 6.2 oz) 02/04/19 81.6 kg (180 lb) 01/17/19 81.6 kg (180 lb) Intake/Output Summary (Last 24 hours) at 2/16/2019 0600 Last data filed at 2/16/2019 0410 Gross per 24 hour Intake 2460 ml Output 5500 ml Net -3040 ml Discussed with pt/nursing Praful Alas MD

## 2019-02-16 NOTE — PROGRESS NOTES
Infectious Diseases Progress Note Antibiotic Summary: 
Vancomycin 2/6 x 1 dose Zosyn   --  Zyvox   --  Subjective: No new symptoms Objective:  
 
Vitals:  
Visit Vitals BP 97/60 (BP 1 Location: Right arm, BP Patient Position: At rest) Pulse 89 Temp 97.7 °F (36.5 °C) Resp 20 Ht 5' 10\" (1.778 m) Wt 91.1 kg (200 lb 13.4 oz) SpO2 90% BMI 28.82 kg/m² Tmax:  Temp (24hrs), Av.7 °F (36.5 °C), Min:97.4 °F (36.3 °C), Max:98.2 °F (36.8 °C) Exam:  General appearance: no distress Lungs: bilateral posterior rales at bases Heart: irregularly irregular rhythm with intermittent pacing; 2/ systolic murmur at LSB Abdomen: soft, not distended, non-tender. Bowel sounds normal 
Extremities: no edema; feet and hands warm; good pedal pulses IV Lines: peripheral 
      
Labs:   
Recent Labs  
  02/15/19 
0348 19 
0437 19 
2445 WBC 6.7 8.0 6.0 HGB 8.8* 9.0* 10.2* PLT 73* 72* 73*  
BUN 64* 67* 63* CREA 2.27* 2.42* 2.54* TBILI 0.7 0.8 1.0  
SGOT 30 29 35 * 163* 174* Urine culture  = NG 
 
BLOOD CULTURES: 
  = NG 
 
Bronchoscopy sample : 
 Gram stain = 2+ WBCs; NOS 
 culture = light normal resp shahrzad CXR 2/15 reviewed = small pleural effusions and probable atelectasis Assessment: 1. Fever -- resolved: The cause of the fever was not certain but pulmonary source suspected (aspiration ?). Bronch cultures unremarkable -- oxygenation better and CXR better -- Zosyn ended  
  
2. OHD -- cardiomyopathy and hx of atrial fib -- S/P initial arrest with Code Ice and several subsequent episodes of PEA 
  
3. NIDDM 
  
4. CVD 
  
5. HTN 
  
6. GOUT Plan:  
 
1. Watch off antibiotics I'll check the patient again on Monday. Please call if problems arise over the weekend.  
 
Candy Carcamo MD

## 2019-02-16 NOTE — PROGRESS NOTES
Hospitalist Progress Note [address] [facility],  Answering service: [phone] OR [phone] from in house phone Date of Service:  2019 NAME:  Patricia Ellsworth :  1945 MRN:  [id number] Admission Summary:  
The patient is a 70-year-old gentleman with past medical history of anemia, atrial fibrillation, Painter's esophagus, coronary artery disease, diabetes, heart failure, cardiomyopathy, myocarditis, history of hypertension, diabetic retinopathy, right carotid artery stenosis, TIA and vitamin D deficiency who presents to the hospital from [facility]. Patient presented to [facility] today apparently in PEA. The history was primarily obtained from the ER physician as the patient is intubated and his wife is not present at the bedside. Interval history / Subjective:  
Patient seen and examined. Appears more alert than days prior. Talkative. Orthostatics negative, will decrease fludrocortisone. Hematuria, urology following, continue CBI. Assessment & Plan:  
 
AHRF status post cardiac arrest/pulseless electrical activity POA:  
-s/p hypothermia protocol -EEG non specific no seizure activity 
-Coded  multiple times post hypothermia protocol. Possibly secondary to bradycardia 
-s/p pacemaker  
-reintubated for airway and reextubated 2/10 after bronch  
-s/p zosyn for 7 days 
-nuclear stress test  with normal EF, no ischemia Orthostatic hypotension: improving  
-decrease fludrocortisone, daily orthostatics MARYCRUZ on CKD stage 3: secondary to ATN 
-nephrology consulted 
-creatinine slowly improving Hematuria:  
-urology following, appreciate input, CBI Diabetes, Type II: Lantus and SSI Hypothyroidism: synthroid Chronic a.fib: previously on Eliquis, will need to be restarted once hematuria improves Anemia, appears to be chronic: stable 
-s/p 2 unit PRBC 2/8  hgb > 8 
 
 Fever: resolved, s/p antibiotics Gastrointestinal PPX added simethicone Thrombocytopenia: stable, continue to monitor Abnormal LFT's from shock liver? Vs CLD can investigate as outpt Coagulopathy: suspect secondary to shock liver Code status: Full DVT prophylaxis: SCD Care Plan discussed with: Patient/Family and Nurse Disposition: TBDinpatient rehab Hospital Problems  Date Reviewed: 2/4/2019 Codes Class Noted POA * (Principal) Cardiac arrest [facility]) ICD-10-CM: I46.9 ICD-9-CM: 427.5  2/4/2019 Unknown Review of Systems:  
Review of systems not obtained due to patient factors. Vital Signs:  
 Last 24hrs VS reviewed since prior progress note. Most recent are: 
Visit Vitals /89 (BP 1 Location: Left arm, BP Patient Position: Standing) Pulse 96 Temp 98.2 °F (36.8 °C) Resp 20 Ht 5' 10\" (1.778 m) Wt 97.7 kg (215 lb 6.2 oz) SpO2 96% BMI 30.91 kg/m² Intake/Output Summary (Last 24 hours) at 2/16/2019 1703 Last data filed at 2/16/2019 0900 Gross per 24 hour Intake 3100 ml Output 9025 ml Net -5925 ml Physical Examination:  
 
 
     
Constitutional:  NAD, alert ENT:  MMM Resp:  CTA   
CV:  irregular, paced rhythm GI:  Soft, non distended, non tender. bs+ : scrotal and penial edema noted with mild blood at urethral base Musculoskeletal:  No edema, warm, 2+ pulses throughout Neurologic:  ALERT AND AWAKE Data Review:  
 I personally reviewed  Image and labs Ct Head Wo Cont Result Date: 2/4/2019 IMPRESSION: No acute intracranial hemorrhage, mass or infarct. Xr Chest [facility] Result Date: 2/15/2019 Impression: 1. Enlarged cardiac silhouette, central pulmonary vascular congestion slightly improved with persistent pleural effusion Xr Chest [facility] Result Date: 2/13/2019 IMPRESSION: Mild central vascular congestion. Pacemaker.  Infiltrate/edema at left base plus/minus trace pleural effusion. Xr Chest [facility] Result Date: 2/11/2019 IMPRESSION: Decreased small left pleural effusion and left basilar opacity. Xr Chest [facility] Result Date: 2/10/2019 IMPRESSION: Stable left effusion and underlying atelectasis Xr Chest [facility] Result Date: 2/9/2019 IMPRESSION: No interval change. Xr [facility] Result Date: 2/8/2019 IMPRESSION: ET tube is in satisfactory position. There is slight increasing haziness overlying left hemithorax with opacification left base consistent with left basilar atelectasis /airspace disease and left effusion. Xr Chest [facility] Result Date: 2/7/2019 IMPRESSION: Left lung base opacification. No significant change. Xr [facility] Result Date: 2/6/2019 IMPRESSION: ET tube in satisfactory position. Worsening aeration of the left lung. Xr [facility] Result Date: 2/6/2019 Impression: Stable bilateral lower lobe atelectasis. Xr [facility] Result Date: 2/5/2019 IMPRESSION: No significant change. Xr [facility] Result Date: 2/4/2019 IMPRESSION: Endotracheal tube appears to be in satisfactory position. Xr Abd Port  1 V Result Date: 2/6/2019 IMPRESSION: NG tube in the stomach. Xr Abd Port  1 V Result Date: 2/4/2019 IMPRESSION: NG tube in appropriate position. Labs:  
 
Recent Labs  
  02/16/19 
0540 02/15/19 
0348 WBC 8.5 6.7 HGB 8.7* 8.8* HCT 29.3* 30.1*  
* 73* Recent Labs  
  02/16/19 
0540 02/15/19 
0348 02/14/19 
5097  139 139  
K 4.2 4.2 4.2  109* 109* CO2 22 20* 19* BUN 61* 64* 67* CREA 2.13* 2.27* 2.42* GLU 99 127* 143* CA 8.7 8.8 8.6 MG 2.3 2.4 2.5* PHOS 3.8 4.3 4.8* Recent Labs  
  02/16/19 
0540 02/15/19 
0348 02/14/19 
4097 SGOT 21 30 29 ALT 52 63 69 * 164* 163* TBILI 0.8 0.7 0.8 TP 6.3* 6.3* 6.5 ALB 2.6* 2.6* 2.6*  
GLOB 3.7 3.7 3.9 Recent Labs  
  02/16/19 
0540 02/15/19 
0348 02/14/19 
8469 INR 1.4* 1.3* 1.3* PTP 13.8* 13.1* 13.0* APTT 28.9 27.9 27.6 No results for input(s): FE, TIBC, PSAT, FERR in the last 72 hours. No results found for: FOL, RBCF No results for input(s): PH, PCO2, PO2 in the last 72 hours. No results for input(s): CPK, CKNDX, TROIQ in the last 72 hours. No lab exists for component: CPKMB Lab Results Component Value Date/Time Cholesterol, total 135 02/23/2018 11:31 AM  
 HDL Cholesterol 34 (L) 02/23/2018 11:31 AM  
 LDL, calculated 79 02/23/2018 11:31 AM  
 Triglyceride 111 02/23/2018 11:31 AM  
 CHOL/HDL Ratio 5.0 01/15/2016 03:49 AM  
 
Lab Results Component Value Date/Time Glucose (POC) 195 (H) 02/16/2019 04:43 PM  
 Glucose (POC) 185 (H) 02/16/2019 11:51 AM  
 Glucose (POC) 103 (H) 02/16/2019 06:01 AM  
 Glucose (POC) 154 (H) 02/15/2019 11:37 PM  
 Glucose (POC) 148 (H) 02/15/2019 05:56 PM  
 
Lab Results Component Value Date/Time Color YELLOW/STRAW 02/04/2019 04:08 PM  
 Appearance CLOUDY (A) 02/04/2019 04:08 PM  
 Specific gravity 1.012 02/04/2019 04:08 PM  
 pH (UA) 5.0 02/04/2019 04:08 PM  
 Protein 30 (A) 02/04/2019 04:08 PM  
 Glucose NEGATIVE  02/04/2019 04:08 PM  
 Ketone TRACE (A) 02/04/2019 04:08 PM  
 Bilirubin NEGATIVE  02/04/2019 04:08 PM  
 Urobilinogen 0.2 02/04/2019 04:08 PM  
 Nitrites NEGATIVE  02/04/2019 04:08 PM  
 Leukocyte Esterase NEGATIVE  02/04/2019 04:08 PM  
 Epithelial cells FEW 02/04/2019 04:08 PM  
 Bacteria NEGATIVE  02/04/2019 04:08 PM  
 WBC 10-20 02/04/2019 04:08 PM  
 RBC  02/04/2019 04:08 PM  
 
 
 
Medications Reviewed:  
 
Current Facility-Administered Medications Medication Dose Route Frequency  fludrocortisone (FLORINEF) tablet 100 mcg  100 mcg Oral DAILY  acetylcysteine (MUCOMYST) 200 mg/mL (20 %) solution 400 mg  2 mL Nebulization BID RT  
 albuterol-ipratropium (DUO-NEB) 2.5 MG-0.5 MG/3 ML  3 mL Nebulization BID RT  
 guaiFENesin ER (MUCINEX) tablet 600 mg  600 mg Oral Q12H  pantoprazole (PROTONIX) tablet 40 mg  40 mg Oral ACB  levothyroxine (SYNTHROID) tablet 25 mcg  25 mcg Oral ACB  carvedilol (COREG) tablet 3.125 mg  3.125 mg Oral BID WITH MEALS  simethicone (MYLICON) tablet 80 mg  80 mg Oral BID  hydrALAZINE (APRESOLINE) 20 mg/mL injection 20 mg  20 mg IntraVENous Q6H PRN  
 lactobac ac& pc-s.therm-b.anim (LAVERNE Q/RISAQUAD)  1 Cap Oral DAILY  sodium chloride (NS) flush 5-40 mL  5-40 mL IntraVENous PRN  
 0.9% sodium chloride infusion 250 mL  250 mL IntraVENous PRN  
 morphine injection 2 mg  2 mg IntraVENous Q6H PRN  
 insulin glargine (LANTUS) injection 5 Units  5 Units SubCUTAneous QHS  acetaminophen (TYLENOL) tablet 650 mg  650 mg Oral Q6H PRN  
 bacitracin 500 unit/gram packet 1 Packet  1 Packet Topical PRN  
 sodium chloride (NS) flush 5-40 mL  5-40 mL IntraVENous Q8H  
 sodium chloride (NS) flush 5-40 mL  5-40 mL IntraVENous PRN  
 sodium chloride (NS) flush 5-40 mL  5-40 mL IntraVENous Q8H  
 sodium chloride (NS) flush 5-40 mL  5-40 mL IntraVENous PRN  
 glucose chewable tablet 16 g  4 Tab Oral PRN  
 dextrose (D50W) injection syrg 12.5-25 g  25-50 mL IntraVENous PRN  
 glucagon (GLUCAGEN) injection 1 mg  1 mg IntraMUSCular PRN  
 insulin lispro (HUMALOG) injection   SubCUTAneous Q6H  
 
______________________________________________________________________ EXPECTED LENGTH OF STAY: 2d 0h 
ACTUAL LENGTH OF STAY:          [id number] [facility]

## 2019-02-16 NOTE — PROGRESS NOTES
Urology Progress Note Principal Problem: 
  Cardiac arrest ([address] (2/4/2019) Admitting MD (and procedure) = MARY Bland [facility] M, DO - Procedure(s): 
INSERT PPM SINGLE VENTRICULAR Established Urologist: [facility] Primary care MD: Sheila Hannah MD  - [phone] Code state: Full Code 
 
________________________________________________________________________________________________________________________________________________________ASSESSMENT/PLAN:  
Hematuria - low Plts. Very sick. Hematuria is mild. Continue CBI. 
 
 
____________________________________________________________________________ 
____________________________________________________________________________ Patient: Ramnó Salomon MRN: [id number]  SSN: [id number] YOB: 1945 Age: 72 y.o. Sex: male Height: Height: 5' 10\" (177.8 cm) Weight: Weight: 97.7 kg (215 lb 6.2 oz) BMI: Body mass index is 30.91 kg/m². PCP: Sheila Hannah, [address] [place] [address] [phone] Contact:  Primary Emergency Contact: Mikal Dominguezr, Home Phone: [phone]  
 
_____________________________________________________________________________ Hospital Day: 13 - Admitted 2/4/2019  2:50 PM by Osiel Siu DO Surgeon(s): 
Dilshad Messer MD 9 Days Post-Op Procedure(s): 
INSERT PPM SINGLE VENTRICULAR  
   
_____________________________________________________________________________ SUBJECTIVE: 
Daily Progress Note: 2/16/2019 9:13 AM 
 
Ramón Salomon is 9 Days Post-Op Doing okay. Current Antimicrobial Therapy (168h ago, onward) None Diet: DIET CARDIAC Regular; Consistent Carb 2000kcal 
DIET NUTRITIONAL SUPPLEMENTS All Meals; [place] DIET NUTRITIONAL SUPPLEMENTS HS Snack; Magic Cups OBJECTIVE: 
Visit Vitals /73 (BP 1 Location: Left arm, BP Patient Position: At rest) Pulse 96 Temp 97.7 °F (36.5 °C) Resp (!) 32 Ht 5' 10\" (1.778 m) Wt 97.7 kg (215 lb 6.2 oz) SpO2 92% BMI 30.91 kg/m² Temp (24hrs), Av.7 °F (36.5 °C), Min:97.5 °F (36.4 °C), Max:97.8 °F (36.6 °C) Intake and Output: DIET CARDIAC Regular; Consistent Carb 2000kcal 
DIET NUTRITIONAL SUPPLEMENTS All Meals; [place] DIET NUTRITIONAL SUPPLEMENTS HS Snack; Magic Cups 
 1901 -  0700 In: 5 [P.O.:560] Out: 3582 [[id number]] No intake/output data recorded. Physical Exam: Abd soft nt nd Lab/Data Review: 
Recent Results (from the past 24 hour(s)) GLUCOSE, POC Collection Time: 02/15/19 12:16 PM  
Result Value Ref Range Glucose (POC) 134 (H) 65 - 100 mg/dL Performed by Evelio Quintanilla  PCT GLUCOSE, POC Collection Time: 02/15/19  5:56 PM  
Result Value Ref Range Glucose (POC) 148 (H) 65 - 100 mg/dL Performed by RADHA CARD   
GLUCOSE, POC Collection Time: 02/15/19 11:37 PM  
Result Value Ref Range Glucose (POC) 154 (H) 65 - 100 mg/dL Performed by Aiyana Sanz PROTHROMBIN TIME + INR Collection Time: 19  5:40 AM  
Result Value Ref Range INR 1.4 (H) 0.9 - 1.1 Prothrombin time 13.8 (H) 9.0 - 11.1 sec PTT Collection Time: 19  5:40 AM  
Result Value Ref Range aPTT 28.9 22.1 - 32.0 sec  
 aPTT, therapeutic range     58.0 - 77.0 SECS  
CBC W/O DIFF Collection Time: 19  5:40 AM  
Result Value Ref Range WBC 8.5 4.1 - 11.1 K/uL  
 RBC 3.08 (L) 4.10 - 5.70 M/uL HGB 8.7 (L) 12.1 - 17.0 g/dL HCT 29.3 (L) 36.6 - 50.3 % MCV 95.1 80.0 - 99.0 FL  
 MCH 28.2 26.0 - 34.0 PG  
 MCHC 29.7 (L) 30.0 - 36.5 g/dL  
 RDW 14.2 11.5 - 14.5 % PLATELET 121 (L) 632 - 400 K/uL MPV 10.6 8.9 - 12.9 FL  
 NRBC 0.0 0  WBC ABSOLUTE NRBC 0.00 0.00 - 0.01 K/uL MAGNESIUM Collection Time: 19  5:40 AM  
Result Value Ref Range Magnesium 2.3 1.6 - 2.4 mg/dL PHOSPHORUS Collection Time: 19  5:40 AM  
Result Value Ref Range  Phosphorus 3.8 2.6 - 4.7 MG/DL  
METABOLIC PANEL, COMPREHENSIVE  
 Collection Time: 02/16/19  5:40 AM  
Result Value Ref Range Sodium 138 136 - 145 mmol/L Potassium 4.2 3.5 - 5.1 mmol/L Chloride 107 97 - 108 mmol/L  
 CO2 22 21 - 32 mmol/L Anion gap 9 5 - 15 mmol/L Glucose 99 65 - 100 mg/dL BUN 61 (H) 6 - 20 MG/DL Creatinine 2.13 (H) 0.70 - 1.30 MG/DL  
 BUN/Creatinine ratio 29 (H) 12 - 20 GFR est AA 37 (L) >60 ml/min/1.73m2 GFR est non-AA 31 (L) >60 ml/min/1.73m2 Calcium 8.7 8.5 - 10.1 MG/DL Bilirubin, total 0.8 0.2 - 1.0 MG/DL  
 ALT (SGPT) 52 12 - 78 U/L  
 AST (SGOT) 21 15 - 37 U/L Alk. phosphatase 158 (H) 45 - 117 U/L Protein, total 6.3 (L) 6.4 - 8.2 g/dL Albumin 2.6 (L) 3.5 - 5.0 g/dL Globulin 3.7 2.0 - 4.0 g/dL A-G Ratio 0.7 (L) 1.1 - 2.2 GLUCOSE, POC Collection Time: 02/16/19  6:01 AM  
Result Value Ref Range Glucose (POC) 103 (H) 65 - 100 mg/dL Performed by Kelechi Garsia Cultures: All Micro Results Procedure Component Value Units Date/Time CULTURE, BLOOD, PAIRED [[id number]] Collected:  02/08/19 0116 Order Status:  Completed Specimen:  Blood Updated:  02/13/19 0505 Special Requests: NO SPECIAL REQUESTS Culture result: NO GROWTH 5 DAYS     
 CULTURE, BLOOD [[id number]] Collected:  02/06/19 1902 Order Status:  Completed Specimen:  Blood Updated:  02/12/19 6587 Special Requests: NO SPECIAL REQUESTS Culture result: NO GROWTH 6 DAYS     
 CULTURE, RESPIRATORY/SPUTUM/BRONCH Andressa Sacks STAIN [[id number]] Collected:  02/08/19 0934 Order Status:  Completed Specimen:  Sputum from Bronchial Washing Updated:  02/11/19 1110 Special Requests: NO SPECIAL REQUESTS     
  GRAM STAIN 2+ WBCS SEEN     
   NO DEFINITE ORGANISM SEEN Culture result:    
  LIGHT NORMAL RESPIRATORY LAVERNE  
     
 CULTURE, BLOOD, PAIRED [[id number]] Collected:  02/04/19 1608 Order Status:  Completed Specimen:  Blood Updated:  02/09/19 0543 Special Requests: NO SPECIAL REQUESTS Culture result: NO GROWTH 5 DAYS     
 CULTURE, URINE [[id number]] Collected:  02/07/19 0021 Order Status:  Completed Specimen:  Urine from [place] Specimen Updated:  02/08/19 3514 Special Requests: NO SPECIAL REQUESTS [place] Count <1,000 CFU/ML Culture result: NO GROWTH 1 DAY     
 CULTURE, RESPIRATORY/SPUTUM/BRONCH Cherie Canary [[id number]] Collected:  02/07/19 0930 Order Status:  Canceled Specimen:  Sputum,ET Suction Signed By: Mireille Mejia MD  
                      February 16, 2019

## 2019-02-16 NOTE — PROGRESS NOTES
1930: Bedside shift change report given to Darrick Welch RN (oncoming nurse) by Braxton Saravia RN (offgoing nurse). Report included the following information SBAR, Kardex, Intake/Output, MAR, Recent Results and Cardiac Rhythm A fib. Problem: Falls - Risk of 
Goal: *Absence of Falls Document Luli Cruz Fall Risk and appropriate interventions in the flowsheet. Outcome: Progressing Towards Goal 
Fall Risk Interventions: 
Mobility Interventions: Communicate number of staff needed for ambulation/transfer, OT consult for ADLs, Patient to call before getting OOB, PT Consult for mobility concerns, PT Consult for assist device competence, Strengthening exercises (ROM-active/passive), Utilize walker, cane, or other assistive device Mentation Interventions: Adequate sleep, hydration, pain control, Door open when patient unattended, Evaluate medications/consider consulting pharmacy, Familiar objects from home, More frequent rounding, Update white board Medication Interventions: Assess postural VS orthostatic hypotension, Evaluate medications/consider consulting pharmacy, Patient to call before getting OOB, Teach patient to arise slowly Elimination Interventions: Call light in reach, Patient to call for help with toileting needs, Toilet paper/wipes in reach, Toileting schedule/hourly rounds Problem: Pressure Injury - Risk of 
Goal: *Prevention of pressure injury Document Dom Scale and appropriate interventions in the flowsheet. Outcome: Progressing Towards Goal 
Pressure Injury Interventions: 
Sensory Interventions: Assess changes in LOC, Check visual cues for pain, Discuss PT/OT consult with provider, Keep linens dry and wrinkle-free, Minimize linen layers, Pressure redistribution bed/mattress (bed type) Moisture Interventions: Absorbent underpads, Assess need for specialty bed, Check for incontinence Q2 hours and as needed, Internal/External urinary devices, Limit adult briefs, Maintain skin hydration (lotion/cream), Moisture barrier, Minimize layers Activity Interventions: Assess need for specialty bed, Increase time out of bed, Pressure redistribution bed/mattress(bed type), PT/OT evaluation Mobility Interventions: Assess need for specialty bed, Float heels, HOB 30 degrees or less, Pressure redistribution bed/mattress (bed type), PT/OT evaluation Nutrition Interventions: Document food/fluid/supplement intake, Discuss nutritional consult with provider, Offer support with meals,snacks and hydration Friction and Shear Interventions: Apply protective barrier, creams and emollients, HOB 30 degrees or less, Lift sheet, Minimize layers, Transferring/repositioning devices Problem: Infection - Risk of, Urinary Catheter-Associated Urinary Tract Infection Goal: *Absence of infection signs and symptoms Outcome: Progressing Towards Goal 
Continuous bladder irrigation-- monitoring I//O. Output is light pink and clear.

## 2019-02-17 LAB
ANION GAP SERPL CALC-SCNC: 8 MMOL/L (ref 5–15)
APTT PPP: 28.8 SEC (ref 22.1–32)
BASOPHILS # BLD: 0.1 K/UL (ref 0–0.1)
BASOPHILS NFR BLD: 1 % (ref 0–1)
BUN SERPL-MCNC: 58 MG/DL (ref 6–20)
BUN/CREAT SERPL: 28 (ref 12–20)
CALCIUM SERPL-MCNC: 8.5 MG/DL (ref 8.5–10.1)
CHLORIDE SERPL-SCNC: 107 MMOL/L (ref 97–108)
CO2 SERPL-SCNC: 22 MMOL/L (ref 21–32)
CREAT SERPL-MCNC: 2.1 MG/DL (ref 0.7–1.3)
DIFFERENTIAL METHOD BLD: ABNORMAL
EOSINOPHIL # BLD: 0.1 K/UL (ref 0–0.4)
EOSINOPHIL NFR BLD: 1 % (ref 0–7)
ERYTHROCYTE [DISTWIDTH] IN BLOOD BY AUTOMATED COUNT: 14.3 % (ref 11.5–14.5)
GLUCOSE BLD STRIP.AUTO-MCNC: 109 MG/DL (ref 65–100)
GLUCOSE BLD STRIP.AUTO-MCNC: 146 MG/DL (ref 65–100)
GLUCOSE BLD STRIP.AUTO-MCNC: 188 MG/DL (ref 65–100)
GLUCOSE BLD STRIP.AUTO-MCNC: 214 MG/DL (ref 65–100)
GLUCOSE SERPL-MCNC: 220 MG/DL (ref 65–100)
HCT VFR BLD AUTO: 30.8 % (ref 36.6–50.3)
HGB BLD-MCNC: 9.2 G/DL (ref 12.1–17)
IMM GRANULOCYTES # BLD AUTO: 0.1 K/UL (ref 0–0.04)
IMM GRANULOCYTES NFR BLD AUTO: 1 % (ref 0–0.5)
INR PPP: 1.4 (ref 0.9–1.1)
LYMPHOCYTES # BLD: 0.3 K/UL (ref 0.8–3.5)
LYMPHOCYTES NFR BLD: 4 % (ref 12–49)
MCH RBC QN AUTO: 28.7 PG (ref 26–34)
MCHC RBC AUTO-ENTMCNC: 29.9 G/DL (ref 30–36.5)
MCV RBC AUTO: 96 FL (ref 80–99)
MONOCYTES # BLD: 0.6 K/UL (ref 0–1)
MONOCYTES NFR BLD: 7 % (ref 5–13)
NEUTS SEG # BLD: 6.9 K/UL (ref 1.8–8)
NEUTS SEG NFR BLD: 86 % (ref 32–75)
NRBC # BLD: 0 K/UL (ref 0–0.01)
NRBC BLD-RTO: 0 PER 100 WBC
PLATELET # BLD AUTO: 109 K/UL (ref 150–400)
PMV BLD AUTO: 10.4 FL (ref 8.9–12.9)
POTASSIUM SERPL-SCNC: 4.7 MMOL/L (ref 3.5–5.1)
PROTHROMBIN TIME: 14 SEC (ref 9–11.1)
RBC # BLD AUTO: 3.21 M/UL (ref 4.1–5.7)
RBC MORPH BLD: ABNORMAL
SERVICE CMNT-IMP: ABNORMAL
SODIUM SERPL-SCNC: 137 MMOL/L (ref 136–145)
THERAPEUTIC RANGE,PTTT: NORMAL SECS (ref 58–77)
WBC # BLD AUTO: 8.1 K/UL (ref 4.1–11.1)

## 2019-02-17 PROCEDURE — 94664 DEMO&/EVAL PT USE INHALER: CPT

## 2019-02-17 PROCEDURE — 74011250637 HC RX REV CODE- 250/637: Performed by: INTERNAL MEDICINE

## 2019-02-17 PROCEDURE — 85730 THROMBOPLASTIN TIME PARTIAL: CPT

## 2019-02-17 PROCEDURE — 80048 BASIC METABOLIC PNL TOTAL CA: CPT

## 2019-02-17 PROCEDURE — 74011250637 HC RX REV CODE- 250/637: Performed by: HOSPITALIST

## 2019-02-17 PROCEDURE — 97535 SELF CARE MNGMENT TRAINING: CPT

## 2019-02-17 PROCEDURE — 85025 COMPLETE CBC W/AUTO DIFF WBC: CPT

## 2019-02-17 PROCEDURE — 65660000001 HC RM ICU INTERMED STEPDOWN

## 2019-02-17 PROCEDURE — 36415 COLL VENOUS BLD VENIPUNCTURE: CPT

## 2019-02-17 PROCEDURE — 74011636637 HC RX REV CODE- 636/637: Performed by: HOSPITALIST

## 2019-02-17 PROCEDURE — 74011000250 HC RX REV CODE- 250: Performed by: INTERNAL MEDICINE

## 2019-02-17 PROCEDURE — 94762 N-INVAS EAR/PLS OXIMTRY CONT: CPT

## 2019-02-17 PROCEDURE — 74011636637 HC RX REV CODE- 636/637: Performed by: FAMILY MEDICINE

## 2019-02-17 PROCEDURE — 94640 AIRWAY INHALATION TREATMENT: CPT

## 2019-02-17 PROCEDURE — 82962 GLUCOSE BLOOD TEST: CPT

## 2019-02-17 PROCEDURE — 85610 PROTHROMBIN TIME: CPT

## 2019-02-17 PROCEDURE — 77010033678 HC OXYGEN DAILY

## 2019-02-17 RX ADMIN — FLUDROCORTISONE ACETATE 50 MCG: 0.1 TABLET ORAL at 08:23

## 2019-02-17 RX ADMIN — Medication 10 ML: at 06:22

## 2019-02-17 RX ADMIN — ACETAMINOPHEN 650 MG: 325 TABLET ORAL at 19:24

## 2019-02-17 RX ADMIN — GUAIFENESIN 600 MG: 600 TABLET, EXTENDED RELEASE ORAL at 20:38

## 2019-02-17 RX ADMIN — CARVEDILOL 3.12 MG: 6.25 TABLET, FILM COATED ORAL at 08:23

## 2019-02-17 RX ADMIN — CARVEDILOL 3.12 MG: 6.25 TABLET, FILM COATED ORAL at 19:24

## 2019-02-17 RX ADMIN — PANTOPRAZOLE SODIUM 40 MG: 40 TABLET, DELAYED RELEASE ORAL at 06:30

## 2019-02-17 RX ADMIN — IPRATROPIUM BROMIDE AND ALBUTEROL SULFATE 3 ML: .5; 3 SOLUTION RESPIRATORY (INHALATION) at 21:00

## 2019-02-17 RX ADMIN — SIMETHICONE CHEW TAB 80 MG 80 MG: 80 TABLET ORAL at 08:22

## 2019-02-17 RX ADMIN — INSULIN LISPRO 2 UNITS: 100 INJECTION, SOLUTION INTRAVENOUS; SUBCUTANEOUS at 19:24

## 2019-02-17 RX ADMIN — ACETYLCYSTEINE 400 MG: 200 SOLUTION ORAL; RESPIRATORY (INHALATION) at 21:00

## 2019-02-17 RX ADMIN — INSULIN GLARGINE 5 UNITS: 100 INJECTION, SOLUTION SUBCUTANEOUS at 23:29

## 2019-02-17 RX ADMIN — Medication 1 CAPSULE: at 08:22

## 2019-02-17 RX ADMIN — Medication 10 ML: at 16:46

## 2019-02-17 RX ADMIN — LEVOTHYROXINE SODIUM 25 MCG: 25 TABLET ORAL at 06:30

## 2019-02-17 RX ADMIN — GUAIFENESIN 600 MG: 600 TABLET, EXTENDED RELEASE ORAL at 08:22

## 2019-02-17 RX ADMIN — SIMETHICONE CHEW TAB 80 MG 80 MG: 80 TABLET ORAL at 19:24

## 2019-02-17 RX ADMIN — Medication 10 ML: at 23:30

## 2019-02-17 RX ADMIN — IPRATROPIUM BROMIDE AND ALBUTEROL SULFATE 3 ML: .5; 3 SOLUTION RESPIRATORY (INHALATION) at 08:03

## 2019-02-17 RX ADMIN — ACETYLCYSTEINE 200 MG: 200 SOLUTION ORAL; RESPIRATORY (INHALATION) at 08:02

## 2019-02-17 RX ADMIN — ACETAMINOPHEN 650 MG: 325 TABLET ORAL at 13:54

## 2019-02-17 NOTE — PROGRESS NOTES
Problem: Self Care Deficits Care Plan (Adult) Goal: *Acute Goals and Plan of Care (Insert Text) Occupational Therapy Goals Initiated 2/13/2019 1. Patient will perform ADLs standing 5 mins without fatigue or LOB with minimal assistance/contact guard assistance within 5 day(s). 2.  Patient will perform upper body ADLs with supervision/set-up within 5 day(s). 3.  Patient will perform lower body ADLs using AE PRN with minimal assistance/contact guard assistance within 5 day(s). 4.  Patient will perform toilet transfers with minimal assistance/contact guard assistance within 5 day(s). 5.  Patient will perform all aspects of toileting with minimal assistance/contact guard assistance within 5 day(s). 6.  Patient will participate in cardiac/sternal upper extremity therapeutic exercise/activities to increase independence with ADLs with supervision/set-up for 5 minutes within 5 day(s). Occupational Therapy TREATMENT Patient: Ramón Salomon (74 y.o. male) Date: 2/17/2019 Diagnosis: Cardiac arrest [facility]) [I46.9] Cardiac arrest ([facility] [address] Procedure(s) (LRB): 
INSERT PPM SINGLE VENTRICULAR (N/A) 10 Days Post-Op Precautions: Fall Chart, occupational therapy assessment, plan of care, and goals were reviewed. ASSESSMENT:  
The patient presents with Minimum assistance upper body ADLs, Maximum assistance lower body ADLs, and Maximum assistance assist functional mobility. The following are barriers to independence with ADLs while in acute care:  
- Cognitive and/or behavioral: command following complex poor, processing, initiation, sequencing, insight into deficits, insight into abilities and short term memory loss and word finding - Medical condition: ROM, strength, functional reach, standing balance, cardiopulmonary tolerance, orthostatic hypotension symptomatic, precautions, pain tolerance, medical history, edema B LEs (42 cm calfs, now with compression stockings) and motor planning - Other:   2/6 L pacemaker and extubated 2/11 (pain from chest compressions, dsouza cath) PLOF: independent with all ADLs, driving. Wife and family independent. PLAN: 
Patient continues to benefit from skilled intervention to address the above impairments. Continue treatment per established plan of care. Recommendations and/or planned interventions for staff: 
-standing ADLs at beside to increase tolerance safely 2* orthostatic hypotension Discharge recommendations: Rehab at inpatient facility: patient can tolerate 3 hours of therapy Barriers to discharging home, in addition to above listed impairments: level of physical assist required to maintain patient safety. If discharges home will require 24 hour skilled physical A, respite for family Equipment recommendations for successful discharge (if) home: hospital bed, RW, tub bench, BSC  
 
SUBJECTIVE:  
Patient stated I do not shave.  (increased time to process, state exactly how he does/does not shave to state his needs and desires. Wife stated uses electric razor at home). OBJECTIVE DATA SUMMARY:  
Cognitive/Behavioral Status: 
Neurologic State: Alert Orientation Level: Oriented X4 Cognition: Follows commands; Appropriate for age attention/concentration Functional Mobility and Transfers for ADLs:Bed Mobility: 
Supine to Sit: Maximum assistance( ALEs and trunk; exit R side as at home) Sit to Supine: Maximum assistance(B LE and trunk) Supine to scoot up bed max A cues twice B LEs flex, bed in trendelenburg Transfers: 
  
  
  
 
Balance: 
Sitting: Intact; Without support ADL Intervention: 
  
Patient declining functional mobility to bathroom at this time. Did sit in chair 1 hour today. Agreed to have therapist come back in 1 hour and would then work with staff to ensure a rest break before starting EOB again. Grooming Washing Face: Supervision/set-up Washing Hands: Supervision/set-up Shaving: Moderate assistance(sitting EOB, cues thoroughness, perseveration on certain are) Brushing/Combing Hair: Supervision/set-up Lower Body Dressing Assistance Antiembolitic Stockings: Total assistance (dependent)(patient measured 42 cm wide calf) daughter stating that compression stockings were ordered for LEs but never donned. Checked orders, orders  In chart, nurse confirmed can don now. Cognitive Retraining Problem Solving: Identifying the task; Identifying the problem;General alternative solution Organizing/Sequencing: Two step sequence Attention to Task: Single task(no additional cues) Maintains Attention For (Time): 5 minutes Following Commands: Follows two step commands/directions Neuro Re-Education: 
  
  
  
Therapeutic Exercises:  
 
Pain: B LEs sensitive, L UE pain, and dsouza cath pain Activity Tolerance:  
fair+ Please refer to the flowsheet for vital signs taken during this treatment. After treatment patient left:  
Supine in bed Bed in low position Caregiver at bedside Call light within reach RN notified Family at bedside Side rails x 3  
 
COMMUNICATION/COLLABORATION:  
The patients plan of care was discussed with: Registered Nurse Rosanna Cockayne Time Calculation: 48 mins

## 2019-02-17 NOTE — PROGRESS NOTES
Problem: Infection - Risk of, Urinary Catheter-Associated Urinary Tract Infection Goal: *Absence of infection signs and symptoms Outcome: Progressing Towards Goal 
Continuous bladder irrigation for pt. [place] output pink in color. [place] care completed.

## 2019-02-17 NOTE — PROGRESS NOTES
Hospitalist Progress Note [address] [facility],  Answering service: [phone] OR [phone] from in house phone Date of Service:  2019 NAME:  Wei Garvey :  1945 MRN:  [id number] Admission Summary:  
The patient is a 79-year-old gentleman with past medical history of anemia, atrial fibrillation, Painter's esophagus, coronary artery disease, diabetes, heart failure, cardiomyopathy, myocarditis, history of hypertension, diabetic retinopathy, right carotid artery stenosis, TIA and vitamin D deficiency who presents to the hospital from [facility]. Patient presented to [address] [place] [address] today apparently in PEA. The history was primarily obtained from the ER physician as the patient is intubated and his wife is not present at the bedside. Interval history / Subjective:  
Patient seen and examined. Continues to appear more alert. No acute events overnight. Orthostatics negative- decrease fludrocortisone. Urology following, CBI clamped. Wean oxygen. Assessment & Plan:  
 
AHRF status post cardiac arrest/pulseless electrical activity POA:  
-s/p hypothermia protocol -EEG non specific no seizure activity 
-Coded  multiple times post hypothermia protocol. Possibly secondary to bradycardia 
-s/p pacemaker  
-reintubated for airway and reextubated 2/10 after bronch  
-s/p zosyn for 7 days 
-nuclear stress test  with normal EF, no ischemia Orthostatic hypotension: improving  
-decrease fludrocortisone to 50 mcg daily, repeat orthostatics MARYCRUZ on CKD stage 3: secondary to ATN 
-nephrology consulted 
-creatinine slowly improving Hematuria:  
-urology following, appreciate input, CBI Diabetes, Type II: Lantus and SSI Hypothyroidism: synthroid Chronic a.fib: previously on Eliquis, will need to be restarted once hematuria improves Anemia, appears to be chronic: stable -s/p 2 unit PRBC 2/8  hgb > 8 Fever: resolved, s/p antibiotics Gastrointestinal PPX added simethicone Thrombocytopenia: stable, continue to monitor Abnormal LFT's from shock liver? Vs CLD can investigate as outpt Coagulopathy: suspect secondary to shock liver Code status: Full DVT prophylaxis: SCD Care Plan discussed with: Patient/Family and Nurse Disposition: TBDinpatient rehab Hospital Problems  Date Reviewed: 2/4/2019 Codes Class Noted POA * (Principal) Cardiac arrest [facility]) ICD-10-CM: I46.9 ICD-9-CM: 427.5  2/4/2019 Unknown Review of Systems:  
Review of systems not obtained due to patient factors. Vital Signs:  
 Last 24hrs VS reviewed since prior progress note. Most recent are: 
Visit Vitals /81 Pulse (!) 105 Temp 97.5 °F (36.4 °C) Resp 22 Ht 5' 10\" (1.778 m) Wt 92.9 kg (204 lb 12.9 oz) SpO2 97% BMI 29.39 kg/m² Intake/Output Summary (Last 24 hours) at 2/17/2019 1037 Last data filed at 2/17/2019 4815 Gross per 24 hour Intake 79679 ml Output 07854 ml Net -2400 ml Physical Examination:  
 
 
     
Constitutional:  NAD, alert ENT:  MMM Resp:  CTA   
CV:  irregular, paced rhythm GI:  Soft, non distended, non tender. bs+ : scrotal and penial edema with mild blood at urethral base Musculoskeletal:  No edema, warm, 2+ pulses throughout Neurologic:  ALERT AND AWAKE Data Review:  
 I personally reviewed  Image and labs Ct Head Wo Cont Result Date: 2/4/2019 IMPRESSION: No acute intracranial hemorrhage, mass or infarct. Xr Chest [facility] Result Date: 2/15/2019 Impression: 1. Enlarged cardiac silhouette, central pulmonary vascular congestion slightly improved with persistent pleural effusion Xr Chest [facility] Result Date: 2/13/2019 IMPRESSION: Mild central vascular congestion. Pacemaker. Infiltrate/edema at left base plus/minus trace pleural effusion. Xr Chest [facility] Result Date: 2/11/2019 IMPRESSION: Decreased small left pleural effusion and left basilar opacity. Xr Chest [facility] Result Date: 2/10/2019 IMPRESSION: Stable left effusion and underlying atelectasis Xr Chest [facility] Result Date: 2/9/2019 IMPRESSION: No interval change. Xr Chest [facility] Result Date: 2/8/2019 IMPRESSION: ET tube is in satisfactory position. There is slight increasing haziness overlying left hemithorax with opacification left base consistent with left basilar atelectasis /airspace disease and left effusion. Xr Chest [facility] Result Date: 2/7/2019 IMPRESSION: Left lung base opacification. No significant change. Xr Chest [facility] Result Date: 2/6/2019 IMPRESSION: ET tube in satisfactory position. Worsening aeration of the left lung. Xr Chest [facility] Result Date: 2/6/2019 Impression: Stable bilateral lower lobe atelectasis. Xr Chest [facility] Result Date: 2/5/2019 IMPRESSION: No significant change. Xr [facility] Result Date: 2/4/2019 IMPRESSION: Endotracheal tube appears to be in satisfactory position. Xr Abd Port  1 V Result Date: 2/6/2019 IMPRESSION: NG tube in the stomach. Xr Abd Port  1 V Result Date: 2/4/2019 IMPRESSION: NG tube in appropriate position. Labs:  
 
Recent Labs  
  02/16/19 
0540 02/15/19 
0348 WBC 8.5 6.7 HGB 8.7* 8.8* HCT 29.3* 30.1*  
* 73* Recent Labs  
  02/16/19 
0540 02/15/19 
0348  139  
K 4.2 4.2  109* CO2 22 20* BUN 61* 64* CREA 2.13* 2.27* GLU 99 127* CA 8.7 8.8 MG 2.3 2.4 PHOS 3.8 4.3 Recent Labs  
  02/16/19 
0540 02/15/19 
0348 SGOT 21 30 ALT 52 63 * 164* TBILI 0.8 0.7 TP 6.3* 6.3* ALB 2.6* 2.6*  
GLOB 3.7 3.7 Recent Labs  
  02/17/19 
0258 02/16/19 
0540 02/15/19 
0348 INR 1.4* 1.4* 1.3* PTP 14.0* 13.8* 13.1* APTT 28.8 28.9 27.9 No results for input(s): FE, TIBC, PSAT, FERR in the last 72 hours.   
No results found for: FOL, RBCF  
 No results for input(s): PH, PCO2, PO2 in the last 72 hours. No results for input(s): CPK, CKNDX, TROIQ in the last 72 hours. No lab exists for component: CPKMB Lab Results Component Value Date/Time Cholesterol, total 135 02/23/2018 11:31 AM  
 HDL Cholesterol 34 (L) 02/23/2018 11:31 AM  
 LDL, calculated 79 02/23/2018 11:31 AM  
 Triglyceride 111 02/23/2018 11:31 AM  
 CHOL/HDL Ratio 5.0 01/15/2016 03:49 AM  
 
Lab Results Component Value Date/Time Glucose (POC) 109 (H) 02/17/2019 06:21 AM  
 Glucose (POC) 203 (H) 02/16/2019 11:35 PM  
 Glucose (POC) 195 (H) 02/16/2019 04:43 PM  
 Glucose (POC) 185 (H) 02/16/2019 11:51 AM  
 Glucose (POC) 103 (H) 02/16/2019 06:01 AM  
 
Lab Results Component Value Date/Time Color YELLOW/STRAW 02/04/2019 04:08 PM  
 Appearance CLOUDY (A) 02/04/2019 04:08 PM  
 Specific gravity 1.012 02/04/2019 04:08 PM  
 pH (UA) 5.0 02/04/2019 04:08 PM  
 Protein 30 (A) 02/04/2019 04:08 PM  
 Glucose NEGATIVE  02/04/2019 04:08 PM  
 Ketone TRACE (A) 02/04/2019 04:08 PM  
 Bilirubin NEGATIVE  02/04/2019 04:08 PM  
 Urobilinogen 0.2 02/04/2019 04:08 PM  
 Nitrites NEGATIVE  02/04/2019 04:08 PM  
 Leukocyte Esterase NEGATIVE  02/04/2019 04:08 PM  
 Epithelial cells FEW 02/04/2019 04:08 PM  
 Bacteria NEGATIVE  02/04/2019 04:08 PM  
 WBC 10-20 02/04/2019 04:08 PM  
 RBC  02/04/2019 04:08 PM  
 
 
 
Medications Reviewed:  
 
Current Facility-Administered Medications Medication Dose Route Frequency  fludrocortisone (FLORINEF) tablet 50 mcg  50 mcg Oral DAILY  acetylcysteine (MUCOMYST) 200 mg/mL (20 %) solution 400 mg  2 mL Nebulization BID RT  
 albuterol-ipratropium (DUO-NEB) 2.5 MG-0.5 MG/3 ML  3 mL Nebulization BID RT  
 guaiFENesin ER (MUCINEX) tablet 600 mg  600 mg Oral Q12H  pantoprazole (PROTONIX) tablet 40 mg  40 mg Oral ACB  levothyroxine (SYNTHROID) tablet 25 mcg  25 mcg Oral ACB  carvedilol (COREG) tablet 3.125 mg  3.125 mg Oral BID WITH MEALS  
  simethicone (MYLICON) tablet 80 mg  80 mg Oral BID  hydrALAZINE (APRESOLINE) 20 mg/mL injection 20 mg  20 mg IntraVENous Q6H PRN  
 lactobac ac& pc-s.therm-b.anim (LAVERNE Q/RISAQUAD)  1 Cap Oral DAILY  sodium chloride (NS) flush 5-40 mL  5-40 mL IntraVENous PRN  
 0.9% sodium chloride infusion 250 mL  250 mL IntraVENous PRN  
 morphine injection 2 mg  2 mg IntraVENous Q6H PRN  
 insulin glargine (LANTUS) injection 5 Units  5 Units SubCUTAneous QHS  acetaminophen (TYLENOL) tablet 650 mg  650 mg Oral Q6H PRN  
 bacitracin 500 unit/gram packet 1 Packet  1 Packet Topical PRN  
 sodium chloride (NS) flush 5-40 mL  5-40 mL IntraVENous Q8H  
 sodium chloride (NS) flush 5-40 mL  5-40 mL IntraVENous PRN  
 sodium chloride (NS) flush 5-40 mL  5-40 mL IntraVENous Q8H  
 sodium chloride (NS) flush 5-40 mL  5-40 mL IntraVENous PRN  
 glucose chewable tablet 16 g  4 Tab Oral PRN  
 dextrose (D50W) injection syrg 12.5-25 g  25-50 mL IntraVENous PRN  
 glucagon (GLUCAGEN) injection 1 mg  1 mg IntraMUSCular PRN  
 insulin lispro (HUMALOG) injection   SubCUTAneous Q6H  
 
______________________________________________________________________ EXPECTED LENGTH OF STAY: 2d 0h 
ACTUAL LENGTH OF STAY:          13 [id number] [facility]

## 2019-02-18 ENCOUNTER — APPOINTMENT (OUTPATIENT)
Dept: INFUSION THERAPY | Age: 74
End: 2019-02-18
Payer: MEDICARE

## 2019-02-18 LAB
ANION GAP SERPL CALC-SCNC: 7 MMOL/L (ref 5–15)
ANION GAP SERPL CALC-SCNC: 8 MMOL/L (ref 5–15)
APTT PPP: 29.2 SEC (ref 22.1–32)
BUN SERPL-MCNC: 59 MG/DL (ref 6–20)
BUN SERPL-MCNC: 60 MG/DL (ref 6–20)
BUN/CREAT SERPL: 29 (ref 12–20)
BUN/CREAT SERPL: 29 (ref 12–20)
CALCIUM SERPL-MCNC: 8.5 MG/DL (ref 8.5–10.1)
CALCIUM SERPL-MCNC: 8.5 MG/DL (ref 8.5–10.1)
CHLORIDE SERPL-SCNC: 104 MMOL/L (ref 97–108)
CHLORIDE SERPL-SCNC: 109 MMOL/L (ref 97–108)
CO2 SERPL-SCNC: 20 MMOL/L (ref 21–32)
CO2 SERPL-SCNC: 24 MMOL/L (ref 21–32)
CREAT SERPL-MCNC: 2.04 MG/DL (ref 0.7–1.3)
CREAT SERPL-MCNC: 2.06 MG/DL (ref 0.7–1.3)
ERYTHROCYTE [DISTWIDTH] IN BLOOD BY AUTOMATED COUNT: 14.5 % (ref 11.5–14.5)
ERYTHROCYTE [DISTWIDTH] IN BLOOD BY AUTOMATED COUNT: 14.6 % (ref 11.5–14.5)
GLUCOSE BLD STRIP.AUTO-MCNC: 160 MG/DL (ref 65–100)
GLUCOSE BLD STRIP.AUTO-MCNC: 184 MG/DL (ref 65–100)
GLUCOSE BLD STRIP.AUTO-MCNC: 202 MG/DL (ref 65–100)
GLUCOSE BLD STRIP.AUTO-MCNC: 328 MG/DL (ref 65–100)
GLUCOSE SERPL-MCNC: 139 MG/DL (ref 65–100)
GLUCOSE SERPL-MCNC: 216 MG/DL (ref 65–100)
HCT VFR BLD AUTO: 29.3 % (ref 36.6–50.3)
HCT VFR BLD AUTO: 29.8 % (ref 36.6–50.3)
HGB BLD-MCNC: 8.7 G/DL (ref 12.1–17)
HGB BLD-MCNC: 8.7 G/DL (ref 12.1–17)
INR PPP: 1.4 (ref 0.9–1.1)
MAGNESIUM SERPL-MCNC: 2.1 MG/DL (ref 1.6–2.4)
MCH RBC QN AUTO: 28 PG (ref 26–34)
MCH RBC QN AUTO: 28.6 PG (ref 26–34)
MCHC RBC AUTO-ENTMCNC: 29.2 G/DL (ref 30–36.5)
MCHC RBC AUTO-ENTMCNC: 29.7 G/DL (ref 30–36.5)
MCV RBC AUTO: 95.8 FL (ref 80–99)
MCV RBC AUTO: 96.4 FL (ref 80–99)
NRBC # BLD: 0 K/UL (ref 0–0.01)
NRBC # BLD: 0 K/UL (ref 0–0.01)
NRBC BLD-RTO: 0 PER 100 WBC
NRBC BLD-RTO: 0 PER 100 WBC
PHOSPHATE SERPL-MCNC: 3.8 MG/DL (ref 2.6–4.7)
PLATELET # BLD AUTO: 113 K/UL (ref 150–400)
PLATELET # BLD AUTO: 126 K/UL (ref 150–400)
PMV BLD AUTO: 10.5 FL (ref 8.9–12.9)
PMV BLD AUTO: 10.7 FL (ref 8.9–12.9)
POTASSIUM SERPL-SCNC: 4.5 MMOL/L (ref 3.5–5.1)
POTASSIUM SERPL-SCNC: 4.7 MMOL/L (ref 3.5–5.1)
PROTHROMBIN TIME: 14 SEC (ref 9–11.1)
RBC # BLD AUTO: 3.04 M/UL (ref 4.1–5.7)
RBC # BLD AUTO: 3.11 M/UL (ref 4.1–5.7)
SERVICE CMNT-IMP: ABNORMAL
SODIUM SERPL-SCNC: 135 MMOL/L (ref 136–145)
SODIUM SERPL-SCNC: 137 MMOL/L (ref 136–145)
THERAPEUTIC RANGE,PTTT: NORMAL SECS (ref 58–77)
WBC # BLD AUTO: 7.4 K/UL (ref 4.1–11.1)
WBC # BLD AUTO: 8.4 K/UL (ref 4.1–11.1)

## 2019-02-18 PROCEDURE — 85730 THROMBOPLASTIN TIME PARTIAL: CPT

## 2019-02-18 PROCEDURE — 85027 COMPLETE CBC AUTOMATED: CPT

## 2019-02-18 PROCEDURE — 97535 SELF CARE MNGMENT TRAINING: CPT

## 2019-02-18 PROCEDURE — 83735 ASSAY OF MAGNESIUM: CPT

## 2019-02-18 PROCEDURE — 74011250637 HC RX REV CODE- 250/637: Performed by: INTERNAL MEDICINE

## 2019-02-18 PROCEDURE — 74011636637 HC RX REV CODE- 636/637: Performed by: HOSPITALIST

## 2019-02-18 PROCEDURE — 85610 PROTHROMBIN TIME: CPT

## 2019-02-18 PROCEDURE — 82962 GLUCOSE BLOOD TEST: CPT

## 2019-02-18 PROCEDURE — 84100 ASSAY OF PHOSPHORUS: CPT

## 2019-02-18 PROCEDURE — 74011000250 HC RX REV CODE- 250: Performed by: INTERNAL MEDICINE

## 2019-02-18 PROCEDURE — 97116 GAIT TRAINING THERAPY: CPT

## 2019-02-18 PROCEDURE — 36415 COLL VENOUS BLD VENIPUNCTURE: CPT

## 2019-02-18 PROCEDURE — 80048 BASIC METABOLIC PNL TOTAL CA: CPT

## 2019-02-18 PROCEDURE — 74011250637 HC RX REV CODE- 250/637: Performed by: HOSPITALIST

## 2019-02-18 PROCEDURE — 65660000001 HC RM ICU INTERMED STEPDOWN

## 2019-02-18 PROCEDURE — 74011250636 HC RX REV CODE- 250/636: Performed by: INTERNAL MEDICINE

## 2019-02-18 PROCEDURE — 94640 AIRWAY INHALATION TREATMENT: CPT

## 2019-02-18 PROCEDURE — 74011636637 HC RX REV CODE- 636/637: Performed by: INTERNAL MEDICINE

## 2019-02-18 RX ORDER — LANOLIN ALCOHOL/MO/W.PET/CERES
3 CREAM (GRAM) TOPICAL
Status: DISCONTINUED | OUTPATIENT
Start: 2019-02-18 | End: 2019-02-22 | Stop reason: HOSPADM

## 2019-02-18 RX ORDER — CARVEDILOL 6.25 MG/1
6.25 TABLET ORAL 2 TIMES DAILY WITH MEALS
Status: DISCONTINUED | OUTPATIENT
Start: 2019-02-18 | End: 2019-02-22 | Stop reason: HOSPADM

## 2019-02-18 RX ORDER — INSULIN LISPRO 100 [IU]/ML
INJECTION, SOLUTION INTRAVENOUS; SUBCUTANEOUS
Status: DISCONTINUED | OUTPATIENT
Start: 2019-02-18 | End: 2019-02-22 | Stop reason: HOSPADM

## 2019-02-18 RX ORDER — PANTOPRAZOLE SODIUM 40 MG/1
40 TABLET, DELAYED RELEASE ORAL
Status: DISCONTINUED | OUTPATIENT
Start: 2019-02-19 | End: 2019-02-22 | Stop reason: HOSPADM

## 2019-02-18 RX ORDER — FUROSEMIDE 40 MG/1
40 TABLET ORAL DAILY
Status: DISCONTINUED | OUTPATIENT
Start: 2019-02-18 | End: 2019-02-18

## 2019-02-18 RX ORDER — FUROSEMIDE 10 MG/ML
40 INJECTION INTRAMUSCULAR; INTRAVENOUS ONCE
Status: COMPLETED | OUTPATIENT
Start: 2019-02-18 | End: 2019-02-18

## 2019-02-18 RX ADMIN — SIMETHICONE CHEW TAB 80 MG 80 MG: 80 TABLET ORAL at 17:48

## 2019-02-18 RX ADMIN — IPRATROPIUM BROMIDE AND ALBUTEROL SULFATE 3 ML: .5; 3 SOLUTION RESPIRATORY (INHALATION) at 20:25

## 2019-02-18 RX ADMIN — FUROSEMIDE 40 MG: 10 INJECTION, SOLUTION INTRAMUSCULAR; INTRAVENOUS at 10:32

## 2019-02-18 RX ADMIN — Medication 10 ML: at 06:30

## 2019-02-18 RX ADMIN — SIMETHICONE CHEW TAB 80 MG 80 MG: 80 TABLET ORAL at 08:32

## 2019-02-18 RX ADMIN — ACETYLCYSTEINE 400 MG: 200 SOLUTION ORAL; RESPIRATORY (INHALATION) at 20:25

## 2019-02-18 RX ADMIN — Medication 10 ML: at 21:41

## 2019-02-18 RX ADMIN — INSULIN GLARGINE 5 UNITS: 100 INJECTION, SOLUTION SUBCUTANEOUS at 21:40

## 2019-02-18 RX ADMIN — Medication 10 ML: at 15:53

## 2019-02-18 RX ADMIN — ACETYLCYSTEINE 400 MG: 200 SOLUTION ORAL; RESPIRATORY (INHALATION) at 08:01

## 2019-02-18 RX ADMIN — CARVEDILOL 6.25 MG: 6.25 TABLET, FILM COATED ORAL at 08:32

## 2019-02-18 RX ADMIN — GUAIFENESIN 600 MG: 600 TABLET, EXTENDED RELEASE ORAL at 08:32

## 2019-02-18 RX ADMIN — ACETAMINOPHEN 650 MG: 325 TABLET ORAL at 06:29

## 2019-02-18 RX ADMIN — PANTOPRAZOLE SODIUM 40 MG: 40 TABLET, DELAYED RELEASE ORAL at 06:30

## 2019-02-18 RX ADMIN — ACETAMINOPHEN 650 MG: 325 TABLET ORAL at 16:41

## 2019-02-18 RX ADMIN — IPRATROPIUM BROMIDE AND ALBUTEROL SULFATE 3 ML: .5; 3 SOLUTION RESPIRATORY (INHALATION) at 08:01

## 2019-02-18 RX ADMIN — INSULIN LISPRO 2 UNITS: 100 INJECTION, SOLUTION INTRAVENOUS; SUBCUTANEOUS at 17:48

## 2019-02-18 RX ADMIN — LEVOTHYROXINE SODIUM 25 MCG: 25 TABLET ORAL at 06:30

## 2019-02-18 RX ADMIN — INSULIN LISPRO 2 UNITS: 100 INJECTION, SOLUTION INTRAVENOUS; SUBCUTANEOUS at 21:40

## 2019-02-18 RX ADMIN — CARVEDILOL 6.25 MG: 6.25 TABLET, FILM COATED ORAL at 16:41

## 2019-02-18 RX ADMIN — Medication 10 ML: at 15:52

## 2019-02-18 RX ADMIN — Medication 3 MG: at 21:40

## 2019-02-18 RX ADMIN — FLUDROCORTISONE ACETATE 50 MCG: 0.1 TABLET ORAL at 08:33

## 2019-02-18 RX ADMIN — GUAIFENESIN 600 MG: 600 TABLET, EXTENDED RELEASE ORAL at 21:40

## 2019-02-18 RX ADMIN — Medication 10 ML: at 21:42

## 2019-02-18 NOTE — PROGRESS NOTES
Bedside shift change report given to Fidelina Bowling RN (oncoming nurse) by Jorge L Cox RN (offgoing nurse). Report included the following information SBAR, Kardex, Intake/Output, MAR, Accordion and Recent Results. 1720: Patient has 6 beat run of [address]. Patient sleeping. BP stable. Notified Dr. Susanne Rubi. Received orders for labs. Will continue to monitor. Problem: Falls - Risk of 
Goal: *Absence of Falls Document [address] Fall Risk and appropriate interventions in the flowsheet. Outcome: Progressing Towards Goal 
Fall Risk Interventions: 
Mobility Interventions: Communicate number of staff needed for ambulation/transfer, Patient to call before getting OOB, Strengthening exercises (ROM-active/passive) Mentation Interventions: Adequate sleep, hydration, pain control, Increase mobility, More frequent rounding, Reorient patient Medication Interventions: Patient to call before getting OOB, Teach patient to arise slowly Elimination Interventions: Call light in reach, Patient to call for help with toileting needs, Toileting schedule/hourly rounds Problem: Pressure Injury - Risk of 
Goal: *Prevention of pressure injury Document Dom Scale and appropriate interventions in the flowsheet. Outcome: Progressing Towards Goal 
Pressure Injury Interventions: 
Sensory Interventions: Assess changes in LOC, Float heels, Maintain/enhance activity level, Turn and reposition approx. every two hours (pillows and wedges if needed) Moisture Interventions: Absorbent underpads, Limit adult briefs, Minimize layers Activity Interventions: Increase time out of bed, PT/OT evaluation Mobility Interventions: Float heels, HOB 30 degrees or less, PT/OT evaluation, Turn and reposition approx. every two hours(pillow and wedges) Nutrition Interventions: Document food/fluid/supplement intake, Offer support with meals,snacks and hydration Friction and Shear Interventions: Apply protective barrier, creams and emollients, Lift sheet Problem: Heart Failure: Discharge Outcomes Goal: *Demonstrates ability to perform prescribed activity without shortness of breath or discomfort Outcome: Progressing Towards Goal 
Patient up to chair for meals. Working with PT/OT. Encouraged to increase time OOB as tolerating.

## 2019-02-18 NOTE — PROGRESS NOTES
Hospitalist Progress Note [address] [facility],  Answering service: [phone] OR [phone] from in house phone Date of Service:  2019 NAME:  Miriam Jo :  1945 MRN:  [id number] Admission Summary:  
The patient is a 60-year-old gentleman with past medical history of anemia, atrial fibrillation, Painter's esophagus, coronary artery disease, diabetes, heart failure, cardiomyopathy, myocarditis, history of hypertension, diabetic retinopathy, right carotid artery stenosis, TIA and vitamin D deficiency who presents to the hospital from [facility]. Patient presented to [facility] today apparently in PEA. The history was primarily obtained from the ER physician as the patient is intubated and his wife is not present at the bedside. Interval history / Subjective:  
Patient seen and examined. States mouth dry. Urology following, plans to discontinue dsouza. Will monitor for hematuria. Florinef discontinued, lasix x1, carvedilol increased per cardiology. Family requesting night time melatonin, will initiate. Case management consulted for inpatient rehab. Assessment & Plan:  
 
AHRF status post cardiac arrest/pulseless electrical activity POA:  
-s/p hypothermia protocol -EEG non specific no seizure activity 
-Coded  multiple times post hypothermia protocol. Possibly secondary to bradycardia 
-s/p pacemaker  
-reintubated for airway and reextubated 2/10 after bronch  
-s/p zosyn for 7 days 
-nuclear stress test  with normal EF, no ischemia Orthostatic hypotension: improving   
-fludrocortisone discontined, repeat orthostatics MARYCRUZ on CKD stage 3: secondary to ATN 
-nephrology consulted 
-creatinine slowly improving  
-lasix x 1 Chronic a.fib: previously on Eliquis, will need to be restarted once hematuria improves Hematuria:  
-urology following, appreciate input, CBI Diabetes, Type II: Lantus and SSI Insomnia: prn melatonin Hypothyroidism: synthroid Anemia, appears to be chronic: stable 
-s/p 2 unit PRBC 2/8  hgb > 8 Fever: resolved, s/p antibiotics Gastrointestinal PPX added simethicone Thrombocytopenia: stable, continue to monitor Abnormal LFT's from shock liver? Vs CLD can investigate as outpt Coagulopathy: suspect secondary to shock liver Code status: Full DVT prophylaxis: SCD Care Plan discussed with: Patient/Family and Nurse Disposition: TBDinpatient rehab Hospital Problems  Date Reviewed: 2/4/2019 Codes Class Noted POA * (Principal) Cardiac arrest [facility]) ICD-10-CM: I46.9 ICD-9-CM: 427.5  2/4/2019 Unknown Review of Systems:  
Review of systems not obtained due to patient factors. Vital Signs:  
 Last 24hrs VS reviewed since prior progress note. Most recent are: 
Visit Vitals BP (!) 164/98 (BP 1 Location: Left arm, BP Patient Position: At rest;Sitting) Pulse (!) 102 Temp 98 °F (36.7 °C) Resp 19 Ht 5' 10\" (1.778 m) Wt 92.5 kg (203 lb 14.8 oz) SpO2 99% BMI 29.26 kg/m² Intake/Output Summary (Last 24 hours) at 2/18/2019 3074 Last data filed at 2/18/2019 8078 Gross per 24 hour Intake 950 ml Output 650 ml Net 300 ml Physical Examination:  
 
 
     
Constitutional:  NAD, alert ENT:  MMM Resp:  CTA   
CV:  irregular, paced rhythm GI:  Soft, non distended, non tender. bs+ : scrotal and penial edema with mild blood at urethral base Musculoskeletal:  1+ edema, warm, 2+ pulses throughout Neurologic:  ALERT AND AWAKE Data Review:  
 I personally reviewed  Image and labs Ct Head Wo Cont Result Date: 2/4/2019 IMPRESSION: No acute intracranial hemorrhage, mass or infarct. Xr Chest [facility] Result Date: 2/15/2019 Impression: 1.  Enlarged cardiac silhouette, central pulmonary vascular congestion slightly improved with persistent pleural effusion Xr Chest [facility] Result Date: 2/13/2019 IMPRESSION: Mild central vascular congestion. Pacemaker. Infiltrate/edema at left base plus/minus trace pleural effusion. Xr [facility] Result Date: 2/11/2019 IMPRESSION: Decreased small left pleural effusion and left basilar opacity. Xr [facility] Result Date: 2/10/2019 IMPRESSION: Stable left effusion and underlying atelectasis Xr [facility] Result Date: 2/9/2019 IMPRESSION: No interval change. Xr [facility] Result Date: 2/8/2019 IMPRESSION: ET tube is in satisfactory position. There is slight increasing haziness overlying left hemithorax with opacification left base consistent with left basilar atelectasis /airspace disease and left effusion. Xr [facility] Result Date: 2/7/2019 IMPRESSION: Left lung base opacification. No significant change. Xr [facility] Result Date: 2/6/2019 IMPRESSION: ET tube in satisfactory position. Worsening aeration of the left lung. Xr [facility] Result Date: 2/6/2019 Impression: Stable bilateral lower lobe atelectasis. Xr [facility] Result Date: 2/5/2019 IMPRESSION: No significant change. Xr [facility] Result Date: 2/4/2019 IMPRESSION: Endotracheal tube appears to be in satisfactory position. Xr Abd Port  1 V Result Date: 2/6/2019 IMPRESSION: NG tube in the stomach. Xr Abd Port  1 V Result Date: 2/4/2019 IMPRESSION: NG tube in appropriate position. Labs:  
 
Recent Labs  
  02/18/19 
0436 02/17/19 
1356 WBC 7.4 8.1 HGB 8.7* 9.2* HCT 29.3* 30.8* * 109* Recent Labs  
  02/18/19 
0436 02/17/19 
1356 02/16/19 
0540  137 138  
K 4.5 4.7 4.2 * 107 107 CO2 20* 22 22 BUN 59* 58* 61* CREA 2.04* 2.10* 2.13* * 220* 99  
CA 8.5 8.5 8.7 MG 2.1  --  2.3 PHOS 3.8  --  3.8 Recent Labs  
  02/16/19 
0540 SGOT 21 ALT 52 * TBILI 0.8 TP 6.3* ALB 2.6*  
GLOB 3.7 Recent Labs  
  02/18/19 
0436 02/17/19 
0258 02/16/19 
0540 INR 1.4* 1.4* 1.4* PTP 14.0* 14.0* 13.8* APTT 29.2 28.8 28.9 No results for input(s): FE, TIBC, PSAT, FERR in the last 72 hours. No results found for: FOL, RBCF No results for input(s): PH, PCO2, PO2 in the last 72 hours. No results for input(s): CPK, CKNDX, TROIQ in the last 72 hours. No lab exists for component: CPKMB Lab Results Component Value Date/Time Cholesterol, total 135 02/23/2018 11:31 AM  
 HDL Cholesterol 34 (L) 02/23/2018 11:31 AM  
 LDL, calculated 79 02/23/2018 11:31 AM  
 Triglyceride 111 02/23/2018 11:31 AM  
 CHOL/HDL Ratio 5.0 01/15/2016 03:49 AM  
 
Lab Results Component Value Date/Time Glucose (POC) 160 (H) 02/18/2019 05:54 AM  
 Glucose (POC) 146 (H) 02/17/2019 11:18 PM  
 Glucose (POC) 214 (H) 02/17/2019 05:59 PM  
 Glucose (POC) 188 (H) 02/17/2019 12:18 PM  
 Glucose (POC) 109 (H) 02/17/2019 06:21 AM  
 
Lab Results Component Value Date/Time Color YELLOW/STRAW 02/04/2019 04:08 PM  
 Appearance CLOUDY (A) 02/04/2019 04:08 PM  
 Specific gravity 1.012 02/04/2019 04:08 PM  
 pH (UA) 5.0 02/04/2019 04:08 PM  
 Protein 30 (A) 02/04/2019 04:08 PM  
 Glucose NEGATIVE  02/04/2019 04:08 PM  
 Ketone TRACE (A) 02/04/2019 04:08 PM  
 Bilirubin NEGATIVE  02/04/2019 04:08 PM  
 Urobilinogen 0.2 02/04/2019 04:08 PM  
 Nitrites NEGATIVE  02/04/2019 04:08 PM  
 Leukocyte Esterase NEGATIVE  02/04/2019 04:08 PM  
 Epithelial cells FEW 02/04/2019 04:08 PM  
 Bacteria NEGATIVE  02/04/2019 04:08 PM  
 WBC 10-20 02/04/2019 04:08 PM  
 RBC  02/04/2019 04:08 PM  
 
 
 
Medications Reviewed:  
 
Current Facility-Administered Medications Medication Dose Route Frequency  carvedilol (COREG) tablet 6.25 mg  6.25 mg Oral BID WITH MEALS  furosemide (LASIX) injection 40 mg  40 mg IntraVENous ONCE  
 acetylcysteine (MUCOMYST) 200 mg/mL (20 %) solution 400 mg  2 mL Nebulization BID RT  
 albuterol-ipratropium (DUO-NEB) 2.5 MG-0.5 MG/3 ML  3 mL Nebulization BID RT  
 guaiFENesin ER (MUCINEX) tablet 600 mg  600 mg Oral Q12H  pantoprazole (PROTONIX) tablet 40 mg  40 mg Oral ACB  levothyroxine (SYNTHROID) tablet 25 mcg  25 mcg Oral ACB  simethicone (MYLICON) tablet 80 mg  80 mg Oral BID  hydrALAZINE (APRESOLINE) 20 mg/mL injection 20 mg  20 mg IntraVENous Q6H PRN  
 lactobac ac& pc-s.therm-b.anim (LAVERNE Q/RISAQUAD)  1 Cap Oral DAILY  sodium chloride (NS) flush 5-40 mL  5-40 mL IntraVENous PRN  
 0.9% sodium chloride infusion 250 mL  250 mL IntraVENous PRN  
 morphine injection 2 mg  2 mg IntraVENous Q6H PRN  
 insulin glargine (LANTUS) injection 5 Units  5 Units SubCUTAneous QHS  acetaminophen (TYLENOL) tablet 650 mg  650 mg Oral Q6H PRN  
 bacitracin 500 unit/gram packet 1 Packet  1 Packet Topical PRN  
 sodium chloride (NS) flush 5-40 mL  5-40 mL IntraVENous Q8H  
 sodium chloride (NS) flush 5-40 mL  5-40 mL IntraVENous PRN  
 sodium chloride (NS) flush 5-40 mL  5-40 mL IntraVENous Q8H  
 sodium chloride (NS) flush 5-40 mL  5-40 mL IntraVENous PRN  
 glucose chewable tablet 16 g  4 Tab Oral PRN  
 dextrose (D50W) injection syrg 12.5-25 g  25-50 mL IntraVENous PRN  
 glucagon (GLUCAGEN) injection 1 mg  1 mg IntraMUSCular PRN  
 insulin lispro (HUMALOG) injection   SubCUTAneous Q6H  
 
______________________________________________________________________ EXPECTED LENGTH OF STAY: 2d 0h 
ACTUAL LENGTH OF STAY:          [id number] [facility]

## 2019-02-18 NOTE — PROGRESS NOTES
Problem: Self Care Deficits Care Plan (Adult) Goal: *Acute Goals and Plan of Care (Insert Text) Occupational Therapy Goals Initiated 2/13/2019 1. Patient will perform ADLs standing 5 mins without fatigue or LOB with minimal assistance/contact guard assistance within 5 day(s). 2.  Patient will perform upper body ADLs with supervision/set-up within 5 day(s). 3.  Patient will perform lower body ADLs using AE PRN with minimal assistance/contact guard assistance within 5 day(s). 4.  Patient will perform toilet transfers with minimal assistance/contact guard assistance within 5 day(s). 5.  Patient will perform all aspects of toileting with minimal assistance/contact guard assistance within 5 day(s). 6.  Patient will participate in cardiac/sternal upper extremity therapeutic exercise/activities to increase independence with ADLs with supervision/set-up for 5 minutes within 5 day(s). Occupational Therapy TREATMENT Patient: Jerri Chamberlain (62 y.o. male) Date: 2/18/2019 Diagnosis: Cardiac arrest [facility]) [I46.9] Cardiac arrest ([facility] [address] Procedure(s) (LRB): 
INSERT PPM SINGLE VENTRICULAR (N/A) 11 Days Post-Op Precautions: Fall Chart, occupational therapy assessment, plan of care, and goals were reviewed. ASSESSMENT: 
Patient requires largely MOD A for toilet transfers, MIN A for toileting tasks, and MIN A for functional mobility using RW and BSC with elevated legs 2* decreased BLE strength and A required for sit <> stand transfers d/t LUE PPM precautions. Patient is motivated to participate in therapy however is limited by decreased endurance at this time. Continue to recommend inpatient rehab to maximize patient safety and independence with ADL transfers and tasks. Progression toward goals: 
[x]       Improving appropriately and progressing toward goals 
[]       Improving slowly and progressing toward goals 
[]       Not making progress toward goals and plan of care will be adjusted PLAN: 
 Patient continues to benefit from skilled intervention to address the above impairments. Continue treatment per established plan of care. Discharge Recommendations:  Inpatient Rehab Further Equipment Recommendations for Discharge:  TBD SUBJECTIVE:  
Patient stated I recover quickly when I sit back down (patient stated regarding SOB).  
 
OBJECTIVE DATA SUMMARY:  
Cognitive/Behavioral Status: 
Neurologic State: Alert Orientation Level: Oriented X4 Cognition: Appropriate for age attention/concentration Perception: Appears intact Perseveration: No perseveration noted Safety/Judgement: Insight into deficits Functional Mobility and Transfers for ADLs: 
Transfers: 
Sit to Stand: Moderate assistance;Assist x2(BSC to RW, cues for hand placement and PPM precautions) Functional Transfers Toilet Transfer : Moderate assistance;Assist x2;Adaptive equipment; Additional time(RW) 
  
 
Balance: 
Sitting: Intact Standing: Impaired; With support Standing - Static: Fair Standing - Dynamic : Fair ADL Intervention: 
 OT assisted with toilet transfers x MOD A x 2 using RW and BSC with patient requiring MIN A for pericare and SBA for washing hands standing at the sink. Patient left seated in chair following OT intervention with call bell in reach and daughter present. Grooming Washing Hands: Stand-by assistance Toileting Toileting Assistance: Minimum assistance Cognitive Retraining Safety/Judgement: Insight into deficits Pain: 
Pain Scale 1: Numeric (0 - 10) Pain Intensity 1: 0 Pain Location 1: Chest 
Pain Orientation 1: Left Pain Intervention(s) 1: Medication (see MAR) Activity Tolerance: VSS Please refer to the flowsheet for vital signs taken during this treatment. After treatment:  
[x] Patient left in no apparent distress sitting up in chair 
[] Patient left in no apparent distress in bed 
[x] Call bell left within reach [x] Nursing notified 
[x] Daughter present [] Bed alarm activated COMMUNICATION/COLLABORATION:  
The patients plan of care was discussed with: Physical Therapist and Registered Nurse Quita Bedolla Time Calculation: 13 mins

## 2019-02-18 NOTE — PROGRESS NOTES
Urine dark but no sign of active bleeding off cbi . Will try and get dsouza out. . Pt denies voiding probs prior to this adm. Bladder scan to check emptying.

## 2019-02-18 NOTE — PROGRESS NOTES
Chart reviewed and discussed with Dr Shea Allan. Patient seen at bedside to discuss disposition. He is now interested in inpatient rehab. Updated  referral sent to [facility]. Christinare July, RN CRM Ext [phone]

## 2019-02-18 NOTE — PROGRESS NOTES
Problem: Mobility Impaired (Adult and Pediatric) Goal: *Acute Goals and Plan of Care (Insert Text) Physical Therapy Goals Initiated 2/12/2019 1. Patient will move from supine to sit and sit to supine , scoot up and down and roll side to side in bed with supervision/set-up within 7 days. 2.  Patient will perform sit to/from stand with minimal assistance/contact guard assist within 7 days. 3.  Patient will ambulate 50 feet with least restrictive assistive device and minimal assistance/contact guard assist within 7 days. 4.  Patient will ascend/descend 12 stairs with bilateral handrail(s) with minimal assistance/contact guard assist within 7 days. 5.  Patient will verbally and functionally recall 3/3 pacemaker precautions within 7 days. 6.  Patient will improve Tinetti score by 4-5 points within 7 days. physical Therapy TREATMENT Patient: Patricia Ellsworth (89 y.o. male) Date: 2/18/2019 Diagnosis: Cardiac arrest [facility]) [I46.9] Cardiac arrest ([facility] [address] Procedure(s) (LRB): 
INSERT PPM SINGLE VENTRICULAR (N/A) 11 Days Post-Op Precautions: Fall, pacemaker precautions Chart, physical therapy assessment, plan of care and goals were reviewed. ASSESSMENT: 
Pt received sitting on toilet after amb to bathroom with nursing. Required multiple attempts to reach stand with mod A x 2 for lift/ balance and cues for hand placement/ pacemaker precautions L UE. Pt tolerated amb back to chair approx 20' with RW, CGA/ min A for balance. Noted SOB with activity, RR 22, HR low 100s, SpO2 mid 90s, SBP 150s. Pt fatigued with mobility but reported that recovery time was decreased today. Able to tolerate sitting in chair at end of session. Pt motivated to participate in rehab process. PLOF was indep no restrictions. Pt now presents well below baseline and will benefit from follow up IPR at d/c. Progression toward goals: 
[x]    Improving appropriately and progressing toward goals []    Improving slowly and progressing toward goals 
[]    Not making progress toward goals and plan of care will be adjusted PLAN: 
Patient continues to benefit from skilled intervention to address the above impairments. Continue treatment per established plan of care. Discharge Recommendations:  Inpatient Rehab Further Equipment Recommendations for Discharge:  rolling walker SUBJECTIVE:  
Patient stated I'm tired and I feel good.  OBJECTIVE DATA SUMMARY:  
Critical Behavior: 
Neurologic State: Alert Orientation Level: Oriented X4 Cognition: Follows commands Safety/Judgement: Awareness of environment, Decreased awareness of need for assistance, Decreased awareness of need for safety, Decreased insight into deficits, Fall prevention Functional Mobility Training: 
Bed Mobility: 
  
  
  
  
  
  
Transfers: 
Sit to Stand: Moderate assistance;Assist x2(BSC to RW, cues for hand placement and PPM precautions) Stand to Sit: Contact guard assistance(use of arm rests) Balance: 
Sitting: Intact Standing: Impaired; With support Standing - Static: Fair Standing - Dynamic : FairAmbulation/Gait Training: 
Distance (ft): 20 Feet (ft) Assistive Device: Gait belt;Walker, rolling Ambulation - Level of Assistance: Minimal assistance Gait Abnormalities: Decreased step clearance;Shuffling gait(flexed posture) Speed/Mariella: Slow Step Length: Left shortened;Right shortened Pain: 
Pain Scale 1: Numeric (0 - 10) Pain Intensity 1: 0 Pain Location 1: Chest 
Pain Orientation 1: Left Pain Intervention(s) 1: Medication (see MAR) Activity Tolerance:  
Pt tolerated amb in room with assist; fatigued with activity but noted decreased time to recover. Please refer to the flowsheet for vital signs taken during this treatment. After treatment:  
[x]    Patient left in no apparent distress sitting up in chair []    Patient left in no apparent distress in bed 
[x]    Call bell left within reach [x]    Nursing notified 
[x]    Caregiver present 
[]    Bed alarm activated COMMUNICATION/COLLABORATION:  
The patients plan of care was discussed with: Occupational Therapist and Registered Nurse Malathi Duffy, PT Time Calculation: 14 mins

## 2019-02-18 NOTE — PROGRESS NOTES
RENAL  PROGRESS NOTE Subjective: SOB Objective: VITALS SIGNS:   
Visit Vitals BP (!) 164/98 (BP 1 Location: Left arm, BP Patient Position: At rest;Sitting) Pulse (!) 102 Temp 98 °F (36.7 °C) Resp 19 Ht 5' 10\" (1.778 m) Wt 92.5 kg (203 lb 14.8 oz) SpO2 99% BMI 29.26 kg/m² O2 Device: Nasal cannula O2 Flow Rate (L/min): 1 l/min Temp (24hrs), Av.6 °F (36.4 °C), Min:97.1 °F (36.2 °C), Max:98 °F (36.7 °C) PHYSICAL EXAM: 
++ edema DATA REVIEW:  
 
INTAKE / OUTPUT:  
Last shift:      701 - 1900 In: -  
Out: [place] Last 3 shifts: 1901 - 700 In: 9847 Out:  Intake/Output Summary (Last 24 hours) at 2019 6720 Last data filed at 2019 7915 Gross per 24 hour Intake 950 ml Output 650 ml Net 300 ml LABS:  
Recent Labs  
  19 
0436 19 
1356 19 
0540 WBC 7.4 8.1 8.5 HGB 8.7* 9.2* 8.7* HCT 29.3* 30.8* 29.3*  
* 109* 100* Recent Labs  
  19 
0436 19 
1356 19 
0258 19 
0540  137  --  138  
K 4.5 4.7  --  4.2 * 107  --  107 CO2 20* 22  --  22 * 220*  --  99 BUN 59* 58*  --  61* CREA 2.04* 2.10*  --  2.13* CA 8.5 8.5  --  8.7 MG 2.1  --   --  2.3 PHOS 3.8  --   --  3.8 ALB  --   --   --  2.6* TBILI  --   --   --  0.8 SGOT  --   --   --  21 ALT  --   --   --  52 INR 1.4*  --  1.4* 1.4* Assessment:  
 
MARYCRUZ- due to ATN . renal recovery. Non-oliguric. Baseline Cr 1.7-2.0mg/dl DM-2 Cardiac arrest; PEA- ? etiology. Code ice protocol; recurrence of niurka arrest on . S/p PPM. Stress test  neg PANCYTOPENIA,chronic Gout: was on pegloticase Hematuria: [place] in place. Urology consulted. Edema Plan:  
Stop florinef One dose lasix Seen by urology Discussed with him and his daughter Delma Lin MD

## 2019-02-18 NOTE — PROGRESS NOTES
[facility] Cardiology Progress Note Date of service: 2/18/2019 Subjective: 
Mr Piotr Gomez says he has chest wall soreness C/o dry mouth Objective: 
 
Visit Vitals BP (!) 153/95 Pulse 94 Temp 97.9 °F (36.6 °C) Resp 20 Ht 5' 10\" (1.778 m) Wt 92.5 kg (203 lb 14.8 oz) SpO2 97% BMI 29.26 kg/m² Physical Exam  
Constitutional: He appears well-developed and well-nourished. HENT:  
Head: Normocephalic and atraumatic. Eyes: Conjunctivae are normal. No scleral icterus. Neck: No JVD present. Cardiovascular: Normal rate. An irregularly irregular rhythm present. Exam reveals no gallop. Murmur heard. Early systolic murmur is present with a grade of 2/6. Pulmonary/Chest: Effort normal. No stridor. No respiratory distress. He has no wheezes. He has no rales. Abdominal: Soft. He exhibits no distension. Genitourinary:  
Genitourinary Comments: Chacon present, slight hematuria Musculoskeletal: He exhibits edema. He exhibits no deformity. Neurological: He is alert. Skin: Skin is warm and dry. Data reviewed: 
Recent Results (from the past 12 hour(s)) GLUCOSE, POC Collection Time: 02/17/19 11:18 PM  
Result Value Ref Range Glucose (POC) 146 (H) 65 - 100 mg/dL Performed by Evaristo Zamudio PROTHROMBIN TIME + INR Collection Time: 02/18/19  4:36 AM  
Result Value Ref Range INR 1.4 (H) 0.9 - 1.1 Prothrombin time 14.0 (H) 9.0 - 11.1 sec PTT Collection Time: 02/18/19  4:36 AM  
Result Value Ref Range aPTT 29.2 22.1 - 32.0 sec  
 aPTT, therapeutic range     58.0 - 23.9 SECS  
METABOLIC PANEL, BASIC Collection Time: 02/18/19  4:36 AM  
Result Value Ref Range Sodium 137 136 - 145 mmol/L Potassium 4.5 3.5 - 5.1 mmol/L Chloride 109 (H) 97 - 108 mmol/L  
 CO2 20 (L) 21 - 32 mmol/L Anion gap 8 5 - 15 mmol/L Glucose 139 (H) 65 - 100 mg/dL BUN 59 (H) 6 - 20 MG/DL  Creatinine 2.04 (H) 0.70 - 1.30 MG/DL  
 BUN/Creatinine ratio 29 (H) 12 - 20    
 GFR est AA 39 (L) >60 ml/min/1.73m2 GFR est non-AA 32 (L) >60 ml/min/1.73m2 Calcium 8.5 8.5 - 10.1 MG/DL MAGNESIUM Collection Time: 02/18/19  4:36 AM  
Result Value Ref Range Magnesium 2.1 1.6 - 2.4 mg/dL PHOSPHORUS Collection Time: 02/18/19  4:36 AM  
Result Value Ref Range Phosphorus 3.8 2.6 - 4.7 MG/DL  
CBC W/O DIFF Collection Time: 02/18/19  4:36 AM  
Result Value Ref Range WBC 7.4 4.1 - 11.1 K/uL  
 RBC 3.04 (L) 4.10 - 5.70 M/uL HGB 8.7 (L) 12.1 - 17.0 g/dL HCT 29.3 (L) 36.6 - 50.3 % MCV 96.4 80.0 - 99.0 FL  
 MCH 28.6 26.0 - 34.0 PG  
 MCHC 29.7 (L) 30.0 - 36.5 g/dL  
 RDW 14.5 11.5 - 14.5 % PLATELET 735 (L) 367 - 400 K/uL MPV 10.5 8.9 - 12.9 FL  
 NRBC 0.0 0  WBC ABSOLUTE NRBC 0.00 0.00 - 0.01 K/uL GLUCOSE, POC Collection Time: 02/18/19  5:54 AM  
Result Value Ref Range Glucose (POC) 160 (H) 65 - 100 mg/dL Performed by Patricia Gallo Telemetry: a.fib, rate  at rest 
 
 
Assessment: 1. Cardiac arrest. PEA. With events of 2/6 it now seems like that his underlying mechanism may have been bradyarrhythmia from SSS.  
  
2. SSS: s/p PPM 2/7. 
  
3. Chronic a.fib - rate control mostly ok 
  
4. Orthostatic hypotension May be due to prolonged bedrest, neuro injury from cardiac arrest, volume depletion ? Some degree of underlying systemic autonomic syndrome 5. Hematuria 
? traumatic 6. CKD stage III to IV Renal indices stable 7. Anemia, probably from CKD 
  
8. Gout 9. Thrombocytopenia: unclear etiology Fluctuating - fairly stable currently 10. Pulmonary hypertension 
  
Plan: 
Increase coreg to 6.25mg bid Would like to start back Eliquis soon but need to wait until hematuria clears up 
-hopefully if we can get Chacon out soon and no further hematuria, then we can resume Signed: 
Lauryn Alejo MD 
Interventional Cardiology 2/18/2019

## 2019-02-18 NOTE — PROGRESS NOTES
Problem: Pressure Injury - Risk of 
Goal: *Prevention of pressure injury Document Dom Scale and appropriate interventions in the flowsheet. Outcome: Progressing Towards Goal 
Pressure Injury Interventions: 
Sensory Interventions: Check visual cues for pain, Minimize linen layers, Keep linens dry and wrinkle-free Moisture Interventions: Absorbent underpads Activity Interventions: Increase time out of bed Mobility Interventions: Pressure redistribution bed/mattress (bed type) Nutrition Interventions: Document food/fluid/supplement intake Friction and Shear Interventions: Minimize layers, Lift sheet Problem: Heart Failure: Discharge Outcomes Goal: *Demonstrates ability to perform prescribed activity without shortness of breath or discomfort Outcome: Progressing Towards Goal 
Patient able to transfer to and from the chair several times today without complaints of SOB. Patient spent most of the afternoon in the chair. 0740: Bedside shift change report given to Mela Daniels RN (oncoming nurse) by Alexandria Dao RN (offgoing nurse). Report included the following information SBAR, Kardex, Intake/Output, MAR and Accordion. 2000: Bedside shift change report given to Alexandria Dao RN (oncoming nurse) by Mela Daniels RN (offgoing nurse). Report included the following information SBAR, Kardex, Intake/Output, MAR and Accordion.

## 2019-02-19 LAB
ANION GAP SERPL CALC-SCNC: 10 MMOL/L (ref 5–15)
APTT PPP: 29.7 SEC (ref 22.1–32)
BUN SERPL-MCNC: 59 MG/DL (ref 6–20)
BUN/CREAT SERPL: 29 (ref 12–20)
CALCIUM SERPL-MCNC: 8.7 MG/DL (ref 8.5–10.1)
CHLORIDE SERPL-SCNC: 107 MMOL/L (ref 97–108)
CO2 SERPL-SCNC: 21 MMOL/L (ref 21–32)
CREAT SERPL-MCNC: 2.03 MG/DL (ref 0.7–1.3)
ERYTHROCYTE [DISTWIDTH] IN BLOOD BY AUTOMATED COUNT: 14.6 % (ref 11.5–14.5)
GLUCOSE BLD STRIP.AUTO-MCNC: 102 MG/DL (ref 65–100)
GLUCOSE BLD STRIP.AUTO-MCNC: 159 MG/DL (ref 65–100)
GLUCOSE BLD STRIP.AUTO-MCNC: 171 MG/DL (ref 65–100)
GLUCOSE BLD STRIP.AUTO-MCNC: 185 MG/DL (ref 65–100)
GLUCOSE SERPL-MCNC: 87 MG/DL (ref 65–100)
HCT VFR BLD AUTO: 28.6 % (ref 36.6–50.3)
HGB BLD-MCNC: 8.4 G/DL (ref 12.1–17)
INR PPP: 1.4 (ref 0.9–1.1)
MCH RBC QN AUTO: 28.1 PG (ref 26–34)
MCHC RBC AUTO-ENTMCNC: 29.4 G/DL (ref 30–36.5)
MCV RBC AUTO: 95.7 FL (ref 80–99)
NRBC # BLD: 0 K/UL (ref 0–0.01)
NRBC BLD-RTO: 0 PER 100 WBC
PLATELET # BLD AUTO: 111 K/UL (ref 150–400)
PMV BLD AUTO: 10.7 FL (ref 8.9–12.9)
POTASSIUM SERPL-SCNC: 4.6 MMOL/L (ref 3.5–5.1)
PROTHROMBIN TIME: 13.7 SEC (ref 9–11.1)
RBC # BLD AUTO: 2.99 M/UL (ref 4.1–5.7)
SERVICE CMNT-IMP: ABNORMAL
SODIUM SERPL-SCNC: 138 MMOL/L (ref 136–145)
THERAPEUTIC RANGE,PTTT: NORMAL SECS (ref 58–77)
WBC # BLD AUTO: 6.5 K/UL (ref 4.1–11.1)

## 2019-02-19 PROCEDURE — 97110 THERAPEUTIC EXERCISES: CPT

## 2019-02-19 PROCEDURE — 74011000250 HC RX REV CODE- 250: Performed by: INTERNAL MEDICINE

## 2019-02-19 PROCEDURE — 36415 COLL VENOUS BLD VENIPUNCTURE: CPT

## 2019-02-19 PROCEDURE — 85027 COMPLETE CBC AUTOMATED: CPT

## 2019-02-19 PROCEDURE — 74011250637 HC RX REV CODE- 250/637: Performed by: INTERNAL MEDICINE

## 2019-02-19 PROCEDURE — 74011250637 HC RX REV CODE- 250/637: Performed by: HOSPITALIST

## 2019-02-19 PROCEDURE — 85730 THROMBOPLASTIN TIME PARTIAL: CPT

## 2019-02-19 PROCEDURE — 94640 AIRWAY INHALATION TREATMENT: CPT

## 2019-02-19 PROCEDURE — 85610 PROTHROMBIN TIME: CPT

## 2019-02-19 PROCEDURE — 82962 GLUCOSE BLOOD TEST: CPT

## 2019-02-19 PROCEDURE — 77010033678 HC OXYGEN DAILY

## 2019-02-19 PROCEDURE — 65660000001 HC RM ICU INTERMED STEPDOWN

## 2019-02-19 PROCEDURE — 97116 GAIT TRAINING THERAPY: CPT

## 2019-02-19 PROCEDURE — 74011636637 HC RX REV CODE- 636/637: Performed by: HOSPITALIST

## 2019-02-19 PROCEDURE — 80048 BASIC METABOLIC PNL TOTAL CA: CPT

## 2019-02-19 PROCEDURE — 94664 DEMO&/EVAL PT USE INHALER: CPT

## 2019-02-19 PROCEDURE — 51798 US URINE CAPACITY MEASURE: CPT

## 2019-02-19 PROCEDURE — 77030034850

## 2019-02-19 RX ORDER — ACETAMINOPHEN 325 MG/1
650 TABLET ORAL EVERY 6 HOURS
Status: DISPENSED | OUTPATIENT
Start: 2019-02-19 | End: 2019-02-21

## 2019-02-19 RX ORDER — DIPHENHYDRAMINE HCL 25 MG
25 CAPSULE ORAL ONCE
Status: COMPLETED | OUTPATIENT
Start: 2019-02-19 | End: 2019-02-19

## 2019-02-19 RX ORDER — COLCHICINE 0.6 MG/1
0.3 TABLET ORAL DAILY
Status: DISCONTINUED | OUTPATIENT
Start: 2019-02-19 | End: 2019-02-22 | Stop reason: HOSPADM

## 2019-02-19 RX ORDER — FUROSEMIDE 40 MG/1
40 TABLET ORAL DAILY
Status: DISCONTINUED | OUTPATIENT
Start: 2019-02-19 | End: 2019-02-20

## 2019-02-19 RX ADMIN — COLCHICINE 0.3 MG: 0.6 TABLET, FILM COATED ORAL at 16:36

## 2019-02-19 RX ADMIN — PANTOPRAZOLE SODIUM 40 MG: 40 TABLET, DELAYED RELEASE ORAL at 07:13

## 2019-02-19 RX ADMIN — ACETAMINOPHEN 650 MG: 325 TABLET ORAL at 06:05

## 2019-02-19 RX ADMIN — Medication 10 ML: at 23:08

## 2019-02-19 RX ADMIN — Medication 10 ML: at 15:08

## 2019-02-19 RX ADMIN — DIPHENHYDRAMINE HYDROCHLORIDE 25 MG: 25 CAPSULE ORAL at 20:09

## 2019-02-19 RX ADMIN — Medication 10 ML: at 23:07

## 2019-02-19 RX ADMIN — Medication 3 MG: at 22:03

## 2019-02-19 RX ADMIN — GUAIFENESIN 600 MG: 600 TABLET, EXTENDED RELEASE ORAL at 08:34

## 2019-02-19 RX ADMIN — GUAIFENESIN 600 MG: 600 TABLET, EXTENDED RELEASE ORAL at 22:03

## 2019-02-19 RX ADMIN — APIXABAN 5 MG: 5 TABLET, FILM COATED ORAL at 22:03

## 2019-02-19 RX ADMIN — INSULIN GLARGINE 5 UNITS: 100 INJECTION, SOLUTION SUBCUTANEOUS at 23:07

## 2019-02-19 RX ADMIN — FUROSEMIDE 40 MG: 40 TABLET ORAL at 11:27

## 2019-02-19 RX ADMIN — ACETAMINOPHEN 650 MG: 325 TABLET ORAL at 23:07

## 2019-02-19 RX ADMIN — SIMETHICONE CHEW TAB 80 MG 80 MG: 80 TABLET ORAL at 08:33

## 2019-02-19 RX ADMIN — IPRATROPIUM BROMIDE AND ALBUTEROL SULFATE 3 ML: .5; 3 SOLUTION RESPIRATORY (INHALATION) at 23:02

## 2019-02-19 RX ADMIN — ACETYLCYSTEINE 400 MG: 200 SOLUTION ORAL; RESPIRATORY (INHALATION) at 23:02

## 2019-02-19 RX ADMIN — CARVEDILOL 6.25 MG: 6.25 TABLET, FILM COATED ORAL at 08:34

## 2019-02-19 RX ADMIN — SIMETHICONE CHEW TAB 80 MG 80 MG: 80 TABLET ORAL at 19:03

## 2019-02-19 RX ADMIN — ACETAMINOPHEN 650 MG: 325 TABLET ORAL at 19:03

## 2019-02-19 RX ADMIN — Medication 10 ML: at 06:06

## 2019-02-19 RX ADMIN — APIXABAN 5 MG: 5 TABLET, FILM COATED ORAL at 08:34

## 2019-02-19 RX ADMIN — CARVEDILOL 6.25 MG: 6.25 TABLET, FILM COATED ORAL at 16:36

## 2019-02-19 RX ADMIN — LEVOTHYROXINE SODIUM 25 MCG: 25 TABLET ORAL at 06:05

## 2019-02-19 RX ADMIN — Medication 10 ML: at 06:05

## 2019-02-19 NOTE — PROGRESS NOTES
RENAL  PROGRESS NOTE Subjective: More UO Objective: VITALS SIGNS:   
Visit Vitals /69 (BP 1 Location: Left arm, BP Patient Position: At rest) Pulse 90 Temp 97.2 °F (36.2 °C) Resp 19 Ht 5' 10\" (1.778 m) Wt 92.3 kg (203 lb 7.8 oz) SpO2 95% BMI 29.20 kg/m² O2 Device: Room air O2 Flow Rate (L/min): 0.5 l/min Temp (24hrs), Av.7 °F (36.5 °C), Min:97.1 °F (36.2 °C), Max:98.2 °F (36.8 °C) PHYSICAL EXAM: 
++ edema DATA REVIEW:  
 
INTAKE / OUTPUT:  
Last shift:      701 - 1900 In: -  
Out: 250 [Urine:250] Last 3 shifts: 1901 -  07 In: 950 Out: 2000 [XYOAC:1620] Intake/Output Summary (Last 24 hours) at 2019 5225 Last data filed at 2019 0830 Gross per 24 hour Intake  Output 1600 ml Net -1600 ml LABS:  
Recent Labs  
  19 
0342 19 
1725 19 
1603 WBC 6.5 8.4 7.4 HGB 8.4* 8.7* 8.7* HCT 28.6* 29.8* 29.3*  
* 126* 113* Recent Labs  
  19 
0342 19 
1725 19 
0436  19 
0258  135* 137   < >  --   
K 4.6 4.7 4.5   < >  --   
 104 109*   < >  --   
CO2 21 24 20*   < >  --   
GLU 87 216* 139*   < >  --   
BUN 59* 60* 59*   < >  --   
CREA 2.03* 2.06* 2.04*   < >  --   
CA 8.7 8.5 8.5   < >  --   
MG  --   --  2.1  --   --   
PHOS  --   --  3.8  --   --   
INR 1.4*  --  1.4*  --  1.4*  
 < > = values in this interval not displayed. Assessment:  
 
MARYCRUZ- due to ATN . renal recovery. Non-oliguric. Baseline Cr 1.7-2.0mg/dl DM-2 Cardiac arrest; PEA- ? etiology. Code ice protocol; recurrence of niurka arrest on . S/p PPM. Stress test  neg PANCYTOPENIA,chronic Gout: was on pegloticase Hematuria: Chacon in place. Urology consulted. Edema Plan:  
Lasix Renal function stable Discussed with him Gabrielle Gee MD

## 2019-02-19 NOTE — PROGRESS NOTES
[facility] Cardiology Progress Note Date of service: 2/19/2019 Subjective: 
Mr Wagner Manuel says he is still uncomfortable Chacon has been removed Objective: 
 
Visit Vitals /69 (BP 1 Location: Left arm, BP Patient Position: At rest) Pulse 90 Temp 97.2 °F (36.2 °C) Resp 19 Ht 5' 10\" (1.778 m) Wt 92.3 kg (203 lb 7.8 oz) SpO2 95% BMI 29.20 kg/m² Physical Exam  
Constitutional: He appears well-developed and well-nourished. HENT:  
Head: Normocephalic and atraumatic. Eyes: Conjunctivae are normal. No scleral icterus. Neck: No JVD present. Cardiovascular: Normal rate. An irregularly irregular rhythm present. Exam reveals no gallop. Murmur heard. Early systolic murmur is present with a grade of 2/6. Pulmonary/Chest: Effort normal. No stridor. No respiratory distress. He has no wheezes. He has no rales. Abdominal: Soft. He exhibits no distension. Genitourinary:  
Genitourinary Comments: Chacon present, slight hematuria Musculoskeletal: He exhibits edema. He exhibits no deformity. Neurological: He is alert. Skin: Skin is warm and dry. Data reviewed: 
Recent Results (from the past 12 hour(s)) GLUCOSE, POC Collection Time: 02/18/19  9:23 PM  
Result Value Ref Range Glucose (POC) 328 (H) 65 - 100 mg/dL Performed by [facility] PROTHROMBIN TIME + INR Collection Time: 02/19/19  3:42 AM  
Result Value Ref Range INR 1.4 (H) 0.9 - 1.1 Prothrombin time 13.7 (H) 9.0 - 11.1 sec PTT Collection Time: 02/19/19  3:42 AM  
Result Value Ref Range aPTT 29.7 22.1 - 32.0 sec  
 aPTT, therapeutic range     58.0 - 77.0 SECS  
CBC W/O DIFF Collection Time: 02/19/19  3:42 AM  
Result Value Ref Range WBC 6.5 4.1 - 11.1 K/uL  
 RBC 2.99 (L) 4.10 - 5.70 M/uL HGB 8.4 (L) 12.1 - 17.0 g/dL HCT 28.6 (L) 36.6 - 50.3 % MCV 95.7 80.0 - 99.0 FL  
 MCH 28.1 26.0 - 34.0 PG  
 MCHC 29.4 (L) 30.0 - 36.5 g/dL  
 RDW 14.6 (H) 11.5 - 14.5 % PLATELET 079 (L) 956 - 400 K/uL MPV 10.7 8.9 - 12.9 FL  
 NRBC 0.0 0  WBC ABSOLUTE NRBC 0.00 0.00 - 0.01 K/uL METABOLIC PANEL, BASIC Collection Time: 02/19/19  3:42 AM  
Result Value Ref Range Sodium 138 136 - 145 mmol/L Potassium 4.6 3.5 - 5.1 mmol/L Chloride 107 97 - 108 mmol/L  
 CO2 21 21 - 32 mmol/L Anion gap 10 5 - 15 mmol/L Glucose 87 65 - 100 mg/dL BUN 59 (H) 6 - 20 MG/DL Creatinine 2.03 (H) 0.70 - 1.30 MG/DL  
 BUN/Creatinine ratio 29 (H) 12 - 20 GFR est AA 39 (L) >60 ml/min/1.73m2 GFR est non-AA 33 (L) >60 ml/min/1.73m2 Calcium 8.7 8.5 - 10.1 MG/DL  
GLUCOSE, POC Collection Time: 02/19/19  6:34 AM  
Result Value Ref Range Glucose (POC) 102 (H) 65 - 100 mg/dL [facility] Telemetry: a.fib - rate control ok Assessment: 1. Cardiac arrest. PEA. With events of 2/6 it now seems like that his underlying mechanism may have been bradyarrhythmia from SSS.  
  
2. SSS: s/p PPM 2/7. 
  
3. Chronic a.fib - rate control mostly ok 
  
4. Volume excess: likely a result of florinef and has been of his usual diuretic dose. Weight has been steadily rising and he now has quite a bit of leg edema 5. Hematuria Resolved, dsouza out 6. CKD stage III to IV Renal indices stable 7. Anemia, probably from CKD 
  
8. Gout 9. Thrombocytopenia: unclear etiology Fluctuating - fairly stable currently 10. Pulmonary hypertension 
  
Plan: 
Continue coreg 6.25mg bid Resume Eliquis Resume lasix 40mg daily Signed: 
Juan A Thomas MD 
Interventional Cardiology 2/19/2019

## 2019-02-19 NOTE — PROGRESS NOTES
Hospitalist Progress Note Luis Miguel Macias MD 
Answering service: [phone] OR [phone] from in house phone Date of Service:  2019 NAME:  Sia Deras :  1945 MRN:  [id number] Admission Summary:  
The patient is a 72-year-old gentleman with past medical history of anemia, atrial fibrillation, Painter's esophagus, coronary artery disease, diabetes, heart failure, cardiomyopathy, myocarditis, history of hypertension, diabetic retinopathy, right carotid artery stenosis, TIA and vitamin D deficiency who presents to the hospital from [facility]. Patient presented to [facility] today apparently in PEA. The history was primarily obtained from the ER physician as the patient is intubated and his wife is not present at the bedside. Interval history / Subjective:  
Patient seen and examined. Urology following dsouza d/c 'd but retaining urine. Will monitor for now will get urology opinion before re placing dsouza cath . lasix and carvedilol increased per cardiology. Case management consulted for inpatient rehab. Assessment & Plan:  
 
AHRF status post cardiac arrest/pulseless electrical activity POA:  
-s/p hypothermia protocol -EEG non specific no seizure activity 
-Coded  multiple times post hypothermia protocol. Possibly secondary to bradycardia 
-s/p pacemaker  
-reintubated for airway and reextubated 2/10 after bronch  
-s/p zosyn for 7 days 
-nuclear stress test  with normal EF, no ischemia Orthostatic hypotension: improving   
-fludrocortisone discontined, repeat orthostatics MARYCRUZ on CKD stage 3: secondary to ATN 
-nephrology consulted 
-creatinine slowly improving  
-lasix x 1 Chronic a.fib: on eliquis after hematuria resolved Hematuria:  
-urology following, appreciate input, CBI 
- dsouza cath removed but now retaining urine Diabetes, Type II: Lantus and SSI Insomnia: prn melatonin Hypothyroidism: synthroid Anemia, appears to be chronic: stable 
-s/p 2 unit PRBC 2/8  hgb > 8 Fever: resolved, s/p antibiotics Gastrointestinal PPX added simethicone Thrombocytopenia: stable, continue to monitor Abnormal LFT's from shock liver? Vs CLD can investigate as outpt Coagulopathy: suspect secondary to shock liver Code status: Full DVT prophylaxis: SCD Care Plan discussed with: Patient/Family and Nurse Disposition: TBD inpatient rehab Hospital Problems  Date Reviewed: 2/4/2019 Codes Class Noted POA * (Principal) Cardiac arrest [facility]) ICD-10-CM: I46.9 ICD-9-CM: 427.5  2/4/2019 Unknown Review of Systems:  
Review of systems not obtained due to patient factors. Vital Signs:  
 Last 24hrs VS reviewed since prior progress note. Most recent are: 
Visit Vitals /69 (BP 1 Location: Left arm, BP Patient Position: At rest) Pulse 90 Temp 97.2 °F (36.2 °C) Resp 19 Ht 5' 10\" (1.778 m) Wt 92.3 kg (203 lb 7.8 oz) SpO2 95% BMI 29.20 kg/m² Intake/Output Summary (Last 24 hours) at 2/19/2019 2030 Last data filed at 2/19/2019 0830 Gross per 24 hour Intake  Output 1600 ml Net -1600 ml Physical Examination:  
 
 
     
Constitutional:  NAD, alert ENT:  MMM Resp:  CTA   
CV:  irregular, paced rhythm GI:  Soft, non distended, non tender. bs+ : scrotal and penial edema Musculoskeletal:  1+ edema, warm, 2+ pulses throughout Neurologic:  ALERT AND AWAKE Data Review:  
 I personally reviewed  Image and labs Ct Head Wo Cont Result Date: 2/4/2019 IMPRESSION: No acute intracranial hemorrhage, mass or infarct. Xr Chest [facility] Result Date: 2/15/2019 Impression: 1. Enlarged cardiac silhouette, central pulmonary vascular congestion slightly improved with persistent pleural effusion Xr Chest [facility] Result Date: 2/13/2019 IMPRESSION: Mild central vascular congestion. Pacemaker. Infiltrate/edema at left base plus/minus trace pleural effusion. Xr Chest [facility] Result Date: 2/11/2019 IMPRESSION: Decreased small left pleural effusion and left basilar opacity. Xr Chest [place] [facility] Result Date: 2/10/2019 IMPRESSION: Stable left effusion and underlying atelectasis Xr Chest [facility] Result Date: 2/9/2019 IMPRESSION: No interval change. Xr Chest [place] [facility] Result Date: 2/8/2019 IMPRESSION: ET tube is in satisfactory position. There is slight increasing haziness overlying left hemithorax with opacification left base consistent with left basilar atelectasis /airspace disease and left effusion. Xr Chest [place] [facility] Result Date: 2/7/2019 IMPRESSION: Left lung base opacification. No significant change. Xr Chest [facility] Result Date: 2/6/2019 IMPRESSION: ET tube in satisfactory position. Worsening aeration of the left lung. Xr Chest [place] [facility] Result Date: 2/6/2019 Impression: Stable bilateral lower lobe atelectasis. Xr Chest [facility] Result Date: 2/5/2019 IMPRESSION: No significant change. Xr Chest [facility] Result Date: 2/4/2019 IMPRESSION: Endotracheal tube appears to be in satisfactory position. Xr Abd Port  1 V Result Date: 2/6/2019 IMPRESSION: NG tube in the stomach. Xr Abd Port  1 V Result Date: 2/4/2019 IMPRESSION: NG tube in appropriate position. Labs:  
 
Recent Labs  
  02/19/19 
0342 02/18/19 
1725 WBC 6.5 8.4 HGB 8.4* 8.7* HCT 28.6* 29.8*  
* 126* Recent Labs  
  02/19/19 
0342 02/18/19 
1725 02/18/19 
0436  135* 137  
K 4.6 4.7 4.5  
 104 109* CO2 21 24 20* BUN 59* 60* 59* CREA 2.03* 2.06* 2.04* GLU 87 216* 139* CA 8.7 8.5 8.5 MG  --   --  2.1 PHOS  --   --  3.8 No results for input(s): SGOT, GPT, ALT, AP, TBIL, TBILI, TP, ALB, GLOB, GGT, AML, LPSE in the last 72 hours. No lab exists for component: AMYP, HLPSE Recent Labs  
  02/19/19 1320 02/18/19 
1214 02/17/19 
3851 INR 1.4* 1.4* 1.4* PTP 13.7* 14.0* 14.0* APTT 29.7 29.2 28.8 No results for input(s): FE, TIBC, PSAT, FERR in the last 72 hours. No results found for: FOL, RBCF No results for input(s): PH, PCO2, PO2 in the last 72 hours. No results for input(s): CPK, CKNDX, TROIQ in the last 72 hours. No lab exists for component: CPKMB Lab Results Component Value Date/Time Cholesterol, total 135 02/23/2018 11:31 AM  
 HDL Cholesterol 34 (L) 02/23/2018 11:31 AM  
 LDL, calculated 79 02/23/2018 11:31 AM  
 Triglyceride 111 02/23/2018 11:31 AM  
 CHOL/HDL Ratio 5.0 01/15/2016 03:49 AM  
 
Lab Results Component Value Date/Time Glucose (POC) 102 (H) 02/19/2019 06:34 AM  
 Glucose (POC) 328 (H) 02/18/2019 09:23 PM  
 Glucose (POC) 202 (H) 02/18/2019 04:54 PM  
 Glucose (POC) 184 (H) 02/18/2019 11:57 AM  
 Glucose (POC) 160 (H) 02/18/2019 05:54 AM  
 
Lab Results Component Value Date/Time Color YELLOW/STRAW 02/04/2019 04:08 PM  
 Appearance CLOUDY (A) 02/04/2019 04:08 PM  
 Specific gravity 1.012 02/04/2019 04:08 PM  
 pH (UA) 5.0 02/04/2019 04:08 PM  
 Protein 30 (A) 02/04/2019 04:08 PM  
 Glucose NEGATIVE  02/04/2019 04:08 PM  
 Ketone TRACE (A) 02/04/2019 04:08 PM  
 Bilirubin NEGATIVE  02/04/2019 04:08 PM  
 Urobilinogen 0.2 02/04/2019 04:08 PM  
 Nitrites NEGATIVE  02/04/2019 04:08 PM  
 Leukocyte Esterase NEGATIVE  02/04/2019 04:08 PM  
 Epithelial cells FEW 02/04/2019 04:08 PM  
 Bacteria NEGATIVE  02/04/2019 04:08 PM  
 WBC 10-20 02/04/2019 04:08 PM  
 RBC  02/04/2019 04:08 PM  
 
 
 
Medications Reviewed:  
 
Current Facility-Administered Medications Medication Dose Route Frequency  apixaban (ELIQUIS) tablet 5 mg  5 mg Oral BID  furosemide (LASIX) tablet 40 mg  40 mg Oral DAILY  carvedilol (COREG) tablet 6.25 mg  6.25 mg Oral BID WITH MEALS  melatonin tablet 3 mg  3 mg Oral QHS  pantoprazole (PROTONIX) tablet 40 mg  40 mg Oral ACB  insulin lispro (HUMALOG) injection   SubCUTAneous AC&HS  acetylcysteine (MUCOMYST) 200 mg/mL (20 %) solution 400 mg  2 mL Nebulization BID RT  
 albuterol-ipratropium (DUO-NEB) 2.5 MG-0.5 MG/3 ML  3 mL Nebulization BID RT  
 guaiFENesin ER (MUCINEX) tablet 600 mg  600 mg Oral Q12H  levothyroxine (SYNTHROID) tablet 25 mcg  25 mcg Oral ACB  simethicone (MYLICON) tablet 80 mg  80 mg Oral BID  hydrALAZINE (APRESOLINE) 20 mg/mL injection 20 mg  20 mg IntraVENous Q6H PRN  
 lactobac ac& pc-s.therm-b.anim (LAVERNE Q/RISAQUAD)  1 Cap Oral DAILY  sodium chloride (NS) flush 5-40 mL  5-40 mL IntraVENous PRN  
 0.9% sodium chloride infusion 250 mL  250 mL IntraVENous PRN  
 morphine injection 2 mg  2 mg IntraVENous Q6H PRN  
 insulin glargine (LANTUS) injection 5 Units  5 Units SubCUTAneous QHS  acetaminophen (TYLENOL) tablet 650 mg  650 mg Oral Q6H PRN  
 bacitracin 500 unit/gram packet 1 Packet  1 Packet Topical PRN  
 sodium chloride (NS) flush 5-40 mL  5-40 mL IntraVENous Q8H  
 sodium chloride (NS) flush 5-40 mL  5-40 mL IntraVENous PRN  
 sodium chloride (NS) flush 5-40 mL  5-40 mL IntraVENous Q8H  
 sodium chloride (NS) flush 5-40 mL  5-40 mL IntraVENous PRN  
 glucose chewable tablet 16 g  4 Tab Oral PRN  
 dextrose (D50W) injection syrg 12.5-25 g  25-50 mL IntraVENous PRN  
 glucagon (GLUCAGEN) injection 1 mg  1 mg IntraMUSCular PRN  
 
______________________________________________________________________ EXPECTED LENGTH OF STAY: 2d 0h 
ACTUAL LENGTH OF STAY:          15 Kylee Armstrong MD

## 2019-02-19 NOTE — PROGRESS NOTES
Problem: Mobility Impaired (Adult and Pediatric) Goal: *Acute Goals and Plan of Care (Insert Text) Physical Therapy Goals Revisited 2/19/2019, remain appropriate carry over. Physical Therapy Goals Initiated 2/12/2019 1. Patient will move from supine to sit and sit to supine , scoot up and down and roll side to side in bed with supervision/set-up within 7 days. 2.  Patient will perform sit to/from stand with minimal assistance/contact guard assist within 7 days. 3.  Patient will ambulate 50 feet with least restrictive assistive device and minimal assistance/contact guard assist within 7 days. 4.  Patient will ascend/descend 12 stairs with bilateral handrail(s) with minimal assistance/contact guard assist within 7 days. 5.  Patient will verbally and functionally recall 3/3 pacemaker precautions within 7 days. 6.  Patient will improve Tinetti score by 4-5 points within 7 days. physical Therapy TREATMENT: WEEKLY REASSESSMENT Patient: Patricia Ellsworth (29 y.o. male) Date: 2/19/2019 Diagnosis: Cardiac arrest [facility]) [I46.9] Cardiac arrest ([facility] [address] Procedure(s) (LRB): 
INSERT PPM SINGLE VENTRICULAR (N/A) 12 Days Post-Op Precautions: Fall, pacer precautions Chart, physical therapy assessment, plan of care and goals were reviewed. ASSESSMENT: 
Pt received up to the chair agreeable to PT. He stated 1 pacer precaution and the rest were reviewed. Pt required mod assist X 2 to come to standing. This task is made more difficult for this patient at this time because of 1) knee pain from gout, 2) LE edema, and 3) need to adhere to pacer precautions. Once in standing he requires assist for balance management, either a walker or physical intervention by those assisting him. He then amb a total of 50 feet with one standing rest break. For most of the amb provided hand held assist, required min assist X 2, gait unstable.  Using the walker he required contact guard X 2 to amb in a straight path but then intervention for walker management, min assist to turn the walker while amb. Post amb took BP, see chart below. Had not taken a pre activity set of vitals so not able to document if he was orthostatic but likely given elevated HR while amb and pt reported feeling weak/fatigued all over while amb towards the end. Vitals:  
 1147/62 02/19/19 1014 02/19/19 1016 BP:  100/41 144/81 BP 1 Location:  Left arm Left arm BP Patient Position: walking Sitting;Post activity Sitting;Post activity Pulse: 114 95 100 Resp:      
Temp:      
SpO2: on room air  96% 94% Pt continues to make slow steady gains with mobility. At baseline he is fully independent, uses no assistive device and presently requires assist X 2 to amb with no assistive device. He has a flight of stairs to access the bedroom in his home. He tried all that was asked of him and is an excellent candidate for inpt rehab. Patient's progression toward goals since last assessment: gains in all areas, met goal for increase in Tinetti score, goals carried over. PLAN: 
Goals have been updated based on progression since last assessment. Patient continues to benefit from skilled intervention to address the above impairments. Continue to follow the patient 5 times a week to address goals. Planned Interventions: 
[x]              Bed Mobility Training             []       Neuromuscular Re-Education [x]              Transfer Training                   []       Orthotic/Prosthetic Training 
[x]              Gait Training                         []       Modalities [x]              Therapeutic Exercises           []       Edema Management/Control [x]              Therapeutic Activities            [x]       Patient and Family Training/Education []              Other (comment): 
Discharge Recommendations: Inpatient Rehab Further Equipment Recommendations for Discharge: none, to be determined at rehab SUBJECTIVE:  
Patient stated I don't think there's any other way, regarding need for rehab OBJECTIVE DATA SUMMARY:  
Chart checked, pt cleared by nursing \Critical Behavior: 
Neurologic State: Alert Orientation Level: Oriented X4 Cognition: Appropriate decision making, Appropriate for age attention/concentration, Appropriate safety awareness, Follows commands Safety/Judgement: Insight into deficits Strength:  
  
  
  
 decreased functional 
  
  
  
 
Functional Mobility Training: 
Bed Mobility: 
  
  
  
 not assessed Transfers: 
Sit to Stand: Assist x2; Moderate assistance Stand to Sit: Assist x2;Contact guard assistance Balance: 
Sitting: Intact; With support Standing: Impaired; Without support Standing - Static: Fair Standing - Dynamic : FairAmbulation/Gait Training: 
Distance (ft): 50 Feet (ft) Assistive Device: Gait belt;Walker, rolling(bilateral hand held assist) Ambulation - Level of Assistance: Assist x2;Minimal assistance;Contact guard assistance Gait Abnormalities: Decreased step clearance Base of Support: Center of gravity altered Speed/Mariella: Slow Step Length: Left shortened;Right shortened Tinetti test: 
 
Sitting Balance: 1 Arises: 0 Attempts to Rise: 0 Immediate Standing Balance: 0 Standing Balance: 1 Nudged: 0 Eyes Closed: 0 Turn 360 Degrees - Continuous/Discontinuous: 0 Turn 360 Degrees - Steady/Unsteady: 0 Sitting Down: 0 Balance Score: 2 Indication of Gait: 0 
R Step Length/Height: 1 L Step Length/Height: 1 
R Foot Clearance: 1 L Foot Clearance: 1 Step Symmetry: 1 Step Continuity: 1 Path: 1 Trunk: 0 Walking Time: 0 Gait Score: 7 Total Score: 9 Tinetti Tool Score Risk of Falls 
<19 = High Fall Risk 19-24 = Moderate Fall Risk 25-28 = Low Fall Risk Tinetti ME. Performance-Oriented Assessment of Mobility Problems in Elderly Patients. Cai 66; [id number].  (Scoring Description: PT Bulletin Feb. 10, 1993) Older adults: Ana Ely et al, 2009; n = [address] [facility] elderly evaluated with ABC, EBEN, ADL, and IADL) · Mean EBEN score for males aged 69-68 years = 26.21(3.40) · Mean EBEN score for females age 69-68 years = 25.16(4.30) · Mean EBEN score for males over 80 years = 23.29(6.02) · Mean EBEN score for females over 80 years = 17.20(8.32) Stairs: 
  
  
 Neuro Re-Education: 
 
Therapeutic Exercises:  
Sitting LAQs combined with ankle pumps, knee flexion/extension in sitting and heel slides to be done in supine hourly several reps bilaterally. Pain: 
Pain Scale 1: Numeric (0 - 10) Pain Intensity 1: 2 at rest and 1-2 with activity Activity Tolerance:  
See assessment above Please refer to the flowsheet for vital signs taken during this treatment. After treatment:  
[x]  Patient left in no apparent distress sitting up in chair 
[]  Patient left in no apparent distress in bed 
[x]  Call bell left within reach [x]  Nursing notified 
[x]  Caregiver present 
[]  Bed alarm activated COMMUNICATION/COLLABORATION:  
The patients plan of care was discussed with: Registered Nurse and  April Acuña Time Calculation: 29 mins

## 2019-02-19 NOTE — PROGRESS NOTES
Problem: Heart Failure: Discharge Outcomes Goal: *Understands and describes signs and symptoms to report to providers(Stroke Metric) Outcome: Progressing Towards Goal 
Reviewed teaching- specifically, meds - taking all as directed, diet - maintaining the sodium restriction, activity - resting as needed between activities, taking weight daily - and recording daily. Also reviewed when to call MD, for an increase in shortness of breath, increase in edema and/or increase in weight of 3 pounds in one day or 5 pounds within 5 days, or difficulty sleeping, lying down. Pt expresses a good understanding of information provided.

## 2019-02-19 NOTE — PROGRESS NOTES
Paged urology for retention concerns. . Orders received to replace 20-22 FR dsouza if GRV greater than 450 or patient uncomfortable . Will continue to monitor. 1951: Spoke with Dr. Earnest Nielsen about rash on back. Received orders for benadryl. Bedside shift change report given to Buddy Joshi, RN (oncoming nurse) by Sampson Quan RN (offgoing nurse). Report included the following information SBAR, Kardex, Intake/Output and MAR. Problem: Falls - Risk of 
Goal: *Absence of Falls Document Brandon Yen Fall Risk and appropriate interventions in the flowsheet. Outcome: Progressing Towards Goal 
Fall Risk Interventions: 
Mobility Interventions: Communicate number of staff needed for ambulation/transfer, Patient to call before getting OOB, Strengthening exercises (ROM-active/passive) Mentation Interventions: Adequate sleep, hydration, pain control, Increase mobility, Reorient patient, More frequent rounding, Room close to nurse's station Medication Interventions: Patient to call before getting OOB, Teach patient to arise slowly Elimination Interventions: Call light in reach, Patient to call for help with toileting needs, Toileting schedule/hourly rounds, Urinal in reach Problem: Pressure Injury - Risk of 
Goal: *Prevention of pressure injury Document Dom Scale and appropriate interventions in the flowsheet. Outcome: Progressing Towards Goal 
Pressure Injury Interventions: 
Sensory Interventions: Assess changes in LOC, Turn and reposition approx. every two hours (pillows and wedges if needed) Moisture Interventions: Absorbent underpads, Limit adult briefs, Maintain skin hydration (lotion/cream), Moisture barrier, Minimize layers Activity Interventions: Increase time out of bed, PT/OT evaluation Mobility Interventions: Float heels, HOB 30 degrees or less, PT/OT evaluation Nutrition Interventions: Document food/fluid/supplement intake, Offer support with meals,snacks and hydration Friction and Shear Interventions: Lift sheet, HOB 30 degrees or less, Minimize layers Problem: Heart Failure: Discharge Outcomes Goal: *Demonstrates ability to perform prescribed activity without shortness of breath or discomfort Outcome: Progressing Towards Goal 
Patient up to mukherjee with PT today. In recliner for most of day. Tolerating well. Encouraged to increase activity as tolerating.

## 2019-02-19 NOTE — PROGRESS NOTES
Infectious Diseases Progress Note Antibiotic Summary: 
Vancomycin 2/6 x 1 dose Zosyn   --  Zyvox   --  Subjective: No new problems Objective:  
 
Vitals:  
Visit Vitals /58 (BP 1 Location: Left arm, BP Patient Position: At rest;Sitting) Pulse 96 Temp 97.7 °F (36.5 °C) Resp 19 Ht 5' 10\" (1.778 m) Wt 92.5 kg (203 lb 14.8 oz) SpO2 95% BMI 29.26 kg/m² Tmax:  Temp (24hrs), Av.7 °F (36.5 °C), Min:97.1 °F (36.2 °C), Max:98 °F (36.7 °C) Exam:  General appearance: no distress Lungs: bilateral posterior rales at bases Heart: irregularly irregular rhythm Abdomen: soft, not distended, non-tender. Bowel sounds normal 
Extremities: no edema; feet and hands warm; good pedal pulses IV Lines: peripheral 
      
Labs:   
Recent Labs  
  19 
1725 19 
0436 19 
1356 19 
0540 WBC 8.4 7.4 8.1 8.5 HGB 8.7* 8.7* 9.2* 8.7* * 113* 109* 100* BUN 60* 59* 58* 61* CREA 2.06* 2.04* 2.10* 2.13* TBILI  --   --   --  0.8 SGOT  --   --   --  21  
AP  --   --   --  158* Urine culture  = NG 
 
BLOOD CULTURES: 
  = NG 
 
Bronchoscopy sample : 
 Gram stain = 2+ WBCs; NOS 
 culture = light normal resp shahrzad CXR 2/15 reviewed = small pleural effusions and probable atelectasis Assessment: 1. Fever -- resolved: The cause of the fever was not certain but pulmonary source suspected (aspiration ?). Bronch cultures unremarkable -- oxygenation better and CXR better -- Zosyn ended  
  
2. OHD -- cardiomyopathy and hx of atrial fib -- S/P initial arrest with Code Ice and several subsequent episodes of PEA 
  
3. NIDDM 
  
4. CVD 
  
5. HTN 
  
6. GOUT Plan:  
 
1. Watch off antibiotics Marco Zelaya MD

## 2019-02-20 ENCOUNTER — TELEPHONE (OUTPATIENT)
Dept: RHEUMATOLOGY | Age: 74
End: 2019-02-20

## 2019-02-20 LAB
APTT PPP: 32.9 SEC (ref 22.1–32)
GLUCOSE BLD STRIP.AUTO-MCNC: 146 MG/DL (ref 65–100)
GLUCOSE BLD STRIP.AUTO-MCNC: 156 MG/DL (ref 65–100)
GLUCOSE BLD STRIP.AUTO-MCNC: 160 MG/DL (ref 65–100)
GLUCOSE BLD STRIP.AUTO-MCNC: 168 MG/DL (ref 65–100)
INR PPP: 1.3 (ref 0.9–1.1)
PROTHROMBIN TIME: 13.1 SEC (ref 9–11.1)
SERVICE CMNT-IMP: ABNORMAL
THERAPEUTIC RANGE,PTTT: ABNORMAL SECS (ref 58–77)

## 2019-02-20 PROCEDURE — 51798 US URINE CAPACITY MEASURE: CPT

## 2019-02-20 PROCEDURE — 65660000000 HC RM CCU STEPDOWN

## 2019-02-20 PROCEDURE — 74011250637 HC RX REV CODE- 250/637: Performed by: INTERNAL MEDICINE

## 2019-02-20 PROCEDURE — 74011250637 HC RX REV CODE- 250/637: Performed by: HOSPITALIST

## 2019-02-20 PROCEDURE — 94640 AIRWAY INHALATION TREATMENT: CPT

## 2019-02-20 PROCEDURE — 97116 GAIT TRAINING THERAPY: CPT

## 2019-02-20 PROCEDURE — 82962 GLUCOSE BLOOD TEST: CPT

## 2019-02-20 PROCEDURE — 74011000250 HC RX REV CODE- 250: Performed by: INTERNAL MEDICINE

## 2019-02-20 PROCEDURE — 74011636637 HC RX REV CODE- 636/637: Performed by: HOSPITALIST

## 2019-02-20 PROCEDURE — 85610 PROTHROMBIN TIME: CPT

## 2019-02-20 PROCEDURE — 85730 THROMBOPLASTIN TIME PARTIAL: CPT

## 2019-02-20 PROCEDURE — 36415 COLL VENOUS BLD VENIPUNCTURE: CPT

## 2019-02-20 RX ORDER — DIPHENHYDRAMINE HCL 25 MG
CAPSULE ORAL
Status: DISPENSED
Start: 2019-02-20 | End: 2019-02-20

## 2019-02-20 RX ORDER — DIPHENHYDRAMINE HCL 25 MG
25 CAPSULE ORAL ONCE
Status: COMPLETED | OUTPATIENT
Start: 2019-02-20 | End: 2019-02-20

## 2019-02-20 RX ORDER — FUROSEMIDE 40 MG/1
40 TABLET ORAL
Status: DISCONTINUED | OUTPATIENT
Start: 2019-02-20 | End: 2019-02-20

## 2019-02-20 RX ORDER — FUROSEMIDE 40 MG/1
40 TABLET ORAL
Status: DISCONTINUED | OUTPATIENT
Start: 2019-02-20 | End: 2019-02-22 | Stop reason: HOSPADM

## 2019-02-20 RX ADMIN — Medication: at 22:11

## 2019-02-20 RX ADMIN — CARVEDILOL 6.25 MG: 6.25 TABLET, FILM COATED ORAL at 16:48

## 2019-02-20 RX ADMIN — ACETYLCYSTEINE 400 MG: 200 SOLUTION ORAL; RESPIRATORY (INHALATION) at 08:35

## 2019-02-20 RX ADMIN — FUROSEMIDE 40 MG: 40 TABLET ORAL at 16:49

## 2019-02-20 RX ADMIN — INSULIN GLARGINE 5 UNITS: 100 INJECTION, SOLUTION SUBCUTANEOUS at 22:00

## 2019-02-20 RX ADMIN — Medication 10 ML: at 16:51

## 2019-02-20 RX ADMIN — Medication 10 ML: at 16:50

## 2019-02-20 RX ADMIN — CARVEDILOL 6.25 MG: 6.25 TABLET, FILM COATED ORAL at 09:24

## 2019-02-20 RX ADMIN — APIXABAN 5 MG: 5 TABLET, FILM COATED ORAL at 22:00

## 2019-02-20 RX ADMIN — COLCHICINE 0.3 MG: 0.6 TABLET, FILM COATED ORAL at 09:25

## 2019-02-20 RX ADMIN — ACETAMINOPHEN 650 MG: 325 TABLET ORAL at 12:51

## 2019-02-20 RX ADMIN — Medication 1 CAPSULE: at 09:24

## 2019-02-20 RX ADMIN — ACETYLCYSTEINE 400 MG: 200 SOLUTION ORAL; RESPIRATORY (INHALATION) at 20:47

## 2019-02-20 RX ADMIN — ACETAMINOPHEN 650 MG: 325 TABLET ORAL at 07:31

## 2019-02-20 RX ADMIN — ACETAMINOPHEN 650 MG: 325 TABLET ORAL at 18:46

## 2019-02-20 RX ADMIN — SIMETHICONE CHEW TAB 80 MG 80 MG: 80 TABLET ORAL at 09:34

## 2019-02-20 RX ADMIN — IPRATROPIUM BROMIDE AND ALBUTEROL SULFATE 3 ML: .5; 3 SOLUTION RESPIRATORY (INHALATION) at 20:47

## 2019-02-20 RX ADMIN — Medication 10 ML: at 07:31

## 2019-02-20 RX ADMIN — DIPHENHYDRAMINE HYDROCHLORIDE 25 MG: 25 CAPSULE ORAL at 03:51

## 2019-02-20 RX ADMIN — Medication 10 ML: at 22:00

## 2019-02-20 RX ADMIN — IPRATROPIUM BROMIDE AND ALBUTEROL SULFATE 3 ML: .5; 3 SOLUTION RESPIRATORY (INHALATION) at 08:35

## 2019-02-20 RX ADMIN — GUAIFENESIN 600 MG: 600 TABLET, EXTENDED RELEASE ORAL at 21:59

## 2019-02-20 RX ADMIN — Medication 3 MG: at 22:00

## 2019-02-20 RX ADMIN — LEVOTHYROXINE SODIUM 25 MCG: 25 TABLET ORAL at 07:31

## 2019-02-20 RX ADMIN — APIXABAN 5 MG: 5 TABLET, FILM COATED ORAL at 09:25

## 2019-02-20 RX ADMIN — FUROSEMIDE 40 MG: 40 TABLET ORAL at 09:25

## 2019-02-20 RX ADMIN — SIMETHICONE CHEW TAB 80 MG 80 MG: 80 TABLET ORAL at 18:46

## 2019-02-20 RX ADMIN — GUAIFENESIN 600 MG: 600 TABLET, EXTENDED RELEASE ORAL at 09:25

## 2019-02-20 RX ADMIN — PANTOPRAZOLE SODIUM 40 MG: 40 TABLET, DELAYED RELEASE ORAL at 07:31

## 2019-02-20 NOTE — PROGRESS NOTES
Bedside and Verbal shift change report given to Sweetie (oncoming nurse) by Jimbo Loera (offgoing nurse). Report included the following information SBAR, Kardex, ED Summary, OR Summary, Procedure Summary, Intake/Output, MAR, Accordion, Recent Results and Cardiac Rhythm Paced. Weight change: 1.3 kg (2 lb 13.9 oz) Standing (pervious weight done in bed Last 3 Recorded Weights in this Encounter 02/18/19 0027 02/19/19 0450 02/20/19 0355 Weight: 92.5 kg (203 lb 14.8 oz) 92.3 kg (203 lb 7.8 oz) 93.6 kg (206 lb 5.6 oz) Problem: Falls - Risk of 
Goal: *Absence of Falls Document Edgar Brunner Fall Risk and appropriate interventions in the flowsheet. Outcome: Progressing Towards Goal 
Fall Risk Interventions: 
Mobility Interventions: Assess mobility with egress test, Communicate number of staff needed for ambulation/transfer, OT consult for ADLs, Patient to call before getting OOB, PT Consult for mobility concerns, Strengthening exercises (ROM-active/passive), PT Consult for assist device competence, Utilize walker, cane, or other assistive device, Utilize gait belt for transfers/ambulation Mentation Interventions: Adequate sleep, hydration, pain control, Increase mobility, Reorient patient, More frequent rounding, Room close to nurse's station Medication Interventions: Assess postural VS orthostatic hypotension, Evaluate medications/consider consulting pharmacy, Patient to call before getting OOB, Utilize gait belt for transfers/ambulation, Teach patient to arise slowly Elimination Interventions: Elevated toilet seat, Call light in reach, Toilet paper/wipes in reach, Toileting schedule/hourly rounds, Patient to call for help with toileting needs, Urinal in reach Problem: Pressure Injury - Risk of 
Goal: *Prevention of pressure injury Document Dom Scale and appropriate interventions in the flowsheet. Outcome: Progressing Towards Goal 
Pressure Injury Interventions: Sensory Interventions: Assess changes in LOC, Turn and reposition approx. every two hours (pillows and wedges if needed) Moisture Interventions: Absorbent underpads, Limit adult briefs, Maintain skin hydration (lotion/cream), Moisture barrier, Minimize layers Activity Interventions: Assess need for specialty bed, Chair cushion, Pressure redistribution bed/mattress(bed type), PT/OT evaluation, Increase time out of bed Mobility Interventions: Assess need for specialty bed, Chair cushion, Float heels, HOB 30 degrees or less, Pressure redistribution bed/mattress (bed type), Suspension boots, Trapeze to reposition, PT/OT evaluation Nutrition Interventions: Document food/fluid/supplement intake, Discuss nutritional consult with provider, Offer support with meals,snacks and hydration Friction and Shear Interventions: Lift sheet, HOB 30 degrees or less, Minimize layers

## 2019-02-20 NOTE — PROGRESS NOTES
Infectious Diseases Progress Note Antibiotic Summary: 
Vancomycin 2/6 x 1 dose Zosyn   --  Zyvox   --  Subjective: He generally had a good day. He ambulated. Cough is better. Objective:  
 
Vitals:  
Visit Vitals /69 (BP 1 Location: Right arm, BP Patient Position: At rest) Pulse 90 Temp 97.4 °F (36.3 °C) Resp 21 Ht 5' 10\" (1.778 m) Wt 92.3 kg (203 lb 7.8 oz) SpO2 94% BMI 29.20 kg/m² Tmax:  Temp (24hrs), Av.6 °F (36.4 °C), Min:97.2 °F (36.2 °C), Max:98.2 °F (36.8 °C) Exam:  General appearance: no distress Lungs: posterior rales at bases Heart: irregularly irregular rhythm Abdomen: soft, not distended, non-tender. Bowel sounds normal 
Extremities: no edema; feet and hands warm; good pedal pulses IV Lines: peripheral 
      
Labs:   
Recent Labs  
  19 
0342 19 
1725 19 
0436 19 
1356 WBC 6.5 8.4 7.4 8.1 HGB 8.4* 8.7* 8.7* 9.2*  
* 126* 113* 109* BUN 59* 60* 59* 58* CREA 2.03* 2.06* 2.04* 2.10* Urine culture  = NG 
 
BLOOD CULTURES: 
  = NG 
 
Bronchoscopy sample : 
 Gram stain = 2+ WBCs; NOS 
 culture = light normal resp shahrzad CXR 2/15 reviewed = small pleural effusions and probable atelectasis Assessment: 1. Fever -- resolved: The cause of the fever was not certain but pulmonary source suspected (aspiration ?). Bronch cultures unremarkable -- oxygenation better and CXR better -- Zosyn ended  
  
2. OHD -- cardiomyopathy and hx of atrial fib -- S/P initial arrest with Code Ice and several subsequent episodes of PEA 
  
3. NIDDM 
  
4. CVD 
  
5. HTN 
  
6. GOUT Plan:  
 
1. Watch off antibiotics Kyree Pizarro MD

## 2019-02-20 NOTE — PROGRESS NOTES
[facility] Cardiology Progress Note Date of service: 2/20/2019 Subjective: 
Mr Darlin Schaumann says he is doing a little better Had good response to one dose of lasix yesterday -2L negative Objective: 
 
Visit Vitals /52 Pulse 87 Temp 97.6 °F (36.4 °C) Resp 20 Ht 5' 10\" (1.778 m) Wt 93.6 kg (206 lb 5.6 oz) SpO2 93% BMI 29.61 kg/m² Physical Exam  
Constitutional: He appears well-developed and well-nourished. HENT:  
Head: Normocephalic and atraumatic. Eyes: Conjunctivae are normal. No scleral icterus. Neck: No JVD present. Cardiovascular: Normal rate. An irregularly irregular rhythm present. Exam reveals no gallop. Murmur heard. Early systolic murmur is present with a grade of 2/6. Pulmonary/Chest: Effort normal. No stridor. No respiratory distress. He has no wheezes. He has no rales. Abdominal: Soft. He exhibits no distension. Genitourinary:  
Genitourinary Comments: Chacon present, slight hematuria Musculoskeletal: He exhibits edema. He exhibits no deformity. Neurological: He is alert. Skin: Skin is warm and dry. Data reviewed: 
Recent Results (from the past 12 hour(s)) GLUCOSE, POC Collection Time: 02/19/19  9:27 PM  
Result Value Ref Range Glucose (POC) 185 (H) 65 - 100 mg/dL Performed by Joan Melendez PROTHROMBIN TIME + INR Collection Time: 02/20/19  3:53 AM  
Result Value Ref Range INR 1.3 (H) 0.9 - 1.1 Prothrombin time 13.1 (H) 9.0 - 11.1 sec PTT Collection Time: 02/20/19  3:53 AM  
Result Value Ref Range aPTT 32.9 (H) 22.1 - 32.0 sec  
 aPTT, therapeutic range     58.0 - 77.0 SECS  
GLUCOSE, POC Collection Time: 02/20/19  7:07 AM  
Result Value Ref Range Glucose (POC) 160 (H) 65 - 100 mg/dL Performed by Brenton Chatterjee Telemetry: a.fib - rate control ok Assessment: 1. Cardiac arrest. PEA. With events of 2/6 it now seems like that his underlying mechanism may have been bradyarrhythmia from SSS.   
2. SSS: s/p PPM 2/7. 
  
3. Chronic a.fib - rate control mostly ok 
  
4. Volume excess: likely a result of florinef and has been of his usual diuretic dose. Weight has been steadily rising and he now has quite a bit of leg edema. 5. Hematuria Resolved, dsouza out 6. CKD stage III to IV Renal indices stable 7. Anemia, probably from CKD 
  
8. Gout 9. Thrombocytopenia: unclear etiology Fluctuating - fairly stable currently 10. Pulmonary hypertension 
  
Plan: 
Continue coreg 6.25mg bid Continue Eliquis Increase lasix up to 40mg bid for now while we try to get some more fluid off Close monitoring of Is and Os, renal function, electrolytes Signed: 
Curly Downey MD 
Interventional Cardiology 2/20/2019

## 2019-02-20 NOTE — PROGRESS NOTES
RENAL  PROGRESS NOTE Subjective:  
Still macrohematuria and clots Objective: VITALS SIGNS:   
Visit Vitals /70 (BP 1 Location: Right arm, BP Patient Position: At rest) Pulse 87 Temp 97.5 °F (36.4 °C) Resp 20 Ht 5' 10\" (1.778 m) Wt 93.6 kg (206 lb 5.6 oz) SpO2 95% BMI 29.61 kg/m² O2 Device: Room air O2 Flow Rate (L/min): 0.5 l/min Temp (24hrs), Av.6 °F (36.4 °C), Min:97.3 °F (36.3 °C), Max:98.3 °F (36.8 °C) PHYSICAL EXAM: 
++ edema DATA REVIEW:  
 
INTAKE / OUTPUT:  
Last shift:       07 - 1900 In: 240 [P.O.:240] Out: 200 [Urine:200] Last 3 shifts:  190 -  0700 In: 981 [P.O.:870] Out: 1425 [[id number]] Intake/Output Summary (Last 24 hours) at 2019 1030 Last data filed at 2019 4042 Gross per 24 hour Intake 1110 ml Output 1075 ml Net 35 ml LABS:  
Recent Labs  
  19 
03419 
1725 19 
9481 WBC 6.5 8.4 7.4 HGB 8.4* 8.7* 8.7* HCT 28.6* 29.8* 29.3*  
* 126* 113* Recent Labs  
  19 
0353 19 
0342 19 
1725 19 
7952 NA  --  138 135* 137 K  --  4.6 4.7 4.5  
CL  --  107 104 109* CO2  --  21 24 20* GLU  --  87 216* 139* BUN  --  59* 60* 59* CREA  --  2.03* 2.06* 2.04* CA  --  8.7 8.5 8.5 MG  --   --   --  2.1 PHOS  --   --   --  3.8 INR 1.3* 1.4*  --  1.4* Assessment:  
 
MARYCRUZ- due to ATN . renal recovery. Non-oliguric. Baseline Cr 1.7-2.0mg/dl DM-2 Cardiac arrest; PEA- ? etiology. Code ice protocol; recurrence of niurka arrest on . S/p PPM. Stress test  neg PANCYTOPENIA,chronic Gout: was on pegloticase Hematuria: Chacon in place. Urology consulted. Edema Plan:  
Lasix Renal function stable as of yesterday 
recom to call back urology for macrohematuria Discussed with him Edmundo Orourke MD

## 2019-02-20 NOTE — TELEPHONE ENCOUNTER
I spoke with Yo Rodriguez and explained the sequence of events as I understood and learned. It was likely due to Medrol. He did not receive Krystexxa during both instances, so it was not an infusion reaction from Ildefonso johnsonnina. He wants to continue treatment. I suggested we try his next infusion without premedication of steroids and if tolerated, will give oral prednisone as a pre-medication. She was agreeable.

## 2019-02-20 NOTE — PROGRESS NOTES
NUTRITION COMPLETE ASSESSMENT 
 
RECOMMENDATIONS:  
1. Continue diet as ordered with oral nutritional supplements 2. Continue daily weights Interventions/Plan:  
Food/Nutrient Delivery: Suplena TID; Magic Cup once/day Assessment:  
Reason for Assessment:  
[x]Reassessment Diet: Cardiac, Consistent carb  2000 kcal 
Supplements: Suplena TID; Magic Cup q day Nutritionally Significant Medications: [x] Reviewed & Includes: lasix; lantus 5 units daily; humalog correction scale (high sensitivity); floraQ daily; synthroid daily; melatonin; protonix daily; simethicone twice/day Meal Intake:  
Patient Vitals for the past 100 hrs: 
 % Diet Eaten 02/20/19 1249 25 % 02/20/19 0930 75 % Current Hospitalization:  
Fluid Restriction:  NA Appetite: Fair PO Ability: Independent Average po intake:50-75% Average supplements intake: 100 Subjective: 
Pt in good spirits with daughter at the bedside. Admits he ate ~75% of breakfast and daughter states ~25% of lunch. He is drinking all Suplena supplements and Magic Cup and inquiring where to purchase on discharge. No complaints or questions at this time. He is willing to drink Suplena from the can and is using Suplena as coffee creamer as well. Objective: 
Chart reviewed, discussed with RN and team during interdisciplinary rounds. Pt's intake has been improving and glad he is willing to accept supplements. Total supplements are providing 1565 kcal, 30 g protein -- meeting 83% and 37% of estimated kcal and protein needs, respectively. Pt with 26# weight gain since admission. This is likely fluid-related as he is in volume overload. Potassium, phosphorus WNL. May be able to resume Glucerna Shakes if labs remain stable [facility] Estimated Nutrition Needs:  
Kcals/day: 0618 Kcals/day(1887-2045kcal) Protein: 82 g(1g/kg) Fluid: 2050 ml(25 ml/kg) Based On: Patricia Machado(x 1.2-1.3) Weight Used: Actual wt(82 kg) Pt expected to meet estimated nutrient needs:  [x]   Yes     []  No [] Unable to predict at this time Nutrition Diagnosis:  
1. Inadequate protein intake related to poor appetite as evidenced by dietary recall of low protein foods Goals:   
 Consumption of at least 3 supplements per day and 50% meals in 3-4 days Monitoring & Evaluation:  
 - Liquid meal replacement, Total energy intake, Protein intake - Weight/weight change, Electrolyte and renal profile Previous Nutrition Goals Met:   Yes Previous Recommendations:    Yes 
 
Education & Discharge Needs: 
 [] None Identified 
 [x] Identified and addressed [x] Participated in care plan, discharge planning, and/or interdisciplinary rounds Cultural, Scientology and ethnic food preferences identified: None Skin Integrity: []Intact  [x]Other: incision Edema: []None [x]Other: 1-2+ pitting Last BM: 2/19/19 Food Allergies: [x]None []Other Diet Restrictions: Cultural/Mandaeism Preference(s): None Anthropometrics:   
Weight Loss Metrics 2/20/2019 2/4/2019 2/4/2019 2/4/2019 1/17/2019 1/15/2019 1/14/2019 Today's Wt 206 lb 5.6 oz - 180 lb - 180 lb 185 lb 3 oz -  
BMI - 29.61 kg/m2 - 25.83 kg/m2 25.83 kg/m2 - 26.57 kg/m2 Weight Source: Standing scale (comment) Height: 5' 10\" (177.8 cm), Body mass index is 29.61 kg/m². IBW : 75.3 kg (166 lb), % IBW (Calculated): 110.36 % Labs:   
Lab Results Component Value Date/Time Sodium 138 02/19/2019 03:42 AM  
 Potassium 4.6 02/19/2019 03:42 AM  
 Chloride 107 02/19/2019 03:42 AM  
 CO2 21 02/19/2019 03:42 AM  
 Glucose 87 02/19/2019 03:42 AM  
 BUN 59 (H) 02/19/2019 03:42 AM  
 Creatinine 2.03 (H) 02/19/2019 03:42 AM  
 Calcium 8.7 02/19/2019 03:42 AM  
 Magnesium 2.1 02/18/2019 04:36 AM  
 Phosphorus 3.8 02/18/2019 04:36 AM  
 Albumin 2.6 (L) 02/16/2019 05:40 AM  
 
Lab Results Component Value Date/Time  Hemoglobin A1c 6.4 (H) 02/05/2019 12:02 AM  
 Hemoglobin A1c (POC) 6.3 08/02/2016 12:53 PM  
 
Camila Baldwin, [address]

## 2019-02-20 NOTE — PROGRESS NOTES
Problem: Patient Education: Go to Patient Education Activity Goal: Patient/Family Education Outcome: Progressing Towards Goal 
Pt. urine is brown, hazy, and without odor. Pt. Denies the urge to void or sensation of fullness. Education on post void scans to assess for retention provided. Frequent toileting rounds performed. Urinal is within reach of the pt and encouraged to use regularly.

## 2019-02-20 NOTE — PROGRESS NOTES
Hospitalist Progress Note Twan bAel MD 
Answering service: [phone] OR [phone] from in house phone Date of Service:  2019 NAME:  Miquel Redd :  1945 MRN:  [id number] Admission Summary:  
The patient is a 59-year-old gentleman with past medical history of anemia, atrial fibrillation, Painter's esophagus, coronary artery disease, diabetes, heart failure, cardiomyopathy, myocarditis, history of hypertension, diabetic retinopathy, right carotid artery stenosis, TIA and vitamin D deficiency who presents to the hospital from [facility]. Patient presented to [facility] today apparently in PEA. The history was primarily obtained from the ER physician as the patient is intubated and his wife is not present at the bedside. Interval history / Subjective:  
Patient seen and examined. Lasix BID bladder scan around = residual 350 Urology following dsouza d/c 'd but retaining urine. Will monitor for now will get urology opinion before re placing dsouza cath . lasix and carvedilol increased per cardiology. Case management consulted for inpatient rehab. Assessment & Plan:  
 
AHRF status post cardiac arrest/pulseless electrical activity POA:  
-s/p hypothermia protocol -EEG non specific no seizure activity 
-Coded  multiple times post hypothermia protocol. Possibly secondary to bradycardia 
-s/p pacemaker  
-reintubated for airway and reextubated 2/10 after bronch  
-s/p zosyn for 7 days 
-nuclear stress test  with normal EF, no ischemia Orthostatic hypotension: improving   
-fludrocortisone discontined, repeat orthostatics MARYCRUZ on CKD stage 3: secondary to ATN 
-nephrology consulted 
-creatinine slowly improving  
-lasix bid 40 Chronic a.fib: on eliquis after hematuria resolved Hematuria:  
-urology following, appreciate input, CBI 
- dsouza cath removed but now retaining urine Diabetes, Type II: Lantus and SSI Insomnia: prn melatonin Hypothyroidism: synthroid Anemia, appears to be chronic: stable 
-s/p 2 unit PRBC 2/8  hgb > 8 Fever: resolved, s/p antibiotics Gastrointestinal PPX added simethicone Thrombocytopenia: stable, continue to monitor Abnormal LFT's from shock liver? Vs CLD can investigate as outpt Coagulopathy: suspect secondary to shock liver Code status: Full DVT prophylaxis: SCD Care Plan discussed with: Patient/Family and Nurse Disposition: TBD inpatient rehab Hospital Problems  Date Reviewed: 2/4/2019 Codes Class Noted POA * (Principal) Cardiac arrest [facility]) ICD-10-CM: I46.9 ICD-9-CM: 427.5  2/4/2019 Unknown Review of Systems:  
Review of systems not obtained due to patient factors. Vital Signs:  
 Last 24hrs VS reviewed since prior progress note. Most recent are: 
Visit Vitals /74 (BP 1 Location: Left arm, BP Patient Position: At rest) Pulse 85 Temp 97.6 °F (36.4 °C) Resp 25 Ht 5' 10\" (1.778 m) Wt 93.6 kg (206 lb 5.6 oz) SpO2 95% BMI 29.61 kg/m² Intake/Output Summary (Last 24 hours) at 2/20/2019 1543 Last data filed at 2/20/2019 1443 Gross per 24 hour Intake 1830 ml Output 1400 ml Net 430 ml Physical Examination:  
 
 
     
Constitutional:  NAD, alert ENT:  MMM Resp:  CTA   
CV:  irregular, paced rhythm GI:  Soft, non distended, non tender. bs+ : scrotal and penial edema Musculoskeletal:  1+ edema, warm, 2+ pulses throughout Neurologic:  ALERT AND AWAKE Data Review:  
 I personally reviewed  Image and labs Ct Head Wo Cont Result Date: 2/4/2019 IMPRESSION: No acute intracranial hemorrhage, mass or infarct. Xr Chest [facility] Result Date: 2/15/2019 Impression: 1. Enlarged cardiac silhouette, central pulmonary vascular congestion slightly improved with persistent pleural effusion Xr Chest [facility] Result Date: 2/13/2019 IMPRESSION: Mild central vascular congestion. Pacemaker. Infiltrate/edema at left base plus/minus trace pleural effusion. Xr Chest [facility] Result Date: 2/11/2019 IMPRESSION: Decreased small left pleural effusion and left basilar opacity. Xr [facility] Result Date: 2/10/2019 IMPRESSION: Stable left effusion and underlying atelectasis Xr [facility] Result Date: 2/9/2019 IMPRESSION: No interval change. Xr [facility] Result Date: 2/8/2019 IMPRESSION: ET tube is in satisfactory position. There is slight increasing haziness overlying left hemithorax with opacification left base consistent with left basilar atelectasis /airspace disease and left effusion. Xr [facility] Result Date: 2/7/2019 IMPRESSION: Left lung base opacification. No significant change. Xr [facility] Result Date: 2/6/2019 IMPRESSION: ET tube in satisfactory position. Worsening aeration of the left lung. Xr [facility] Result Date: 2/6/2019 Impression: Stable bilateral lower lobe atelectasis. Xr [facility] Result Date: 2/5/2019 IMPRESSION: No significant change. Xr [facility] Result Date: 2/4/2019 IMPRESSION: Endotracheal tube appears to be in satisfactory position. Xr Abd Port  1 V Result Date: 2/6/2019 IMPRESSION: NG tube in the stomach. Xr Abd Port  1 V Result Date: 2/4/2019 IMPRESSION: NG tube in appropriate position. Labs:  
 
Recent Labs  
  02/19/19 
0342 02/18/19 
1725 WBC 6.5 8.4 HGB 8.4* 8.7* HCT 28.6* 29.8*  
* 126* Recent Labs  
  02/19/19 
0342 02/18/19 
1725 02/18/19 
0436  135* 137  
K 4.6 4.7 4.5  
 104 109* CO2 21 24 20* BUN 59* 60* 59* CREA 2.03* 2.06* 2.04* GLU 87 216* 139* CA 8.7 8.5 8.5 MG  --   --  2.1 PHOS  --   --  3.8 No results for input(s): SGOT, GPT, ALT, AP, TBIL, TBILI, TP, ALB, GLOB, GGT, AML, LPSE in the last 72 hours. No lab exists for component: AMYP, HLPSE Recent Labs  
  02/20/19 
0353 02/19/19 
0342 02/18/19 
5941 INR 1.3* 1.4* 1.4* PTP 13.1* 13.7* 14.0* APTT 32.9* 29.7 29.2 No results for input(s): FE, TIBC, PSAT, FERR in the last 72 hours. No results found for: FOL, RBCF No results for input(s): PH, PCO2, PO2 in the last 72 hours. No results for input(s): CPK, CKNDX, TROIQ in the last 72 hours. No lab exists for component: CPKMB Lab Results Component Value Date/Time Cholesterol, total 135 02/23/2018 11:31 AM  
 HDL Cholesterol 34 (L) 02/23/2018 11:31 AM  
 LDL, calculated 79 02/23/2018 11:31 AM  
 Triglyceride 111 02/23/2018 11:31 AM  
 CHOL/HDL Ratio 5.0 01/15/2016 03:49 AM  
 
Lab Results Component Value Date/Time Glucose (POC) 146 (H) 02/20/2019 11:29 AM  
 Glucose (POC) 160 (H) 02/20/2019 07:07 AM  
 Glucose (POC) 185 (H) 02/19/2019 09:27 PM  
 Glucose (POC) 171 (H) 02/19/2019 04:55 PM  
 Glucose (POC) 159 (H) 02/19/2019 12:22 PM  
 
Lab Results Component Value Date/Time Color YELLOW/STRAW 02/04/2019 04:08 PM  
 Appearance CLOUDY (A) 02/04/2019 04:08 PM  
 Specific gravity 1.012 02/04/2019 04:08 PM  
 pH (UA) 5.0 02/04/2019 04:08 PM  
 Protein 30 (A) 02/04/2019 04:08 PM  
 Glucose NEGATIVE  02/04/2019 04:08 PM  
 Ketone TRACE (A) 02/04/2019 04:08 PM  
 Bilirubin NEGATIVE  02/04/2019 04:08 PM  
 Urobilinogen 0.2 02/04/2019 04:08 PM  
 Nitrites NEGATIVE  02/04/2019 04:08 PM  
 Leukocyte Esterase NEGATIVE  02/04/2019 04:08 PM  
 Epithelial cells FEW 02/04/2019 04:08 PM  
 Bacteria NEGATIVE  02/04/2019 04:08 PM  
 WBC 10-20 02/04/2019 04:08 PM  
 RBC  02/04/2019 04:08 PM  
 
 
 
Medications Reviewed:  
 
Current Facility-Administered Medications Medication Dose Route Frequency  furosemide (LASIX) tablet 40 mg  40 mg Oral ACB&D  
 diphenhydrAMINE-zinc acetate 1%-0.1% (BENADRYL) cream   Topical TID PRN  
 apixaban (ELIQUIS) tablet 5 mg  5 mg Oral BID  acetaminophen (TYLENOL) tablet 650 mg  650 mg Oral Q6H  
  colchicine tablet 0.3 mg  0.3 mg Oral DAILY  carvedilol (COREG) tablet 6.25 mg  6.25 mg Oral BID WITH MEALS  melatonin tablet 3 mg  3 mg Oral QHS  pantoprazole (PROTONIX) tablet 40 mg  40 mg Oral ACB  insulin lispro (HUMALOG) injection   SubCUTAneous AC&HS  acetylcysteine (MUCOMYST) 200 mg/mL (20 %) solution 400 mg  2 mL Nebulization BID RT  
 albuterol-ipratropium (DUO-NEB) 2.5 MG-0.5 MG/3 ML  3 mL Nebulization BID RT  
 guaiFENesin ER (MUCINEX) tablet 600 mg  600 mg Oral Q12H  levothyroxine (SYNTHROID) tablet 25 mcg  25 mcg Oral ACB  simethicone (MYLICON) tablet 80 mg  80 mg Oral BID  hydrALAZINE (APRESOLINE) 20 mg/mL injection 20 mg  20 mg IntraVENous Q6H PRN  
 lactobac ac& pc-s.therm-b.anim (LAVERNE Q/RISAQUAD)  1 Cap Oral DAILY  sodium chloride (NS) flush 5-40 mL  5-40 mL IntraVENous PRN  
 0.9% sodium chloride infusion 250 mL  250 mL IntraVENous PRN  
 morphine injection 2 mg  2 mg IntraVENous Q6H PRN  
 insulin glargine (LANTUS) injection 5 Units  5 Units SubCUTAneous QHS  bacitracin 500 unit/gram packet 1 Packet  1 Packet Topical PRN  
 sodium chloride (NS) flush 5-40 mL  5-40 mL IntraVENous Q8H  
 sodium chloride (NS) flush 5-40 mL  5-40 mL IntraVENous PRN  
 sodium chloride (NS) flush 5-40 mL  5-40 mL IntraVENous Q8H  
 sodium chloride (NS) flush 5-40 mL  5-40 mL IntraVENous PRN  
 glucose chewable tablet 16 g  4 Tab Oral PRN  
 dextrose (D50W) injection syrg 12.5-25 g  25-50 mL IntraVENous PRN  
 glucagon (GLUCAGEN) injection 1 mg  1 mg IntraMUSCular PRN  
 
______________________________________________________________________ EXPECTED LENGTH OF STAY: 2d 0h 
ACTUAL LENGTH OF STAY:          16 Mary Grullon MD

## 2019-02-20 NOTE — PROGRESS NOTES
Problem: Mobility Impaired (Adult and Pediatric) Goal: *Acute Goals and Plan of Care (Insert Text) Physical Therapy Goals Revisited 2/19/2019, remain appropriate carry over. Physical Therapy Goals Initiated 2/12/2019 1. Patient will move from supine to sit and sit to supine , scoot up and down and roll side to side in bed with supervision/set-up within 7 days. 2.  Patient will perform sit to/from stand with minimal assistance/contact guard assist within 7 days. 3.  Patient will ambulate 50 feet with least restrictive assistive device and minimal assistance/contact guard assist within 7 days. 4.  Patient will ascend/descend 12 stairs with bilateral handrail(s) with minimal assistance/contact guard assist within 7 days. 5.  Patient will verbally and functionally recall 3/3 pacemaker precautions within 7 days. 6.  Patient will improve Tinetti score by 4-5 points within 7 days. physical Therapy TREATMENT Patient: Noreen Hughes (41 y.o. male) Date: 2/20/2019 Diagnosis: Cardiac arrest [facility]) [I46.9] Cardiac arrest ([facility] [address] Procedure(s) (LRB): 
INSERT PPM SINGLE VENTRICULAR (N/A) 13 Days Post-Op Precautions: Fall Chart, physical therapy assessment, plan of care and goals were reviewed. ASSESSMENT: 
The patient presents with Moderate Assistance with sit to stand adhering to no pushing with left UE, pt with poor anterior weightshift for task. Minimum assistance gait, 50 feet ambulated, and with gait belt and rolling walker device. Pt progressed to mod A with no A.D 40 feet. The following are barriers to independence while in acute care:  
-Cognitive and/or behavioral: attention to task, sequencing and safety awareness 
-Medical condition: strength, functional endurance, standing balance, cardiopulmonary tolerance, precautions, medical history and edema    
-Other:    
 
Prior level of function: independent and working PLAN: 
 Patient continues to benefit from skilled intervention to address the above impairments. Continue treatment per established plan of care. Recommendations and/or planned interventions for staff: 
Continue gait with rolling walker with nursing and progress off A.D. With therapy Discharge recommendations: Rehab at inpatient facility: patient can tolerate 3 hours of therapy Patient's barriers to discharging home, in addition to above impairments: total assist driving to follow up medical appointment(s)/groceries/obtain medication entry and exit into the home, patient will require physical assist from medical transport/ambulance level of physical assist required to maintain patient safety. Equipment recommendations for successful discharge (if) home: gait belt and rolling walker SUBJECTIVE:  
Patient stated I feel tired now.  OBJECTIVE DATA SUMMARY:  
Critical Behavior: 
Neurologic State: Alert Orientation Level: Oriented X4 Cognition: Appropriate decision making, Appropriate safety awareness, Follows commands Safety/Judgement: Insight into deficits Functional Mobility Training: 
Bed Mobility: 
  
  
  
  
  
  
Transfers: 
Sit to Stand: Moderate assistance Stand to Sit: Minimum assistance(assist with control) Balance: 
Sitting: Intact Standing: Impaired Standing - Static: Fair Standing - Dynamic : FairAmbulation/Gait Training: 
Distance (ft): 50 Feet (ft)(40 feet without A.D. mod A ) Assistive Device: Gait belt;Walker, rolling Ambulation - Level of Assistance: Minimal assistance Gait Abnormalities: Decreased step clearance Base of Support: Center of gravity altered Speed/Mariella: Slow Step Length: Left shortened;Right shortened Stairs: 
  
  
   
 
Neuro Re-Education: 
 
Therapeutic Exercises:  
 
Pain: 
 
Location: scrotal edema Aggravating factors: gait Activity Tolerance:  
Limited Please refer to the flowsheet for vital signs taken during this treatment. After treatment patient left:  
Up in chair Caregiver at bedside Call light within reach RN notified Family at bedside COMMUNICATION/COLLABORATION:  
The patients plan of care was discussed with: Registered Nurse Ami Valentino PTA Time Calculation: 31 mins

## 2019-02-20 NOTE — TELEPHONE ENCOUNTER
----- Message from Jada Samuels RN sent at 2/20/2019 11:23 AM EST -----  Regarding: FW: Dr Andrew Guaman      ----- Message -----  From: Ale Campuzano  Sent: 2/20/2019  11:16 AM  To: Jada Samuels RN  Subject: FW: Dr Andrew Guaman                              ----- Message -----  From: Mary Bowman  Sent: 2/19/2019  12:29 PM  To: [facility] Front Office Pool  Subject: Dr Andrew Guaman                              Pt's wife Woo would like to speak to the doctor, regarding her  in [address] for 2 weeks, would like to get a visit from the doctor, his knees and legs are swollen, and painful,  please call pt's wife [phone].

## 2019-02-21 LAB
ANION GAP SERPL CALC-SCNC: 9 MMOL/L (ref 5–15)
APTT PPP: 35.2 SEC (ref 22.1–32)
BUN SERPL-MCNC: 64 MG/DL (ref 6–20)
BUN/CREAT SERPL: 30 (ref 12–20)
CALCIUM SERPL-MCNC: 8.3 MG/DL (ref 8.5–10.1)
CHLORIDE SERPL-SCNC: 103 MMOL/L (ref 97–108)
CO2 SERPL-SCNC: 23 MMOL/L (ref 21–32)
CREAT SERPL-MCNC: 2.11 MG/DL (ref 0.7–1.3)
ERYTHROCYTE [DISTWIDTH] IN BLOOD BY AUTOMATED COUNT: 14.5 % (ref 11.5–14.5)
GLUCOSE BLD STRIP.AUTO-MCNC: 149 MG/DL (ref 65–100)
GLUCOSE BLD STRIP.AUTO-MCNC: 151 MG/DL (ref 65–100)
GLUCOSE BLD STRIP.AUTO-MCNC: 165 MG/DL (ref 65–100)
GLUCOSE BLD STRIP.AUTO-MCNC: 202 MG/DL (ref 65–100)
GLUCOSE SERPL-MCNC: 109 MG/DL (ref 65–100)
HCT VFR BLD AUTO: 29.5 % (ref 36.6–50.3)
HGB BLD-MCNC: 8.8 G/DL (ref 12.1–17)
INR PPP: 1.4 (ref 0.9–1.1)
MCH RBC QN AUTO: 27.8 PG (ref 26–34)
MCHC RBC AUTO-ENTMCNC: 29.8 G/DL (ref 30–36.5)
MCV RBC AUTO: 93.4 FL (ref 80–99)
NRBC # BLD: 0 K/UL (ref 0–0.01)
NRBC BLD-RTO: 0 PER 100 WBC
PLATELET # BLD AUTO: 141 K/UL (ref 150–400)
PMV BLD AUTO: 10.8 FL (ref 8.9–12.9)
POTASSIUM SERPL-SCNC: 4.2 MMOL/L (ref 3.5–5.1)
PROTHROMBIN TIME: 14 SEC (ref 9–11.1)
RBC # BLD AUTO: 3.16 M/UL (ref 4.1–5.7)
SERVICE CMNT-IMP: ABNORMAL
SODIUM SERPL-SCNC: 135 MMOL/L (ref 136–145)
THERAPEUTIC RANGE,PTTT: ABNORMAL SECS (ref 58–77)
WBC # BLD AUTO: 6.5 K/UL (ref 4.1–11.1)

## 2019-02-21 PROCEDURE — 74011000250 HC RX REV CODE- 250: Performed by: INTERNAL MEDICINE

## 2019-02-21 PROCEDURE — 65660000000 HC RM CCU STEPDOWN

## 2019-02-21 PROCEDURE — 80048 BASIC METABOLIC PNL TOTAL CA: CPT

## 2019-02-21 PROCEDURE — 74011250637 HC RX REV CODE- 250/637: Performed by: INTERNAL MEDICINE

## 2019-02-21 PROCEDURE — 85027 COMPLETE CBC AUTOMATED: CPT

## 2019-02-21 PROCEDURE — 94664 DEMO&/EVAL PT USE INHALER: CPT

## 2019-02-21 PROCEDURE — 94640 AIRWAY INHALATION TREATMENT: CPT

## 2019-02-21 PROCEDURE — 77010033678 HC OXYGEN DAILY

## 2019-02-21 PROCEDURE — 97530 THERAPEUTIC ACTIVITIES: CPT

## 2019-02-21 PROCEDURE — 85730 THROMBOPLASTIN TIME PARTIAL: CPT

## 2019-02-21 PROCEDURE — 51798 US URINE CAPACITY MEASURE: CPT

## 2019-02-21 PROCEDURE — 82962 GLUCOSE BLOOD TEST: CPT

## 2019-02-21 PROCEDURE — 74011636637 HC RX REV CODE- 636/637: Performed by: INTERNAL MEDICINE

## 2019-02-21 PROCEDURE — 85610 PROTHROMBIN TIME: CPT

## 2019-02-21 PROCEDURE — 97116 GAIT TRAINING THERAPY: CPT

## 2019-02-21 PROCEDURE — 97535 SELF CARE MNGMENT TRAINING: CPT

## 2019-02-21 PROCEDURE — 74011250637 HC RX REV CODE- 250/637: Performed by: HOSPITALIST

## 2019-02-21 PROCEDURE — 36415 COLL VENOUS BLD VENIPUNCTURE: CPT

## 2019-02-21 PROCEDURE — 74011636637 HC RX REV CODE- 636/637: Performed by: HOSPITALIST

## 2019-02-21 RX ORDER — TRIAMCINOLONE ACETONIDE 1 MG/G
CREAM TOPICAL 2 TIMES DAILY
Status: DISCONTINUED | OUTPATIENT
Start: 2019-02-21 | End: 2019-02-22 | Stop reason: HOSPADM

## 2019-02-21 RX ADMIN — FUROSEMIDE 40 MG: 40 TABLET ORAL at 07:11

## 2019-02-21 RX ADMIN — ACETYLCYSTEINE 400 MG: 200 SOLUTION ORAL; RESPIRATORY (INHALATION) at 08:15

## 2019-02-21 RX ADMIN — APIXABAN 5 MG: 5 TABLET, FILM COATED ORAL at 09:22

## 2019-02-21 RX ADMIN — Medication 3 MG: at 21:31

## 2019-02-21 RX ADMIN — CARVEDILOL 6.25 MG: 6.25 TABLET, FILM COATED ORAL at 09:22

## 2019-02-21 RX ADMIN — ACETYLCYSTEINE 400 MG: 200 SOLUTION ORAL; RESPIRATORY (INHALATION) at 21:17

## 2019-02-21 RX ADMIN — SIMETHICONE CHEW TAB 80 MG 80 MG: 80 TABLET ORAL at 17:22

## 2019-02-21 RX ADMIN — PANTOPRAZOLE SODIUM 40 MG: 40 TABLET, DELAYED RELEASE ORAL at 07:11

## 2019-02-21 RX ADMIN — GUAIFENESIN 600 MG: 600 TABLET, EXTENDED RELEASE ORAL at 21:31

## 2019-02-21 RX ADMIN — Medication 10 ML: at 07:12

## 2019-02-21 RX ADMIN — LEVOTHYROXINE SODIUM 25 MCG: 25 TABLET ORAL at 07:11

## 2019-02-21 RX ADMIN — COLCHICINE 0.3 MG: 0.6 TABLET, FILM COATED ORAL at 09:21

## 2019-02-21 RX ADMIN — Medication 10 ML: at 15:01

## 2019-02-21 RX ADMIN — TRIAMCINOLONE ACETONIDE: 1 CREAM TOPICAL at 21:34

## 2019-02-21 RX ADMIN — APIXABAN 5 MG: 5 TABLET, FILM COATED ORAL at 21:31

## 2019-02-21 RX ADMIN — CARVEDILOL 6.25 MG: 6.25 TABLET, FILM COATED ORAL at 17:21

## 2019-02-21 RX ADMIN — Medication 1 CAPSULE: at 09:21

## 2019-02-21 RX ADMIN — FUROSEMIDE 40 MG: 40 TABLET ORAL at 17:21

## 2019-02-21 RX ADMIN — IPRATROPIUM BROMIDE AND ALBUTEROL SULFATE 3 ML: .5; 3 SOLUTION RESPIRATORY (INHALATION) at 08:15

## 2019-02-21 RX ADMIN — IPRATROPIUM BROMIDE AND ALBUTEROL SULFATE 3 ML: .5; 3 SOLUTION RESPIRATORY (INHALATION) at 21:17

## 2019-02-21 RX ADMIN — SIMETHICONE CHEW TAB 80 MG 80 MG: 80 TABLET ORAL at 09:22

## 2019-02-21 RX ADMIN — Medication 10 ML: at 21:31

## 2019-02-21 RX ADMIN — INSULIN GLARGINE 5 UNITS: 100 INJECTION, SOLUTION SUBCUTANEOUS at 21:37

## 2019-02-21 RX ADMIN — GUAIFENESIN 600 MG: 600 TABLET, EXTENDED RELEASE ORAL at 09:22

## 2019-02-21 RX ADMIN — INSULIN LISPRO 1 UNITS: 100 INJECTION, SOLUTION INTRAVENOUS; SUBCUTANEOUS at 21:31

## 2019-02-21 RX ADMIN — Medication 10 ML: at 21:34

## 2019-02-21 RX ADMIN — TRIAMCINOLONE ACETONIDE: 1 CREAM TOPICAL at 15:46

## 2019-02-21 NOTE — PROGRESS NOTES
RENAL  PROGRESS NOTE Subjective:  
resting Objective: VITALS SIGNS:   
Visit Vitals /75 (BP 1 Location: Left arm, BP Patient Position: Sitting) Pulse 94 Temp 98.1 °F (36.7 °C) Resp 19 Ht 5' 10\" (1.778 m) Wt 93.6 kg (206 lb 5.6 oz) SpO2 95% BMI 29.61 kg/m² O2 Device: Room air O2 Flow Rate (L/min): 0.5 l/min Temp (24hrs), Av.6 °F (36.4 °C), Min:97.3 °F (36.3 °C), Max:98.1 °F (36.7 °C) PHYSICAL EXAM: 
+ edema DATA REVIEW:  
 
INTAKE / OUTPUT:  
Last shift:      No intake/output data recorded. Last 3 shifts:  1901 -  0700 In: 7923 [P.O.:2310] Out: 1800 [Urine:1800] Intake/Output Summary (Last 24 hours) at 2019 6526 Last data filed at 2019 2773 Gross per 24 hour Intake 1200 ml Output 1400 ml Net -200 ml LABS:  
Recent Labs  
  19 
0428 19 
0342 19 
1725 WBC 6.5 6.5 8.4 HGB 8.8* 8.4* 8.7* HCT 29.5* 28.6* 29.8*  
* 111* 126* Recent Labs  
  19 
0428 19 
0353 19 
0342 19 
1725 *  --  138 135* K 4.2  --  4.6 4.7   --  107 104 CO2   --   24 *  --  87 216* BUN 64*  --  59* 60* CREA 2.11*  --  2.03* 2.06* CA 8.3*  --  8.7 8.5 INR 1.4* 1.3* 1.4*  --   
 
 
 
 
Assessment:  
 
MARYCRUZ- due to ATN . renal recovery. Non-oliguric. Baseline Cr 1.7-2.0mg/dl DM-2 Cardiac arrest; PEA- ? etiology. Code ice protocol; recurrence of niurka arrest on 2/6. S/p PPM. Stress test 2/13 neg PANCYTOPENIA,chronic Gout: was on pegloticase Hematuria: [place] in place. Urology consulted. Edema Plan:  
Lasix Renal function stable   
recom to call back urology if still macrohematuria Edmundo Orourke MD

## 2019-02-21 NOTE — PROGRESS NOTES
Problem: Mobility Impaired (Adult and Pediatric) Goal: *Acute Goals and Plan of Care (Insert Text) Physical Therapy Goals Revisited 2/19/2019, remain appropriate carry over. Physical Therapy Goals Initiated 2/12/2019 1. Patient will move from supine to sit and sit to supine , scoot up and down and roll side to side in bed with supervision/set-up within 7 days. 2.  Patient will perform sit to/from stand with minimal assistance/contact guard assist within 7 days. 3.  Patient will ambulate 50 feet with least restrictive assistive device and minimal assistance/contact guard assist within 7 days. 4.  Patient will ascend/descend 12 stairs with bilateral handrail(s) with minimal assistance/contact guard assist within 7 days. 5.  Patient will verbally and functionally recall 3/3 pacemaker precautions within 7 days. 6.  Patient will improve Tinetti score by 4-5 points within 7 days. physical Therapy TREATMENT Patient: Corie Villanueva (85 y.o. male) Date: 2/21/2019 Diagnosis: Cardiac arrest [facility]) [I46.9] Cardiac arrest ([facility] [address] Procedure(s) (LRB): 
INSERT PPM SINGLE VENTRICULAR (N/A) 14 Days Post-Op Precautions: Fall Chart, physical therapy assessment, plan of care and goals were reviewed. ASSESSMENT: 
Pt received up to the chair agreeable to PT. Today worked on sit to stand, required min assist from the chair and mod assist from the toilet. Immediate standing balance is fair with delayed anterior weight shift putting him at risk for falls. He worked on [employer]-Pl[employer]eebrite with hand held assist, at baseline is independent, NO  assistive device. Today he was able to amb with hand held assist X 1 min assist. Note lateral sway while amb. Pt took two standing rest breaks while amb, rested so HR could recover up, to 121 at highest. He also paid the bathroom a visit and required mod assist to come to stand off the toilet.  Overall pt continues to make slow steady gains with mobility but will require rehab. He is not yet ready to try stairs and has stairs to just get into his home and to get to the bedroom level of his home. Progression toward goals: 
[]    Improving appropriately and progressing toward goals [x]    Improving slowly and progressing toward goals 
[]    Not making progress toward goals and plan of care will be adjusted PLAN: 
Patient continues to benefit from skilled intervention to address the above impairments. Continue treatment per established plan of care. Discharge Recommendations:  Inpatient Rehab (preferred), [place] and To Be Determined Further Equipment Recommendations for Discharge: To be determined at rehab SUBJECTIVE:  
Patient stated I want to try to use the bathroom (use the commode for a BM).  
 
OBJECTIVE DATA SUMMARY:  
Chart checked, pt cleared by nursing Critical Behavior: 
Neurologic State: Alert Orientation Level: Oriented X4 Cognition: Appropriate decision making, Follows commands Safety/Judgement: Insight into deficits Functional Mobility Training: 
Bed Mobility: 
  
  
  
 not assessed Transfers: 
Sit to Stand: Minimum assistance; Moderate assistance from a commode Stand to Sit: Minimum assistance Balance: 
Sitting: Intact; Without support Standing: Impaired; With support Standing - Static: Good Standing - Dynamic : FairAmbulation/Gait Training: 
Distance (ft): 80 Feet (ft)(with two standing and one seated rest breaks) Assistive Device: Gait belt(hand held assist) Ambulation - Level of Assistance: Minimal assistance Gait Abnormalities: Decreased step clearance Base of Support: Center of gravity altered Speed/Mariella: Slow Step Length: Left shortened;Right shortened Stairs: 
  
  
   
 
Neuro Re-Education: 
 
Therapeutic Exercises:  
 
Pain: 
Pain Scale 1: Numeric (0 - 10) Pain Intensity 1: 0 
  
  
  
  
 Activity Tolerance:  
See assessment above Please refer to the flowsheet for vital signs taken during this treatment. After treatment:  
[x]    Patient left in no apparent distress sitting up in chair 
[]    Patient left in no apparent distress in bed 
[x]    Call bell left within reach [x]    Nursing notified 
[]    Caregiver present 
[]    Bed alarm activated COMMUNICATION/COLLABORATION:  
The patients plan of care was discussed with: Registered Nurse Kristen Line Time Calculation: 28 mins

## 2019-02-21 NOTE — PROGRESS NOTES
[facility] Cardiology Progress Note Date of service: 2/21/2019 Subjective: 
Mr Fatou Bennett says he is doing fairly well No new complaints Diuresing well on lasix Objective: 
 
Visit Vitals /70 (BP 1 Location: Left arm, BP Patient Position: At rest) Pulse (!) 108 Temp 97.6 °F (36.4 °C) Resp 8 Ht 5' 10\" (1.778 m) Wt 93.6 kg (206 lb 5.6 oz) SpO2 94% BMI 29.61 kg/m² Physical Exam  
Constitutional: He appears well-developed and well-nourished. HENT:  
Head: Normocephalic and atraumatic. Eyes: Conjunctivae are normal. No scleral icterus. Neck: No JVD present. Cardiovascular: Normal rate. An irregularly irregular rhythm present. Exam reveals no gallop. Murmur heard. Early systolic murmur is present with a grade of 2/6. Pulmonary/Chest: Effort normal. No stridor. No respiratory distress. He has no wheezes. He has no rales. Abdominal: Soft. He exhibits no distension. Genitourinary:  
Genitourinary Comments: Ch[place] present, slight hematuria Musculoskeletal: He exhibits edema. He exhibits no deformity. Neurological: He is alert. Skin: Skin is warm and dry. Data reviewed: 
Recent Results (from the past 12 hour(s)) PROTHROMBIN TIME + INR Collection Time: 02/21/19  4:28 AM  
Result Value Ref Range INR 1.4 (H) 0.9 - 1.1 Prothrombin time 14.0 (H) 9.0 - 11.1 sec PTT Collection Time: 02/21/19  4:28 AM  
Result Value Ref Range aPTT 35.2 (H) 22.1 - 32.0 sec  
 aPTT, therapeutic range     58.0 - 77.0 SECS  
CBC W/O DIFF Collection Time: 02/21/19  4:28 AM  
Result Value Ref Range WBC 6.5 4.1 - 11.1 K/uL  
 RBC 3.16 (L) 4.10 - 5.70 M/uL HGB 8.8 (L) 12.1 - 17.0 g/dL HCT 29.5 (L) 36.6 - 50.3 % MCV 93.4 80.0 - 99.0 FL  
 MCH 27.8 26.0 - 34.0 PG  
 MCHC 29.8 (L) 30.0 - 36.5 g/dL  
 RDW 14.5 11.5 - 14.5 % PLATELET 960 (L) 539 - 400 K/uL MPV 10.8 8.9 - 12.9 FL  
 NRBC 0.0 0  WBC ABSOLUTE NRBC 0.00 0.00 - 0.01 K/uL METABOLIC PANEL, BASIC Collection Time: 02/21/19  4:28 AM  
Result Value Ref Range Sodium 135 (L) 136 - 145 mmol/L Potassium 4.2 3.5 - 5.1 mmol/L Chloride 103 97 - 108 mmol/L  
 CO2 23 21 - 32 mmol/L Anion gap 9 5 - 15 mmol/L Glucose 109 (H) 65 - 100 mg/dL BUN 64 (H) 6 - 20 MG/DL Creatinine 2.11 (H) 0.70 - 1.30 MG/DL  
 BUN/Creatinine ratio 30 (H) 12 - 20 GFR est AA 38 (L) >60 ml/min/1.73m2 GFR est non-AA 31 (L) >60 ml/min/1.73m2 Calcium 8.3 (L) 8.5 - 10.1 MG/DL Telemetry: a.fib - rate control ok Assessment: 1. Cardiac arrest. PEA. With events of 2/6 it now seems like that his underlying mechanism may have been bradyarrhythmia from SSS.  
  
2. SSS: s/p PPM 2/7. 
  
3. Chronic a.fib - rate control mostly ok 
  
4. Volume excess: likely a result of florinef and has been of his usual diuretic dose. Weight has been steadily rising and he now has quite a bit of leg edema. 5. Hematuria Resolved, dsouza out 6. CKD stage III to IV Renal indices stable 7. Anemia, probably from CKD 
  
8. Gout 9. Thrombocytopenia: unclear etiology Fluctuating - fairly stable currently 10. Pulmonary hypertension 
  
Plan: 
Continue coreg 6.25mg bid Continue Eliquis Continue lasix 40mg bid for now while watching renal parameters Probably best to d/c him on 40mg once daily Seems stable for discharge to inpatient rehab from cardiac standpoint. Signed: 
Lemuel Caruso MD 
Interventional Cardiology 2/21/2019

## 2019-02-21 NOTE — PROGRESS NOTES
Problem: Patient Education: Go to Patient Education Activity Goal: Patient/Family Education Outcome: Progressing Towards Goal 
Pt. calls out for assistance with standing to void. Walker used to stand with 1 person assistance. Urinal used for voiding. Urine is brown, hazy, odorless with occasion clots. Post void bladder scans performed to check for retention. Patient encouraged to void frequently. Teaching performed on Furosemide and patient verbalizes understanding. Opportunity for questions provided to patient and family.

## 2019-02-21 NOTE — PROGRESS NOTES
Problem: Falls - Risk of 
Goal: *Absence of Falls Document Laurel Boo Fall Risk and appropriate interventions in the flowsheet. Outcome: Progressing Towards Goal 
Fall Risk Interventions: 
Mobility Interventions: Assess mobility with egress test, Bed/chair exit alarm, Communicate number of staff needed for ambulation/transfer, Mechanical lift, PT Consult for mobility concerns, Patient to call before getting OOB, OT consult for ADLs, PT Consult for assist device competence, Strengthening exercises (ROM-active/passive), Utilize walker, cane, or other assistive device Mentation Interventions: Adequate sleep, hydration, pain control, Family/sitter at bedside, Gait belt with transfers/ambulation, Toileting rounds Medication Interventions: Assess postural VS orthostatic hypotension, Evaluate medications/consider consulting pharmacy, Patient to call before getting OOB, Teach patient to arise slowly, Utilize gait belt for transfers/ambulation Elimination Interventions: Call light in reach, Elevated toilet seat, Patient to call for help with toileting needs, Toilet paper/wipes in reach, Toileting schedule/hourly rounds, Urinal in reach

## 2019-02-21 NOTE — PROGRESS NOTES
Hospitalist Progress Note Hammad Lang MD 
Answering service: [phone] OR [phone] from in house phone Date of Service:  2019 NAME:  Tayo Jefferson :  1945 MRN:  [id number] Admission Summary:  
The patient is a 79-year-old gentleman with past medical history of anemia, atrial fibrillation, Painter's esophagus, coronary artery disease, diabetes, heart failure, cardiomyopathy, myocarditis, history of hypertension, diabetic retinopathy, right carotid artery stenosis, TIA and vitamin D deficiency who presents to the hospital from [facility]. Patient presented to [address] [place] [address] today apparently in PEA. The history was primarily obtained from the ER physician as the patient is intubated and his wife is not present at the bedside. Interval history / Subjective:  
Patient seen and examined. Some chest rash Per RN residual in bladder stil around 200 He has some rash on chest  
Case management consulted for inpatient rehab. Assessment & Plan:  
 
AHRF status post cardiac arrest/pulseless electrical activity POA:  
-s/p hypothermia protocol -EEG non specific no seizure activity 
-Coded  multiple times post hypothermia protocol. Possibly secondary to bradycardia 
-s/p pacemaker  
-reintubated for airway and reextubated 2/10 after bronch  
-s/p zosyn for 7 days 
-nuclear stress test  with normal EF, no ischemia 
-Stable Orthostatic hypotension:resolved MARYCRUZ on CKD stage 3: secondary to ATN 
-nephrology consulted 
-creatinine slowly improving  
-lasix 40 daily on d/c  
-volume overloaded Chronic a.fib: on eliquis  Rate controlled Hematuria:  
-urology following, appreciate input, [facility] 
- dsouza cath removed but now retaining urine Diabetes, Type II: Lantus and SSI Insomnia: prn melatonin Hypothyroidism: synthroid Anemia, appears to be chronic: stable -s/p 2 unit PRBC 2/8  hgb > 8 Fever: resolved, s/p antibiotics Gastrointestinal PPX added simethicone Thrombocytopenia: stable, continue to monitor Abnormal LFT's from shock liver? Vs CLD can investigate as outpt Coagulopathy: suspect secondary to shock liver Code status: Full DVT prophylaxis: SCD Care Plan discussed with: Patient/Family and Nurse Disposition: TBD inpatient rehab ready for d/c Hospital Problems  Date Reviewed: 2/4/2019 Codes Class Noted POA * (Principal) Cardiac arrest [facility]) ICD-10-CM: I46.9 ICD-9-CM: 427.5  2/4/2019 Unknown Review of Systems:  
Review of systems not obtained due to patient factors. Vital Signs:  
 Last 24hrs VS reviewed since prior progress note. Most recent are: 
Visit Vitals /70 (BP 1 Location: Left arm, BP Patient Position: At rest) Pulse (!) 108 Temp 97.6 °F (36.4 °C) Resp 8 Ht 5' 10\" (1.778 m) Wt 93.6 kg (206 lb 5.6 oz) SpO2 94% BMI 29.61 kg/m² Intake/Output Summary (Last 24 hours) at 2/21/2019 1020 Last data filed at 2/21/2019 4881 Gross per 24 hour Intake 720 ml Output 1550 ml Net -830 ml Physical Examination:  
 
 
     
Constitutional:  NAD, alert rash noted on chest   
ENT:  MMM Resp:  CTA   
CV:  irregular, paced rhythm GI:  Soft, non distended, non tender. bs+ : scrotal and penial edema Musculoskeletal:  1+ edema, warm, 2+ pulses throughout Neurologic:  Alert and awake Data Review:  
 I personally reviewed  Image and labs Ct Head Wo Cont Result Date: 2/4/2019 IMPRESSION: No acute intracranial hemorrhage, mass or infarct. Xr Chest [place] Result Date: 2/15/2019 Impression: 1. Enlarged cardiac silhouette, central pulmonary vascular congestion slightly improved with persistent pleural effusion Xr Chest [place] Result Date: 2/13/2019 IMPRESSION: Mild central vascular congestion. Pacemaker.  Infiltrate/edema at left base plus/minus trace pleural effusion. Xr Chest [place]etta [place] Result Date: 2/11/2019 IMPRESSION: Decreased small left pleural effusion and left basilar opacity. Xr Chest Renetta Otter Result Date: 2/10/2019 IMPRESSION: Stable left effusion and underlying atelectasis Xr Chest Renetta Otter Result Date: 2/9/2019 IMPRESSION: No interval change. Xr Chest Renetta Otter Result Date: 2/8/2019 IMPRESSION: ET tube is in satisfactory position. There is slight increasing haziness overlying left hemithorax with opacification left base consistent with left basilar atelectasis /airspace disease and left effusion. Xr Chest Renetta Otter Result Date: 2/7/2019 IMPRESSION: Left lung base opacification. No significant change. Xr Chest Renetta Otter Result Date: 2/6/2019 IMPRESSION: ET tube in satisfactory position. Worsening aeration of the left lung. Xr Chest Renetta Otter Result Date: 2/6/2019 Impression: Stable bilateral lower lobe atelectasis. Xr Chest Renetta Otter Result Date: 2/5/2019 IMPRESSION: No significant change. Xr Chest Renetta Otter Result Date: 2/4/2019 IMPRESSION: Endotracheal tube appears to be in satisfactory position. Xr Abd Port  1 V Result Date: 2/6/2019 IMPRESSION: NG tube in the stomach. Xr Abd Port  1 V Result Date: 2/4/2019 IMPRESSION: NG tube in appropriate position. Labs:  
 
Recent Labs  
  02/21/19 
0428 02/19/19 
4623 WBC 6.5 6.5 HGB 8.8* 8.4* HCT 29.5* 28.6*  
* 111* Recent Labs  
  02/21/19 
0428 02/19/19 
0342 02/18/19 
1725 * 138 135* K 4.2 4.6 4.7  107 104 CO2 23 21 24 BUN 64* 59* 60* CREA 2.11* 2.03* 2.06* * 87 216* CA 8.3* 8.7 8.5 No results for input(s): SGOT, GPT, ALT, AP, TBIL, TBILI, TP, ALB, GLOB, GGT, AML, LPSE in the last 72 hours. No lab exists for component: AMYP, HLPSE Recent Labs  
  02/21/19 
0428 02/20/19 
0353 02/19/19 
3411 INR 1.4* 1.3* 1.4* PTP 14.0* 13.1* 13.7* APTT 35.2* 32.9* 29.7 No results for input(s): FE, TIBC, PSAT, FERR in the last 72 hours. No results found for: FOL, RBCF No results for input(s): PH, PCO2, PO2 in the last 72 hours. No results for input(s): CPK, CKNDX, TROIQ in the last 72 hours. No lab exists for component: CPKMB Lab Results Component Value Date/Time Cholesterol, total 135 02/23/2018 11:31 AM  
 HDL Cholesterol 34 (L) 02/23/2018 11:31 AM  
 LDL, calculated 79 02/23/2018 11:31 AM  
 Triglyceride 111 02/23/2018 11:31 AM  
 CHOL/HDL Ratio 5.0 01/15/2016 03:49 AM  
 
Lab Results Component Value Date/Time Glucose (POC) 156 (H) 02/20/2019 09:05 PM  
 Glucose (POC) 168 (H) 02/20/2019 04:32 PM  
 Glucose (POC) 146 (H) 02/20/2019 11:29 AM  
 Glucose (POC) 160 (H) 02/20/2019 07:07 AM  
 Glucose (POC) 185 (H) 02/19/2019 09:27 PM  
 
Lab Results Component Value Date/Time Color YELLOW/STRAW 02/04/2019 04:08 PM  
 Appearance CLOUDY (A) 02/04/2019 04:08 PM  
 Specific gravity 1.012 02/04/2019 04:08 PM  
 pH (UA) 5.0 02/04/2019 04:08 PM  
 Protein 30 (A) 02/04/2019 04:08 PM  
 Glucose NEGATIVE  02/04/2019 04:08 PM  
 Ketone TRACE (A) 02/04/2019 04:08 PM  
 Bilirubin NEGATIVE  02/04/2019 04:08 PM  
 Urobilinogen 0.2 02/04/2019 04:08 PM  
 Nitrites NEGATIVE  02/04/2019 04:08 PM  
 Leukocyte Esterase NEGATIVE  02/04/2019 04:08 PM  
 Epithelial cells FEW 02/04/2019 04:08 PM  
 Bacteria NEGATIVE  02/04/2019 04:08 PM  
 WBC 10-20 02/04/2019 04:08 PM  
 RBC  02/04/2019 04:08 PM  
 
 
 
Medications Reviewed:  
 
Current Facility-Administered Medications Medication Dose Route Frequency  furosemide (LASIX) tablet 40 mg  40 mg Oral ACB&D  
 diphenhydrAMINE-zinc acetate 1%-0.1% (BENADRYL) cream   Topical TID PRN  
 apixaban (ELIQUIS) tablet 5 mg  5 mg Oral BID  acetaminophen (TYLENOL) tablet 650 mg  650 mg Oral Q6H  
 colchicine tablet 0.3 mg  0.3 mg Oral DAILY  carvedilol (COREG) tablet 6.25 mg  6.25 mg Oral BID WITH MEALS  
  melatonin tablet 3 mg  3 mg Oral QHS  pantoprazole (PROTONIX) tablet 40 mg  40 mg Oral ACB  insulin lispro (HUMALOG) injection   SubCUTAneous AC&HS  acetylcysteine (MUCOMYST) 200 mg/mL (20 %) solution 400 mg  2 mL Nebulization BID RT  
 albuterol-ipratropium (DUO-NEB) 2.5 MG-0.5 MG/3 ML  3 mL Nebulization BID RT  
 guaiFENesin ER (MUCINEX) tablet 600 mg  600 mg Oral Q12H  levothyroxine (SYNTHROID) tablet 25 mcg  25 mcg Oral ACB  simethicone (MYLICON) tablet 80 mg  80 mg Oral BID  hydrALAZINE (APRESOLINE) 20 mg/mL injection 20 mg  20 mg IntraVENous Q6H PRN  
 lactobac ac& pc-s.therm-b.anim (LAVERNE Q/RISAQUAD)  1 Cap Oral DAILY  sodium chloride (NS) flush 5-40 mL  5-40 mL IntraVENous PRN  
 0.9% sodium chloride infusion 250 mL  250 mL IntraVENous PRN  
 morphine injection 2 mg  2 mg IntraVENous Q6H PRN  
 insulin glargine (LANTUS) injection 5 Units  5 Units SubCUTAneous QHS  bacitracin 500 unit/gram packet 1 Packet  1 Packet Topical PRN  
 sodium chloride (NS) flush 5-40 mL  5-40 mL IntraVENous Q8H  
 sodium chloride (NS) flush 5-40 mL  5-40 mL IntraVENous PRN  
 sodium chloride (NS) flush 5-40 mL  5-40 mL IntraVENous Q8H  
 sodium chloride (NS) flush 5-40 mL  5-40 mL IntraVENous PRN  
 glucose chewable tablet 16 g  4 Tab Oral PRN  
 dextrose (D50W) injection syrg 12.5-25 g  25-50 mL IntraVENous PRN  
 glucagon (GLUCAGEN) injection 1 mg  1 mg IntraMUSCular PRN  
 
______________________________________________________________________ EXPECTED LENGTH OF STAY: 2d 0h 
ACTUAL LENGTH OF STAY:          Amy Sutherland MD

## 2019-02-21 NOTE — PROGRESS NOTES
Pt reporting continued itching and rash this AM. Dr. Betty Gonzalez notified. Orders received for steroid cream. Plan for pt to take shower and use some steroid cream. Dr. Betty Gonzalez and pharmacist, Sheila Sears do not think rash is due to any medications pt has been receiving.  
 
1300- orders received to [facility] to see patient again. [facility] paged. Dr. Alex Amato ok with pt going to rehab, hematuria is due to old clot because of color. Dr. Betty Gonzalez notified, Dr. Betty Gonzalez also ok with pt going to rehab. Yoly Victor, care management notified of above. Krista notified RN that pt has room at [facility] tomorrow. Dr. Betty Gonzalez notified.

## 2019-02-21 NOTE — PROGRESS NOTES
2200 - During pt care and applying benadryl cream, primary nurse noticed rash that had originally started on pt's back evening of 2/19/19 had spread to pt chest, abdomin, underarms, and groin/ inner anterior thighs. Also noted red ringed rash around pace maker site. Rash around pacer site is new as of evening of 2/20/19. Pt and wife denied that pt had been recently been wiped with bath wipes, has not received any new medications or food. Pt and wife mentioned after application of benadryl cream that pt had multiple medications, besides IV Solu Medrol, that he had taken PO before he coded prior to admission. Pt and wife both stated the medications he had were \"Tylenol, Benadryl, and Pepcid. But they [doctors] thought it was the IV medication he had, Solu Medrol that caused him to code\" 26 - Hospitalist on call was paged and made aware of spreading rash, pacemaker site, and medications given prior to admission (prior to pt coding). No new orders given at this time and told to update Cardiology in the morning about change in rash, pace maker site, and medications. Will hold Tylenol, Benadryl, and Pepcid for now. Will continue to monitor closely. Weight change: 0 kg (0 lb) Last 3 Recorded Weights in this Encounter 02/19/19 3245 02/20/19 0355 02/21/19 9442 Weight: 92.3 kg (203 lb 7.8 oz) 93.6 kg (206 lb 5.6 oz) 93.6 kg (206 lb 5.6 oz) Bedside and Verbal shift change report given to Sweetie (oncoming nurse) by Jimbo Loera (offgoing nurse). Report included the following information SBAR, Kardex, Intake/Output, MAR, Accordion, Recent Results and Cardiac Rhythm Afib.

## 2019-02-21 NOTE — PROGRESS NOTES
This CM spoke with Jessica horan from [facility] and their medical director is awaiting patient to be seen again by Urology for macrohematuria and for new rash to be further evaluated. Dustin Woods RN CRM Ext [phone] This CM spoke with Jessica horan from [facility] and they are ready to accept patient but do not have a bed today. Anticipate  a bed will be available tomorrow at 1000. Bedside nurse made aware.

## 2019-02-21 NOTE — PROGRESS NOTES
Problem: Self Care Deficits Care Plan (Adult) Goal: *Acute Goals and Plan of Care (Insert Text) Occupational Therapy Goals Goals reviewed and modified 2/21/2019 1. Patient will perform ADLs standing 10 mins without fatigue or LOB with supervision within 5 day(s). 2.  Patient will perform upper body ADLs with modified independence within 5 day(s). 3.  Patient will perform lower body ADLs using AE PRN with contact guard assist within 5 day(s). 4.  Patient will perform toilet transfers with minimal assistance/contact guard assistance within 5 day(s). 5.  Patient will perform all aspects of toileting with minimal assistance/contact guard assistance within 5 day(s). 6.  Patient will participate in cardiac/sternal upper extremity therapeutic exercise/activities to increase independence with ADLs with supervision/set-up for 5 minutes within 5 day(s). Initiated 2/13/2019 1. Patient will perform ADLs standing 5 mins without fatigue or LOB with minimal assistance/contact guard assistance within 5 day(s). 2.  Patient will perform upper body ADLs with supervision/set-up within 5 day(s). 3.  Patient will perform lower body ADLs using AE PRN with minimal assistance/contact guard assistance within 5 day(s). 4.  Patient will perform toilet transfers with minimal assistance/contact guard assistance within 5 day(s). 5.  Patient will perform all aspects of toileting with minimal assistance/contact guard assistance within 5 day(s). 6.  Patient will participate in cardiac/sternal upper extremity therapeutic exercise/activities to increase independence with ADLs with supervision/set-up for 5 minutes within 5 day(s). Occupational Therapy TREATMENT: WEEKLY REASSESSMENT Patient: Wei Garvey (86 y.o. male) Date: 2/21/2019 Diagnosis: Cardiac arrest [facility]) [I46.9] Cardiac arrest ([facility] [address] Procedure(s) (LRB): 
INSERT PPM SINGLE VENTRICULAR (N/A) 14 Days Post-Op Precautions: Fall Chart, occupational therapy assessment, plan of care, and goals were reviewed. ASSESSMENT: 
Patient is largely MIN A-MOD A for sit <> stand transfers, MAX A for LB ADLs, and MIN A-SBA for standing toileting and grooming tasks today. Per RN report, patient also performing shower transfers x MOD A x 1 using grab bar and walker. Patient continues to present with BLE edema, elevated HR, and decreased endurance however is motivated to participate in therapy sessions. Continue to recommend inpatient rehab to maximize patient safety and independence with ADL transfers and tasks. Progression toward goals: 
[x]            Improving appropriately and progressing toward goals []            Improving slowly and progressing toward goals []            Not making progress toward goals and plan of care will be adjusted PLAN: 
Goals have been updated based on progression since last assessment. Patient continues to benefit from skilled intervention to address the above impairments. Continue to follow patient 5 times a week to address goals. Planned Interventions: 
[x]                    Self Care Training                  [x]             Therapeutic Activities [x]                    Functional Mobility Training    []             Cognitive Retraining 
[x]                    Therapeutic Exercises           [x]             Endurance Activities [x]                    Balance Training                   []             Neuromuscular Re-Education []                    Visual/Perceptual Training     [x]        Home Safety Training 
[x]                    Patient Education                 [x]             Family Training/Education []                    Other (comment): 
Discharge Recommendations: Inpatient Rehab Further Equipment Recommendations for Discharge: TBD SUBJECTIVE:  
Patient stated I feel like a danish (after having a shower with [facility] staff).  OBJECTIVE DATA SUMMARY:  
Cognitive/Behavioral Status: Neurologic State: Alert Orientation Level: Oriented X4 Cognition: Appropriate for age attention/concentration; Follows commands Perception: Appears intact Perseveration: No perseveration noted Safety/Judgement: Insight into deficits Functional Mobility and Transfers for ADLs: 
Transfers: 
Sit to Stand: Minimum assistance;Assist x1;Additional time; Adaptive equipment(RW) Functional Transfers Bathroom Mobility: Minimum assistance Shower Transfer: Moderate assistance;Assist x1;Additional time(per RN report) Balance: 
Sitting: Intact; With support Standing: Impaired; With support Standing - Static: Good Standing - Dynamic : Fair ADL Intervention: OT facilitated functional mobility <> bathroom for grooming tasks and standing toileting tasks. Patient left seated in recliner with family present and BLE elevated following OT intervention. Grooming Brushing Teeth: Stand-by assistance(A for opening containers) Lower Body Dressing Assistance Socks: Total assistance (dependent) Toileting Toileting Assistance: Minimum assistance(standing using urinal with walker) Cognitive Retraining Safety/Judgement: Insight into deficits Pain: 
Pain Scale 1: Numeric (0 - 10) Pain Intensity 1: 0 Activity Tolerance: VSS Please refer to the flowsheet for vital signs taken during this treatment. After treatment:  
[x] Patient left in no apparent distress sitting up in chair 
[] Patient left in no apparent distress in bed 
[x] Call bell left within reach [x] Nursing notified 
[x] Caregiver present 
[] Bed alarm activated COMMUNICATION/COLLABORATION:  
The patients plan of care was discussed with: Physical Therapist and Registered Nurse Fatimah Bustos Time Calculation: 25 mins

## 2019-02-22 ENCOUNTER — HOSPITAL ENCOUNTER (OUTPATIENT)
Dept: REHABILITATION | Age: 74
End: 2019-03-08
Attending: PHYSICAL MEDICINE & REHABILITATION | Admitting: PHYSICAL MEDICINE & REHABILITATION

## 2019-02-22 VITALS
WEIGHT: 209.66 LBS | RESPIRATION RATE: 14 BRPM | DIASTOLIC BLOOD PRESSURE: 56 MMHG | BODY MASS INDEX: 30.02 KG/M2 | HEIGHT: 70 IN | SYSTOLIC BLOOD PRESSURE: 132 MMHG | HEART RATE: 88 BPM | OXYGEN SATURATION: 97 % | TEMPERATURE: 97.6 F

## 2019-02-22 PROBLEM — I46.9 CARDIAC ARREST (HCC): Status: RESOLVED | Noted: 2019-02-04 | Resolved: 2019-02-22

## 2019-02-22 LAB
APPEARANCE UR: ABNORMAL
APTT PPP: 34.7 SEC (ref 22.1–32)
BACTERIA URNS QL MICRO: NEGATIVE /HPF
BILIRUB UR QL: NEGATIVE
COLOR UR: ABNORMAL
EPITH CASTS URNS QL MICRO: ABNORMAL /LPF
GLUCOSE BLD STRIP.AUTO-MCNC: 127 MG/DL (ref 65–100)
GLUCOSE BLD STRIP.AUTO-MCNC: 192 MG/DL (ref 65–100)
GLUCOSE UR STRIP.AUTO-MCNC: NEGATIVE MG/DL
HGB UR QL STRIP: ABNORMAL
INR PPP: 1.5 (ref 0.9–1.1)
KETONES UR QL STRIP.AUTO: NEGATIVE MG/DL
LEUKOCYTE ESTERASE UR QL STRIP.AUTO: ABNORMAL
NITRITE UR QL STRIP.AUTO: POSITIVE
PH UR STRIP: 5 [PH] (ref 5–8)
PROT UR STRIP-MCNC: 30 MG/DL
PROTHROMBIN TIME: 14.5 SEC (ref 9–11.1)
RBC #/AREA URNS HPF: >100 /HPF (ref 0–5)
SERVICE CMNT-IMP: ABNORMAL
SERVICE CMNT-IMP: ABNORMAL
SP GR UR REFRACTOMETRY: 1.01 (ref 1–1.03)
THERAPEUTIC RANGE,PTTT: ABNORMAL SECS (ref 58–77)
UA: UC IF INDICATED,UAUC: ABNORMAL
UROBILINOGEN UR QL STRIP.AUTO: 0.2 EU/DL (ref 0.2–1)
WBC URNS QL MICRO: ABNORMAL /HPF (ref 0–4)

## 2019-02-22 PROCEDURE — 74011250637 HC RX REV CODE- 250/637: Performed by: INTERNAL MEDICINE

## 2019-02-22 PROCEDURE — 85730 THROMBOPLASTIN TIME PARTIAL: CPT

## 2019-02-22 PROCEDURE — 82962 GLUCOSE BLOOD TEST: CPT

## 2019-02-22 PROCEDURE — 74011000250 HC RX REV CODE- 250: Performed by: INTERNAL MEDICINE

## 2019-02-22 PROCEDURE — 36415 COLL VENOUS BLD VENIPUNCTURE: CPT

## 2019-02-22 PROCEDURE — 94640 AIRWAY INHALATION TREATMENT: CPT

## 2019-02-22 PROCEDURE — 81001 URINALYSIS AUTO W/SCOPE: CPT

## 2019-02-22 PROCEDURE — 51798 US URINE CAPACITY MEASURE: CPT

## 2019-02-22 PROCEDURE — 74011250637 HC RX REV CODE- 250/637: Performed by: HOSPITALIST

## 2019-02-22 PROCEDURE — 85610 PROTHROMBIN TIME: CPT

## 2019-02-22 RX ORDER — INSULIN GLARGINE 100 [IU]/ML
5 INJECTION, SOLUTION SUBCUTANEOUS
Qty: 4 PEN | Refills: 1 | Status: SHIPPED | OUTPATIENT
Start: 2019-02-22 | End: 2019-02-22 | Stop reason: SDUPTHER

## 2019-02-22 RX ORDER — TRIAMCINOLONE ACETONIDE 1 MG/G
CREAM TOPICAL 2 TIMES DAILY
Qty: 15 G | Refills: 0 | Status: SHIPPED | OUTPATIENT
Start: 2019-02-22 | End: 2019-03-08

## 2019-02-22 RX ORDER — IPRATROPIUM BROMIDE AND ALBUTEROL SULFATE 2.5; .5 MG/3ML; MG/3ML
3 SOLUTION RESPIRATORY (INHALATION)
Qty: 30 NEBULE | Refills: 0 | Status: SHIPPED | OUTPATIENT
Start: 2019-02-22 | End: 2019-02-22

## 2019-02-22 RX ORDER — LANOLIN ALCOHOL/MO/W.PET/CERES
3 CREAM (GRAM) TOPICAL
Qty: 30 TAB | Refills: 0 | Status: SHIPPED | OUTPATIENT
Start: 2019-02-22 | End: 2019-03-08

## 2019-02-22 RX ORDER — IPRATROPIUM BROMIDE AND ALBUTEROL SULFATE 2.5; .5 MG/3ML; MG/3ML
3 SOLUTION RESPIRATORY (INHALATION)
Qty: 30 NEBULE | Refills: 0 | Status: SHIPPED | OUTPATIENT
Start: 2019-02-22 | End: 2019-03-08

## 2019-02-22 RX ORDER — CARVEDILOL 6.25 MG/1
6.25 TABLET ORAL 2 TIMES DAILY WITH MEALS
Qty: 60 TAB | Refills: 0 | Status: SHIPPED
Start: 2019-02-22 | End: 2019-02-22

## 2019-02-22 RX ORDER — FUROSEMIDE 40 MG/1
40 TABLET ORAL 2 TIMES DAILY
Qty: 60 TAB | Refills: 0 | Status: SHIPPED
Start: 2019-02-22 | End: 2019-02-22

## 2019-02-22 RX ORDER — PANTOPRAZOLE SODIUM 40 MG/1
40 TABLET, DELAYED RELEASE ORAL
Qty: 30 TAB | Refills: 0 | Status: SHIPPED | OUTPATIENT
Start: 2019-02-23 | End: 2019-02-22

## 2019-02-22 RX ORDER — CARVEDILOL 6.25 MG/1
6.25 TABLET ORAL 2 TIMES DAILY WITH MEALS
Qty: 60 TAB | Refills: 1 | Status: SHIPPED | OUTPATIENT
Start: 2019-02-22 | End: 2019-03-24

## 2019-02-22 RX ORDER — TRIAMCINOLONE ACETONIDE 1 MG/G
CREAM TOPICAL 2 TIMES DAILY
Qty: 15 G | Refills: 0 | Status: SHIPPED | OUTPATIENT
Start: 2019-02-22 | End: 2019-02-22

## 2019-02-22 RX ORDER — LANOLIN ALCOHOL/MO/W.PET/CERES
3 CREAM (GRAM) TOPICAL
Qty: 30 TAB | Refills: 0 | Status: SHIPPED | OUTPATIENT
Start: 2019-02-22 | End: 2019-02-22

## 2019-02-22 RX ORDER — INSULIN GLARGINE 100 [IU]/ML
5 INJECTION, SOLUTION SUBCUTANEOUS
Qty: 4 PEN | Refills: 1 | Status: SHIPPED | OUTPATIENT
Start: 2019-02-22 | End: 2020-11-11 | Stop reason: SDUPTHER

## 2019-02-22 RX ORDER — FUROSEMIDE 40 MG/1
40 TABLET ORAL 2 TIMES DAILY
Qty: 60 TAB | Refills: 0 | Status: SHIPPED | OUTPATIENT
Start: 2019-02-22 | End: 2019-03-24

## 2019-02-22 RX ORDER — COLCHICINE 0.6 MG/1
0.3 TABLET ORAL DAILY
Qty: 15 TAB | Refills: 0 | Status: SHIPPED | OUTPATIENT
Start: 2019-02-22 | End: 2019-02-22

## 2019-02-22 RX ORDER — COLCHICINE 0.6 MG/1
0.3 TABLET ORAL DAILY
Qty: 15 TAB | Refills: 0 | Status: SHIPPED | OUTPATIENT
Start: 2019-02-22 | End: 2019-03-22 | Stop reason: SDUPTHER

## 2019-02-22 RX ORDER — PANTOPRAZOLE SODIUM 40 MG/1
40 TABLET, DELAYED RELEASE ORAL
Qty: 30 TAB | Refills: 0 | Status: SHIPPED | OUTPATIENT
Start: 2019-02-23 | End: 2019-03-08

## 2019-02-22 RX ADMIN — Medication 10 ML: at 07:21

## 2019-02-22 RX ADMIN — SIMETHICONE CHEW TAB 80 MG 80 MG: 80 TABLET ORAL at 09:10

## 2019-02-22 RX ADMIN — Medication 1 CAPSULE: at 09:10

## 2019-02-22 RX ADMIN — ACETYLCYSTEINE 200 MG: 200 SOLUTION ORAL; RESPIRATORY (INHALATION) at 08:39

## 2019-02-22 RX ADMIN — IPRATROPIUM BROMIDE AND ALBUTEROL SULFATE 3 ML: .5; 3 SOLUTION RESPIRATORY (INHALATION) at 08:40

## 2019-02-22 RX ADMIN — Medication 10 ML: at 13:13

## 2019-02-22 RX ADMIN — FUROSEMIDE 40 MG: 40 TABLET ORAL at 07:21

## 2019-02-22 RX ADMIN — APIXABAN 5 MG: 5 TABLET, FILM COATED ORAL at 10:21

## 2019-02-22 RX ADMIN — GUAIFENESIN 600 MG: 600 TABLET, EXTENDED RELEASE ORAL at 09:10

## 2019-02-22 RX ADMIN — LEVOTHYROXINE SODIUM 25 MCG: 25 TABLET ORAL at 07:21

## 2019-02-22 RX ADMIN — COLCHICINE 0.3 MG: 0.6 TABLET, FILM COATED ORAL at 09:07

## 2019-02-22 RX ADMIN — CARVEDILOL 6.25 MG: 6.25 TABLET, FILM COATED ORAL at 07:21

## 2019-02-22 RX ADMIN — PANTOPRAZOLE SODIUM 40 MG: 40 TABLET, DELAYED RELEASE ORAL at 07:21

## 2019-02-22 RX ADMIN — TRIAMCINOLONE ACETONIDE: 1 CREAM TOPICAL at 13:07

## 2019-02-22 NOTE — PROGRESS NOTES
Discharge noted and [facility] ready to accept patient today. Nursing can call report to [phone]. Nursing to complete Emtala and place copy in envelope provided along with AVS and MARs. [facility] can accept after 1500. Family aware and will transport patient. Reagan Dillon RN  CRM Ext [phone]

## 2019-02-22 NOTE — DISCHARGE INSTRUCTIONS
Discharge SNF/Rehab Instructions/LTAC       PATIENT ID: Miquel Redd  MRN: [id number]   YOB: 1945    DATE OF ADMISSION: 2/4/2019  2:50 PM    DATE OF DISCHARGE: 2/22/2019    PRIMARY CARE PROVIDER: Aleksandar Padilla MD       ATTENDING PHYSICIAN: Siddharth Arriaga MD  DISCHARGING PROVIDER: Twan Abel MD     To contact this individual call [phone] and ask the  to page. If unavailable ask to be transferred the Adult Hospitalist Department. CONSULTATIONS: IP CONSULT TO CARDIOLOGY  IP CONSULT TO CARDIOLOGY  IP CONSULT TO CARDIOLOGY  IP CONSULT TO HOSPITALIST  IP CONSULT TO PULMONOLOGY  IP CONSULT TO NEUROLOGY  IP CONSULT TO NEPHROLOGY  IP CONSULT TO INFECTIOUS DISEASES  IP CONSULT TO UROLOGY    PROCEDURES/SURGERIES: Procedure(s):  INSERT PPM SINGLE VENTRICULAR    ADMITTING [address] [place] [address] COURSE:   The patient is a 21-year-old gentleman with past medical history of anemia, atrial fibrillation, Painter's esophagus, coronary artery disease, diabetes, heart failure, cardiomyopathy, myocarditis, history of hypertension, diabetic retinopathy, right carotid artery stenosis, TIA and vitamin D deficiency who presents to the hospital from [facility]. Patient presented to [facility] today apparently in PEA. The history was primarily obtained from the ER physician as the patient is intubated and his wife is not present at the bedside.      Assessment & Plan:      AHRF status post cardiac arrest/pulseless electrical activity POA:   -s/p hypothermia protocol  -EEG non specific no seizure activity  -Coded 2/6 multiple times post hypothermia protocol.  Possibly secondary to bradycardia  -s/p pacemaker 2/7  -reintubated for airway and reextubated 2/10 after bronch   -s/p zosyn and zyvox for 7 days  -nuclear stress test 2/13 with normal EF, no ischemia  -Stable       MARYCRUZ on CKD stage 3: secondary to ATN  -nephrology consulted  -creatinine slowly improving   -lasix 40 BID on d/c   -volume overloaded     Chronic a.fib: on eliquis  Rate controlled       Hematuria:   -s/p  CBI  - dsouza cath removed but now retaining some urine   - f/u with urology as out pt     Diabetes, Type II: Lantus and SSI     Insomnia: prn melatonin     Hypothyroidism: synthroid      Anemia, appears to be chronic: stable  -s/p 2 unit PRBC 2/8  hgb > 8     Fever: resolved, s/p antibiotics     Gastrointestinal PPX added PPI     Thrombocytopenia: stable, continue to monitor                     DISCHARGE DIAGNOSES / PLAN:      1. [facility]       PENDING TEST RESULTS:   At the time of discharge the following test results are still pending:     FOLLOW UP APPOINTMENTS:    Follow-up Information     Follow up With Specialties Details Why Contact Info    Marilu Foley MD Family Practice In 2 weeks  [address] [facility] [address] [facility]      Alexandria Anderson MD Cardiology In 2 weeks  [address] [facility]  [address]  [phone]             ADDITIONAL CARE RECOMMENDATIONS:  Cont lasix BID for  A week and repeat BMP and also f/u with nephrologist at that point to review renal function    DIET: Cardiac Diet    TUBE FEEDING INSTRUCTIONS:     OXYGEN / BiPAP SETTINGS:     ACTIVITY: Activity as tolerated    WOUND CARE:     EQUIPMENT needed:       DISCHARGE MEDICATIONS:   See Medication Reconciliation Form      NOTIFY YOUR PHYSICIAN FOR ANY OF THE FOLLOWING:   Fever over 101 degrees for 24 hours. Chest pain, shortness of breath, fever, chills, nausea, vomiting, diarrhea, change in mentation, falling, weakness, bleeding. Severe pain or pain not relieved by medications. Or, any other signs or symptoms that you may have questions about.     DISPOSITION:    Home With:   OT  PT  HH  RN      x SNF/Inpatient Rehab/LTAC    Independent/assisted living    Hospice    Other:       PATIENT CONDITION AT DISCHARGE:     Functional status    Poor    x Deconditioned     Independent      Cognition x  Lucid     Forgetful     Dementia      Catheters/lines (plus indication)    Chacon     PICC     PEG    x None      Code status    x Full code     DNR      PHYSICAL EXAMINATION AT DISCHARGE:     Constitutional:  NAD, alert rash noted on chest    ENT:  MMM    Resp:  CTA    CV:  irregular, paced rhythm     GI:  Soft, non distended, non tender. bs+   : scrotal and penial edema     Musculoskeletal:  1+ edema, warm, 2+ pulses throughout    Neurologic:  Alert and awake             CHRONIC MEDICAL DIAGNOSES:  Problem List as of 2/22/2019 Date Reviewed: 2/22/2019          Codes Class Noted - Resolved    Thrombocytopenia ([facility] [address] ICD-10-CM: D69.6  ICD-9-CM: 287.5  1/14/2019 - Present        Syncope ICD-10-CM: R55  ICD-9-CM: 780.2  1/14/2019 - Present        Prolonged Q-T interval on ECG ICD-10-CM: R94.31  ICD-9-CM: 794.31  1/14/2019 - Present        Anemia ICD-10-CM: D64.9  ICD-9-CM: 285.9  12/5/2018 - Present        GI bleed ICD-10-CM: K92.2  ICD-9-CM: 578.9  8/29/2018 - Present        Acute blood loss anemia ICD-10-CM: D62  ICD-9-CM: 285.1  8/29/2018 - Present        Long-term use of immunosuppressant medication ICD-10-CM: Z79.899  ICD-9-CM: V58.69  8/16/2018 - Present        Hypothyroidism due to Hashimoto's thyroiditis ICD-10-CM: E03.8, E06.3  ICD-9-CM: 244.8, 245.2  8/6/2018 - Present        Adenomatous polyp of ascending colon ICD-10-CM: D12.2  ICD-9-CM: 211.3  8/4/2018 - Present        Paroxysmal atrial fibrillation (HCC) ICD-10-CM: I48.0  ICD-9-CM: 427.31  8/4/2018 - Present        Renal artery stenosis (HCC) ICD-10-CM: I70.1  ICD-9-CM: 440.1  8/4/2018 - Present        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 ICD-10-CM: M1A.30X1  ICD-9-CM: 274.03, 588.89  7/13/2018 - Present        CKD (chronic kidney disease) stage 4, GFR 15-29 ml/min ([facility]) ICD-10-CM: N18.4  ICD-9-CM: 585.4  7/13/2018 - Present        Essential hypertension ICD-10-CM: I10  ICD-9-CM: 401.9  Unknown - Present        Anemia, chronic disease ICD-10-CM: D63.8  ICD-9-CM: 285.29  5/11/2017 - Present        Painter esophagus ICD-10-CM: K22.70  ICD-9-CM: 530.85  Unknown - Present        Advance care planning ICD-10-CM: Z71.89  ICD-9-CM: V65.49  11/9/2016 - Present    Overview Signed 11/9/2016 10:37 AM by Russel Brown     Initial ACP discussion. AMD form reviewed and copy provided.  NN/facilitator to discuss with patient               Heart murmur, systolic [id number]: Q67.5  ICD-9-CM: 785.2  5/6/2016 - Present    Overview Signed 5/6/2016  1:58 PM by Ashley Vazquez     1/6 on 4/2016             Retinopathy, diabetic, bilateral ([facility] ICD-10-CM: E11.319  ICD-9-CM: 250.50, 362.01  3/11/2016 - Present        Vitamin D deficiency ICD-10-CM: E55.9  ICD-9-CM: 268.9  3/1/2016 - Present    Overview Signed 3/1/2016  4:55 PM by Ashley Vazquez     Nephrology ordered and follow 2/22/16             Stenosis of right carotid artery without cerebral infarction ICD-10-CM: I65.21  ICD-9-CM: 433.10  1/18/2016 - Present        TIA (transient ischemic attack) ICD-10-CM: G45.9  ICD-9-CM: 435.9  1/14/2016 - Present        * (Principal) RESOLVED: Cardiac arrest ([facility] ICD-10-CM: I46.9  ICD-9-CM: 427.5  2/4/2019 - 2/22/2019        RESOLVED: Pulmonary hypertension ([facility] ICD-10-CM: I27.20  ICD-9-CM: 416.8  6/21/2016 - 6/28/2016        RESOLVED: Gout (Chronic) ICD-10-CM: M10.9  ICD-9-CM: 274.9  6/21/2016 - 6/28/2016        RESOLVED: CKD (chronic kidney disease) stage 3, GFR 30-59 ml/min (HCC) (Chronic) ICD-10-CM: N18.3  ICD-9-CM: 585.3  6/21/2016 - 6/28/2016        RESOLVED: HTN (hypertension) (Chronic) ICD-10-CM: I10  ICD-9-CM: 401.9  6/21/2016 - 6/28/2016        RESOLVED: Acute encephalopathy ICD-10-CM: G93.40  ICD-9-CM: 348.30  1/14/2016 - 8/11/2017        RESOLVED: A-fib ([facility] ICD-10-CM: I48.91  ICD-9-CM: 427.31  12/11/2011 - 6/28/2016        RESOLVED: Type 2 diabetes mellitus with diabetic retinopathy ([facility] ICD-10-CM: E11.319  ICD-9-CM: 250.50, 362.01 12/11/2011 - 6/28/2016                CDMP Checked:   Yes x     PROBLEM LIST Updated:  Yes x         Signed:   Kyle Amor MD  2/22/2019  7:26 AM

## 2019-02-22 NOTE — PROGRESS NOTES
0800: Bedside shift change report given to Demian Paez RN (oncoming nurse) by Buddy Joshi  (offgoing nurse). Report included the following information SBAR, Kardex, Intake/Output, MAR, Accordion, Recent Results and Cardiac Rhythm Afib, paced. 1144: TRANSFER - OUT REPORT: 
 
Verbal report given to Norm Nogueira (name) rishi Pineda Ready  being transferred to [facility] for routine progression of care Report consisted of patients Situation, Background, Assessment and  
Recommendations(SBAR). Information from the following report(s) SBAR, Kardex, Intake/Output, MAR, Accordion, Recent Results and Cardiac Rhythm Paced, Afib was reviewed with the receiving nurse. Opportunity for questions and clarification was provided. Patient is being transported by wife. 1500: Discharge paperwork, medication list, and prescriptions reviewed with patient. Opportunity for questions was allowed. Patient verbalized understanding. IV removed. Discharge paperwork signed by patient and a copy was made and placed in patient's chart. EMTALA was completed and signed by patient. Transfer packet with EMTALA, discharge paperwork, and prescriptions given to patient's wife. VSS.

## 2019-02-22 NOTE — PROGRESS NOTES
Bedside and Verbal shift change report given to Mela Daniels (oncoming nurse) by Goyo Richmond (offgoing nurse). Report included the following information SBAR, Kardex, Intake/Output, MAR, Accordion and Recent Results. Weight change: 1.5 kg (3 lb 4.9 oz) Last 3 Recorded Weights in this Encounter 02/20/19 0128 02/21/19 4023 02/22/19 2934 Weight: 93.6 kg (206 lb 5.6 oz) 93.6 kg (206 lb 5.6 oz) 95.1 kg (209 lb 10.5 oz) Problem: Falls - Risk of 
Goal: *Absence of Falls [facility] Risk and appropriate interventions in the flowsheet. Outcome: Progressing Towards Goal 
Fall Risk Interventions: 
Mobility Interventions: Assess mobility with egress test, Bed/chair exit alarm, Communicate number of staff needed for ambulation/transfer, Mechanical lift, OT consult for ADLs, PT Consult for mobility concerns, PT Consult for assist device competence, Utilize walker, cane, or other assistive device Mentation Interventions: Adequate sleep, hydration, pain control, Door open when patient unattended, Evaluate medications/consider consulting pharmacy, Eyeglasses and hearing aids, Gait belt with transfers/ambulation, Increase mobility, Reorient patient, Self-releasing belt, Familiar objects from home, Family/sitter at bedside, Room close to nurse's station, Update white board, Toileting rounds, More frequent rounding Medication Interventions: Assess postural VS orthostatic hypotension, Evaluate medications/consider consulting pharmacy, Utilize gait belt for transfers/ambulation, Patient to call before getting OOB, Teach patient to arise slowly Elimination Interventions: Bed/chair exit alarm, Call light in reach, Patient to call for help with toileting needs, Toilet paper/wipes in reach, Toileting schedule/hourly rounds, Urinal in reach, Elevated toilet seat

## 2019-02-22 NOTE — PROGRESS NOTES
[facility] Cardiology Progress Note Date of service: 2/22/2019 Subjective: 
Mr Ed Johns says he is doing fairly well No new complaints Diuresing well on lasix Objective: 
 
Visit Vitals /70 (BP 1 Location: Left arm, BP Patient Position: At rest) Pulse 86 Temp 97.9 °F (36.6 °C) Resp 18 Ht 5' 10\" (1.778 m) Wt 95.1 kg (209 lb 10.5 oz) SpO2 95% BMI 30.08 kg/m² Physical Exam  
Constitutional: He appears well-developed and well-nourished. HENT:  
Head: Normocephalic and atraumatic. Eyes: Conjunctivae are normal. No scleral icterus. Neck: No JVD present. Cardiovascular: Normal rate. An irregularly irregular rhythm present. Exam reveals no gallop. Murmur heard. Early systolic murmur is present with a grade of 2/6. Pulmonary/Chest: Effort normal. No stridor. No respiratory distress. He has no wheezes. He has no rales. Abdominal: Soft. He exhibits no distension. Musculoskeletal: He exhibits edema. He exhibits no deformity. Neurological: He is alert. Skin: Skin is warm and dry. Data reviewed: 
Recent Results (from the past 12 hour(s)) GLUCOSE, POC Collection Time: 02/21/19  9:18 PM  
Result Value Ref Range Glucose (POC) 202 (H) 65 - 100 mg/dL Performed by Shana Sylvester PROTHROMBIN TIME + INR Collection Time: 02/22/19  3:18 AM  
Result Value Ref Range INR 1.5 (H) 0.9 - 1.1 Prothrombin time 14.5 (H) 9.0 - 11.1 sec PTT Collection Time: 02/22/19  3:18 AM  
Result Value Ref Range aPTT 34.7 (H) 22.1 - 32.0 sec  
 aPTT, therapeutic range     58.0 - 77.0 SECS  
GLUCOSE, POC Collection Time: 02/22/19  6:34 AM  
Result Value Ref Range Glucose (POC) 127 (H) 65 - 100 mg/dL Performed by Shana Sylvester Telemetry: a.fib - rate control ok Pacing appropriately.  Alarms saying \"pacer not capturing\" are incorrect - there are some pacing spike that occur right at the same time as a native QRS complex begins - this is normal pacemaker activity in someone with a.fib and does not represent failure to capture. Assessment: 1. Cardiac arrest. PEA. With events of 2/6 it now seems like that his underlying mechanism may have been bradyarrhythmia from SSS.  
  
2. SSS: s/p PPM 2/7. 
  
3. Chronic a.fib - rate control mostly ok 
  
4. Volume excess: likely a result of florinef and has been of his usual diuretic dose. Weight has been steadily rising and he now has quite a bit of leg edema. 5. Hematuria Resolved, dsouza out 6. CKD stage III to IV Renal indices stable 7. Anemia, probably from CKD 
  
8. Gout 9. Thrombocytopenia: unclear etiology Fluctuating - fairly stable currently 10. Pulmonary hypertension 
  
Plan: 
Continue coreg 6.25mg bid Continue Eliquis Continue lasix 40mg bid for now and continue x 1 week after discharge F/u with Dr Nickie Elena in one week Signed: 
Nadya Dennis MD 
Interventional Cardiology 2/22/2019

## 2019-02-22 NOTE — PROGRESS NOTES
Bedside shift change report given to Nirali Moore (oncoming nurse) by Jeremi Banuelos RN (offgoing nurse). Report included the following information SBAR, Kardex, Intake/Output, MAR, Recent Results, Med Rec Status and Cardiac Rhythm Afib;paced. Patient Vitals for the past 12 hrs: 
 Temp Pulse Resp BP SpO2  
02/21/19 1920 98.1 °F (36.7 °C) (!) 103 19 125/56 92 % 02/21/19 1547 97.7 °F (36.5 °C) 92 15 144/80 97 % 02/21/19 1240     100 % 02/21/19 1159 97.5 °F (36.4 °C) (!) 105 18 146/78 93 % 02/21/19 0918     94 % 02/21/19 0834 97.6 °F (36.4 °C) (!) 108 8 131/70 94 % 02/21/19 0815     95 %

## 2019-02-22 NOTE — PROGRESS NOTES
RENAL  PROGRESS NOTE Subjective:  
Doing well Objective: VITALS SIGNS:   
Visit Vitals /53 (BP 1 Location: Right arm, BP Patient Position: At rest) Pulse 97 Temp 97.7 °F (36.5 °C) Resp 16 Ht 5' 10\" (1.778 m) Wt 95.1 kg (209 lb 10.5 oz) SpO2 99% BMI 30.08 kg/m² O2 Device: Room air O2 Flow Rate (L/min): 2 l/min Temp (24hrs), Av.9 °F (36.6 °C), Min:97.7 °F (36.5 °C), Max:98.1 °F (36.7 °C) PHYSICAL EXAM: 
+ edema DATA REVIEW:  
 
INTAKE / OUTPUT:  
Last shift:       07 - 1900 In: 240 [P.O.:240] Out: 1175 [GPXBL:8475] Last 3 shifts: 1901 -  0700 In: 960 [P.O.:960] Out: 1673 [Urine:2725] Intake/Output Summary (Last 24 hours) at 2019 1234 Last data filed at 2019 1158 Gross per 24 hour Intake 840 ml Output 3300 ml Net -2460 ml  
 
 
 
LABS:  
Recent Labs  
  19 
0428 WBC 6.5 HGB 8.8* HCT 29.5*  
* Recent Labs  
  19 
0318 19 
0428 19 
3088 NA  --  135*  --   
K  --  4.2  --   
CL  --  103  --   
CO2  --  23  --   
GLU  --  109*  --   
BUN  --  64*  --   
CREA  --  2.11*  --   
CA  --  8.3*  --   
INR 1.5* 1.4* 1.3* Assessment:  
 
MARYCRUZ- due to ATN . renal recovery. Non-oliguric. Baseline Cr 1.7-2.0mg/dl DM-2 Cardiac arrest; PEA- ? etiology. Code ice protocol; recurrence of niurka arrest on . S/p PPM. Stress test  neg PANCYTOPENIA,chronic Gout: was on pegloticase Hematuria: Chacon in place. Urology consulted. Edema Plan:  
Lasix Renal function stable  As of yesterday Will follow back on Monday if still here Pan Nur MD

## 2019-02-22 NOTE — PROGRESS NOTES
Infectious Diseases Progress Note Antibiotic Summary: 
Vancomycin 2/6 x 1 dose Zosyn   --  Zyvox   --  Subjective: No new symptoms; feels better Objective:  
 
Vitals:  
Visit Vitals /56 (BP 1 Location: Left arm, BP Patient Position: At rest) Pulse (!) 103 Temp 98.1 °F (36.7 °C) Resp 19 Ht 5' 10\" (1.778 m) Wt 93.6 kg (206 lb 5.6 oz) SpO2 93% BMI 29.61 kg/m² Tmax:  Temp (24hrs), Av.8 °F (36.6 °C), Min:97.5 °F (36.4 °C), Max:98.1 °F (36.7 °C) Exam:  General appearance: no distress Lungs: posterior rales at bases Heart: irregularly irregular rhythm Abdomen: soft, not distended, non-tender. Bowel sounds normal 
Extremities: no edema; feet and hands warm; good pedal pulses Skin: rash c/w drug eruption IV Lines: peripheral 
      
Labs:   
Recent Labs  
  19 
0428 19 
0342 WBC 6.5 6.5 HGB 8.8* 8.4* * 111* BUN 64* 59* CREA 2.11* 2.03* Urine culture  = NG 
 
BLOOD CULTURES: 
  = NG 
 
Bronchoscopy sample : 
 Gram stain = 2+ WBCs; NOS 
 culture = light normal resp shahrzad CXR 2/15 reviewed = small pleural effusions and probable atelectasis Assessment: 1. Fever -- resolved: The cause of the fever was not certain but pulmonary source suspected (aspiration ?). Bronch cultures unremarkable -- oxygenation better and CXR better -- Zosyn ended  
  
2. OHD -- cardiomyopathy and hx of atrial fib -- S/P initial arrest with Code Ice and several subsequent episodes of PEA 
  
3. NIDDM 
  
4. CVD 
  
5. HTN 
  
6. GOUT Plan:  
 
1. Watch off antibiotics -- stop all unnecessary meds in view of rash I'll sign off. Please call if problems arise.  
 
Penelope Hewitt MD

## 2019-02-23 LAB
25(OH)D3 SERPL-MCNC: 22.9 NG/ML (ref 30–100)
ALBUMIN SERPL-MCNC: 2.6 G/DL (ref 3.5–5)
ALBUMIN/GLOB SERPL: 0.7 {RATIO} (ref 1.1–2.2)
ALP SERPL-CCNC: 184 U/L (ref 45–117)
ALT SERPL-CCNC: 30 U/L (ref 12–78)
ANION GAP SERPL CALC-SCNC: 9 MMOL/L (ref 5–15)
AST SERPL-CCNC: 20 U/L (ref 15–37)
BASOPHILS # BLD: 0 K/UL (ref 0–0.1)
BASOPHILS NFR BLD: 1 % (ref 0–1)
BILIRUB SERPL-MCNC: 0.5 MG/DL (ref 0.2–1)
BUN SERPL-MCNC: 59 MG/DL (ref 6–20)
BUN/CREAT SERPL: 31 (ref 12–20)
CALCIUM SERPL-MCNC: 8.4 MG/DL (ref 8.5–10.1)
CHLORIDE SERPL-SCNC: 102 MMOL/L (ref 97–108)
CO2 SERPL-SCNC: 26 MMOL/L (ref 21–32)
CREAT SERPL-MCNC: 1.92 MG/DL (ref 0.7–1.3)
DIFFERENTIAL METHOD BLD: ABNORMAL
EOSINOPHIL # BLD: 0.1 K/UL (ref 0–0.4)
EOSINOPHIL NFR BLD: 3 % (ref 0–7)
ERYTHROCYTE [DISTWIDTH] IN BLOOD BY AUTOMATED COUNT: 14.6 % (ref 11.5–14.5)
GLOBULIN SER CALC-MCNC: 3.5 G/DL (ref 2–4)
GLUCOSE SERPL-MCNC: 182 MG/DL (ref 65–100)
HCT VFR BLD AUTO: 26.2 % (ref 36.6–50.3)
HGB BLD-MCNC: 7.9 G/DL (ref 12.1–17)
IMM GRANULOCYTES # BLD AUTO: 0 K/UL (ref 0–0.04)
IMM GRANULOCYTES NFR BLD AUTO: 1 % (ref 0–0.5)
LYMPHOCYTES # BLD: 0.3 K/UL (ref 0.8–3.5)
LYMPHOCYTES NFR BLD: 8 % (ref 12–49)
MCH RBC QN AUTO: 27.3 PG (ref 26–34)
MCHC RBC AUTO-ENTMCNC: 30.2 G/DL (ref 30–36.5)
MCV RBC AUTO: 90.7 FL (ref 80–99)
MONOCYTES # BLD: 0.6 K/UL (ref 0–1)
MONOCYTES NFR BLD: 15 % (ref 5–13)
NEUTS SEG # BLD: 3.2 K/UL (ref 1.8–8)
NEUTS SEG NFR BLD: 72 % (ref 32–75)
NRBC # BLD: 0 K/UL (ref 0–0.01)
NRBC BLD-RTO: 0 PER 100 WBC
PLATELET # BLD AUTO: 138 K/UL (ref 150–400)
PMV BLD AUTO: 10.9 FL (ref 8.9–12.9)
POTASSIUM SERPL-SCNC: 3.8 MMOL/L (ref 3.5–5.1)
PROT SERPL-MCNC: 6.1 G/DL (ref 6.4–8.2)
RBC # BLD AUTO: 2.89 M/UL (ref 4.1–5.7)
RBC MORPH BLD: ABNORMAL
SODIUM SERPL-SCNC: 137 MMOL/L (ref 136–145)
WBC # BLD AUTO: 4.2 K/UL (ref 4.1–11.1)

## 2019-02-23 PROCEDURE — 82306 VITAMIN D 25 HYDROXY: CPT

## 2019-02-23 PROCEDURE — 85025 COMPLETE CBC W/AUTO DIFF WBC: CPT

## 2019-02-23 PROCEDURE — 80053 COMPREHEN METABOLIC PANEL: CPT

## 2019-02-23 PROCEDURE — 36415 COLL VENOUS BLD VENIPUNCTURE: CPT

## 2019-02-24 LAB — HGB BLD-MCNC: 8.7 G/DL (ref 12.1–17)

## 2019-02-24 PROCEDURE — 36415 COLL VENOUS BLD VENIPUNCTURE: CPT

## 2019-02-24 PROCEDURE — 85018 HEMOGLOBIN: CPT

## 2019-02-25 ENCOUNTER — PATIENT OUTREACH (OUTPATIENT)
Dept: FAMILY MEDICINE CLINIC | Age: 74
End: 2019-02-25

## 2019-02-25 ENCOUNTER — APPOINTMENT (OUTPATIENT)
Dept: INFUSION THERAPY | Age: 74
End: 2019-02-25
Payer: MEDICARE

## 2019-02-25 LAB
ANION GAP SERPL CALC-SCNC: 11 MMOL/L (ref 5–15)
BUN SERPL-MCNC: 50 MG/DL (ref 6–20)
BUN/CREAT SERPL: 28 (ref 12–20)
CALCIUM SERPL-MCNC: 8.6 MG/DL (ref 8.5–10.1)
CHLORIDE SERPL-SCNC: 97 MMOL/L (ref 97–108)
CO2 SERPL-SCNC: 28 MMOL/L (ref 21–32)
CREAT SERPL-MCNC: 1.81 MG/DL (ref 0.7–1.3)
ERYTHROCYTE [DISTWIDTH] IN BLOOD BY AUTOMATED COUNT: 14.5 % (ref 11.5–14.5)
GLUCOSE SERPL-MCNC: 167 MG/DL (ref 65–100)
HCT VFR BLD AUTO: 28.2 % (ref 36.6–50.3)
HGB BLD-MCNC: 8.5 G/DL (ref 12.1–17)
MCH RBC QN AUTO: 27.3 PG (ref 26–34)
MCHC RBC AUTO-ENTMCNC: 30.1 G/DL (ref 30–36.5)
MCV RBC AUTO: 90.7 FL (ref 80–99)
NRBC # BLD: 0 K/UL (ref 0–0.01)
NRBC BLD-RTO: 0 PER 100 WBC
PLATELET # BLD AUTO: 143 K/UL (ref 150–400)
PMV BLD AUTO: 10.9 FL (ref 8.9–12.9)
POTASSIUM SERPL-SCNC: 3.7 MMOL/L (ref 3.5–5.1)
RBC # BLD AUTO: 3.11 M/UL (ref 4.1–5.7)
SODIUM SERPL-SCNC: 136 MMOL/L (ref 136–145)
WBC # BLD AUTO: 4 K/UL (ref 4.1–11.1)

## 2019-02-25 PROCEDURE — 85027 COMPLETE CBC AUTOMATED: CPT

## 2019-02-25 PROCEDURE — 36415 COLL VENOUS BLD VENIPUNCTURE: CPT

## 2019-02-25 PROCEDURE — 80048 BASIC METABOLIC PNL TOTAL CA: CPT

## 2019-02-25 NOTE — PROGRESS NOTES
Patient admitted to [address] [place] [address] 2/4-2/22 for cardiac arrest while at [facility] -had received Solu-Medrol when cardiac arrest occured. Contacted [address] [place] [address] 2/25[facility] for Gila Colon, requesting call back to discuss current treatment plan and discharge date. Per EMR, patient still in rehab as of 3/1/19.

## 2019-02-26 NOTE — DISCHARGE SUMMARY
Discharge Summary       PATIENT ID: Tasha Sethi  MRN: [id number]   YOB: 1945    DATE OF ADMISSION: 2/4/2019  2:50 PM    DATE OF DISCHARGE: 2/22/19   PRIMARY CARE PROVIDER: Stephen Torres MD     ATTENDING PHYSICIAN: Cristine Ratliff  DISCHARGING PROVIDER: Justino Almanzar MD    To contact this individual call [phone] and ask the  to page. If unavailable ask to be transferred the Adult Hospitalist Department. CONSULTATIONS: IP CONSULT TO CARDIOLOGY  IP CONSULT TO PULMONOLOGY  IP CONSULT TO NEUROLOGY  IP CONSULT TO NEPHROLOGY  IP CONSULT TO UROLOGY    PROCEDURES/SURGERIES: Procedure(s):  INSERT PPM SINGLE VENTRICULAR    ADMITTING [facility] COURSE:   PATIENT ID: Tasha Sethi  MRN: [id number]   YOB: 1945    DATE OF ADMISSION: 2/4/2019  2:50 PM    DATE OF DISCHARGE: 2/22/2019    PRIMARY CARE PROVIDER: Stephen Torres MD       ATTENDING PHYSICIAN: Francesco Rodríguez MD  DISCHARGING PROVIDER: Justino Almanzar MD     To contact this individual call [phone] and ask the  to page. If unavailable ask to be transferred the Adult Hospitalist Department. CONSULTATIONS: IP CONSULT TO CARDIOLOGY  IP CONSULT TO CARDIOLOGY  IP CONSULT TO CARDIOLOGY  IP CONSULT TO HOSPITALIST  IP CONSULT TO PULMONOLOGY  IP CONSULT TO NEUROLOGY  IP CONSULT TO NEPHROLOGY  IP CONSULT TO INFECTIOUS DISEASES  IP CONSULT TO UROLOGY    PROCEDURES/SURGERIES: Procedure(s):  INSERT PPM SINGLE VENTRICULAR    ADMITTING [facility] COURSE:   The patient is a 62-year-old gentleman with past medical history of anemia, atrial fibrillation, Painter's esophagus, coronary artery disease, diabetes, heart failure, cardiomyopathy, myocarditis, history of hypertension, diabetic retinopathy, right carotid artery stenosis, TIA and vitamin D deficiency who presents to the hospital from [facility]. Patient presented to [facility] today apparently in [place].  The history was primarily obtained from the ER physician as the patient is intubated and his wife is not present at the bedside.      Assessment & Plan:      AHRF status post cardiac arrest/pulseless electrical activity POA:   -s/p hypothermia protocol  -EEG non specific no seizure activity  -Coded 2/6 multiple times post hypothermia protocol.  Possibly secondary to bradycardia  -s/p pacemaker 2/7  -reintubated for airway and reextubated 2/10 after bronch   -s/p zosyn and zyvox for 7 days  -nuclear stress test 2/13 with normal EF, no ischemia  -Stable       MARYCRUZ on CKD stage 3: secondary to ATN  -nephrology consulted  -creatinine slowly improving   -lasix 40 BID on d/c   -volume overloaded     Chronic a.fib: on eliquis  Rate controlled       Hematuria:   -s/p  CBI  - dsouza cath removed but now retaining some urine   - f/u with urology as out pt     Diabetes, Type II: Lantus and SSI     Insomnia: prn melatonin     Hypothyroidism: synthroid      Anemia, appears to be chronic: stable  -s/p 2 unit PRBC 2/8  hgb > 8     Fever: resolved, s/p antibiotics     Gastrointestinal PPX added PPI     Thrombocytopenia: stable, continue to monitor                     DISCHARGE DIAGNOSES / PLAN:      1. [facility]         ADDITIONAL CARE RECOMMENDATIONS:     PENDING TEST RESULTS:   At the time of discharge the following test results are still pending:     FOLLOW UP APPOINTMENTS:    Follow-up Information     Follow up With Specialties Details Why Contact Info    Nino Harris MD [facility] Go on 3/5/2019 For hospital follow up appointment at 2:30PM  Raghav Holman 55      Malika Mae MD Nephrology In 1 week repeat BMP and f/u  [facility] [address] [place] [address]      Delmar Nunn MD Cardiology In 1 week  [facility] [address] [facility] [address]  [address] [place] [address]                  DIET: Regular Diet and Cardiac Diet    ACTIVITY: Activity as tolerated    WOUND CARE:     EQUIPMENT needed:       DISCHARGE MEDICATIONS:  Discharge Medication List as of 2/22/2019  1:24 PM      START taking these medications    Details   albuterol-ipratropium (DUO-NEB) 2.5 mg-0.5 mg/3 ml nebu 3 mL by Nebulization route every six (6) hours as needed., Normal, Disp-30 Nebule, R-0      colchicine 0.6 mg tablet Take 0.5 Tabs by mouth daily for 30 days. , Normal, Disp-15 Tab, R-0      L. acidoph & paracasei- S therm- Bifido (LAVERNE-Q/RISAQUAD) 8 billion cell cap cap Take 1 Cap by mouth daily for 30 days. , Normal, Disp-30 Cap, R-0      melatonin 3 mg tablet Take 1 Tab by mouth nightly for 30 days. , Normal, Disp-30 Tab, R-0      pantoprazole (PROTONIX) 40 mg tablet Take 1 Tab by mouth Daily (before breakfast) for 30 days. , Normal, Disp-30 Tab, R-0      triamcinolone acetonide (KENALOG) 0.1 % topical cream Apply  to affected area two (2) times a day. use thin layer, Normal, Disp-15 g, R-0         CONTINUE these medications which have CHANGED    Details   carvedilol (COREG) 6.25 mg tablet Take 1 Tab by mouth two (2) times daily (with meals) for 30 days. , No Print, Disp-60 Tab, R-0      apixaban (ELIQUIS) 5 mg tablet Take 1 Tab by mouth two (2) times a day for 30 days. , No Print, Disp-60 Tab, R-0      furosemide (LASIX) 40 mg tablet Take 1 Tab by mouth two (2) times a day for 30 days. , No Print, Disp-60 Tab, R-0      insulin glargine (LANTUS,BASAGLAR) 100 unit/mL (3 mL) inpn 5 Units by SubCUTAneous route nightly., Normal, Disp-4 Pen, R-1         CONTINUE these medications which have NOT CHANGED    Details   levothyroxine (SYNTHROID) 25 mcg tablet TAKE 1 TABLET BY MOUTH EVERY DAY BEFORE BREAKFAST, Normal, Disp-90 Tab, R-1      dextran 70-hypromellose (ARTIFICIAL TEARS,[id number],) ophthalmic solution Administer 1 Drop to both eyes as needed., Historical Med      pravastatin (PRAVACHOL) 20 mg tablet Take 1 Tab by mouth nightly. , Print, Disp-30 Tab, R-2         STOP taking these medications       leflunomide (ARAVA) 20 mg tablet Comments:   Reason for Stopping:         pegloticase (KRYSTEXXA) 8 mg/mL soln injection Comments:   Reason for Stopping:         digoxin (LANOXIN) 0.125 mg tablet Comments:   Reason for Stopping:         hydrALAZINE (APRESOLINE) 25 mg tablet Comments:   Reason for Stopping:         omeprazole (PRILOSEC) 20 mg capsule Comments:   Reason for Stopping:         multivitamin (ONE A DAY) tablet Comments:   Reason for Stopping:                 NOTIFY YOUR PHYSICIAN FOR ANY OF THE FOLLOWING:   Fever over 101 degrees for 24 hours. Chest pain, shortness of breath, fever, chills, nausea, vomiting, diarrhea, change in mentation, falling, weakness, bleeding. Severe pain or pain not relieved by medications. Or, any other signs or symptoms that you may have questions about.     DISPOSITION:    Home With:   OT  PT  HH  RN      x Long term SNF/Inpatient Rehab    Independent/assisted living    Hospice    Other:       PATIENT CONDITION AT DISCHARGE:     Functional status    Poor    x Deconditioned     Independent      Cognition    x Lucid     Forgetful     Dementia      Catheters/lines (plus indication)    [place]     PICC     PEG    x None      Code status   x  Full code     DNR      PHYSICAL EXAMINATION AT DISCHARGE:   Refer to Progress Note      CHRONIC MEDICAL DIAGNOSES:  Problem List as of 2/22/2019 Date Reviewed: 2/22/2019          Codes Class Noted - Resolved    Thrombocytopenia ([facility] ICD-10-CM: D69.6  ICD-9-CM: 287.5  1/14/2019 - Present        Syncope ICD-10-CM: R55  ICD-9-CM: 780.2  1/14/2019 - Present        Prolonged Q-T interval on ECG ICD-10-CM: R94.31  ICD-9-CM: 794.31  1/14/2019 - Present        Anemia ICD-10-CM: D64.9  ICD-9-CM: 285.9  12/5/2018 - Present        GI bleed ICD-10-CM: K92.2  ICD-9-CM: 578.9  8/29/2018 - Present        Acute blood loss anemia ICD-10-CM: D62  ICD-9-CM: 285.1  8/29/2018 - Present        Long-term use of immunosuppressant medication ICD-10-CM: Z79.899  ICD-9-CM: V58.69  8/16/2018 - Present        Hypothyroidism due to Hashimoto's thyroiditis ICD-10-CM: E03.8, E06.3  ICD-9-CM: 244.8, 245.2  8/6/2018 - Present        Adenomatous polyp of ascending colon ICD-10-CM: D12.2  ICD-9-CM: 211.3  8/4/2018 - Present        Paroxysmal atrial fibrillation (HCC) ICD-10-CM: I48.0  ICD-9-CM: 427.31  8/4/2018 - Present        Renal artery stenosis (HCC) ICD-10-CM: I70.1  ICD-9-CM: 440.1  8/4/2018 - Present        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 ICD-10-CM: M1A.30X1  ICD-9-CM: 274.03, 588.89  7/13/2018 - Present        CKD (chronic kidney disease) stage 4, GFR 15-29 ml/min ([facility]) ICD-10-CM: N18.4  ICD-9-CM: 585.4  7/13/2018 - Present        Essential hypertension ICD-10-CM: I10  ICD-9-CM: 401.9  Unknown - Present        Anemia, chronic disease ICD-10-CM: D63.8  ICD-9-CM: 285.29  5/11/2017 - Present        Painter esophagus ICD-10-CM: K22.70  ICD-9-CM: 530.85  Unknown - Present        Advance care planning ICD-10-CM: Z71.89  ICD-9-CM: V65.49  11/9/2016 - Present    Overview Signed 11/9/2016 10:37 AM by Heather Moss     Initial ACP discussion. AMD form reviewed and copy provided.  NN/facilitator to discuss with patient               Heart murmur, systolic ETL-25-LK: L04.4  ICD-9-CM: 785.2  5/6/2016 - Present    Overview Signed 5/6/2016  1:58 PM by Peter Paulino     1/6 on 4/2016             Retinopathy, diabetic, bilateral ([address] ICD-10-CM: E11.319  ICD-9-CM: 250.50, 362.01  3/11/2016 - Present        Vitamin D deficiency ICD-10-CM: E55.9  ICD-9-CM: 268.9  3/1/2016 - Present    Overview Signed 3/1/2016  4:55 PM by Peter Paulino     Nephrology ordered and follow 2/22/16             Stenosis of right carotid artery without cerebral infarction ICD-10-CM: I65.21  ICD-9-CM: 433.10  1/18/2016 - Present        TIA (transient ischemic attack) ICD-10-CM: G45.9  ICD-9-CM: 435.9  1/14/2016 - Present        RESOLVED: Pulmonary hypertension ([facility] ICD-10-CM: I27.20  ICD-9-CM: 416.8  6/21/2016 - 6/28/2016        RESOLVED: Gout (Chronic) ICD-10-CM: M10.9  ICD-9-CM: 274.9  6/21/2016 - 6/28/2016        RESOLVED: CKD (chronic kidney disease) stage 3, GFR 30-59 ml/min (HCC) (Chronic) ICD-10-CM: N18.3  ICD-9-CM: 585.3  6/21/2016 - 6/28/2016        RESOLVED: HTN (hypertension) (Chronic) ICD-10-CM: I10  ICD-9-CM: 401.9  6/21/2016 - 6/28/2016        RESOLVED: Acute encephalopathy ICD-10-CM: G93.40  ICD-9-CM: 348.30  1/14/2016 - 8/11/2017        RESOLVED: A-fib ([facility] ICD-10-CM: I48.91  ICD-9-CM: 427.31  12/11/2011 - 6/28/2016        RESOLVED: Type 2 diabetes mellitus with diabetic retinopathy ([facility] ICD-10-CM: E11.319  ICD-9-CM: 250.50, 362.01  12/11/2011 - 6/28/2016        * (Principal) RESOLVED: Cardiac arrest ([facility] ICD-10-CM: I46.9  ICD-9-CM: 427.5  2/4/2019 - 2/22/2019              Greater than 30  minutes were spent with the patient on counseling and coordination of care    Signed:   Saurabh Carr MD  2/26/2019  12:33 PM

## 2019-02-28 ENCOUNTER — TELEPHONE (OUTPATIENT)
Dept: FAMILY MEDICINE CLINIC | Age: 74
End: 2019-02-28

## 2019-02-28 LAB
ANION GAP SERPL CALC-SCNC: 9 MMOL/L (ref 5–15)
BUN SERPL-MCNC: 42 MG/DL (ref 6–20)
BUN/CREAT SERPL: 27 (ref 12–20)
CALCIUM SERPL-MCNC: 8.8 MG/DL (ref 8.5–10.1)
CHLORIDE SERPL-SCNC: 96 MMOL/L (ref 97–108)
CO2 SERPL-SCNC: 32 MMOL/L (ref 21–32)
CREAT SERPL-MCNC: 1.55 MG/DL (ref 0.7–1.3)
ERYTHROCYTE [DISTWIDTH] IN BLOOD BY AUTOMATED COUNT: 14.3 % (ref 11.5–14.5)
GLUCOSE SERPL-MCNC: 174 MG/DL (ref 65–100)
HCT VFR BLD AUTO: 30.7 % (ref 36.6–50.3)
HGB BLD-MCNC: 9.4 G/DL (ref 12.1–17)
MAGNESIUM SERPL-MCNC: 1.6 MG/DL (ref 1.6–2.4)
MCH RBC QN AUTO: 27.5 PG (ref 26–34)
MCHC RBC AUTO-ENTMCNC: 30.6 G/DL (ref 30–36.5)
MCV RBC AUTO: 89.8 FL (ref 80–99)
NRBC # BLD: 0 K/UL (ref 0–0.01)
NRBC BLD-RTO: 0 PER 100 WBC
PLATELET # BLD AUTO: 152 K/UL (ref 150–400)
PMV BLD AUTO: 11 FL (ref 8.9–12.9)
POTASSIUM SERPL-SCNC: 3.5 MMOL/L (ref 3.5–5.1)
RBC # BLD AUTO: 3.42 M/UL (ref 4.1–5.7)
SODIUM SERPL-SCNC: 137 MMOL/L (ref 136–145)
WBC # BLD AUTO: 4.2 K/UL (ref 4.1–11.1)

## 2019-02-28 PROCEDURE — 80048 BASIC METABOLIC PNL TOTAL CA: CPT

## 2019-02-28 PROCEDURE — 83735 ASSAY OF MAGNESIUM: CPT

## 2019-02-28 PROCEDURE — 36415 COLL VENOUS BLD VENIPUNCTURE: CPT

## 2019-02-28 PROCEDURE — 85027 COMPLETE CBC AUTOMATED: CPT

## 2019-02-28 NOTE — TELEPHONE ENCOUNTER
----- Message from Liz Kwon sent at 2/28/2019 11:52 AM EST -----  Regarding: Dr. Cheo Rich requesting to have his [facility] [place] appointment moved from March 5,2019 to March 11,2019. Pt best contact number is [phone].

## 2019-03-04 LAB
ANION GAP SERPL CALC-SCNC: 10 MMOL/L (ref 5–15)
BUN SERPL-MCNC: 38 MG/DL (ref 6–20)
BUN/CREAT SERPL: 27 (ref 12–20)
CALCIUM SERPL-MCNC: 8.7 MG/DL (ref 8.5–10.1)
CHLORIDE SERPL-SCNC: 94 MMOL/L (ref 97–108)
CO2 SERPL-SCNC: 33 MMOL/L (ref 21–32)
CREAT SERPL-MCNC: 1.42 MG/DL (ref 0.7–1.3)
ERYTHROCYTE [DISTWIDTH] IN BLOOD BY AUTOMATED COUNT: 14.6 % (ref 11.5–14.5)
GLUCOSE SERPL-MCNC: 153 MG/DL (ref 65–100)
HCT VFR BLD AUTO: 31.1 % (ref 36.6–50.3)
HGB BLD-MCNC: 9.4 G/DL (ref 12.1–17)
MCH RBC QN AUTO: 27.1 PG (ref 26–34)
MCHC RBC AUTO-ENTMCNC: 30.2 G/DL (ref 30–36.5)
MCV RBC AUTO: 89.6 FL (ref 80–99)
NRBC # BLD: 0 K/UL (ref 0–0.01)
NRBC BLD-RTO: 0 PER 100 WBC
PLATELET # BLD AUTO: 125 K/UL (ref 150–400)
PMV BLD AUTO: 10.8 FL (ref 8.9–12.9)
POTASSIUM SERPL-SCNC: 3.2 MMOL/L (ref 3.5–5.1)
RBC # BLD AUTO: 3.47 M/UL (ref 4.1–5.7)
SODIUM SERPL-SCNC: 137 MMOL/L (ref 136–145)
WBC # BLD AUTO: 4.2 K/UL (ref 4.1–11.1)

## 2019-03-04 PROCEDURE — 36415 COLL VENOUS BLD VENIPUNCTURE: CPT

## 2019-03-04 PROCEDURE — 85027 COMPLETE CBC AUTOMATED: CPT

## 2019-03-04 PROCEDURE — 80048 BASIC METABOLIC PNL TOTAL CA: CPT

## 2019-03-05 ENCOUNTER — PATIENT OUTREACH (OUTPATIENT)
Dept: FAMILY MEDICINE CLINIC | Age: 74
End: 2019-03-05

## 2019-03-05 NOTE — PROGRESS NOTES
Patient at [facility] 1/14-1/15 syncope. Readmitted to [facility] 2/4-2/22 after having cardiac arrest at Cancer infusion center on 24 after getting SoluMedrol. Upon discharge from [facility], transferred to [facility]. Called and LM for Luis Carlos Adair, to call me back with update on possible discharge date. Also LM at home number requesting call back to discuss. 3/6/19Castro Maldonado CM at [address] back. Reports expects him to discharge to home on Friday, 3/8. Will be discussing HH tomorrow. Requested copy of discharge summary. NN to contact patient on 3/11.

## 2019-03-06 LAB
ANION GAP SERPL CALC-SCNC: 6 MMOL/L (ref 5–15)
BUN SERPL-MCNC: 41 MG/DL (ref 6–20)
BUN/CREAT SERPL: 26 (ref 12–20)
CALCIUM SERPL-MCNC: 9 MG/DL (ref 8.5–10.1)
CHLORIDE SERPL-SCNC: 96 MMOL/L (ref 97–108)
CO2 SERPL-SCNC: 35 MMOL/L (ref 21–32)
CREAT SERPL-MCNC: 1.57 MG/DL (ref 0.7–1.3)
GLUCOSE SERPL-MCNC: 141 MG/DL (ref 65–100)
POTASSIUM SERPL-SCNC: 3.5 MMOL/L (ref 3.5–5.1)
SODIUM SERPL-SCNC: 137 MMOL/L (ref 136–145)

## 2019-03-06 PROCEDURE — 36415 COLL VENOUS BLD VENIPUNCTURE: CPT

## 2019-03-06 PROCEDURE — 80048 BASIC METABOLIC PNL TOTAL CA: CPT

## 2019-03-07 LAB
ANION GAP SERPL CALC-SCNC: 8 MMOL/L (ref 5–15)
BUN SERPL-MCNC: 40 MG/DL (ref 6–20)
BUN/CREAT SERPL: 27 (ref 12–20)
CALCIUM SERPL-MCNC: 8.9 MG/DL (ref 8.5–10.1)
CHLORIDE SERPL-SCNC: 95 MMOL/L (ref 97–108)
CO2 SERPL-SCNC: 33 MMOL/L (ref 21–32)
CREAT SERPL-MCNC: 1.48 MG/DL (ref 0.7–1.3)
ERYTHROCYTE [DISTWIDTH] IN BLOOD BY AUTOMATED COUNT: 14.6 % (ref 11.5–14.5)
GLUCOSE SERPL-MCNC: 145 MG/DL (ref 65–100)
HCT VFR BLD AUTO: 32.2 % (ref 36.6–50.3)
HGB BLD-MCNC: 9.9 G/DL (ref 12.1–17)
MCH RBC QN AUTO: 27.6 PG (ref 26–34)
MCHC RBC AUTO-ENTMCNC: 30.7 G/DL (ref 30–36.5)
MCV RBC AUTO: 89.7 FL (ref 80–99)
NRBC # BLD: 0 K/UL (ref 0–0.01)
NRBC BLD-RTO: 0 PER 100 WBC
PLATELET # BLD AUTO: 113 K/UL (ref 150–400)
PMV BLD AUTO: 11.1 FL (ref 8.9–12.9)
POTASSIUM SERPL-SCNC: 3.4 MMOL/L (ref 3.5–5.1)
RBC # BLD AUTO: 3.59 M/UL (ref 4.1–5.7)
SODIUM SERPL-SCNC: 136 MMOL/L (ref 136–145)
WBC # BLD AUTO: 3.8 K/UL (ref 4.1–11.1)

## 2019-03-07 PROCEDURE — 85027 COMPLETE CBC AUTOMATED: CPT

## 2019-03-07 PROCEDURE — 80048 BASIC METABOLIC PNL TOTAL CA: CPT

## 2019-03-07 PROCEDURE — 36415 COLL VENOUS BLD VENIPUNCTURE: CPT

## 2019-03-08 ENCOUNTER — OFFICE VISIT (OUTPATIENT)
Dept: FAMILY MEDICINE CLINIC | Age: 74
End: 2019-03-08

## 2019-03-08 VITALS
DIASTOLIC BLOOD PRESSURE: 87 MMHG | BODY MASS INDEX: 24.71 KG/M2 | HEART RATE: 89 BPM | TEMPERATURE: 97.8 F | SYSTOLIC BLOOD PRESSURE: 148 MMHG | RESPIRATION RATE: 16 BRPM | WEIGHT: 172.6 LBS | OXYGEN SATURATION: 98 % | HEIGHT: 70 IN

## 2019-03-08 DIAGNOSIS — Z09 HOSPITAL DISCHARGE FOLLOW-UP: Primary | ICD-10-CM

## 2019-03-08 DIAGNOSIS — Z95.0 S/P PLACEMENT OF CARDIAC PACEMAKER: ICD-10-CM

## 2019-03-08 DIAGNOSIS — J90 PLEURAL EFFUSION: ICD-10-CM

## 2019-03-08 DIAGNOSIS — Z20.828 EXPOSURE TO THE FLU: ICD-10-CM

## 2019-03-08 PROBLEM — R55 SYNCOPE: Status: RESOLVED | Noted: 2019-01-14 | Resolved: 2019-03-08

## 2019-03-08 PROBLEM — D64.9 ANEMIA: Status: RESOLVED | Noted: 2018-12-05 | Resolved: 2019-03-08

## 2019-03-08 PROBLEM — R94.31 PROLONGED Q-T INTERVAL ON ECG: Status: RESOLVED | Noted: 2019-01-14 | Resolved: 2019-03-08

## 2019-03-08 RX ORDER — OSELTAMIVIR PHOSPHATE 30 MG/1
30 CAPSULE ORAL DAILY
Qty: 7 CAP | Refills: 0 | Status: SHIPPED | OUTPATIENT
Start: 2019-03-08 | End: 2019-03-15

## 2019-03-08 RX ORDER — HYDRALAZINE HYDROCHLORIDE 25 MG/1
TABLET, FILM COATED ORAL
Refills: 3 | COMMUNITY
Start: 2018-11-29 | End: 2019-03-08

## 2019-03-08 RX ORDER — LEFLUNOMIDE 20 MG/1
TABLET ORAL
Refills: 0 | COMMUNITY
Start: 2018-12-05 | End: 2019-09-27

## 2019-03-08 RX ORDER — CARVEDILOL 3.12 MG/1
TABLET ORAL
Refills: 4 | COMMUNITY
Start: 2019-01-30 | End: 2019-03-08

## 2019-03-08 RX ORDER — DIGOXIN 125 MCG
TABLET ORAL
Refills: 3 | COMMUNITY
Start: 2019-01-20 | End: 2019-03-08

## 2019-03-08 RX ORDER — BISMUTH SUBSALICYLATE 262 MG
1 TABLET,CHEWABLE ORAL DAILY
COMMUNITY

## 2019-03-08 RX ORDER — OMEPRAZOLE 20 MG/1
20 CAPSULE, DELAYED RELEASE ORAL AS NEEDED
COMMUNITY

## 2019-03-08 NOTE — PROGRESS NOTES
Chief Complaint   Patient presents with   [facility] Follow Up     1. Have you been to the ER, urgent care clinic since your last visit? Hospitalized since your last visit? No    2. Have you seen or consulted any other health care providers outside of the [address] [place] [address] since your last visit? Include any pap smears or colon screening.  No

## 2019-03-08 NOTE — PROGRESS NOTES
Patient Name: Yasmin Rodriguez   MRN: [id number]    Patty Cervantes is a 72 y.o. male who presents with the following:     Transition Care Management:    Admission: 2/4/19  Discharge: 2/22/19  Location: [facility]. [facility]  Diagnosis: Status post cardiac arrest and sick sinus syndrome status post pacemaker. Nurse Navigator note: reviewed    We reviewed the records. He presented with cardiac arrest status post hypothermia protocol. Cardiac arrest occurred at outpatient infusion center where he was about to receive IV treatment for his gout when he was given Solu-Medrol when he became unresponsive. He had sick sinus syndrome therefore underwent pacemaker placement on 2/7. Also treated for pneumonia for 7 days. Had pleural effusion secondary to volume overload from IV fluids. He was discharged at [address] until today. Currently, he reports feeling well without any shortness of breath or chest pain. He feels occasionally unsteady but will be receiving home health services with PT/OT. Occasionally will need to use a cane when walking but would prefer not to. Unsure if he qualifies from a DMV handicap placard. Has upcoming appointments with his cardiologist and nephrologist.  He lives with his daughter and granddaughter. His daughter was recently diagnosed with flu and is on Tamiflu as of 2 days ago. Patient did receive his flu shot. Estimated Creatinine Clearance: 47.3 mL/min (A) (based on SCr of 1.48 mg/dL (H)). XR Results (most recent):  Results from Hospital Encounter encounter on 02/04/19   XR CHEST PORT    Narrative INDICATION:  Heart Failure     EXAM: Single portable view of chest 1852 . Comparison:2/13/2019    Findings: Cardiac silhouette is enlarged. Pulmonary vasculature is slightly  improved. Small left pleural effusion and right pleural effusion unchanged. Atelectasis at the lung bases unchanged. . pacer unchanged       Impression Impression:  1. Enlarged cardiac silhouette, central pulmonary vascular congestion slightly  improved with persistent pleural effusion         Review of Systems   Constitutional: Negative for fever, malaise/fatigue and weight loss. Respiratory: Negative for cough, hemoptysis, shortness of breath and wheezing. Cardiovascular: Negative for chest pain, palpitations, leg swelling and PND. Gastrointestinal: Negative for abdominal pain, constipation, diarrhea, nausea and vomiting. The patient's medications, allergies, past medical history, surgical history, family history and social history were reviewed and updated where appropriate. Prior to Admission medications    Medication Sig Start Date End Date Taking? Authorizing Provider   multivitamin (ONE A DAY) tablet Take 1 Tab by mouth daily. Yes Provider, Historical   omeprazole (PRILOSEC) 20 mg capsule Take 20 mg by mouth daily. Yes Provider, Historical   oseltamivir (TAMIFLU) 30 mg capsule Take 1 Cap by mouth daily for 7 days. 3/8/19 3/15/19 Yes Gurwinder Smyth MD   carvedilol (COREG) 6.25 mg tablet Take 1 Tab by mouth two (2) times daily (with meals) for 30 days. 2/22/19 3/24/19 Yes Calvin Hannah MD   colchicine 0.6 mg tablet Take 0.5 Tabs by mouth daily for 30 days. Patient taking differently: Take 0.3 mg by mouth as needed. 2/22/19 3/24/19 Yes Calvin Hannah MD   apixaban (ELIQUIS) 5 mg tablet Take 1 Tab by mouth two (2) times a day for 30 days. 2/22/19 3/24/19 Yes Calvin Hannah MD   furosemide (LASIX) 40 mg tablet Take 1 Tab by mouth two (2) times a day for 30 days. Patient taking differently: Take 40 mg by mouth daily. 2/22/19 3/24/19 Yes Calvin Hannah MD   insulin glargine (LANTUS,BASAGLAR) 100 unit/mL (3 mL) inpn 5 Units by SubCUTAneous route nightly.  2/22/19  Yes Calvin Hannah MD   levothyroxine (SYNTHROID) 25 mcg tablet TAKE 1 TABLET BY MOUTH EVERY DAY BEFORE BREAKFAST 1/22/19  Yes Gurwinder Smyth MD   dextran 70-hypromellose (ARTIFICIAL TEARS,[id number],) ophthalmic solution Administer 1 Drop to both eyes as needed. Yes Provider, Historical   pravastatin (PRAVACHOL) 20 mg tablet Take 1 Tab by mouth nightly. 1/18/16  Yes Jonathon Harrell NP   leflunomide (ARAVA) 20 mg tablet TAKE 1 TABLET BY MOUTH EVERY DAY 12/5/18   Provider, Historical       Allergies   Allergen Reactions    Solu-Medrol [Methylprednisolone Sodium Succ] Other (comments)     Syncope / Cardiac Arrest           OBJECTIVE    Visit Vitals  /87 (BP 1 Location: Left arm, BP Patient Position: Sitting)   Pulse 89   Temp 97.8 °F (36.6 °C) (Oral)   Resp 16   Ht 5' 10\" (1.778 m)   Wt 172 lb 9.6 oz (78.3 kg)   SpO2 98%   BMI 24.77 kg/m²       Physical Exam   Constitutional: He is oriented to person, place, and time and well-developed, well-nourished, and in no distress. No distress. Eyes: Conjunctivae and EOM are normal. Pupils are equal, round, and reactive to light. Cardiovascular: Normal rate, regular rhythm and normal heart sounds. Exam reveals no gallop and no friction rub. No murmur heard. Pulmonary/Chest: Effort normal and breath sounds normal. No respiratory distress. He has no wheezes. Neurological: He is alert and oriented to person, place, and time. Skin: Skin is warm and dry. No rash noted. He is not diaphoretic. Psychiatric: Mood, memory, affect and judgment normal.   Nursing note and vitals reviewed. ASSESSMENT AND PLAN  Gabrielle Garcia is a 72 y.o. male who presents today for:    1. Hospital discharge follow-up  Clinically doing well.    2. S/P placement of cardiac pacemaker  F/u with cardiology. 3. Exposure to the flu  Renally dosed ppx Tamiflu. - oseltamivir (TAMIFLU) 30 mg capsule; Take 1 Cap by mouth daily for 7 days. Dispense: 7 Cap; Refill: 0    4. Pleural effusion  Clinically doing well; offered CXR to re-evaluate prior effusion but pt declined.       Medications Discontinued During This Encounter   Medication Reason    albuterol-ipratropium (DUO-NEB) 2.5 mg-0.5 mg/3 ml nebu     L. acidoph & paracasei- S therm- Bifido (LAVERNE-Q/RISAQUAD) 8 billion cell cap cap     melatonin 3 mg tablet     pantoprazole (PROTONIX) 40 mg tablet     triamcinolone acetonide (KENALOG) 0.1 % topical cream     carvedilol (COREG) 3.125 mg tablet     digoxin (LANOXIN) 0.125 mg tablet     hydrALAZINE (APRESOLINE) 25 mg tablet        Follow-up Disposition:  Return in about 3 months (around 6/8/2019) for DM follow up. Medication risks/benefits/costs/interactions/alternatives discussed with patient. Advised patient to call back or return to office if symptoms worsen/change/persist. If patient cannot reach us or should anything more severe/urgent arise he/she should proceed directly to the nearest emergency department. Discussed expected course/resolution/complications of diagnosis in detail with patient. Patient given a written after visit summary which includes his/her diagnoses, current medications and vitals. Patient expressed understanding with the diagnosis and plan. Amy Pryor M.D.

## 2019-03-08 NOTE — PATIENT INSTRUCTIONS
Eating Healthy Foods: Care Instructions  Your Care Instructions    Eating healthy foods can help lower your risk for disease. Healthy food gives you energy and keeps your heart strong, your brain active, your muscles working, and your bones strong. A healthy diet includes a variety of foods from the basic food groups: grains, vegetables, fruits, milk and milk products, and meat and beans. Some people may eat more of their favorite foods from only one food group and, as a result, miss getting the nutrients they need. So, it is important to pay attention not only to what you eat but also to what you are missing from your diet. You can eat a healthy, balanced diet by making a few small changes. Follow-up care is a key part of your treatment and safety. Be sure to make and go to all appointments, and call your doctor if you are having problems. It's also a good idea to know your test results and keep a list of the medicines you take. How can you care for yourself at home? Look at what you eat  · Keep a food diary for a week or two and record everything you eat or drink. Track the number of servings you eat from each food group. · For a balanced diet every day, eat a variety of:  ? 6 or more ounce-equivalents of grains, such as cereals, breads, crackers, rice, or pasta, every day. An ounce-equivalent is 1 slice of bread, 1 cup of ready-to-eat cereal, or ½ cup of cooked rice, cooked pasta, or cooked cereal.  ? 2½ cups of vegetables, especially:  § Dark-green vegetables such as broccoli and spinach. § Orange vegetables such as carrots and sweet potatoes. § Dry beans (such as baron and kidney beans) and peas (such as lentils). ? 2 cups of fresh, frozen, or canned fruit. A small apple or 1 banana or orange equals 1 cup. ? 3 cups of nonfat or low-fat milk, yogurt, or other milk products. ? 5½ ounces of meat and beans, such as chicken, fish, lean meat, beans, nuts, and seeds.  One egg, 1 tablespoon of peanut butter, ½ ounce nuts or seeds, or ¼ cup of cooked beans equals 1 ounce of meat. · Learn how to read food labels for serving sizes and ingredients. Fast-food and convenience-food meals often contain few or no fruits or vegetables. Make sure you eat some fruits and vegetables to make the meal more nutritious. · Look at your food diary. For each food group, add up what you have eaten and then divide the total by the number of days. This will give you an idea of how much you are eating from each food group. See if you can find some ways to change your diet to make it more healthy. Start small  · Do not try to make dramatic changes to your diet all at once. You might feel that you are missing out on your favorite foods and then be more likely to fail. · Start slowly, and gradually change your habits. Try some of the following:  ? Use whole wheat bread instead of white bread. ? Use nonfat or low-fat milk instead of whole milk. ? Eat brown rice instead of white rice, and eat whole wheat pasta instead of white-flour pasta. ? Try low-fat cheeses and low-fat yogurt. ? Add more fruits and vegetables to meals and have them for snacks. ? Add lettuce, tomato, cucumber, and onion to sandwiches. ? Add fruit to yogurt and cereal.  Enjoy food  · You can still eat your favorite foods. You just may need to eat less of them. If your favorite foods are high in fat, salt, and sugar, limit how often you eat them, but do not cut them out entirely. · Eat a wide variety of foods. Make healthy choices when eating out  · The type of restaurant you choose can help you make healthy choices. Even fast-food chains are now offering more low-fat or healthier choices on the menu. · Choose smaller portions, or take half of your meal home. · When eating out, try:  ? A veggie pizza with a whole wheat crust or grilled chicken (instead of sausage or pepperoni).   ? Pasta with roasted vegetables, grilled chicken, or marinara sauce instead of cream sauce. ? A vegetable wrap or grilled chicken wrap. ? Broiled or poached food instead of fried or breaded items. Make healthy choices easy  · Buy packaged, prewashed, ready-to-eat fresh vegetables and fruits, such as baby carrots, salad mixes, and chopped or shredded broccoli and cauliflower. · Buy packaged, presliced fruits, such as melon or pineapple. · Choose 100% fruit or vegetable juice instead of soda. Limit juice intake to 4 to 6 oz (½ to ¾ cup) a day. · Blend low-fat yogurt, fruit juice, and canned or frozen fruit to make a smoothie for breakfast or a snack. Where can you learn more? Go to http://tanika-claudia.info/. Enter T756 in the search box to learn more about \"Eating Healthy Foods: Care Instructions. \"  Current as of: March 28, 2018  Content Version: 11.9  © 1014-7126 [employer]. Care instructions adapted under license by [employer] (which disclaims liability or warranty for this information). If you have questions about a medical condition or this instruction, always ask your healthcare professional. [place] [address] any warranty or liability for your use of this information.

## 2019-03-11 ENCOUNTER — PATIENT OUTREACH (OUTPATIENT)
Dept: FAMILY MEDICINE CLINIC | Age: 74
End: 2019-03-11

## 2019-03-11 NOTE — PROGRESS NOTES
Hospital Discharge Follow-Up      Date/Time:  3/11/2019 10:23 AM    Patient was admitted to [facility] on  and discharged on  for s/p cardiac arrest/MARYCRUZ on CKD,chronic afib. Transferred to [address] [place] [address]  and discharged on 3/8. The physician discharge summary was available at the time of outreach. Patient was contacted within 1 business days of discharge. Top Challenges reviewed with the provider   Leaving [facility] for 2-3 weeks to go to [place]  Seeing cardio 3/11  Did not want to review meds with me, said had discussed with you on Friday. Method of communication with provider :chart routing,face to face    Inpatient RRAT score: 44  Was this a readmission? yes   Patient stated reason for the readmission: \"heart attack after getting IV solumedrol\"    Nurse Navigator (NN) contacted the patient by telephone to perform post hospital discharge assessment. Verified name and  with patient as identifiers. Provided introduction to self, and explanation of the Nurse Navigator role. Patient reports he is good, still somewhat weak, but feels he is improving. Slept well. Goes to see Saray Edwards this afternoon for f/u. Then will be going out of town for 1-2 weeks for family function in [place]. Reviewed discharge instructions and red flags with patient who verbalized understanding. Patient given an opportunity to ask questions and does not have any further questions or concerns at this time. The patient agrees to contact the PCP office for questions related to their healthcare. NN provided contact information for future reference. Disease Specific:   N/A    Summary of patient's top problems:  1. AHRF status post cardiac arrest/pulseless electrical activity POA:   -s/p hypothermia protocol  -EEG non specific no seizure activity  -Coded 2/ multiple times post hypothermia protocol.  Possibly secondary to bradycardia  -s/p pacemaker   -reintubated for airway and reextubated 2/10 after bronch -s/p zosyn and zyvox for 7 days  -nuclear stress test 2/13 with normal EF, no ischemia  -Stable  2. MARYCRUZ on CKD stage 3: secondary to ATN  -nephrology consulted  -creatinine slowly improving   -lasix 40 BID on d/c   -volume overloaded     3. Chronic a.fib: on eliquis  Rate controlled    One [facility] Nigel orders at discharge: SN/PT/OT  [address]: unsure        Date of initial visit: Saturday, 3/9    Durable Medical Equipment ordered/company: none  Durable Medical Equipment received: na    Barriers to care? Utilization of services- patient will be leaving town for 2-3 weeks to go to [place] for family function. Expects that will not have [facility] when he returns. Advance Care Planning:   Does patient have an Advance Directive:  not on file     Medication(s):   New Medications at Discharge:   Changed Medications at Discharge:   Discontinued Medications at Discharge:     Medication reconciliation was not performed with patient, who verbalizes understanding of administration of home medications. He had seen pcp on day of discharge, Friday 3/8 and reviewed all meds with her. Did not want to review them again with me. There were no barriers to obtaining medications identified at this time. Referral to Pharm D needed: no     Current Outpatient Medications   Medication Sig    leflunomide (ARAVA) 20 mg tablet TAKE 1 TABLET BY MOUTH EVERY DAY    multivitamin (ONE A DAY) tablet Take 1 Tab by mouth daily.  omeprazole (PRILOSEC) 20 mg capsule Take 20 mg by mouth daily.  oseltamivir (TAMIFLU) 30 mg capsule Take 1 Cap by mouth daily for 7 days.  carvedilol (COREG) 6.25 mg tablet Take 1 Tab by mouth two (2) times daily (with meals) for 30 days.  colchicine 0.6 mg tablet Take 0.5 Tabs by mouth daily for 30 days. (Patient taking differently: Take 0.6 mg by mouth as needed.)    apixaban (ELIQUIS) 5 mg tablet Take 1 Tab by mouth two (2) times a day for 30 days.     furosemide (LASIX) 40 mg tablet Take 1 Tab by mouth two (2) times a day for 30 days. (Patient taking differently: Take 40 mg by mouth daily.)    insulin glargine (LANTUS,BASAGLAR) 100 unit/mL (3 mL) inpn 5 Units by SubCUTAneous route nightly.  levothyroxine (SYNTHROID) 25 mcg tablet TAKE 1 TABLET BY MOUTH EVERY DAY BEFORE BREAKFAST    dextran 70-hypromellose (ARTIFICIAL TEARS,[id number],) ophthalmic solution Administer 1 Drop to both eyes as needed.  pravastatin (PRAVACHOL) 20 mg tablet Take 1 Tab by mouth nightly. No current facility-administered medications for this visit. There are no discontinued medications. [facility] follow up appointment(s):   Future Appointments   Date Time Provider Xiomara Velez   6/10/2019  8:30 AM Avril Gutierrez MD [facility]   7/17/2019  1:40 PM Philip Sommer MD [facility]      Non-[facility] follow up appointment(s): - 3/11  [facility]:  information provided as a resource       Goals      education on management of syncope      · Monitor bp-report low or high values to provider  · Promote adequate fluid intake]  · Change positions slowly  · Reevaluate meds-may need to space bp meds out. · Note any dizziness, weakness  · F/u cardio as directed. mbt    2/1/19  · No further episodes of syncope. · Following above recs. · bp stable per patient. mbt       Prevent complications post hospitalization. 1/16/19 [facility] 1/14-1/15 syncope. · Reviewed meds with patient  · Reviewed discharge instructions  · Reminded to keep appt 1/18 with cardio and discuss syncope and MICAH  · pcp MICAH 1/21  1:30pm  · Reviewed red flags-dizzy,sob, chest pain, weakness,  · Report any red flags/concerns to pcp/nn/cardio. · Nn contact info provided. · [facility] info provided. · NN to call back in 5-7 days for update. mbt    2/1/19  · Saw cardio and no new recs. · Cancelled appt for MICAH with pcp, rescheduled for 2/8 for a cpx-didn't want earlier visit, \"I am fine. \"  · Denies any red flags  · Reminded to call if any questions/concerns. mbt       Prevent complications post hospitalization. 3/11/19 [facility] 2/4-2/22,then  rehab 2/22-3/8  · Reviewed discharge instructions with patient  · Reviewed meds with patient  · Reviewed red flags with patient-using teach back method: fever,chest pain,sob,nausea,vomiting,diarrhea,change in mentation,falling,weakness,bleeding  · Reviewed f/u appt- saw pcp 3/8 for MICAH, sees cardio- this afternoon 3/11;  · Reminded to schedule appt with nephrologist-  · Gave info on [facility] as resource  · Reminded provider on call after hours  · Gave him NN direct contact information  · NN to contact in 5-7 days for update. mbt

## 2019-03-24 RX ORDER — COLCHICINE 0.6 MG/1
0.6 TABLET ORAL
Qty: 90 TAB | Refills: 0 | Status: SHIPPED | OUTPATIENT
Start: 2019-03-24 | End: 2019-04-23

## 2019-04-10 DIAGNOSIS — D64.9 ANEMIA, UNSPECIFIED TYPE: ICD-10-CM

## 2019-08-05 RX ORDER — LEVOTHYROXINE SODIUM 25 UG/1
TABLET ORAL
Qty: 90 TAB | Refills: 0 | Status: SHIPPED | OUTPATIENT
Start: 2019-08-05 | End: 2019-09-27

## 2019-08-05 NOTE — TELEPHONE ENCOUNTER
Pharmacy requesting medication refill     Requested Prescriptions     Pending Prescriptions Disp Refills    levothyroxine (SYNTHROID) 25 mcg tablet 90 Tab 1     Sig: TAKE 1 TABLET BY MOUTH EVERY DAY BEFORE BREAKFAST     Pharmacy also requesting BD Elida Pen [place]      Pharmacy on file verified  Dominique Ornelas [phone])

## 2019-09-27 ENCOUNTER — OFFICE VISIT (OUTPATIENT)
Dept: FAMILY MEDICINE CLINIC | Age: 74
End: 2019-09-27

## 2019-09-27 VITALS
SYSTOLIC BLOOD PRESSURE: 140 MMHG | TEMPERATURE: 98.1 F | HEIGHT: 70 IN | WEIGHT: 191.6 LBS | RESPIRATION RATE: 16 BRPM | HEART RATE: 82 BPM | BODY MASS INDEX: 27.43 KG/M2 | DIASTOLIC BLOOD PRESSURE: 90 MMHG | OXYGEN SATURATION: 97 %

## 2019-09-27 DIAGNOSIS — Z23 ENCOUNTER FOR IMMUNIZATION: ICD-10-CM

## 2019-09-27 DIAGNOSIS — D63.8 ANEMIA, CHRONIC DISEASE: ICD-10-CM

## 2019-09-27 DIAGNOSIS — E11.21 TYPE 2 DIABETES WITH NEPHROPATHY (HCC): ICD-10-CM

## 2019-09-27 DIAGNOSIS — Z12.5 PROSTATE CANCER SCREENING: ICD-10-CM

## 2019-09-27 DIAGNOSIS — E06.3 HYPOTHYROIDISM DUE TO HASHIMOTO'S THYROIDITIS: ICD-10-CM

## 2019-09-27 DIAGNOSIS — M1A.9XX0 CHRONIC GOUT WITHOUT TOPHUS, UNSPECIFIED CAUSE, UNSPECIFIED SITE: ICD-10-CM

## 2019-09-27 DIAGNOSIS — Z13.220 SCREENING FOR LIPID DISORDERS: ICD-10-CM

## 2019-09-27 DIAGNOSIS — Z00.00 MEDICARE ANNUAL WELLNESS VISIT, SUBSEQUENT: Primary | ICD-10-CM

## 2019-09-27 DIAGNOSIS — I48.0 PAROXYSMAL ATRIAL FIBRILLATION (HCC): ICD-10-CM

## 2019-09-27 DIAGNOSIS — E11.319 DIABETIC RETINOPATHY OF BOTH EYES ASSOCIATED WITH TYPE 2 DIABETES MELLITUS, MACULAR EDEMA PRESENCE UNSPECIFIED, UNSPECIFIED RETINOPATHY SEVERITY (HCC): ICD-10-CM

## 2019-09-27 DIAGNOSIS — E55.9 VITAMIN D DEFICIENCY: ICD-10-CM

## 2019-09-27 DIAGNOSIS — N18.4 CKD (CHRONIC KIDNEY DISEASE) STAGE 4, GFR 15-29 ML/MIN (HCC): ICD-10-CM

## 2019-09-27 DIAGNOSIS — E03.8 HYPOTHYROIDISM DUE TO HASHIMOTO'S THYROIDITIS: ICD-10-CM

## 2019-09-27 PROBLEM — Z86.2 HISTORY OF THROMBOCYTOPENIA: Status: ACTIVE | Noted: 2019-01-14

## 2019-09-27 PROBLEM — Z86.79 HISTORY OF RENAL ARTERY STENOSIS: Status: ACTIVE | Noted: 2018-08-04

## 2019-09-27 RX ORDER — LISINOPRIL 5 MG/1
5 TABLET ORAL DAILY
COMMUNITY
Start: 2019-07-24

## 2019-09-27 RX ORDER — APIXABAN 5 MG/1
5 TABLET, FILM COATED ORAL 2 TIMES DAILY
COMMUNITY
Start: 2019-07-04

## 2019-09-27 RX ORDER — FUROSEMIDE 40 MG/1
40 TABLET ORAL DAILY
COMMUNITY
Start: 2019-08-04

## 2019-09-27 RX ORDER — CARVEDILOL 6.25 MG/1
6.25 TABLET ORAL 2 TIMES DAILY WITH MEALS
COMMUNITY
Start: 2019-08-06

## 2019-09-27 NOTE — ACP (ADVANCE CARE PLANNING)
Advance Care Planning:   Patient was offered the opportunity to discuss advance care planning:  Yes   Does patient have an Advance Directive:  no   If no, did you provide information on [facility]ctions? yes     Conducted ACP discussion today. Advanced Care Planning Information Card copy provided to patient; he/she will reach out to NN/facilitator to review.

## 2019-09-27 NOTE — PROGRESS NOTES
This is the Subsequent Medicare Annual Wellness Exam, performed 12 months or more after the Initial AWV or the last Subsequent AWV    I have reviewed the patient's medical history in detail and updated the computerized patient record. History     Past Medical History:   Diagnosis Date    Acute encephalopathy 1/14/2016    Anemia 5/11/2017    Atrial fibrillation (HCC)     Painter esophagus     CAD (coronary artery disease)     Diabetes (HCC)     Heart failure (HCC)     Cardiomyopathy, myocarditis    History of renal artery stenosis 8/4/2018    History of thrombocytopenia 1/14/2019    HTN, goal below 140/90     Retinopathy, diabetic, bilateral ([address] 3/11/2016    Stenosis of right carotid artery without cerebral infarction 1/18/2016    TIA (transient ischemic attack) 1/14/2016    Vitamin D deficiency 3/1/2016    Nephrology ordered and follow 2/22/16       Past Surgical History:   Procedure Laterality Date    COLONOSCOPY N/A 6/23/2016    COLONOSCOPY performed by Juhi Morillo MD at [address] [place] [address] N/A 8/6/2018    COLONOSCOPY performed by Juhi Morillo MD at [address] [place] [address] N/A 8/30/2018    COLONOSCOPY performed by Mehnaz Velásquez MD at [address] [place] [address] HX ENDOSCOPY  06/27/2016    CAPSULE; normal small bowel    HX ENDOSCOPY  06/23/2016    upper endoscopy    HX UROLOGICAL  2013    MA INS NEW/RPLC PRM PACEMAKER W/TRANSV ELTRD VENTR N/A 2/7/2019    INSERT PPM SINGLE VENTRICULAR performed by Cheli Tomlinson MD at [place] [address], [facility]/s Dr CATH LAB     Current Outpatient Medications   Medication Sig Dispense Refill    furosemide (LASIX) 40 mg tablet Take 40 mg by mouth daily.  lisinopril (PRINIVIL, ZESTRIL) 5 mg tablet Take 5 mg by mouth daily.  carvedilol (COREG) 6.25 mg tablet Take 6.25 mg by mouth two (2) times daily (with meals).  ELIQUIS 5 mg tablet Take 5 mg by mouth two (2) times a day.  multivitamin (ONE A DAY) tablet Take 1 Tab by mouth daily.       omeprazole (PRILOSEC) 20 mg capsule Take 20 mg by mouth as needed.  insulin glargine (LANTUS,BASAGLAR) 100 unit/mL (3 mL) inpn 5 Units by SubCUTAneous route nightly. 4 Pen 1    dextran 70-hypromellose (ARTIFICIAL TEARS,[id number],) ophthalmic solution Administer 1 Drop to both eyes as needed.  pravastatin (PRAVACHOL) 20 mg tablet Take 1 Tab by mouth nightly.  30 Tab 2     Allergies   Allergen Reactions    Solu-Medrol [Methylprednisolone Sodium Succ] Other (comments)     Syncope / Cardiac Arrest     Family History   Problem Relation Age of Onset    Heart Failure Mother     Diabetes Father     No Known Problems Sister     Other Daughter         Laura Furry Other Daughter         Laura Furry Other Daughter         Hashimoto     Social History     Tobacco Use    Smoking status: Never Smoker    Smokeless tobacco: Never Used   Substance Use Topics    Alcohol use: Yes     Comment: 2 drinks/week, never a heavy drinker     Patient Active Problem List   Diagnosis Code    TIA (transient ischemic attack) G45.9    Stenosis of right carotid artery without cerebral infarction I65.21    Vitamin D deficiency E55.9    Retinopathy, diabetic, bilateral ([facility] E11.319    Heart murmur, systolic K31.3    Advance care planning Z71.89    Essential hypertension I10    Anemia, chronic disease D63.8    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    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    CKD (chronic kidney disease) stage 4, GFR 15-29 ml/min (HCC) N18.4    Adenomatous polyp of ascending colon D12.2    Paroxysmal atrial fibrillation (HCC) I48.0    History of renal artery stenosis Z86.79    Hypothyroidism due to Hashimoto's thyroiditis E03.8, E06.3    Long-term use of immunosuppressant medication Z79.899    GI bleed K92.2    Acute blood loss anemia D62    History of thrombocytopenia Z86.2    Type 2 diabetes with nephropathy (HCC) E11.21       Depression Risk Factor Screening:     3 most recent PHQ Screens 3/11/2019   Little interest or pleasure in doing things Not at all   Feeling down, depressed, irritable, or hopeless Not at all   Total Score PHQ 2 0     Alcohol Risk Factor Screening: On any occasion in the past three months you have had more than 7 drinks containing alcohol    Functional Ability and Level of Safety:   Hearing Loss  Hearing is good. Activities of Daily Living  The home contains: no safety equipment. Patient does total self care    Fall Risk  Fall Risk Assessment, last 12 mths 3/11/2019   Able to walk? Yes   Fall in past 12 months? No       Abuse Screen  Patient is not abused    Cognitive Screening   Evaluation of Cognitive Function:  Has your family/caregiver stated any concerns about your memory: no      Patient Care Team   Patient Care Team:  Max Robison MD as PCP - General (Family Practice)  Jose Moseley MD (Nephrology)  Roxi Mcmillan MD (Cardiology)  Cecilia Day MD (Ophthalmology)  Elio Bah MD as Physician (Ophthalmology)  Dontae Hare MD (Gastroenterology)  Pancho Saba MD as Physician (Endocrinology)    Assessment/Plan   Education and counseling provided:  Are appropriate based on today's review and evaluation    Diagnoses and all orders for this visit:    1. Medicare annual wellness visit, subsequent    2. Type 2 diabetes with nephropathy [facility])  Assessment & Plan: This condition is managed by Specialist.  Key Antihyperglycemic Medications             insulin glargine (LANTUS,BASAGLAR) 100 unit/mL (3 mL) inpn (Taking) 5 Units by SubCUTAneous route nightly. Other Key Diabetic Medications             lisinopril (PRINIVIL, ZESTRIL) 5 mg tablet (Taking) Take 5 mg by mouth daily. pravastatin (PRAVACHOL) 20 mg tablet (Taking) Take 1 Tab by mouth nightly.         Lab Results   Component Value Date/Time    Hemoglobin A1c 6.4 02/05/2019 12:02 AM    Glucose 145 03/07/2019 05:41 AM    Creatinine 1.48 03/07/2019 05:41 AM Creatinine (POC) 2.1 02/04/2019 12:23 PM     Diabetic Foot and Eye Exam HM Status   Topic Date Due    Diabetic Foot Care  05/25/2018    Eye Exam  10/17/2019       Orders:  -     HEMOGLOBIN A1C WITH EAG  -     MICROALBUMIN, UR, RAND W/ MICROALB/CREAT RATIO    3. Hypothyroidism due to Hashimoto's thyroiditis  -     TSH AND FREE T4    4. Anemia, chronic disease  -     CBC WITH AUTOMATED DIFF  -     FERRITIN  -     IRON PROFILE    5. Vitamin D deficiency  -     VITAMIN D, 25 HYDROXY    6. Chronic gout without tophus, unspecified cause, unspecified site  -     URIC ACID    7. Screening for lipid disorders  -     METABOLIC PANEL, COMPREHENSIVE  -     LIPID PANEL    8. Prostate cancer screening    9. Paroxysmal atrial fibrillation [facility])  Assessment & Plan: This condition is managed by Specialist.  Key CAD CHF Meds             furosemide (LASIX) 40 mg tablet (Taking) Take 40 mg by mouth daily. lisinopril (PRINIVIL, ZESTRIL) 5 mg tablet (Taking) Take 5 mg by mouth daily. carvedilol (COREG) 6.25 mg tablet (Taking) Take 6.25 mg by mouth two (2) times daily (with meals). ELIQUIS 5 mg tablet (Taking) Take 5 mg by mouth two (2) times a day. pravastatin (PRAVACHOL) 20 mg tablet (Taking) Take 1 Tab by mouth nightly. Lab Results   Component Value Date/Time    Sodium 136 03/07/2019 05:41 AM    Sodium (POC) 141 02/04/2019 12:23 PM    Potassium 3.4 03/07/2019 05:41 AM    Potassium (POC) 3.1 02/04/2019 12:23 PM    INR 1.5 02/22/2019 03:18 AM    Prothrombin time 14.5 02/22/2019 03:18 AM    Digoxin level 1.4 08/04/2018 03:03 PM         10. CKD (chronic kidney disease) stage 4, GFR 15-29 ml/min ([facility])  Assessment & Plan: This condition is managed by Specialist.   Key CKD Meds             furosemide (LASIX) 40 mg tablet (Taking) Take 40 mg by mouth daily. lisinopril (PRINIVIL, ZESTRIL) 5 mg tablet (Taking) Take 5 mg by mouth daily.         Lab Results   Component Value Date/Time    GFR est non-AA 47 03/07/2019 05:41 AM    GFRNA, POC 31 02/04/2019 12:23 PM    GFR est AA 57 03/07/2019 05:41 AM    GFRAA, POC 38 02/04/2019 12:23 PM    Creatinine 1.48 03/07/2019 05:41 AM    Creatinine (POC) 2.1 02/04/2019 12:23 PM    BUN 40 03/07/2019 05:41 AM    BUN (POC) 40 02/04/2019 12:23 PM    Sodium 136 03/07/2019 05:41 AM    Sodium (POC) 141 02/04/2019 12:23 PM    Potassium 3.4 03/07/2019 05:41 AM    Potassium (POC) 3.1 02/04/2019 12:23 PM    Chloride 95 03/07/2019 05:41 AM    Chloride (POC) 101 02/04/2019 12:23 PM    CO2 33 03/07/2019 05:41 AM    Calcium 8.9 03/07/2019 05:41 AM    Magnesium 1.6 02/28/2019 05:20 AM    Phosphorus 3.8 02/18/2019 04:36 AM    Vitamin D 25-Hydroxy 22.9 02/23/2019 04:00 AM     CrCl cannot be calculated (Patient's most recent lab result is older than the maximum 180 days allowed. ). 11. Diabetic retinopathy of both eyes associated with type 2 diabetes mellitus, macular edema presence unspecified, unspecified retinopathy severity ([address]  Assessment & Plan: This condition is managed by Specialist.  Key Antihyperglycemic Medications             insulin glargine (LANTUS,BASAGLAR) 100 unit/mL (3 mL) inpn (Taking) 5 Units by SubCUTAneous route nightly. Other Key Diabetic Medications             lisinopril (PRINIVIL, ZESTRIL) 5 mg tablet (Taking) Take 5 mg by mouth daily. pravastatin (PRAVACHOL) 20 mg tablet (Taking) Take 1 Tab by mouth nightly. Lab Results   Component Value Date/Time    Hemoglobin A1c 6.4 02/05/2019 12:02 AM    Glucose 145 03/07/2019 05:41 AM    Creatinine 1.48 03/07/2019 05:41 AM    Creatinine (POC) 2.1 02/04/2019 12:23 PM     Diabetic Foot and Eye Exam HM Status   Topic Date Due    Diabetic Foot Care  05/25/2018    Eye Exam  10/17/2019         12.  Encounter for immunization  -     INFLUENZA VACCINE INACTIVATED (IIV), SUBUNIT, ADJUVANTED, IM  -     ADMIN INFLUENZA VIRUS VAC      Health Maintenance Due   Topic Date Due    DTaP/Tdap/Td series (1 - Tdap) 10/26/1968    Shingrix Vaccine Age 50> (1 of 2) 10/26/1997    Pneumococcal 65+ years (1 of 2 - PCV13) 10/26/2012    FOOT EXAM Q1  05/25/2018    MEDICARE YEARLY EXAM  08/29/2018    MICROALBUMIN Q1  02/23/2019    LIPID PANEL Q1  02/23/2019    Influenza Age 5 to Adult  08/01/2019    HEMOGLOBIN A1C Q6M  08/05/2019    GLAUCOMA SCREENING Q2Y  10/17/2019    EYE EXAM RETINAL OR DILATED  10/17/2019

## 2019-09-27 NOTE — PROGRESS NOTES
Chief Complaint   Patient presents with    Diabetes     follow up     1. Have you been to the ER, urgent care clinic since your last visit? Hospitalized since your last visit? No    2. Have you seen or consulted any other health care providers outside of the [address] [place] [address] since your last visit? Include any pap smears or colon screening.  No

## 2019-09-27 NOTE — PROGRESS NOTES
Patient Name: Steven Lange   MRN: [id number]    Nimisha Osorio is a 72 y.o. male who presents with the following:     Patient states that he usually takes 40 units of Lantus but does tend to skip occasionally. If his sugars very high, he will take 8 units. He has a history of hypothyroidism but stopped taking his thyroid medicine a few weeks ago because he noticed that it made him feel fatigued midday. Hx of anemia of chronic disease and iron deficiency also has had thrombocytopenia. Saw oncology who recommended either monitoring labs or doing a bone marrow biopsy. Has not seen them in a while.  Patient has not yet done a bone marrow biopsy.  Of note, he also has a history of abnormal free light chains which were thought to be attributable to his chronic kidney disease. Hx of chronic gout. Has not bothered him recently. Review of Systems   Constitutional: Negative for fever, malaise/fatigue and weight loss. Respiratory: Negative for cough, hemoptysis, shortness of breath and wheezing. Cardiovascular: Negative for chest pain, palpitations, leg swelling and PND. Gastrointestinal: Negative for abdominal pain, constipation, diarrhea, nausea and vomiting. The patient's medications, allergies, past medical history, surgical history, family history and social history were reviewed and updated where appropriate. Prior to Admission medications    Medication Sig Start Date End Date Taking? Authorizing Provider   furosemide (LASIX) 40 mg tablet Take 40 mg by mouth daily. 8/4/19  Yes Provider, Historical   lisinopril (PRINIVIL, ZESTRIL) 5 mg tablet Take 5 mg by mouth daily. 7/24/19  Yes Provider, Historical   carvedilol (COREG) 6.25 mg tablet Take 6.25 mg by mouth two (2) times daily (with meals). 8/6/19  Yes Provider, Historical   ELIQUIS 5 mg tablet Take 5 mg by mouth two (2) times a day. 7/4/19  Yes Provider, Historical   multivitamin (ONE A DAY) tablet Take 1 Tab by mouth daily. Yes Provider, Historical   omeprazole (PRILOSEC) 20 mg capsule Take 20 mg by mouth as needed. Yes Provider, Historical   insulin glargine (LANTUS,BASAGLAR) 100 unit/mL (3 mL) inpn 5 Units by SubCUTAneous route nightly. 2/22/19  Yes Garrett Camacho MD   dextran 70-hypromellose (ARTIFICIAL TEARS,[id number],) ophthalmic solution Administer 1 Drop to both eyes as needed. Yes Provider, Historical   pravastatin (PRAVACHOL) 20 mg tablet Take 1 Tab by mouth nightly. 1/18/16  Yes Johanna Chavez NP   levothyroxine (SYNTHROID) 25 mcg tablet TAKE 1 TABLET BY MOUTH EVERY DAY BEFORE BREAKFAST 8/5/19   Ivonne Ty MD       Allergies   Allergen Reactions    Solu-Medrol [Methylprednisolone Sodium Succ] Other (comments)     Syncope / Cardiac Arrest           OBJECTIVE    Visit Vitals  Pulse 82   Temp 98.1 °F (36.7 °C) (Oral)   Resp 16   Ht 5' 10\" (1.778 m)   Wt 191 lb 9.6 oz (86.9 kg)   SpO2 97%   BMI 27.49 kg/m²       Physical Exam   Constitutional: He is oriented to person, place, and time and well-developed, well-nourished, and in no distress. No distress. Eyes: Pupils are equal, round, and reactive to light. Conjunctivae and EOM are normal.   Cardiovascular: Normal rate, regular rhythm and normal heart sounds. Exam reveals no gallop and no friction rub. No murmur heard. Pulmonary/Chest: Effort normal and breath sounds normal. No respiratory distress. He has no wheezes. Neurological: He is alert and oriented to person, place, and time. Skin: Skin is warm and dry. No rash noted. He is not diaphoretic. Psychiatric: Mood, memory, affect and judgment normal.   Nursing note and vitals reviewed. ASSESSMENT AND PLAN  Felicity Ernst is a 72 y.o. male who presents today for:    1. Medicare annual wellness visit, subsequent  See other note. 2. Type 2 diabetes with nephropathy ([address]  Stable, continue current treatment pending review of labs. Will try to find a stable Lantus daily dose for him.   - HEMOGLOBIN A1C WITH EAG  - MICROALBUMIN, UR, RAND W/ MICROALB/CREAT RATIO    3. Hypothyroidism due to Hashimoto's thyroiditis  Stable, continue current treatment pending review of labs. - TSH AND FREE T4    4. Anemia, chronic disease  - CBC WITH AUTOMATED DIFF  - FERRITIN  - IRON PROFILE    5. Vitamin D deficiency  - VITAMIN D, 25 HYDROXY    6. Chronic gout without tophus, unspecified cause, unspecified site  - URIC ACID    7. Screening for lipid disorders  - METABOLIC PANEL, COMPREHENSIVE  - LIPID PANEL    8. Prostate cancer screening  Regarding PSA-based prostate cancer screening, I discussed with patient the following:   Prostate cancer screening with PSA carries a \"D\" recommendation from the USPSTF due to small or absent evidence of benefit and clinically important harms due to overdiagnosis and overtreatment. The American Cancer Society (ACS) recommends that men have a chance to make an informed decision with their health care provider about whether to be screened for prostate cancer. After a discussion of uncertainties, risks, and potential benefits of prostate cancer screening, patient today would like to decline prostate cancer screening. 9. Paroxysmal atrial fibrillation (HCC)  Stable, continue current treatment. 10. CKD (chronic kidney disease) stage 4, GFR 15-29 ml/min (HCC)  Stable, continue current treatment. 11. Diabetic retinopathy of both eyes associated with type 2 diabetes mellitus, macular edema presence unspecified, unspecified retinopathy severity ([address]  Stable, continue current treatment. 12. Encounter for immunization  - INFLUENZA VACCINE INACTIVATED (IIV), SUBUNIT, ADJUVANTED, IM  - ADMIN INFLUENZA VIRUS VAC       Medications Discontinued During This Encounter   Medication Reason    leflunomide (ARAVA) 20 mg tablet     levothyroxine (SYNTHROID) 25 mcg tablet        Follow-up and Dispositions    · Return in about 3 months (around 12/27/2019) for DM follow up. Medication risks/benefits/costs/interactions/alternatives discussed with patient. Advised patient to call back or return to office if symptoms worsen/change/persist. If patient cannot reach us or should anything more severe/urgent arise he/she should proceed directly to the nearest emergency department. Discussed expected course/resolution/complications of diagnosis in detail with patient. Patient given a written after visit summary which includes his/her diagnoses, current medications and vitals. Patient expressed understanding with the diagnosis and plan. Amy Mcgee M.D.

## 2019-09-27 NOTE — PATIENT INSTRUCTIONS
Medicare Wellness Visit, Male The best way to live healthy is to have a lifestyle where you eat a well-balanced diet, exercise regularly, limit alcohol use, and quit all forms of tobacco/nicotine, if applicable. Regular preventive services are another way to keep healthy. Preventive services (vaccines, screening tests, monitoring & exams) can help personalize your care plan, which helps you manage your own care. Screening tests can find health problems at the earliest stages, when they are easiest to treat. [facility] follows the current, evidence-based guidelines published by the [facility] Kirit Darnell ([facility]) when recommending preventive services for our patients. Because we follow these guidelines, sometimes recommendations change over time as research supports it. (For example, a prostate screening blood test is no longer routinely recommended for men with no symptoms.) Of course, you and your doctor may decide to screen more often for some diseases, based on your risk and co-morbidities (chronic disease you are already diagnosed with). Preventive services for you include: - Medicare offers their members a free annual wellness visit, which is time for you and your primary care provider to discuss and plan for your preventive service needs. Take advantage of this benefit every year! 
-All adults over age 72 should receive the recommended pneumonia vaccines. Current USPSTF guidelines recommend a series of two vaccines for the best pneumonia protection.  
-All adults should have a flu vaccine yearly and an ECG.  All adults age 61 and older should receive a shingles vaccine once in their lifetime.   
-All adults age 38-68 who are overweight should have a diabetes screening test once every three years.  
-Other screening tests & preventive services for persons with diabetes include: an eye exam to screen for diabetic retinopathy, a kidney function test, a foot exam, and stricter control over your cholesterol.  
-Cardiovascular screening for adults with routine risk involves an electrocardiogram (ECG) at intervals determined by the provider.  
-Colorectal cancer screening should be done for adults age 54-65 with no increased risk factors for colorectal cancer. There are a number of acceptable methods of screening for this type of cancer. Each test has its own benefits and drawbacks. Discuss with your provider what is most appropriate for you during your annual wellness visit. The different tests include: colonoscopy (considered the best screening method), a fecal occult blood test, a fecal DNA test, and sigmoidoscopy. 
-All adults born between [facility] should be screened once for Hepatitis C. 
-An Abdominal Aortic Aneurysm (AAA) Screening is recommended for men age 73-68 who has ever smoked in their lifetime. Here is a list of your current Health Maintenance items (your personalized list of preventive services) with a due date: 
Health Maintenance Due Topic Date Due  
 DTaP/Tdap/Td  (1 - Tdap) 10/26/1968  Shingles Vaccine (1 of 2) 10/26/1997  Pneumococcal Vaccine (1 of 2 - PCV13) 10/26/2012 [facility]  05/25/2018 Fernando Carry Annual Well Visit  08/29/2018  Albumin Urine Test  02/23/2019  Cholesterol Test   02/23/2019  Flu Vaccine  08/01/2019  Hemoglobin A1C    08/05/2019  Glaucoma Screening   10/17/2019  Eye Exam  10/17/2019

## 2019-10-24 ENCOUNTER — TELEPHONE (OUTPATIENT)
Dept: FAMILY MEDICINE CLINIC | Age: 74
End: 2019-10-24

## 2019-10-24 NOTE — TELEPHONE ENCOUNTER
Called patient to remind him that he has lab orders pending. Patient stated that he forgot about it, but will try to come in the office for blood work in the next couple of days.

## 2019-10-29 ENCOUNTER — HOSPITAL ENCOUNTER (OUTPATIENT)
Dept: LAB | Age: 74
Discharge: HOME | End: 2019-10-29
Payer: MEDICARE

## 2019-10-29 PROCEDURE — 84550 ASSAY OF BLOOD/URIC ACID: CPT

## 2019-10-29 PROCEDURE — 83550 IRON BINDING TEST: CPT

## 2019-10-29 PROCEDURE — 82306 VITAMIN D 25 HYDROXY: CPT

## 2019-10-29 PROCEDURE — 36415 COLL VENOUS BLD VENIPUNCTURE: CPT

## 2019-10-29 PROCEDURE — 84443 ASSAY THYROID STIM HORMONE: CPT

## 2019-10-29 PROCEDURE — 83036 HEMOGLOBIN GLYCOSYLATED A1C: CPT

## 2019-10-29 PROCEDURE — 82728 ASSAY OF FERRITIN: CPT

## 2019-10-29 PROCEDURE — 80061 LIPID PANEL: CPT

## 2019-10-29 PROCEDURE — 80053 COMPREHEN METABOLIC PANEL: CPT

## 2019-10-29 PROCEDURE — 82043 UR ALBUMIN QUANTITATIVE: CPT

## 2019-10-29 PROCEDURE — 85025 COMPLETE CBC W/AUTO DIFF WBC: CPT

## 2019-10-30 LAB
25(OH)D3+25(OH)D2 SERPL-MCNC: 37.4 NG/ML (ref 30–100)
ALBUMIN SERPL-MCNC: 4.3 G/DL (ref 3.5–4.8)
ALBUMIN/CREAT UR: 158.5 MG/G CREAT (ref 0–30)
ALBUMIN/GLOB SERPL: 1.5 {RATIO} (ref 1.2–2.2)
ALP SERPL-CCNC: 66 IU/L (ref 39–117)
ALT SERPL-CCNC: 19 IU/L (ref 0–44)
AST SERPL-CCNC: 19 IU/L (ref 0–40)
BASOPHILS # BLD AUTO: 0 X10E3/UL (ref 0–0.2)
BASOPHILS NFR BLD AUTO: 1 %
BILIRUB SERPL-MCNC: 0.6 MG/DL (ref 0–1.2)
BUN SERPL-MCNC: 53 MG/DL (ref 8–27)
BUN/CREAT SERPL: 25 (ref 10–24)
CALCIUM SERPL-MCNC: 9.3 MG/DL (ref 8.6–10.2)
CHLORIDE SERPL-SCNC: 100 MMOL/L (ref 96–106)
CHOLEST SERPL-MCNC: 128 MG/DL (ref 100–199)
CO2 SERPL-SCNC: 25 MMOL/L (ref 20–29)
CREAT SERPL-MCNC: 2.09 MG/DL (ref 0.76–1.27)
CREAT UR-MCNC: 104 MG/DL
EOSINOPHIL # BLD AUTO: 0.3 X10E3/UL (ref 0–0.4)
EOSINOPHIL NFR BLD AUTO: 6 %
ERYTHROCYTE [DISTWIDTH] IN BLOOD BY AUTOMATED COUNT: 13.2 % (ref 12.3–15.4)
EST. AVERAGE GLUCOSE BLD GHB EST-MCNC: 163 MG/DL
FERRITIN SERPL-MCNC: 163 NG/ML (ref 30–400)
GLOBULIN SER CALC-MCNC: 2.8 G/DL (ref 1.5–4.5)
GLUCOSE SERPL-MCNC: 156 MG/DL (ref 65–99)
HBA1C MFR BLD: 7.3 % (ref 4.8–5.6)
HCT VFR BLD AUTO: 33.3 % (ref 37.5–51)
HDLC SERPL-MCNC: 33 MG/DL
HGB BLD-MCNC: 10.8 G/DL (ref 13–17.7)
IMM GRANULOCYTES # BLD AUTO: 0 X10E3/UL (ref 0–0.1)
IMM GRANULOCYTES NFR BLD AUTO: 0 %
INTERPRETATION, 910389: NORMAL
INTERPRETATION: NORMAL
IRON SATN MFR SERPL: 23 % (ref 15–55)
IRON SERPL-MCNC: 76 UG/DL (ref 38–169)
LDLC SERPL CALC-MCNC: 84 MG/DL (ref 0–99)
LYMPHOCYTES # BLD AUTO: 0.6 X10E3/UL (ref 0.7–3.1)
LYMPHOCYTES NFR BLD AUTO: 13 %
MCH RBC QN AUTO: 30.4 PG (ref 26.6–33)
MCHC RBC AUTO-ENTMCNC: 32.4 G/DL (ref 31.5–35.7)
MCV RBC AUTO: 94 FL (ref 79–97)
MICROALBUMIN UR-MCNC: 164.8 UG/ML
MONOCYTES # BLD AUTO: 0.4 X10E3/UL (ref 0.1–0.9)
MONOCYTES NFR BLD AUTO: 9 %
NEUTROPHILS # BLD AUTO: 3.2 X10E3/UL (ref 1.4–7)
NEUTROPHILS NFR BLD AUTO: 71 %
PDF IMAGE, 910387: NORMAL
PLATELET # BLD AUTO: 100 X10E3/UL (ref 150–450)
POTASSIUM SERPL-SCNC: 4.9 MMOL/L (ref 3.5–5.2)
PROT SERPL-MCNC: 7.1 G/DL (ref 6–8.5)
RBC # BLD AUTO: 3.55 X10E6/UL (ref 4.14–5.8)
SODIUM SERPL-SCNC: 138 MMOL/L (ref 134–144)
T4 FREE SERPL-MCNC: 0.8 NG/DL (ref 0.82–1.77)
TIBC SERPL-MCNC: 330 UG/DL (ref 250–450)
TRIGL SERPL-MCNC: 57 MG/DL (ref 0–149)
TSH SERPL DL<=0.005 MIU/L-ACNC: 21.96 UIU/ML (ref 0.45–4.5)
UIBC SERPL-MCNC: 254 UG/DL (ref 111–343)
URATE SERPL-MCNC: 11 MG/DL (ref 3.7–8.6)
VLDLC SERPL CALC-MCNC: 11 MG/DL (ref 5–40)
WBC # BLD AUTO: 4.6 X10E3/UL (ref 3.4–10.8)

## 2019-10-31 RX ORDER — LEVOTHYROXINE SODIUM 25 UG/1
25 TABLET ORAL
Qty: 90 TAB | Refills: 1 | Status: SHIPPED | OUTPATIENT
Start: 2019-10-31 | End: 2020-12-17 | Stop reason: ALTCHOICE

## 2019-10-31 NOTE — PROGRESS NOTES
Outbound call to patient verified  , informed him of results and recommendations. Patient verbalized understanding.

## 2019-12-06 ENCOUNTER — OFFICE VISIT (OUTPATIENT)
Dept: RHEUMATOLOGY | Age: 74
End: 2019-12-06

## 2019-12-06 VITALS
TEMPERATURE: 98.1 F | HEART RATE: 70 BPM | HEIGHT: 70 IN | RESPIRATION RATE: 18 BRPM | DIASTOLIC BLOOD PRESSURE: 79 MMHG | BODY MASS INDEX: 26.48 KG/M2 | SYSTOLIC BLOOD PRESSURE: 136 MMHG | WEIGHT: 185 LBS

## 2019-12-06 DIAGNOSIS — M1A.39X1 CHRONIC TOPHACEOUS GOUT OF MULTIPLE SITES DUE TO RENAL IMPAIRMENT: Primary | ICD-10-CM

## 2019-12-06 DIAGNOSIS — M25.462 SWELLING OF LEFT KNEE JOINT: ICD-10-CM

## 2019-12-06 DIAGNOSIS — D61.818 PANCYTOPENIA (HCC): ICD-10-CM

## 2019-12-06 DIAGNOSIS — Z79.60 LONG-TERM USE OF IMMUNOSUPPRESSANT MEDICATION: ICD-10-CM

## 2019-12-06 DIAGNOSIS — N18.4 CKD (CHRONIC KIDNEY DISEASE) STAGE 4, GFR 15-29 ML/MIN (HCC): ICD-10-CM

## 2019-12-06 RX ORDER — LEFLUNOMIDE 20 MG/1
20 TABLET ORAL DAILY
Qty: 90 TAB | Refills: 0 | Status: SHIPPED | OUTPATIENT
Start: 2019-12-06 | End: 2019-12-07

## 2019-12-06 NOTE — LETTER
12/7/19 Patient: Dakota Grier YOB: 1945 Date of Visit: 12/6/2019 Maida Chi MD 
[address] [facility] [id number] VIA In Basket Dear Maida Chi MD, Thank you for referring Mr. Javi Freire to [address] [place] [address] for evaluation. My notes for this consultation are attached. If you have questions, please do not hesitate to call me. I look forward to following your patient along with you.  
 
 
Sincerely, 
 
Hai Clinton MD

## 2019-12-06 NOTE — PROGRESS NOTES
REASON FOR VISIT    This is a follow-up visit for Mr. Slim Hdz [facility] for     ICD-10-CM   1.  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     Gouty arthritis phenotype includes:  Tophi: yes  Erosions: yes  Tenosynovitis: no  Double Contour: yes     Therapy Includes:  NSAIDs: no (contra-indicated due to CKD)  Colchicine prophylaxis: yes  Current uricosuric therapy: none  Current urate-lowering therapy: none  Previous urate-lowering therapy: Krystexxa 8 mg every 2 weeks (8/13/2018 to 12/31/2019), allopurinol, Uloric  Discontinued uricosuric because of inefficacy: none  Discontinued uricosuric because of side effects: none  Discontinued urate-lowering therapy because of inefficacy: allopurinol  Discontinued urate-lowering therapy because of side effects: Uloric (dysphagia)  Contraindicated urate-lowering therapy because of CKD: allopurinol, probenecid  Contra-Indicated urate-lowering therapy because of FDA warning of all-cause mortality and cardiac death: Uloric (febuxostat)  G6PD Status: normal (7/13/2018)  Current anti-immunogenicity DMARD therapy: Leflunomide 20 mg daily (8/2018 to 2/04/2019)    Active problems include:    Patient Active Problem List   Diagnosis Code    TIA (transient ischemic attack) G45.9    Stenosis of right carotid artery without cerebral infarction I65.21    Vitamin D deficiency E55.9    Retinopathy, diabetic, bilateral ([facility] [address] E11.319    Heart murmur, systolic P81.5    Advance care planning Z71.89    Essential hypertension I10    Anemia, chronic disease D63.8    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 Caroline Brown returns for a follow-up visit. On his last visit, I continued colchicine 0.6 mg, leflunomide 20 mg daily to minimize immunogenicity and Krystexxa infusions. His last infusion was 12/31/2018. On 1/14/2018, he received his pre-medication and had syncopal episode. His blood pressure and heart rate were normal. He regained consciousness but had become diaphoretic and had soiled himself and then was sent to the ED. He had not eaten. He associated it due to Pepcid and felt abdominal distention as soon he received. He felt that it was given soon as Benadryl. He also recalled that he had upset stomach prior to it. When he presented for his next appointment for Carmen Mckenzie on 2/04/2019, he developed cardiac arrest s/p hypothermia protocol after received Medrol. Neither time was Carmen Mckenzie administered. Today, he feels pretty good. He notes that his left knee has been painful and swollen for 5-6 days and he continues to have swelling and discomfort. He also has been having tightness in his hands which he attributes to the cold weather. He has been avoiding meat and has been taking colchicine which has been helping. He asks about a therapeutic knee aspiration. He also asks about resuming Krystexxa. He denies fever, weight loss, blurred vision, vision loss, oral ulcers, ankle swelling, dry cough, dyspnea, nausea, vomiting, dysphagia, abdominal pain, black or bloody stool, fall since last visit, rash, easy bruising and increased thirst.    Most recent uric acid from 10/29/2019 was 11.0 mg/dL (previously 0.2, 0.2, 0.2, 0.2, 0.2, 1.5, 10.2, 11.2, 12.0, 4.1, 9.6, 12.6 mg/dL). The patient has not had any infections or surgeries but had interval hospitalization (see above).      REVIEW OF SYSTEMS    A comprehensive review of systems was performed and pertinent results are documented in the HPI, review of systems is otherwise non-contributory. PAST MEDICAL HISTORY    He has a past medical history of Acute encephalopathy (1/14/2016), Anemia (5/11/2017), Atrial fibrillation ([facility] [address] Painter esophagus, CAD (coronary artery disease), Diabetes ([facility] [address] Heart failure ([facility] [address] History of renal artery stenosis (8/4/2018), History of thrombocytopenia (1/14/2019), HTN, goal below 140/90, Retinopathy, diabetic, bilateral ([facility] [address] (3/11/2016), Stenosis of right carotid artery without cerebral infarction (1/18/2016), TIA (transient ischemic attack) (1/14/2016), and Vitamin D deficiency (3/1/2016). FAMILY HISTORY    His family history includes Diabetes in his father; Heart Failure in his mother; No Known Problems in his sister; Other in his daughter, daughter, and daughter. SOCIAL HISTORY    He reports that he has never smoked. He has never used smokeless tobacco. He reports current alcohol use. He reports that he does not use drugs. IMMUNIZATIONS  Immunization History   Administered Date(s) Administered    Influenza High Dose Vaccine PF 02/26/2016, 11/23/2017    Influenza Vaccine 11/05/2018    Influenza Vaccine (Tri) Adjuvanted 09/27/2019    Pneumococcal Conjugate (PCV-13) 06/05/2019       MEDICATIONS    Current Outpatient Medications   Medication Sig Dispense Refill    hydrocortisone (CORTEF) 10 mg tablet Take 3 tabs the night before and 3 tabs the morning of Krystexxa infusions 36 Tab 5    leflunomide (ARAVA) 20 mg tablet Take 1 Tab by mouth daily. Take half tab daily for 7 days and then one tab daily if tolerated 90 Tab 5    levothyroxine (SYNTHROID) 25 mcg tablet Take 1 Tab by mouth Daily (before breakfast). 90 Tab 1    furosemide (LASIX) 40 mg tablet Take 40 mg by mouth daily.  lisinopril (PRINIVIL, ZESTRIL) 5 mg tablet Take 5 mg by mouth daily.       carvedilol (COREG) 6.25 mg tablet Take 6.25 mg by mouth two (2) times daily (with meals).  ELIQUIS 5 mg tablet Take 5 mg by mouth two (2) times a day.  multivitamin (ONE A DAY) tablet Take 1 Tab by mouth daily.  omeprazole (PRILOSEC) 20 mg capsule Take 20 mg by mouth as needed.  insulin glargine (LANTUS,BASAGLAR) 100 unit/mL (3 mL) inpn 5 Units by SubCUTAneous route nightly. 4 Pen 1    dextran 70-hypromellose (ARTIFICIAL TEARS,[id number],) ophthalmic solution Administer 1 Drop to both eyes as needed.  pravastatin (PRAVACHOL) 20 mg tablet Take 1 Tab by mouth nightly. 30 Tab 2        ALLERGIES    Allergies   Allergen Reactions    Solu-Medrol [Methylprednisolone Sodium Succ] Other (comments)     Syncope / Cardiac Arrest       PHYSICAL EXAMINATION    Visit Vitals  /79   Pulse 70   Temp 98.1 °F (36.7 °C)   Resp 18   Ht 5' 10\" (1.778 m)   Wt 185 lb (83.9 kg)   BMI 26.54 kg/m²     Body mass index is 26.54 kg/m². General: Patient is alert, oriented x 3, not in acute distress    HEENT:   Sclerae are not injected and appear moist.  There is no alopecia. Neck is supple    Cardiovascular:  Heart is regular rate and rhythm, no murmurs. Chest:  Lungs are clear to auscultation bilaterally. No rhonchi, wheezes, or crackles. Extremities:  Free of clubbing, cyanosis, edema    Neurological exam:  Muscle strength is full in upper and lower extremities     Skin exam:  There are no rashes, no alopecia, no discoid lesions, no active Raynaud's, no livedo reticularis, no periungual erythema. Tophi: left olecranon, bilateral PIPs    Musculoskeletal exam:  A comprehensive musculoskeletal exam was performed for all joints of each upper and lower extremity and assessed for swelling, tenderness and range of motion.  Positive results are documented as below:    Left Knee effusion and warmth with decreased ROM    Joint Count 11/15/2018 7/13/2018   Patient pain (0-100) - 70   Left 1st MCP - Tender - 1   Left 1st MCP - Swollen - 1   Left 2nd MCP - Swollen - 1 Left thumb IP - Tender - 1   Left thumb IP - Swollen - 1   Left 2nd PIP - Tender 1 1   Left 2nd PIP - Swollen 0 1   Left 3rd PIP - Tender 1 1   Left 3rd PIP - Swollen 1 1   Left 5th PIP - Tender - 1   Left 5th PIP - Swollen - 1   Right wrist- Tender - 1   Right wrist- Swollen - 1   Right 2nd MCP - Swollen - 1   Right 5th MCP - Swollen - 1   Right 2nd PIP - Swollen - 1   Right 3rd PIP - Swollen - 1   Right 4th PIP - Swollen - 1   Right 5th PIP - Swollen - 1   Right knee - Tender - 1   Right knee - Swollen - 1   Tender Joint Count (Total) 2 7   Swollen Joint Count (Total) 1 14   Patient Assessment (0-10) - 2.5     DATA REVIEW    Laboratory     Recent laboratory results were reviewed, summarized, and discussed with the patient. Imaging    Musculoskeletal Ultrasound    Ultrasound of the left hand. Indication: joint pain. (07/13/18)  Using a [employer] Logiq e with 18 Mhz probe, limited views of the left hand were reviewed. This revealed hyperechoic rim along the 2nd MCP dorsally. The tendons were normal. Bony contours were regular without erosions seen. There were an an inhomogeneous collection in the joint space  Impression: tophaceous gout with double contour    Ultrasound of the right knee. Indication: joint pain. (07/13/18)  Using a [employer] Logiq e with 12 Mhz probe, limited views of the right knee were reviewed. This revealed a large anechoic colleciton without doppler and the presence of synovial frons within the suprapatellar joint space and medial and lateral gutters joint space. The tendons were normal. Bony contours were regular without erosions seen. There were no soft tissue masses noted. Unable to perform suprapatellar view due to knee effusion to assess for double contour. Impression: large joint effusion    Radiographs    Bilateral Hand 7/13/2018: RIGHT: Osteopenia is unchanged. No acute fracture or dislocation. Vascular calcifications are slightly increased. Radiocarpal and DRUJ osteoarthritis is increased. Triscaphe and first Aia 16 joint mild osteoarthritis is increased. No carpal erosion. There are erosions of the proximal fourth metacarpal at the fourth Aia 16 joint. Questionable new erosion of the second metacarpal head. No other MCP joint erosion. LEFT: No fracture or dislocation on plain film. Bones are osteopenic. Vascular calcifications are present. No chondrocalcinosis. Radiocarpal and intercarpal joints are within normal limits and unchanged. First MCP joint erosions are increased in size and number. Second MCP joint erosions are new. Remaining MCP joints are within normal limits. Multiple IP joint erosions are increased and more conspicuous. No significant narrowing of the DIP joints. No periosteal reaction. Subtle erosion of the fourth DIP joint is unchanged. Third DIP joint erosions are more conspicuous. No chondrocalcinosis or periosteal reaction. Bilateral Hand 10/02/2012: RIGHT: demineralization and small erosive changes.  Vascular calcification is noted. LEFT: demineralization and small erosive changes head 2nd middle phalanx, head first metacarpal bone. Vascular calcification is noted    CT Imaging    CT Chest without contrast 6/21/2016: There is no definite evidence of interstitial lung disease. In the right upper lobe, there are vague areas of groundglass opacity which are nonspecific. There is a 5 mm nodule in the right upper lobe. There is minor atelectasis at the lung bases. There is a borderline enlarged pretracheal lymph node similar to 2012. The main pulmonary artery segment is slightly prominent measuring 3.6 cm perhaps and pulmonary artery hypertension. There is extensive atherosclerotic changes in the coronary arteries. There is also calcification in the aortic valve. In the upper abdomen, there is a small amount of ascites. There is mild prominence of the IVC and hepatic veins.      MR Imaging    MRI Brain without contrast 1/14/2016: There is no evidence for acute infarction.  Several nonspecific tiny foci of altered signal are noted in the centrum semiovale bilaterally, somewhat more numerous on the left, nonspecific though compatible with chronic small vessel ischemic disease of white matter. There is no intra-axial extra-axial mass. The vascular flow voids at the base of the brain appear normal in conspicuity. Sella, optic chiasm, posterior fossa, orbits and paranasal sinuses are normal. A 7 mm focus of altered signal in the left occipital bone corresponds with an ill-defined lytic lesion shown on CT. The significance of this finding is doubtful. MRA Brain without contrast 1/14/2016: The codominant vertebral artery confluence and basilar artery  demonstrate normal flow as do the posterior cerebral arteries bilaterally. Normal appearing flow is shown in the internal carotid artery siphons bilaterally as well as the anterior and middle cerebral arteries bilaterally. DXA     None    PROCEDURE    Ultrasound-Guided Right Knee Aspiration and Kenalog 40 mg IA. (07/13/18)    [address] [place] [address]  OFFICE PROCEDURE PROGRESS NOTE      Symptom relief from Left Knee Effusion Arthritis.  (12/06/19)     Chart reviewed for the following:   Anderson Dan MD, have reviewed the History, Physical and updated the Allergic reactions for [id number] performed immediately prior to start of procedure:   Anderson Dan MD, have performed the following reviews on [facility] prior to the start of the procedure            * Patient was identified by name and date of birth   * Agreement on procedure being performed was verified  * Risks and Benefits explained to the patient  * Procedure site verified and marked as necessary  * Patient was positioned for comfort  * Consent was signed and verified     Time: 7:20 AM    Date of procedure: 12/6/2019    Procedure performed by: Anjelica Naqvi MD    Provider assisted by: self    Patient assisted by: self    How tolerated by patient: tolerated the procedure well with no complications    Post Procedural Pain Scale: 0 - No Hurt    Comments: none    Musculoskeletal Ultrasound Procedure Note. 1. Ultrasound of left knee. Indication: joint swelling. Using a [employer] e with 12 Mhz probe, focused views of the left knee were obtained. This revealed anechoic with hypoechoic collections (frons) from the joint space seen on anterior long suprapellar and transverse long suprapaterllar views. Septations visualized. The tendons were normal. Bony contours were regular without erosions seen. There were no soft tissue masses noted. Impression: mild knee effusion    2. Ultrasound Guided Joint aspiration. Indication: Joint swelling    The patient was advised about the possible risks of the procedure including pain, bleeding, infection and lack of benefit. After obtaining verbal and written informed consent and time out performed, the area was prepped in a sterile fashion with chlorprep scrub. The lateral suprapatellar skin was sprayed with Ethyl Chloride followed by insertion of 25 gauge needle into the subcutaneous tissue and infiltrated with lidocaine 1% and the removed. The ultrasound probe was then placed on the sterile surface with sterile gel and under direct in-plane visualization an 18 gauge needle was inserted into the suprapatellar joint space joint space and 5 mL of yellow serous fluid was obtained. No more fluid is aspirated despite applying external medial pressure. The needle was within the anechoic space but due to lack of fluid aspirate, needle was withdrawn. No intaricular corticosteroid was injected due to previous cardiac arrest with IV Solumedrol. ASSESSMENT AND PLAN    This is a follow-up visit for Mr. Kody Carter. 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  He was maintained on leflunomide 20 mg daily for immunogenicity prophylaxis while on Krystexxa infusions (8/13/2018 to 12/31/2019). He developed an adverse reaction of syncope after his premedications on 1/14/2019 and then resusicitated so was not given Glendora Rich but on rechallege due to concern that IV Pepcid caused this, recurrence occurred after administration of IV SoluMedrol 2 weeks later which resulted with cardiac arrest. Both instances, he did not receive Krystexxa, so they were not infusion reactions to the treatment but to Solu-Medrol. He has since flared in his knee and would like to resume treatment tomy he had felt better while on treatment. I performed a therapeutic knee aspiration with minimal drainage of 5 mL. His most recent uric acid from 10/29/2019 was 11.0 mg/dL (previously 0.2, 0.2, 0.2, 0.2, 0.2, 1.5, 10.2, 11.2, 12.0, 4.1, 9.6, 12.6 mg/dL). I discusssed with his cardiologist, Dr. Sheila Han, who was agreeable to resume Krystexxa and not using Solu-Medrol as pre-treatment. I will instead use oral hydrocortisone 30 mg twice daily starting the day before his infusion and the day of infusion. . I will resume leflunomide 20 mg daily. 2) Long Term Use of Immunosuppressants. The patient will resume on immunomodulatory medications (lefluomide) and requires frequent toxicity monitoring by blood work. 3) Chronic Kidney Disease Stage 3. The patient's creatinine 2.09 mg/dL and eGFR 31.    4) Pancytopenia. His Hct was 33.3% and platelets 227,384. This is likely due to chronic kidney disease. Dr. Vicky Schneider is following. 5) Cardiac Arrest. This was due to IV Solu-Medrol    The patient voiced understanding of the aforementioned assessment and plan. Summary of plan was provided in the After Visit Summary patient instructions.      TODAY'S ORDERS    Orders Placed This Encounter    AMB POC US,EXTREMITY,NONVASCULAR,REAL-TIME IMAGE,LIMITED (23643)    hydrocortisone (CORTEF) 10 mg tablet    leflunomide (ARAVA) 20 mg tablet     Future Appointments   Date Time Provider Xiomara Velez   1/6/2020 8:15 AM Laina Kaminski MD [facility] SCHED   3/9/2020  2:40 PM Kenrick Frederick MD [place], MD, [facility]    Adult Rheumatology   Rheumatology Ultrasound Certified  [address] [facility] [address]  [address] [address]   Phone [phone]  Fax [phone]

## 2019-12-06 NOTE — PROGRESS NOTES
Chief Complaint   Patient presents with    Gout     1. Have you been to the ER, urgent care clinic since your last visit? Hospitalized since your last visit? Yes Hospitalization for bleed following colonoscopy    2. Have you seen or consulted any other health care providers outside of the [address] [place] [address] since your last visit? Include any pap smears or colon screening.  Yes cardiologist and GI

## 2019-12-06 NOTE — Clinical Note
Venita: sent Jovan Bolus order. Remove IV Pepcid and replace with PO Pepcid. Remove Solu-Medrol and replace with hydrocortisone 30 mg the day before and 30 mg the day of Krystexxa (patient prescribed the hydrocortisone but include in infusion order under nursing information to confirm that he has done that). Also write to confirm that he is taking leflunomide for at least 4 weeks prior to first dose of Krystexxa.

## 2019-12-07 RX ORDER — LEFLUNOMIDE 20 MG/1
20 TABLET ORAL DAILY
Qty: 90 TAB | Refills: 5 | Status: SHIPPED | OUTPATIENT
Start: 2019-12-07 | End: 2020-12-17 | Stop reason: ALTCHOICE

## 2019-12-07 RX ORDER — HYDROCORTISONE 10 MG/1
TABLET ORAL
Qty: 36 TAB | Refills: 5 | Status: SHIPPED | OUTPATIENT
Start: 2019-12-07 | End: 2020-04-14

## 2020-01-21 ENCOUNTER — TELEPHONE (OUTPATIENT)
Dept: RHEUMATOLOGY | Age: 75
End: 2020-01-21

## 2020-01-21 NOTE — TELEPHONE ENCOUNTER
----- Message from [address] sent at 1/21/2020  3:09 PM EST -----  Regarding: Dr. Génesis Brown first and last name: Perfecto Pino      Reason for call: requesting a callback to r/s infusion scheduled for 01/24/20      Callback required yes/no and why: yes      Best contact number(s): [phone]      Details to clarify the request:      [address]

## 2020-01-24 ENCOUNTER — HOSPITAL ENCOUNTER (OUTPATIENT)
Dept: INFUSION THERAPY | Age: 75
End: 2020-01-24

## 2020-02-04 RX ORDER — DIPHENHYDRAMINE HCL 25 MG
25 CAPSULE ORAL ONCE
Status: ACTIVE | OUTPATIENT
Start: 2020-02-07 | End: 2020-02-07

## 2020-02-04 RX ORDER — DIPHENHYDRAMINE HYDROCHLORIDE 50 MG/ML
25 INJECTION, SOLUTION INTRAMUSCULAR; INTRAVENOUS
Status: ACTIVE | OUTPATIENT
Start: 2020-02-07 | End: 2020-02-07

## 2020-02-04 RX ORDER — ACETAMINOPHEN 325 MG/1
650 TABLET ORAL
Status: ACTIVE | OUTPATIENT
Start: 2020-02-07 | End: 2020-02-07

## 2020-02-04 RX ORDER — ACETAMINOPHEN 325 MG/1
650 TABLET ORAL ONCE
Status: ACTIVE | OUTPATIENT
Start: 2020-02-07 | End: 2020-02-07

## 2020-02-04 RX ORDER — SODIUM CHLORIDE 9 MG/ML
25 INJECTION, SOLUTION INTRAVENOUS CONTINUOUS
Status: DISPENSED | OUTPATIENT
Start: 2020-02-07 | End: 2020-02-07

## 2020-02-05 ENCOUNTER — TELEPHONE (OUTPATIENT)
Dept: RHEUMATOLOGY | Age: 75
End: 2020-02-05

## 2020-02-05 NOTE — TELEPHONE ENCOUNTER
Dr. Conchita Zuniga spoke with Ernesto Diana and clarified her concerns regarding this pts infusion on Friday.

## 2020-02-05 NOTE — TELEPHONE ENCOUNTER
Angely zavaleta [facility], would like Dr Bob Flores nurse to call her due to this patient is coming in this Friday and she said it was urgent. Her phone is [phone].

## 2020-02-07 ENCOUNTER — HOSPITAL ENCOUNTER (OUTPATIENT)
Dept: INFUSION THERAPY | Age: 75
Discharge: HOME | End: 2020-02-07

## 2020-02-21 ENCOUNTER — APPOINTMENT (OUTPATIENT)
Dept: INFUSION THERAPY | Age: 75
End: 2020-02-21

## 2020-03-06 ENCOUNTER — APPOINTMENT (OUTPATIENT)
Dept: INFUSION THERAPY | Age: 75
End: 2020-03-06

## 2020-03-20 ENCOUNTER — APPOINTMENT (OUTPATIENT)
Dept: INFUSION THERAPY | Age: 75
End: 2020-03-20

## 2020-04-03 ENCOUNTER — APPOINTMENT (OUTPATIENT)
Dept: INFUSION THERAPY | Age: 75
End: 2020-04-03

## 2020-04-14 ENCOUNTER — VIRTUAL VISIT (OUTPATIENT)
Dept: FAMILY MEDICINE CLINIC | Age: 75
End: 2020-04-14
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
## 2020-04-14 DIAGNOSIS — S61.451A DOG BITE OF RIGHT HAND WITHOUT COMPLICATION, INITIAL ENCOUNTER: Primary | ICD-10-CM

## 2020-04-14 RX ORDER — AMOXICILLIN AND CLAVULANATE POTASSIUM 875; 125 MG/1; MG/1
1 TABLET, FILM COATED ORAL 2 TIMES DAILY
Qty: 14 TAB | Refills: 0 | Status: SHIPPED | OUTPATIENT
Start: 2020-04-14 | End: 2020-04-21

## 2020-04-14 NOTE — PROGRESS NOTES
[address] Note  Subjective:      Vic Mcgee is a 69 y.o. male who presents for an acute visit with the following chief complaints. Chief Complaint   Patient presents with    Dog Bite     pt states his own dog bit him 2 nights ago. bite occured on right trigger finger. pt reports soreness but no redness or swelling. Dog is up to date on vaccines including Rabies. I was in the office while conducting this encounter. Consent:  He and/or his healthcare decision maker is aware that this patient-initiated Telehealth encounter is a billable service, with coverage as determined by his insurance carrier. He is aware that he may receive a bill and has provided verbal consent to proceed: Yes    This virtual visit was conducted via [employer]. Pursuant to the emergency declaration under the [facility] [place] [address] waiver authority and the [employer] Act, this Virtual  Visit was conducted to reduce the patient's risk of exposure to COVID-19 and provide continuity of care for an established patient. Services were provided through a video synchronous discussion virtually to substitute for in-person clinic visit. Due to this being a TeleHealth evaluation, many elements of the physical examination are unable to be assessed. Total Time: minutes: 11-20 minutes. Vic Mcgee is an 69 y.o. male who presents for evaluation of a bite wound to the right hand, pointer finger. Bite occurred 2 nights ago. History of bite: His small [address] dog was actively dying and was in pain. Patient was petting the dog when a wave of discomfort overcame the dog, and the dog snapped at him, biting his right pointer finger. He reports site bleed for only a few minutes, he cleansed site immediately with soap and water, and he has been applying topical antibiotic ointment.  He continues to have mild pain, mild swelling, both improving since onset. He denies coldness, numbness, paresthesias. He is able to use his hand for ADL's without difficulty. There is no drainage from sites. He denies fevers, chills, malaise. Rabies status is known vaccinated. There were no other injuries. His tetanus status is unknown, he believes greater than 10 years ago. Current Outpatient Medications   Medication Sig Dispense Refill    amoxicillin-clavulanate (AUGMENTIN) 875-125 mg per tablet Take 1 Tab by mouth two (2) times a day for 7 days. Indications: dog bite wound 14 Tab 0    furosemide (LASIX) 40 mg tablet Take 40 mg by mouth daily.  lisinopril (PRINIVIL, ZESTRIL) 5 mg tablet Take 5 mg by mouth daily.  carvedilol (COREG) 6.25 mg tablet Take 6.25 mg by mouth two (2) times daily (with meals).  ELIQUIS 5 mg tablet Take 5 mg by mouth two (2) times a day.  multivitamin (ONE A DAY) tablet Take 1 Tab by mouth daily.  omeprazole (PRILOSEC) 20 mg capsule Take 20 mg by mouth as needed.  dextran 70-hypromellose (ARTIFICIAL TEARS,[id number],) ophthalmic solution Administer 1 Drop to both eyes as needed.  pravastatin (PRAVACHOL) 20 mg tablet Take 1 Tab by mouth nightly. 30 Tab 2    leflunomide (ARAVA) 20 mg tablet Take 1 Tab by mouth daily. Take half tab daily for 7 days and then one tab daily if tolerated 90 Tab 5    levothyroxine (SYNTHROID) 25 mcg tablet Take 1 Tab by mouth Daily (before breakfast). 90 Tab 1    insulin glargine (LANTUS,BASAGLAR) 100 unit/mL (3 mL) inpn 5 Units by SubCUTAneous route nightly.  4 Pen 1     Allergies   Allergen Reactions    Solu-Medrol [Methylprednisolone Sodium Succ] Other (comments)     Syncope / Cardiac Arrest     Past Medical History:   Diagnosis Date    Acute encephalopathy 1/14/2016    Anemia 5/11/2017    Atrial fibrillation (HCC)     Painter esophagus     CAD (coronary artery disease)     Diabetes ([facility] [address]     Gout     Heart failure (HCC)     Cardiomyopathy, myocarditis    History of renal artery stenosis 8/4/2018    History of thrombocytopenia 1/14/2019    HTN, goal below 140/90     Retinopathy, diabetic, bilateral ([address] 3/11/2016    Stenosis of right carotid artery without cerebral infarction 1/18/2016    TIA (transient ischemic attack) 1/14/2016    Vitamin D deficiency 3/1/2016    Nephrology ordered and follow 2/22/16        ROS:   Complete review of systems was reviewed with pertinent information listed in HPI. Review of Systems   Constitutional: Negative for chills, diaphoresis, fever, malaise/fatigue and weight loss. HENT: Negative for congestion and sore throat. Respiratory: Negative for cough and shortness of breath. Cardiovascular: Negative for chest pain and palpitations. Gastrointestinal: Negative for abdominal pain, nausea and vomiting. Musculoskeletal: Negative for myalgias. Skin:        See HPI   Neurological: Negative for dizziness and headaches. Objective: There were no vitals taken for this visit. Vitals and Nurse Documentation reviewed. Physical Exam  Constitutional:       General: He is not in acute distress. Appearance: Normal appearance. He is well-developed, well-groomed and normal weight. He is not ill-appearing, toxic-appearing or diaphoretic. HENT:      Head: Normocephalic and atraumatic. Right Ear: Hearing normal.      Left Ear: Hearing normal.      Mouth/Throat:      Lips: Pink. No lesions. Mouth: Mucous membranes are moist.   Eyes:      General: Lids are normal.      Conjunctiva/sclera:      Right eye: Right conjunctiva is not injected. Left eye: Left conjunctiva is not injected. Neck:      Musculoskeletal: Full passive range of motion without pain. Pulmonary:      Effort: Pulmonary effort is normal.   Musculoskeletal:      Right wrist: He exhibits normal range of motion, no tenderness, no swelling, no deformity and no laceration.       Right hand: He exhibits decreased range of motion, tenderness and swelling. He exhibits normal capillary refill. Normal sensation noted. Normal strength noted. He exhibits no finger abduction, no thumb/finger opposition and no wrist extension trouble. Left hand: He exhibits normal range of motion, no tenderness, no laceration and no swelling. Hands:       Comments: Right hand 2nd digit: 1) Distal dorsal surface, just superior to the mid nail bed, is a small puncture site approximately 2-3 mm x 2-3 mm. 2) palmar surface between DIP and PIP joint is another puncture site approximately 3-4 mm x 3-4 mm, with surrounding mild erythema and swelling. No drainage from either site. 3) distal palmar surface inferior to DIP joint with small scab. He has mild decreased ROM of the DIP and PIP joint secondary to discomfort. He reports good sensation of the finger, cap refill was < 2 second. MCP joint is not affected. Skin:     Capillary Refill: Capillary refill takes less than 2 seconds. Neurological:      Mental Status: He is alert and oriented to person, place, and time. Gait: Gait normal.   Psychiatric:         Mood and Affect: Mood normal.         Behavior: Behavior normal. Behavior is cooperative. Thought Content: Thought content normal.         Judgment: Judgment normal.        Assessment/Plan:     Diagnoses and all orders for this visit:    1. Dog bite of right hand without complication, initial encounter: Bite as described above. Puncture sites are small and are resolving, no need for wound closure. Limited exam is concerning for possible mild cellulitis present. Will start augment. Advised continued wound care daily. Tetanus immunization indicated: yes; Advised to seek immunization at pharmacy when he picks up prescription. Rabies risk: Low Risk. Dicussed in details Redflags that would prompt urgent medical evaluation, including if symptoms acutely worsen or do not resolve with therapy.    -     amoxicillin-clavulanate (AUGMENTIN) 875-125 mg per tablet; Take 1 Tab by mouth two (2) times a day for 7 days. Indications: dog bite wound    Follow-up and Dispositions    · Return if symptoms worsen or fail to improve.          Discussed expected course/resolution/complications of diagnosis in detail with patient.    Medication risks/benefits/costs/interactions/alternatives discussed with patient.  Pt expressed understanding with the diagnosis and plan

## 2020-04-14 NOTE — PROGRESS NOTES
Chief Complaint   Patient presents with    Dog Bite     pt states his own dog bit him 2 nights ago. bite occured on right trigger finger. pt reports soreness but no redness or swelling. Dog is up to date on vaccines including Rabies. \"REVIEWED RECORD IN PREPARATION FOR VISIT AND HAVE OBTAINED THE NECESSARY DOCUMENTATION\"  1. Have you been to the ER, urgent care clinic since your last visit? Hospitalized since your last visit? No    2. Have you seen or consulted any other health care providers outside of the [address] [place] [address] since your last visit? Include any pap smears or colon screening.  No

## 2020-04-17 ENCOUNTER — APPOINTMENT (OUTPATIENT)
Dept: INFUSION THERAPY | Age: 75
End: 2020-04-17

## 2020-08-18 LAB — HBA1C MFR BLD HPLC: 6.6 %

## 2020-11-11 ENCOUNTER — TELEPHONE (OUTPATIENT)
Dept: FAMILY MEDICINE CLINIC | Age: 75
End: 2020-11-11

## 2020-11-11 NOTE — TELEPHONE ENCOUNTER
----- Message from Venkatesh Odonnell sent at 11/11/2020 10:09 AM EST -----  Regarding: FW: /Refill    ----- Message -----  From: Pedro Sha  Sent: 11/10/2020   1:30 PM EST  To: [facility] Front Office Pool  Subject: /Refill                              Caller (if not patient):self  Relationship of caller (if not patient):self  Best contact number(s): [phone]  Name of medication and dosage if known:  \"Baskalar\" 90 day supply (insulin)  Is patient out of this medication (yes/no): yes   Ph[facility] name: [facility] listed in chart? (yes/no): yes  Pharmacy phone number:  Date of last visit:4/14/2020  Details to clarify the request: PT. Stated that that he was put on a new insulin for insurance reasons and that his insurance just made him aware that he can have the \"baskalar\" and his insurance will cover it. Pt. Requesting  write a new prescription for that.

## 2020-11-13 RX ORDER — INSULIN GLARGINE 100 [IU]/ML
40 INJECTION, SOLUTION SUBCUTANEOUS DAILY
Qty: 36 ML | Refills: 0 | Status: SHIPPED | OUTPATIENT
Start: 2020-11-13 | End: 2020-12-17 | Stop reason: ALTCHOICE

## 2020-11-13 NOTE — TELEPHONE ENCOUNTER
Please schedule a sooner appt for pt as I have not seen him in a year and his next appt is in January.

## 2020-11-24 NOTE — TELEPHONE ENCOUNTER
Patient moved up to December 17th. Patient is out of town the week before and the week after. This was was the only time patient could come in. Were you going to be sending the rx for the baskalar?

## 2020-11-25 NOTE — TELEPHONE ENCOUNTER
Patient called looking for his insulin rx. It was sent on 11/13/20. Contacted patient to let him know.   Thanks, Felisa Diamond

## 2020-12-17 ENCOUNTER — OFFICE VISIT (OUTPATIENT)
Dept: FAMILY MEDICINE CLINIC | Age: 75
End: 2020-12-17
Payer: MEDICARE

## 2020-12-17 VITALS
BODY MASS INDEX: 28.6 KG/M2 | TEMPERATURE: 98.3 F | OXYGEN SATURATION: 100 % | DIASTOLIC BLOOD PRESSURE: 84 MMHG | WEIGHT: 199.8 LBS | RESPIRATION RATE: 16 BRPM | HEIGHT: 70 IN | HEART RATE: 88 BPM | SYSTOLIC BLOOD PRESSURE: 160 MMHG

## 2020-12-17 DIAGNOSIS — M1A.9XX0 CHRONIC GOUT WITHOUT TOPHUS, UNSPECIFIED CAUSE, UNSPECIFIED SITE: ICD-10-CM

## 2020-12-17 DIAGNOSIS — Z13.220 SCREENING FOR LIPID DISORDERS: ICD-10-CM

## 2020-12-17 DIAGNOSIS — E55.9 VITAMIN D DEFICIENCY: ICD-10-CM

## 2020-12-17 DIAGNOSIS — N18.4 CKD (CHRONIC KIDNEY DISEASE) STAGE 4, GFR 15-29 ML/MIN (HCC): ICD-10-CM

## 2020-12-17 DIAGNOSIS — E11.21 TYPE 2 DIABETES WITH NEPHROPATHY (HCC): ICD-10-CM

## 2020-12-17 DIAGNOSIS — Z23 ENCOUNTER FOR IMMUNIZATION: ICD-10-CM

## 2020-12-17 DIAGNOSIS — D63.8 ANEMIA, CHRONIC DISEASE: ICD-10-CM

## 2020-12-17 DIAGNOSIS — R14.0 ABDOMINAL BLOATING: ICD-10-CM

## 2020-12-17 DIAGNOSIS — Z00.00 ENCOUNTER FOR MEDICARE ANNUAL WELLNESS EXAM: Primary | ICD-10-CM

## 2020-12-17 DIAGNOSIS — Z12.5 PROSTATE CANCER SCREENING: ICD-10-CM

## 2020-12-17 DIAGNOSIS — E03.8 HYPOTHYROIDISM DUE TO HASHIMOTO'S THYROIDITIS: ICD-10-CM

## 2020-12-17 DIAGNOSIS — I48.0 PAROXYSMAL ATRIAL FIBRILLATION (HCC): ICD-10-CM

## 2020-12-17 DIAGNOSIS — E06.3 HYPOTHYROIDISM DUE TO HASHIMOTO'S THYROIDITIS: ICD-10-CM

## 2020-12-17 DIAGNOSIS — E11.319 DIABETIC RETINOPATHY OF BOTH EYES ASSOCIATED WITH TYPE 2 DIABETES MELLITUS, MACULAR EDEMA PRESENCE UNSPECIFIED, UNSPECIFIED RETINOPATHY SEVERITY (HCC): ICD-10-CM

## 2020-12-17 PROCEDURE — G0439 PPPS, SUBSEQ VISIT: HCPCS | Performed by: FAMILY MEDICINE

## 2020-12-17 PROCEDURE — G8427 DOCREV CUR MEDS BY ELIG CLIN: HCPCS | Performed by: FAMILY MEDICINE

## 2020-12-17 PROCEDURE — G8536 NO DOC ELDER MAL SCRN: HCPCS | Performed by: FAMILY MEDICINE

## 2020-12-17 PROCEDURE — 2022F DILAT RTA XM EVC RTNOPTHY: CPT | Performed by: FAMILY MEDICINE

## 2020-12-17 PROCEDURE — G8398 DIL MACULAR/FUNDUS NOT PERFO: HCPCS | Performed by: FAMILY MEDICINE

## 2020-12-17 PROCEDURE — G8753 SYS BP > OR = 140: HCPCS | Performed by: FAMILY MEDICINE

## 2020-12-17 PROCEDURE — G8510 SCR DEP NEG, NO PLAN REQD: HCPCS | Performed by: FAMILY MEDICINE

## 2020-12-17 PROCEDURE — 3017F COLORECTAL CA SCREEN DOC REV: CPT | Performed by: FAMILY MEDICINE

## 2020-12-17 PROCEDURE — G8419 CALC BMI OUT NRM PARAM NOF/U: HCPCS | Performed by: FAMILY MEDICINE

## 2020-12-17 PROCEDURE — 99214 OFFICE O/P EST MOD 30 MIN: CPT | Performed by: FAMILY MEDICINE

## 2020-12-17 PROCEDURE — G0463 HOSPITAL OUTPT CLINIC VISIT: HCPCS | Performed by: FAMILY MEDICINE

## 2020-12-17 PROCEDURE — G8754 DIAS BP LESS 90: HCPCS | Performed by: FAMILY MEDICINE

## 2020-12-17 PROCEDURE — 3044F HG A1C LEVEL LT 7.0%: CPT | Performed by: FAMILY MEDICINE

## 2020-12-17 PROCEDURE — 1101F PT FALLS ASSESS-DOCD LE1/YR: CPT | Performed by: FAMILY MEDICINE

## 2020-12-17 PROCEDURE — 90732 PPSV23 VACC 2 YRS+ SUBQ/IM: CPT | Performed by: FAMILY MEDICINE

## 2020-12-17 RX ORDER — CHOLECALCIFEROL (VITAMIN D3) 125 MCG
CAPSULE ORAL
COMMUNITY

## 2020-12-17 NOTE — PROGRESS NOTES
Chief Complaint   Patient presents with   [facility] Annual Wellness Visit       1. Have you been to the ER, urgent care clinic since your last visit? Hospitalized since your last visit? No    2. Have you seen or consulted any other health care providers outside of the [address] [place] [address] since your last visit? Include any pap smears or colon screening. Yes When: 08/2020 Where: Dr. Murcia(Cardiology) Reason for visit: Followup    3 most recent PHQ Screens 12/17/2020   Little interest or pleasure in doing things Not at all   Feeling down, depressed, irritable, or hopeless Not at all   Total Score PHQ 2 0       Fall Risk Assessment, last 12 mths 12/17/2020   Able to walk? Yes   Fall in past 12 months? No         ADL Assessment 12/17/2020   Feeding yourself No Help Needed   Getting from bed to chair No Help Needed   Getting dressed No Help Needed   Bathing or showering No Help Needed   Walk across the room (includes cane/walker) No Help Needed   Using the telphone No Help Needed   Taking your medications No Help Needed   Preparing meals No Help Needed   Managing money (expenses/bills) No Help Needed   Moderately strenuous housework (laundry) No Help Needed   Shopping for personal items (toiletries/medicines) No Help Needed   Shopping for groceries No Help Needed   Driving No Help Needed   Climbing a flight of stairs No Help Needed   Getting to places beyond walking distances No Help Needed       Abuse Screening Questionnaire 12/17/2020   Do you ever feel afraid of your partner? N   Are you in a relationship with someone who physically or mentally threatens you? N   Is it safe for you to go home?  Y       Health Maintenance Due   Topic Date Due    DTaP/Tdap/Td series (1 - Tdap) 10/26/1968    Shingrix Vaccine Age 50> (1 of 2) 10/26/1997    GLAUCOMA SCREENING Q2Y  10/17/2019    Eye Exam Retinal or Dilated  10/17/2019    Pneumococcal 65+ years (2 of 2 - PPSV23) 06/05/2020    Pneumococcal 65+ yrs at Risk Vaccine (2 of 2 - PPSV23) 06/05/2020    Medicare Yearly Exam  09/27/2020    MICROALBUMIN Q1  10/29/2020    Lipid Screen  10/29/2020     Due for eye exam in January. Updated viis was given to patient/guardian prior to administering vaccine. Opportunity was presented for questions. No concerns were verbalized prior to administration. Vaccine administered by Lorenzo Maldonado LPN. No adverse reactions noted.

## 2020-12-17 NOTE — ASSESSMENT & PLAN NOTE
Stable, based on history, physical exam and review of pertinent labs, studies and medications; meds reconciled; continue current treatment plan. Key CKD Meds             cholecalciferol, vitamin D3, (Vitamin D3) 50 mcg (2,000 unit) tab (Taking) Take  by mouth. furosemide (LASIX) 40 mg tablet (Taking) Take 40 mg by mouth daily. lisinopril (PRINIVIL, ZESTRIL) 5 mg tablet (Taking) Take 5 mg by mouth daily. Lab Results   Component Value Date/Time    GFR est non-AA 31 10/29/2019 01:02 PM    GFR est AA 35 10/29/2019 01:02 PM    Creatinine 2.09 10/29/2019 01:02 PM    BUN 53 10/29/2019 01:02 PM    Sodium 138 10/29/2019 01:02 PM    Potassium 4.9 10/29/2019 01:02 PM    Chloride 100 10/29/2019 01:02 PM    CO2 25 10/29/2019 01:02 PM    Calcium 9.3 10/29/2019 01:02 PM    VITAMIN D, 25-HYDROXY 37.4 10/29/2019 01:02 PM     CrCl cannot be calculated (Patient's most recent lab result is older than the maximum 180 days allowed. ).

## 2020-12-17 NOTE — ASSESSMENT & PLAN NOTE
Stable, based on history, physical exam and review of pertinent labs, studies and medications; meds reconciled; continue current treatment plan. Key CAD CHF Meds             furosemide (LASIX) 40 mg tablet (Taking) Take 40 mg by mouth daily. lisinopril (PRINIVIL, ZESTRIL) 5 mg tablet (Taking) Take 5 mg by mouth daily. carvedilol (COREG) 6.25 mg tablet (Taking) Take 6.25 mg by mouth two (2) times daily (with meals). ELIQUIS 5 mg tablet (Taking) Take 5 mg by mouth two (2) times a day. pravastatin (PRAVACHOL) 20 mg tablet (Taking) Take 1 Tab by mouth nightly.         Lab Results   Component Value Date/Time    Sodium 138 10/29/2019 01:02 PM    Potassium 4.9 10/29/2019 01:02 PM    Cholesterol, total 128 10/29/2019 01:02 PM    HDL Cholesterol 33 10/29/2019 01:02 PM    LDL, calculated 84 10/29/2019 01:02 PM    Triglyceride 57 10/29/2019 01:02 PM

## 2020-12-17 NOTE — PROGRESS NOTES
This is the Subsequent Medicare Annual Wellness Exam, performed 12 months or more after the Initial AWV or the last Subsequent AWV    I have reviewed the patient's medical history in detail and updated the computerized patient record. Depression Risk Factor Screening:     3 most recent PHQ Screens 12/17/2020   Little interest or pleasure in doing things Not at all   Feeling down, depressed, irritable, or hopeless Not at all   Total Score PHQ 2 0       Alcohol Risk Screen    Do you average more than 1 drink per night or more than 7 drinks a week: No    In the past three months have you have had more than 4 drinks containing alcohol on one occasion: No        Functional Ability and Level of Safety:    Hearing: Hearing is good. Activities of Daily Living: The home contains: no safety equipment. Patient does total self care      Ambulation: with no difficulty     Fall Risk:  Fall Risk Assessment, last 12 mths 12/17/2020   Able to walk? Yes   Fall in past 12 months? No      Abuse Screen:  Patient is not abused       Cognitive Screening    Has your family/caregiver stated any concerns about your memory: no       Assessment/Plan   Education and counseling provided:  Are appropriate based on today's review and evaluation    Diagnoses and all orders for this visit:    1. Encounter for Medicare annual wellness exam    2. Type 2 diabetes with nephropathy [facility])  Assessment & Plan:  Stable, based on history, physical exam and review of pertinent labs, studies and medications; meds reconciled; continue current treatment plan. Key Antihyperglycemic Medications     Patient is on no antihyperglycemic meds. Other Key Diabetic Medications             lisinopril (PRINIVIL, ZESTRIL) 5 mg tablet (Taking) Take 5 mg by mouth daily. pravastatin (PRAVACHOL) 20 mg tablet (Taking) Take 1 Tab by mouth nightly.         Lab Results   Component Value Date/Time    Hemoglobin A1c 7.3 10/29/2019 01:02 PM    Hemoglobin A1c, External 6.6 08/18/2020    Glucose 156 10/29/2019 01:02 PM    Creatinine 2.09 10/29/2019 01:02 PM    Microalb/Creat ratio (ug/mg creat.) 158.5 10/29/2019 01:02 PM    Cholesterol, total 128 10/29/2019 01:02 PM    HDL Cholesterol 33 10/29/2019 01:02 PM    LDL, calculated 84 10/29/2019 01:02 PM    Triglyceride 57 10/29/2019 01:02 PM     Diabetic Foot and Eye Exam HM Status   Topic Date Due    Eye Exam  10/17/2019    Diabetic Foot Care  05/07/2021       Orders:  -     HEMOGLOBIN A1C WITH EAG; Future  -     MICROALBUMIN, UR, RAND W/ MICROALB/CREAT RATIO; Future    3. Hypothyroidism due to Hashimoto's thyroiditis  -     TSH 3RD GENERATION; Future  -     T4 (THYROXINE); Future    4. Vitamin D deficiency  -     VITAMIN D, 25 HYDROXY; Future    5. Anemia, chronic disease  -     CBC WITH AUTOMATED DIFF; Future  -     FERRITIN; Future  -     IRON PROFILE; Future    6. Chronic gout without tophus, unspecified cause, unspecified site  -     URIC ACID; Future    7. Abdominal bloating    8. Screening for lipid disorders  -     METABOLIC PANEL, COMPREHENSIVE; Future  -     LIPID PANEL; Future    9. Prostate cancer screening  -     PSA, DIAGNOSTIC (PROSTATE SPECIFIC AG); Future    10. Encounter for immunization  -     PNEUMOCOCCAL POLYSACCHARIDE VACCINE, 23-VALENT, ADULT OR IMMUNOSUPPRESSED PT DOSE,    11. Diabetic retinopathy of both eyes associated with type 2 diabetes mellitus, macular edema presence unspecified, unspecified retinopathy severity ([address]  Assessment & Plan:  Stable, based on history, physical exam and review of pertinent labs, studies and medications; meds reconciled; continue current treatment plan. Key Antihyperglycemic Medications     Patient is on no antihyperglycemic meds. Other Key Diabetic Medications             lisinopril (PRINIVIL, ZESTRIL) 5 mg tablet (Taking) Take 5 mg by mouth daily. pravastatin (PRAVACHOL) 20 mg tablet (Taking) Take 1 Tab by mouth nightly.         Lab Results   Component Value Date/Time    Hemoglobin A1c 7.3 10/29/2019 01:02 PM    Hemoglobin A1c, External 6.6 08/18/2020    Glucose 156 10/29/2019 01:02 PM    Creatinine 2.09 10/29/2019 01:02 PM    Microalb/Creat ratio (ug/mg creat.) 158.5 10/29/2019 01:02 PM    Cholesterol, total 128 10/29/2019 01:02 PM    HDL Cholesterol 33 10/29/2019 01:02 PM    LDL, calculated 84 10/29/2019 01:02 PM    Triglyceride 57 10/29/2019 01:02 PM     Diabetic Foot and Eye Exam HM Status   Topic Date Due    Eye Exam  10/17/2019    Diabetic Foot Care  05/07/2021         12. CKD (chronic kidney disease) stage 4, GFR 15-29 ml/min ([facility])  Assessment & Plan:  Stable, based on history, physical exam and review of pertinent labs, studies and medications; meds reconciled; continue current treatment plan. Key CKD Meds             cholecalciferol, vitamin D3, (Vitamin D3) 50 mcg (2,000 unit) tab (Taking) Take  by mouth. furosemide (LASIX) 40 mg tablet (Taking) Take 40 mg by mouth daily. lisinopril (PRINIVIL, ZESTRIL) 5 mg tablet (Taking) Take 5 mg by mouth daily. Lab Results   Component Value Date/Time    GFR est non-AA 31 10/29/2019 01:02 PM    GFR est AA 35 10/29/2019 01:02 PM    Creatinine 2.09 10/29/2019 01:02 PM    BUN 53 10/29/2019 01:02 PM    Sodium 138 10/29/2019 01:02 PM    Potassium 4.9 10/29/2019 01:02 PM    Chloride 100 10/29/2019 01:02 PM    CO2 25 10/29/2019 01:02 PM    Calcium 9.3 10/29/2019 01:02 PM    VITAMIN D, 25-HYDROXY 37.4 10/29/2019 01:02 PM     CrCl cannot be calculated (Patient's most recent lab result is older than the maximum 180 days allowed. ). 13. Paroxysmal atrial fibrillation (HCC)  Assessment & Plan:  Stable, based on history, physical exam and review of pertinent labs, studies and medications; meds reconciled; continue current treatment plan. Key CAD CHF Meds             furosemide (LASIX) 40 mg tablet (Taking) Take 40 mg by mouth daily.     lisinopril (PRINIVIL, ZESTRIL) 5 mg tablet (Taking) Take 5 mg by mouth daily.    carvedilol (COREG) 6.25 mg tablet (Taking) Take 6.25 mg by mouth two (2) times daily (with meals). ELIQUIS 5 mg tablet (Taking) Take 5 mg by mouth two (2) times a day. pravastatin (PRAVACHOL) 20 mg tablet (Taking) Take 1 Tab by mouth nightly.         Lab Results   Component Value Date/Time    Sodium 138 10/29/2019 01:02 PM    Potassium 4.9 10/29/2019 01:02 PM    Cholesterol, total 128 10/29/2019 01:02 PM    HDL Cholesterol 33 10/29/2019 01:02 PM    LDL, calculated 84 10/29/2019 01:02 PM    Triglyceride 57 10/29/2019 01:02 PM             Health Maintenance Due     Health Maintenance Due   Topic Date Due    DTaP/Tdap/Td series (1 - Tdap) 10/26/1968    Shingrix Vaccine Age 50> (1 of 2) 10/26/1997    GLAUCOMA SCREENING Q2Y  10/17/2019    Eye Exam Retinal or Dilated  10/17/2019    MICROALBUMIN Q1  10/29/2020    Lipid Screen  10/29/2020       Patient Care Team   Patient Care Team:  Anabell Sevilla MD as PCP - General (Family Medicine)  Anabell Sevilla MD as PCP - [facility]  Holli Kraft MD (Nephrology)  Luisa Hsieh MD (Cardiology)  Ella Russo MD (Ophthalmology)  Tara Abdalla MD as Physician (Ophthalmology)  Cornelio Garber MD (Gastroenterology)  Uli Fuller MD as Physician (Endocrinology)    History     Patient Active Problem List   Diagnosis Code    TIA (transient ischemic attack) G45.9    Stenosis of right carotid artery without cerebral infarction I65.21    Vitamin D deficiency E55.9    Retinopathy, diabetic, bilateral ([address] E11.319    Heart murmur, systolic W18.3    Advance care planning Z71.89    Essential hypertension I10    Anemia, chronic disease D63.8    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    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    CKD (chronic kidney disease) stage 4, GFR 15-29 ml/min ([facility]) N18.4    Adenomatous polyp of ascending colon D12.2    Paroxysmal atrial fibrillation ([address] I48.0    History of renal artery stenosis Z86.79    Hypothyroidism due to Hashimoto's thyroiditis E03.8, E06.3    Long-term use of immunosuppressant medication Z79.899    GI bleed K92.2    Acute blood loss anemia D62    History of thrombocytopenia Z86.2    Type 2 diabetes with nephropathy (HCC) E11.21     Past Medical History:   Diagnosis Date    Acute encephalopathy 1/14/2016    Anemia 5/11/2017    Atrial fibrillation (HCC)     Painter esophagus     CAD (coronary artery disease)     Diabetes (HCC)     Gout     Heart failure (HCC)     Cardiomyopathy, myocarditis    History of renal artery stenosis 8/4/2018    History of thrombocytopenia 1/14/2019    HTN, goal below 140/90     Retinopathy, diabetic, bilateral ([address] 3/11/2016    Stenosis of right carotid artery without cerebral infarction 1/18/2016    TIA (transient ischemic attack) 1/14/2016    Vitamin D deficiency 3/1/2016    Nephrology ordered and follow 2/22/16       Past Surgical History:   Procedure Laterality Date    COLONOSCOPY N/A 6/23/2016    COLONOSCOPY performed by Ivana Kerr MD at [facility]. N/A 8/6/2018    COLONOSCOPY performed by Ivana Kerr MD at [facility]. N/A 8/30/2018    COLONOSCOPY performed by [facility] Hardin [facility] HX ENDOSCOPY  06/27/2016    CAPSULE; normal small bowel    HX ENDOSCOPY  06/23/2016    upper endoscopy    HX UROLOGICAL  2013    WI INS NEW/RPLC PRM PACEMAKER W/TRANSV ELTRD VENTR N/A 2/7/2019    INSERT PPM SINGLE VENTRICULAR performed by Simone Black MD at [place] [address], [facility] CATH LAB     Current Outpatient Medications   Medication Sig Dispense Refill    cholecalciferol, vitamin D3, (Vitamin D3) 50 mcg (2,000 unit) tab Take  by mouth.  TURMERIC PO Take  by mouth.  B.ani/L.aci/L.sal/L.plan/L. Brady (PROBIOTIC FORMULA PO) Take  by mouth.  furosemide (LASIX) 40 mg tablet Take 40 mg by mouth daily.       lisinopril (PRINIVIL, ZESTRIL) 5 mg tablet Take 5 mg by mouth daily.      carvedilol (COREG) 6.25 mg tablet Take 6.25 mg by mouth two (2) times daily (with meals).  ELIQUIS 5 mg tablet Take 5 mg by mouth two (2) times a day.  multivitamin (ONE A DAY) tablet Take 1 Tab by mouth daily.  omeprazole (PRILOSEC) 20 mg capsule Take 20 mg by mouth as needed.  dextran 70-hypromellose (ARTIFICIAL TEARS,[id number],) ophthalmic solution Administer 1 Drop to both eyes as needed.  pravastatin (PRAVACHOL) 20 mg tablet Take 1 Tab by mouth nightly.  30 Tab 2     Allergies   Allergen Reactions    Solu-Medrol [Methylprednisolone Sodium Succ] Other (comments)     Syncope / Cardiac Arrest       Family History   Problem Relation Age of Onset    Heart Failure Mother     Diabetes Father     No Known Problems Sister     Other Daughter         Easton Hickey Other Daughter         Wayne Alexandreann    Other Daughter         Hashimoto     Social History     Tobacco Use    Smoking status: Never Smoker    Smokeless tobacco: Never Used   Substance Use Topics    Alcohol use: Yes     Comment: 2 drinks/week, never a heavy drinker

## 2020-12-17 NOTE — PROGRESS NOTES
Patient Name: Robyn Padilla   MRN: [id number]    Leelee Gavin is a 70 y.o. male who presents with the following:     Pt did not take his levothyroxine due to nausea. His last TSH was very abnormal.  He also stopped taking his Basaglar about a year ago as it was too expensive; his last A1c done at his cardiologist's office was 6.6 in August 2020. Reports increased weight around his abdomen area, abdominal bloating, difficulty swallowing, and gas. Probiotic seems to help. He does have a hx of Painter's esophagus and hiatal hernia. Has high uric acid but gout has not bothered him and he is not on medications. BP Readings from Last 3 Encounters:   12/17/20 (!) 160/84   12/06/19 136/79   09/27/19 140/90     Wt Readings from Last 3 Encounters:   12/17/20 199 lb 12.8 oz (90.6 kg)   12/06/19 185 lb (83.9 kg)   09/27/19 191 lb 9.6 oz (86.9 kg)     Review of Systems   Constitutional: Positive for malaise/fatigue. Negative for fever and weight loss. Respiratory: Negative for cough, hemoptysis, shortness of breath and wheezing. Cardiovascular: Negative for chest pain, palpitations, leg swelling and PND. Gastrointestinal: Positive for abdominal pain and heartburn. Negative for blood in stool, constipation, diarrhea, melena, nausea and vomiting. The patient's medications, allergies, past medical history, surgical history, family history and social history were reviewed and updated where appropriate. Prior to Admission medications    Medication Sig Start Date End Date Taking? Authorizing Provider   cholecalciferol, vitamin D3, (Vitamin D3) 50 mcg (2,000 unit) tab Take  by mouth. Yes Provider, Historical   TURMERIC PO Take  by mouth. Yes Provider, Historical   B.ani/L.aci/L.nahum/L.plan/L. Brady (PROBIOTIC FORMULA PO) Take  by mouth. Yes Provider, Historical   furosemide (LASIX) 40 mg tablet Take 40 mg by mouth daily.  8/4/19  Yes Provider, Historical   lisinopril (PRINIVIL, ZESTRIL) 5 mg tablet Take 5 mg by mouth daily. 7/24/19  Yes Provider, Historical   carvedilol (COREG) 6.25 mg tablet Take 6.25 mg by mouth two (2) times daily (with meals). 8/6/19  Yes Provider, Historical   ELIQUIS 5 mg tablet Take 5 mg by mouth two (2) times a day. 7/4/19  Yes Provider, Historical   multivitamin (ONE A DAY) tablet Take 1 Tab by mouth daily. Yes Provider, Historical   omeprazole (PRILOSEC) 20 mg capsule Take 20 mg by mouth as needed. Yes Provider, Historical   dextran 70-hypromellose (ARTIFICIAL TEARS,[id number],) ophthalmic solution Administer 1 Drop to both eyes as needed. Yes Provider, Historical   pravastatin (PRAVACHOL) 20 mg tablet Take 1 Tab by mouth nightly. 1/18/16  Yes Ksenia Gaona NP   insulin glargine (LANTUS,BASAGLAR) 100 unit/mL (3 mL) inpn 40 Units by SubCUTAneous route daily. 11/13/20   Vicky Palomares MD   leflunomide (ARAVA) 20 mg tablet Take 1 Tab by mouth daily. Take half tab daily for 7 days and then one tab daily if tolerated 12/7/19   Wendi Reed MD   levothyroxine (SYNTHROID) 25 mcg tablet Take 1 Tab by mouth Daily (before breakfast). 10/31/19   Vicky Palomares MD       Allergies   Allergen Reactions    Solu-Medrol [Methylprednisolone Sodium Succ] Other (comments)     Syncope / Cardiac Arrest         OBJECTIVE    Visit Vitals  BP (!) 160/84   Pulse 88   Temp 98.3 °F (36.8 °C) (Temporal)   Resp 16   Ht 5' 10\" (1.778 m)   Wt 199 lb 12.8 oz (90.6 kg)   SpO2 100%   BMI 28.67 kg/m²       Physical Exam  Vitals signs and nursing note reviewed. Constitutional:       General: He is not in acute distress. Appearance: He is not diaphoretic. Eyes:      Conjunctiva/sclera: Conjunctivae normal.      Pupils: Pupils are equal, round, and reactive to light. Neck:      Thyroid: No thyroid mass, thyromegaly or thyroid tenderness. Cardiovascular:      Rate and Rhythm: Normal rate and regular rhythm. Heart sounds: Normal heart sounds. No murmur. No friction rub.  No gallop. Pulmonary:      Effort: Pulmonary effort is normal. No respiratory distress. Breath sounds: Normal breath sounds. No wheezing. Skin:     General: Skin is warm and dry. Neurological:      Mental Status: He is alert. ASSESSMENT AND PLAN  Maxim Marc is a 70 y.o. male who presents today for:    1. Encounter for Medicare annual wellness exam    2. Type 2 diabetes with nephropathy (HCC)  Stable, continue current treatment pending review of labs. - HEMOGLOBIN A1C WITH EAG; Future  - MICROALBUMIN, UR, RAND W/ MICROALB/CREAT RATIO; Future  - MICROALBUMIN, UR, RAND W/ MICROALB/CREAT RATIO  - HEMOGLOBIN A1C WITH EAG    3. Hypothyroidism due to Hashimoto's thyroiditis  Stable, continue current treatment pending review of labs. - TSH 3RD GENERATION; Future  - T4 (THYROXINE); Future  - T4 (THYROXINE)  - TSH 3RD GENERATION    4. Vitamin D deficiency  - VITAMIN D, 25 HYDROXY; Future  - VITAMIN D, 25 HYDROXY    5. Anemia, chronic disease  - CBC WITH AUTOMATED DIFF; Future  - FERRITIN; Future  - IRON PROFILE; Future  - IRON PROFILE  - FERRITIN  - CBC WITH AUTOMATED DIFF    6. Chronic gout without tophus, unspecified cause, unspecified site  - URIC ACID; Future  - URIC ACID    7. Abdominal bloating  Recommend pt to f/u with GI.    8. Screening for lipid disorders  - METABOLIC PANEL, COMPREHENSIVE; Future  - LIPID PANEL; Future  - LIPID PANEL  - METABOLIC PANEL, COMPREHENSIVE    9. Prostate cancer screening  - PSA, DIAGNOSTIC (PROSTATE SPECIFIC AG); Future  - PSA, DIAGNOSTIC (PROSTATE SPECIFIC AG)    10. Encounter for immunization  - PNEUMOCOCCAL POLYSACCHARIDE VACCINE, 23-VALENT, ADULT OR IMMUNOSUPPRESSED PT DOSE,    11. Diabetic retinopathy of both eyes associated with type 2 diabetes mellitus, macular edema presence unspecified, unspecified retinopathy severity ([address]    12. CKD (chronic kidney disease) stage 4, GFR 15-29 ml/min ([facility])    13.  Paroxysmal atrial fibrillation (HCC)       Medications Discontinued During This Encounter   Medication Reason    leflunomide (ARAVA) 20 mg tablet Therapy Completed    insulin glargine (LANTUS,BASAGLAR) 100 unit/mL (3 mL) inpn Therapy Completed    levothyroxine (SYNTHROID) 25 mcg tablet Therapy Completed     Follow-up and Dispositions    · Return in about 3 months (around 3/17/2021) for DM follow up. Treatment risks/benefits/costs/interactions/alternatives discussed with patient. Advised patient to call back or return to office if symptoms worsen/change/persist. If patient cannot reach us or should anything more severe/urgent arise he/she should proceed directly to the nearest emergency department. Discussed expected course/resolution/complications of diagnosis in detail with patient. Patient expressed understanding with the diagnosis and plan. Aivi N. Caleb Severe M.D.

## 2020-12-17 NOTE — ASSESSMENT & PLAN NOTE
Stable, based on history, physical exam and review of pertinent labs, studies and medications; meds reconciled; continue current treatment plan. Key Antihyperglycemic Medications     Patient is on no antihyperglycemic meds. Other Key Diabetic Medications             lisinopril (PRINIVIL, ZESTRIL) 5 mg tablet (Taking) Take 5 mg by mouth daily. pravastatin (PRAVACHOL) 20 mg tablet (Taking) Take 1 Tab by mouth nightly.         Lab Results   Component Value Date/Time    Hemoglobin A1c 7.3 10/29/2019 01:02 PM    Hemoglobin A1c, External 6.6 08/18/2020    Glucose 156 10/29/2019 01:02 PM    Creatinine 2.09 10/29/2019 01:02 PM    Microalb/Creat ratio (ug/mg creat.) 158.5 10/29/2019 01:02 PM    Cholesterol, total 128 10/29/2019 01:02 PM    HDL Cholesterol 33 10/29/2019 01:02 PM    LDL, calculated 84 10/29/2019 01:02 PM    Triglyceride 57 10/29/2019 01:02 PM     Diabetic Foot and Eye Exam  Status   Topic Date Due    Eye Exam  10/17/2019    Diabetic Foot Care  05/07/2021

## 2020-12-18 LAB
25(OH)D3 SERPL-MCNC: 51.7 NG/ML (ref 30–100)
ALBUMIN SERPL-MCNC: 4.4 G/DL (ref 3.5–5)
ALBUMIN/GLOB SERPL: 1.3 {RATIO} (ref 1.1–2.2)
ALP SERPL-CCNC: 53 U/L (ref 45–117)
ALT SERPL-CCNC: 24 U/L (ref 12–78)
ANION GAP SERPL CALC-SCNC: 4 MMOL/L (ref 5–15)
AST SERPL-CCNC: 24 U/L (ref 15–37)
BASOPHILS # BLD: 0 K/UL (ref 0–0.1)
BASOPHILS NFR BLD: 1 % (ref 0–1)
BILIRUB SERPL-MCNC: 0.6 MG/DL (ref 0.2–1)
BUN SERPL-MCNC: 77 MG/DL (ref 6–20)
BUN/CREAT SERPL: 30 (ref 12–20)
CALCIUM SERPL-MCNC: 9.1 MG/DL (ref 8.5–10.1)
CHLORIDE SERPL-SCNC: 105 MMOL/L (ref 97–108)
CHOLEST SERPL-MCNC: 120 MG/DL
CO2 SERPL-SCNC: 29 MMOL/L (ref 21–32)
CREAT SERPL-MCNC: 2.6 MG/DL (ref 0.7–1.3)
CREAT UR-MCNC: 67.1 MG/DL
DIFFERENTIAL METHOD BLD: ABNORMAL
EOSINOPHIL # BLD: 0.2 K/UL (ref 0–0.4)
EOSINOPHIL NFR BLD: 6 % (ref 0–7)
ERYTHROCYTE [DISTWIDTH] IN BLOOD BY AUTOMATED COUNT: 13.2 % (ref 11.5–14.5)
EST. AVERAGE GLUCOSE BLD GHB EST-MCNC: 148 MG/DL
FERRITIN SERPL-MCNC: 90 NG/ML (ref 26–388)
GLOBULIN SER CALC-MCNC: 3.5 G/DL (ref 2–4)
GLUCOSE SERPL-MCNC: 114 MG/DL (ref 65–100)
HBA1C MFR BLD: 6.8 % (ref 4–5.6)
HCT VFR BLD AUTO: 34 % (ref 36.6–50.3)
HDLC SERPL-MCNC: 38 MG/DL
HDLC SERPL: 3.2 {RATIO} (ref 0–5)
HGB BLD-MCNC: 10.6 G/DL (ref 12.1–17)
IMM GRANULOCYTES # BLD AUTO: 0 K/UL (ref 0–0.04)
IMM GRANULOCYTES NFR BLD AUTO: 0 % (ref 0–0.5)
IRON SATN MFR SERPL: 23 % (ref 20–50)
IRON SERPL-MCNC: 94 UG/DL (ref 35–150)
LDLC SERPL CALC-MCNC: 70 MG/DL (ref 0–100)
LIPID PROFILE,FLP: NORMAL
LYMPHOCYTES # BLD: 0.6 K/UL (ref 0.8–3.5)
LYMPHOCYTES NFR BLD: 14 % (ref 12–49)
MCH RBC QN AUTO: 29.8 PG (ref 26–34)
MCHC RBC AUTO-ENTMCNC: 31.2 G/DL (ref 30–36.5)
MCV RBC AUTO: 95.5 FL (ref 80–99)
MICROALBUMIN UR-MCNC: 5.86 MG/DL
MICROALBUMIN/CREAT UR-RTO: 87 MG/G (ref 0–30)
MONOCYTES # BLD: 0.4 K/UL (ref 0–1)
MONOCYTES NFR BLD: 10 % (ref 5–13)
NEUTS SEG # BLD: 2.8 K/UL (ref 1.8–8)
NEUTS SEG NFR BLD: 69 % (ref 32–75)
NRBC # BLD: 0 K/UL (ref 0–0.01)
NRBC BLD-RTO: 0 PER 100 WBC
PLATELET # BLD AUTO: 112 K/UL (ref 150–400)
PMV BLD AUTO: 10.9 FL (ref 8.9–12.9)
POTASSIUM SERPL-SCNC: 5.3 MMOL/L (ref 3.5–5.1)
PROT SERPL-MCNC: 7.9 G/DL (ref 6.4–8.2)
PSA SERPL-MCNC: 2.5 NG/ML (ref 0.01–4)
RBC # BLD AUTO: 3.56 M/UL (ref 4.1–5.7)
RBC MORPH BLD: ABNORMAL
SODIUM SERPL-SCNC: 138 MMOL/L (ref 136–145)
T4 SERPL-MCNC: 6.7 UG/DL (ref 4.5–12.1)
TIBC SERPL-MCNC: 411 UG/DL (ref 250–450)
TRIGL SERPL-MCNC: 60 MG/DL (ref ?–150)
TSH SERPL DL<=0.05 MIU/L-ACNC: 14.4 UIU/ML (ref 0.36–3.74)
URATE SERPL-MCNC: 11.6 MG/DL (ref 3.5–7.2)
VLDLC SERPL CALC-MCNC: 12 MG/DL
WBC # BLD AUTO: 4 K/UL (ref 4.1–11.1)

## 2020-12-22 RX ORDER — LEVOTHYROXINE SODIUM 25 UG/1
25 TABLET ORAL
Qty: 90 TAB | Refills: 1 | Status: SHIPPED | OUTPATIENT
Start: 2020-12-22 | End: 2021-04-20 | Stop reason: SDUPTHER

## 2021-03-02 NOTE — PERIOP NOTES
Anesthesia staff at patient's bedside administering anesthesia and monitoring patients vital signs throughout procedure. See anesthesia note.
Chart accessed for endoscopy charge nurse review .
Chart accessed/reviewed by writer, patient is a pending or scheduled endoscopy of .
Patient tolerated procedure without problems. Abdomen soft and patient arousable and voices no complaints Report received from CRNA, see anesthesia note. Patient transported to endoscopy recovery area.  
Endoscope was pre-cleaned at bedside immediately following procedure by Palak Dickens 

leticia TRANSFER - OUT REPORT: 
 
Verbal report given to leticia on Rafal Pluck  being transferred to 5th floor(unit) for routine progression of care Report consisted of patients Situation, Background, Assessment and  
Recommendations(SBAR). Information from the following report(s) Procedure Summary and Recent Results was reviewed with the receiving nurse. Lines:  
Peripheral IV 08/29/18 Right Forearm (Active) Site Assessment Clean, dry, & intact 8/30/2018  9:00 AM  
Phlebitis Assessment 0 8/30/2018  9:00 AM  
Infiltration Assessment 0 8/30/2018  9:00 AM  
Dressing Status Clean, dry, & intact 8/30/2018  9:00 AM  
Dressing Type Transparent 8/30/2018  9:00 AM  
Hub Color/Line Status Pink 8/30/2018  9:00 AM  
Action Taken Blood drawn 8/29/2018  1:58 PM  
Alcohol Cap Used Yes 8/30/2018  9:00 AM  
  
 
Opportunity for questions and clarification was provided. Patient transported with: 
 7digital
Patient

## 2021-03-22 ENCOUNTER — OFFICE VISIT (OUTPATIENT)
Dept: FAMILY MEDICINE CLINIC | Age: 76
End: 2021-03-22
Payer: MEDICARE

## 2021-03-22 VITALS
BODY MASS INDEX: 29.49 KG/M2 | RESPIRATION RATE: 18 BRPM | TEMPERATURE: 97.3 F | OXYGEN SATURATION: 96 % | HEART RATE: 83 BPM | DIASTOLIC BLOOD PRESSURE: 78 MMHG | SYSTOLIC BLOOD PRESSURE: 136 MMHG | WEIGHT: 206 LBS | HEIGHT: 70 IN

## 2021-03-22 DIAGNOSIS — N18.4 CKD (CHRONIC KIDNEY DISEASE) STAGE 4, GFR 15-29 ML/MIN (HCC): ICD-10-CM

## 2021-03-22 DIAGNOSIS — E06.3 HYPOTHYROIDISM DUE TO HASHIMOTO'S THYROIDITIS: ICD-10-CM

## 2021-03-22 DIAGNOSIS — E11.21 TYPE 2 DIABETES WITH NEPHROPATHY (HCC): Primary | ICD-10-CM

## 2021-03-22 DIAGNOSIS — E03.8 HYPOTHYROIDISM DUE TO HASHIMOTO'S THYROIDITIS: ICD-10-CM

## 2021-03-22 PROCEDURE — 1101F PT FALLS ASSESS-DOCD LE1/YR: CPT | Performed by: FAMILY MEDICINE

## 2021-03-22 PROCEDURE — G8754 DIAS BP LESS 90: HCPCS | Performed by: FAMILY MEDICINE

## 2021-03-22 PROCEDURE — 2022F DILAT RTA XM EVC RTNOPTHY: CPT | Performed by: FAMILY MEDICINE

## 2021-03-22 PROCEDURE — G8427 DOCREV CUR MEDS BY ELIG CLIN: HCPCS | Performed by: FAMILY MEDICINE

## 2021-03-22 PROCEDURE — 3046F HEMOGLOBIN A1C LEVEL >9.0%: CPT | Performed by: FAMILY MEDICINE

## 2021-03-22 PROCEDURE — G8536 NO DOC ELDER MAL SCRN: HCPCS | Performed by: FAMILY MEDICINE

## 2021-03-22 PROCEDURE — G8752 SYS BP LESS 140: HCPCS | Performed by: FAMILY MEDICINE

## 2021-03-22 PROCEDURE — G8432 DEP SCR NOT DOC, RNG: HCPCS | Performed by: FAMILY MEDICINE

## 2021-03-22 PROCEDURE — G8419 CALC BMI OUT NRM PARAM NOF/U: HCPCS | Performed by: FAMILY MEDICINE

## 2021-03-22 PROCEDURE — G0463 HOSPITAL OUTPT CLINIC VISIT: HCPCS | Performed by: FAMILY MEDICINE

## 2021-03-22 PROCEDURE — 3017F COLORECTAL CA SCREEN DOC REV: CPT | Performed by: FAMILY MEDICINE

## 2021-03-22 PROCEDURE — 99214 OFFICE O/P EST MOD 30 MIN: CPT | Performed by: FAMILY MEDICINE

## 2021-03-22 RX ORDER — CARVEDILOL 12.5 MG/1
12.5 TABLET ORAL 2 TIMES DAILY WITH MEALS
COMMUNITY
End: 2021-03-22

## 2021-03-22 RX ORDER — FLASH GLUCOSE SCANNING READER
1 EACH MISCELLANEOUS DAILY
Qty: 1 EACH | Refills: 0 | Status: SHIPPED | OUTPATIENT
Start: 2021-03-22 | End: 2022-11-01

## 2021-03-22 RX ORDER — FLASH GLUCOSE SENSOR
KIT MISCELLANEOUS
Qty: 1 KIT | Refills: 2 | Status: SHIPPED | OUTPATIENT
Start: 2021-03-22 | End: 2022-11-01

## 2021-03-22 NOTE — PROGRESS NOTES
Patient Name: Sai Koroma   MRN: [id number]    Ki Roberts is a 70 y.o. male who presents with the following:     Patient has an upcoming appointment with his cardiologist next week and will plan to do his lab work there. Since last visit, he had restarted the levothyroxine and denies any side effects. He is requesting a continuous glucose monitoring symptom to check his ongoing sugars. Has not yet followed up with us nephrologist since her last visit for his CKD. Lab Results   Component Value Date/Time    Hemoglobin A1c 6.8 (H) 12/17/2020 02:21 PM    Hemoglobin A1c (POC) 6.3 08/02/2016 12:53 PM    Hemoglobin A1c, External 6.6 08/18/2020       Review of Systems   Constitutional: Negative for fever, malaise/fatigue and weight loss. Respiratory: Negative for cough, hemoptysis, shortness of breath and wheezing. Cardiovascular: Negative for chest pain, palpitations, leg swelling and PND. Gastrointestinal: Negative for abdominal pain, constipation, diarrhea, nausea and vomiting. The patient's medications, allergies, past medical history, surgical history, family history and social history were reviewed and updated where appropriate. Prior to Admission medications    Medication Sig Start Date End Date Taking? Authorizing Provider   flash glucose sensor (FreeStyle Evens 14 Day Sensor) kit Apply every 2 weeks; ICD 10 E11.8 3/22/21  Yes Digna Scruggs MD   flash glucose scanning reader (FreeStyle Evens 14 Day [place]) misc 1 Device by SubCUTAneous route daily. ICD10 E11.8 3/22/21  Yes Dave Laguna MD   levothyroxine (SYNTHROID) 25 mcg tablet Take 1 Tab by mouth Daily (before breakfast). 12/22/20  Yes Dave Laguna MD   cholecalciferol, vitamin D3, (Vitamin D3) 50 mcg (2,000 unit) tab Take  by mouth. Yes Provider, Historical   TURMERIC PO Take  by mouth. Yes Provider, Historical   B.ani/L.aci/L.nahum/L.plan/L. Brady (PROBIOTIC FORMULA PO) Take  by mouth.    Yes Provider, Historical   furosemide (LASIX) 40 mg tablet Take 40 mg by mouth daily. 8/4/19  Yes Provider, Historical   lisinopril (PRINIVIL, ZESTRIL) 5 mg tablet Take 5 mg by mouth daily. 7/24/19  Yes Provider, Historical   carvedilol (COREG) 6.25 mg tablet Take 6.25 mg by mouth two (2) times daily (with meals). 8/6/19  Yes Provider, Historical   ELIQUIS 5 mg tablet Take 5 mg by mouth two (2) times a day. 7/4/19  Yes Provider, Historical   multivitamin (ONE A DAY) tablet Take 1 Tab by mouth daily. Yes Provider, Historical   omeprazole (PRILOSEC) 20 mg capsule Take 20 mg by mouth as needed. Yes Provider, Historical   dextran 70-hypromellose (ARTIFICIAL TEARS,[id number],) ophthalmic solution Administer 1 Drop to both eyes as needed. Yes Provider, Historical   pravastatin (PRAVACHOL) 20 mg tablet Take 1 Tab by mouth nightly. 1/18/16  Yes Dillon Lima NP   carvediloL (COREG) 12.5 mg tablet Take 12.5 mg by mouth two (2) times daily (with meals). 3/22/21  Provider, Historical       Allergies   Allergen Reactions    Solu-Medrol [Methylprednisolone Sodium Succ] Other (comments)     Syncope / Cardiac Arrest         OBJECTIVE    Visit Vitals  /78 (BP 1 Location: Left upper arm, BP Patient Position: Sitting, BP Cuff Size: Adult)   Pulse 83   Temp 97.3 °F (36.3 °C) (Temporal)   Resp 18   Ht 5' 10\" (1.778 m)   Wt 206 lb (93.4 kg)   SpO2 96%   BMI 29.56 kg/m²       Physical Exam  Vitals signs and nursing note reviewed. Constitutional:       General: He is not in acute distress. Appearance: Normal appearance. He is not toxic-appearing. HENT:      Head: Normocephalic and atraumatic. Eyes:      Pupils: Pupils are equal, round, and reactive to light. Pulmonary:      Effort: Pulmonary effort is normal.   Musculoskeletal: Normal range of motion. Skin:     General: Skin is warm and dry. Neurological:      Mental Status: He is alert. Mental status is at baseline.    Psychiatric:         Mood and Affect: Mood normal.         Behavior: Behavior normal.           ASSESSMENT AND PLAN  Vikas Pfeiffer is a 70 y.o. male who presents today for:    1. Type 2 diabetes with nephropathy (HCC)  Stable, continue current treatment pending review of labs. - HEMOGLOBIN A1C WITH EAG; Future  - flash glucose sensor (FreeStyle Evens 14 Day Sensor) kit; Apply every 2 weeks; ICD 10 E11.8  Dispense: 1 Kit; Refill: 2  - flash glucose scanning reader (FreeStyle Evens 14 Day [place]) misc; 1 Device by SubCUTAneous route daily. ICD10 E11.8  Dispense: 1 Each; Refill: 0    2. Hypothyroidism due to Hashimoto's thyroiditis  Stable, continue current treatment pending review of labs. - TSH 3RD GENERATION; Future  - T4 (THYROXINE); Future    3. CKD (chronic kidney disease) stage 4, GFR 15-29 ml/min ([facility])  - METABOLIC PANEL, BASIC; Future       Medications Discontinued During This Encounter   Medication Reason    carvediloL (COREG) 12.5 mg tablet LIST CLEANUP     Follow-up and Dispositions    · Return in about 3 months (around 6/22/2021) for DM follow up. Treatment risks/benefits/costs/interactions/alternatives discussed with patient. Advised patient to call back or return to office if symptoms worsen/change/persist. If patient cannot reach us or should anything more severe/urgent arise he/she should proceed directly to the nearest emergency department. Discussed expected course/resolution/complications of diagnosis in detail with patient. Patient expressed understanding with the diagnosis and plan. Amy Manning M.D.

## 2021-03-22 NOTE — PATIENT INSTRUCTIONS
Stopping the COVID-19 pandemic is going to take all our tools: vaccination, masking, washing hands and physical distancing. [facility] has received a limited supply of COVID-19 vaccine from the [facility] and is scheduling vaccination appointments for individuals in Phase 1B as identified in the [facility] Phase 1B distribution plan. If you would like to receive the vaccine, please call [phone], Monday- Friday, 8 a.m. - 5 p.m. to make your appointment. There is limited availability and we will be scheduling patients in the order in which they call until our current vaccine supply is allocated. At this time, vaccines are being administered at the [address] at [address] [facility] [facility] in [facility]. Thank you for allowing [facility] to be your trusted health care partner!

## 2021-03-22 NOTE — PROGRESS NOTES
Chief Complaint   Patient presents with    Diabetes     3 month follow up     1. Have you been to the ER, urgent care clinic since your last visit? Hospitalized since your last visit? No    2. Have you seen or consulted any other health care providers outside of the [address] [place] [address] since your last visit? Include any pap smears or colon screening.  No

## 2021-04-16 ENCOUNTER — LAB ONLY (OUTPATIENT)
Dept: FAMILY MEDICINE CLINIC | Age: 76
End: 2021-04-16

## 2021-04-16 DIAGNOSIS — E06.3 HYPOTHYROIDISM DUE TO HASHIMOTO'S THYROIDITIS: ICD-10-CM

## 2021-04-16 DIAGNOSIS — E11.21 TYPE 2 DIABETES WITH NEPHROPATHY (HCC): ICD-10-CM

## 2021-04-16 DIAGNOSIS — N18.4 CKD (CHRONIC KIDNEY DISEASE) STAGE 4, GFR 15-29 ML/MIN (HCC): ICD-10-CM

## 2021-04-16 DIAGNOSIS — E03.8 HYPOTHYROIDISM DUE TO HASHIMOTO'S THYROIDITIS: ICD-10-CM

## 2021-04-16 LAB
ANION GAP SERPL CALC-SCNC: 8 MMOL/L (ref 5–15)
BUN SERPL-MCNC: 65 MG/DL (ref 6–20)
BUN/CREAT SERPL: 28 (ref 12–20)
CALCIUM SERPL-MCNC: 9.1 MG/DL (ref 8.5–10.1)
CHLORIDE SERPL-SCNC: 105 MMOL/L (ref 97–108)
CO2 SERPL-SCNC: 26 MMOL/L (ref 21–32)
CREAT SERPL-MCNC: 2.34 MG/DL (ref 0.7–1.3)
EST. AVERAGE GLUCOSE BLD GHB EST-MCNC: 154 MG/DL
GLUCOSE SERPL-MCNC: 92 MG/DL (ref 65–100)
HBA1C MFR BLD: 7 % (ref 4–5.6)
POTASSIUM SERPL-SCNC: 5.5 MMOL/L (ref 3.5–5.1)
SODIUM SERPL-SCNC: 139 MMOL/L (ref 136–145)
T4 SERPL-MCNC: 6.6 UG/DL (ref 4.5–12.1)
TSH SERPL DL<=0.05 MIU/L-ACNC: 11.2 UIU/ML (ref 0.36–3.74)

## 2021-04-20 RX ORDER — LEVOTHYROXINE SODIUM 50 UG/1
50 TABLET ORAL
Qty: 90 TAB | Refills: 0 | Status: SHIPPED | OUTPATIENT
Start: 2021-04-20 | End: 2021-11-18

## 2021-04-20 NOTE — PROGRESS NOTES
Dear  Kenneth Jada,    I wanted to follow up on your recent test results: Your thyroid level is still very off; I will send in a new dose of your levothyroxine  A1c is 7.0 which is stable; continue working on your low carb intake and exercise. I recommend we follow up in 3 months.

## 2021-09-28 ENCOUNTER — OFFICE VISIT (OUTPATIENT)
Dept: FAMILY MEDICINE CLINIC | Age: 76
End: 2021-09-28
Payer: MEDICARE

## 2021-09-28 VITALS
HEART RATE: 75 BPM | BODY MASS INDEX: 27.37 KG/M2 | RESPIRATION RATE: 16 BRPM | SYSTOLIC BLOOD PRESSURE: 130 MMHG | OXYGEN SATURATION: 99 % | HEIGHT: 70 IN | DIASTOLIC BLOOD PRESSURE: 70 MMHG | TEMPERATURE: 97.3 F | WEIGHT: 191.2 LBS

## 2021-09-28 DIAGNOSIS — M1A.9XX0 CHRONIC GOUT WITHOUT TOPHUS, UNSPECIFIED CAUSE, UNSPECIFIED SITE: ICD-10-CM

## 2021-09-28 DIAGNOSIS — E06.3 HYPOTHYROIDISM DUE TO HASHIMOTO'S THYROIDITIS: ICD-10-CM

## 2021-09-28 DIAGNOSIS — R14.0 ABDOMINAL BLOATING: ICD-10-CM

## 2021-09-28 DIAGNOSIS — Z13.220 SCREENING FOR LIPID DISORDERS: ICD-10-CM

## 2021-09-28 DIAGNOSIS — I48.0 PAROXYSMAL ATRIAL FIBRILLATION (HCC): ICD-10-CM

## 2021-09-28 DIAGNOSIS — E03.8 HYPOTHYROIDISM DUE TO HASHIMOTO'S THYROIDITIS: ICD-10-CM

## 2021-09-28 DIAGNOSIS — Z23 NEEDS FLU SHOT: ICD-10-CM

## 2021-09-28 DIAGNOSIS — Z12.5 PROSTATE CANCER SCREENING: ICD-10-CM

## 2021-09-28 DIAGNOSIS — E11.21 TYPE 2 DIABETES WITH NEPHROPATHY (HCC): Primary | ICD-10-CM

## 2021-09-28 DIAGNOSIS — E55.9 VITAMIN D DEFICIENCY: ICD-10-CM

## 2021-09-28 LAB
25(OH)D3 SERPL-MCNC: 56.1 NG/ML (ref 30–100)
ALBUMIN SERPL-MCNC: 3.8 G/DL (ref 3.5–5)
ALBUMIN/GLOB SERPL: 1 {RATIO} (ref 1.1–2.2)
ALP SERPL-CCNC: 63 U/L (ref 45–117)
ALT SERPL-CCNC: 30 U/L (ref 12–78)
ANION GAP SERPL CALC-SCNC: 1 MMOL/L (ref 5–15)
AST SERPL-CCNC: 23 U/L (ref 15–37)
BILIRUB SERPL-MCNC: 0.4 MG/DL (ref 0.2–1)
BUN SERPL-MCNC: 80 MG/DL (ref 6–20)
BUN/CREAT SERPL: 34 (ref 12–20)
CALCIUM SERPL-MCNC: 9.3 MG/DL (ref 8.5–10.1)
CHLORIDE SERPL-SCNC: 108 MMOL/L (ref 97–108)
CHOLEST SERPL-MCNC: 98 MG/DL
CO2 SERPL-SCNC: 28 MMOL/L (ref 21–32)
COMMENT, HOLDF: NORMAL
CREAT SERPL-MCNC: 2.34 MG/DL (ref 0.7–1.3)
CREAT UR-MCNC: 61.4 MG/DL
ERYTHROCYTE [DISTWIDTH] IN BLOOD BY AUTOMATED COUNT: 13.2 % (ref 11.5–14.5)
EST. AVERAGE GLUCOSE BLD GHB EST-MCNC: 134 MG/DL
GLOBULIN SER CALC-MCNC: 4 G/DL (ref 2–4)
GLUCOSE SERPL-MCNC: 134 MG/DL (ref 65–100)
HBA1C MFR BLD: 6.3 % (ref 4–5.6)
HCT VFR BLD AUTO: 34.1 % (ref 36.6–50.3)
HDLC SERPL-MCNC: 32 MG/DL
HDLC SERPL: 3.1 {RATIO} (ref 0–5)
HGB BLD-MCNC: 10.6 G/DL (ref 12.1–17)
LDLC SERPL CALC-MCNC: 54.2 MG/DL (ref 0–100)
MCH RBC QN AUTO: 30.3 PG (ref 26–34)
MCHC RBC AUTO-ENTMCNC: 31.1 G/DL (ref 30–36.5)
MCV RBC AUTO: 97.4 FL (ref 80–99)
MICROALBUMIN UR-MCNC: 4.3 MG/DL
MICROALBUMIN/CREAT UR-RTO: 70 MG/G (ref 0–30)
NRBC # BLD: 0 K/UL (ref 0–0.01)
NRBC BLD-RTO: 0 PER 100 WBC
PLATELET # BLD AUTO: 146 K/UL (ref 150–400)
PMV BLD AUTO: 10.9 FL (ref 8.9–12.9)
POTASSIUM SERPL-SCNC: 5.3 MMOL/L (ref 3.5–5.1)
PROT SERPL-MCNC: 7.8 G/DL (ref 6.4–8.2)
PSA SERPL-MCNC: 2.4 NG/ML (ref 0.01–4)
RBC # BLD AUTO: 3.5 M/UL (ref 4.1–5.7)
SAMPLES BEING HELD,HOLD: NORMAL
SODIUM SERPL-SCNC: 137 MMOL/L (ref 136–145)
T4 SERPL-MCNC: 7.1 UG/DL (ref 4.5–12.1)
TRIGL SERPL-MCNC: 59 MG/DL (ref ?–150)
TSH SERPL DL<=0.05 MIU/L-ACNC: 13.3 UIU/ML (ref 0.36–3.74)
URATE SERPL-MCNC: 12 MG/DL (ref 3.5–7.2)
VLDLC SERPL CALC-MCNC: 11.8 MG/DL
WBC # BLD AUTO: 4.3 K/UL (ref 4.1–11.1)

## 2021-09-28 PROCEDURE — G0463 HOSPITAL OUTPT CLINIC VISIT: HCPCS | Performed by: FAMILY MEDICINE

## 2021-09-28 PROCEDURE — 90694 VACC AIIV4 NO PRSRV 0.5ML IM: CPT | Performed by: FAMILY MEDICINE

## 2021-09-28 PROCEDURE — 3017F COLORECTAL CA SCREEN DOC REV: CPT | Performed by: FAMILY MEDICINE

## 2021-09-28 PROCEDURE — 99214 OFFICE O/P EST MOD 30 MIN: CPT | Performed by: FAMILY MEDICINE

## 2021-09-28 PROCEDURE — G8752 SYS BP LESS 140: HCPCS | Performed by: FAMILY MEDICINE

## 2021-09-28 PROCEDURE — 1101F PT FALLS ASSESS-DOCD LE1/YR: CPT | Performed by: FAMILY MEDICINE

## 2021-09-28 PROCEDURE — G8419 CALC BMI OUT NRM PARAM NOF/U: HCPCS | Performed by: FAMILY MEDICINE

## 2021-09-28 PROCEDURE — 3051F HG A1C>EQUAL 7.0%<8.0%: CPT | Performed by: FAMILY MEDICINE

## 2021-09-28 PROCEDURE — G8754 DIAS BP LESS 90: HCPCS | Performed by: FAMILY MEDICINE

## 2021-09-28 PROCEDURE — 2022F DILAT RTA XM EVC RTNOPTHY: CPT | Performed by: FAMILY MEDICINE

## 2021-09-28 PROCEDURE — G8510 SCR DEP NEG, NO PLAN REQD: HCPCS | Performed by: FAMILY MEDICINE

## 2021-09-28 PROCEDURE — G8536 NO DOC ELDER MAL SCRN: HCPCS | Performed by: FAMILY MEDICINE

## 2021-09-28 PROCEDURE — G8427 DOCREV CUR MEDS BY ELIG CLIN: HCPCS | Performed by: FAMILY MEDICINE

## 2021-09-28 RX ORDER — COLCHICINE 0.6 MG/1
0.6 TABLET ORAL AS NEEDED
COMMUNITY
End: 2021-09-28 | Stop reason: SDUPTHER

## 2021-09-28 RX ORDER — COLCHICINE 0.6 MG/1
TABLET ORAL
Qty: 30 TABLET | Refills: 2 | Status: SHIPPED | OUTPATIENT
Start: 2021-09-28

## 2021-09-28 NOTE — PROGRESS NOTES
Chief Complaint   Patient presents with    Diabetes     f/u    Hypothyroidism     f/u       1. \"Have you been to the ER, urgent care clinic since your last visit? Hospitalized since your last visit? \" No    2. \"Have you seen or consulted any other health care providers outside of the [address] [place] [address] since your last visit? \" Yes When: 08/21 Where: [facility] Cardiologist Dr. Quijano Seen Reason for visit: rutine check up      3. For patients over 45: Has the patient had a colonoscopy? No     If the patient is female:    4. For patients over 40: Has the patient had a mammogram? n/a  5.  For patients over 21: Has the patient had a pap smear? n/a    3 most recent PHQ Screens 9/28/2021   Little interest or pleasure in doing things Not at all   Feeling down, depressed, irritable, or hopeless Not at all   Total Score PHQ 2 0

## 2021-09-28 NOTE — PROGRESS NOTES
Patient Name: Beverly Oconnell   MRN: [id number]    Jennifer Dao is a 70 y.o. male who presents with the following:     Continues to report abdominal bloating which is slightly improved over the past few years after his hospitalization for acute GI bleeding in 2018. Still has some bloating, gas, and belching. He is due for repeat colonoscopy with his GI doctor. BP Readings from Last 3 Encounters:   03/22/21 136/78   12/17/20 (!) 160/84   12/06/19 136/79     Lab Results   Component Value Date/Time    Hemoglobin A1c 7.0 (H) 04/16/2021 01:23 PM    Hemoglobin A1c (POC) 6.3 08/02/2016 12:53 PM    Hemoglobin A1c, External 6.6 08/18/2020 12:00 AM     Wt Readings from Last 3 Encounters:   09/28/21 191 lb 3.2 oz (86.7 kg)   03/22/21 206 lb (93.4 kg)   12/17/20 199 lb 12.8 oz (90.6 kg)     Review of Systems   Constitutional: Negative for fever, malaise/fatigue and weight loss. Respiratory: Negative for cough, hemoptysis, shortness of breath and wheezing. Cardiovascular: Negative for chest pain, palpitations, leg swelling and PND. Gastrointestinal: Negative for abdominal pain, blood in stool, constipation, diarrhea, heartburn, melena, nausea and vomiting. The patient's medications, allergies, past medical history, surgical history, family history and social history were reviewed and updated where appropriate. Current Outpatient Medications:     colchicine 0.6 mg tablet, Take 0.6 mg by mouth as needed for Pain., Disp: , Rfl:     levothyroxine (SYNTHROID) 50 mcg tablet, Take 1 Tab by mouth Daily (before breakfast). Dose change, Disp: 90 Tab, Rfl: 0    flash glucose sensor (FreeStyle Evens 14 Day Sensor) kit, Apply every 2 weeks; ICD 10 E11.8, Disp: 1 Kit, Rfl: 2    flash glucose scanning reader (FreeStyle Evens 14 Day [place]) misc, 1 Device by SubCUTAneous route daily.  ICD10 E11.8, Disp: 1 Each, Rfl: 0    cholecalciferol, vitamin D3, (Vitamin D3) 50 mcg (2,000 unit) tab, Take  by mouth., Disp: , Rfl:     TURMERIC PO, Take  by mouth., Disp: , Rfl:     B.ani/L.aci/L.sal/L.plan/L. Brady (PROBIOTIC FORMULA PO), Take  by mouth., Disp: , Rfl:     furosemide (LASIX) 40 mg tablet, Take 40 mg by mouth daily. , Disp: , Rfl:     lisinopril (PRINIVIL, ZESTRIL) 5 mg tablet, Take 5 mg by mouth daily. , Disp: , Rfl:     carvedilol (COREG) 6.25 mg tablet, Take 6.25 mg by mouth two (2) times daily (with meals). , Disp: , Rfl:     ELIQUIS 5 mg tablet, Take 5 mg by mouth two (2) times a day., Disp: , Rfl:     multivitamin (ONE A DAY) tablet, Take 1 Tab by mouth daily. , Disp: , Rfl:     omeprazole (PRILOSEC) 20 mg capsule, Take 20 mg by mouth as needed. , Disp: , Rfl:     dextran 70-hypromellose (ARTIFICIAL TEARS,[id number],) ophthalmic solution, Administer 1 Drop to both eyes as needed. , Disp: , Rfl:     pravastatin (PRAVACHOL) 20 mg tablet, Take 1 Tab by mouth nightly., Disp: 30 Tab, Rfl: 2    Allergies   Allergen Reactions    Solu-Medrol [Methylprednisolone Sodium Succ] Other (comments)     Syncope / Cardiac Arrest         OBJECTIVE    Visit Vitals  Pulse 75   Temp 97.3 °F (36.3 °C) (Temporal)   Resp 16   Ht 5' 10\" (1.778 m)   Wt 191 lb 3.2 oz (86.7 kg)   SpO2 99%   BMI 27.43 kg/m²       Physical Exam  Vitals and nursing note reviewed. Constitutional:       General: He is not in acute distress. Appearance: He is not diaphoretic. Eyes:      Conjunctiva/sclera: Conjunctivae normal.      Pupils: Pupils are equal, round, and reactive to light. Cardiovascular:      Rate and Rhythm: Normal rate and regular rhythm. Heart sounds: Normal heart sounds. No murmur heard. No friction rub. No gallop. Pulmonary:      Effort: Pulmonary effort is normal. No respiratory distress. Breath sounds: Normal breath sounds. No wheezing. Skin:     General: Skin is warm and dry. Neurological:      Mental Status: He is alert.            ASSESSMENT AND PLAN  Rufina Fletcher is a 70 y.o. male who presents today for:    1. Type 2 diabetes with nephropathy (HCC)  Stable, continue current treatment pending review of labs. - MICROALBUMIN, UR, RAND W/ MICROALB/CREAT RATIO; Future  - HEMOGLOBIN A1C WITH EAG; Future  - HEMOGLOBIN A1C WITH EAG  - MICROALBUMIN, UR, RAND W/ MICROALB/CREAT RATIO    2. Hypothyroidism due to Hashimoto's thyroiditis  - TSH 3RD GENERATION; Future  - T4 (THYROXINE); Future  - T4 (THYROXINE)  - TSH 3RD GENERATION    3. Vitamin D deficiency  - VITAMIN D, 25 HYDROXY; Future  - VITAMIN D, 25 HYDROXY    4. Chronic gout without tophus, unspecified cause, unspecified site  - URIC ACID; Future  - URIC ACID    5. Screening for lipid disorders  - CBC W/O DIFF; Future  - METABOLIC PANEL, COMPREHENSIVE; Future  - LIPID PANEL; Future  - LIPID PANEL  - METABOLIC PANEL, COMPREHENSIVE  - CBC W/O DIFF    6. Prostate cancer screening  - PSA, DIAGNOSTIC (PROSTATE SPECIFIC AG); Future  - PSA, DIAGNOSTIC (PROSTATE SPECIFIC AG)    7. Abdominal bloating  Pt to f/u with GI. Will r/o H pylori infection.  - H PYLORI AG, STOOL; Future    8. Paroxysmal atrial fibrillation (HCC)  Stable, continue current treatment. 9. Needs flu shot  - FLU (FLUAD QUAD INFLUENZA VACCINE,QUAD,ADJUVANTED)       Medications Discontinued During This Encounter   Medication Reason    colchicine 0.6 mg tablet REORDER     Follow-up and Dispositions    · Return in about 6 months (around 3/28/2022) for DM follow up, HTN follow up. Treatment risks/benefits/costs/interactions/alternatives discussed with patient. Advised patient to call back or return to office if symptoms worsen/change/persist. If patient cannot reach us or should anything more severe/urgent arise he/she should proceed directly to the nearest emergency department. Discussed expected course/resolution/complications of diagnosis in detail with patient. Patient expressed understanding with the diagnosis and plan.      This dictation may have been completed with Dragon, the computer voice recognition software. Unanticipated grammatical, syntax, homophones, and other interpretive errors are sometimes inadvertently transcribed by the computer software. Please disregard any errors that have escaped final proofreading. Amy Briggs M.D.

## 2021-09-30 ENCOUNTER — TELEPHONE (OUTPATIENT)
Dept: FAMILY MEDICINE CLINIC | Age: 76
End: 2021-09-30

## 2021-09-30 DIAGNOSIS — E03.8 HYPOTHYROIDISM DUE TO HASHIMOTO'S THYROIDITIS: Primary | ICD-10-CM

## 2021-09-30 DIAGNOSIS — E06.3 HYPOTHYROIDISM DUE TO HASHIMOTO'S THYROIDITIS: Primary | ICD-10-CM

## 2021-09-30 NOTE — TELEPHONE ENCOUNTER
Catrina Morris (Self) [phone] (H)     Pt is returning call from nurse. Please call back when available.

## 2021-09-30 NOTE — PROGRESS NOTES
Called patient. Two patient identifiers verified. Discussed lab results per provider's note. Verbalized understanding. Patient has not been taking thyroid medication, stated that they will resume taking it regularly. Patient would like to know what value for total cholesterol is too low.  Patient requested lab results be faxed to Dr. Mendel Echevaria, his cardiologist.

## 2021-10-01 NOTE — PROGRESS NOTES
Called patient. Two patient identifiers verified. Discussed lab results per provider's note. Verbalized understanding.

## 2021-10-08 LAB
H PYLORI AG STL QL IA: NEGATIVE
SPECIMEN SOURCE: NORMAL

## 2021-10-08 NOTE — PROGRESS NOTES
Dear Mr. Leticia Siemens,    I wanted to follow up on your recent test results: Your H pylori stool test was negative.

## 2021-10-15 ENCOUNTER — TELEPHONE (OUTPATIENT)
Dept: FAMILY MEDICINE CLINIC | Age: 76
End: 2021-10-15

## 2021-10-15 NOTE — TELEPHONE ENCOUNTER
I'm not sure for what medical reason he needs this for. If it is for his heart history, would defer to his cardiologist on whether abx for dental work is needed. If it is non cardiac related, needs further discussion with me via VV or mychart msg.

## 2021-10-15 NOTE — TELEPHONE ENCOUNTER
Called patient and confirmed two identifiers. Patient received a carry card for abx before due to myocarditis - but dentist now says he needs rx from doctor. Relayed message from provider to defer to cardiologist. Told patient to call back or send virtual message to speak with Dr. Gabby Dominguez directly about the issue. Patient verbalized understanding.

## 2021-10-15 NOTE — TELEPHONE ENCOUNTER
Patient called use to carry card that say he needs a antibiotic when having dental work done.     Requesting a call back    Best call back #[phone]

## 2021-11-19 ENCOUNTER — LAB ONLY (OUTPATIENT)
Dept: FAMILY MEDICINE CLINIC | Age: 76
End: 2021-11-19

## 2021-11-19 DIAGNOSIS — E06.3 HYPOTHYROIDISM DUE TO HASHIMOTO'S THYROIDITIS: ICD-10-CM

## 2021-11-19 DIAGNOSIS — E03.8 HYPOTHYROIDISM DUE TO HASHIMOTO'S THYROIDITIS: ICD-10-CM

## 2021-11-19 LAB
T4 SERPL-MCNC: 6 UG/DL (ref 4.5–12.1)
TSH SERPL DL<=0.05 MIU/L-ACNC: 8.87 UIU/ML (ref 0.36–3.74)

## 2021-11-22 DIAGNOSIS — E06.3 HYPOTHYROIDISM DUE TO HASHIMOTO'S THYROIDITIS: Primary | ICD-10-CM

## 2021-11-22 DIAGNOSIS — E03.8 HYPOTHYROIDISM DUE TO HASHIMOTO'S THYROIDITIS: Primary | ICD-10-CM

## 2021-11-22 RX ORDER — LEVOTHYROXINE SODIUM 50 UG/1
50 TABLET ORAL
Qty: 30 TABLET | Refills: 2 | Status: SHIPPED | OUTPATIENT
Start: 2021-11-22 | End: 2022-04-22

## 2021-11-22 RX ORDER — LEVOTHYROXINE SODIUM 75 UG/1
75 TABLET ORAL
Qty: 30 TABLET | Refills: 2 | Status: SHIPPED | OUTPATIENT
Start: 2021-11-22 | End: 2021-11-22

## 2021-11-22 NOTE — PROGRESS NOTES
Called patient. Two patient identifiers verified. Discussed lab results per provider's note. Verbalized understanding. Patient states he is not taking medications 30 min before, but is taking medication on an empty stomach. Encouraged patient to wait 30 minutes before eating. Patient also states he was out of levothyroxine for a week and this could have affected his labs; therefore, he does not want to change the dosage. Unsure if refills are needed because the medication was discontinued, so it may need to be re-prescribed. Appointment scheduled in a month to recheck levels.

## 2022-03-18 PROBLEM — M1A.39X1 CHRONIC TOPHACEOUS GOUT OF MULTIPLE SITES DUE TO RENAL IMPAIRMENT: Status: ACTIVE | Noted: 2018-07-13

## 2022-03-18 PROBLEM — Z86.2 HISTORY OF THROMBOCYTOPENIA: Status: ACTIVE | Noted: 2019-01-14

## 2022-03-18 PROBLEM — N18.4 CKD (CHRONIC KIDNEY DISEASE) STAGE 4, GFR 15-29 ML/MIN (HCC): Status: ACTIVE | Noted: 2018-07-13

## 2022-03-19 PROBLEM — D62 ACUTE BLOOD LOSS ANEMIA: Status: ACTIVE | Noted: 2018-08-29

## 2022-03-19 PROBLEM — D12.2 ADENOMATOUS POLYP OF ASCENDING COLON: Status: ACTIVE | Noted: 2018-08-04

## 2022-03-19 PROBLEM — Z86.79 HISTORY OF RENAL ARTERY STENOSIS: Status: ACTIVE | Noted: 2018-08-04

## 2022-03-19 PROBLEM — K92.2 GI BLEED: Status: ACTIVE | Noted: 2018-08-29

## 2022-03-19 PROBLEM — Z79.60 LONG-TERM USE OF IMMUNOSUPPRESSANT MEDICATION: Status: ACTIVE | Noted: 2018-08-16

## 2022-03-19 PROBLEM — I48.0 PAROXYSMAL ATRIAL FIBRILLATION (HCC): Status: ACTIVE | Noted: 2018-08-04

## 2022-03-19 PROBLEM — E11.21 TYPE 2 DIABETES WITH NEPHROPATHY (HCC): Status: ACTIVE | Noted: 2019-09-27

## 2022-03-19 PROBLEM — D63.8 ANEMIA, CHRONIC DISEASE: Status: ACTIVE | Noted: 2017-05-11

## 2022-03-20 PROBLEM — E03.8 HYPOTHYROIDISM DUE TO HASHIMOTO'S THYROIDITIS: Status: ACTIVE | Noted: 2018-08-06

## 2022-03-20 PROBLEM — E06.3 HYPOTHYROIDISM DUE TO HASHIMOTO'S THYROIDITIS: Status: ACTIVE | Noted: 2018-08-06

## 2022-04-04 ENCOUNTER — OFFICE VISIT (OUTPATIENT)
Dept: FAMILY MEDICINE CLINIC | Age: 77
End: 2022-04-04
Payer: MEDICARE

## 2022-04-04 VITALS
RESPIRATION RATE: 16 BRPM | HEIGHT: 70 IN | DIASTOLIC BLOOD PRESSURE: 72 MMHG | WEIGHT: 192.4 LBS | SYSTOLIC BLOOD PRESSURE: 140 MMHG | BODY MASS INDEX: 27.54 KG/M2 | HEART RATE: 74 BPM | TEMPERATURE: 97.4 F | OXYGEN SATURATION: 98 %

## 2022-04-04 DIAGNOSIS — N18.4 CKD (CHRONIC KIDNEY DISEASE) STAGE 4, GFR 15-29 ML/MIN (HCC): ICD-10-CM

## 2022-04-04 DIAGNOSIS — E11.21 TYPE 2 DIABETES WITH NEPHROPATHY (HCC): ICD-10-CM

## 2022-04-04 DIAGNOSIS — D63.8 ANEMIA, CHRONIC DISEASE: ICD-10-CM

## 2022-04-04 DIAGNOSIS — I48.0 PAROXYSMAL ATRIAL FIBRILLATION (HCC): ICD-10-CM

## 2022-04-04 DIAGNOSIS — Z13.220 SCREENING FOR LIPID DISORDERS: ICD-10-CM

## 2022-04-04 DIAGNOSIS — E06.3 HYPOTHYROIDISM DUE TO HASHIMOTO'S THYROIDITIS: ICD-10-CM

## 2022-04-04 DIAGNOSIS — Z00.00 ENCOUNTER FOR MEDICARE ANNUAL WELLNESS EXAM: Primary | ICD-10-CM

## 2022-04-04 DIAGNOSIS — E03.8 HYPOTHYROIDISM DUE TO HASHIMOTO'S THYROIDITIS: ICD-10-CM

## 2022-04-04 DIAGNOSIS — Z12.11 SCREENING FOR COLON CANCER: ICD-10-CM

## 2022-04-04 LAB
COMMENT, HOLDF: NORMAL
SAMPLES BEING HELD,HOLD: NORMAL

## 2022-04-04 PROCEDURE — 3017F COLORECTAL CA SCREEN DOC REV: CPT | Performed by: FAMILY MEDICINE

## 2022-04-04 PROCEDURE — 2022F DILAT RTA XM EVC RTNOPTHY: CPT | Performed by: FAMILY MEDICINE

## 2022-04-04 PROCEDURE — G8754 DIAS BP LESS 90: HCPCS | Performed by: FAMILY MEDICINE

## 2022-04-04 PROCEDURE — G8536 NO DOC ELDER MAL SCRN: HCPCS | Performed by: FAMILY MEDICINE

## 2022-04-04 PROCEDURE — G8419 CALC BMI OUT NRM PARAM NOF/U: HCPCS | Performed by: FAMILY MEDICINE

## 2022-04-04 PROCEDURE — G8427 DOCREV CUR MEDS BY ELIG CLIN: HCPCS | Performed by: FAMILY MEDICINE

## 2022-04-04 PROCEDURE — G8510 SCR DEP NEG, NO PLAN REQD: HCPCS | Performed by: FAMILY MEDICINE

## 2022-04-04 PROCEDURE — 3046F HEMOGLOBIN A1C LEVEL >9.0%: CPT | Performed by: FAMILY MEDICINE

## 2022-04-04 PROCEDURE — G8753 SYS BP > OR = 140: HCPCS | Performed by: FAMILY MEDICINE

## 2022-04-04 PROCEDURE — 1101F PT FALLS ASSESS-DOCD LE1/YR: CPT | Performed by: FAMILY MEDICINE

## 2022-04-04 PROCEDURE — G0439 PPPS, SUBSEQ VISIT: HCPCS | Performed by: FAMILY MEDICINE

## 2022-04-04 NOTE — PROGRESS NOTES
Patient Name: Nicole Hodge   MRN: [id number]    Angel Dixon is a 78 y.o. male who presents with the following:     Hx of CKD; hasn't seen a kidney doctor in a while and doesn't want to. Had a colonoscopy scheduled for her 3 year follow up for polyps but canceled it as he has concerns with anesthesia. Hx of reacting poorly with hallucination to anesthesia in the past (recalls a cardioversion procedure 3 years ago which caused a similar reaction). Had a virtual visit with a GI provider that did not go well so declined to do the procedure. Hx of GIB on Eliquis. Denies blood in stool currently. Hx of hypothyroidism, on levothyroxine. Takes meds consistently. Wt Readings from Last 3 Encounters:   04/04/22 192 lb 6.4 oz (87.3 kg)   09/28/21 191 lb 3.2 oz (86.7 kg)   03/22/21 206 lb (93.4 kg)     BP Readings from Last 3 Encounters:   04/04/22 (!) 140/72   09/28/21 130/70   03/22/21 136/78     Lab Results   Component Value Date/Time    Hemoglobin A1c 6.3 (H) 09/28/2021 09:24 AM    Hemoglobin A1c (POC) 6.3 08/02/2016 12:53 PM    Hemoglobin A1c, External 6.6 08/18/2020 12:00 AM     Review of Systems   Constitutional: Negative for fever, malaise/fatigue and weight loss. Respiratory: Negative for cough, hemoptysis, shortness of breath and wheezing. Cardiovascular: Negative for chest pain, palpitations, leg swelling and PND. Gastrointestinal: Negative for abdominal pain, constipation, diarrhea, nausea and vomiting. The patient's medications, allergies, past medical history, surgical history, family history and social history were reviewed and updated where appropriate. Current Outpatient Medications:     levothyroxine (SYNTHROID) 50 mcg tablet, Take 1 Tablet by mouth Daily (before breakfast). , Disp: 30 Tablet, Rfl: 2    colchicine 0.6 mg tablet, Day 1: 1.2 mg at the first sign of flare, followed by 0.6 mg after 1 hour; Day 2 or after:  0.6 mg once or twice daily until flare resolves, Disp: 30 Tablet, Rfl: 2    flash glucose sensor (FreeStyle Evens 14 Day Sensor) kit, Apply every 2 weeks; ICD 10 E11.8, Disp: 1 Kit, Rfl: 2    flash glucose scanning reader (FreeStyle Evens 14 Day [place]) misc, 1 Device by SubCUTAneous route daily. ICD10 E11.8, Disp: 1 Each, Rfl: 0    cholecalciferol, vitamin D3, (Vitamin D3) 50 mcg (2,000 unit) tab, Take  by mouth., Disp: , Rfl:     TURMERIC PO, Take  by mouth., Disp: , Rfl:     B.ani/L.aci/L.sal/L.plan/L. Brady (PROBIOTIC FORMULA PO), Take  by mouth., Disp: , Rfl:     furosemide (LASIX) 40 mg tablet, Take 40 mg by mouth daily. , Disp: , Rfl:     lisinopril (PRINIVIL, ZESTRIL) 5 mg tablet, Take 5 mg by mouth daily. , Disp: , Rfl:     carvedilol (COREG) 6.25 mg tablet, Take 6.25 mg by mouth two (2) times daily (with meals). , Disp: , Rfl:     ELIQUIS 5 mg tablet, Take 5 mg by mouth two (2) times a day., Disp: , Rfl:     multivitamin (ONE A DAY) tablet, Take 1 Tab by mouth daily. , Disp: , Rfl:     omeprazole (PRILOSEC) 20 mg capsule, Take 20 mg by mouth as needed. , Disp: , Rfl:     dextran 70-hypromellose (ARTIFICIAL TEARS,[id number],) ophthalmic solution, Administer 1 Drop to both eyes as needed. , Disp: , Rfl:     pravastatin (PRAVACHOL) 20 mg tablet, Take 1 Tab by mouth nightly., Disp: 30 Tab, Rfl: 2    Allergies   Allergen Reactions    Solu-Medrol [Methylprednisolone Sodium Succ] Other (comments)     Syncope / Cardiac Arrest         OBJECTIVE    Visit Vitals  BP (!) 140/72   Pulse 74   Temp 97.4 °F (36.3 °C) (Temporal)   Resp 16   Ht 5' 10\" (1.778 m)   Wt 192 lb 6.4 oz (87.3 kg)   SpO2 98%   BMI 27.61 kg/m²       Physical Exam  Vitals and nursing note reviewed. Constitutional:       General: He is not in acute distress. Appearance: He is not diaphoretic. Eyes:      Conjunctiva/sclera: Conjunctivae normal.      Pupils: Pupils are equal, round, and reactive to light. Cardiovascular:      Rate and Rhythm: Normal rate and regular rhythm. Heart sounds: Normal heart sounds. No murmur heard. No friction rub. No gallop. Pulmonary:      Effort: Pulmonary effort is normal. No respiratory distress. Breath sounds: Normal breath sounds. No wheezing. Skin:     General: Skin is warm and dry. Neurological:      Mental Status: He is alert. ASSESSMENT AND PLAN  Kourtney Diaz is a 78 y.o. male who presents today for:    1. Encounter for Medicare annual wellness exam    2. Type 2 diabetes with nephropathy (HCC)  Stable, continue current treatment pending review of labs. - HEMOGLOBIN A1C WITH EAG; Future  - MICROALBUMIN, UR, RAND W/ MICROALB/CREAT RATIO; Future  - MICROALBUMIN, UR, RAND W/ MICROALB/CREAT RATIO  - HEMOGLOBIN A1C WITH EAG    3. CKD (chronic kidney disease) stage 4, GFR 15-29 ml/min (HCC)  If worsen, consider nephrology referral again.  - METABOLIC PANEL, COMPREHENSIVE; Future  - METABOLIC PANEL, COMPREHENSIVE    4. Paroxysmal atrial fibrillation (HCC)  Stable, continue current treatment. 5. Hypothyroidism due to Hashimoto's thyroiditis  - TSH 3RD GENERATION; Future  - T4 (THYROXINE); Future  - T4 (THYROXINE)  - TSH 3RD GENERATION    6. Screening for lipid disorders  - LIPID PANEL; Future  - LIPID PANEL    7. Anemia, chronic disease  - CBC WITH AUTOMATED DIFF; Future  - FERRITIN; Future  - IRON PROFILE; Future  - IRON PROFILE  - FERRITIN  - CBC WITH AUTOMATED DIFF    8. Screening for colon cancer  Discussed that we should at least do a FIT test; if positive, recommend colonoscopy. Discussed that FIT tests do not evaluate for polyps. - OCCULT BLOOD IMMUNOASSAY,DIAGNOSTIC; Future  - OCCULT BLOOD IMMUNOASSAY,DIAGNOSTIC       There are no discontinued medications. Treatment risks/benefits/costs/interactions/alternatives discussed with patient.   Advised patient to call back or return to office if symptoms worsen/change/persist. If patient cannot reach us or should anything more severe/urgent arise he/she should proceed directly to the nearest emergency department. Discussed expected course/resolution/complications of diagnosis in detail with patient. Patient expressed understanding with the diagnosis and plan. This dictation may have been completed with Dragon, the computer voice recognition software. Unanticipated grammatical, syntax, homophones, and other interpretive errors are sometimes inadvertently transcribed by the computer software. Please disregard any errors that have escaped final proofreading. Amy Whitney M.D.

## 2022-04-04 NOTE — PROGRESS NOTES
Chief Complaint   Patient presents with   Gisela Dale Annual Wellness Visit       1. Have you been to the ER, urgent care clinic since your last visit? Hospitalized since your last visit? No    2. Have you seen or consulted any other health care providers outside of the [address] [place] [address] [place] since your last visit? Include any pap smears or colon screening. No    3. For patients over 45: Has the patient had a colonoscopy? Yes - Care Gap present. Most recent result on file     If the patient is female:    4. For patients over 36: Has the patient had a mammogram? NA - based on age    11. For patients over 21: Has the patient had a pap smear? NA - based on age    1 most recent PHQ Screens 4/4/2022   Little interest or pleasure in doing things Not at all   Feeling down, depressed, irritable, or hopeless Not at all   Total Score PHQ 2 0       Fall Risk Assessment, last 12 mths 4/4/2022   Able to walk? Yes   Fall in past 12 months? 0   Do you feel unsteady? 0   Are you worried about falling 0       ADL Assessment 4/4/2022   Feeding yourself No Help Needed   Getting from bed to chair No Help Needed   Getting dressed No Help Needed   Bathing or showering No Help Needed   Walk across the room (includes cane/walker) No Help Needed   Using the telphone No Help Needed   Taking your medications No Help Needed   Preparing meals No Help Needed   Managing money (expenses/bills) No Help Needed   Moderately strenuous housework (laundry) No Help Needed   Shopping for personal items (toiletries/medicines) No Help Needed   Shopping for groceries No Help Needed   Driving No Help Needed   Climbing a flight of stairs No Help Needed   Getting to places beyond walking distances No Help Needed       Abuse Screening Questionnaire 4/4/2022   Do you ever feel afraid of your partner? N   Are you in a relationship with someone who physically or mentally threatens you? N   Is it safe for you to go home?  Eladio Nyhan

## 2022-04-04 NOTE — PROGRESS NOTES
This is the Subsequent Medicare Annual Wellness Exam, performed 12 months or more after the Initial AWV or the last Subsequent AWV    I have reviewed the patient's medical history in detail and updated the computerized patient record. Assessment/Plan   Education and counseling provided:  Are appropriate based on today's review and evaluation    1. Encounter for Medicare annual wellness exam  2. Type 2 diabetes with nephropathy (HCC)  -     HEMOGLOBIN A1C WITH EAG; Future  -     MICROALBUMIN, UR, RAND W/ MICROALB/CREAT RATIO; Future  3. CKD (chronic kidney disease) stage 4, GFR 15-29 ml/min (HCC)  -     METABOLIC PANEL, COMPREHENSIVE; Future  4. Paroxysmal atrial fibrillation (HCC)  5. Hypothyroidism due to Hashimoto's thyroiditis  -     TSH 3RD GENERATION; Future  -     T4 (THYROXINE); Future  6. Screening for lipid disorders  -     LIPID PANEL; Future  7. Anemia, chronic disease  -     CBC WITH AUTOMATED DIFF; Future  -     FERRITIN; Future  -     IRON PROFILE; Future  8. Screening for colon cancer  -     OCCULT BLOOD IMMUNOASSAY,DIAGNOSTIC; Future       Depression Risk Factor Screening     3 most recent PHQ Screens 4/4/2022   Little interest or pleasure in doing things Not at all   Feeling down, depressed, irritable, or hopeless Not at all   Total Score PHQ 2 0       Alcohol & Drug Abuse Risk Screen    Do you average more than 1 drink per night or more than 7 drinks a week: No    In the past three months have you have had more than 4 drinks containing alcohol on one occasion: No          Functional Ability and Level of Safety    Hearing: Hearing is good. Activities of Daily Living: The home contains: no safety equipment. Patient does total self care      Ambulation: with no difficulty     Fall Risk:  Fall Risk Assessment, last 12 mths 4/4/2022   Able to walk? Yes   Fall in past 12 months? 0   Do you feel unsteady?  0   Are you worried about falling 0      Abuse Screen:  Patient is not abused Cognitive Screening    Has your family/caregiver stated any concerns about your memory: no     Health Maintenance Due     Health Maintenance Due   Topic Date Due    Shingrix Vaccine Age 49> (1 of 2) Never done    Eye Exam Retinal or Dilated  10/17/2019    DTaP/Tdap/Td series (1 - Tdap) 04/17/2020    Foot Exam Q1  05/07/2021    Colorectal Cancer Screening Combo  08/30/2021    Medicare Yearly Exam  12/18/2021       Patient Care Team   Patient Care Team:  Domenic Lopez MD as PCP - General (Family Medicine)  Domenic Lopez MD as PCP - [facility]  Christine Mott MD (Nephrology)  Paty Varela MD (Cardiology)  Joshua Seaman MD (Ophthalmology)  Alessio Valadez MD as Physician (Ophthalmology)  Pebbles Thomas MD (Gastroenterology)  Lore Wu MD as Physician (Endocrinology)    History     Patient Active Problem List   Diagnosis Code    TIA (transient ischemic attack) G45.9    Stenosis of right carotid artery without cerebral infarction I65.21    Vitamin D deficiency E55.9    Retinopathy, diabetic, bilateral ([address] E11.319    Heart murmur, systolic H82.4    Advance care planning Z71.89    Essential hypertension I10    Anemia, chronic disease D63.8    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    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    CKD (chronic kidney disease) stage 4, GFR 15-29 ml/min ([facility]) N18.4    Adenomatous polyp of ascending colon D12.2    Paroxysmal atrial fibrillation (HCC) I48.0    History of renal artery stenosis Z86.79    Hypothyroidism due to Hashimoto's thyroiditis E03.8, E06.3    Long-term use of immunosuppressant medication Z79.899    GI bleed K92.2    Acute blood loss anemia D62    History of thrombocytopenia Z86.2    Type 2 diabetes with nephropathy ([address] E11.21     Past Medical History:   Diagnosis Date    Acute encephalopathy 1/14/2016    Anemia 5/11/2017    Atrial fibrillation ([address]     Painter esophagus     CAD (coronary artery disease)     Diabetes ([facility]     Gout     Heart failure (HCC)     Cardiomyopathy, myocarditis    History of renal artery stenosis 8/4/2018    History of thrombocytopenia 1/14/2019    HTN, goal below 140/90     Retinopathy, diabetic, bilateral ([facility] 3/11/2016    Stenosis of right carotid artery without cerebral infarction 1/18/2016    TIA (transient ischemic attack) 1/14/2016    Vitamin D deficiency 3/1/2016    Nephrology ordered and follow 2/22/16       Past Surgical History:   Procedure Laterality Date    COLONOSCOPY N/A 6/23/2016    COLONOSCOPY performed by Tyson Mendez MD at [facility]. N/A 8/6/2018    COLONOSCOPY performed by Tyson Mendez MD at [facility]. N/A 8/30/2018    COLONOSCOPY performed by Wanda Granados MD at [address] [place] [address] HX ENDOSCOPY  06/27/2016    CAPSULE; normal small bowel    HX ENDOSCOPY  06/23/2016    upper endoscopy    HX UROLOGICAL  2013    SD INS NEW/RPLC PRM PACEMAKER W/TRANSV ELTRD VENTR N/A 2/7/2019    INSERT PPM SINGLE VENTRICULAR performed by Jenifer Wood MD at [place] [address], [facility] [address] CATH LAB     Current Outpatient Medications   Medication Sig Dispense Refill    levothyroxine (SYNTHROID) 50 mcg tablet Take 1 Tablet by mouth Daily (before breakfast). 30 Tablet 2    colchicine 0.6 mg tablet Day 1: 1.2 mg at the first sign of flare, followed by 0.6 mg after 1 hour; Day 2 or after:  0.6 mg once or twice daily until flare resolves 30 Tablet 2    flash glucose sensor (FreeStyle Evens 14 Day Sensor) kit Apply every 2 weeks; ICD 10 E11.8 1 Kit 2    flash glucose scanning reader (FreeStyle Evens 14 Day [place]) misc 1 Device by SubCUTAneous route daily. ICD10 E11.8 1 Each 0    cholecalciferol, vitamin D3, (Vitamin D3) 50 mcg (2,000 unit) tab Take  by mouth.  TURMERIC PO Take  by mouth.  B.ani/L.aci/L.sal/L.plan/L. Brady (PROBIOTIC FORMULA PO) Take  by mouth.       furosemide (LASIX) 40 mg tablet Take 40 mg by mouth daily.      lisinopril (PRINIVIL, ZESTRIL) 5 mg tablet Take 5 mg by mouth daily.  carvedilol (COREG) 6.25 mg tablet Take 6.25 mg by mouth two (2) times daily (with meals).  ELIQUIS 5 mg tablet Take 5 mg by mouth two (2) times a day.  multivitamin (ONE A DAY) tablet Take 1 Tab by mouth daily.  omeprazole (PRILOSEC) 20 mg capsule Take 20 mg by mouth as needed.  dextran 70-hypromellose (ARTIFICIAL TEARS,[id number],) ophthalmic solution Administer 1 Drop to both eyes as needed.  pravastatin (PRAVACHOL) 20 mg tablet Take 1 Tab by mouth nightly.  30 Tab 2     Allergies   Allergen Reactions    Solu-Medrol [Methylprednisolone Sodium Succ] Other (comments)     Syncope / Cardiac Arrest       Family History   Problem Relation Age of Onset    Heart Failure Mother     Diabetes Father     No Known Problems Sister     Other Daughter         Jennifer Perry Other Daughter         Jeferson Du    Other Daughter         Hashimoto     Social History     Tobacco Use    Smoking status: Never Smoker    Smokeless tobacco: Never Used   Substance Use Topics    Alcohol use: Yes     Comment: 2 drinks/week, never a heavy drinker         Jenelle David MD

## 2022-04-13 LAB
HEMOCCULT STL QL IA: NEGATIVE
SPECIMEN STATUS REPORT, ROLRST: NORMAL

## 2022-04-15 NOTE — PROGRESS NOTES
Dear Mr. Debbi Yuen,    I wanted to follow up on your recent test results: Your stool test was normal as there was no blood.

## 2022-05-31 ENCOUNTER — LAB ONLY (OUTPATIENT)
Dept: FAMILY MEDICINE CLINIC | Age: 77
End: 2022-05-31

## 2022-05-31 DIAGNOSIS — D63.8 ANEMIA, CHRONIC DISEASE: ICD-10-CM

## 2022-05-31 DIAGNOSIS — E06.3 HYPOTHYROIDISM DUE TO HASHIMOTO'S THYROIDITIS: ICD-10-CM

## 2022-05-31 DIAGNOSIS — E11.21 TYPE 2 DIABETES WITH NEPHROPATHY (HCC): ICD-10-CM

## 2022-05-31 DIAGNOSIS — Z13.220 SCREENING FOR LIPID DISORDERS: ICD-10-CM

## 2022-05-31 DIAGNOSIS — E03.8 HYPOTHYROIDISM DUE TO HASHIMOTO'S THYROIDITIS: ICD-10-CM

## 2022-06-01 LAB
ALBUMIN SERPL-MCNC: 4.2 G/DL (ref 3.5–5)
ALBUMIN/GLOB SERPL: 1.1 {RATIO} (ref 1.1–2.2)
ALP SERPL-CCNC: 58 U/L (ref 45–117)
ALT SERPL-CCNC: 23 U/L (ref 12–78)
ANION GAP SERPL CALC-SCNC: 5 MMOL/L (ref 5–15)
AST SERPL-CCNC: 24 U/L (ref 15–37)
BASOPHILS # BLD: 0 K/UL (ref 0–0.1)
BASOPHILS NFR BLD: 1 % (ref 0–1)
BILIRUB SERPL-MCNC: 0.5 MG/DL (ref 0.2–1)
BUN SERPL-MCNC: 124 MG/DL (ref 6–20)
BUN/CREAT SERPL: 39 (ref 12–20)
CALCIUM SERPL-MCNC: 9.4 MG/DL (ref 8.5–10.1)
CHLORIDE SERPL-SCNC: 104 MMOL/L (ref 97–108)
CHOLEST SERPL-MCNC: 124 MG/DL
CO2 SERPL-SCNC: 26 MMOL/L (ref 21–32)
CREAT SERPL-MCNC: 3.19 MG/DL (ref 0.7–1.3)
DIFFERENTIAL METHOD BLD: ABNORMAL
EOSINOPHIL # BLD: 0.2 K/UL (ref 0–0.4)
EOSINOPHIL NFR BLD: 4 % (ref 0–7)
ERYTHROCYTE [DISTWIDTH] IN BLOOD BY AUTOMATED COUNT: 13.2 % (ref 11.5–14.5)
EST. AVERAGE GLUCOSE BLD GHB EST-MCNC: 143 MG/DL
FERRITIN SERPL-MCNC: 108 NG/ML (ref 26–388)
GLOBULIN SER CALC-MCNC: 3.8 G/DL (ref 2–4)
GLUCOSE SERPL-MCNC: 117 MG/DL (ref 65–100)
HBA1C MFR BLD: 6.6 % (ref 4–5.6)
HCT VFR BLD AUTO: 37.1 % (ref 36.6–50.3)
HDLC SERPL-MCNC: 37 MG/DL
HDLC SERPL: 3.4 {RATIO} (ref 0–5)
HGB BLD-MCNC: 11.6 G/DL (ref 12.1–17)
IMM GRANULOCYTES # BLD AUTO: 0 K/UL (ref 0–0.04)
IMM GRANULOCYTES NFR BLD AUTO: 0 % (ref 0–0.5)
IRON SATN MFR SERPL: 17 % (ref 20–50)
IRON SERPL-MCNC: 70 UG/DL (ref 35–150)
LDLC SERPL CALC-MCNC: 73.2 MG/DL (ref 0–100)
LYMPHOCYTES # BLD: 0.5 K/UL (ref 0.8–3.5)
LYMPHOCYTES NFR BLD: 13 % (ref 12–49)
MCH RBC QN AUTO: 30.8 PG (ref 26–34)
MCHC RBC AUTO-ENTMCNC: 31.3 G/DL (ref 30–36.5)
MCV RBC AUTO: 98.4 FL (ref 80–99)
MONOCYTES # BLD: 0.5 K/UL (ref 0–1)
MONOCYTES NFR BLD: 11 % (ref 5–13)
NEUTS SEG # BLD: 3 K/UL (ref 1.8–8)
NEUTS SEG NFR BLD: 71 % (ref 32–75)
NRBC # BLD: 0 K/UL (ref 0–0.01)
NRBC BLD-RTO: 0 PER 100 WBC
PLATELET # BLD AUTO: 135 K/UL (ref 150–400)
PMV BLD AUTO: 10.9 FL (ref 8.9–12.9)
POTASSIUM SERPL-SCNC: 5.6 MMOL/L (ref 3.5–5.1)
PROT SERPL-MCNC: 8 G/DL (ref 6.4–8.2)
RBC # BLD AUTO: 3.77 M/UL (ref 4.1–5.7)
RBC MORPH BLD: ABNORMAL
SODIUM SERPL-SCNC: 135 MMOL/L (ref 136–145)
T4 SERPL-MCNC: 6.9 UG/DL (ref 4.5–12.1)
TIBC SERPL-MCNC: 402 UG/DL (ref 250–450)
TRIGL SERPL-MCNC: 69 MG/DL (ref ?–150)
TSH SERPL DL<=0.05 MIU/L-ACNC: 7.33 UIU/ML (ref 0.36–3.74)
VLDLC SERPL CALC-MCNC: 13.8 MG/DL
WBC # BLD AUTO: 4.2 K/UL (ref 4.1–11.1)

## 2022-06-02 DIAGNOSIS — M1A.9XX0 CHRONIC GOUT WITHOUT TOPHUS, UNSPECIFIED CAUSE, UNSPECIFIED SITE: ICD-10-CM

## 2022-06-02 DIAGNOSIS — E06.3 HYPOTHYROIDISM DUE TO HASHIMOTO'S THYROIDITIS: ICD-10-CM

## 2022-06-02 DIAGNOSIS — E03.8 HYPOTHYROIDISM DUE TO HASHIMOTO'S THYROIDITIS: ICD-10-CM

## 2022-06-02 DIAGNOSIS — N18.4 CKD (CHRONIC KIDNEY DISEASE) STAGE 4, GFR 15-29 ML/MIN (HCC): Primary | ICD-10-CM

## 2022-06-02 RX ORDER — LEVOTHYROXINE SODIUM 75 UG/1
75 TABLET ORAL
Qty: 90 TABLET | Refills: 0 | Status: SHIPPED | OUTPATIENT
Start: 2022-06-02 | End: 2022-08-29

## 2022-06-02 NOTE — PROGRESS NOTES
Spoke with patient. He has been using colchicine often for gout and uses lasix daily. Does not have a lot of issues with swelling. Saw Dr. Sarthak Martínez in the past but wants to switch groups. Recommend stopping colchicine and use Lasix prn edema; pt to repeat labs. He will make an appt with [facility]. Will also increase his levothyroxine dose.

## 2022-06-20 ENCOUNTER — LAB ONLY (OUTPATIENT)
Dept: FAMILY MEDICINE CLINIC | Age: 77
End: 2022-06-20

## 2022-06-20 DIAGNOSIS — E11.21 TYPE 2 DIABETES WITH NEPHROPATHY (HCC): ICD-10-CM

## 2022-06-20 DIAGNOSIS — M1A.9XX0 CHRONIC GOUT WITHOUT TOPHUS, UNSPECIFIED CAUSE, UNSPECIFIED SITE: ICD-10-CM

## 2022-06-20 DIAGNOSIS — N18.4 CKD (CHRONIC KIDNEY DISEASE) STAGE 4, GFR 15-29 ML/MIN (HCC): ICD-10-CM

## 2022-06-20 LAB
ALBUMIN SERPL-MCNC: 4 G/DL (ref 3.5–5)
ALBUMIN/GLOB SERPL: 1.1 {RATIO} (ref 1.1–2.2)
ALP SERPL-CCNC: 70 U/L (ref 45–117)
ALT SERPL-CCNC: 21 U/L (ref 12–78)
ANION GAP SERPL CALC-SCNC: 3 MMOL/L (ref 5–15)
AST SERPL-CCNC: 22 U/L (ref 15–37)
BILIRUB SERPL-MCNC: 0.6 MG/DL (ref 0.2–1)
BUN SERPL-MCNC: 66 MG/DL (ref 6–20)
BUN/CREAT SERPL: 27 (ref 12–20)
CALCIUM SERPL-MCNC: 9.2 MG/DL (ref 8.5–10.1)
CHLORIDE SERPL-SCNC: 105 MMOL/L (ref 97–108)
CO2 SERPL-SCNC: 29 MMOL/L (ref 21–32)
CREAT SERPL-MCNC: 2.41 MG/DL (ref 0.7–1.3)
ERYTHROCYTE [DISTWIDTH] IN BLOOD BY AUTOMATED COUNT: 13.6 % (ref 11.5–14.5)
GLOBULIN SER CALC-MCNC: 3.6 G/DL (ref 2–4)
GLUCOSE SERPL-MCNC: 124 MG/DL (ref 65–100)
HCT VFR BLD AUTO: 36.4 % (ref 36.6–50.3)
HGB BLD-MCNC: 10.9 G/DL (ref 12.1–17)
MAGNESIUM SERPL-MCNC: 2.2 MG/DL (ref 1.6–2.4)
MCH RBC QN AUTO: 29.8 PG (ref 26–34)
MCHC RBC AUTO-ENTMCNC: 29.9 G/DL (ref 30–36.5)
MCV RBC AUTO: 99.5 FL (ref 80–99)
NRBC # BLD: 0 K/UL (ref 0–0.01)
NRBC BLD-RTO: 0 PER 100 WBC
PHOSPHATE SERPL-MCNC: 4.2 MG/DL (ref 2.6–4.7)
PLATELET # BLD AUTO: 128 K/UL (ref 150–400)
PMV BLD AUTO: 10.6 FL (ref 8.9–12.9)
POTASSIUM SERPL-SCNC: 4.9 MMOL/L (ref 3.5–5.1)
PROT SERPL-MCNC: 7.6 G/DL (ref 6.4–8.2)
RBC # BLD AUTO: 3.66 M/UL (ref 4.1–5.7)
SODIUM SERPL-SCNC: 137 MMOL/L (ref 136–145)
URATE SERPL-MCNC: 10.7 MG/DL (ref 3.5–7.2)
WBC # BLD AUTO: 3.4 K/UL (ref 4.1–11.1)

## 2022-06-21 NOTE — PROGRESS NOTES
Please call patient:    Your kidney numbers are still quite abnormal. Other findings like elevated gout levels (uric acid) as well as anemia are common with chronic kidney disease. Please see your kidney doctor as soon as possible to address these lab values as we need to avoid any worsening progression of your kidney function.

## 2022-06-23 NOTE — PROGRESS NOTES
Attempted to call patient. LVM asking pt to call back or send Missingames message if he has any questions. Pt already viewed lab results and provider's comment via Missingames.

## 2022-08-03 ENCOUNTER — OFFICE VISIT (OUTPATIENT)
Dept: ORTHOPEDIC SURGERY | Age: 77
End: 2022-08-03
Payer: MEDICARE

## 2022-08-03 DIAGNOSIS — M25.512 LEFT SHOULDER PAIN, UNSPECIFIED CHRONICITY: ICD-10-CM

## 2022-08-03 DIAGNOSIS — M1A.39X1 CHRONIC TOPHACEOUS GOUT OF MULTIPLE SITES DUE TO RENAL IMPAIRMENT: ICD-10-CM

## 2022-08-03 DIAGNOSIS — M79.642 LEFT HAND PAIN: Primary | ICD-10-CM

## 2022-08-03 PROBLEM — N18.30 CHRONIC RENAL DISEASE, STAGE III (HCC): Status: ACTIVE | Noted: 2022-08-03

## 2022-08-03 PROCEDURE — 1123F ACP DISCUSS/DSCN MKR DOCD: CPT | Performed by: ORTHOPAEDIC SURGERY

## 2022-08-03 PROCEDURE — 99203 OFFICE O/P NEW LOW 30 MIN: CPT | Performed by: ORTHOPAEDIC SURGERY

## 2022-08-03 NOTE — PROGRESS NOTES
Cisco Sethi (: 1945) is a 78 y.o. male patient here for evaluation of the following chief complaint(s):  Hand Pain (Left hand pain ) and Shoulder Pain (Left shoulder pain/)       ASSESSMENT/PLAN:  Below is the assessment and plan developed based on review of pertinent history, physical exam, labs, studies, and medications. 1. Left hand pain  -     XR HAND LT MIN 3 V; Future  2. Left shoulder pain, unspecified chronicity  -     XR SHOULDER LT AP/LAT MIN 2 V; Future  3. 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 Currently it seems like he has a gouty flare in the left hand and digits as well as in the left AC joint. He also has severe erosive arthritis noted in multiple sites as listed below. I discussed that I think his management is best for chronic pain management by a rheumatologist especially if there are any medications that can be used to alleviate some inflammation pain. From a surgical standpoint with his complex medical history I think he is at a very high risk of complications from any surgical intervention and he is not interested in that at this time either. We discussed if pain got so severe could consider fusion of some of the joints in his hands or distal clavicle excision. However for now they are going to follow-up with rheumatology and see if there are any other long-term medications he can beyond that will be safe for him to take. Patient can follow-up with our office as needed. Patient verbalized understanding and elected to proceed. All questions were answered to the patient's apparent satisfaction. SUBJECTIVE/OBJECTIVE:  HPI    81-year-old male with left hand and shoulder pain. Been worse over the last month. Is gotten better over the last couple of days with taking colchicine. He tries to avoid the colchicine due to renal impairment.   Pain can get very severe at times. He has been dealing with this chronically but worse over the last month. Patient reports a gradual onset of symptoms. Duration of problem 1 month. Symptom Severity 7/10  Symptom Frequency intermittent        Allergies   Allergen Reactions    Solu-Medrol [Methylprednisolone Sodium Succ] Other (comments)     Syncope / Cardiac Arrest       Current Outpatient Medications   Medication Sig    levothyroxine (SYNTHROID) 75 mcg tablet Take 1 Tablet by mouth Daily (before breakfast). colchicine 0.6 mg tablet Day 1: 1.2 mg at the first sign of flare, followed by 0.6 mg after 1 hour; Day 2 or after:  0.6 mg once or twice daily until flare resolves    flash glucose sensor (FreeStyle Evens 14 Day Sensor) kit Apply every 2 weeks; ICD 10 E11.8    flash glucose scanning reader (FreeStyle Evens 14 Day [place]) misc 1 Device by SubCUTAneous route daily. ICD10 E11.8    cholecalciferol, vitamin D3, (Vitamin D3) 50 mcg (2,000 unit) tab Take  by mouth. TURMERIC PO Take  by mouth. B.ani/L.aci/L.nahum/L.plan/L. Brady (PROBIOTIC FORMULA PO) Take  by mouth. furosemide (LASIX) 40 mg tablet Take 40 mg by mouth daily. lisinopril (PRINIVIL, ZESTRIL) 5 mg tablet Take 5 mg by mouth daily. carvedilol (COREG) 6.25 mg tablet Take 6.25 mg by mouth two (2) times daily (with meals). ELIQUIS 5 mg tablet Take 5 mg by mouth two (2) times a day. multivitamin (ONE A DAY) tablet Take 1 Tab by mouth daily. omeprazole (PRILOSEC) 20 mg capsule Take 20 mg by mouth as needed. dextran 70-hypromellose (ARTIFICIAL TEARS,[id number],) ophthalmic solution Administer 1 Drop to both eyes as needed. pravastatin (PRAVACHOL) 20 mg tablet Take 1 Tab by mouth nightly. No current facility-administered medications for this visit.        Social History     Socioeconomic History    Marital status:      Spouse name: Not on file    Number of children: Not on file    Years of education: Not on file    Highest education level: Not on file   Occupational History    Not on file   Tobacco Use    Smoking status: Never    Smokeless tobacco: Never   Vaping Use    Vaping Use: Never used   Substance and Sexual Activity    Alcohol use: Yes     Comment: 2 drinks/week, never a heavy drinker    Drug use: No    Sexual activity: Not Currently   Other Topics Concern    Not on file   Social History Narrative        Generally active, works out in the yard often     Social Determinants of Health     Financial Resource Strain: Not on file   Food Insecurity: Not on file   Transportation Needs: Not on file   Physical Activity: Not on file   Stress: Not on file   Social Connections: Not on file   Intimate Partner Violence: Not on file   Housing Stability: Not on file       Past Surgical History:   Procedure Laterality Date    COLONOSCOPY N/A 6/23/2016    COLONOSCOPY performed by Carroll Porras MD at [address] [facility] [address] N/A 8/6/2018    COLONOSCOPY performed by Carroll Porras MD at [address] N/A 8/30/2018    COLONOSCOPY performed by Suze Trivedi MD at [address]    HX ENDOSCOPY  06/27/2016    CAPSULE; normal small bowel    HX ENDOSCOPY  06/23/2016    upper endoscopy    HX UROLOGICAL  2013    ME INS NEW/RPLC PRM PACEMAKER W/TRANSV ELTRD VENTR N/A 2/7/2019    INSERT PPM SINGLE VENTRICULAR performed by Jose Munoz MD at [address] [address] CATH LAB       Family History   Problem Relation Age of Onset    Heart Failure Mother     Diabetes Father     No Known Problems Sister     Other Daughter         Dariusz Swift    Other Daughter         Dariusz Swift    Other Daughter         Dariusz Swift            Review of Systems    No flowsheet data found. Vitals: There were no vitals taken for this visit. Estimated body surface area is 2.08 meters squared as calculated from the following:    Height as of 4/4/22: 5' 10\" (1.778 m). Weight as of 4/4/22: 192 lb 6.4 oz (87.3 kg).   There is no height or weight on file to calculate BMI.       Physical Exam    Musculoskeletal Exam:    Left Upper Extremity EXAMINATION    Patient has tenderness to palpation at all the PIP joints of his left hand as well as DIP joints and wrist.  He also has tenderness at the [facility] joint of the left shoulder. He has stiffness with range of motion of the hand with limited flexion and full extension. He has some mild redness on the dorsum of his hand consistent with gout flare. Hand is slightly warmer compared to contralateral side. He states this is improved over the last day since taking colchicine. Patient fires AIN, PIN and ulnar nerves. Sensation is grossly intact in the median, radial and ulnar distribution. Hand is pink and appears well-perfused. Hand is warm. Skin is intact. Compartments are soft and compressible. Consitutional: Healthy  Skin:   - Edema - mild  - Cellulitis - No    Neuro: Numbness or tingling in R/L arm: No    Psych: Affect normal    Cardiovascular: Capillary Refill < 2 seconds in upper extremities    Respiratory: Non-Labored Breathing    ROS:    Constitutional: Denies fever/chills    Respiratory: Denies SOB        Imaging:    XR Results (most recent):  Results from Appointment encounter on 08/03/22    XR SHOULDER LT AP/LAT MIN 2 V    Narrative  Left Shoulder Xray:  Indication: pain  Views: 4 views - AP, Grashey, Axillary, Scap-Y  Interpretation:  -4 views of the left shoulder are reviewed and show normal bone architecture. The humerus is well located within the glenoid. Patient has severe AC joint arthritis and mild glenohumeral arthritis. There is no evidence of high riding of the humeral head. No evidence of fracture or subluxation. There is no evidence of soft tissue swelling.           Orders Placed This Encounter    XR HAND LT MIN 3 V     Standing Status:   Future     Number of Occurrences:   1     Standing Expiration Date:   8/4/2023    XR SHOULDER LT AP/LAT MIN 2 V     Standing Status:   Future     Number of Occurrences:   1     Standing Expiration Date:   8/4/2023            An electronic signature was used to authenticate this note.   -- Kenna Saleem MD

## 2022-08-29 DIAGNOSIS — E03.8 HYPOTHYROIDISM DUE TO HASHIMOTO'S THYROIDITIS: ICD-10-CM

## 2022-08-29 DIAGNOSIS — E06.3 HYPOTHYROIDISM DUE TO HASHIMOTO'S THYROIDITIS: ICD-10-CM

## 2022-08-29 RX ORDER — LEVOTHYROXINE SODIUM 75 UG/1
TABLET ORAL
Qty: 90 TABLET | Refills: 0 | Status: SHIPPED | OUTPATIENT
Start: 2022-08-29 | End: 2022-11-03 | Stop reason: SDUPTHER

## 2022-10-26 ENCOUNTER — TELEPHONE (OUTPATIENT)
Dept: FAMILY MEDICINE CLINIC | Age: 77
End: 2022-10-26

## 2022-10-26 NOTE — TELEPHONE ENCOUNTER
Left generic message on 10/26/22 to inform the pt to callback the office. Pt fell over a box and would like an antibiotic,however the pt is currently in [place]. Pt need's to be in [place] and need's to be seen either thru a VV or In Office to get evaluated for an antibiotic. Please schedule the pt accordingly.   -VBN 10/26/22

## 2022-10-26 NOTE — TELEPHONE ENCOUNTER
Pt called back and stated \"I am out of stated right now and I need antibiotics, due to me falling and scraping my leg. \" Pt was advised that he would need to be in the [facility] to be seen either in-office, or virtually. The pt understood this and stated \"I will be back in [place] on Friday (10/28/22). This is precautionary, more than anything, since I'm diabetic. \" The pt was scheduled for 11/01/22 @ 9:45, with Dr. Zakiya Reddy.     Callback Phone Number: [phone]

## 2022-10-26 NOTE — TELEPHONE ENCOUNTER
----- Message from Luis Moreno sent at 10/25/2022  4:29 PM EDT -----  Subject: Message to Provider    QUESTIONS  Information for Provider? Pt is requesting an antibiotic as pt tripped   over a box at home and has traveled to [place]. Pt also notes unable to take   large pills. please return call to pt. Pt will return to [address]  as of Friday October 28,2022.  ---------------------------------------------------------------------------  --------------  Cierra VALENTIN  [phone]; OK to leave message on voicemail  ---------------------------------------------------------------------------  --------------  SCRIPT ANSWERS  Relationship to Patient?  Self

## 2022-10-26 NOTE — TELEPHONE ENCOUNTER
Prescription cannot be sent without a visit. We cannot schedule patient until he is back in [place]. Please call to offer VV or OV with available provider so that he can be examined. Thanks!

## 2022-11-01 ENCOUNTER — OFFICE VISIT (OUTPATIENT)
Dept: FAMILY MEDICINE CLINIC | Age: 77
End: 2022-11-01
Payer: MEDICARE

## 2022-11-01 VITALS
TEMPERATURE: 97.7 F | BODY MASS INDEX: 27.92 KG/M2 | SYSTOLIC BLOOD PRESSURE: 112 MMHG | RESPIRATION RATE: 16 BRPM | HEIGHT: 70 IN | WEIGHT: 195 LBS | HEART RATE: 74 BPM | DIASTOLIC BLOOD PRESSURE: 72 MMHG | OXYGEN SATURATION: 97 %

## 2022-11-01 DIAGNOSIS — M1A.9XX0 CHRONIC GOUT WITHOUT TOPHUS, UNSPECIFIED CAUSE, UNSPECIFIED SITE: ICD-10-CM

## 2022-11-01 DIAGNOSIS — E03.8 HYPOTHYROIDISM DUE TO HASHIMOTO'S THYROIDITIS: ICD-10-CM

## 2022-11-01 DIAGNOSIS — E11.21 TYPE 2 DIABETES WITH NEPHROPATHY (HCC): ICD-10-CM

## 2022-11-01 DIAGNOSIS — S81.819A LACERATION OF SHIN: Primary | ICD-10-CM

## 2022-11-01 DIAGNOSIS — N18.4 CKD (CHRONIC KIDNEY DISEASE) STAGE 4, GFR 15-29 ML/MIN (HCC): ICD-10-CM

## 2022-11-01 DIAGNOSIS — Z23 NEEDS FLU SHOT: ICD-10-CM

## 2022-11-01 DIAGNOSIS — E06.3 HYPOTHYROIDISM DUE TO HASHIMOTO'S THYROIDITIS: ICD-10-CM

## 2022-11-01 PROCEDURE — 99214 OFFICE O/P EST MOD 30 MIN: CPT | Performed by: FAMILY MEDICINE

## 2022-11-01 PROCEDURE — G8754 DIAS BP LESS 90: HCPCS | Performed by: FAMILY MEDICINE

## 2022-11-01 PROCEDURE — 3044F HG A1C LEVEL LT 7.0%: CPT | Performed by: FAMILY MEDICINE

## 2022-11-01 PROCEDURE — G8510 SCR DEP NEG, NO PLAN REQD: HCPCS | Performed by: FAMILY MEDICINE

## 2022-11-01 PROCEDURE — 90694 VACC AIIV4 NO PRSRV 0.5ML IM: CPT | Performed by: FAMILY MEDICINE

## 2022-11-01 PROCEDURE — 3017F COLORECTAL CA SCREEN DOC REV: CPT | Performed by: FAMILY MEDICINE

## 2022-11-01 PROCEDURE — G8536 NO DOC ELDER MAL SCRN: HCPCS | Performed by: FAMILY MEDICINE

## 2022-11-01 PROCEDURE — 1101F PT FALLS ASSESS-DOCD LE1/YR: CPT | Performed by: FAMILY MEDICINE

## 2022-11-01 PROCEDURE — G0463 HOSPITAL OUTPT CLINIC VISIT: HCPCS | Performed by: FAMILY MEDICINE

## 2022-11-01 PROCEDURE — 1123F ACP DISCUSS/DSCN MKR DOCD: CPT | Performed by: FAMILY MEDICINE

## 2022-11-01 PROCEDURE — G8427 DOCREV CUR MEDS BY ELIG CLIN: HCPCS | Performed by: FAMILY MEDICINE

## 2022-11-01 PROCEDURE — G8417 CALC BMI ABV UP PARAM F/U: HCPCS | Performed by: FAMILY MEDICINE

## 2022-11-01 PROCEDURE — G8752 SYS BP LESS 140: HCPCS | Performed by: FAMILY MEDICINE

## 2022-11-01 PROCEDURE — 3078F DIAST BP <80 MM HG: CPT | Performed by: FAMILY MEDICINE

## 2022-11-01 PROCEDURE — 2022F DILAT RTA XM EVC RTNOPTHY: CPT | Performed by: FAMILY MEDICINE

## 2022-11-01 PROCEDURE — 3074F SYST BP LT 130 MM HG: CPT | Performed by: FAMILY MEDICINE

## 2022-11-01 RX ORDER — LANOLIN ALCOHOL/MO/W.PET/CERES
1 CREAM (GRAM) TOPICAL DAILY
COMMUNITY

## 2022-11-01 NOTE — PROGRESS NOTES
Patient Name: Dalton Mendez   MRN: [id number]    Farooq Schwab is a 78 y.o. male who presents with the following: Three weeks ago, he tripped at home and landed on his left knee. Scraped up his left shin which was bleeding quite a bit as he is on Eliquis. He does have baseline redness of his legs due to peripheral arterial disease. Currently states that his leg is no longer painful and it is mainly scabbed up at this point. Has been using hydrogen peroxide. Denies any fevers. Of note, has a history of CKD, gout, diabetes. He has not seen a nephrologist recently. We adjusted his levothyroxine dose since last visit. Wt Readings from Last 3 Encounters:   11/01/22 195 lb (88.5 kg)   04/04/22 192 lb 6.4 oz (87.3 kg)   09/28/21 191 lb 3.2 oz (86.7 kg)     BP Readings from Last 3 Encounters:   11/01/22 112/72   04/04/22 (!) 140/72   09/28/21 130/70     Review of Systems   Constitutional:  Negative for fever, malaise/fatigue and weight loss. Respiratory:  Negative for cough, hemoptysis, shortness of breath and wheezing. Cardiovascular:  Negative for chest pain, palpitations, leg swelling and PND. Gastrointestinal:  Negative for abdominal pain, constipation, diarrhea, nausea and vomiting. Musculoskeletal:  Positive for falls. The patient's medications, allergies, past medical history, surgical history, family history and social history were reviewed and updated where appropriate. Current Outpatient Medications:     glucosamine-chondroitin (ARTHX) 500-400 mg cap, Take 1 Capsule by mouth daily. , Disp: , Rfl:     levothyroxine (SYNTHROID) 75 mcg tablet, TAKE 1 TABLET BY MOUTH EVERY DAY BEFORE BREAKFAST, Disp: 90 Tablet, Rfl: 0    colchicine 0.6 mg tablet, Day 1: 1.2 mg at the first sign of flare, followed by 0.6 mg after 1 hour; Day 2 or after:  0.6 mg once or twice daily until flare resolves, Disp: 30 Tablet, Rfl: 2    cholecalciferol, vitamin D3, 50 mcg (2,000 unit) tab, Take  by mouth., Disp: , Rfl:     TURMERIC PO, Take  by mouth., Disp: , Rfl:     B.ani/L.aci/L.sal/L.plan/L. Brady (PROBIOTIC FORMULA PO), Take  by mouth., Disp: , Rfl:     furosemide (LASIX) 40 mg tablet, Take 40 mg by mouth daily. , Disp: , Rfl:     lisinopril (PRINIVIL, ZESTRIL) 5 mg tablet, Take 5 mg by mouth daily. , Disp: , Rfl:     carvedilol (COREG) 6.25 mg tablet, Take 6.25 mg by mouth two (2) times daily (with meals). , Disp: , Rfl:     ELIQUIS 5 mg tablet, Take 5 mg by mouth two (2) times a day., Disp: , Rfl:     multivitamin (ONE A DAY) tablet, Take 1 Tab by mouth daily. , Disp: , Rfl:     omeprazole (PRILOSEC) 20 mg capsule, Take 20 mg by mouth as needed. , Disp: , Rfl:     dextran 70-hypromellose (ARTIFICIAL TEARS) ophthalmic solution, Administer 1 Drop to both eyes as needed. , Disp: , Rfl:     pravastatin (PRAVACHOL) 20 mg tablet, Take 1 Tab by mouth nightly., Disp: 30 Tab, Rfl: 2    flash glucose sensor (FreeStyle Evens 14 Day Sensor) kit, Apply every 2 weeks; ICD 10 E11.8 (Patient not taking: Reported on 11/1/2022), Disp: 1 Kit, Rfl: 2    flash glucose scanning reader (FreeStyle Evens 14 Day [place]) misc, 1 Device by SubCUTAneous route daily. ICD10 E11.8 (Patient not taking: Reported on 11/1/2022), Disp: 1 Each, Rfl: 0    Allergies   Allergen Reactions    Solu-Medrol [Methylprednisolone Sodium Succ] Other (comments)     Syncope / Cardiac Arrest       OBJECTIVE    Visit Vitals  /72 (BP 1 Location: Left upper arm, BP Patient Position: Sitting, BP Cuff Size: Adult)   Pulse 74   Temp 97.7 °F (36.5 °C) (Temporal)   Resp 16   Ht 5' 10\" (1.778 m)   Wt 195 lb (88.5 kg)   SpO2 97%   BMI 27.98 kg/m²       Physical Exam  Vitals and nursing note reviewed. Constitutional:       General: He is not in acute distress. Appearance: Normal appearance. He is not toxic-appearing. HENT:      Head: Normocephalic and atraumatic. Eyes:      Pupils: Pupils are equal, round, and reactive to light.    Pulmonary: Effort: Pulmonary effort is normal.   Musculoskeletal:         General: Normal range of motion. Skin:     General: Skin is warm and dry. Findings: Erythema (noted circumferentially from left ankle up to knee.) present. Comments: Several black scabs on anterior left shin; no tenderness or drainage   Neurological:      Mental Status: He is alert. Mental status is at baseline. Psychiatric:         Mood and Affect: Mood normal.         Behavior: Behavior normal.         ASSESSMENT AND PLAN  Gurpreet Lehman is a 78 y.o. male who presents today for:    1. Laceration of shin  Healing well. UTD on Tetanus. Recommend topical bacitracin and non stick gauze. No current clinical signs of cellulitis or infection. 2. Hypothyroidism due to Hashimoto's thyroiditis  - TSH 3RD GENERATION; Future  - T4 (THYROXINE); Future  - T4 (THYROXINE)  - TSH 3RD GENERATION    3. Chronic gout without tophus, unspecified cause, unspecified site  - URIC ACID; Future  - URIC ACID    4. CKD (chronic kidney disease) stage 4, GFR 15-29 ml/min (HCC)  - CBC WITH AUTOMATED DIFF; Future  - METABOLIC PANEL, BASIC; Future  - METABOLIC PANEL, BASIC  - CBC WITH AUTOMATED DIFF    5. Type 2 diabetes with nephropathy (HCC)  - MICROALBUMIN, UR, RAND W/ MICROALB/CREAT RATIO; Future  - HEMOGLOBIN A1C WITH EAG; Future  - HEMOGLOBIN A1C WITH EAG  - MICROALBUMIN, UR, RAND W/ MICROALB/CREAT RATIO    6. Needs flu shot  - INFLUENZA, FLUAD, (AGE 72 Y+), IM, PF, 0.5 ML       Medications Discontinued During This Encounter   Medication Reason    flash glucose scanning reader (FreeStyle Evens 14 Day [place]) misc LIST CLEANUP    flash glucose sensor (FreeStyle Evens 14 Day Sensor) kit LIST CLEANUP           Treatment risks/benefits/costs/interactions/alternatives discussed with patient.   Advised patient to call back or return to office if symptoms worsen/change/persist. If patient cannot reach us or should anything more severe/urgent arise he/she should proceed directly to the nearest emergency department. Discussed expected course/resolution/complications of diagnosis in detail with patient. Patient expressed understanding with the diagnosis and plan. This dictation may have been completed with Dragon, the computer voice recognition software. Unanticipated grammatical, syntax, homophones, and other interpretive errors are sometimes inadvertently transcribed by the computer software. Please disregard any errors that have escaped final proofreading. Amy Christianson M.D.

## 2022-11-01 NOTE — PROGRESS NOTES
Chief Complaint   Patient presents with    Leg Injury       1. \"Have you been to the ER, urgent care clinic since your last visit? Hospitalized since your last visit? \" No    2. \"Have you seen or consulted any other health care providers outside of the [address] [place] [address] since your last visit? \" No     3. For patients aged 39-70: Has the patient had a colonoscopy / FIT/ Cologuard? Yes - no Care Gap present      If the patient is female:    4. For patients aged 41-77: Has the patient had a mammogram within the past 2 years? NA - based on age or sex      11. For patients aged 21-65: Has the patient had a pap smear?  NA - based on age or sex    3 most recent PHQ Screens 11/1/2022   Little interest or pleasure in doing things Not at all   Feeling down, depressed, irritable, or hopeless Not at all   Total Score PHQ 2 0

## 2022-11-02 LAB
ANION GAP SERPL CALC-SCNC: 10 MMOL/L (ref 5–15)
BASOPHILS # BLD: 0.1 K/UL (ref 0–0.1)
BASOPHILS NFR BLD: 2 % (ref 0–1)
BUN SERPL-MCNC: 75 MG/DL (ref 6–20)
BUN/CREAT SERPL: 26 (ref 12–20)
CALCIUM SERPL-MCNC: 9.2 MG/DL (ref 8.5–10.1)
CHLORIDE SERPL-SCNC: 104 MMOL/L (ref 97–108)
CO2 SERPL-SCNC: 27 MMOL/L (ref 21–32)
CREAT SERPL-MCNC: 2.91 MG/DL (ref 0.7–1.3)
DIFFERENTIAL METHOD BLD: ABNORMAL
EOSINOPHIL # BLD: 0.1 K/UL (ref 0–0.4)
EOSINOPHIL NFR BLD: 3 % (ref 0–7)
ERYTHROCYTE [DISTWIDTH] IN BLOOD BY AUTOMATED COUNT: 13.9 % (ref 11.5–14.5)
EST. AVERAGE GLUCOSE BLD GHB EST-MCNC: 123 MG/DL
GLUCOSE SERPL-MCNC: 102 MG/DL (ref 65–100)
HBA1C MFR BLD: 5.9 % (ref 4–5.6)
HCT VFR BLD AUTO: 32.2 % (ref 36.6–50.3)
HGB BLD-MCNC: 9.6 G/DL (ref 12.1–17)
IMM GRANULOCYTES # BLD AUTO: 0 K/UL (ref 0–0.04)
IMM GRANULOCYTES NFR BLD AUTO: 0 % (ref 0–0.5)
LYMPHOCYTES # BLD: 0.3 K/UL (ref 0.8–3.5)
LYMPHOCYTES NFR BLD: 9 % (ref 12–49)
MCH RBC QN AUTO: 30.2 PG (ref 26–34)
MCHC RBC AUTO-ENTMCNC: 29.8 G/DL (ref 30–36.5)
MCV RBC AUTO: 101.3 FL (ref 80–99)
MONOCYTES # BLD: 0.4 K/UL (ref 0–1)
MONOCYTES NFR BLD: 12 % (ref 5–13)
NEUTS SEG # BLD: 2.4 K/UL (ref 1.8–8)
NEUTS SEG NFR BLD: 74 % (ref 32–75)
NRBC # BLD: 0 K/UL (ref 0–0.01)
NRBC BLD-RTO: 0 PER 100 WBC
PLATELET # BLD AUTO: 162 K/UL (ref 150–400)
PMV BLD AUTO: 12.3 FL (ref 8.9–12.9)
POTASSIUM SERPL-SCNC: 5.2 MMOL/L (ref 3.5–5.1)
RBC # BLD AUTO: 3.18 M/UL (ref 4.1–5.7)
RBC MORPH BLD: ABNORMAL
SODIUM SERPL-SCNC: 141 MMOL/L (ref 136–145)
T4 SERPL-MCNC: 9.9 UG/DL (ref 4.5–12.1)
TSH SERPL DL<=0.05 MIU/L-ACNC: 7.42 UIU/ML (ref 0.36–3.74)
URATE SERPL-MCNC: 13.2 MG/DL (ref 3.5–7.2)
WBC # BLD AUTO: 3.3 K/UL (ref 4.1–11.1)

## 2022-11-03 DIAGNOSIS — E06.3 HYPOTHYROIDISM DUE TO HASHIMOTO'S THYROIDITIS: ICD-10-CM

## 2022-11-03 DIAGNOSIS — E03.8 HYPOTHYROIDISM DUE TO HASHIMOTO'S THYROIDITIS: ICD-10-CM

## 2022-11-03 LAB
CREAT UR-MCNC: 65 MG/DL
MICROALBUMIN UR-MCNC: 0.79 MG/DL
MICROALBUMIN/CREAT UR-RTO: 12 MG/G (ref 0–30)

## 2022-11-03 RX ORDER — LEVOTHYROXINE SODIUM 100 UG/1
100 TABLET ORAL
Qty: 90 TABLET | Refills: 0 | Status: SHIPPED | OUTPATIENT
Start: 2022-11-03

## 2022-11-08 ENCOUNTER — OFFICE VISIT (OUTPATIENT)
Dept: FAMILY MEDICINE CLINIC | Age: 77
End: 2022-11-08
Payer: MEDICARE

## 2022-11-08 ENCOUNTER — HOSPITAL ENCOUNTER (OUTPATIENT)
Dept: ULTRASOUND IMAGING | Age: 77
Discharge: HOME | End: 2022-11-08
Attending: STUDENT IN AN ORGANIZED HEALTH CARE EDUCATION/TRAINING PROGRAM
Payer: MEDICARE

## 2022-11-08 ENCOUNTER — NURSE TRIAGE (OUTPATIENT)
Dept: OTHER | Facility: CLINIC | Age: 77
End: 2022-11-08

## 2022-11-08 VITALS
OXYGEN SATURATION: 98 % | HEART RATE: 86 BPM | SYSTOLIC BLOOD PRESSURE: 116 MMHG | WEIGHT: 197.6 LBS | BODY MASS INDEX: 28.29 KG/M2 | HEIGHT: 70 IN | RESPIRATION RATE: 18 BRPM | DIASTOLIC BLOOD PRESSURE: 62 MMHG | TEMPERATURE: 97 F

## 2022-11-08 DIAGNOSIS — R22.41 LOCALIZED SWELLING OF RIGHT LOWER EXTREMITY: ICD-10-CM

## 2022-11-08 DIAGNOSIS — R22.41 LOCALIZED SWELLING OF RIGHT LOWER EXTREMITY: Primary | ICD-10-CM

## 2022-11-08 PROCEDURE — G8754 DIAS BP LESS 90: HCPCS | Performed by: STUDENT IN AN ORGANIZED HEALTH CARE EDUCATION/TRAINING PROGRAM

## 2022-11-08 PROCEDURE — 3078F DIAST BP <80 MM HG: CPT | Performed by: STUDENT IN AN ORGANIZED HEALTH CARE EDUCATION/TRAINING PROGRAM

## 2022-11-08 PROCEDURE — 3074F SYST BP LT 130 MM HG: CPT | Performed by: STUDENT IN AN ORGANIZED HEALTH CARE EDUCATION/TRAINING PROGRAM

## 2022-11-08 PROCEDURE — G8752 SYS BP LESS 140: HCPCS | Performed by: STUDENT IN AN ORGANIZED HEALTH CARE EDUCATION/TRAINING PROGRAM

## 2022-11-08 PROCEDURE — 1123F ACP DISCUSS/DSCN MKR DOCD: CPT | Performed by: STUDENT IN AN ORGANIZED HEALTH CARE EDUCATION/TRAINING PROGRAM

## 2022-11-08 PROCEDURE — 99214 OFFICE O/P EST MOD 30 MIN: CPT | Performed by: STUDENT IN AN ORGANIZED HEALTH CARE EDUCATION/TRAINING PROGRAM

## 2022-11-08 PROCEDURE — 93971 EXTREMITY STUDY: CPT

## 2022-11-08 PROCEDURE — G0463 HOSPITAL OUTPT CLINIC VISIT: HCPCS | Performed by: STUDENT IN AN ORGANIZED HEALTH CARE EDUCATION/TRAINING PROGRAM

## 2022-11-08 PROCEDURE — 3017F COLORECTAL CA SCREEN DOC REV: CPT | Performed by: STUDENT IN AN ORGANIZED HEALTH CARE EDUCATION/TRAINING PROGRAM

## 2022-11-08 PROCEDURE — G8510 SCR DEP NEG, NO PLAN REQD: HCPCS | Performed by: STUDENT IN AN ORGANIZED HEALTH CARE EDUCATION/TRAINING PROGRAM

## 2022-11-08 PROCEDURE — G8417 CALC BMI ABV UP PARAM F/U: HCPCS | Performed by: STUDENT IN AN ORGANIZED HEALTH CARE EDUCATION/TRAINING PROGRAM

## 2022-11-08 PROCEDURE — G8536 NO DOC ELDER MAL SCRN: HCPCS | Performed by: STUDENT IN AN ORGANIZED HEALTH CARE EDUCATION/TRAINING PROGRAM

## 2022-11-08 PROCEDURE — G8427 DOCREV CUR MEDS BY ELIG CLIN: HCPCS | Performed by: STUDENT IN AN ORGANIZED HEALTH CARE EDUCATION/TRAINING PROGRAM

## 2022-11-08 PROCEDURE — 1101F PT FALLS ASSESS-DOCD LE1/YR: CPT | Performed by: STUDENT IN AN ORGANIZED HEALTH CARE EDUCATION/TRAINING PROGRAM

## 2022-11-08 NOTE — PROGRESS NOTES
Chief Complaint   Patient presents with    Knee Pain      Right Knee: Red swollen and painful          1. \"Have you been to the ER, urgent care clinic since your last visit? Hospitalized since your last visit? \" No    2. \"Have you seen or consulted any other health care providers outside of the [address] [place] [address] since your last visit? \" No     3. For patients aged 39-70: Has the patient had a colonoscopy / FIT/ Cologuard? NA - based on age      If the patient is female:    4. For patients aged 41-77: Has the patient had a mammogram within the past 2 years? NA - based on age or sex      11. For patients aged 21-65: Has the patient had a pap smear?  NA - based on age or sex         3 most recent PHQ Screens 11/8/2022   Little interest or pleasure in doing things Not at all   Feeling down, depressed, irritable, or hopeless Not at all   Total Score PHQ 2 0       Health Maintenance Due   Topic Date Due    Shingrix Vaccine Age 49> (1 of 2) Never done    Eye Exam Retinal or Dilated  10/17/2019    DTaP/Tdap/Td series (1 - Tdap) 04/17/2020    Foot Exam Q1  05/07/2021    COVID-19 Vaccine (4 - Booster for [place] series) 01/16/2022

## 2022-11-08 NOTE — TELEPHONE ENCOUNTER
Location of patient:[place]    Received call from Robert Funes at [facility] with Red Flag Complaint. Subjective: Caller states \"knee is swollen\"     Current Symptoms: below left knee and slightly above the shin is swollen. Feels tight and swollen, no redness. Pain under knee cap, denies injury. Can't bend the knee. Made ortho appt for today but wife thinks he needs to cancel it and see PCP    Onset: 1 day ago; gradual-swelling    Associated Symptoms: NA    Pain Severity: mild pain with sitting, severe with standing    Temperature: no fever     What has been tried: tylenol last night    LMP: NA Pregnant: NA    Recommended disposition: Go to [facility]    Care advice provided, patient verbalizes understanding; denies any other questions or concerns; instructed to call back for any new or worsening symptoms. Patient/Caller agrees with recommended disposition; writer provided warm transfer to Trav Moses at [facility] for appointment scheduling    Attention Provider: Thank you for allowing me to participate in the care of your patient. The patient was connected to triage in response to information provided to the [facility]. Please do not respond through this encounter as the response is not directed to a shared pool.         Reason for Disposition   Can't move swollen joint at all    Protocols used: Knee Pain-ADULT-[place]

## 2022-11-08 NOTE — PROGRESS NOTES
Lise Flowers  78 y.o. male  1945  [address] [place] [id number]  [id number]     [facility]       Chief Complaint: left knee pain  Source: self, his wife and the medical record     Subjective  Lise Flowers is an 78 y.o. male who presents for ongoing right knee pain. Was seen a few weeks ago for left knee laceration and pain following a fall. States his left knee is ok but noticed swelling in the right knee starting yesterday which was worse today. Feels better with standing, very difficult to bend the right knee. Pain behind the knee without warmth, no fevers. He is on eliquis. Allergies - reviewed: Allergies   Allergen Reactions    Solu-Medrol [Methylprednisolone Sodium Succ] Other (comments)     Syncope / Cardiac Arrest         Medications - reviewed:   Current Outpatient Medications   Medication Sig    levothyroxine (SYNTHROID) 100 mcg tablet Take 1 Tablet by mouth Daily (before breakfast). glucosamine-chondroitin (ARTHX) 500-400 mg cap Take 1 Capsule by mouth daily. colchicine 0.6 mg tablet Day 1: 1.2 mg at the first sign of flare, followed by 0.6 mg after 1 hour; Day 2 or after:  0.6 mg once or twice daily until flare resolves    cholecalciferol, vitamin D3, 50 mcg (2,000 unit) tab Take  by mouth. TURMERIC PO Take  by mouth. B.ani/L.aci/L.nahum/L.plan/L. Brady (PROBIOTIC FORMULA PO) Take  by mouth. furosemide (LASIX) 40 mg tablet Take 40 mg by mouth daily. lisinopril (PRINIVIL, ZESTRIL) 5 mg tablet Take 5 mg by mouth daily. carvedilol (COREG) 6.25 mg tablet Take 6.25 mg by mouth two (2) times daily (with meals). ELIQUIS 5 mg tablet Take 5 mg by mouth two (2) times a day. multivitamin (ONE A DAY) tablet Take 1 Tab by mouth daily. omeprazole (PRILOSEC) 20 mg capsule Take 20 mg by mouth as needed. dextran 70-hypromellose (ARTIFICIAL TEARS) ophthalmic solution Administer 1 Drop to both eyes as needed.     pravastatin (PRAVACHOL) 20 mg tablet Take 1 Tab by mouth nightly. No current facility-administered medications for this visit.          Past Medical History - reviewed:  Past Medical History:   Diagnosis Date    Acute encephalopathy 1/14/2016    Anemia 5/11/2017    Atrial fibrillation (HCC)     Painter esophagus     CAD (coronary artery disease)     Diabetes (HCC)     Gout     Heart failure (HCC)     Cardiomyopathy, myocarditis    History of renal artery stenosis 8/4/2018    History of thrombocytopenia 1/14/2019    HTN, goal below 140/90     Retinopathy, diabetic, bilateral ([address] 3/11/2016    Stenosis of right carotid artery without cerebral infarction 1/18/2016    TIA (transient ischemic attack) 1/14/2016    Vitamin D deficiency 3/1/2016    Nephrology ordered and follow 2/22/16          Past Surgical History - reviewed:   Past Surgical History:   Procedure Laterality Date    COLONOSCOPY N/A 6/23/2016    COLONOSCOPY performed by Ayse Bueno MD at [facility] ENDOSCOPY    COLONOSCOPY N/A 8/6/2018    COLONOSCOPY performed by Ayse Bueno MD at [address] [place] [address] Road N/A 8/30/2018    COLONOSCOPY performed by Claude Fleck, MD at [address] [place] [address],[address] ENDOSCOPY  06/27/2016    CAPSULE; normal small bowel    HX ENDOSCOPY  06/23/2016    upper endoscopy    HX UROLOGICAL  2013    WA INS NEW/RPLC PRM PACEMAKER W/TRANSV ELTRD VENTR N/A 2/7/2019    INSERT PPM SINGLE VENTRICULAR performed by Severa Cabal, MD at [address] [facility] [address] CATH LAB         Social History - reviewed:  Social History     Socioeconomic History    Marital status:      Spouse name: Not on file    Number of children: Not on file    Years of education: Not on file    Highest education level: Not on file   Occupational History    Not on file   Tobacco Use    Smoking status: Never     Passive exposure: Past    Smokeless tobacco: Never   Vaping Use    Vaping Use: Never used   Substance and Sexual Activity    Alcohol use: Yes     Comment: 2 drinks/week, never a heavy drinker    Drug use: No    Sexual activity: Not Currently   Other Topics Concern    Not on file   Social History Narrative        Generally active, works out in the yard often     Social Determinants of Health     Financial Resource Strain: Low Risk     Difficulty of Paying Living Expenses: Not hard at all   Food Insecurity: No Food Insecurity    Worried About Running Out of Food in the Last Year: Never true    Ran Out of Food in the Last Year: Never true   Transportation Needs: Not on file   Physical Activity: Not on file   Stress: Not on file   Social Connections: Not on file   Intimate Partner Violence: Not on file   Housing Stability: Not on file         Family History - reviewed:  Family History   Problem Relation Age of Onset    Heart Failure Mother     Diabetes Father     No Known Problems Sister     Other Daughter         Markos Pulling    Other Daughter         Markos Pulling    Other Daughter         [place] Pulling         Immunizations - reviewed:   Immunization History   Administered Date(s) Administered    COVID-19, PFIZER PURPLE top, DILUTE for use, (age 15 y+), IM, 30mcg/0.3mL 04/13/2021, 05/05/2021, 11/21/2021    Influenza High Dose Vaccine PF 02/26/2016, 11/23/2017, 11/27/2020    Influenza Vaccine 11/05/2018    Influenza Vaccine (Tri) Adjuvanted (>65 Yrs FLUAD TRI 05034) 09/27/2019    Influenza, FLUAD, (age 72 y+), Adjuvanted 09/28/2021, 11/01/2022    Pneumococcal Conjugate (PCV-13) 06/05/2019    Pneumococcal Polysaccharide (PPSV-23) 12/17/2020    Td, Adsorbed PF 04/16/2020     Review of Systems   Constitutional:  Negative for chills, fever and malaise/fatigue. Respiratory:  Negative for shortness of breath and wheezing. Cardiovascular:  Positive for leg swelling. Musculoskeletal:  Positive for falls, joint pain and myalgias. Negative for back pain and neck pain. Neurological:  Negative for dizziness and headaches.        Physical Exam  Visit Vitals  /62 (BP 1 Location: Left upper arm, BP Patient Position: Sitting, BP Cuff Size: Large adult)   Pulse 86   Temp 97 °F (36.1 °C) (Temporal)   Resp 18   Ht 5' 10\" (1.778 m)   Wt 197 lb 9.6 oz (89.6 kg)   SpO2 98%   BMI 28.35 kg/m²       Physical Exam  Vitals and nursing note reviewed. Constitutional:       Appearance: Normal appearance. Cardiovascular:      Rate and Rhythm: Normal rate and regular rhythm. Pulses: Normal pulses. Heart sounds: Normal heart sounds. Pulmonary:      Effort: Pulmonary effort is normal. No respiratory distress. Breath sounds: Normal breath sounds. No wheezing or rales. Musculoskeletal:      Right knee: Swelling present. No ecchymosis, bony tenderness or crepitus. Normal range of motion. Tenderness present over the medial joint line. No patellar tendon tenderness. Normal alignment. Left knee: No bony tenderness or crepitus. No patellar tendon tenderness. Normal alignment. Right lower leg: Tenderness present. No deformity or lacerations. 3+ Edema present. Left lower leg: No deformity, lacerations or tenderness. No edema. Right ankle: Swelling present. Left ankle: No swelling. Comments: Right knee TTP over medial joint line. Mild TTP over the right calf. No erythema of the skin of the RLE, not warm to touch. Neurological:      Mental Status: He is alert. 41cm on the left vs 48cm on the right            Assessment/Plan    ICD-10-CM ICD-9-CM    1. Localized swelling of right lower extremity  R22.41 729.81 DUPLEX LOWER EXT VENOUS RIGHT      CANCELED: DUPLEX LOW EXT ARTERY RIGHT W MERI          Luan Singh is an 78 y.o. male on eliquis with sudden onset of swelling in the RLE with notable difference in calf diameter R>>L and focal TTP over the right medial knee and less so calf tenderness. Unable to flex right knee 2/2 pain however with full flexion. Recent fall however without pain in the right knee until more than a week after, initial pain on the left so injury less likely though possible. Calf swelling despite AC concerning for clot norm given known vascular disease so sending for urgent doppler (appt made in office). Has followup with ortho so will await their assessment until further imagine if doppler is negative. 1. Localized swelling of right lower extremity  - DUPLEX LOWER EXT VENOUS RIGHT; Future    Patient informed to follow up: Follow-up and Dispositions    Return for routine care with Dr Kylie Valdez. I have discussed the diagnosis with the patient and the intended plan as seen in the above orders. Patient verbalized understanding of the plan and agrees with the plan. The patient has received an after-visit summary and questions were answered concerning future plans. I have discussed medication side effects and warnings with the patient as well. Informed patient to return to the office if new symptoms arise.       Neena Ward MD  [facility]

## 2022-11-09 ENCOUNTER — DOCUMENTATION ONLY (OUTPATIENT)
Dept: FAMILY MEDICINE CLINIC | Age: 77
End: 2022-11-09

## 2022-11-09 ENCOUNTER — TELEPHONE (OUTPATIENT)
Dept: FAMILY MEDICINE CLINIC | Age: 77
End: 2022-11-09

## 2022-11-09 NOTE — PROGRESS NOTES
Doppler negative for DVT, incidentally imaged bakers cyst c/w area of TTP on exam    MyChart message sent to patient / called to discuss result with his wife

## 2022-11-09 NOTE — TELEPHONE ENCOUNTER
Pt had ultrasound last night and it did not show a clot. Patient's wife has questions about the results of the ultrasound. Patient's wife is listed on the [address] [place] [address] Road form.     Callback Phone Number: [phone] 07-Dec-2020 12:40

## 2022-11-09 NOTE — TELEPHONE ENCOUNTER
Sent [address] message regarding result and discussed with wife as well. US showed no DVT but Bakers cyst at area of focal tenderness. Has followup with ortho tomorrow as further imaging is likely needed - Mrs Marnie Aguilar will update regarding that visit. If no answer or imaging after that will readdress. Leg swelling is the same as yesterday, no increased pain. Reiterated reasons to go to ED including signs of compartment syndrome.

## 2022-11-10 ENCOUNTER — OFFICE VISIT (OUTPATIENT)
Dept: ORTHOPEDIC SURGERY | Age: 77
End: 2022-11-10
Payer: MEDICARE

## 2022-11-10 VITALS — WEIGHT: 189 LBS | HEIGHT: 71 IN | BODY MASS INDEX: 26.46 KG/M2

## 2022-11-10 DIAGNOSIS — M25.561 RIGHT MEDIAL KNEE PAIN: ICD-10-CM

## 2022-11-10 DIAGNOSIS — M79.89 SWELLING OF CALF: ICD-10-CM

## 2022-11-10 DIAGNOSIS — S83.411A SPRAIN OF MEDIAL COLLATERAL LIGAMENT OF RIGHT KNEE, INITIAL ENCOUNTER: ICD-10-CM

## 2022-11-10 DIAGNOSIS — M25.561 PAIN OF RIGHT KNEE AFTER INJURY: Primary | ICD-10-CM

## 2022-11-10 DIAGNOSIS — S83.241A ACUTE MEDIAL MENISCUS TEAR, RIGHT, INITIAL ENCOUNTER: ICD-10-CM

## 2022-11-10 DIAGNOSIS — M25.561 KNEE MENISCUS PAIN, RIGHT: ICD-10-CM

## 2022-11-10 PROCEDURE — 20610 DRAIN/INJ JOINT/BURSA W/O US: CPT | Performed by: ORTHOPAEDIC SURGERY

## 2022-11-10 PROCEDURE — 1123F ACP DISCUSS/DSCN MKR DOCD: CPT | Performed by: ORTHOPAEDIC SURGERY

## 2022-11-10 PROCEDURE — G8417 CALC BMI ABV UP PARAM F/U: HCPCS | Performed by: ORTHOPAEDIC SURGERY

## 2022-11-10 PROCEDURE — G8432 DEP SCR NOT DOC, RNG: HCPCS | Performed by: ORTHOPAEDIC SURGERY

## 2022-11-10 PROCEDURE — G8536 NO DOC ELDER MAL SCRN: HCPCS | Performed by: ORTHOPAEDIC SURGERY

## 2022-11-10 PROCEDURE — 1101F PT FALLS ASSESS-DOCD LE1/YR: CPT | Performed by: ORTHOPAEDIC SURGERY

## 2022-11-10 PROCEDURE — G8756 NO BP MEASURE DOC: HCPCS | Performed by: ORTHOPAEDIC SURGERY

## 2022-11-10 PROCEDURE — G8427 DOCREV CUR MEDS BY ELIG CLIN: HCPCS | Performed by: ORTHOPAEDIC SURGERY

## 2022-11-10 PROCEDURE — 3017F COLORECTAL CA SCREEN DOC REV: CPT | Performed by: ORTHOPAEDIC SURGERY

## 2022-11-10 PROCEDURE — 99214 OFFICE O/P EST MOD 30 MIN: CPT | Performed by: ORTHOPAEDIC SURGERY

## 2022-11-10 NOTE — PROGRESS NOTES
Erma Essex (: 1945) is a 78 y.o. male, patient, here for evaluation of the following chief complaint(s):  Knee Pain (right) and Swelling (Right calf, had US 22, no DVT)       HPI:    He began having increased right knee pain when he was injured on 10/20/2022. The patient states that he tripped. Over the last 4 days, the patient reports increased discomfort and swelling. He gives no detail or description of his discomfort. He reports that walking and bending makes pain worse and rest and elevation makes pain better. The patient has been taking Tylenol for his discomfort as needed. He has had a Doppler performed on his right leg secondary to calf tenderness and swelling and the Doppler was negative. He reports no previous or related right knee or leg surgery. Allergies   Allergen Reactions    Solu-Medrol [Methylprednisolone Sodium Succ] Other (comments)     Syncope / Cardiac Arrest       Current Outpatient Medications   Medication Sig    levothyroxine (SYNTHROID) 100 mcg tablet Take 1 Tablet by mouth Daily (before breakfast). glucosamine-chondroitin (ARTHX) 500-400 mg cap Take 1 Capsule by mouth daily. colchicine 0.6 mg tablet Day 1: 1.2 mg at the first sign of flare, followed by 0.6 mg after 1 hour; Day 2 or after:  0.6 mg once or twice daily until flare resolves    cholecalciferol, vitamin D3, 50 mcg (2,000 unit) tab Take  by mouth. TURMERIC PO Take  by mouth. B.ani/L.aci/L.nahum/L.plan/L. Brady (PROBIOTIC FORMULA PO) Take  by mouth. furosemide (LASIX) 40 mg tablet Take 40 mg by mouth daily. lisinopril (PRINIVIL, ZESTRIL) 5 mg tablet Take 5 mg by mouth daily. carvedilol (COREG) 6.25 mg tablet Take 6.25 mg by mouth two (2) times daily (with meals). ELIQUIS 5 mg tablet Take 5 mg by mouth two (2) times a day. multivitamin (ONE A DAY) tablet Take 1 Tab by mouth daily. omeprazole (PRILOSEC) 20 mg capsule Take 20 mg by mouth as needed.     dextran 70-hypromellose (ARTIFICIAL TEARS) ophthalmic solution Administer 1 Drop to both eyes as needed. pravastatin (PRAVACHOL) 20 mg tablet Take 1 Tab by mouth nightly. No current facility-administered medications for this visit.        Past Medical History:   Diagnosis Date    Acute encephalopathy 1/14/2016    Anemia 5/11/2017    Atrial fibrillation (HCC)     Painter esophagus     CAD (coronary artery disease)     Diabetes (HCC)     Gout     Heart failure (HCC)     Cardiomyopathy, myocarditis    History of renal artery stenosis 8/4/2018    History of thrombocytopenia 1/14/2019    HTN, goal below 140/90     Retinopathy, diabetic, bilateral ([address] 3/11/2016    Stenosis of right carotid artery without cerebral infarction 1/18/2016    TIA (transient ischemic attack) 1/14/2016    Vitamin D deficiency 3/1/2016    Nephrology ordered and follow 2/22/16         Past Surgical History:   Procedure Laterality Date    COLONOSCOPY N/A 6/23/2016    COLONOSCOPY performed by Karin House MD at [facility] ENDOSCOPY    COLONOSCOPY N/A 8/6/2018    COLONOSCOPY performed by Karin House MD at [facility] ENDOSCOPY    COLONOSCOPY N/A 8/30/2018    COLONOSCOPY performed by Edda Nissen, MD at [place] [address]    HX ENDOSCOPY  06/27/2016    CAPSULE; normal small bowel    HX ENDOSCOPY  06/23/2016    upper endoscopy    HX UROLOGICAL  2013    [place] INS NEW/RPLC PRM PACEMAKER W/TRANSV ELTRD VENTR N/A 2/7/2019    INSERT PPM SINGLE VENTRICULAR performed by Jose Givens MD at [address] [facility] CATH LAB       Family History   Problem Relation Age of Onset    Heart Failure Mother     Diabetes Father     No Known Problems Sister     Other Daughter         Leotis     Other Daughter         Leotis     Other Daughter         Hashimoto        Social History     Socioeconomic History    Marital status:      Spouse name: Not on file    Number of children: Not on file    Years of education: Not on file    Highest education level: Not on file   Occupational History    Not on file Tobacco Use    Smoking status: Never     Passive exposure: Past    Smokeless tobacco: Never   Vaping Use    Vaping Use: Never used   Substance and Sexual Activity    Alcohol use: Yes     Comment: 2 drinks/week, never a heavy drinker    Drug use: No    Sexual activity: Not Currently   Other Topics Concern    Not on file   Social History Narrative        Generally active, works out in the yard often     Social Determinants of Health     Financial Resource Strain: Low Risk     Difficulty of Paying Living Expenses: Not hard at all   Food Insecurity: No Food Insecurity    Worried About [address] in the Last Year: Never true    Ran Out of Food in the Last Year: Never true   Transportation Needs: Not on file   Physical Activity: Not on file   Stress: Not on file   Social Connections: Not on file   Intimate Partner Violence: Not on file   Housing Stability: Not on file       Review of Systems   All other systems reviewed and are negative. Vitals:  Ht 5' 10.5\" (1.791 m)   Wt 189 lb (85.7 kg)   BMI 26.74 kg/m²    Body mass index is 26.74 kg/m². Ortho Exam     The patient is well-developed and well-nourished. The patient presents today in alert and oriented x3 with a normal mood and affect. The patient stands with a normal weightbearing line and walks with a normal gait. Right knee is tender to palpation along the medial joint line, and has an effusion. The patient has discomfort with Reynold´s maneuvers, and the knee is stable over the medial collateral ligament and pain with valgus stress. He lacks full flexion secondary to the effusion, but have full extension. He does have significant tenderness and fluid in his calf. His calf is very swollen compared to the opposite side. They have 5/5 strength, and are neurovascularly intact distally. There is no erythema, warmth or skin lesions present. ASSESSMENT/PLAN:      1. Pain of right knee after injury  -     XR KNEE RT MIN 4 V; Future  2. Right medial knee pain  3. Knee meniscus pain, right  4. Acute medial meniscus tear, right, initial encounter  -     MRI KNEE RT WO CONT; Future  -     bupivacaine (PF) (MARCAINE) 0.75 % (7.5 mg/mL) injection 15 mg; 15 mg (2 mL), Intra artICUlar, ONCE, 1 dose, On Fri 11/11/22 at 0900  -     methylPREDNISolone acetate (DEPO-MEDROL) 80 mg/mL injection 80 mg; 80 mg, Intra artICUlar, ONCE, 1 dose, On Fri 11/11/22 at 0900  5. Swelling of calf  6. Sprain of medial collateral ligament of right knee, initial encounter     XR Results (most recent):  Results from Appointment encounter on 11/10/22    XR KNEE RT MIN 4 V    Narrative  4 view x-rays of the right knee shows no evidence of a fracture or dislocation. There is no evidence of significant degenerative disease. There is evidence of significant calcification of his arterial system. Below is the assessment and plan developed based on review of pertinent history, physical exam, labs, studies, and medications. We discussed the patient's right knee and leg pain. His signs, symptoms, physical exam, description of his pain, description of his injury, and x-rays are consistent with an acute medial meniscus tear, MCL sprain, and Baker's cyst.  He does have significant swelling and tenderness in his calf secondary to his injury and his anticoagulation medication. He had a previous Doppler performed on his right lower extremity and the Doppler was negative. There is still persistence of noticeable swelling. The possible treatment options were discussed with the patient and because of an effusion present we elected to aspirate and inject his right knee with cortisone today to try and alleviate some of his discomfort.   The risks and benefits of the aspiration and injection were discussed in detail with the patient and under sterile prep the patient's right knee was aspirated of approximately 20 ccs of straw-colored synovial fluid and injected with 1 cc of 80 mg/cc of Depo-Medrol and 2 ccs of 0.75% Sensorcaine  He tolerated both the aspiration and injection well. Also because of the duration of his increased pain, no improvement with multiple modalities of conservative management including an at-home exercise program, his physical exam, description of pain, description of his injury, x-rays, and his inability to complete daily living activities without significant discomfort we elected to obtain an MRI of his right knee to further evaluate the severity of his acute medial meniscus tear. The MRI images and results will be used in preoperative planning if and likely when surgical intervention is necessary. The risks and benefits of the MRI were discussed in detail with the patient and he would like to proceed. We will schedule at his convenience. I will see him back after his MRI is complete to discuss the images, results, and further treatment options. **We will obtain an MRI for more information to determine the best treatment plan moving forward and help us prepare for surgical intervention if necessary. **    Return for After his right knee MRI is complete. An electronic signature was used to authenticate this note.   -- Amarjit Mattson MD

## 2022-11-11 RX ORDER — METHYLPREDNISOLONE ACETATE 80 MG/ML
80 INJECTION, SUSPENSION INTRA-ARTICULAR; INTRALESIONAL; INTRAMUSCULAR; SOFT TISSUE ONCE
Status: COMPLETED | OUTPATIENT
Start: 2022-11-11 | End: 2022-11-11

## 2022-11-11 RX ORDER — BUPIVACAINE HYDROCHLORIDE 7.5 MG/ML
2 INJECTION, SOLUTION EPIDURAL; RETROBULBAR ONCE
Status: COMPLETED | OUTPATIENT
Start: 2022-11-11 | End: 2022-11-11

## 2022-11-11 RX ADMIN — BUPIVACAINE HYDROCHLORIDE 15 MG: 7.5 INJECTION, SOLUTION EPIDURAL; RETROBULBAR at 08:07

## 2022-11-11 RX ADMIN — METHYLPREDNISOLONE ACETATE 80 MG: 80 INJECTION, SUSPENSION INTRA-ARTICULAR; INTRALESIONAL; INTRAMUSCULAR; SOFT TISSUE at 08:08

## 2022-11-14 DIAGNOSIS — S83.241A TEAR OF MEDIAL MENISCUS OF RIGHT KNEE, UNSPECIFIED TEAR TYPE, UNSPECIFIED WHETHER OLD OR CURRENT TEAR, INITIAL ENCOUNTER: Primary | ICD-10-CM

## 2022-12-01 ENCOUNTER — HOSPITAL ENCOUNTER (OUTPATIENT)
Dept: MRI IMAGING | Age: 77
End: 2022-12-01
Attending: ORTHOPAEDIC SURGERY
Payer: MEDICARE

## 2022-12-01 DIAGNOSIS — S83.241A TEAR OF MEDIAL MENISCUS OF RIGHT KNEE, UNSPECIFIED TEAR TYPE, UNSPECIFIED WHETHER OLD OR CURRENT TEAR, INITIAL ENCOUNTER: ICD-10-CM

## 2022-12-01 PROCEDURE — 73721 MRI JNT OF LWR EXTRE W/O DYE: CPT

## 2022-12-13 ENCOUNTER — OFFICE VISIT (OUTPATIENT)
Dept: ORTHOPEDIC SURGERY | Age: 77
End: 2022-12-13
Payer: MEDICARE

## 2022-12-13 DIAGNOSIS — M25.561 KNEE MENISCUS PAIN, RIGHT: ICD-10-CM

## 2022-12-13 DIAGNOSIS — M94.261 CHONDROMALACIA OF RIGHT KNEE: ICD-10-CM

## 2022-12-13 DIAGNOSIS — M25.561 PAIN OF RIGHT KNEE AFTER INJURY: Primary | ICD-10-CM

## 2022-12-13 DIAGNOSIS — M25.561 RIGHT MEDIAL KNEE PAIN: ICD-10-CM

## 2022-12-13 DIAGNOSIS — S83.411D SPRAIN OF MEDIAL COLLATERAL LIGAMENT OF RIGHT KNEE, SUBSEQUENT ENCOUNTER: ICD-10-CM

## 2022-12-13 DIAGNOSIS — M23.006 MENISCAL CYST, RIGHT: ICD-10-CM

## 2022-12-13 DIAGNOSIS — S83.271D COMPLEX TEAR OF LATERAL MENISCUS OF RIGHT KNEE, SUBSEQUENT ENCOUNTER: ICD-10-CM

## 2022-12-13 NOTE — PROGRESS NOTES
Yonny Jacinto (: 1945) is a 78 y.o. male, patient, here for evaluation of the following chief complaint(s):  Knee Pain (Right , mri results)       HPI:    He was last seen for his right knee pain on 11/10/2022. Since then, the patient did have an MRI performed on his right knee on 2022. The patient rates the severity of his right knee pain as a 1 out of 10. He gives no detail or description of his discomfort. He has been experiencing some swelling, tingling, and weakness in his right knee. He has been taking turmeric for his discomfort as needed. Of note, the patient's right knee was aspirated and injected with cortisone at his last visit. He reports that the aspiration and injection did help reduce his right knee pain. Right knee MRI images and results were independently reviewed and they were consistent with a complex tear of the anterior horn of the lateral meniscus extending to the anterior root with intrameniscal parameniscal cyst formation. Free edge fraying of the posterior horn of the medial meniscus. ACL moderate mucoid degeneration with possible split tear versus intrasubstance ganglion formation. Grade 1/2 sprain pattern of the MCL with associated ganglion formation in the medial femoral condyle. Mild to moderate tricompartmental chondrosis, most pronounced anteriorly. Joint effusion with synovitis and innumerable small linear bodies which may reflect rice bodies, and finding which can be seen with inflammatory arthropathy (e.g.,  Rheumatoid). Complex popliteal cyst with evidence of leakage. Popliteus marked tendinosis and associated ganglion formation. Allergies   Allergen Reactions    Solu-Medrol [Methylprednisolone Sodium Succ] Other (comments)     Syncope / Cardiac Arrest       Current Outpatient Medications   Medication Sig    levothyroxine (SYNTHROID) 100 mcg tablet Take 1 Tablet by mouth Daily (before breakfast).     glucosamine-chondroitin (ARTHX) 500-400 mg cap Take 1 Capsule by mouth daily. colchicine 0.6 mg tablet Day 1: 1.2 mg at the first sign of flare, followed by 0.6 mg after 1 hour; Day 2 or after:  0.6 mg once or twice daily until flare resolves    cholecalciferol, vitamin D3, 50 mcg (2,000 unit) tab Take  by mouth. TURMERIC PO Take  by mouth. B.ani/L.aci/L.nahum/L.plan/L. Brady (PROBIOTIC FORMULA PO) Take  by mouth. furosemide (LASIX) 40 mg tablet Take 40 mg by mouth daily. lisinopril (PRINIVIL, ZESTRIL) 5 mg tablet Take 5 mg by mouth daily. carvedilol (COREG) 6.25 mg tablet Take 6.25 mg by mouth two (2) times daily (with meals). ELIQUIS 5 mg tablet Take 5 mg by mouth two (2) times a day. multivitamin (ONE A DAY) tablet Take 1 Tab by mouth daily. omeprazole (PRILOSEC) 20 mg capsule Take 20 mg by mouth as needed. dextran 70-hypromellose (ARTIFICIAL TEARS) ophthalmic solution Administer 1 Drop to both eyes as needed. pravastatin (PRAVACHOL) 20 mg tablet Take 1 Tab by mouth nightly. No current facility-administered medications for this visit.        Past Medical History:   Diagnosis Date    Acute encephalopathy 1/14/2016    Anemia 5/11/2017    Atrial fibrillation (HCC)     Painter esophagus     CAD (coronary artery disease)     Diabetes (HCC)     Gout     Heart failure (HCC)     Cardiomyopathy, myocarditis    History of renal artery stenosis 8/4/2018    History of thrombocytopenia 1/14/2019    HTN, goal below 140/90     Retinopathy, diabetic, bilateral ([address] 3/11/2016    Stenosis of right carotid artery without cerebral infarction 1/18/2016    TIA (transient ischemic attack) 1/14/2016    Vitamin D deficiency 3/1/2016    Nephrology ordered and follow 2/22/16         Past Surgical History:   Procedure Laterality Date    COLONOSCOPY N/A 6/23/2016    COLONOSCOPY performed by Bentley Werner MD at [facility] ENDOSCOPY    COLONOSCOPY N/A 8/6/2018    COLONOSCOPY performed by Bentley Werner MD at [address] [place] [address] N/A 8/30/2018 COLONOSCOPY performed by Catina Becerra MD at [address] ENDOSCOPY  06/27/2016    CAPSULE; normal small bowel    HX ENDOSCOPY  06/23/2016    upper endoscopy    HX UROLOGICAL  2013    [place] INS NEW/RPLC PRM PACEMAKER W/TRANSV ELTRD VENTR N/A 2/7/2019    INSERT PPM SINGLE VENTRICULAR performed by Nicolas Francisco MD at [address] CATH LAB       Family History   Problem Relation Age of Onset    Heart Failure Mother     Diabetes Father     No Known Problems Sister     Other Daughter         Aga Freiberg    Other Daughter         Aga Freiberg    Other Daughter         Aga Freiberg        Social History     Socioeconomic History    Marital status:      Spouse name: Not on file    Number of children: Not on file    Years of education: Not on file    Highest education level: Not on file   Occupational History    Not on file   Tobacco Use    Smoking status: Never     Passive exposure: Past    Smokeless tobacco: Never   Vaping Use    Vaping Use: Never used   Substance and Sexual Activity    Alcohol use: Yes     Comment: 2 drinks/week, never a heavy drinker    Drug use: No    Sexual activity: Not Currently   Other Topics Concern    Not on file   Social History Narrative        Generally active, works out in the yard often     Social Determinants of Health     Financial Resource Strain: Low Risk     Difficulty of Paying Living Expenses: Not hard at all   Food Insecurity: No Food Insecurity    Worried About [address] in the Last Year: Never true    [address] in the Last Year: Never true   Transportation Needs: Not on file   Physical Activity: Not on file   Stress: Not on file   Social Connections: Not on file   Intimate Partner Violence: Not on file   Housing Stability: Not on file       Review of Systems   All other systems reviewed and are negative. Vitals: There were no vitals taken for this visit. There is no height or weight on file to calculate BMI.     Ortho Exam     The patient is well-developed and well-nourished. The patient presents today in alert and oriented x3 with a normal mood and affect. The patient stands with a normal weightbearing line but walks with a slightly antalgic gait because of his right knee pain. Right knee has some residual tenderness over his medial, lateral, and patellofemoral joint line with no significant effusion. He does have very mild discomfort with Reynold's maneuvers. There is no significant tenderness over his MCL. He has little to no pain with valgus stress. He has approximately 130 degrees of flexion. There is some slight limitation in terminal flexion. He reports no calf tenderness. His calf tenderness and fluid in his calf has completely resolved itself since his last visit. He has well-maintained strength. He can easily do a seated straight leg raise without discomfort. There is a negative Lachman's. His sensations are intact and his pulses are 2+. His skin is normal.      ASSESSMENT/PLAN:      1. Pain of right knee after injury  2. Right medial knee pain  3. Knee meniscus pain, right  4. Complex tear of lateral meniscus of right knee, subsequent encounter  -     REFERRAL TO PHYSICAL THERAPY  5. Meniscal cyst, right  6. Chondromalacia of right knee  -     REFERRAL TO PHYSICAL THERAPY  7. Sprain of medial collateral ligament of right knee, subsequent encounter     Below is the assessment and plan developed based on review of pertinent history, physical exam, labs, studies, and medications. **The patient was referred to formal physical therapy. **    We discussed the patient's ongoing right knee pain and we independently reviewed his MRI images and results and they were consistent with a complex tear of the anterior horn of the lateral meniscus extending to the anterior root with intrameniscal parameniscal cyst formation. Free edge fraying of the posterior horn of the medial meniscus.   ACL moderate mucoid degeneration with possible split tear versus intrasubstance ganglion formation. Grade 1/2 sprain pattern of the MCL with associated ganglion formation in the medial femoral condyle. Mild to moderate tricompartmental chondrosis, most pronounced anteriorly. Joint effusion with synovitis and innumerable small linear bodies which may reflect rice bodies, and finding which can be seen with inflammatory arthropathy (e.g.,  Rheumatoid). Complex popliteal cyst with evidence of leakage. Popliteus marked tendinosis and associated ganglion formation. The possible treatment options were discussed with the patient and because his pain has improved significantly with his previous aspiration and cortisone injection we elected to treat his right knee pain with rest, ice, elevation, activity modification, and anti-inflammatory medication. The patient will work on range of motion, strengthening, stretching, and balance activities and exercises at formal physical therapy. He will also work on these exercises with an at-home exercise program as pain tolerates. I will see him back in 4 weeks for reevaluation if he has continued persistence of his pain however, if his pain has improved and is well-maintained I will see him back on an as-needed basis. Return in about 4 weeks (around 1/10/2023), or if symptoms worsen or fail to improve. An electronic signature was used to authenticate this note.   -- Jerman Marquez MD

## 2023-05-05 ENCOUNTER — OFFICE VISIT (OUTPATIENT)
Dept: FAMILY MEDICINE CLINIC | Age: 78
End: 2023-05-05

## 2023-05-05 VITALS
DIASTOLIC BLOOD PRESSURE: 78 MMHG | WEIGHT: 189 LBS | OXYGEN SATURATION: 98 % | RESPIRATION RATE: 16 BRPM | SYSTOLIC BLOOD PRESSURE: 130 MMHG | TEMPERATURE: 97.8 F | BODY MASS INDEX: 27.06 KG/M2 | HEART RATE: 61 BPM | HEIGHT: 70 IN

## 2023-05-05 DIAGNOSIS — Z13.6 SCREENING FOR CARDIOVASCULAR CONDITION: ICD-10-CM

## 2023-05-05 DIAGNOSIS — I48.0 PAROXYSMAL ATRIAL FIBRILLATION (HCC): ICD-10-CM

## 2023-05-05 DIAGNOSIS — E03.8 HYPOTHYROIDISM DUE TO HASHIMOTO'S THYROIDITIS: ICD-10-CM

## 2023-05-05 DIAGNOSIS — Z12.5 PROSTATE CANCER SCREENING: ICD-10-CM

## 2023-05-05 DIAGNOSIS — E55.9 VITAMIN D DEFICIENCY: ICD-10-CM

## 2023-05-05 DIAGNOSIS — M1A.9XX0 CHRONIC GOUT WITHOUT TOPHUS, UNSPECIFIED CAUSE, UNSPECIFIED SITE: ICD-10-CM

## 2023-05-05 DIAGNOSIS — E11.21 TYPE 2 DIABETES WITH NEPHROPATHY (HCC): ICD-10-CM

## 2023-05-05 DIAGNOSIS — E06.3 HYPOTHYROIDISM DUE TO HASHIMOTO'S THYROIDITIS: ICD-10-CM

## 2023-05-05 DIAGNOSIS — N18.4 CKD (CHRONIC KIDNEY DISEASE) STAGE 4, GFR 15-29 ML/MIN (HCC): ICD-10-CM

## 2023-05-05 DIAGNOSIS — Z00.00 ENCOUNTER FOR MEDICARE ANNUAL WELLNESS EXAM: Primary | ICD-10-CM

## 2023-05-12 ENCOUNTER — TELEPHONE (OUTPATIENT)
Age: 78
End: 2023-05-12

## 2023-05-12 RX ORDER — LEVOTHYROXINE SODIUM 112 UG/1
112 TABLET ORAL DAILY
Qty: 90 TABLET | Refills: 1 | Status: SHIPPED | OUTPATIENT
Start: 2023-05-12

## 2023-05-12 NOTE — TELEPHONE ENCOUNTER
Please call patient regarding results (requested through Riki Pop as results did not populate in his chart):    Kidney function is about the same as it was in November but is overall worse over the past few months. He can also discuss with his cardiologist if he could possibly reduce his Lasix dose as this is likely contributing to his worsening kidney function. He has several other lab readings that are all attributed to his chronic kidney disease including anemia, elevated uric acid, and high potassium. Please fax lab results [facility] to [facility] (nephrology) as he has an upcoming appointment with him. His thyroid level is too low; I will send a higher dose of his thyroid medication. Sugar, prostate marker, and cholesterol are normal.    Follow up with me in 3 months.

## 2023-05-12 NOTE — TELEPHONE ENCOUNTER
Called patient. Two patient identifiers verified. Discussed lab results per provider's note. Verbalized understanding. Faxed results to [facility] (nephrology) per provider.

## 2023-07-19 ENCOUNTER — OFFICE VISIT (OUTPATIENT)
Age: 78
End: 2023-07-19
Payer: MEDICARE

## 2023-07-19 VITALS
HEART RATE: 70 BPM | RESPIRATION RATE: 18 BRPM | TEMPERATURE: 97 F | WEIGHT: 187.8 LBS | OXYGEN SATURATION: 98 % | HEIGHT: 70 IN | BODY MASS INDEX: 26.88 KG/M2 | SYSTOLIC BLOOD PRESSURE: 100 MMHG | DIASTOLIC BLOOD PRESSURE: 70 MMHG

## 2023-07-19 DIAGNOSIS — E11.21 TYPE 2 DIABETES WITH NEPHROPATHY (HCC): ICD-10-CM

## 2023-07-19 DIAGNOSIS — L03.031 CELLULITIS OF TOE OF RIGHT FOOT: Primary | ICD-10-CM

## 2023-07-19 DIAGNOSIS — R60.0 BILATERAL LEG EDEMA: ICD-10-CM

## 2023-07-19 PROCEDURE — 1036F TOBACCO NON-USER: CPT | Performed by: STUDENT IN AN ORGANIZED HEALTH CARE EDUCATION/TRAINING PROGRAM

## 2023-07-19 PROCEDURE — 3078F DIAST BP <80 MM HG: CPT | Performed by: STUDENT IN AN ORGANIZED HEALTH CARE EDUCATION/TRAINING PROGRAM

## 2023-07-19 PROCEDURE — 1123F ACP DISCUSS/DSCN MKR DOCD: CPT | Performed by: STUDENT IN AN ORGANIZED HEALTH CARE EDUCATION/TRAINING PROGRAM

## 2023-07-19 PROCEDURE — 3017F COLORECTAL CA SCREEN DOC REV: CPT | Performed by: STUDENT IN AN ORGANIZED HEALTH CARE EDUCATION/TRAINING PROGRAM

## 2023-07-19 PROCEDURE — 3046F HEMOGLOBIN A1C LEVEL >9.0%: CPT | Performed by: STUDENT IN AN ORGANIZED HEALTH CARE EDUCATION/TRAINING PROGRAM

## 2023-07-19 PROCEDURE — 99214 OFFICE O/P EST MOD 30 MIN: CPT | Performed by: STUDENT IN AN ORGANIZED HEALTH CARE EDUCATION/TRAINING PROGRAM

## 2023-07-19 PROCEDURE — G8427 DOCREV CUR MEDS BY ELIG CLIN: HCPCS | Performed by: STUDENT IN AN ORGANIZED HEALTH CARE EDUCATION/TRAINING PROGRAM

## 2023-07-19 PROCEDURE — 2022F DILAT RTA XM EVC RTNOPTHY: CPT | Performed by: STUDENT IN AN ORGANIZED HEALTH CARE EDUCATION/TRAINING PROGRAM

## 2023-07-19 PROCEDURE — G8419 CALC BMI OUT NRM PARAM NOF/U: HCPCS | Performed by: STUDENT IN AN ORGANIZED HEALTH CARE EDUCATION/TRAINING PROGRAM

## 2023-07-19 PROCEDURE — 3074F SYST BP LT 130 MM HG: CPT | Performed by: STUDENT IN AN ORGANIZED HEALTH CARE EDUCATION/TRAINING PROGRAM

## 2023-07-19 RX ORDER — SULFAMETHOXAZOLE AND TRIMETHOPRIM 800; 160 MG/1; MG/1
1 TABLET ORAL 2 TIMES DAILY
Qty: 14 TABLET | Refills: 0 | Status: SHIPPED | OUTPATIENT
Start: 2023-07-19 | End: 2023-07-21

## 2023-07-19 RX ORDER — MULTIVITAMIN
1 TABLET ORAL DAILY
COMMUNITY

## 2023-07-19 RX ORDER — CEPHALEXIN 500 MG/1
500 CAPSULE ORAL 4 TIMES DAILY
Qty: 28 CAPSULE | Refills: 0 | Status: SHIPPED | OUTPATIENT
Start: 2023-07-19 | End: 2023-07-26

## 2023-07-19 SDOH — ECONOMIC STABILITY: INCOME INSECURITY: HOW HARD IS IT FOR YOU TO PAY FOR THE VERY BASICS LIKE FOOD, HOUSING, MEDICAL CARE, AND HEATING?: NOT HARD AT ALL

## 2023-07-19 SDOH — ECONOMIC STABILITY: HOUSING INSECURITY
IN THE LAST 12 MONTHS, WAS THERE A TIME WHEN YOU DID NOT HAVE A STEADY PLACE TO SLEEP OR SLEPT IN A SHELTER (INCLUDING NOW)?: NO

## 2023-07-19 SDOH — ECONOMIC STABILITY: FOOD INSECURITY: WITHIN THE PAST 12 MONTHS, THE FOOD YOU BOUGHT JUST DIDN'T LAST AND YOU DIDN'T HAVE MONEY TO GET MORE.: NEVER TRUE

## 2023-07-19 SDOH — ECONOMIC STABILITY: FOOD INSECURITY: WITHIN THE PAST 12 MONTHS, YOU WORRIED THAT YOUR FOOD WOULD RUN OUT BEFORE YOU GOT MONEY TO BUY MORE.: NEVER TRUE

## 2023-07-19 ASSESSMENT — PATIENT HEALTH QUESTIONNAIRE - PHQ9
SUM OF ALL RESPONSES TO PHQ QUESTIONS 1-9: 0
SUM OF ALL RESPONSES TO PHQ9 QUESTIONS 1 & 2: 0
SUM OF ALL RESPONSES TO PHQ QUESTIONS 1-9: 0
2. FEELING DOWN, DEPRESSED OR HOPELESS: 0
1. LITTLE INTEREST OR PLEASURE IN DOING THINGS: 0

## 2023-07-19 NOTE — PROGRESS NOTES
Chief Complaint   Patient presents with    Toe Injury     Right foot big toe lost toe nail and middle toe loosing toe nail. 1. \"Have you been to the ER, urgent care clinic since your last visit? Hospitalized since your last visit? \" no    2. \"Have you seen or consulted any other health care providers outside of the [address] [place] [address] since your last visit? \" yes - Dr. Rashmi Nayak 7/18/23    3. For patients aged 43-73: Has the patient had a colonoscopy / FIT/ Cologuard? No      If the patient is female:    4. For patients aged 43-66: Has the patient had a mammogram within the past 2 years? no      5. For patients aged 21-65: Has the patient had a pap smear? no    PHQ-9  7/19/2023   Little interest or pleasure in doing things 0   Little interest or pleasure in doing things -   Feeling down, depressed, or hopeless 0   Trouble falling or staying asleep, or sleeping too much -   Feeling tired or having little energy -   Poor appetite or overeating -   Feeling bad about yourself - or that you are a failure or have let yourself or your family down -   Trouble concentrating on things, such as reading the newspaper or watching television -   Moving or speaking so slowly that other people could have noticed.  Or the opposite - being so fidgety or restless that you have been moving around a lot more than usual -   PHQ-2 Score 0   Total Score PHQ 2 -   PHQ-9 Total Score 0   How difficult have these problems made it for you to do your work, take care of your home and get along with others -           Financial Resource Strain: Low Risk     Difficulty of Paying Living Expenses: Not hard at all      Food Insecurity: No Food Insecurity    Worried About Running Out of Food in the Last Year: Never true    [address] in the Last Year: Never true          Health Maintenance Due   Topic Date Due    Shingles vaccine (1 of 2) Never done    Diabetic retinal exam  10/17/2018    DTaP/Tdap/Td vaccine (1 - Tdap) 04/17/2020
LE swelling here today for right 3rd digit cellulitis and erythema of the right Le with mild warmth to touch. Started on abx therapy for focal cellulitis with strict return precautions. Referred to podiatry for routine nail and foot care. 1. Cellulitis of toe of right foot  - sulfamethoxazole-trimethoprim (BACTRIM DS;SEPTRA DS) 800-160 MG per tablet; Take 1 tablet by mouth 2 times daily for 7 days  Dispense: 14 tablet; Refill: 0  - cephALEXin (KEFLEX) 500 MG capsule; Take 1 capsule by mouth 4 times daily for 7 days  Dispense: 28 capsule; Refill: 0    2. Bilateral leg edema\" 2/2 cirrhosis and manged with cardiology and with recent removal of 8L through paracentesis   - [facility] - Birgit Prado DPONEYDA, Podiatry, [address] [place] [address] ([address])    3. Type 2 diabetes with nephropathy ([address] [facility])  - [facility] - Wes Bautista DPM, Podiatry, [place] ([address])      Patient informed to follow up:   Return if symptoms worsen or fail to improve, for routine care with your PCP (f/u with [facility] 8/15). I have discussed the diagnosis with the patient and the intended plan as seen in the above orders. Patient verbalized understanding of the plan and agrees with the plan. The patient has received an after-visit summary and questions were answered concerning future plans. I have discussed medication side effects and warnings with the patient as well. Informed patient to return to the office if new symptoms arise.       Sanaz Wild MD  [facility]

## 2023-07-21 ENCOUNTER — TELEPHONE (OUTPATIENT)
Age: 78
End: 2023-07-21

## 2023-07-21 RX ORDER — DOXYCYCLINE 100 MG/1
100 CAPSULE ORAL 2 TIMES DAILY
Qty: 14 CAPSULE | Refills: 0 | Status: SHIPPED | OUTPATIENT
Start: 2023-07-21 | End: 2023-07-28

## 2023-07-21 NOTE — TELEPHONE ENCOUNTER
Agree that he should not take Bactrim given his kidney function. Would stop Bactrim and I will send in a prescription for doxycycline. He is to still take the Keflex that Dr. Adolfo Suh gave him but should take 500 mg every 8 hours (it is written every 6 hours but this needs to be lowered due to his kidney function). Duration is total of 7 days for each abx so he can just discard the left over Keflex after 7 days.

## 2023-07-21 NOTE — TELEPHONE ENCOUNTER
Pt's wife's Cha Hippo called said pt was prescribed Bactrim. She is concerned due to him being a renal pt. Also needs to discuss the diagnose noted in the pt's chart.        [facility]# [phone]

## 2023-07-26 ENCOUNTER — TELEPHONE (OUTPATIENT)
Age: 78
End: 2023-07-26

## 2023-07-26 NOTE — TELEPHONE ENCOUNTER
Patient saw that Cirrhosis was noted in his office visit with Dr. Johnny Rahman under subjective. He and his wife would like clarification, does he have cirrhosis bsed on any tests that Dr. Johnny Rahman saw? Or was this put in error? (If so they would like it removed from chart). It was not a diagnosis he was aware of, so he would just like to be sure.

## 2023-07-28 NOTE — TELEPHONE ENCOUNTER
Called patient   2 pt identifiers   Spoke to the patient   Patient advised that Pio Burch is out of the office until August 7th and that is when the word Cirrhosis will be removed from the chart  Patient shown understanding and had no further questions or concerns

## 2023-07-28 NOTE — TELEPHONE ENCOUNTER
Pt's wife Grey Petersen called said they  checked the chart and the diagnose is still on it and they want it be removed.      BCB# [phone]

## 2023-08-15 ENCOUNTER — OFFICE VISIT (OUTPATIENT)
Age: 78
End: 2023-08-15
Payer: MEDICARE

## 2023-08-15 VITALS
DIASTOLIC BLOOD PRESSURE: 70 MMHG | BODY MASS INDEX: 28.46 KG/M2 | WEIGHT: 198.8 LBS | HEART RATE: 54 BPM | RESPIRATION RATE: 14 BRPM | HEIGHT: 70 IN | SYSTOLIC BLOOD PRESSURE: 128 MMHG | OXYGEN SATURATION: 97 % | TEMPERATURE: 97.2 F

## 2023-08-15 DIAGNOSIS — E03.8 OTHER SPECIFIED HYPOTHYROIDISM: ICD-10-CM

## 2023-08-15 DIAGNOSIS — R60.0 BILATERAL LEG EDEMA: ICD-10-CM

## 2023-08-15 DIAGNOSIS — E78.5 HYPERLIPIDEMIA, UNSPECIFIED HYPERLIPIDEMIA TYPE: ICD-10-CM

## 2023-08-15 DIAGNOSIS — M1A.9XX0 CHRONIC GOUT WITHOUT TOPHUS, UNSPECIFIED CAUSE, UNSPECIFIED SITE: ICD-10-CM

## 2023-08-15 DIAGNOSIS — D63.8 ANEMIA IN OTHER CHRONIC DISEASES CLASSIFIED ELSEWHERE: ICD-10-CM

## 2023-08-15 DIAGNOSIS — E11.21 TYPE 2 DIABETES WITH NEPHROPATHY (HCC): ICD-10-CM

## 2023-08-15 DIAGNOSIS — N18.4 CHRONIC KIDNEY DISEASE, STAGE 4 (SEVERE) (HCC): Primary | ICD-10-CM

## 2023-08-15 PROCEDURE — 99214 OFFICE O/P EST MOD 30 MIN: CPT | Performed by: FAMILY MEDICINE

## 2023-08-15 PROCEDURE — 2022F DILAT RTA XM EVC RTNOPTHY: CPT | Performed by: FAMILY MEDICINE

## 2023-08-15 PROCEDURE — 3074F SYST BP LT 130 MM HG: CPT | Performed by: FAMILY MEDICINE

## 2023-08-15 PROCEDURE — 3046F HEMOGLOBIN A1C LEVEL >9.0%: CPT | Performed by: FAMILY MEDICINE

## 2023-08-15 PROCEDURE — G8427 DOCREV CUR MEDS BY ELIG CLIN: HCPCS | Performed by: FAMILY MEDICINE

## 2023-08-15 PROCEDURE — 3017F COLORECTAL CA SCREEN DOC REV: CPT | Performed by: FAMILY MEDICINE

## 2023-08-15 PROCEDURE — 1036F TOBACCO NON-USER: CPT | Performed by: FAMILY MEDICINE

## 2023-08-15 PROCEDURE — G8419 CALC BMI OUT NRM PARAM NOF/U: HCPCS | Performed by: FAMILY MEDICINE

## 2023-08-15 PROCEDURE — 1123F ACP DISCUSS/DSCN MKR DOCD: CPT | Performed by: FAMILY MEDICINE

## 2023-08-15 PROCEDURE — 3078F DIAST BP <80 MM HG: CPT | Performed by: FAMILY MEDICINE

## 2023-08-15 ASSESSMENT — ENCOUNTER SYMPTOMS
ABDOMINAL DISTENTION: 1
VOMITING: 0
CHEST TIGHTNESS: 0
DIARRHEA: 0
WHEEZING: 0
COUGH: 0
SHORTNESS OF BREATH: 0
NAUSEA: 0
ABDOMINAL PAIN: 0
CONSTIPATION: 0

## 2023-08-15 NOTE — PATIENT INSTRUCTIONS
[address] at [facility]  [address] [facility], [address] [address] [facility], [address] [place] [address]  [phone]    [address] [place] [address]  To schedule an appointment, please call [facility] [address] at [phone]

## 2023-08-15 NOTE — PROGRESS NOTES
Chief Complaint   Patient presents with    Follow-up         1. \"Have you been to the ER, urgent care clinic since your last visit? Hospitalized since your last visit? \" no    2. \"Have you seen or consulted any other health care providers outside of the [address] [place] [address] since your last visit? \" no      PHQ-9  7/19/2023   Little interest or pleasure in doing things 0   Little interest or pleasure in doing things -   Feeling down, depressed, or hopeless 0   Trouble falling or staying asleep, or sleeping too much -   Feeling tired or having little energy -   Poor appetite or overeating -   Feeling bad about yourself - or that you are a failure or have let yourself or your family down -   Trouble concentrating on things, such as reading the newspaper or watching television -   Moving or speaking so slowly that other people could have noticed.  Or the opposite - being so fidgety or restless that you have been moving around a lot more than usual -   PHQ-2 Score 0   Total Score PHQ 2 -   PHQ-9 Total Score 0   How difficult have these problems made it for you to do your work, take care of your home and get along with others -           Financial Resource Strain: Low Risk     Difficulty of Paying Living Expenses: Not hard at all      Food Insecurity: No Food Insecurity    Worried About Running Out of Food in the Last Year: Never true    [address] in the Last Year: Never true          Health Maintenance Due   Topic Date Due    Shingles vaccine (1 of 2) Never done    Diabetic retinal exam  10/17/2018    DTaP/Tdap/Td vaccine (1 - Tdap) 04/17/2020    Diabetic foot exam  05/07/2021    COVID-19 Vaccine (4 - Booster for Pfizer series) 01/16/2022    Colorectal Cancer Screen  04/13/2022    Lipids  05/31/2023    Flu vaccine (1) 08/01/2023

## 2023-08-15 NOTE — PROGRESS NOTES
Patient Name: Dam[place]o [place]   MRN: [id number]    Justice Mclean is a 78 y.o. male who presents with the following:     Patient continues to see nephrology for his CKD who has adjusted some of his diuretics. He also sees Dr. Sujit Marin with gastroenterology who did a paracentesis due to increased abdominal distention from ascites. Main concern today is ongoing bilateral leg swelling. States that when it becomes very swollen, he develops blisters and was recently treated for cellulitis. Denies any worsening symptoms today of his swelling. He is wondering if he may have light chain amyloidosis as he thinks that he has several of the symptoms that go along with it. He would like to pursue a bone marrow biopsy to confirm this. His labs are notable for anemia, supposedly from chronic kidney disease. Review of Systems   Constitutional:  Positive for fatigue. Negative for activity change, appetite change, fever and unexpected weight change. Respiratory:  Negative for cough, chest tightness, shortness of breath and wheezing. Cardiovascular:  Positive for leg swelling. Negative for chest pain and palpitations. Gastrointestinal:  Positive for abdominal distention. Negative for abdominal pain, constipation, diarrhea, nausea and vomiting. Genitourinary:  Negative for dysuria, frequency and urgency. Skin:  Negative for rash. Neurological:  Negative for dizziness, weakness and headaches. Psychiatric/Behavioral:  Negative for dysphoric mood and suicidal ideas. The patient is not nervous/anxious. All other systems reviewed and are negative. The patient's medications, allergies, past medical history, surgical history, family history and social history were reviewed and updated where appropriate.       Current Outpatient Medications:     Multiple Vitamin (DAILY VITES) TABS, Take 1 tablet by mouth daily, Disp: , Rfl:     levothyroxine (SYNTHROID) 112 MCG tablet, Take 1 tablet by mouth daily,

## 2023-08-31 ENCOUNTER — TELEPHONE (OUTPATIENT)
Age: 78
End: 2023-08-31

## 2023-08-31 NOTE — TELEPHONE ENCOUNTER
MD Nba Baez,    This can wait until MD Nba Baez returns on Tues, 9/5/23 per patient ok. Patient call requesting a new prescription for either Allopurinol OR Uloric. Patient stated saw nephrologist and MD ok'd either the Allopurinol or Uloric as \"would not affect kidneys\". Thanks, Angela Pill should go to [facility] when med determined.   Thanks, [id number] [address] Tracking Only  Program: Medication Refill  Intervention Detail: New Rx: 1, reason: Patient Preference  Time Spent (min): 10

## 2023-09-04 NOTE — TELEPHONE ENCOUNTER
I would defer gout medications to his kidney doctor at this point. These medications may be used but based on his chronic kidney disease, they likely need to be dosed differently based on his kidney function so I would defer to his kidney doctor.

## 2023-09-05 NOTE — TELEPHONE ENCOUNTER
Called Patient. Name and  confirmed. Informed him that you need to contact your kidney doctor. He stated he did. Kidney doctor suggested him to take allopurinol or uloric.

## 2023-09-06 NOTE — TELEPHONE ENCOUNTER
Called Patient. Name and  confirmed. Informed him that dr. Lesley Santana would prefer for the prescription of medication to come from your kidney doctor so you can appropriate dose the medications based on your kidney function.

## 2023-09-07 ENCOUNTER — TELEPHONE (OUTPATIENT)
Age: 78
End: 2023-09-07

## 2023-09-07 RX ORDER — LEVOTHYROXINE SODIUM 137 UG/1
137 TABLET ORAL DAILY
Qty: 90 TABLET | Refills: 3 | Status: SHIPPED | OUTPATIENT
Start: 2023-09-07

## 2023-09-07 NOTE — TELEPHONE ENCOUNTER
Reviewed [address] [employer]; sent [facility] [employer] message as below:    I received your lab results from [facility].  Your kidney function is a little more abnormal than usual. Creatinine is a little bit higher than your baseline at 3.12 with your GFR at 20 mL/min. Please discuss this further with your kidney doctor. Your uric acid/gout level is also elevated at 11.8. Please discuss with your kidney doctor about appropriate gout medications as he needs to appropriately dose medications based on your current kidney function. Your thyroid level is very underdosed  (TSH 8.012). I will increase your levothyroxine dose to 137 mcg daily and we need to recheck your thyroid level again in 2 weeks. You continue to have iron deficiency anemia (8.1/25.9). As discussed during your  last appointment, please see the hematologist to determine the cause of this anemia and possibly discuss your concern for light chain amyloidosis.

## 2023-09-20 ENCOUNTER — TELEPHONE (OUTPATIENT)
Age: 78
End: 2023-09-20

## 2023-09-20 NOTE — TELEPHONE ENCOUNTER
----- Message from Andres Bacon sent at 9/20/2023  9:35 AM EDT -----  Subject: Message to Provider    QUESTIONS  Information for Provider? Patient called and would like an appointment   with pharmacist by the name of Juliette Fearing to discuss his new medications and   the instructions. Please call back to schedule.   ---------------------------------------------------------------------------  --------------  Tiny MEAD  [phone]; OK to leave message on voicemail  ---------------------------------------------------------------------------  --------------  SCRIPT ANSWERS  Relationship to Patient?  Self

## 2023-09-25 ENCOUNTER — PHARMACY VISIT (OUTPATIENT)
Age: 78
End: 2023-09-25

## 2023-09-25 DIAGNOSIS — Z71.89 ENCOUNTER FOR MEDICATION REVIEW AND COUNSELING: Primary | ICD-10-CM

## 2023-09-25 NOTE — PATIENT INSTRUCTIONS
Today we discussed the timing of your medications    Early Morning  Levothyroxine 137 mcg daily    With Breakfast  Carvedilol 6.25 mg  Eliquis 5 mg - currently taking 2.5 mg due to bruising (has discussed with cardiologist)  Furosemide 40 mg tablet - currently taking 2 tablets (80 mg)  Probiotic  Lisinopril 5 mg - currently on hold    With Dinner  Multivitamin  Iron supplement  Turmeric/Ashley  Glucosamine/Chondroitin    At Bedtime  Carvedilol 6.25 mg  Eliquis 5 mg - currently taking 2.5 mg due to bruising (has discussed with cardiologist)  Tamsulosin 0.4 mg  Finasteride 5 mg  Pravastatin 20 mg  Omeprazole 20 mg    Pharmacist Contact Information:  Jose Starkey, PharmD, [address] [address]  Work Cell: [phone]

## 2023-09-25 NOTE — PROGRESS NOTES
([address])     Cardiomyopathy, myocarditis    History of renal artery stenosis 8/4/2018    History of thrombocytopenia 1/14/2019    HTN, goal below 140/90     Retinopathy, diabetic, bilateral ([address]) 3/11/2016    Stenosis of right carotid artery without cerebral infarction 1/18/2016    TIA (transient ischemic attack) 1/14/2016    Vitamin D deficiency 3/1/2016    Nephrology ordered and follow 2/22/16      Allergies   Allergen Reactions    Methylprednisolone Sodium Succ Other (See Comments)     Syncope / Cardiac Arrest       Current Outpatient Medications   Medication Sig    levothyroxine (SYNTHROID) 137 MCG tablet Take 1 tablet by mouth daily    Multiple Vitamin (DAILY VITES) TABS Take 1 tablet by mouth daily    TURMERIC PO Take by mouth    apixaban (ELIQUIS) 5 MG TABS tablet Take 1 tablet by mouth 2 times daily    carvedilol (COREG) 6.25 MG tablet Take 1 tablet by mouth 2 times daily (with meals)    Cholecalciferol 50 MCG (2000 UT) TABS Take by mouth    dextran 70-hypromellose (TEARS NATURALE) 0.1-0.3 % SOLN opthalmic solution Apply 1 drop to eye as needed    furosemide (LASIX) 40 MG tablet Take 1 tablet by mouth daily    glucosamine-chondroitin 500-400 MG CAPS Take 1 capsule by mouth daily    lisinopril (PRINIVIL;ZESTRIL) 5 MG tablet Take 1 tablet by mouth daily    omeprazole (PRILOSEC) 20 MG delayed release capsule Take 1 capsule by mouth as needed    pravastatin (PRAVACHOL) 20 MG tablet Take 1 tablet by mouth     No current facility-administered medications for this visit.      Lab Results   Component Value Date/Time     11/01/2022 10:07 AM    K 5.2 11/01/2022 10:07 AM     11/01/2022 10:07 AM    CO2 27 11/01/2022 10:07 AM    BUN 75 11/01/2022 10:07 AM    GFRAA 32 06/20/2022 08:22 AM    GLOB 3.6 06/20/2022 08:22 AM    ALT 21 06/20/2022 08:22 AM     Lab Results   Component Value Date/Time    CHOL 124 05/31/2022 01:58 PM    HDL 37 05/31/2022 01:58 PM     Lab Results   Component Value Date/Time    WBC 3.3

## 2023-09-27 ENCOUNTER — TELEPHONE (OUTPATIENT)
Age: 78
End: 2023-09-27

## 2023-09-27 DIAGNOSIS — D63.8 ANEMIA IN OTHER CHRONIC DISEASES CLASSIFIED ELSEWHERE: Primary | ICD-10-CM

## 2023-09-27 NOTE — TELEPHONE ENCOUNTER
----- Message from Talha Palmer sent at 9/27/2023  3:23 PM EDT -----  Subject: Referral Request    Reason for referral request? Patient phoned would like his hematology   referral sent to Dr Jamie Givens (or first available physician) @   [facility]; please call patient when order has been written   & sent  Provider patient wants to be referred to(if known):     Provider Phone Number(if known):     Additional Information for Provider?   ---------------------------------------------------------------------------  --------------  [address]    [phone]; OK to leave message on voicemail  ---------------------------------------------------------------------------  --------------

## 2023-09-28 NOTE — TELEPHONE ENCOUNTER
Updated patient's referral order and routed/faxed over to [address] [place] [address] as requested by patient    Close [address] [facility]  Referral Specialist  [id number] [facility]

## 2023-10-27 ENCOUNTER — TELEPHONE (OUTPATIENT)
Age: 78
End: 2023-10-27

## 2023-10-27 NOTE — TELEPHONE ENCOUNTER
Elias Erazo called from At [address] [place] [address] requesting last office note to be faxed over.     Fax # [phone]    Best call back # [phone]

## 2023-11-02 ENCOUNTER — TELEPHONE (OUTPATIENT)
Age: 78
End: 2023-11-02

## 2023-11-02 NOTE — TELEPHONE ENCOUNTER
Spoke with pt's wife was calling about appointment with Dr. Cristina months ago.Pt's wife would like the dx  cirrhosis to be taken out of pt's chart, and would like a call back.Best call back number [phone]

## 2023-11-03 NOTE — TELEPHONE ENCOUNTER
Call and spoke to patient wife, two ID confirmed.  Writer informed her that  DX was removed.     She informed writer that DX:: is Cadiac Ascites , she also want MD to be updated.

## 2023-11-09 RX ORDER — LEVOTHYROXINE SODIUM 112 UG/1
112 TABLET ORAL DAILY
Qty: 90 TABLET | Refills: 1 | OUTPATIENT
Start: 2023-11-09

## 2023-11-09 NOTE — TELEPHONE ENCOUNTER
PCP: Mervin Huggins MD    Last appt: 9/25/2023       Future Appointments   Date Time Provider Department Center   1/12/2024  2:00 PM Ramo Mo MD [facility]       Requested Prescriptions     Pending Prescriptions Disp Refills    levothyroxine (SYNTHROID) 112 MCG tablet [Pharmacy Med Name: LEVOTHYROXINE 112 MCG TABLET] 90 tablet 1     Sig: TAKE 1 TABLET BY MOUTH EVERY DAY       Prior labs and Blood pressures:  BP Readings from Last 3 Encounters:   08/15/23 128/70   07/19/23 100/70   05/05/23 130/78     Lab Results   Component Value Date/Time     11/01/2022 10:07 AM    K 5.2 11/01/2022 10:07 AM     11/01/2022 10:07 AM    CO2 27 11/01/2022 10:07 AM    BUN 75 11/01/2022 10:07 AM    GFRAA 32 06/20/2022 08:22 AM     No results found for: \"HBA1C\", \"[id number]\"  Lab Results   Component Value Date/Time    CHOL 124 05/31/2022 01:58 PM    HDL 37 05/31/2022 01:58 PM     No results found for: \"VITD3\", \"VD3RIA\"    Lab Results   Component Value Date/Time    TSH 7.42 11/01/2022 10:07 AM

## 2024-09-10 ENCOUNTER — TELEPHONE (OUTPATIENT)
Age: 79
End: 2024-09-10

## 2024-10-25 ENCOUNTER — TELEPHONE (OUTPATIENT)
Age: 79
End: 2024-10-25

## 2024-10-25 NOTE — TELEPHONE ENCOUNTER
Chief Complaint   Patient presents with    Appointment Requested     Physical      Called Patient. Left voice mail message to return call back to the office.

## (undated) DEVICE — Device: Brand: PADPRO

## (undated) DEVICE — KIT MFLD ISOLATN NACL CNTRST PRT TBNG SPIK W/ PRSS TRNSDUC

## (undated) DEVICE — 1200 GUARD II KIT W/5MM TUBE W/O VAC TUBE: Brand: GUARDIAN

## (undated) DEVICE — PINNACLE INTRODUCER SHEATH: Brand: PINNACLE

## (undated) DEVICE — BAG BELONG PT PERS CLEAR HANDL

## (undated) DEVICE — CONTAINER SPEC 20 ML LID NEUT BUFF FORMALIN 10 % POLYPR STS

## (undated) DEVICE — CATH IV AUTOGRD BC BLU 22GA 25 -- INSYTE

## (undated) DEVICE — CATHETER PACE 5FR L110CM 1CM ELECTRD SPACE R HRT CARD VENT

## (undated) DEVICE — SYR 5ML 1/5 GRAD LL NSAF LF --

## (undated) DEVICE — Z DISCONTINUED USE 2751540 TUBING IRRIG L10IN DISP PMP ENDOGATOR

## (undated) DEVICE — NDL FLTR TIP 5 MIC 18GX1.5IN --

## (undated) DEVICE — KIT HND CTRL 3 W STPCOCK ROT END 54IN PREM HI PRSS TBNG AT

## (undated) DEVICE — SET ADMIN 16ML TBNG L100IN 2 Y INJ SITE IV PIGGY BK DISP

## (undated) DEVICE — CANN NASAL O2 CAPNOGRAPHY AD -- FILTERLINE

## (undated) DEVICE — KENDALL RADIOLUCENT FOAM MONITORING ELECTRODE -RECTANGULAR SHAPE: Brand: KENDALL

## (undated) DEVICE — FCPS BX HOT RJ4 2.2MMX240CM -- RADIAL JAW 4 BX/40

## (undated) DEVICE — SUTURE COAT VCRL SZ 4-0 L18IN ABSRB UD L19MM PS-2 1/2 CIR J496G

## (undated) DEVICE — Device

## (undated) DEVICE — FORCEPS BX L240CM JAW DIA2.8MM L CAP W/ NDL MIC MESH TOOTH

## (undated) DEVICE — BAG SPEC BIOHZD LF 2MIL 6X10IN -- CONVERT TO ITEM 357326

## (undated) DEVICE — ENDO CARRY-ON PROCEDURE KIT INCLUDES ENZYMATIC SPONGE, GAUZE, BIOHAZARD LABEL, TRAY, LUBRICANT, DIRTY SCOPE LABEL, WATER LABEL, TRAY, DRAWSTRING PAD, AND DEFENDO 4-PIECE KIT.: Brand: ENDO CARRY-ON PROCEDURE KIT

## (undated) DEVICE — Z DISCONTINUED NO SUB IDED SET EXTN W/ 4 W STPCOCK M SPIN LOK 36IN

## (undated) DEVICE — INSTINCT ENDOSCOPIC HEMOCLIP: Brand: INSTINCT

## (undated) DEVICE — BASIN EMSIS 16OZ GRAPHITE PLAS KID SHP MOLD GRAD FOR ORAL

## (undated) DEVICE — NDL PRT INJ NSAF BLNT 18GX1.5 --

## (undated) DEVICE — PACK PROCEDURE SURG HRT CATH

## (undated) DEVICE — SNARE ENDOSCP M L240CM W27MM SHTH DIA2.4MM CHN 2.8MM OVL

## (undated) DEVICE — KIT INTRO 7FR L14CM DIA0.038IN PEEL AWAY DI-LOCK DIL CLOSE

## (undated) DEVICE — BW-412T DISP COMBO CLEANING BRUSH: Brand: SINGLE USE COMBINATION CLEANING BRUSH

## (undated) DEVICE — CTRL URINE TOX S2E X100 --

## (undated) DEVICE — TRAP SURG QUAD PARABOLA SLOT DSGN SFTY SCRN TRAPEASE

## (undated) DEVICE — BULB SYRINGE, IRRIGATION WITH PROTECTIVE CAP, 60 CC, INDIVIDUALLY WRAPPED: Brand: DOVER

## (undated) DEVICE — SOLIDIFIER FLUID 3000 CC ABSORB

## (undated) DEVICE — SUTURE VCRL SZ 3-0 L27IN ABSRB VLT L26MM SH 1/2 CIR J316H

## (undated) DEVICE — 3M™ IOBAN™ 2 ANTIMICROBIAL INCISE DRAPE 6650EZ: Brand: IOBAN™ 2

## (undated) DEVICE — QUILTED PREMIUM COMFORT UNDERPAD,EXTRA HEAVY: Brand: WINGS

## (undated) DEVICE — KIT COLON W/ 1.1OZ LUB AND 2 END

## (undated) DEVICE — SOLIDIFIER MEDC 1200ML -- CONVERT TO 356117

## (undated) DEVICE — AIRLIFE™ U/CONNECT-IT OXYGEN TUBING 7 FEET (2.1 M) CRUSH-RESISTANT OXYGEN TUBING, VINYL TIPPED: Brand: AIRLIFE™

## (undated) DEVICE — PACEMAKER PACK: Brand: MEDLINE INDUSTRIES, INC.

## (undated) DEVICE — DRSG AQUACEL SURG 3.5X6IN -- CONVERT TO ITEM 369227

## (undated) DEVICE — KIT IV STRT W CHLORAPREP PD 1ML

## (undated) DEVICE — SYRINGE MED 20ML STD CLR PLAS LUERLOCK TIP N CTRL DISP

## (undated) DEVICE — SYR 3ML LL TIP 1/10ML GRAD --

## (undated) DEVICE — NEEDLE HYPO 18GA L1.5IN PNK S STL HUB POLYPR SHLD REG BVL

## (undated) DEVICE — CONNECTOR TBNG AUX H2O JET DISP FOR OLY 160/180 SER

## (undated) DEVICE — SLEEVE PRGM HD CVR PACE STRL --